# Patient Record
Sex: MALE | Race: ASIAN | Employment: OTHER | URBAN - METROPOLITAN AREA
[De-identification: names, ages, dates, MRNs, and addresses within clinical notes are randomized per-mention and may not be internally consistent; named-entity substitution may affect disease eponyms.]

---

## 2018-09-11 PROBLEM — E55.9 VITAMIN D DEFICIENCY: Status: ACTIVE | Noted: 2018-09-11

## 2018-09-11 PROCEDURE — 82043 UR ALBUMIN QUANTITATIVE: CPT | Performed by: INTERNAL MEDICINE

## 2018-09-11 PROCEDURE — 82570 ASSAY OF URINE CREATININE: CPT | Performed by: INTERNAL MEDICINE

## 2018-12-28 PROCEDURE — 81001 URINALYSIS AUTO W/SCOPE: CPT | Performed by: INTERNAL MEDICINE

## 2019-02-03 PROBLEM — E11.29 TYPE 2 DIABETES MELLITUS WITH MICROALBUMINURIA (HCC): Status: ACTIVE | Noted: 2019-02-03

## 2019-02-03 PROBLEM — R80.9 TYPE 2 DIABETES MELLITUS WITH MICROALBUMINURIA: Status: ACTIVE | Noted: 2019-02-03

## 2019-02-03 PROBLEM — E11.29 TYPE 2 DIABETES MELLITUS WITH MICROALBUMINURIA  (HCC): Status: ACTIVE | Noted: 2019-02-03

## 2019-02-03 PROBLEM — E11.29 TYPE 2 DIABETES MELLITUS WITH MICROALBUMINURIA: Status: ACTIVE | Noted: 2019-02-03

## 2019-02-03 PROBLEM — R80.9 TYPE 2 DIABETES MELLITUS WITH MICROALBUMINURIA (HCC): Status: ACTIVE | Noted: 2019-02-03

## 2019-02-03 PROBLEM — R80.9 TYPE 2 DIABETES MELLITUS WITH MICROALBUMINURIA  (HCC): Status: ACTIVE | Noted: 2019-02-03

## 2019-02-04 PROCEDURE — 81001 URINALYSIS AUTO W/SCOPE: CPT | Performed by: INTERNAL MEDICINE

## 2019-02-04 PROCEDURE — 84156 ASSAY OF PROTEIN URINE: CPT | Performed by: INTERNAL MEDICINE

## 2019-02-04 PROCEDURE — 82652 VIT D 1 25-DIHYDROXY: CPT | Performed by: INTERNAL MEDICINE

## 2019-02-04 PROCEDURE — 86335 IMMUNFIX E-PHORSIS/URINE/CSF: CPT | Performed by: INTERNAL MEDICINE

## 2019-02-04 PROCEDURE — 84165 PROTEIN E-PHORESIS SERUM: CPT | Performed by: INTERNAL MEDICINE

## 2019-02-04 PROCEDURE — 84100 ASSAY OF PHOSPHORUS: CPT | Performed by: INTERNAL MEDICINE

## 2019-02-04 PROCEDURE — 82043 UR ALBUMIN QUANTITATIVE: CPT | Performed by: INTERNAL MEDICINE

## 2019-02-04 PROCEDURE — 83883 ASSAY NEPHELOMETRY NOT SPEC: CPT | Performed by: INTERNAL MEDICINE

## 2019-02-04 PROCEDURE — 83970 ASSAY OF PARATHORMONE: CPT | Performed by: INTERNAL MEDICINE

## 2019-02-04 PROCEDURE — 86334 IMMUNOFIX E-PHORESIS SERUM: CPT | Performed by: INTERNAL MEDICINE

## 2019-02-04 PROCEDURE — 82570 ASSAY OF URINE CREATININE: CPT | Performed by: INTERNAL MEDICINE

## 2019-02-04 PROCEDURE — 82310 ASSAY OF CALCIUM: CPT | Performed by: INTERNAL MEDICINE

## 2019-02-04 PROCEDURE — 82565 ASSAY OF CREATININE: CPT | Performed by: INTERNAL MEDICINE

## 2019-02-04 PROCEDURE — 82784 ASSAY IGA/IGD/IGG/IGM EACH: CPT | Performed by: INTERNAL MEDICINE

## 2019-02-11 PROCEDURE — 84156 ASSAY OF PROTEIN URINE: CPT | Performed by: INTERNAL MEDICINE

## 2019-02-11 PROCEDURE — 82570 ASSAY OF URINE CREATININE: CPT | Performed by: INTERNAL MEDICINE

## 2019-02-11 PROCEDURE — 36415 COLL VENOUS BLD VENIPUNCTURE: CPT | Performed by: INTERNAL MEDICINE

## 2019-02-21 ENCOUNTER — HOSPITAL ENCOUNTER (OUTPATIENT)
Facility: HOSPITAL | Age: 69
Setting detail: HOSPITAL OUTPATIENT SURGERY
Discharge: HOME | End: 2019-02-21
Attending: OPHTHALMOLOGY | Admitting: OPHTHALMOLOGY
Payer: MEDICARE

## 2019-02-21 VITALS
BODY MASS INDEX: 30.46 KG/M2 | OXYGEN SATURATION: 100 % | DIASTOLIC BLOOD PRESSURE: 77 MMHG | WEIGHT: 224.88 LBS | HEIGHT: 72 IN | RESPIRATION RATE: 16 BRPM | TEMPERATURE: 98 F | HEART RATE: 56 BPM | SYSTOLIC BLOOD PRESSURE: 139 MMHG

## 2019-02-21 DIAGNOSIS — H25.812 COMBINED FORMS OF AGE-RELATED CATARACT OF LEFT EYE: ICD-10-CM

## 2019-02-21 LAB
GLUCOSE BLD-MCNC: 158 MG/DL (ref 70–99)
GLUCOSE BLD-MCNC: 168 MG/DL (ref 70–99)

## 2019-02-21 PROCEDURE — 08RK3JZ REPLACEMENT OF LEFT LENS WITH SYNTHETIC SUBSTITUTE, PERCUTANEOUS APPROACH: ICD-10-PCS | Performed by: OPHTHALMOLOGY

## 2019-02-21 PROCEDURE — 82962 GLUCOSE BLOOD TEST: CPT

## 2019-02-21 RX ORDER — TROPICAMIDE 10 MG/ML
1 SOLUTION/ DROPS OPHTHALMIC SEE ADMIN INSTRUCTIONS
Status: COMPLETED | OUTPATIENT
Start: 2019-02-21 | End: 2019-02-21

## 2019-02-21 RX ORDER — SODIUM CHLORIDE, SODIUM LACTATE, POTASSIUM CHLORIDE, CALCIUM CHLORIDE 600; 310; 30; 20 MG/100ML; MG/100ML; MG/100ML; MG/100ML
INJECTION, SOLUTION INTRAVENOUS CONTINUOUS
Status: DISCONTINUED | OUTPATIENT
Start: 2019-02-21 | End: 2019-02-21

## 2019-02-21 RX ORDER — LIDOCAINE HYDROCHLORIDE 10 MG/ML
INJECTION, SOLUTION EPIDURAL; INFILTRATION; INTRACAUDAL; PERINEURAL AS NEEDED
Status: DISCONTINUED | OUTPATIENT
Start: 2019-02-21 | End: 2019-02-21 | Stop reason: HOSPADM

## 2019-02-21 RX ORDER — ONDANSETRON 2 MG/ML
4 INJECTION INTRAMUSCULAR; INTRAVENOUS AS NEEDED
Status: DISCONTINUED | OUTPATIENT
Start: 2019-02-21 | End: 2019-02-21

## 2019-02-21 RX ORDER — HYDROMORPHONE HYDROCHLORIDE 1 MG/ML
0.4 INJECTION, SOLUTION INTRAMUSCULAR; INTRAVENOUS; SUBCUTANEOUS EVERY 5 MIN PRN
Status: DISCONTINUED | OUTPATIENT
Start: 2019-02-21 | End: 2019-02-21

## 2019-02-21 RX ORDER — HYDROCODONE BITARTRATE AND ACETAMINOPHEN 5; 325 MG/1; MG/1
1 TABLET ORAL AS NEEDED
Status: DISCONTINUED | OUTPATIENT
Start: 2019-02-21 | End: 2019-02-21

## 2019-02-21 RX ORDER — ACETAMINOPHEN 500 MG
1000 TABLET ORAL ONCE
Status: DISCONTINUED | OUTPATIENT
Start: 2019-02-21 | End: 2019-02-21 | Stop reason: HOSPADM

## 2019-02-21 RX ORDER — TETRACAINE HYDROCHLORIDE 5 MG/ML
1 SOLUTION OPHTHALMIC SEE ADMIN INSTRUCTIONS
Status: COMPLETED | OUTPATIENT
Start: 2019-02-21 | End: 2019-02-21

## 2019-02-21 RX ORDER — HYDROCODONE BITARTRATE AND ACETAMINOPHEN 5; 325 MG/1; MG/1
2 TABLET ORAL AS NEEDED
Status: DISCONTINUED | OUTPATIENT
Start: 2019-02-21 | End: 2019-02-21

## 2019-02-21 RX ORDER — DEXTROSE MONOHYDRATE 25 G/50ML
50 INJECTION, SOLUTION INTRAVENOUS
Status: DISCONTINUED | OUTPATIENT
Start: 2019-02-21 | End: 2019-02-21

## 2019-02-21 RX ORDER — TETRACAINE HYDROCHLORIDE 5 MG/ML
SOLUTION OPHTHALMIC AS NEEDED
Status: DISCONTINUED | OUTPATIENT
Start: 2019-02-21 | End: 2019-02-21 | Stop reason: HOSPADM

## 2019-02-21 RX ORDER — DEXTROSE MONOHYDRATE 25 G/50ML
50 INJECTION, SOLUTION INTRAVENOUS
Status: DISCONTINUED | OUTPATIENT
Start: 2019-02-21 | End: 2019-02-21 | Stop reason: HOSPADM

## 2019-02-21 RX ORDER — NALOXONE HYDROCHLORIDE 0.4 MG/ML
80 INJECTION, SOLUTION INTRAMUSCULAR; INTRAVENOUS; SUBCUTANEOUS AS NEEDED
Status: DISCONTINUED | OUTPATIENT
Start: 2019-02-21 | End: 2019-02-21

## 2019-02-21 RX ORDER — ACETAMINOPHEN 500 MG
1000 TABLET ORAL EVERY 6 HOURS PRN
COMMUNITY

## 2019-02-21 RX ORDER — PHENYLEPHRINE HCL 2.5 %
1 DROPS OPHTHALMIC (EYE) SEE ADMIN INSTRUCTIONS
Status: COMPLETED | OUTPATIENT
Start: 2019-02-21 | End: 2019-02-21

## 2019-02-21 RX ORDER — METOCLOPRAMIDE HYDROCHLORIDE 5 MG/ML
10 INJECTION INTRAMUSCULAR; INTRAVENOUS AS NEEDED
Status: DISCONTINUED | OUTPATIENT
Start: 2019-02-21 | End: 2019-02-21

## 2019-02-21 RX ORDER — CYCLOPENTOLATE HYDROCHLORIDE 10 MG/ML
1 SOLUTION/ DROPS OPHTHALMIC SEE ADMIN INSTRUCTIONS
Status: COMPLETED | OUTPATIENT
Start: 2019-02-21 | End: 2019-02-21

## 2019-02-21 RX ORDER — DEXAMETHASONE SODIUM PHOSPHATE 4 MG/ML
4 VIAL (ML) INJECTION AS NEEDED
Status: DISCONTINUED | OUTPATIENT
Start: 2019-02-21 | End: 2019-02-21

## 2019-02-21 RX ORDER — MOXIFLOXACIN 5 MG/ML
SOLUTION/ DROPS OPHTHALMIC AS NEEDED
Status: DISCONTINUED | OUTPATIENT
Start: 2019-02-21 | End: 2019-02-21 | Stop reason: HOSPADM

## 2019-02-21 RX ORDER — BALANCED SALT SOLUTION 6.4; .75; .48; .3; 3.9; 1.7 MG/ML; MG/ML; MG/ML; MG/ML; MG/ML; MG/ML
SOLUTION OPHTHALMIC AS NEEDED
Status: DISCONTINUED | OUTPATIENT
Start: 2019-02-21 | End: 2019-02-21 | Stop reason: HOSPADM

## 2019-02-21 NOTE — INTERVAL H&P NOTE
Pre-op Diagnosis: Combined forms of age-related cataract of left eye [H25.812]    The above referenced H&P was reviewed by Bennie Sánchez MD on 2/21/2019, the patient was examined and no significant changes have occurred in the patient's condition since th

## 2019-02-21 NOTE — OPERATIVE REPORT
[facility] OPERATIVE REPORT:     PATIENT NAME:  Ailyn Brown    MRN:  [id number]    DATE OF OPERATION:   2/21/2019      PREOPERATIVE DIAGNOSIS:   1. Cortical Cataract, OS  2. Nuclear Sclerotic Cataract, OS    POSTOPERATIVE DIAGNOSIS:   1.  Cortical cannula. The nucleus was then divided into 4 quadrants using a prechopper. The phacoemulsification tip was introduced into the eye, and the nuclear fragments were removed without difficulty.  The residual cortex was removed using automated irrigation and as

## 2019-04-03 PROBLEM — E66.09 CLASS 1 OBESITY DUE TO EXCESS CALORIES WITH SERIOUS COMORBIDITY AND BODY MASS INDEX (BMI) OF 31.0 TO 31.9 IN ADULT: Status: ACTIVE | Noted: 2019-04-03

## 2019-04-03 PROBLEM — M17.12 OSTEOARTHRITIS OF LEFT KNEE: Status: ACTIVE | Noted: 2019-04-03

## 2019-06-10 ENCOUNTER — TELEPHONE (OUTPATIENT)
Dept: INTERNAL MEDICINE CLINIC | Facility: CLINIC | Age: 69
End: 2019-06-10

## 2019-06-10 RX ORDER — LOSARTAN POTASSIUM 100 MG/1
100 TABLET ORAL DAILY
Qty: 90 TABLET | Refills: 2 | OUTPATIENT
Start: 2019-06-10

## 2019-06-10 NOTE — TELEPHONE ENCOUNTER
Requested Prescriptions     Pending Prescriptions Disp Refills   • losartan 100 MG Oral Tab 90 tablet 2     Sig: Take 1 tablet (100 mg total) by mouth daily.        [address] [address], [place] [address] Waqar Heredia

## 2019-06-10 NOTE — TELEPHONE ENCOUNTER
Fax from [facility] asking for refill for:      Losartan 100mg - daily - #90      Fax to:  [phone]

## 2019-06-21 PROCEDURE — 81001 URINALYSIS AUTO W/SCOPE: CPT | Performed by: INTERNAL MEDICINE

## 2019-06-21 PROCEDURE — 84156 ASSAY OF PROTEIN URINE: CPT | Performed by: INTERNAL MEDICINE

## 2019-08-27 PROCEDURE — 84156 ASSAY OF PROTEIN URINE: CPT | Performed by: INTERNAL MEDICINE

## 2019-08-27 PROCEDURE — 83970 ASSAY OF PARATHORMONE: CPT | Performed by: INTERNAL MEDICINE

## 2019-08-27 PROCEDURE — 84100 ASSAY OF PHOSPHORUS: CPT | Performed by: INTERNAL MEDICINE

## 2019-08-27 PROCEDURE — 82310 ASSAY OF CALCIUM: CPT | Performed by: INTERNAL MEDICINE

## 2019-08-27 PROCEDURE — 82565 ASSAY OF CREATININE: CPT | Performed by: INTERNAL MEDICINE

## 2019-09-10 RX ORDER — FENOFIBRATE 160 MG/1
160 TABLET ORAL DAILY
Qty: 90 TABLET | Refills: 0 | Status: SHIPPED | OUTPATIENT
Start: 2019-09-10 | End: 2019-12-10

## 2019-09-10 NOTE — TELEPHONE ENCOUNTER
Requested Prescriptions     Pending Prescriptions Disp Refills   • Fenofibrate 160 MG Oral Tab 90 tablet 2     Sig: Take 1 tablet (160 mg total) by mouth daily.        [address] [place] [address], [place] [address] [facility] 99.9

## 2019-11-21 RX ORDER — LOSARTAN POTASSIUM 100 MG/1
100 TABLET ORAL DAILY
Qty: 90 TABLET | Refills: 3 | Status: SHIPPED | OUTPATIENT
Start: 2019-11-21 | End: 2020-11-30

## 2019-11-26 PROBLEM — E11.69 COMBINED HYPERLIPIDEMIA ASSOCIATED WITH TYPE 2 DIABETES MELLITUS: Status: ACTIVE | Noted: 2019-11-26

## 2019-11-26 PROBLEM — E78.2 COMBINED HYPERLIPIDEMIA ASSOCIATED WITH TYPE 2 DIABETES MELLITUS  (HCC): Status: ACTIVE | Noted: 2019-11-26

## 2019-11-26 PROBLEM — E78.2 COMBINED HYPERLIPIDEMIA ASSOCIATED WITH TYPE 2 DIABETES MELLITUS (HCC): Status: ACTIVE | Noted: 2019-11-26

## 2019-11-26 PROBLEM — I15.2 HYPERTENSION ASSOCIATED WITH TYPE 2 DIABETES MELLITUS: Status: ACTIVE | Noted: 2019-11-26

## 2019-11-26 PROBLEM — I15.2 HYPERTENSION ASSOCIATED WITH TYPE 2 DIABETES MELLITUS  (HCC): Status: ACTIVE | Noted: 2019-11-26

## 2019-11-26 PROBLEM — E11.59 HYPERTENSION ASSOCIATED WITH TYPE 2 DIABETES MELLITUS: Status: ACTIVE | Noted: 2019-11-26

## 2019-11-26 PROBLEM — I15.2 HYPERTENSION ASSOCIATED WITH TYPE 2 DIABETES MELLITUS (HCC): Status: ACTIVE | Noted: 2019-11-26

## 2019-11-26 PROBLEM — E11.21 TYPE 2 DIABETES MELLITUS WITH NEPHROPATHY (HCC): Status: ACTIVE | Noted: 2019-11-26

## 2019-11-26 PROBLEM — E11.69 COMBINED HYPERLIPIDEMIA ASSOCIATED WITH TYPE 2 DIABETES MELLITUS (HCC): Status: ACTIVE | Noted: 2019-11-26

## 2019-11-26 PROBLEM — E11.59 HYPERTENSION ASSOCIATED WITH TYPE 2 DIABETES MELLITUS  (HCC): Status: ACTIVE | Noted: 2019-11-26

## 2019-11-26 PROBLEM — E78.2 COMBINED HYPERLIPIDEMIA ASSOCIATED WITH TYPE 2 DIABETES MELLITUS: Status: ACTIVE | Noted: 2019-11-26

## 2019-11-26 PROBLEM — E11.59 HYPERTENSION ASSOCIATED WITH TYPE 2 DIABETES MELLITUS (HCC): Status: ACTIVE | Noted: 2019-11-26

## 2019-11-26 PROBLEM — E11.69 COMBINED HYPERLIPIDEMIA ASSOCIATED WITH TYPE 2 DIABETES MELLITUS  (HCC): Status: ACTIVE | Noted: 2019-11-26

## 2019-12-11 RX ORDER — FENOFIBRATE 160 MG/1
TABLET ORAL
Qty: 90 TABLET | Refills: 3 | Status: SHIPPED | OUTPATIENT
Start: 2019-12-11 | End: 2020-12-02

## 2019-12-11 NOTE — TELEPHONE ENCOUNTER
Last office visit: 12/4/2019    Future Appointments   Date Time Provider Maximiliano Rain   2/5/2020  9:20 AM MD Mina Guzman [id number]       Last labs: 12/4/2019    Pharmacy: Daily    Requested Prescriptions     Signed Prescriptions Disp Ref

## 2020-03-10 RX ORDER — METOPROLOL SUCCINATE 100 MG/1
100 TABLET, EXTENDED RELEASE ORAL DAILY
Qty: 90 TABLET | Refills: 3 | OUTPATIENT
Start: 2020-03-10

## 2020-03-10 NOTE — TELEPHONE ENCOUNTER
Refill request has failed the Ambulatory Medication Refill Standing Order and is routed to the primary physician to review the following:    Requested Prescriptions     Refused Prescriptions Disp Refills   • Metoprolol Succinate  MG Oral Tablet 24 Hr

## 2020-04-11 ENCOUNTER — APPOINTMENT (OUTPATIENT)
Dept: GENERAL RADIOLOGY | Facility: HOSPITAL | Age: 70
End: 2020-04-11
Attending: EMERGENCY MEDICINE
Payer: MEDICARE

## 2020-04-11 ENCOUNTER — HOSPITAL ENCOUNTER (EMERGENCY)
Facility: HOSPITAL | Age: 70
Discharge: HOME | End: 2020-04-11
Attending: EMERGENCY MEDICINE
Payer: MEDICARE

## 2020-04-11 ENCOUNTER — APPOINTMENT (OUTPATIENT)
Dept: CT IMAGING | Facility: HOSPITAL | Age: 70
End: 2020-04-11
Attending: EMERGENCY MEDICINE
Payer: MEDICARE

## 2020-04-11 VITALS
HEIGHT: 72 IN | HEART RATE: 68 BPM | DIASTOLIC BLOOD PRESSURE: 76 MMHG | BODY MASS INDEX: 29.8 KG/M2 | RESPIRATION RATE: 18 BRPM | OXYGEN SATURATION: 96 % | TEMPERATURE: 97 F | SYSTOLIC BLOOD PRESSURE: 140 MMHG | WEIGHT: 220 LBS
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
## 2020-04-11 PROCEDURE — 72125 CT NECK SPINE W/O DYE: CPT | Performed by: EMERGENCY MEDICINE

## 2020-04-11 PROCEDURE — 99284 EMERGENCY DEPT VISIT MOD MDM: CPT

## 2020-04-11 PROCEDURE — 72100 X-RAY EXAM L-S SPINE 2/3 VWS: CPT | Performed by: EMERGENCY MEDICINE

## 2020-04-11 PROCEDURE — 73560 X-RAY EXAM OF KNEE 1 OR 2: CPT | Performed by: EMERGENCY MEDICINE

## 2020-04-11 PROCEDURE — 70450 CT HEAD/BRAIN W/O DYE: CPT | Performed by: EMERGENCY MEDICINE

## 2020-04-11 RX ORDER — HYDROCODONE BITARTRATE AND ACETAMINOPHEN 5; 325 MG/1; MG/1
1 TABLET ORAL ONCE
Status: COMPLETED | OUTPATIENT
Start: 2020-04-11 | End: 2020-04-11
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
## 2020-06-22 PROBLEM — D50.9 IRON DEFICIENCY ANEMIA: Status: ACTIVE | Noted: 2020-06-22

## 2020-06-22 PROBLEM — R60.0 BILATERAL LOWER EXTREMITY EDEMA: Status: ACTIVE | Noted: 2020-06-22

## 2020-07-01 PROBLEM — I44.7 LBBB (LEFT BUNDLE BRANCH BLOCK): Status: ACTIVE | Noted: 2020-07-01

## 2021-02-09 PROBLEM — R06.09 DOE (DYSPNEA ON EXERTION): Status: ACTIVE | Noted: 2021-02-09

## 2021-02-09 PROBLEM — R06.00 DOE (DYSPNEA ON EXERTION): Status: ACTIVE | Noted: 2021-02-09

## 2021-02-17 PROBLEM — R06.00 DOE (DYSPNEA ON EXERTION): Status: RESOLVED | Noted: 2021-02-09 | Resolved: 2021-02-17

## 2021-02-17 PROBLEM — R60.0 LOWER EXTREMITY EDEMA: Status: ACTIVE | Noted: 2020-06-22

## 2021-02-17 PROBLEM — I44.7 LBBB (LEFT BUNDLE BRANCH BLOCK): Status: RESOLVED | Noted: 2020-07-01 | Resolved: 2021-02-17

## 2021-02-17 PROBLEM — N18.4 CKD (CHRONIC KIDNEY DISEASE) STAGE 4, GFR 15-29 ML/MIN (HCC): Status: ACTIVE | Noted: 2021-02-17

## 2021-02-17 PROBLEM — E66.09 CLASS 1 OBESITY DUE TO EXCESS CALORIES WITH SERIOUS COMORBIDITY AND BODY MASS INDEX (BMI) OF 31.0 TO 31.9 IN ADULT: Status: RESOLVED | Noted: 2019-04-03 | Resolved: 2021-02-17

## 2021-02-17 PROBLEM — R60.0 BILATERAL LOWER EXTREMITY EDEMA: Status: RESOLVED | Noted: 2020-06-22 | Resolved: 2021-02-17

## 2021-02-17 PROBLEM — R06.09 DOE (DYSPNEA ON EXERTION): Status: RESOLVED | Noted: 2021-02-09 | Resolved: 2021-02-17

## 2021-02-17 PROBLEM — M17.12 OSTEOARTHRITIS OF LEFT KNEE: Status: RESOLVED | Noted: 2019-04-03 | Resolved: 2021-02-17

## 2021-03-24 ENCOUNTER — APPOINTMENT (OUTPATIENT)
Dept: GENERAL RADIOLOGY | Facility: HOSPITAL | Age: 71
DRG: 292 | End: 2021-03-24
Attending: EMERGENCY MEDICINE
Payer: MEDICARE

## 2021-03-24 ENCOUNTER — HOSPITAL ENCOUNTER (INPATIENT)
Facility: HOSPITAL | Age: 71
LOS: 2 days | Discharge: HOME | DRG: 292 | End: 2021-03-26
Attending: EMERGENCY MEDICINE | Admitting: STUDENT IN AN ORGANIZED HEALTH CARE EDUCATION/TRAINING PROGRAM
Payer: MEDICARE

## 2021-03-24 ENCOUNTER — APPOINTMENT (OUTPATIENT)
Dept: CV DIAGNOSTICS | Facility: HOSPITAL | Age: 71
DRG: 292 | End: 2021-03-24
Attending: INTERNAL MEDICINE
Payer: MEDICARE

## 2021-03-24 DIAGNOSIS — I50.9 ACUTE ON CHRONIC CONGESTIVE HEART FAILURE, UNSPECIFIED HEART FAILURE TYPE (HCC): ICD-10-CM

## 2021-03-24 DIAGNOSIS — J96.01 ACUTE RESPIRATORY FAILURE WITH HYPOXIA (HCC): ICD-10-CM

## 2021-03-24 DIAGNOSIS — J81.0 ACUTE PULMONARY EDEMA (HCC): Primary | ICD-10-CM

## 2021-03-24 PROCEDURE — 93005 ELECTROCARDIOGRAM TRACING: CPT

## 2021-03-24 PROCEDURE — 96365 THER/PROPH/DIAG IV INF INIT: CPT

## 2021-03-24 PROCEDURE — 85025 COMPLETE CBC W/AUTO DIFF WBC: CPT | Performed by: EMERGENCY MEDICINE

## 2021-03-24 PROCEDURE — 82962 GLUCOSE BLOOD TEST: CPT

## 2021-03-24 PROCEDURE — 84484 ASSAY OF TROPONIN QUANT: CPT | Performed by: EMERGENCY MEDICINE

## 2021-03-24 PROCEDURE — 93010 ELECTROCARDIOGRAM REPORT: CPT | Performed by: EMERGENCY MEDICINE

## 2021-03-24 PROCEDURE — 83880 ASSAY OF NATRIURETIC PEPTIDE: CPT | Performed by: EMERGENCY MEDICINE

## 2021-03-24 PROCEDURE — 80048 BASIC METABOLIC PNL TOTAL CA: CPT | Performed by: INTERNAL MEDICINE

## 2021-03-24 PROCEDURE — 96375 TX/PRO/DX INJ NEW DRUG ADDON: CPT

## 2021-03-24 PROCEDURE — 80048 BASIC METABOLIC PNL TOTAL CA: CPT | Performed by: EMERGENCY MEDICINE

## 2021-03-24 PROCEDURE — 85025 COMPLETE CBC W/AUTO DIFF WBC: CPT | Performed by: INTERNAL MEDICINE

## 2021-03-24 PROCEDURE — 99285 EMERGENCY DEPT VISIT HI MDM: CPT

## 2021-03-24 PROCEDURE — 96366 THER/PROPH/DIAG IV INF ADDON: CPT

## 2021-03-24 PROCEDURE — 71045 X-RAY EXAM CHEST 1 VIEW: CPT | Performed by: EMERGENCY MEDICINE

## 2021-03-24 RX ORDER — HYDRALAZINE HYDROCHLORIDE 25 MG/1
25 TABLET, FILM COATED ORAL 3 TIMES DAILY
Status: DISCONTINUED | OUTPATIENT
Start: 2021-03-24 | End: 2021-03-26

## 2021-03-24 RX ORDER — LOSARTAN POTASSIUM 100 MG/1
100 TABLET ORAL DAILY
Status: DISCONTINUED | OUTPATIENT
Start: 2021-03-24 | End: 2021-03-26

## 2021-03-24 RX ORDER — PANTOPRAZOLE SODIUM 40 MG/1
40 TABLET, DELAYED RELEASE ORAL
Status: DISCONTINUED | OUTPATIENT
Start: 2021-03-24 | End: 2021-03-26

## 2021-03-24 RX ORDER — GABAPENTIN 300 MG/1
300 CAPSULE ORAL 2 TIMES DAILY
Status: DISCONTINUED | OUTPATIENT
Start: 2021-03-24 | End: 2021-03-26

## 2021-03-24 RX ORDER — ACETAMINOPHEN 325 MG/1
650 TABLET ORAL EVERY 6 HOURS PRN
Status: DISCONTINUED | OUTPATIENT
Start: 2021-03-24 | End: 2021-03-26

## 2021-03-24 RX ORDER — DEXTROSE MONOHYDRATE 25 G/50ML
50 INJECTION, SOLUTION INTRAVENOUS
Status: DISCONTINUED | OUTPATIENT
Start: 2021-03-24 | End: 2021-03-26

## 2021-03-24 RX ORDER — NITROGLYCERIN 20 MG/100ML
INJECTION INTRAVENOUS CONTINUOUS
Status: DISCONTINUED | OUTPATIENT
Start: 2021-03-24 | End: 2021-03-26

## 2021-03-24 RX ORDER — ALBUTEROL SULFATE 90 UG/1
2 AEROSOL, METERED RESPIRATORY (INHALATION) EVERY 6 HOURS PRN
Status: DISCONTINUED | OUTPATIENT
Start: 2021-03-24 | End: 2021-03-26

## 2021-03-24 RX ORDER — ASPIRIN 81 MG/1
81 TABLET ORAL DAILY
Status: DISCONTINUED | OUTPATIENT
Start: 2021-03-24 | End: 2021-03-26

## 2021-03-24 RX ORDER — TAMSULOSIN HYDROCHLORIDE 0.4 MG/1
0.8 CAPSULE ORAL DAILY
Status: DISCONTINUED | OUTPATIENT
Start: 2021-03-24 | End: 2021-03-26

## 2021-03-24 RX ORDER — AMLODIPINE BESYLATE 10 MG/1
10 TABLET ORAL DAILY
Status: DISCONTINUED | OUTPATIENT
Start: 2021-03-24 | End: 2021-03-26

## 2021-03-24 RX ORDER — FUROSEMIDE 10 MG/ML
40 INJECTION INTRAMUSCULAR; INTRAVENOUS
Status: DISCONTINUED | OUTPATIENT
Start: 2021-03-24 | End: 2021-03-25

## 2021-03-24 RX ORDER — HEPARIN SODIUM 5000 [USP'U]/ML
5000 INJECTION, SOLUTION INTRAVENOUS; SUBCUTANEOUS EVERY 8 HOURS SCHEDULED
Status: DISCONTINUED | OUTPATIENT
Start: 2021-03-24 | End: 2021-03-26

## 2021-03-24 RX ORDER — FENOFIBRATE 134 MG/1
134 CAPSULE ORAL
Status: DISCONTINUED | OUTPATIENT
Start: 2021-03-24 | End: 2021-03-26

## 2021-03-24 RX ORDER — METOPROLOL SUCCINATE 100 MG/1
100 TABLET, EXTENDED RELEASE ORAL DAILY
Status: DISCONTINUED | OUTPATIENT
Start: 2021-03-24 | End: 2021-03-26

## 2021-03-24 RX ORDER — ONDANSETRON 2 MG/ML
4 INJECTION INTRAMUSCULAR; INTRAVENOUS EVERY 4 HOURS PRN
Status: ACTIVE | OUTPATIENT
Start: 2021-03-24 | End: 2021-03-24

## 2021-03-24 RX ORDER — PRAVASTATIN SODIUM 20 MG
20 TABLET ORAL NIGHTLY
Status: DISCONTINUED | OUTPATIENT
Start: 2021-03-24 | End: 2021-03-26

## 2021-03-24 RX ORDER — FUROSEMIDE 10 MG/ML
40 INJECTION INTRAMUSCULAR; INTRAVENOUS ONCE
Status: COMPLETED | OUTPATIENT
Start: 2021-03-24 | End: 2021-03-24

## 2021-03-24 RX ORDER — FINASTERIDE 5 MG/1
5 TABLET, FILM COATED ORAL DAILY
Status: DISCONTINUED | OUTPATIENT
Start: 2021-03-24 | End: 2021-03-26

## 2021-03-24 NOTE — PLAN OF CARE
Patient alert and oriented x4. Patient has been comfortable in bed with call light in reach. Patient is self care can ambulate to the bathroom by himself. Patient was suppose to get Echo today, push back to possibly tomorrow. BNP was slightly elevated.  Michele Vogt cardiology and pulmonology  - Respiratory Txs as needed  - Medications as ordered  - Labs and procedures as ordered  - See additional Care Plan goals for specific interventions  Outcome: Progressing     Problem: CARDIOVASCULAR - ADULT  Goal: Maintains opti

## 2021-03-24 NOTE — ED NOTES
Pt states that WOB is easier. Pts work of breathing appears slightly easier. O2 turned down to 2l o2 nc. Will continue to monitor.

## 2021-03-24 NOTE — ED NOTES
Orders for admission, patient is aware of plan and ready to go upstairs. Any questions, please call ED RN Wilfred Keenan at extension [phone].    Type of COVID test sent: rapid  COVID Suspicion level: Low    Titratable drug(s) infusing: n/a  Rate:    LOC at time of tr

## 2021-03-24 NOTE — PLAN OF CARE
Pt admitted from ER with increasing SOB. Oxygenating WNL on 3L NC. No complaints or events at this time. Self care. Plan is to have ECHO today and continue with diuresing.      Problem: Patient Centered Care  Goal: Patient preferences are identified and int CARDIOVASCULAR - ADULT  Goal: Maintains optimal cardiac output and hemodynamic stability  Description: INTERVENTIONS:  - Monitor vital signs, rhythm, and trends  - Monitor for bleeding, hypotension and signs of decreased cardiac output  - Evaluate effectiv vital signs for trends  - Administer supportive blood products/factors, fluids and medications as ordered and appropriate  - Administer supportive blood products/factors as ordered and appropriate  Outcome: Progressing  Goal: Free from bleeding injury  [place]

## 2021-03-24 NOTE — PROGRESS NOTES
ASSESSMENT/PLAN:     Impression: 1. Acute shortness of breath suspicious for congestive heart failure in a 57-year-old male with risk factors heart disease. (HFpEF)  2. Chronic kidney injury. 3.  Diabetes mellitus. 4.  Hypertension 5.   Obesity    Rec CAD: No   Social History:  Social History    Tobacco Use      Smoking status: Never Smoker      Smokeless tobacco: Never Used    Vaping Use      Vaping Use: Never used    Alcohol use: No    Drug use: No       REVIEW OF SYSTEMS:   CONS:denies fever, chills,

## 2021-03-24 NOTE — ED NOTES
Pt c/o dyspnea x2days, worse on exertion. pts work of breathing is labored. Skin color is appropriate. Mild conversational dyspnea. Pt is aox4. Pt states that he has been compliant with his meds. Will continue to monitor.

## 2021-03-24 NOTE — H&P
[facility] Hospitalist H&P     CC: Patient presents with:  Difficulty Breathing     PCP: Ky Hall MD    Date of Admission: 3/24/2021 12:13 AM    ASSESSMENT / PLAN:     Mr. Taurus Marquez is a 64 yo M with PMH of DM2, HTN, HL, BPH who presented with shortness o lasix to take on day prior to admission. In the ED, BP elevated, initially started on nitroglycerin gtt and received IV lasix. Breathing feels better today but not back to baseline.        PMH  Past Medical History:   Diagnosis Date   • BPH (benign prostati Monitoring Suppl [facility] ULTRA MINI) w/Device Does not apply Kit, Test Blood Sugar Once Daily. Non-Insulin Dependent Diabetes Type II. E11.9., Disp: 1 kit, Rfl: 0  OneTouch UltraSoft Lancets Does not apply Misc, Test Blood Sugar Once Daily.  Non-Insulin D HCT 36.5* 34.7*   MCV 96.3 96.7   MCH 29.3 29.2   MCHC 30.4* 30.3*   RDW 14.1 14.1   NEPRELIM 9.29* 8.75*   WBC 12.1* 11.0   .0 206.0         Recent Labs   Lab 03/24/21  0019 03/24/21  0544   * 149*   BUN 44* 43*   CREATSERUM 2.76* 2.73*

## 2021-03-24 NOTE — ED PROVIDER NOTES
Patient Seen in: [facility] AND [facility] Emergency Department    History   Patient presents with:  Difficulty Breathing      HPI    The patient presents to the ED complaining of shortness of breath of the past 2 days it been progressive. Severe today.   He sta Year:       Ran Out of Food in the Last Year:   Transportation Needs:       Lack of Transportation (Medical):       Lack of Transportation (Non-Medical):   Physical Activity:       Days of Exercise per Week:       Minutes of Exercise per Session:   Stress: Conjunctiva/sclera: Conjunctivae normal.   Neck:      Trachea: No tracheal deviation. Cardiovascular:      Rate and Rhythm: Normal rate. Pulmonary:      Effort: Tachypnea and respiratory distress present. Breath sounds: No stridor.  Examination of -----------         ------                                     CBC W/ DIFFERENTIAL[[id number]]          Abnormal            Final result                                                 Please view results for these tests on the individu 03/24/21  0215 03/24/21  0230 03/24/21  0245   BP: 147/76 154/72 155/83 156/90   Pulse: 82 82 82 78   Resp: 18 21 19 16   Temp:       SpO2: 100% 96% 96% 96%     *I personally reviewed and interpreted all ED vitals.     Pulse Ox: 82%, Room air, hypoxic    [facility])    Disposition:  Admit    Follow-up:  No follow-up provider specified.     Medications Prescribed:  Current Discharge Medication List        Hospital Problems     Present on Admission  Date Reviewed: 3/12/2021        ICD-10-CM Noted POA    * (Amelia Contreras

## 2021-03-25 ENCOUNTER — APPOINTMENT (OUTPATIENT)
Dept: CV DIAGNOSTICS | Facility: HOSPITAL | Age: 71
DRG: 292 | End: 2021-03-25
Attending: INTERNAL MEDICINE
Payer: MEDICARE

## 2021-03-25 ENCOUNTER — APPOINTMENT (OUTPATIENT)
Dept: ULTRASOUND IMAGING | Facility: HOSPITAL | Age: 71
DRG: 292 | End: 2021-03-25
Attending: INTERNAL MEDICINE
Payer: MEDICARE

## 2021-03-25 PROCEDURE — 84156 ASSAY OF PROTEIN URINE: CPT | Performed by: INTERNAL MEDICINE

## 2021-03-25 PROCEDURE — 82962 GLUCOSE BLOOD TEST: CPT

## 2021-03-25 PROCEDURE — 84466 ASSAY OF TRANSFERRIN: CPT | Performed by: INTERNAL MEDICINE

## 2021-03-25 PROCEDURE — 83540 ASSAY OF IRON: CPT | Performed by: INTERNAL MEDICINE

## 2021-03-25 PROCEDURE — 80048 BASIC METABOLIC PNL TOTAL CA: CPT | Performed by: INTERNAL MEDICINE

## 2021-03-25 PROCEDURE — 93306 TTE W/DOPPLER COMPLETE: CPT | Performed by: INTERNAL MEDICINE

## 2021-03-25 PROCEDURE — 81001 URINALYSIS AUTO W/SCOPE: CPT | Performed by: INTERNAL MEDICINE

## 2021-03-25 PROCEDURE — 82728 ASSAY OF FERRITIN: CPT | Performed by: INTERNAL MEDICINE

## 2021-03-25 PROCEDURE — 82570 ASSAY OF URINE CREATININE: CPT | Performed by: INTERNAL MEDICINE

## 2021-03-25 PROCEDURE — 84540 ASSAY OF URINE/UREA-N: CPT | Performed by: INTERNAL MEDICINE

## 2021-03-25 PROCEDURE — 84300 ASSAY OF URINE SODIUM: CPT | Performed by: INTERNAL MEDICINE

## 2021-03-25 PROCEDURE — 76770 US EXAM ABDO BACK WALL COMP: CPT | Performed by: INTERNAL MEDICINE

## 2021-03-25 RX ORDER — FUROSEMIDE 10 MG/ML
40 INJECTION INTRAMUSCULAR; INTRAVENOUS DAILY
Status: DISCONTINUED | OUTPATIENT
Start: 2021-03-25 | End: 2021-03-26

## 2021-03-25 NOTE — PROGRESS NOTES
[facility] Hospitalist Progress Note     CC: Hospital Follow up    PCP: Ayaka Camacho MD       Assessment/Plan:     Principal Problem:    Acute pulmonary edema ([facility] [address]  Active Problems:    Acute on chronic congestive heart failure, unspecified heart failure t (1.829 m), weight 238 lb 9.6 oz (108.2 kg), SpO2 95 %.     Temp:  [97.9 °F (36.6 °C)-98.5 °F (36.9 °C)] 98.3 °F (36.8 °C)  Pulse:  [70-84] 70  Resp:  [15-16] 16  BP: (134-148)/(68-81) 134/70      Intake/Output:    Intake/Output Summary (Last 24 hours) at 3/ 8:13 AM              Meds:     • furosemide  40 mg Intravenous Daily   • Heparin Sodium (Porcine)  5,000 Units Subcutaneous Q8H [address]   • amLODIPine Besylate  10 mg Oral Daily   • aspirin  81 mg Oral Daily   • fenofibrate micronized  134 mg Oral Daily with b

## 2021-03-25 NOTE — PAYOR COMM NOTE
--------------  ADMISSION REVIEW     Payor: [address] #:  [id number]  Authorization Number: [id number]    Admit date: 3/24/21  Admit time:  3:39 AM       Admitting Physician: Jaycee Coppola MD  Attending Physician:  Sharda Solano history.     Smoking Status: Social History    Socioeconomic History      Marital status:       Spouse name: Not on file      Number of children: Not on file      Years of education: Not on file      Highest education level: Not on file    Tobacco Us droplet mask on my arrival to the room.  Personal protective equipment including droplet mask, eye protection, and gloves were worn throughout the duration of the exam.  Handwashing was performed prior to and after the exam.  Stethoscope and any equipment u limits   CBC W/ DIFFERENTIAL - Abnormal; Notable for the following components:    WBC 12.1 (*)     RBC 3.79 (*)     HGB 11.1 (*)     HCT 36.5 (*)     MCHC 30.4 (*)     RDW-SD 50.1 (*)     Neutrophil Absolute Prelim 9.29 (*)     Neutrophil Absolute 9.29 (*) angle cuggestive of pleural effusion. Report was faxed/finalized only to the ER and radiology departments at 1:58 AM EST. If you would like to discuss this case directly please call [phone] (extension [phone]).   If you can't reach me at this number, continued diuresis. Discussed the [facility] hospitalist and cardiology. Critical Care:   I spent a total of 34 minutes of critical care time in obtaining history, performing a physical exam, bedside monitoring of interventions, collecting and interpreting colette SSI    HTN  - BP elevated on admit, initially on nitroglycerin gtt, stopped at 2AM  - continue home amlodipine 10, metoprolol 100 mg BID, hydralazine 25 mg TID, losartan 100 mg daily    LBBB  - seen on prior EKG  - had recent stress test that was negative PSH  Past Surgical History:   Procedure Laterality Date   • CATARACT Right    • EYE CATARACT WITH INTRAOCULAR LENS Left 2/21/2019    Performed by Shantelle Jeter MD at [facility] MAIN OR   • RIGHT PHACOEMULSIFICATION OF CATARACT WITH INTRAOCULAR LENS IMPLAN Rfl: 3  tamsulosin (FLOMAX) cap, Take 2 capsules (0.8 mg total) by mouth daily. , Disp: 180 capsule, Rfl: 1  gabapentin 300 MG Oral Cap, Take 1 capsule (300 mg total) by mouth 2 (two) times daily. , Disp: 180 capsule, Rfl: 3  omeprazole 20 MG Oral Capsule Marisol Lilly 03/24/2021    K 4.6 03/24/2021     03/24/2021    CO2 30.0 03/24/2021     03/24/2021    CA 8.8 03/24/2021    TROP <0.045 03/24/2021       Recent Labs   Lab 03/24/21  0019   TROP <0.045       Additional Diagnostics:  FAUSTO    CXR: image personall (hyperlipidemia)    • HTN (hypertension)    • Proteinuria    • Type II diabetes mellitus [facility])      Past Surgical History         Past Surgical History:   Procedure Laterality Date   • CATARACT Right    • EYE CATARACT WITH INTRAOCULAR LENS Left 2/21/2019 PRN   Or   dextrose 50 % injection 50 mL, 50 mL, Intravenous, Q15 Min PRN   Or   glucose (DEX4) oral liquid 30 g, 30 g, Oral, Q15 Min PRN   Or   Glucose-Vitamin C (DEX-4) chewable tab 8 tablet, 8 tablet, Oral, Q15 Min PRN   Insulin Aspart Pen (NOVOLOG) 100 MG Oral Tab, Take 81 mg by mouth daily. hydrALAzine HCl 25 MG Oral Tab, Take 1 tablet (25 mg total) by mouth 3 (three) times daily.  Take two in AM and one in PM   Albuterol Sulfate  (90 Base) MCG/ACT Inhalation Aero Soln, Inhale 2 puffs into the l there are no major discrepancies. Dictated by (CST): Sheryl Chowdhury MD on 3/24/2021 at 8:11 AM Finalized by (CST): Sheryl Chowdhury MD on 3/24/2021 at 8:13 AM     Diagnosis:   Arterionephrosclerosis. Diabetic Glomerulosclerosis.    Comment:   [facility]   Nephrology     3/25  Assessment and Plan:         1. HFpEF  2. Acute on CKD  3.  HTN  4. Obesity           PLAN:  - will discuss with renal  - Echo           Subjective:      Feels better     Objective:   Temp:  [97.9 °F (36.6 °C)-98.5 °F (3 submitted and there are no major discrepancies. Dictated by (CST): Chase Conde MD on 3/24/2021 at 8:11 AM     Finalized by (CST):  Chase Conde MD on 3/24/2021 at 8:13 AM           EKG 12-LEAD     Result Date: 3/24/2021  ECG Report  Interpreta Given 25 mg Oral Ej Caldwell RN    3/24/2021 2104 Given 25 mg Oral Myesha Ahmadi RN    3/24/2021 1631 Given 25 mg Oral Mayda Rhodes      losartan (COZAAR) tab 100 mg     Date Action Dose Route User    3/25/2021 9106 Given 100 mg Oral Vigilia,

## 2021-03-25 NOTE — PROGRESS NOTES
Assessment and Plan:       1. HFpEF  2. Acute on CKD  3.  HTN  4. Obesity        PLAN:  - will discuss with renal  - Echo        Subjective:     Feels better    Objective:   Temp:  [97.9 °F (36.6 °C)-98.5 °F (36.9 °C)] 98.3 °F (36.8 °C)  Pulse:  [79-84] 8 MD on 3/24/2021 at 8:11 AM     Finalized by (CST):  Maxwell Flor MD on 3/24/2021 at 8:13 AM          EKG 12-LEAD    Result Date: 3/24/2021  ECG Report  Interpretation  -------------------------- Sinus Rhythm -Intraventricular Conduction Delay and left

## 2021-03-25 NOTE — PLAN OF CARE
VS stable. Denies any pain. Independent with ADLs. On 1.5 liter of oxygen - will attempt to wean. Renal consult today. Problem: Patient Centered Care  Goal: Patient preferences are identified and integrated in the patient's plan of care  Description:  In and hemodynamic stability  Description: INTERVENTIONS:  - Monitor vital signs, rhythm, and trends  - Monitor for bleeding, hypotension and signs of decreased cardiac output  - Evaluate effectiveness of vasoactive medications to optimize hemodynamic stabili products/factors, fluids and medications as ordered and appropriate  - Administer supportive blood products/factors as ordered and appropriate  Outcome: Progressing  Goal: Free from bleeding injury  Description: (Example usage: patient with low platelets)

## 2021-03-25 NOTE — CONSULTS
[facility]    Report of Consultation    Chelo Beltrán Patient Status:  Inpatient    1950 MRN [id number]   Location [facility] Attending Lauren Davis MD   Hosp Day # 1 PCP Aly Monreal MD     Date of Admission: Intravenous, Continuous  acetaminophen (TYLENOL) tab 650 mg, 650 mg, Oral, Q6H PRN  furosemide (LASIX) injection 40 mg, 40 mg, Intravenous, BID (Diuretic)  Heparin Sodium (Porcine) 5000 UNIT/ML injection 5,000 Units, 5,000 Units, Subcutaneous, Q8H YOLANDA  Alb mouth every evening. losartan 100 MG Oral Tab, Take 1 tablet (100 mg total) by mouth daily. metFORMIN HCl 1000 MG Oral Tab, Take 1 tablet (1,000 mg total) by mouth 2 (two) times daily with meals.   Fenofibrate 160 MG Oral Tab, Take 1 tablet (160 mg total) PERRL  Neck: No JVD. Respiratory: Diminished breath sounds. Cardiovascular: S1, S2.  Regular rate and rhythm. No murmurs. Abdomen: Soft, nontender, nondistended. Positive bowel sounds.   Ext: 1+ LE edema      Results:     Laboratory Data:  Lab Result velocities in the renal artery.    Parenchymal signals and kidney size are normal.    Impression:   65year old male with PMH of HTN, DM2, BPH, CKD stage 4 Biopsy proven arterionephrosclerosis and diabetic nephropathy, presents with increased SOB:    SOB/

## 2021-03-25 NOTE — PLAN OF CARE
O2 2 l n/c. Iv lasix continues. Diuresing well. 2 d echo today.     Problem: Patient Centered Care  Goal: Patient preferences are identified and integrated in the patient's plan of care  Description: Interventions:  - What would you like us to know as we ca INTERVENTIONS:  - Monitor vital signs, rhythm, and trends  - Monitor for bleeding, hypotension and signs of decreased cardiac output  - Evaluate effectiveness of vasoactive medications to optimize hemodynamic stability  - Monitor arterial and/or venous pun and appropriate  - Administer supportive blood products/factors as ordered and appropriate  Outcome: Progressing  Goal: Free from bleeding injury  Description: (Example usage: patient with low platelets)  INTERVENTIONS:  - Avoid intramuscular injections, e

## 2021-03-26 VITALS
WEIGHT: 235.19 LBS | OXYGEN SATURATION: 95 % | BODY MASS INDEX: 31.86 KG/M2 | TEMPERATURE: 98 F | HEIGHT: 72 IN | HEART RATE: 71 BPM | RESPIRATION RATE: 18 BRPM | SYSTOLIC BLOOD PRESSURE: 131 MMHG | DIASTOLIC BLOOD PRESSURE: 73 MMHG

## 2021-03-26 PROCEDURE — 82962 GLUCOSE BLOOD TEST: CPT

## 2021-03-26 PROCEDURE — 83735 ASSAY OF MAGNESIUM: CPT | Performed by: INTERNAL MEDICINE

## 2021-03-26 PROCEDURE — 80048 BASIC METABOLIC PNL TOTAL CA: CPT | Performed by: INTERNAL MEDICINE

## 2021-03-26 PROCEDURE — 80069 RENAL FUNCTION PANEL: CPT | Performed by: INTERNAL MEDICINE

## 2021-03-26 RX ORDER — METOPROLOL SUCCINATE 50 MG/1
50 TABLET, EXTENDED RELEASE ORAL DAILY
Qty: 30 TABLET | Refills: 0 | Status: SHIPPED | OUTPATIENT
Start: 2021-03-27 | End: 2021-04-26

## 2021-03-26 RX ORDER — METOPROLOL SUCCINATE 50 MG/1
50 TABLET, EXTENDED RELEASE ORAL DAILY
Status: DISCONTINUED | OUTPATIENT
Start: 2021-03-27 | End: 2021-03-26

## 2021-03-26 NOTE — PROGRESS NOTES
Assessment and Plan:       1. HFpEF  - EF now 30-40%  - RVSP 50  2. Acute on CKD  3. HTN  4. Obesity        PLAN:  - DC  - outpatient referral for possible cath  - discussed with Dr. Tony Sanchez and call out to son.   - decrease metoprolol       Son: Faviola Brothers

## 2021-03-26 NOTE — PROGRESS NOTES
[facility]    Progress Note    Gio Gold Patient Status:  Inpatient    1950 MRN [id number]   Location [address] [facility] Attending Gonzalez Oquendo, [address] [facility] Day # 2 PCP Shukri Landry MD       Subjective:     Improved O2 intake/output  - daily standing weights.     MED on CKD stage 4:  - Baseline Creatinine 2.2 2/2 Bx proven arterionephrosclerosis and diabetic glomerulopathy  - MED from CRS and progressive kidney disease.  - Renal Ultrasound Pending  - Avoid Nephrotoxins

## 2021-03-26 NOTE — PAYOR COMM NOTE
--------------  CONTINUED STAY REVIEW    Payor: [facility]  Subscriber #:  [id number]  Authorization Number: [id number]    Admit date: 3/24/21  Admit time:  3:39 AM    Admitting Physician: Emeli James DO  Attending Physician:  Fabrice Ryan arterionephrosclerosis and diabetic nephropathy, presents with increased SOB:     SOB/ HF exacerbation:  - continue lasix 40mg IV every day for now.   - Strict intake/output  - daily standing weights.     MED on CKD stage 4:  - Baseline Creatinine 2.2 2/2 B 7461 Given 300 mg Oral Marlys Rivas, RN    3/25/2021 2113 Given 300 mg Oral Patti Castillor, RN      Heparin Sodium (Porcine) 5000 UNIT/ML injection 5,000 Units     Date Action Dose Route User    3/26/2021 0612 Given 5,000 Units Subcutaneous (Left Upp

## 2021-03-26 NOTE — CDS QUERY
Heart Failure  CLINICAL DOCUMENTATION CLARIFICATION FORM    How To Answer This Query:  1)  Click \"Edit Button\" on the toolbar. 2)  Type an \"X\" in the bracket for the diagnosis that applies.   (You may also add additional clinical details as you feel ne parameters are consistent with abnormal left ventricular      relaxation (grade 1 diastolic dysfunction). 03/24 CXR - CONCLUSION:   1. Unfavorable change from June 14 , 2012. 2. Cardiomegaly with moderate CHF.      PMH -   HTN, DM2, CKD 4     If you

## 2021-03-26 NOTE — PLAN OF CARE
Problem: Patient Centered Care  Goal: Patient preferences are identified and integrated in the patient's plan of care  Description: Interventions:  - What would you like us to know as we care for you? I have three sons and two are becoming doctors.   - Pr bleeding, hypotension and signs of decreased cardiac output  - Evaluate effectiveness of vasoactive medications to optimize hemodynamic stability  - Monitor arterial and/or venous puncture sites for bleeding and/or hematoma  - Assess quality of pulses, ski ordered and appropriate  Outcome: Progressing  Goal: Free from bleeding injury  Description: (Example usage: patient with low platelets)  INTERVENTIONS:  - Avoid intramuscular injections, enemas and rectal medication administration  - Ensure safe mobilizat

## 2021-03-26 NOTE — PLAN OF CARE
Plan for discharge home today; no new complaints; up ambulating in room; call light in reach.    Problem: Patient Centered Care  Goal: Patient preferences are identified and integrated in the patient's plan of care  Description: Interventions:  - What would stability  Description: INTERVENTIONS:  - Monitor vital signs, rhythm, and trends  - Monitor for bleeding, hypotension and signs of decreased cardiac output  - Evaluate effectiveness of vasoactive medications to optimize hemodynamic stability  - Monitor ar medications as ordered and appropriate  - Administer supportive blood products/factors as ordered and appropriate  Outcome: Completed  Goal: Free from bleeding injury  Description: (Example usage: patient with low platelets)  INTERVENTIONS:  - Avoid intram

## 2021-03-27 NOTE — DISCHARGE SUMMARY
General Medicine Discharge Summary     Patient ID:  George Mauricio  65year old  9/1/1950    Admit date: 3/24/2021    Discharge date and time: 3/26/2021  6:19 PM     Attending Physician: Dr. Sulma Espinoza: Stefani Hoffmann lasix 40 mg BID IV and switched to po torsemide at d/c  - TTE ordered     MED on CKD4  - Cr 2.7 on admit, recent baseline 2.2-2.6  - monitor  - renal US last year with medical renal disease  - renal consulted,   - resumed losartan, holding aldactone, home MG Tabs  Commonly known as: NORVASC  TAKE 1 TABLET(10 MG) BY MOUTH DAILY     aspirin 81 MG Tabs     ergocalciferol 1.25 MG (06026 UT) Caps  Commonly known as: DRISDOL/VITAMIN D2  Take 1 capsule (50,000 Units total) by mouth every 7 days.      Fenofibrate 16 daily.           Where to Get Your Medications      These medications were sent to [place] [address] [address] [facility]  [address] [facility], [phone], [place] [place], [place] [id number]

## 2021-03-29 NOTE — PAYOR COMM NOTE
--------------  DISCHARGE REVIEW    Payor: [address] #:  [id number]  Authorization Number: [id number]    Admit date: 3/24/21  Admit time:   3:39 AM  Discharge Date: 3/26/2021  6:19 PM     Admitting Physician: Karen Harp DO  At lasix to take on day prior to admission. In the ED, BP elevated, initially started on nitroglycerin gtt and received IV lasix. Breathing feels better today but not back to baseline. Hospital Course: Pt managed with IV diuresis, resopnded well.  SOB impr Disposition: home    Activity: activity as tolerated  Diet: cardiac diet  Code Status: Full Code    Home Medication Changes:    Med list     Medication List      CHANGE how you take these medications    Metoprolol Succinate ER 50 MG Tb24  Commonly OneTouch Ultra Strp  Generic drug: Glucose Blood  Test Blood Sugar Once Daily. Non-Insulin Dependent Diabetes Type II. E11.9. OneTouch UltraSoft Lancets Misc  Test Blood Sugar Once Daily. Non-Insulin Dependent Diabetes Type II. E11.9.      Ozempic (0. Low Sodium Diet  Fluid restriction 1500ml/day    Total Time Coordinating Care: Greater than 30 minutes     I personally reviewed and reconciled current and discharge meds. Rx sent to pharmacy for new meds, changes noted and explained to patient.  Patient's

## 2021-03-30 NOTE — H&P (VIEW-ONLY)
Interventional Cardiology Consultation  [facility]    [facility] Patient Status:  No patient class for patient encounter    1950 MRN [id number]   Location CARDIOLOGY - [address], [facility] Attending No att. providers found   José Luis Reid Allergies    Medications:  Metoprolol Succinate ER 50 MG Oral Tablet 24 Hr, Take 1 tablet (50 mg total) by mouth daily. , Disp: 30 tablet, Rfl: 0  torsemide 20 MG Oral Tab, Take 1 tablet (20 mg total) by mouth daily. , Disp: 90 tablet, Rfl: 3  amLODIPine Bes 1  finasteride 5 MG Oral Tab, TAKE 1 TABLET(5 MG) BY MOUTH DAILY, Disp: 90 tablet, Rfl: 3  tamsulosin (FLOMAX) cap, Take 2 capsules (0.8 mg total) by mouth daily. , Disp: 180 capsule, Rfl: 1  gabapentin 300 MG Oral Cap, Take 1 capsule (300 mg total) by mout and oriented, normal affect. Psych: normal mood and affect  Skin: Warm and dry. Laboratories and Data:  Diagnostics:    TTE 3/2021:  1. Procedure narrative: The study was technically limited due to      body habitus. 2. Left ventricle:  The cavity 09/11/2018     Lab Results   Component Value Date     06/16/2020     12/14/2018     (H) 09/11/2018     Lab Results   Component Value Date    AST 19 02/06/2020    AST 26 02/04/2019    AST 30 12/14/2018     Lab Results   Component Value

## 2021-04-04 ENCOUNTER — LAB ENCOUNTER (OUTPATIENT)
Dept: LAB | Facility: HOSPITAL | Age: 71
End: 2021-04-04
Attending: INTERNAL MEDICINE
Payer: MEDICARE

## 2021-04-04 DIAGNOSIS — Z01.818 PREOP TESTING: ICD-10-CM

## 2021-04-04 DIAGNOSIS — N18.30 STAGE 3 CHRONIC KIDNEY DISEASE, UNSPECIFIED WHETHER STAGE 3A OR 3B CKD (HCC): ICD-10-CM

## 2021-04-04 PROCEDURE — 36415 COLL VENOUS BLD VENIPUNCTURE: CPT

## 2021-04-04 PROCEDURE — 85027 COMPLETE CBC AUTOMATED: CPT

## 2021-04-04 PROCEDURE — 80069 RENAL FUNCTION PANEL: CPT

## 2021-04-05 NOTE — PROGRESS NOTES
Joaquin Vines ,    I have reviewed your labs below are my recommendations and/or comments please let us know if you have any questions. :    - renal function is stable around baseline    MD Mingo Andrea[address]  Nephrology

## 2021-04-07 ENCOUNTER — HOSPITAL ENCOUNTER (OUTPATIENT)
Dept: INTERVENTIONAL RADIOLOGY/VASCULAR | Facility: HOSPITAL | Age: 71
Discharge: HOME | End: 2021-04-07
Attending: INTERNAL MEDICINE | Admitting: INTERNAL MEDICINE
Payer: MEDICARE

## 2021-04-07 VITALS
DIASTOLIC BLOOD PRESSURE: 92 MMHG | SYSTOLIC BLOOD PRESSURE: 152 MMHG | TEMPERATURE: 98 F | RESPIRATION RATE: 15 BRPM | BODY MASS INDEX: 31 KG/M2 | WEIGHT: 230 LBS | HEART RATE: 82 BPM | OXYGEN SATURATION: 96 %

## 2021-04-07 DIAGNOSIS — E11.69 COMBINED HYPERLIPIDEMIA ASSOCIATED WITH TYPE 2 DIABETES MELLITUS (HCC): ICD-10-CM

## 2021-04-07 DIAGNOSIS — Z01.818 PREOP TESTING: Primary | ICD-10-CM

## 2021-04-07 DIAGNOSIS — I50.20 HEART FAILURE WITH REDUCED EJECTION FRACTION (HCC): ICD-10-CM

## 2021-04-07 DIAGNOSIS — E78.2 COMBINED HYPERLIPIDEMIA ASSOCIATED WITH TYPE 2 DIABETES MELLITUS (HCC): ICD-10-CM

## 2021-04-07 PROCEDURE — 36415 COLL VENOUS BLD VENIPUNCTURE: CPT

## 2021-04-07 PROCEDURE — 4A023N8 MEASUREMENT OF CARDIAC SAMPLING AND PRESSURE, BILATERAL, PERCUTANEOUS APPROACH: ICD-10-PCS | Performed by: INTERNAL MEDICINE

## 2021-04-07 PROCEDURE — 82962 GLUCOSE BLOOD TEST: CPT

## 2021-04-07 PROCEDURE — 93460 R&L HRT ART/VENTRICLE ANGIO: CPT

## 2021-04-07 PROCEDURE — B2111ZZ FLUOROSCOPY OF MULTIPLE CORONARY ARTERIES USING LOW OSMOLAR CONTRAST: ICD-10-PCS | Performed by: INTERNAL MEDICINE

## 2021-04-07 PROCEDURE — 99153 MOD SED SAME PHYS/QHP EA: CPT

## 2021-04-07 PROCEDURE — 99152 MOD SED SAME PHYS/QHP 5/>YRS: CPT

## 2021-04-07 PROCEDURE — B2151ZZ FLUOROSCOPY OF LEFT HEART USING LOW OSMOLAR CONTRAST: ICD-10-PCS | Performed by: INTERNAL MEDICINE

## 2021-04-07 RX ORDER — ASPIRIN 81 MG/1
TABLET, CHEWABLE ORAL
Status: DISCONTINUED
Start: 2021-04-07 | End: 2021-04-07

## 2021-04-07 RX ORDER — LIDOCAINE HYDROCHLORIDE 20 MG/ML
INJECTION, SOLUTION EPIDURAL; INFILTRATION; INTRACAUDAL; PERINEURAL
Status: COMPLETED
Start: 2021-04-07 | End: 2021-04-07

## 2021-04-07 RX ORDER — MIDAZOLAM HYDROCHLORIDE 1 MG/ML
INJECTION INTRAMUSCULAR; INTRAVENOUS
Status: COMPLETED
Start: 2021-04-07 | End: 2021-04-07

## 2021-04-07 RX ORDER — ROSUVASTATIN CALCIUM 40 MG/1
40 TABLET, COATED ORAL NIGHTLY
Qty: 90 TABLET | Refills: 3 | Status: SHIPPED | OUTPATIENT
Start: 2021-04-07 | End: 2022-04-07

## 2021-04-07 RX ORDER — ASPIRIN 81 MG/1
162 TABLET, CHEWABLE ORAL DAILY
Status: COMPLETED | OUTPATIENT
Start: 2021-04-07 | End: 2021-04-07

## 2021-04-07 RX ORDER — SODIUM CHLORIDE 9 MG/ML
INJECTION, SOLUTION INTRAVENOUS
Status: DISCONTINUED | OUTPATIENT
Start: 2021-04-07 | End: 2021-04-07

## 2021-04-07 RX ORDER — SODIUM CHLORIDE 9 MG/ML
INJECTION, SOLUTION INTRAVENOUS CONTINUOUS
Status: DISCONTINUED | OUTPATIENT
Start: 2021-04-07 | End: 2021-04-07

## 2021-04-07 RX ADMIN — ASPIRIN 162 MG: 81 TABLET, CHEWABLE ORAL at 13:45:00

## 2021-04-07 RX ADMIN — SODIUM CHLORIDE: 9 INJECTION, SOLUTION INTRAVENOUS at 16:00:00

## 2021-04-07 RX ADMIN — SODIUM CHLORIDE: 9 INJECTION, SOLUTION INTRAVENOUS at 16:51:00

## 2021-04-07 NOTE — PROCEDURES
Procedure Report    Indication for procedure: HFrEF    Procedures performed:  1) Right heart catheterization  2) Left heart catheterization  3) Coronary angiography   4) Supervision of moderate sedation from 1435 to 1535, with a total of 1mg versed and 75m mean 31  PCW 17    /13, EDP 22  Ao 146/77, mean 107    Ao 90%  PA 64%    Thiago:  CO 7.4  CI 3.3  PVR 1.9 Teresa units    Recommendations:  1) Bedrest x 2 hours  2) Gentle post cath hydration  3) CV surgery eval for possible CABG (multivessel CAD in pat

## 2021-04-07 NOTE — INTERVAL H&P NOTE
Pre-op Diagnosis: * No pre-op diagnosis entered *    The above referenced H&P was reviewed by Inis Meigs, MD on 4/7/2021, the patient was examined and no significant changes have occurred in the patient's condition since the H&P was performed.   I discusse

## 2021-04-07 NOTE — IVS NOTE
Report given to mio NUNES, Dr Blaine Gilbert was here to see patient and to leave information, about bypass surgery

## 2021-04-08 NOTE — CONSULTS
[facility]    Report of Consultation    Coy Said Patient Status:  Outpatient in a Bed    1950 MRN [id number]   Location [facility] Attending No att. providers found   Hosp Day # 0 PCP Acosta Eric INTRAOCULAR LENS Left 2/21/2019    Performed by Meryle Guarneri, MD at [facility] MAIN OR   • RIGHT PHACOEMULSIFICATION OF CATARACT WITH INTRAOCULAR LENS IMPLANT [id number] Right 8/24/2016    Performed by Meryle Guarneri, MD at [address] [place] [address] appreciable adenopathy mass or JVD. Carotids are 2+ without bruits. Lungs are clear to auscultation and percussion. Cardiac exam there is a normal rate and rhythm with a normal S1,S2 and no pathological murmurs.   No rub or extra heart sounds appreciated including an approximately 2.5% risk of death and 25% risk of renal failure requiring dialysis. In addition, other risks include but are not limited to stroke, bleeding, MI, and organ failure. I have answered all his questions about the procedure.  He is v

## 2021-04-12 NOTE — PROGRESS NOTES
Unable to reach patient via phone. No call identifier on VM.  Per HIPPA guidelines VM has been left to return call at earliest convenience.  Will attempt to reach patient at a later time.

## 2021-04-14 NOTE — CDS QUERY
Heart Failure  CLINICAL DOCUMENTATION CLARIFICATION FORM  How To Answer This Query:  1)  Click \"Edit Button\" on the toolbar. 2)  Type an \"X\" in the bracket for the diagnosis that applies.   (You may also add additional clinical details as you feel nece Unfavorable change from June 14 , 2012. 2. Cardiomegaly with moderate CHF. PMH -   HTN, DM2, CKD 4         If you have any questions, please contact Clinical :  Hina Tejada at [phone]     Thank You!      THIS FORM IS A PE

## 2021-06-02 ENCOUNTER — APPOINTMENT (OUTPATIENT)
Dept: CT IMAGING | Facility: HOSPITAL | Age: 71
End: 2021-06-02
Attending: EMERGENCY MEDICINE
Payer: MEDICARE

## 2021-06-02 ENCOUNTER — HOSPITAL ENCOUNTER (EMERGENCY)
Facility: HOSPITAL | Age: 71
Discharge: HOME | End: 2021-06-02
Attending: EMERGENCY MEDICINE
Payer: MEDICARE

## 2021-06-02 ENCOUNTER — APPOINTMENT (OUTPATIENT)
Dept: GENERAL RADIOLOGY | Facility: HOSPITAL | Age: 71
End: 2021-06-02
Attending: EMERGENCY MEDICINE
Payer: MEDICARE

## 2021-06-02 VITALS
DIASTOLIC BLOOD PRESSURE: 89 MMHG | SYSTOLIC BLOOD PRESSURE: 107 MMHG | OXYGEN SATURATION: 96 % | HEIGHT: 72 IN | HEART RATE: 75 BPM | BODY MASS INDEX: 29.8 KG/M2 | WEIGHT: 220 LBS | RESPIRATION RATE: 19 BRPM | TEMPERATURE: 97 F

## 2021-06-02 DIAGNOSIS — R10.9 FLANK PAIN: Primary | ICD-10-CM

## 2021-06-02 DIAGNOSIS — I50.9 ACUTE ON CHRONIC CONGESTIVE HEART FAILURE, UNSPECIFIED HEART FAILURE TYPE (HCC): ICD-10-CM

## 2021-06-02 PROCEDURE — 81001 URINALYSIS AUTO W/SCOPE: CPT | Performed by: EMERGENCY MEDICINE

## 2021-06-02 PROCEDURE — 93010 ELECTROCARDIOGRAM REPORT: CPT | Performed by: EMERGENCY MEDICINE

## 2021-06-02 PROCEDURE — 80048 BASIC METABOLIC PNL TOTAL CA: CPT | Performed by: EMERGENCY MEDICINE

## 2021-06-02 PROCEDURE — 71045 X-RAY EXAM CHEST 1 VIEW: CPT | Performed by: EMERGENCY MEDICINE

## 2021-06-02 PROCEDURE — 84484 ASSAY OF TROPONIN QUANT: CPT | Performed by: EMERGENCY MEDICINE

## 2021-06-02 PROCEDURE — 74176 CT ABD & PELVIS W/O CONTRAST: CPT | Performed by: EMERGENCY MEDICINE

## 2021-06-02 PROCEDURE — 96374 THER/PROPH/DIAG INJ IV PUSH: CPT

## 2021-06-02 PROCEDURE — 93005 ELECTROCARDIOGRAM TRACING: CPT

## 2021-06-02 PROCEDURE — 85025 COMPLETE CBC W/AUTO DIFF WBC: CPT | Performed by: EMERGENCY MEDICINE

## 2021-06-02 PROCEDURE — 85379 FIBRIN DEGRADATION QUANT: CPT | Performed by: EMERGENCY MEDICINE

## 2021-06-02 PROCEDURE — 99284 EMERGENCY DEPT VISIT MOD MDM: CPT

## 2021-06-02 RX ORDER — MORPHINE SULFATE 4 MG/ML
2 INJECTION, SOLUTION INTRAMUSCULAR; INTRAVENOUS ONCE
Status: COMPLETED | OUTPATIENT
Start: 2021-06-02 | End: 2021-06-02

## 2021-06-02 RX ORDER — ACETAMINOPHEN AND CODEINE PHOSPHATE 300; 30 MG/1; MG/1
1 TABLET ORAL EVERY 12 HOURS PRN
Qty: 10 TABLET | Refills: 0 | Status: SHIPPED | OUTPATIENT
Start: 2021-06-02 | End: 2021-06-09

## 2021-06-03 NOTE — ED PROVIDER NOTES
Patient Seen in: [facility] AND [facility] Emergency Department    History   Patient presents with:  Abdomen/Flank Pain    Stated Complaint: Back pain    Patient complains of l flank pain that began few days ago. Pain is 6/10. Associated with nausea.   No fever WEEK   metFORMIN HCl 1000 MG Oral Tab,  Take 1 tablet (1,000 mg total) by mouth 2 (two) times daily with meals. Fenofibrate 160 MG Oral Tab,  Take 1 tablet (160 mg total) by mouth daily.    Blood Glucose Monitoring Suppl (ONETOUCH ULTRA MINI) w/Device Vázquez injection  ENT, Mouth: mucous membranes moist  Respiratory: there are no retractions, lungs are clear to auscultation  Cardiovascular: regular rate and rhythm    Gastrointestinal:  abdomen is soft and non tender, no masses, bowel sounds normal, l flank ten RAINBOW DRAW GOLD     EKG    Rate, intervals and axes as noted on EKG Report.   Rate: 78  Rhythm: Sinus Rhythm  Reading: first degree av with lbbb           MDM       Radiology Interpretation:  CT ABDOMEN+PELVIS KIDNEYSTONE 2D RNDR(NO IV,NO ORAL)(CPT=7417 Normal, Disp-10 tablet, R-0

## 2021-06-03 NOTE — ED INITIAL ASSESSMENT (HPI)
Patient with left flank pain, came on suddenly last night. Denies f/n/v/d. No chest pain or shortness of breath. Took Tylenol with some relief.

## 2021-06-17 ENCOUNTER — HOSPITAL ENCOUNTER (OUTPATIENT)
Dept: ULTRASOUND IMAGING | Facility: HOSPITAL | Age: 71
Discharge: HOME | End: 2021-06-17
Attending: THORACIC SURGERY (CARDIOTHORACIC VASCULAR SURGERY)
Payer: MEDICARE

## 2021-06-17 DIAGNOSIS — R42 DIZZINESS: ICD-10-CM

## 2021-06-17 DIAGNOSIS — Z01.818 PRE-OP TESTING: ICD-10-CM

## 2021-06-17 PROCEDURE — 93880 EXTRACRANIAL BILAT STUDY: CPT | Performed by: THORACIC SURGERY (CARDIOTHORACIC VASCULAR SURGERY)

## 2021-06-18 ENCOUNTER — NURSE ONLY (OUTPATIENT)
Dept: LAB | Facility: HOSPITAL | Age: 71
DRG: 235 | End: 2021-06-18
Attending: THORACIC SURGERY (CARDIOTHORACIC VASCULAR SURGERY)
Payer: MEDICARE

## 2021-06-18 ENCOUNTER — ANESTHESIA EVENT (OUTPATIENT)
Dept: SURGERY | Facility: HOSPITAL | Age: 71
DRG: 235 | End: 2021-06-18
Payer: MEDICARE

## 2021-06-18 ENCOUNTER — HOSPITAL ENCOUNTER (OUTPATIENT)
Dept: GENERAL RADIOLOGY | Facility: HOSPITAL | Age: 71
Discharge: HOME | DRG: 235 | End: 2021-06-18
Attending: THORACIC SURGERY (CARDIOTHORACIC VASCULAR SURGERY)
Payer: MEDICARE

## 2021-06-18 DIAGNOSIS — Z01.818 PREOPERATIVE TESTING: ICD-10-CM

## 2021-06-18 PROCEDURE — 85730 THROMBOPLASTIN TIME PARTIAL: CPT

## 2021-06-18 PROCEDURE — 81001 URINALYSIS AUTO W/SCOPE: CPT

## 2021-06-18 PROCEDURE — 83735 ASSAY OF MAGNESIUM: CPT

## 2021-06-18 PROCEDURE — 80053 COMPREHEN METABOLIC PANEL: CPT

## 2021-06-18 PROCEDURE — 85025 COMPLETE CBC W/AUTO DIFF WBC: CPT

## 2021-06-18 PROCEDURE — 85610 PROTHROMBIN TIME: CPT

## 2021-06-18 PROCEDURE — 71046 X-RAY EXAM CHEST 2 VIEWS: CPT | Performed by: THORACIC SURGERY (CARDIOTHORACIC VASCULAR SURGERY)

## 2021-06-18 PROCEDURE — 86900 BLOOD TYPING SEROLOGIC ABO: CPT

## 2021-06-18 PROCEDURE — 86901 BLOOD TYPING SEROLOGIC RH(D): CPT

## 2021-06-18 PROCEDURE — 86850 RBC ANTIBODY SCREEN: CPT

## 2021-06-18 PROCEDURE — 36415 COLL VENOUS BLD VENIPUNCTURE: CPT

## 2021-06-18 RX ORDER — ASCORBIC ACID 500 MG
1000 TABLET ORAL ONCE
Status: CANCELLED | OUTPATIENT
Start: 2021-06-21

## 2021-06-18 RX ORDER — CEFAZOLIN SODIUM/WATER 2 G/20 ML
2 SYRINGE (ML) INTRAVENOUS
Status: CANCELLED | OUTPATIENT
Start: 2021-06-21 | End: 2021-06-21

## 2021-06-18 NOTE — ANESTHESIA PREPROCEDURE EVALUATION
Anesthesia PreOp Note    HPI:     Erika Manzo is a 65year old male who presents for preoperative consultation requested by: Candie Sanchez MD    Date of Surgery: 6/21/2021    Procedure(s):  CORONARY ARTERY BYPASS GRAFT; POSSIBLE ENDOSCOPIC VEIN HARVEST retinopathy ([facility]    • High blood pressure    • High cholesterol    • HLD (hyperlipidemia)    • HTN (hypertension)    • Neuropathy    • Proteinuria    • Renal disorder    • Type II diabetes mellitus ([facility].)    • Visual impairment        Past Surgical History Oral Tab, Take 81 mg by mouth daily. , Disp: , Rfl:   ergocalciferol 1.25 MG (27341 UT) Oral Cap, Take 1 capsule (50,000 Units total) by mouth every 7 days. , Disp: 4 capsule, Rfl: 0  Albuterol Sulfate  (90 Base) MCG/ACT Inhalation Aero Soln, Inhale 2     Difficulty of Paying Living Expenses:   Food Insecurity:       Worried About [address] [email or web address] in the Last Year:       Ran Out of Food in the Last Year:   Transportation Needs:       Lack of Transportation (Medical):       Lack of Transportation (Amina Cage for this visit. There were no vitals filed for this visit.      Anesthesia Evaluation     Patient summary reviewed and Nursing notes reviewed    No history of anesthetic complications   Airway   Mallampati: II  TM distance: >3 FB  Neck ROM: full  Dental management as planned.   Va Sanchez  6/18/2021 1:28 PM

## 2021-06-18 NOTE — PROGRESS NOTES
Pt is having CABG with Dr Carol Ann Schneider on  6/21/21. Preadmission testing performed today with pt. Written and verbal information provided re: manju operative expectations with all questions answered. Pt and daughter  who came with pt verbalized understanding.  Benjy

## 2021-06-18 NOTE — CM/SW NOTE
Preadmission Screen:     Met with patient and his daughter Bhakti for assessment. Patient, wife Smith Medina, [address] [facility] 2 adult sons live in a ranch style home w/ no stairs. Patient is completely independent with ADLs, family manages most IADLs.  He does not own any D

## 2021-06-21 ENCOUNTER — HOSPITAL ENCOUNTER (INPATIENT)
Facility: HOSPITAL | Age: 71
LOS: 8 days | Discharge: HOME HEALTH | DRG: 235 | End: 2021-06-29
Attending: THORACIC SURGERY (CARDIOTHORACIC VASCULAR SURGERY) | Admitting: THORACIC SURGERY (CARDIOTHORACIC VASCULAR SURGERY)
Payer: MEDICARE

## 2021-06-21 ENCOUNTER — APPOINTMENT (OUTPATIENT)
Dept: GENERAL RADIOLOGY | Facility: HOSPITAL | Age: 71
DRG: 235 | End: 2021-06-21
Attending: CLINICAL NURSE SPECIALIST
Payer: MEDICARE

## 2021-06-21 ENCOUNTER — ANESTHESIA (OUTPATIENT)
Dept: SURGERY | Facility: HOSPITAL | Age: 71
DRG: 235 | End: 2021-06-21
Payer: MEDICARE

## 2021-06-21 DIAGNOSIS — Z01.818 PREOPERATIVE TESTING: Primary | ICD-10-CM

## 2021-06-21 DIAGNOSIS — I25.10 CORONARY ARTERY DISEASE INVOLVING NATIVE CORONARY ARTERY OF NATIVE HEART WITHOUT ANGINA PECTORIS: ICD-10-CM

## 2021-06-21 PROCEDURE — 76942 ECHO GUIDE FOR BIOPSY: CPT | Performed by: ANESTHESIOLOGY

## 2021-06-21 PROCEDURE — 71045 X-RAY EXAM CHEST 1 VIEW: CPT | Performed by: CLINICAL NURSE SPECIALIST

## 2021-06-21 PROCEDURE — 06BQ4ZZ EXCISION OF LEFT SAPHENOUS VEIN, PERCUTANEOUS ENDOSCOPIC APPROACH: ICD-10-PCS | Performed by: THORACIC SURGERY (CARDIOTHORACIC VASCULAR SURGERY)

## 2021-06-21 PROCEDURE — 05HN33Z INSERTION OF INFUSION DEVICE INTO LEFT INTERNAL JUGULAR VEIN, PERCUTANEOUS APPROACH: ICD-10-PCS | Performed by: ANESTHESIOLOGY

## 2021-06-21 PROCEDURE — B24BZZ4 ULTRASONOGRAPHY OF HEART WITH AORTA, TRANSESOPHAGEAL: ICD-10-PCS | Performed by: THORACIC SURGERY (CARDIOTHORACIC VASCULAR SURGERY)

## 2021-06-21 PROCEDURE — 02100Z9 BYPASS CORONARY ARTERY, ONE ARTERY FROM LEFT INTERNAL MAMMARY, OPEN APPROACH: ICD-10-PCS | Performed by: THORACIC SURGERY (CARDIOTHORACIC VASCULAR SURGERY)

## 2021-06-21 PROCEDURE — 021109W BYPASS CORONARY ARTERY, TWO ARTERIES FROM AORTA WITH AUTOLOGOUS VENOUS TISSUE, OPEN APPROACH: ICD-10-PCS | Performed by: THORACIC SURGERY (CARDIOTHORACIC VASCULAR SURGERY)

## 2021-06-21 DEVICE — IMPLANTABLE DEVICE
Type: IMPLANTABLE DEVICE | Site: CHEST | Status: FUNCTIONAL
Brand: STERNALOCK® BLU SYSTEM

## 2021-06-21 RX ORDER — DEXTROSE MONOHYDRATE 25 G/50ML
50 INJECTION, SOLUTION INTRAVENOUS
Status: DISCONTINUED | OUTPATIENT
Start: 2021-06-21 | End: 2021-06-29

## 2021-06-21 RX ORDER — FINASTERIDE 5 MG/1
5 TABLET, FILM COATED ORAL DAILY
Status: DISCONTINUED | OUTPATIENT
Start: 2021-06-22 | End: 2021-06-29

## 2021-06-21 RX ORDER — MAGNESIUM SULFATE 1 G/100ML
1 INJECTION INTRAVENOUS AS NEEDED
Status: DISCONTINUED | OUTPATIENT
Start: 2021-06-21 | End: 2021-06-21

## 2021-06-21 RX ORDER — METOCLOPRAMIDE 10 MG/1
10 TABLET ORAL ONCE
Status: COMPLETED | OUTPATIENT
Start: 2021-06-21 | End: 2021-06-21

## 2021-06-21 RX ORDER — ROCURONIUM BROMIDE 10 MG/ML
INJECTION, SOLUTION INTRAVENOUS AS NEEDED
Status: DISCONTINUED | OUTPATIENT
Start: 2021-06-21 | End: 2021-06-21 | Stop reason: SURG

## 2021-06-21 RX ORDER — DEXMEDETOMIDINE HYDROCHLORIDE 4 UG/ML
INJECTION, SOLUTION INTRAVENOUS CONTINUOUS
Status: DISCONTINUED | OUTPATIENT
Start: 2021-06-21 | End: 2021-06-23

## 2021-06-21 RX ORDER — ASCORBIC ACID 500 MG
1000 TABLET ORAL ONCE
Status: COMPLETED | OUTPATIENT
Start: 2021-06-21 | End: 2021-06-21

## 2021-06-21 RX ORDER — NITROGLYCERIN 20 MG/100ML
INJECTION INTRAVENOUS CONTINUOUS PRN
Status: DISCONTINUED | OUTPATIENT
Start: 2021-06-21 | End: 2021-06-23

## 2021-06-21 RX ORDER — DEXTROSE MONOHYDRATE 50 MG/ML
INJECTION, SOLUTION INTRAVENOUS CONTINUOUS
Status: DISCONTINUED | OUTPATIENT
Start: 2021-06-21 | End: 2021-06-23

## 2021-06-21 RX ORDER — MORPHINE SULFATE 4 MG/ML
4 INJECTION, SOLUTION INTRAMUSCULAR; INTRAVENOUS EVERY 2 HOUR PRN
Status: DISCONTINUED | OUTPATIENT
Start: 2021-06-21 | End: 2021-06-22

## 2021-06-21 RX ORDER — MILRINONE LACTATE 0.2 MG/ML
INJECTION, SOLUTION INTRAVENOUS AS NEEDED
Status: DISCONTINUED | OUTPATIENT
Start: 2021-06-21 | End: 2021-06-23

## 2021-06-21 RX ORDER — AMIODARONE HYDROCHLORIDE 200 MG/1
200 TABLET ORAL
Status: DISCONTINUED | OUTPATIENT
Start: 2021-06-21 | End: 2021-06-29

## 2021-06-21 RX ORDER — DEXTROSE AND SODIUM CHLORIDE 5; .9 G/100ML; G/100ML
INJECTION, SOLUTION INTRAVENOUS CONTINUOUS
Status: DISCONTINUED | OUTPATIENT
Start: 2021-06-21 | End: 2021-06-23

## 2021-06-21 RX ORDER — FAMOTIDINE 20 MG/1
20 TABLET ORAL ONCE
Status: COMPLETED | OUTPATIENT
Start: 2021-06-21 | End: 2021-06-21

## 2021-06-21 RX ORDER — DOXEPIN HYDROCHLORIDE 50 MG/1
1 CAPSULE ORAL DAILY
Status: DISCONTINUED | OUTPATIENT
Start: 2021-06-22 | End: 2021-06-29

## 2021-06-21 RX ORDER — ASCORBIC ACID 500 MG
500 TABLET ORAL DAILY
Status: DISCONTINUED | OUTPATIENT
Start: 2021-06-22 | End: 2021-06-29

## 2021-06-21 RX ORDER — POTASSIUM CHLORIDE 14.9 MG/ML
20 INJECTION INTRAVENOUS AS NEEDED
Status: DISCONTINUED | OUTPATIENT
Start: 2021-06-21 | End: 2021-06-21

## 2021-06-21 RX ORDER — ACETAMINOPHEN 650 MG/1
650 SUPPOSITORY RECTAL EVERY 6 HOURS PRN
Status: DISCONTINUED | OUTPATIENT
Start: 2021-06-21 | End: 2021-06-29

## 2021-06-21 RX ORDER — NEOSTIGMINE METHYLSULFATE 1 MG/ML
3 INJECTION INTRAVENOUS ONCE
Status: COMPLETED | OUTPATIENT
Start: 2021-06-21 | End: 2021-06-21

## 2021-06-21 RX ORDER — HEPARIN SODIUM 5000 [USP'U]/ML
5000 INJECTION, SOLUTION INTRAVENOUS; SUBCUTANEOUS EVERY 12 HOURS SCHEDULED
Status: DISCONTINUED | OUTPATIENT
Start: 2021-06-22 | End: 2021-06-27

## 2021-06-21 RX ORDER — CEFAZOLIN SODIUM/WATER 2 G/20 ML
2 SYRINGE (ML) INTRAVENOUS EVERY 12 HOURS
Status: COMPLETED | OUTPATIENT
Start: 2021-06-21 | End: 2021-06-22

## 2021-06-21 RX ORDER — FENOFIBRATE 134 MG/1
134 CAPSULE ORAL
Refills: 2 | Status: DISCONTINUED | OUTPATIENT
Start: 2021-06-22 | End: 2021-06-23

## 2021-06-21 RX ORDER — TAMSULOSIN HYDROCHLORIDE 0.4 MG/1
0.8 CAPSULE ORAL DAILY
Status: DISCONTINUED | OUTPATIENT
Start: 2021-06-22 | End: 2021-06-29

## 2021-06-21 RX ORDER — CEFAZOLIN SODIUM/WATER 2 G/20 ML
2 SYRINGE (ML) INTRAVENOUS
Status: COMPLETED | OUTPATIENT
Start: 2021-06-21 | End: 2021-06-21

## 2021-06-21 RX ORDER — SENNOSIDES 8.6 MG
8.6 TABLET ORAL 2 TIMES DAILY
Status: DISCONTINUED | OUTPATIENT
Start: 2021-06-22 | End: 2021-06-29

## 2021-06-21 RX ORDER — CEFAZOLIN SODIUM 1 G/3ML
INJECTION, POWDER, FOR SOLUTION INTRAMUSCULAR; INTRAVENOUS AS NEEDED
Status: DISCONTINUED | OUTPATIENT
Start: 2021-06-21 | End: 2021-06-21 | Stop reason: HOSPADM

## 2021-06-21 RX ORDER — HYDROCODONE BITARTRATE AND ACETAMINOPHEN 5; 325 MG/1; MG/1
1 TABLET ORAL EVERY 4 HOURS PRN
Status: DISCONTINUED | OUTPATIENT
Start: 2021-06-21 | End: 2021-06-24

## 2021-06-21 RX ORDER — ACETAMINOPHEN 10 MG/ML
1000 INJECTION, SOLUTION INTRAVENOUS EVERY 8 HOURS
Status: COMPLETED | OUTPATIENT
Start: 2021-06-21 | End: 2021-06-22

## 2021-06-21 RX ORDER — BISACODYL 10 MG
10 SUPPOSITORY, RECTAL RECTAL
Status: DISCONTINUED | OUTPATIENT
Start: 2021-06-21 | End: 2021-06-29

## 2021-06-21 RX ORDER — MAGNESIUM SULFATE HEPTAHYDRATE 40 MG/ML
2 INJECTION, SOLUTION INTRAVENOUS AS NEEDED
Status: DISCONTINUED | OUTPATIENT
Start: 2021-06-21 | End: 2021-06-21

## 2021-06-21 RX ORDER — ALBUMIN, HUMAN INJ 5% 5 %
250 SOLUTION INTRAVENOUS ONCE AS NEEDED
Status: COMPLETED | OUTPATIENT
Start: 2021-06-21 | End: 2021-06-21

## 2021-06-21 RX ORDER — DOBUTAMINE HYDROCHLORIDE 200 MG/100ML
INJECTION INTRAVENOUS CONTINUOUS PRN
Status: DISCONTINUED | OUTPATIENT
Start: 2021-06-21 | End: 2021-06-23

## 2021-06-21 RX ORDER — MORPHINE SULFATE 4 MG/ML
2 INJECTION, SOLUTION INTRAMUSCULAR; INTRAVENOUS EVERY 2 HOUR PRN
Status: DISCONTINUED | OUTPATIENT
Start: 2021-06-21 | End: 2021-06-23

## 2021-06-21 RX ORDER — HEPARIN SODIUM 1000 [USP'U]/ML
INJECTION, SOLUTION INTRAVENOUS; SUBCUTANEOUS AS NEEDED
Status: DISCONTINUED | OUTPATIENT
Start: 2021-06-21 | End: 2021-06-21 | Stop reason: SURG

## 2021-06-21 RX ORDER — ACETAMINOPHEN 325 MG/1
650 TABLET ORAL EVERY 4 HOURS PRN
Status: DISCONTINUED | OUTPATIENT
Start: 2021-06-21 | End: 2021-06-29

## 2021-06-21 RX ORDER — CHLORHEXIDINE GLUCONATE 0.12 MG/ML
15 RINSE ORAL 2 TIMES DAILY
Status: DISCONTINUED | OUTPATIENT
Start: 2021-06-21 | End: 2021-06-29

## 2021-06-21 RX ORDER — POLYETHYLENE GLYCOL 3350 17 G/17G
17 POWDER, FOR SOLUTION ORAL DAILY PRN
Status: DISCONTINUED | OUTPATIENT
Start: 2021-06-21 | End: 2021-06-29

## 2021-06-21 RX ORDER — METOCLOPRAMIDE HYDROCHLORIDE 5 MG/ML
5 INJECTION INTRAMUSCULAR; INTRAVENOUS EVERY 6 HOURS
Status: DISCONTINUED | OUTPATIENT
Start: 2021-06-21 | End: 2021-06-24

## 2021-06-21 RX ORDER — DEXTROSE MONOHYDRATE 25 G/50ML
50 INJECTION, SOLUTION INTRAVENOUS
Status: DISCONTINUED | OUTPATIENT
Start: 2021-06-21 | End: 2021-06-21 | Stop reason: HOSPADM

## 2021-06-21 RX ORDER — DOCUSATE SODIUM 100 MG/1
100 CAPSULE, LIQUID FILLED ORAL 2 TIMES DAILY
Status: DISCONTINUED | OUTPATIENT
Start: 2021-06-22 | End: 2021-06-29

## 2021-06-21 RX ORDER — MELATONIN
3 NIGHTLY PRN
Status: DISCONTINUED | OUTPATIENT
Start: 2021-06-21 | End: 2021-06-29

## 2021-06-21 RX ORDER — LORAZEPAM 1 MG/1
1 TABLET ORAL ONCE
Status: COMPLETED | OUTPATIENT
Start: 2021-06-21 | End: 2021-06-21

## 2021-06-21 RX ORDER — ONDANSETRON 2 MG/ML
4 INJECTION INTRAMUSCULAR; INTRAVENOUS EVERY 6 HOURS PRN
Status: DISCONTINUED | OUTPATIENT
Start: 2021-06-21 | End: 2021-06-29

## 2021-06-21 RX ORDER — EPHEDRINE SULFATE 50 MG/ML
INJECTION INTRAVENOUS AS NEEDED
Status: DISCONTINUED | OUTPATIENT
Start: 2021-06-21 | End: 2021-06-21 | Stop reason: SURG

## 2021-06-21 RX ORDER — DOBUTAMINE HYDROCHLORIDE 100 MG/100ML
INJECTION INTRAVENOUS CONTINUOUS PRN
Status: DISCONTINUED | OUTPATIENT
Start: 2021-06-21 | End: 2021-06-21 | Stop reason: SURG

## 2021-06-21 RX ORDER — MORPHINE SULFATE 2 MG/ML
2 INJECTION, SOLUTION INTRAMUSCULAR; INTRAVENOUS ONCE
Status: COMPLETED | OUTPATIENT
Start: 2021-06-21 | End: 2021-06-21

## 2021-06-21 RX ORDER — HYDROCODONE BITARTRATE AND ACETAMINOPHEN 5; 325 MG/1; MG/1
2 TABLET ORAL EVERY 4 HOURS PRN
Status: DISCONTINUED | OUTPATIENT
Start: 2021-06-21 | End: 2021-06-24

## 2021-06-21 RX ORDER — SODIUM CHLORIDE, SODIUM LACTATE, POTASSIUM CHLORIDE, CALCIUM CHLORIDE 600; 310; 30; 20 MG/100ML; MG/100ML; MG/100ML; MG/100ML
INJECTION, SOLUTION INTRAVENOUS CONTINUOUS
Status: DISCONTINUED | OUTPATIENT
Start: 2021-06-21 | End: 2021-06-23

## 2021-06-21 RX ORDER — PROTAMINE SULFATE 10 MG/ML
INJECTION, SOLUTION INTRAVENOUS AS NEEDED
Status: DISCONTINUED | OUTPATIENT
Start: 2021-06-21 | End: 2021-06-21 | Stop reason: SURG

## 2021-06-21 RX ORDER — VANCOMYCIN HYDROCHLORIDE 1 G/20ML
INJECTION, POWDER, LYOPHILIZED, FOR SOLUTION INTRAVENOUS AS NEEDED
Status: DISCONTINUED | OUTPATIENT
Start: 2021-06-21 | End: 2021-06-21 | Stop reason: HOSPADM

## 2021-06-21 RX ORDER — MORPHINE SULFATE 4 MG/ML
8 INJECTION, SOLUTION INTRAMUSCULAR; INTRAVENOUS EVERY 2 HOUR PRN
Status: DISCONTINUED | OUTPATIENT
Start: 2021-06-21 | End: 2021-06-22

## 2021-06-21 RX ORDER — MIDAZOLAM HYDROCHLORIDE 1 MG/ML
INJECTION INTRAMUSCULAR; INTRAVENOUS AS NEEDED
Status: DISCONTINUED | OUTPATIENT
Start: 2021-06-21 | End: 2021-06-21 | Stop reason: SURG

## 2021-06-21 RX ORDER — FAMOTIDINE 10 MG/ML
20 INJECTION, SOLUTION INTRAVENOUS DAILY
Status: DISCONTINUED | OUTPATIENT
Start: 2021-06-21 | End: 2021-06-29

## 2021-06-21 RX ORDER — SODIUM CHLORIDE 9 MG/ML
83 INJECTION, SOLUTION INTRAVENOUS CONTINUOUS
Status: DISCONTINUED | OUTPATIENT
Start: 2021-06-21 | End: 2021-06-23

## 2021-06-21 RX ORDER — ROSUVASTATIN CALCIUM 20 MG/1
40 TABLET, COATED ORAL NIGHTLY
Status: DISCONTINUED | OUTPATIENT
Start: 2021-06-21 | End: 2021-06-29

## 2021-06-21 RX ORDER — CLOPIDOGREL BISULFATE 75 MG/1
75 TABLET ORAL DAILY
Status: DISCONTINUED | OUTPATIENT
Start: 2021-06-22 | End: 2021-06-27

## 2021-06-21 RX ORDER — ACETAMINOPHEN 325 MG/1
650 TABLET ORAL EVERY 6 HOURS PRN
Status: DISCONTINUED | OUTPATIENT
Start: 2021-06-21 | End: 2021-06-29

## 2021-06-21 RX ORDER — CHLORHEXIDINE GLUCONATE 0.12 MG/ML
15 RINSE ORAL
Status: DISCONTINUED | OUTPATIENT
Start: 2021-06-21 | End: 2021-06-22

## 2021-06-21 RX ORDER — ACETAMINOPHEN 160 MG/5ML
650 SOLUTION ORAL EVERY 6 HOURS PRN
Status: DISCONTINUED | OUTPATIENT
Start: 2021-06-21 | End: 2021-06-29

## 2021-06-21 RX ORDER — FAMOTIDINE 20 MG/1
20 TABLET ORAL DAILY
Status: DISCONTINUED | OUTPATIENT
Start: 2021-06-21 | End: 2021-06-29

## 2021-06-21 RX ORDER — INSULIN ASPART 100 [IU]/ML
INJECTION, SOLUTION INTRAVENOUS; SUBCUTANEOUS ONCE
Status: COMPLETED | OUTPATIENT
Start: 2021-06-21 | End: 2021-06-21

## 2021-06-21 RX ORDER — ASPIRIN 81 MG/1
81 TABLET ORAL DAILY
Status: DISCONTINUED | OUTPATIENT
Start: 2021-06-22 | End: 2021-06-29

## 2021-06-21 RX ORDER — GLYCOPYRROLATE 0.2 MG/ML
0.6 INJECTION, SOLUTION INTRAMUSCULAR; INTRAVENOUS ONCE
Status: COMPLETED | OUTPATIENT
Start: 2021-06-21 | End: 2021-06-21

## 2021-06-21 RX ORDER — POTASSIUM CHLORIDE 29.8 MG/ML
40 INJECTION INTRAVENOUS AS NEEDED
Status: DISCONTINUED | OUTPATIENT
Start: 2021-06-21 | End: 2021-06-21

## 2021-06-21 RX ADMIN — ROCURONIUM BROMIDE 30 MG: 10 INJECTION, SOLUTION INTRAVENOUS at 08:26:00

## 2021-06-21 RX ADMIN — CEFAZOLIN SODIUM/WATER 2 G: 2 G/20 ML SYRINGE (ML) INTRAVENOUS at 07:23:00

## 2021-06-21 RX ADMIN — MIDAZOLAM HYDROCHLORIDE 1 MG: 1 INJECTION INTRAMUSCULAR; INTRAVENOUS at 08:27:00

## 2021-06-21 RX ADMIN — ROCURONIUM BROMIDE 50 MG: 10 INJECTION, SOLUTION INTRAVENOUS at 07:14:00

## 2021-06-21 RX ADMIN — PROTAMINE SULFATE 500 MG: 10 INJECTION, SOLUTION INTRAVENOUS at 10:25:00

## 2021-06-21 RX ADMIN — HEPARIN SODIUM 41000 UNITS: 1000 INJECTION, SOLUTION INTRAVENOUS; SUBCUTANEOUS at 08:42:00

## 2021-06-21 RX ADMIN — EPHEDRINE SULFATE 5 MG: 50 INJECTION INTRAVENOUS at 07:17:00

## 2021-06-21 RX ADMIN — DOBUTAMINE HYDROCHLORIDE 5 MCG/KG/MIN: 100 INJECTION INTRAVENOUS at 10:16:00

## 2021-06-21 RX ADMIN — HEPARIN SODIUM 10000 UNITS: 1000 INJECTION, SOLUTION INTRAVENOUS; SUBCUTANEOUS at 08:58:00

## 2021-06-21 RX ADMIN — MIDAZOLAM HYDROCHLORIDE 1 MG: 1 INJECTION INTRAMUSCULAR; INTRAVENOUS at 10:25:00

## 2021-06-21 RX ADMIN — MIDAZOLAM HYDROCHLORIDE 1 MG: 1 INJECTION INTRAMUSCULAR; INTRAVENOUS at 07:14:00

## 2021-06-21 RX ADMIN — MIDAZOLAM HYDROCHLORIDE 1 MG: 1 INJECTION INTRAMUSCULAR; INTRAVENOUS at 07:05:00

## 2021-06-21 RX ADMIN — EPHEDRINE SULFATE 5 MG: 50 INJECTION INTRAVENOUS at 07:20:00

## 2021-06-21 RX ADMIN — ROCURONIUM BROMIDE 30 MG: 10 INJECTION, SOLUTION INTRAVENOUS at 10:25:00

## 2021-06-21 RX ADMIN — EPHEDRINE SULFATE 5 MG: 50 INJECTION INTRAVENOUS at 08:30:00

## 2021-06-21 RX ADMIN — DOBUTAMINE HYDROCHLORIDE 2 MCG/KG/MIN: 100 INJECTION INTRAVENOUS at 10:36:00

## 2021-06-21 NOTE — ANESTHESIA PROCEDURE NOTES
Central Line  Performed by: Cecilia Ryan MD  Authorized by: Cecilia Ryan MD     General Information and Staff    Procedure Start:  6/21/2021 7:25 AM  Procedure End:  6/21/2021 7:31 AM  Anesthesiologist:  Cecilia Ryan MD  Performed by:   Anesthesiologi

## 2021-06-21 NOTE — CONSULTS
NEPHROLOGY CONSULT NOTE     DATE: 6/21/2021    Requesting Physician: Yo Vera NP      Reason for Consult: CKD     HISTORY OF PRESENT ILLNESS: Yeni Ozuna is a 65year old male with history of DM2, HTN and CKD Stage 4 secondary to biopsy proven hype Alcohol use: No      Drug use: No      Sexual activity: Not on file    Other Topics      Concerns:        Caffeine Concern: Not Asked        Exercise: Not Asked        Seat Belt: Not Asked        Special Diet: Not Asked        Stress Concern: Not Asked (DOBUTREX) 500 mg/250 ml infusion, 2.5-20 mcg/kg/min, Intravenous, Continuous PRN  nitroGLYCERIN infusion 50mg in D5W 250ml, 5-300 mcg/min, Intravenous, Continuous PRN  norepinephrine (LEVOPHED) 4 mg/250 ml premix infusion, 0.5-30 mcg/min, Intravenous, Con Or  HYDROcodone-acetaminophen (NORCO) 5-325 MG per tab 2 tablet, 2 tablet, Oral, Q4H PRN  morphINE sulfate (PF) 4 MG/ML injection 2 mg, 2 mg, Intravenous, Q2H PRN   Or  morphINE sulfate (PF) 4 MG/ML injection 4 mg, 4 mg, Intravenous, Q2H PRN   Or  morphINE mcg/kg/hr, Intravenous, Continuous  propofol (DIPRIVAN) infusion, 5-100 mcg/kg/min, Intravenous, Continuous  amiodarone HCl (PACERONE) tab 200 mg, 200 mg, Oral, TID CC  Amiodarone HCl (CORDARONE) 450 mg in dextrose 5 % 250 mL infusion, 1 mg/min, Intravenou 680 ml   Net 70 ml       IMAGING:  XR CHEST AP PORTABLE  (CPT=71045)    Result Date: 6/21/2021  CONCLUSION:  1. Postoperative changes from an interval CABG.   A new left-sided internal jugular approach central venous catheter tip localizes to the azygos vei

## 2021-06-21 NOTE — OPERATIVE REPORT
[facility] [address]  Operative Note     Keiko Torres Location: OR   [facility] [id number] MRN [id number]   Admission Date 6/21/2021 Operation Date 6/21/2021   Attending Physician Mer Rodarte MD Operating Physician Cassandra Alvarez MD      Preoperative D 2.4MM 14MM [facility] [id number]  SCREW BN 2.4MM 14MM [facility]   N/A 8 Implanted   SCREW BN 2.4MM 16MM [facility] - [id number]  SCREW BN 2.4MM 16MM [facility]   N/A 8 Implanted        Specimen: None     Drains: CT # 32 x 2 of cold blood cardioplegia was administered with a good arrest.  Cardioplegia was redosed at approximately 15-minute intervals throughout the cross-clamp. The lateral wall was exposed and a distal OM identified and dissected out with a [place] blade.   An a resuscitated, and weaned from bypass without issue. Protamine was administered and the cannulae removed with the previously placed pursestring sutures tied down.   Hemostasis was obtained in the mediastinum and ventricular pacing wires and 2 chest tubes we

## 2021-06-21 NOTE — ANESTHESIA PROCEDURE NOTES
Arterial Line  Performed by: Lashay Washington MD  Authorized by: Lashay Washington MD     General Information and Staff    Procedure Start:  6/21/2021 7:09 AM  Procedure End:  6/21/2021 7:10 AM  Anesthesiologist:  Lashay Washington MD  Performed By:  Clovis Closs

## 2021-06-21 NOTE — CONSULTS
[facility] Hospitalist Team  Consult   Admit Date:  6/21/21     ASSESSMENT / PLAN:   64 yo male DM type II with neuropathy, retinopathy and ckd, LBBB,HTN, lung nodule, CKD stage 4, HL, BPH, chronic systolic chf, ? right subclavian stenosis, severe CAD who is S/P [phone]  Answering Service number: [phone]  6/21/2021      HISTORY:   CC:Consult from Dr Karol Hyde for post op medical management     PCP: Brooklyn Shaw MD    History of Present Illness:    History obtained via chart review as patient is intubat Rfl: 3  HYDRALAZINE HCL 25 MG Oral Tab, TAKE 1 TABLET(25 MG) BY MOUTH THREE TIMES DAILY, Disp: 90 tablet, Rfl: 3  tamsulosin (FLOMAX) cap, TAKE 2 CAPSULES(0.8 MG) BY MOUTH DAILY, Disp: 180 capsule, Rfl: 3  LOSARTAN 100 MG Oral Tab, TAKE 1 TABLET BY MOUTH D CBC/Chem  Recent Labs   Lab 06/18/21  0851 06/21/21  1144   WBC 7.2 15.0*   HGB 11.4* 10.1*   MCV 91.4 90.8   .0 158.0   INR 1.04  --        Recent Labs   Lab 06/18/21  0851 06/21/21  1144    147*   K 4.3 4.0    116*   CO2 24.0 23.0 intubated  Neck: Lines in place  Pulm: Lungs with mechanical BS  CV: Heart with regular rate and rhythm,    Abd: Abdomen mild distention, BS diminished  MSK: dressing in place  Skin: no rashes or lesions   Psych: unable to assess  Neuro: unable to assess

## 2021-06-21 NOTE — ANESTHESIA PROCEDURE NOTES
Airway  Date/Time: 6/21/2021 7:16 AM  Urgency: Elective    Airway not difficult    General Information and Staff    Patient location during procedure: OR  Anesthesiologist: Jahaira Neely MD  Performed: anesthesiologist     Indications and Patient Conditio

## 2021-06-21 NOTE — CONSULTS
Critical Care Consult     Assessment / Plan:  1. CAD s/p CABG  - extubate per protocol  - wean gtts  - chest tubes in place  - CT surgery and cards following  2. CKD  - monitor  3. Anemia - postop  - monitor  4.  FEN  - npo  5. PPx  - subcu heparin  - famot 1000  torsemide 20 MG Oral Tab, Take 1 tablet (20 mg total) by mouth daily. , Disp: 90 tablet, Rfl: 3, 6/18/2021  amLODIPine Besylate 10 MG Oral Tab, TAKE 1 TABLET(10 MG) BY MOUTH DAILY, Disp: 90 tablet, Rfl: 3, 6/20/2021 at 1000  GLIMEPIRIDE 4 MG Oral Tab, daily., Disp: 180 capsule, Rfl: 3, More than a month at Unknown time      metFORMIN HCl 500 MG Oral Tab, Take 500 mg by mouth 2 (two) times daily with meals. , Disp: , Rfl:   Metoprolol Succinate ER 25 MG Oral Tablet 24 Hr, Take 1 tablet (25 mg total) by mo Mother    • No Known Problems Daughter    • No Known Problems Son    • No Known Problems Son    • No Known Problems Son    • No Known Problems Sister    • No Known Problems Brother          Exam:   06/21/21  1200 06/21/21  1215 06/21/21  1230 06/21/21  124

## 2021-06-21 NOTE — ANESTHESIA POSTPROCEDURE EVALUATION
Patient: Aaron Jones    Procedure Summary     Date: 06/21/21 Room / Location: [address] [place] [address] OR 18 / [address] [place] [address] OR    Anesthesia Start: 0703 Anesthesia Stop: 7746    Procedure: CORONARY ARTERY BYPASS GRAFT X 3; UTILIZING THE LEFT INTERNAL MAMMARY ARTERY TO THE

## 2021-06-21 NOTE — PROGRESS NOTES
[facility] Pharmacy Note:  Renal Adjustment for cefazolin (ANCEF)    Terrence Fulton is a 65year old patient who has been prescribed cefazolin (ANCEF) 2000 mg every 8 hrs x5. The estimated creatinine clearance is 24.8 mL/min (A) (based on SCr of 2.95 mg/dL (H)).

## 2021-06-21 NOTE — CONSULTS
[facility] CARDIOLOGY CONSULT      Terrence MEJIA Kal Restreop is a 65year old male who I assessed as follows:         REASON FOR CONSULTATION:    CABG            ASSESSMENT/PLAN:     IMPRESSIONS:  1. CAD s/p 3v CABG 6/21/21 LIMA-->LAD, SVG-->Diag, SVG-->OM  2.  Anastasia Villalobos Social History:  Social History    Tobacco Use      Smoking status: Never Smoker      Smokeless tobacco: Never Used    Vaping Use      Vaping Use: Never used    Alcohol use: No    Drug use: No         Medications:  0.9% NaCl infusion, 83 mL/hr, Intraveno 6/22/2021] multivitamin (ADULT) tab 1 tablet, 1 tablet, Oral, Daily  [START ON 6/22/2021] ascorbic acid (VITAMIN C) tab 500 mg, 500 mg, Oral, Daily  mupirocin (BACTROBAN) 2% nasal ointment OINT 1 Application, 1 Application, Nasal, BID  Chlorhexidine Glucon (SUBLIMAZE) 0.05 MG/ML injection 50 mcg, 50 mcg, Intravenous, Q30 Min PRN  acetaminophen (TYLENOL) tab 650 mg, 650 mg, Oral, Q6H PRN   Or  acetaminophen (TYLENOL) 160 MG/5ML oral liquid 650 mg, 650 mg, Oral, Q6H PRN   Or  acetaminophen (TYLENOL) 650 MG rec sedated  SKIN: no rashes  HEENT: atraumatic, normocephalic  NECK: supple, no bruits  LUNGS: clear to auscultation  CARDIO: RRR without murmur or S3   GI: soft, nontender  EXTREMITIES: no cyanosis, clubbing or edema  NEUROLOGY: sedated  PSYCH: sedated projects over the expected location of the stomach, however the distal side hole projects over the expected location of the gastroesophageal junction. Consider advancing approximately 5.0 cm. 3. Additional lines/tubes in customary positioning.  4. Developm

## 2021-06-21 NOTE — ANESTHESIA PROCEDURE NOTES
Peripheral IV  Date/Time: 6/21/2021 7:17 AM  Inserted by: Renie Peabody, MD    Placement  Needle size: 20 G  Laterality: left  Location: hand  Local anesthetic: none  Site prep: alcohol  Technique: anatomical landmarks  Attempts: 1

## 2021-06-21 NOTE — PLAN OF CARE
11:45 Received from OR intubated and sedated. Lines in usual placement with additional LIJ for potential dialysis. Surgical incisions to chest and left leg clean dry intact. CT with scant drainage noted on admission, quezada with clear output.  OG placed to L suctioning and perform as needed  - Assess and instruct to report SOB or any respiratory difficulty  - Respiratory Therapy support as indicated  - Manage/alleviate anxiety  - Monitor for signs/symptoms of CO2 retention  Outcome: Progressing     Problem: GA nutritional intake and initiate nutrition consult as needed  - Instruct patient on self management of diabetes  Outcome: Progressing  Goal: Electrolytes maintained within normal limits  Description: INTERVENTIONS:  - Monitor labs and rhythm and assess manish prevention bundle as indicated  Outcome: Progressing     Problem: HEMATOLOGIC - ADULT  Goal: Maintains hematologic stability  Description: INTERVENTIONS  - Assess for signs and symptoms of bleeding or hemorrhage  - Monitor labs and vital signs for trends pain management  - Manage/alleviate anxiety  - Utilize distraction and/or relaxation techniques  - Monitor for opioid side effects  - Notify MD/LIP if interventions unsuccessful or patient reports new pain  - Anticipate increased pain with activity and pre physician/LIP order or complex needs related to functional status, cognitive ability or social support system  Outcome: Progressing

## 2021-06-21 NOTE — CM/SW NOTE
Received MDO for [facility].  Pt is s/p CABG X3 today.   SW met pt in preadmission testing appointment and confirmed he lives with his wife and sons and is independent with ADLs and family manages IADLs and that they are agreeable to [address] refer

## 2021-06-21 NOTE — ANESTHESIA PROCEDURE NOTES
Central Line  Performed by: Sameer Sneed MD  Authorized by: Sameer Sneed MD     General Information and Staff    Procedure Start:  6/21/2021 7:35 AM  Procedure End:  6/21/2021 7:45 AM  Anesthesiologist:  Sameer Sneed MD  Performed by:   Anesthesiologi

## 2021-06-21 NOTE — HOME CARE LIAISON
Received referral from [address] unable to accept due to not being contracted with patient's insurance. CAMILO Alcazar notified.  Thank you for this referral.

## 2021-06-22 ENCOUNTER — APPOINTMENT (OUTPATIENT)
Dept: GENERAL RADIOLOGY | Facility: HOSPITAL | Age: 71
DRG: 235 | End: 2021-06-22
Attending: CLINICAL NURSE SPECIALIST
Payer: MEDICARE

## 2021-06-22 PROCEDURE — 71045 X-RAY EXAM CHEST 1 VIEW: CPT | Performed by: CLINICAL NURSE SPECIALIST

## 2021-06-22 RX ORDER — MELATONIN
325 2 TIMES DAILY WITH MEALS
Status: DISCONTINUED | OUTPATIENT
Start: 2021-06-22 | End: 2021-06-29

## 2021-06-22 RX ORDER — GABAPENTIN 300 MG/1
300 CAPSULE ORAL 2 TIMES DAILY
Status: DISCONTINUED | OUTPATIENT
Start: 2021-06-22 | End: 2021-06-23

## 2021-06-22 NOTE — PROGRESS NOTES
NEPHROLOGY DAILY PROGRESS NOTE     Follow up Reason: CKD stage 4      SUBJECTIVE:  Extubated yesterday   Reports nausea and some chest discomfort from chest tube     OBJECTIVE:    Total Intake/Output:    Intake/Output Summary (Last 24 hours) at 6/22/2021 1 (PRIMACOR) 20mg in D5W 100 mL premix infusion, 0.125-0.25 mcg/kg/min, Intravenous, PRN  docusate sodium (COLACE) cap 100 mg, 100 mg, Oral, BID  PEG 3350 (MIRALAX) powder packet 17 g, 17 g, Oral, Daily PRN  bisacodyl (DULCOLAX) rectal suppository 10 mg, 10 Or  Glucose-Vitamin C (DEX-4) chewable tab 4 tablet, 4 tablet, Oral, Q15 Min PRN   Or  dextrose 50 % injection 50 mL, 50 mL, Intravenous, Q15 Min PRN   Or  glucose (DEX4) oral liquid 30 g, 30 g, Oral, Q15 Min PRN   Or  Glucose-Vitamin C (DEX-4) chewable ta IMAGING:  XR CHEST AP PORTABLE  (CPT=71045)    Result Date: 6/22/2021  CONCLUSION:  1. Endotracheal tube and nasogastric tube removed. 2. Jugular catheter remains. 3. Niland-Stan catheter right pulmonary artery. 4. Stable cardiomegaly.  5. Markings stil free water   - follow Na levels.       CAD s/p CABG x3   - per CV surgery      We will continue to follow [address].  Michela Oreilly, [address] [facility] - Nephrology

## 2021-06-22 NOTE — PROGRESS NOTES
[facility]    Progress Note    Becky Burrows Patient Status:  Inpatient    1950 MRN [id number]   Location [facility] 2W/SW Attending Vita Gonzales MD   Hosp Day # 1 PCP Jane Boyd MD     Subjective:  Pt.  Sitting in Extremities: Warm, dry, no LE edema bilat  Pulses: 2+ Right DP/1+ Left DP  Skin: sternotomy incision drsg: CDI w/TPW intact/Left leg ACE wrap intact  Neurological:  AAOx3, MAEW    Assessment/Plan:  S/P CABG x 3 POD # 1  Encourage pulm toilet: IS- able to

## 2021-06-22 NOTE — PROGRESS NOTES
[facility]    Cardiology Progress Note    Lolis Blanco Patient Status:  Inpatient    1950 MRN [id number]   Location [facility] 2W/SW Attending Jennifer Garg MD   Hosp Day # 1 PCP Jae Camejo MD       Impression/Plan: Last 3 Weights  06/22/21 0610 : 230 lb 12.8 oz (104.7 kg)  06/21/21 0557 : 227 lb (103 kg)  06/18/21 1400 : 231 lb 11.3 oz (105.1 kg)  06/14/21 1007 : 231 lb 12.8 oz (105.1 kg)  06/08/21 1125 : 232 lb (105.2 kg)      Tele: NSR    Physical Exam:    Ge (PRECEDEX) 400 MCG/100ML premix infusion, 0.3-0.7 mcg/kg/hr, Intravenous, Continuous  Insulin Regular Human (NOVOLIN R) 100 Units in sodium chloride 0.9% 100 mL infusion, 1-40 Units/hr, Intravenous, Continuous  lactated ringers infusion, , Intravenous, Con Or  HYDROcodone-acetaminophen (NORCO) 5-325 MG per tab 2 tablet, 2 tablet, Oral, Q4H PRN  morphINE sulfate (PF) 4 MG/ML injection 2 mg, 2 mg, Intravenous, Q2H PRN   Or  morphINE sulfate (PF) 4 MG/ML injection 4 mg, 4 mg, Intravenous, Q2H PRN   Or  morphINE tab 200 mg, 200 mg, Oral, TID CC  Amiodarone HCl (CORDARONE) 450 mg in dextrose 5 % 250 mL infusion, 0.5 mg/min, Intravenous, Continuous  fenofibrate micronized (LOFIBRA) cap 134 mg, 134 mg, Oral, Daily with breakfast  finasteride (PROSCAR) tab 5 mg, 5 mg,

## 2021-06-22 NOTE — PLAN OF CARE
Extubated @ 2045. Tolerated well and currently is on 3L NC. Vitals have been WNL throughout the night. Remains on Amio & Insulin gtts as per protocol. Currently Algorithm 6. Pain has been controlled with Morphine and this has been working well for him.  Ant Therapy support as indicated  - Manage/alleviate anxiety  - Monitor for signs/symptoms of CO2 retention  Outcome: Progressing     Problem: GASTROINTESTINAL - ADULT  Goal: Minimal or absence of nausea and vomiting  Description: INTERVENTIONS:  - Maintain ad restrictions as appropriate  Outcome: Progressing  Goal: Hemodynamic stability and optimal renal function maintained  Description: INTERVENTIONS:  - Monitor labs and assess for signs and symptoms of volume excess or deficit  - Monitor intake, output and pa status  Description: INTERVENTIONS  - Assess for and report changes in neurological status  - Initiate measures to prevent increased intracranial pressure  - Maintain blood pressure and fluid volume within ordered parameters to optimize cerebral perfusion assist with strengthening/mobility  - Encourage toileting schedule  Outcome: Progressing

## 2021-06-22 NOTE — DIETARY NOTE
NUTRITION:  Diet Education    Consult received for diet education per protocol. POD#1 CABG. Education deferred until s/p intervention and when appropriate for teaching. Patient sleeping. Family visited.  Nutrition care discussed/handout provided on what

## 2021-06-22 NOTE — PROGRESS NOTES
[facility] Hospitalist Team  Progress Note    Becky Burrows Patient Status:  Inpatient    1950 MRN [id number]   Location [facility] 2W/SW Attending Vita Gonzales MD   Hosp Day # 1 PCP Jane Boyd MD     CC: Follow Up  PCP: Jane Boyd Right Subclavian Stenosis  -noted on us carotid  -follow up CTA neck ?     FEN  -lytes in am  -GI PPX  -diet-advance to ADA diet when able      Prophy  -SCD  -heparin      Dispo  -pending clinical course  PCP: Suze Rivas MD        Concerns regardi (NEURONTIN) cap 300 mg, 300 mg, Oral, BID  ferrous sulfate EC tab 325 mg, 325 mg, Oral, BID with meals  0.9% NaCl infusion, 83 mL/hr, Intravenous, Continuous  norepinephrine (LEVOPHED) 4 mg/250 ml premix infusion, 0.5-30 mcg/min, Intravenous, Continuous  D Mouth/Throat, BID  Heparin Sodium (Porcine) 5000 UNIT/ML injection 5,000 Units, 5,000 Units, Subcutaneous, 2 times per day  acetaminophen (TYLENOL) tab 650 mg, 650 mg, Oral, Q4H PRN   Or  HYDROcodone-acetaminophen (NORCO) 5-325 MG per tab 1 tablet, 1 table (DULCOLAX) rectal suppository 10 mg, 10 mg, Rectal, Daily PRN  Chlorhexidine Gluconate (PERIDEX) 0.12 % solution 15 mL, 15 mL, Mouth/Throat, [email or web address]  Dexmedetomidine HCl in NaCl (PRECEDEX) 400 MCG/100ML premix infusion, 0.2-1.5 mcg/kg/hr, Intravenous (Pulmonary Artery)   Resp 12   Ht 6' (1.829 m)   Wt 230 lb 12.8 oz (104.7 kg)   SpO2 99%   BMI 31.30 kg/m²     Gen: No acute distress, alert and oriented x3  Neck bilateral lines in place  Pulm: Lungs diminished at bases  CV: Heart with regular rate and rh

## 2021-06-22 NOTE — PLAN OF CARE
Awoke for a few seconds. , opens eyes to voice. Following simple commands. Falls asleep easily. Not awake enough to start weaning parameters.

## 2021-06-22 NOTE — PROGRESS NOTES
Critical Care Progress Note     Assessment / Plan:  1. CAD s/p CABG - extubated 6/21  - IS and OOB as able  - chest tubes in place  - CT surgery and cards following  2. CKD  - nephrology following  3. Anemia - postop  - monitor  4.  PPx  - subcu heparin  -

## 2021-06-23 ENCOUNTER — APPOINTMENT (OUTPATIENT)
Dept: GENERAL RADIOLOGY | Facility: HOSPITAL | Age: 71
DRG: 235 | End: 2021-06-23
Attending: STUDENT IN AN ORGANIZED HEALTH CARE EDUCATION/TRAINING PROGRAM
Payer: MEDICARE

## 2021-06-23 PROCEDURE — 71045 X-RAY EXAM CHEST 1 VIEW: CPT | Performed by: STUDENT IN AN ORGANIZED HEALTH CARE EDUCATION/TRAINING PROGRAM

## 2021-06-23 PROCEDURE — 5A09357 ASSISTANCE WITH RESPIRATORY VENTILATION, LESS THAN 24 CONSECUTIVE HOURS, CONTINUOUS POSITIVE AIRWAY PRESSURE: ICD-10-PCS | Performed by: HOSPITALIST

## 2021-06-23 RX ORDER — FUROSEMIDE 10 MG/ML
20 INJECTION INTRAMUSCULAR; INTRAVENOUS ONCE
Status: COMPLETED | OUTPATIENT
Start: 2021-06-23 | End: 2021-06-23

## 2021-06-23 NOTE — PLAN OF CARE
Spoke w/ US tech to get time estimation for US of the kidneys & bladder. Stated that it will either be later tonight or in the morning.

## 2021-06-23 NOTE — PLAN OF CARE
Terrence is A&O x4 but very drowsy. This morning he was able to walk 5 feet. His drowsiness continued this morning and an ABG was drawn and he was subsequently placed on BiPap then AVAPS. He has been in bed in the chair position for most of the day.  Later t respiratory effort  - Oxygen supplementation based on oxygen saturation or ABGs  - Provide Smoking Cessation handout, if applicable  - Encourage broncho-pulmonary hygiene including cough, deep breathe, Incentive Spirometry  - Assess the need for suctioning range  Description: INTERVENTIONS:  - Monitor Blood Glucose as ordered  - Assess for signs and symptoms of hyperglycemia and hypoglycemia  - Administer ordered medications to maintain glucose within target range  - Assess barriers to adequate nutritional i appropriate  - Administer supportive blood products/factors as ordered and appropriate  Outcome: Progressing     Problem: MUSCULOSKELETAL - ADULT  Goal: Return mobility to safest level of function  Description: INTERVENTIONS:  - Assess patient stability an ADULT  Goal: Absence of fever/infection during anticipated neutropenic period  Description: INTERVENTIONS  - Monitor WBC  - Administer growth factors as ordered  - Implement neutropenic guidelines  Outcome: Progressing     Problem: [address] [facility] - Provide timely, complete, and accurate information to patient/family  - Incorporate patient and family knowledge, values, beliefs, and cultural backgrounds into the planning and delivery of care  - Encourage patient/family to participate in care and de

## 2021-06-23 NOTE — PROGRESS NOTES
[facility]    Cardiology Progress Note    Cesarcarissa Costello Patient Status:  Inpatient    1950 MRN [id number]   Location [facility] 2W/SW Attending Edi Roy MD   Hosp Day # 2 PCP Mikaela Fritz MD       Impression/Plan: lb 11.2 oz (105.1 kg)  06/22/21 0610 : 230 lb 12.8 oz (104.7 kg)  06/21/21 0557 : 227 lb (103 kg)  06/18/21 1400 : 231 lb 11.3 oz (105.1 kg)  06/14/21 1007 : 231 lb 12.8 oz (105.1 kg)  06/08/21 1125 : 232 lb (105.2 kg)      Tele: NSR    Physical Exam:    G Aspart Pen (NOVOLOG) 100 UNIT/ML flexpen 1-5 Units, 1-5 Units, Subcutaneous, TID CC  ferrous sulfate EC tab 325 mg, 325 mg, Oral, BID with meals  melatonin tab 3 mg, 3 mg, Oral, Nightly PRN  metoprolol Tartrate (LOPRESSOR) tab 25 mg, 25 mg, Oral, 2x Daily( 160 MG/5ML oral liquid 650 mg, 650 mg, Oral, Q6H PRN   Or  acetaminophen (TYLENOL) 650 MG rectal suppository 650 mg, 650 mg, Rectal, Q6H PRN  PEG 3350 (MIRALAX) powder packet 17 g, 17 g, Oral, Daily PRN  bisacodyl (DULCOLAX) rectal suppository 10 mg, 10 mg

## 2021-06-23 NOTE — PROGRESS NOTES
[address] Patient Status:  Inpatient    1950 MRN [id number]   Location [facility] 2W/SW Attending Jennifer Garg MD   Hosp Day # 2 PCP Jae Camejo MD     Critical Care Progress Note      Assessment/Plan:  1.  CAD s/p CABG - extuba tamsulosin HCl  0.8 mg Oral Daily       FiO2 (%):  [30 %] 30 %    Intake/Output Summary (Last 24 hours) at 6/23/2021 1150  Last data filed at 6/23/2021 0900  Gross per 24 hour   Intake 1465 ml   Output 995 ml   Net 470 ml       /59 (BP Location: [facility] 168 hours. No results for input(s): TROP, CK in the last 168 hours. Imaging: I independently visualized all relevant chest imaging in PACS, agree with radiology interpretation except where noted.

## 2021-06-23 NOTE — PROGRESS NOTES
[facility]    Progress Note    George Mauricio Patient Status:  Inpatient    1950 MRN [id number]   Location [facility] 2W/SW Attending Caterina Devine MD   Hosp Day # 2 PCP Cydney Mckeon MD     Subjective:  Pt.  Sitting up Extremities: Warm, dry, no LE edema bilat  Pulses: 2+ bilat DP  Skin: sternotomy incision drsg: CDI w/TPW intact/Left leg incision drsg: CDI   Neurological: drowsy/oriented x 2/weak- moves all ext.      Assessment/Plan:  S/P CABG x 3 POD # 2  Encourage pu

## 2021-06-23 NOTE — PROGRESS NOTES
[facility] Hospitalist Team  Progress Note    [facility] Patient Status:  Inpatient    1950 MRN [id number]   Location [facility] 2W/SW Attending Andrea Koo MD   Hosp Day # 2 PCP Tramaine Sellers MD     CC: Follow Up  PCP: Tramaine Sellers renal   -daily wts, Cr, I/O     HTN  -holding home norvasc, hydralazine and topril  -lopressor BID  -follow BP     HL  -continue crestor     CKD Stage 4 2/2 DM  -baseline Cr 2.2-3, admission C 2.95-->3  -US renals with out hydro and medical renal disease DATA:   Labs:     Recent Labs   Lab 06/18/21  0851 06/21/21  1144 06/21/21  1228 06/22/21  5047 06/23/21  0417   WBC 7.2 15.0*  --  14.6* 13.0*   HGB 11.4* 10.1*  --  9.3* 9.3*   MCV 91.4 90.8  --  92.8 95.1   .0 158.0  --  152.0 147.0*   INR 1.04 (PRIMACOR) 20mg in D5W 100 mL premix infusion, 0.125-0.25 mcg/kg/min, Intravenous, PRN  docusate sodium (COLACE) cap 100 mg, 100 mg, Oral, BID  PEG 3350 (MIRALAX) powder packet 17 g, 17 g, Oral, Daily PRN  bisacodyl (DULCOLAX) rectal suppository 10 mg, 10 MG/ML injection 25 mcg, 25 mcg, Intravenous, Q30 Min PRN   Or  fentaNYL citrate (SUBLIMAZE) 0.05 MG/ML injection 50 mcg, 50 mcg, Intravenous, Q30 Min PRN  acetaminophen (TYLENOL) tab 650 mg, 650 mg, Oral, Q6H PRN   Or  acetaminophen (TYLENOL) 160 MG/5ML or projects over the expected location of the gastroesophageal junction. Consider advancing approximately 5.0 cm. 3. Additional lines/tubes in customary positioning.  4. Development of central pulmonary venous congestion and a trace left pleural effusion cons leukocytosis   -meds- asa, plavix, lopressor, amio for paf prevention   -plans for cordis and CT removal   -prn lasix  -O2 as below  -IS, ambulation   -as per [facility] and Cards     Lethargy  Acute Hypercapneic Failure   -morphine and ric stopped in case contr

## 2021-06-23 NOTE — CARDIAC REHAB
Cardiac Rehab Phase I    Activity:   Chair: Yes   Ambulation: No   Assistive Device: Walker   Distance: Standing position for 1 minute   Assistance needed: Maximum   Patient tolerated activity: Very lethargic. Shower Date:  Tolerated Shower Activity .

## 2021-06-23 NOTE — PROGRESS NOTES
NEPHROLOGY DAILY PROGRESS NOTE     Follow up Reason: CKD stage 4      SUBJECTIVE:  Sleepy and lethargic today.  Denies SOB or pain    On BiPAP, given IV lasix earlier today with good urinary response   Wife at bedside     OBJECTIVE:    Total Intake/Output: C) tab 500 mg, 500 mg, Oral, Daily  mupirocin (BACTROBAN) 2% nasal ointment OINT 1 Application, 1 Application, Nasal, BID  Chlorhexidine Gluconate (PERIDEX) 0.12 % solution 15 mL, 15 mL, Mouth/Throat, BID  Heparin Sodium (Porcine) 5000 UNIT/ML injection 5, HB Auto Resulted 03/24/2021    HCT 31.3 (L) 06/23/2021    .0 (L) 06/23/2021       IMAGING:  XR CHEST AP PORTABLE  (CPT=71045)    Result Date: 6/23/2021  CONCLUSION:  1.  Left-sided central venous catheter tip again projects over the expected location - primary nephrologist: Dr. Lidia Gaines     Hyperkalemia   - due to MED   - repeat K normal at 5.1   - low K / renal diet when taking PO      Hx of HTN, hypotension has resolved.     - weaned off levophed   - metoprolol resumed      Anemia   - Trend Hb, blood tr

## 2021-06-23 NOTE — PLAN OF CARE
Pt very drowsy/lethargic throughout the shift, follows commands, but cannot keep eyes open for long periods of time. Attempted to stand up with front wheel walker with two max assist to get to bed, pt could not successfully  order to ambulate.  Pt s saturation or ABGs  - Provide Smoking Cessation handout, if applicable  - Encourage broncho-pulmonary hygiene including cough, deep breathe, Incentive Spirometry  - Assess the need for suctioning and perform as needed  - Assess and instruct to report SOB o ordered  - Assess for signs and symptoms of hyperglycemia and hypoglycemia  - Administer ordered medications to maintain glucose within target range  - Assess barriers to adequate nutritional intake and initiate nutrition consult as needed  - Instruct manish sodium.  Initiate appropriate interventions as ordered  Outcome: Progressing     Problem: PAIN - ADULT  Goal: Verbalizes/displays adequate comfort level or patient's stated pain goal  Description: INTERVENTIONS:  - Encourage pt to monitor pain and request a discharge planning  - Arrange for needed discharge resources and transportation as appropriate  - Identify discharge learning needs (meds, wound care, etc)  - Arrange for interpreters to assist at discharge as needed  - Consider post-discharge preferences goals for specific interventions  Outcome: Progressing  Goal: Patient/Family Short Term Goal  Description: Patient's Short Term Goal: to improve mentation    Interventions:   - frequent neuro checks  -nephrology recommendations    - See additional Care Leslye patient/family  Outcome: Not Progressing

## 2021-06-24 ENCOUNTER — APPOINTMENT (OUTPATIENT)
Dept: GENERAL RADIOLOGY | Facility: HOSPITAL | Age: 71
DRG: 235 | End: 2021-06-24
Attending: STUDENT IN AN ORGANIZED HEALTH CARE EDUCATION/TRAINING PROGRAM
Payer: MEDICARE

## 2021-06-24 ENCOUNTER — APPOINTMENT (OUTPATIENT)
Dept: ULTRASOUND IMAGING | Facility: HOSPITAL | Age: 71
DRG: 235 | End: 2021-06-24
Attending: INTERNAL MEDICINE
Payer: MEDICARE

## 2021-06-24 PROCEDURE — 71045 X-RAY EXAM CHEST 1 VIEW: CPT | Performed by: STUDENT IN AN ORGANIZED HEALTH CARE EDUCATION/TRAINING PROGRAM

## 2021-06-24 PROCEDURE — 76770 US EXAM ABDO BACK WALL COMP: CPT | Performed by: INTERNAL MEDICINE

## 2021-06-24 RX ORDER — ACETAMINOPHEN 500 MG
1000 TABLET ORAL EVERY 8 HOURS PRN
Status: DISCONTINUED | OUTPATIENT
Start: 2021-06-24 | End: 2021-06-29

## 2021-06-24 RX ORDER — FUROSEMIDE 10 MG/ML
20 INJECTION INTRAMUSCULAR; INTRAVENOUS ONCE
Status: COMPLETED | OUTPATIENT
Start: 2021-06-24 | End: 2021-06-24

## 2021-06-24 NOTE — CM/SW NOTE
Attempted to meet with family regarding discharge planning/HH preference, but wife left for the day. L/m for patient's wife at home. SW to f/u Friday.     Adonis Mora, [id number]

## 2021-06-24 NOTE — PROGRESS NOTES
[facility] Hospitalist Progress Note     PCP: Brooklyn Shaw MD    CC: Follow up       Assessment/Plan:   64 yo male DM type II with neuropathy, retinopathy and ckd, LBBB,HTN, lung nodule, CKD stage 4, HL, BPH, chronic systolic chf, ? right subclavian sten I/O     HTN  -holding home norvasc, hydralazine and topril  -lopressor BID  -follow BP     HL  -continue crestor     CKD Stage 4 2/2 DM  -baseline Cr 2.2-3, admission C 2.95-->3->4.09->4.23->3.95  -US renals with out hydro and medical renal disease  -left effort, No wheezing or crackles  CV: Heart with regular rate and rhythm, No murmurs, rubs, gallops  Abd: Abdomen soft, nontender, nondistended, no organomegaly, bowel sounds present  MSK:  no clubbing, no cyanosis.   No Lower extremity edema  Skin: no rashe 06/23/21  0417 06/23/21  1154 06/24/21  0750      < > 147* 147* 143 141 144   K 4.3   < > 4.0 4.7 5.2* 5.1  5.1 4.9      < > 116* 118* 114* 112 113*   CO2 24.0   < > 23.0 21.0 23.0 18.0* 22.0   BUN 41*   < > 41* 39* 56* 57* 56*   CREATSERUM 2.9 2. Postoperative changes from a recent CABG. Central pulmonary venous congestion has partially improved consistent with resolving mild CHF/fluid overload. 3. Partial improvement in subsegmental atelectasis at the left lung base.     Dictated by (CST): Bran Development of central pulmonary venous congestion and a trace left pleural effusion consistent with mild CHF/fluid overload. 5. Development of mild subsegmental atelectasis at the left lung base.      Dictated by (CST): Sapna Harden MD on 6/21/2021 a

## 2021-06-24 NOTE — PROGRESS NOTES
[facility]    Progress Note    Bobby Gordon Patient Status:  Inpatient    1950 MRN [id number]   Location [facility] 2W/SW Attending Aguila Lindsay MD   Hosp Day # 3 PCP Weston Fernandes MD       Subjective:   Bobby Gordon calm  Sternum Incision dressing C/D/I. Left leg incision dressing C/D/I.   Pulmonary:  clear to Diminished  Cardiovascular: S1, S2 normal, no murmur, click, rub or gallop, regular rate and rhythm  Abdominal: softly Disctended, non-tender;faint  bowel sound ready    Rounded with CV surgeon    Addendum; Higher blood glucoses Increase Levemir to 20 units daily.        Results:     Lab Results   Component Value Date    WBC 13.0 (H) 06/23/2021    HGB 9.3 (L) 06/23/2021    HCT 31.3 (L) 06/23/2021    .0 (L) 0 11:22 AM                  Boom Andrade NP  6/24/2021

## 2021-06-24 NOTE — PLAN OF CARE
Remains lethargic and drowsy; not much improvement throughout the shift; ABGs being monitored, AVAPS while asleep but tries to pull of.  Plan to check ABG this evening and continue nasal cannula if ABG is unchanged from prior and use AVAPS at night    Diure Encourage broncho-pulmonary hygiene including cough, deep breathe, Incentive Spirometry  - Assess the need for suctioning and perform as needed  - Assess and instruct to report SOB or any respiratory difficulty  - Respiratory Therapy support as indicated and hypoglycemia  - Administer ordered medications to maintain glucose within target range  - Assess barriers to adequate nutritional intake and initiate nutrition consult as needed  - Instruct patient on self management of diabetes  Outcome: Not Progressi sites and surrounding tissue  - Implement wound care per orders  - Initiate isolation precautions as appropriate  - Initiate Pressure Ulcer prevention bundle as indicated  Outcome: Not Progressing     Problem: HEMATOLOGIC - ADULT  Goal: Maintains hematolog and evaluate response  - Implement non-pharmacological measures as appropriate and evaluate response  - Consider cultural and social influences on pain and pain management  - Manage/alleviate anxiety  - Utilize distraction and/or relaxation techniques  - M responsible for managing their own health  - Refer to Case Management Department for coordinating discharge planning if the patient needs post-hospital services based on physician/LIP order or complex needs related to functional status, cognitive ability o

## 2021-06-24 NOTE — RESPIRATORY THERAPY NOTE
Pt on BiPAP settings AVAPS/450/+5/24/30%/[id number]/0.7  Pt tolerating and saturating appropriately. No acute changes overnight, RT to continue to monitor.

## 2021-06-24 NOTE — OCCUPATIONAL THERAPY NOTE
OCCUPATIONAL THERAPY EVALUATION - INPATIENT     Room Number: 298/545-I  Evaluation Date: 6/24/2021  Type of Evaluation: Initial  Presenting Problem: CABG x 3    Physician Order: IP Consult to Occupational Therapy  Reason for Therapy: ADL/IADL Dysfunction a position. O2 saturations remained WFL throughout activity. The patient's Approx Degree of Impairment: 85.69% has been calculated based on documentation in the [facility] '6 clicks' Inpatient Daily Activity Short Form.   Research supports that patients with t Lives With: Spouse                      Drives: Yes       Prior Level of [place]: independent     SUBJECTIVE  \"no pain, no\" --at rest    OCCUPATIONAL THERAPY EXAMINATION      OBJECTIVE  Precautions: Cardiac;Sternal  Fall Risk: High fall risk    PA Comment:    Patient will complete item retrieval with SBA  Comment:    Pt will complete LE dressing with SBA      Goals  on: 2021  Frequency: 3-5x/wk

## 2021-06-24 NOTE — PROGRESS NOTES
[facility]    Cardiology Progress Note    Weyman Breaker Patient Status:  Inpatient    1950 MRN [id number]   Location [facility] 2W/SW Attending Juliann Meneses MD   Hosp Day # 3 PCP Suze Rivas MD       Impression/Plan: Output 2200 ml   Net -1424 ml       Last 3 Weights  06/24/21 0600 : 230 lb 2.6 oz (104.4 kg)  06/23/21 0500 : 231 lb 11.2 oz (105.1 kg)  06/22/21 0610 : 230 lb 12.8 oz (104.7 kg)  06/21/21 0557 : 227 lb (103 kg)  06/18/21 1400 : 231 lb 11.3 oz (105.1 kg) changes from a recent CABG.  Central pulmonary venous congestion has partially improved consistent with resolving mild CHF/fluid overload.    3. Partial improvement in subsegmental atelectasis at the left lung base.             ECHO 3/21:  Study Conclusions tablet, 1 tablet, Oral, Daily  ascorbic acid (VITAMIN C) tab 500 mg, 500 mg, Oral, Daily  mupirocin (BACTROBAN) 2% nasal ointment OINT 1 Application, 1 Application, Nasal, BID  Chlorhexidine Gluconate (PERIDEX) 0.12 % solution 15 mL, 15 mL, Mouth/Throat, B

## 2021-06-24 NOTE — PROGRESS NOTES
Critical Care Progress Note     Assessment / Plan:  1. Acute hypercapnic respiratory failure - possibly due to pulmonary edema  - ABG reviewed. AVAPS settings adjusted - Vt increased  - lasix  - minimize sedating medications  2.  CAD s/p CABG - extubated 6/

## 2021-06-24 NOTE — PHYSICAL THERAPY NOTE
PHYSICAL THERAPY EVALUATION - INPATIENT     Room Number: 210/951-H  Evaluation Date: 6/24/2021  Type of Evaluation: Initial   Physician Order: PT Eval and Treat    Presenting Problem: CAD, s/p CABG x3 on 6/21  Reason for Therapy: Mobility Dysfunction and functional mobility. Recommended to RN to use lift assist.   Sternal precautions maintained throughout session. Unable to educate pt on sternal precautions due to increased lethargy; will educate in future sessions if appropriate.     Patient's current func his shortness of breath and lower extremity edema.  At the time an echo was done which showed depressed LV function with elevated right heart pressures and he was referred for an angiogram.  He underwent left and right heart cath today which demonstrated 7 Relaxation;Repositioning    COGNITION  · Following Commands:  follows one-step commands inconsistently  · Motor Planning: impaired  · Safety Judgement:  decreased awareness of need for assistance and decreased awareness of need for safety  · Awareness of D self-stated goal is: not stated.     Goal #1 Patient is able to demonstrate supine - sit EOB @ level: modified independent     Goal #1   Current Status    Goal #2 Patient is able to demonstrate transfers EOB to/from Chair/Wheelchair at assistance level: sup

## 2021-06-24 NOTE — DIETARY NOTE
NUTRITION EDUCATION NOTE    Consult received for heart healthy diet education due to s/p CABGx3 6/21. Pt family/spouse in room during visit. Pt very drowsy, education provided to wife.  Pt with CKD4 and noted high Phos, Mg, and K. RD reviewed and provided h

## 2021-06-24 NOTE — PLAN OF CARE
Problem: CARDIOVASCULAR - ADULT  Goal: Maintains optimal cardiac output and hemodynamic stability  Description: INTERVENTIONS:  - Monitor vital signs, rhythm, and trends  - Monitor for bleeding, hypotension and signs of decreased cardiac output  - Evalua profile  Outcome: Progressing     Problem: GENITOURINARY - ADULT  Goal: Absence of urinary retention  Description: INTERVENTIONS:  - Assess patient’s ability to void and empty bladder  - Monitor intake/output and perform bladder scan as needed  - Follow ur appropriate  Outcome: Progressing     Problem: SKIN/TISSUE INTEGRITY - ADULT  Goal: Skin integrity remains intact  Description: INTERVENTIONS  - Assess and document risk factors for pressure ulcer development  - Assess and document skin integrity  - Monito INTERVENTIONS:  - Encourage pt to monitor pain and request assistance  - Assess pain using appropriate pain scale  - Administer analgesics based on type and severity of pain and evaluate response  - Implement non-pharmacological measures as appropriate and discharge as needed  - Consider post-discharge preferences of patient/family/discharge partner  - Complete POLST form as appropriate  - Assess patient's ability to be responsible for managing their own health  - Refer to Case Management Department for coor Progressing     Problem: RESPIRATORY - ADULT  Goal: Achieves optimal ventilation and oxygenation  Description: INTERVENTIONS:  - Assess for changes in respiratory status  - Assess for changes in mentation and behavior  - Position to facilitate oxygenation

## 2021-06-24 NOTE — PROGRESS NOTES
NEPHROLOGY DAILY PROGRESS NOTE     Follow up Reason: CKD stage 4      SUBJECTIVE:  Remains sleepy and lethargic this AM   On AVAPS, denies SOB      OBJECTIVE:    Total Intake/Output:    Intake/Output Summary (Last 24 hours) at 6/24/2021 0815  Last data ceferino Daily  ascorbic acid (VITAMIN C) tab 500 mg, 500 mg, Oral, Daily  mupirocin (BACTROBAN) 2% nasal ointment OINT 1 Application, 1 Application, Nasal, BID  Chlorhexidine Gluconate (PERIDEX) 0.12 % solution 15 mL, 15 mL, Mouth/Throat, BID  Heparin Sodium (Porc (H) 05/24/2021    WBC 13.0 (H) 06/23/2021    HB Auto Resulted 03/24/2021    HCT 31.3 (L) 06/23/2021    .0 (L) 06/23/2021       IMAGING:  XR CHEST AP PORTABLE  (CPT=71045)    Result Date: 6/24/2021  CONCLUSION:  1.  Left-sided central venous catheter acute indication for RRT at this time. Will continue to assess for dialysis needs.   Already has [facility] cath in place if needed   - primary nephrologist: Dr. Rasheed Kennedy     Hyperkalemia   - due to MED   - resolved   - low K / renal diet when taking PO      H

## 2021-06-25 ENCOUNTER — APPOINTMENT (OUTPATIENT)
Dept: MRI IMAGING | Facility: HOSPITAL | Age: 71
DRG: 235 | End: 2021-06-25
Attending: STUDENT IN AN ORGANIZED HEALTH CARE EDUCATION/TRAINING PROGRAM
Payer: MEDICARE

## 2021-06-25 ENCOUNTER — APPOINTMENT (OUTPATIENT)
Dept: GENERAL RADIOLOGY | Facility: HOSPITAL | Age: 71
DRG: 235 | End: 2021-06-25
Attending: CLINICAL NURSE SPECIALIST
Payer: MEDICARE

## 2021-06-25 PROCEDURE — 71046 X-RAY EXAM CHEST 2 VIEWS: CPT | Performed by: CLINICAL NURSE SPECIALIST

## 2021-06-25 NOTE — PROGRESS NOTES
[facility]    Progress Note    Merlinda Beard Patient Status:  Inpatient    1950 MRN [id number]   Location [facility] 2W/SW Attending Armando Walsh MD   Hosp Day # 4 PCP Erasmo Lema MD     Subjective:  Pt.  Sitting in incision drsg: CDI w/TPW intact/left leg incision drsg: CDI   Neurological: Drowsy/arousable-oriented x 2- weak but moves all ext. Assessment/Plan:  S/P CABG x 3 POD # 4  Encourage pulm toilet: IS- needs assistance/encouragement to perform.  Will add EZ [facility]  6/25/2021  8:58 AM

## 2021-06-25 NOTE — PLAN OF CARE
A&ox 1-3, confused and drowsy, repeat ABG done after patient refused AVAPS and on NC, improved  Chua with improved urine output, labs done this am, US kidney/bladder completed  No BM, poor appetite still, (+)BS & flatus    Addendum: alert x4 this am, able report SOB or any respiratory difficulty  - Respiratory Therapy support as indicated  - Manage/alleviate anxiety  - Monitor for signs/symptoms of CO2 retention  Outcome: Progressing     Problem: GASTROINTESTINAL - ADULT  Goal: Minimal or absence of nausea Instruct patient on self management of diabetes  Outcome: Progressing  Goal: Electrolytes maintained within normal limits  Description: INTERVENTIONS:  - Monitor labs and rhythm and assess patient for signs and symptoms of electrolyte imbalances  - Adminis Problem: HEMATOLOGIC - ADULT  Goal: Maintains hematologic stability  Description: INTERVENTIONS  - Assess for signs and symptoms of bleeding or hemorrhage  - Monitor labs and vital signs for trends  - Administer supportive blood products/factors, fluids distraction and/or relaxation techniques  - Monitor for opioid side effects  - Notify MD/LIP if interventions unsuccessful or patient reports new pain  - Anticipate increased pain with activity and pre-medicate as appropriate  Outcome: Progressing     Prob functional status, cognitive ability or social support system  Outcome: Progressing     Problem: Patient Centered Care  Goal: Patient preferences are identified and integrated in the patient's plan of care  Description: Interventions:  - What would you lik

## 2021-06-25 NOTE — PROGRESS NOTES
NEPHROLOGY DAILY PROGRESS NOTE     Follow up Reason: CKD stage 4      SUBJECTIVE:  Remains sleepy,lethargic  Confused this AM  Off AVAPS    OBJECTIVE:    Total Intake/Output:    Intake/Output Summary (Last 24 hours) at 6/25/2021 3014  Last data filed at [facility] BID  Chlorhexidine Gluconate (PERIDEX) 0.12 % solution 15 mL, 15 mL, Mouth/Throat, BID  Heparin Sodium (Porcine) 5000 UNIT/ML injection 5,000 Units, 5,000 Units, Subcutaneous, 2 times per day  acetaminophen (TYLENOL) tab 650 mg, 650 mg, Oral, Q4H PRN  Clop recent CABG. Central pulmonary venous congestion has partially improved consistent with resolving mild CHF/fluid overload. 3. Partial improvement in subsegmental atelectasis at the left lung base.     Dictated by (CST): Brayden Ogden MD on 6/24/2021 a Trend Hb, blood transfusions per ICU / Surgery      Hypernatremia   - Na mildly elevated, encourage po intake of free water  -follow Na levels      CAD s/p CABG x3   - per CV surgery      Dw CV surgery and RN    We will continue to follow [address]

## 2021-06-25 NOTE — PLAN OF CARE
Pt mental status fluctuating, mostly oriented x1-2. Very drowsy/lethargic all day, arouses to voice. MRI brain ordered and will be done tonight. Screening form completed. Pt up in chair most of the day. Up with sling.  Stood at bedside x2 with 2 assist and Spirometry  - Assess the need for suctioning and perform as needed  - Assess and instruct to report SOB or any respiratory difficulty  - Respiratory Therapy support as indicated  - Manage/alleviate anxiety  - Monitor for signs/symptoms of CO2 retention  Norah Aviles Assess barriers to adequate nutritional intake and initiate nutrition consult as needed  - Instruct patient on self management of diabetes  Outcome: Not Progressing  Goal: Electrolytes maintained within normal limits  Description: INTERVENTIONS:  - Monitor appropriate  - Initiate Pressure Ulcer prevention bundle as indicated  Outcome: Progressing     Problem: HEMATOLOGIC - ADULT  Goal: Maintains hematologic stability  Description: INTERVENTIONS  - Assess for signs and symptoms of bleeding or hemorrhage  - Mo Consider cultural and social influences on pain and pain management  - Manage/alleviate anxiety  - Utilize distraction and/or relaxation techniques  - Monitor for opioid side effects  - Notify MD/LIP if interventions unsuccessful or patient reports new gonzalo if the patient needs post-hospital services based on physician/LIP order or complex needs related to functional status, cognitive ability or social support system  Outcome: Progressing     Problem: Patient Centered Care  Goal: Patient preferences are ident Promote wakefulness i.e. lights on, blinds open  - Promote sleep, encourage patient's normal rest cycle i.e. lights off, TV off, minimize noise and interruptions  - Encourage family to assist in orientation and promotion of home routines  Outcome: Progress

## 2021-06-25 NOTE — PROGRESS NOTES
ABG unable to be obtained after 3 attempts by RT. Will place patient back on AVAPS as tolerated and utilize NC for breaks until repeat ABG can be obtained.     Addendum: refused AVAPS after about 2 hours, pulling at mask, becoming irritated with RN and son

## 2021-06-25 NOTE — PAYOR COMM NOTE
--------------  CONTINUED STAY REVIEW    Payor: [address] [place] [address] #:  [id number]  Authorization Number: [id number]    Admit date: 6/21/21  Admit time:  5:49 AM    Admitting Physician: Ken Bergeron MD  Attending Physician:  Ken Bergeron MD 6/25/21 0344   Magnesium   1.6 - 2.6 mg/dL 2.7High         He continues drowsy but improving from previous days. He is more easily arousable. He is calm, cooperative and confused. He is oriented x 2 (person/time). He knows he had heart surgery.  Does not kn indeterminate left lung nodule noted on 6/2 CT abd/pelvis per PCP during F/U visit.   Discharge planning: pt. Lives with his wife and children. Acute rehab recommended per PT. Will ask SW/ to assist with planning.     Addendum @ 11:45am  Pt.  Shower Jadyn Post, RN    6/24/2021 1823 Given 4 Units Subcutaneous (Right Upper Arm) Kamran Don RN    6/24/2021 1359 Given 4 Units Subcutaneous (Left Upper Arm) Deana Mirza RN      Insulin Aspart Pen (NOVOLOG) 100 UNIT/ML flexpen 5 Units     Date Action Dose

## 2021-06-25 NOTE — PHYSICAL THERAPY NOTE
PHYSICAL THERAPY TREATMENT NOTE - INPATIENT     Room Number: 443/298-X       Presenting Problem: CAD, s/p CABG x3 on 6/21    Problem List  Principal Problem:    Coronary artery disease involving native coronary artery of native heart  Active Problems:    C patients with this level of impairment may benefit from LTAC. Recommending acute rehab due to level of assist needed for all mobility.      DISCHARGE RECOMMENDATIONS  PT Discharge Recommendations: Acute rehabilitation     PLAN  PT Treatment Plan: Bed mobili Patient End of Session: Needs met;RN aware of session/findings; All patient questions and concerns addressed; In bathroom - nursing staff aware; Other (Comment) (With RN Jackelin Collins in shower)    CURRENT GOALS   Goals to be met by: 7/8/21  Patient Goal Patibirgit

## 2021-06-25 NOTE — CM/SW NOTE
Notified in Nursing Rounds that PT/OT is currently recommending Acute Rehab. PMR entered and pending. Met with patient and his wife at the bedside and they are agreeable to referral, but hoping pt will progress and return home.       Referral entered fo

## 2021-06-25 NOTE — PROGRESS NOTES
Critical Care Progress Note     Assessment / Plan:  1. Acute hypercapnic respiratory failure - possibly due to pulmonary edema. Improved  - continue AVAPS with sleep as tolerated  - on RA  - lasix prn  - minimize sedating medications  2.  CAD s/p CABG - ext

## 2021-06-25 NOTE — PROGRESS NOTES
[facility] HOSP - [facility]    Cardiology Progress Note    Jenae Hughes Patient Status:  Inpatient    1950 MRN [id number]   Location [facility] 2W/SW Attending Tang De Dios MD   Hosp Day # 4 PCP Dotty Goff MD       Impression/Plan: 24 hour   Intake 540 ml   Output 3900 ml   Net -3360 ml       Last 3 Weights  06/25/21 0600 : 224 lb 10.4 oz (101.9 kg)  06/24/21 0600 : 230 lb 2.6 oz (104.4 kg)  06/23/21 0500 : 231 lb 11.2 oz (105.1 kg)  06/22/21 0610 : 230 lb 12.8 oz (104.7 kg)  06/21/2 Recent Labs   Lab 06/18/21  0851 06/23/21  1154 06/24/21  0750 06/25/21  0344   ALT 29 11*  --   --    AST 19 24  --   --    ALB 3.3* 2.8* 2.5* 2.4*       No results for input(s): TROP in the last 168 hours. CXR 6/24/21:  CONCLUSION:   1.  Left BID  PEG 3350 (MIRALAX) powder packet 17 g, 17 g, Oral, Daily PRN  bisacodyl (DULCOLAX) rectal suppository 10 mg, 10 mg, Rectal, Daily PRN  Senna (SENOKOT) tab 8.6 mg, 8.6 mg, Oral, BID  ondansetron HCl (ZOFRAN) injection 4 mg, 4 mg, Intravenous, Q6H PRN

## 2021-06-25 NOTE — PROGRESS NOTES
[facility] Hospitalist Progress Note     PCP: Brooklyn Shaw MD    CC: Follow up       Assessment/Plan:   66 yo male DM type II with neuropathy, retinopathy and ckd, LBBB,HTN, lung nodule, CKD stage 4, HL, BPH, chronic systolic chf, ? right subclavian sten 6/15     Chronic Systolic CHF  -pre op echo 30-40% with grade 1 DD, moderate MR, Post Op LANE with EF 40%  -home meds- toprol, cozaar and torsemide  -currently on lopressor  -volume management as per renal   -daily wts, Cr, I/O     HTN  -holding home norvas 1400 : 231 lb 11.3 oz (105.1 kg)  06/14/21 1007 : 231 lb 12.8 oz (105.1 kg)  06/08/21 1125 : 232 lb (105.2 kg)      Exam  Gen: No acute distress, avaps, awakens easily, follows commands and answers questions appropriately when he hears you (hard of hearing Recent Labs   Lab 06/21/21  1144 06/21/21  1228 06/22/21  0457 06/23/21  0417 06/25/21  0343   WBC 15.0*  --  14.6* 13.0* 8.7   HGB 10.1*  --  9.3* 9.3* 8.9*   MCV 90.8  --  92.8 95.1 94.8   .0  --  152.0 147.0* 163.0   INR  --  1.28*  --   -- Nikolai Thompson [facility] (PIR=[id number])    Result Date: 6/17/2021  CONCLUSION:  1. No hemodynamically significant stenosis of the right or left internal carotid artery. Mild left carotid bifurcation atherosclerosis.  2. Nonspecific retrograde flow within the nasogastric tube removed. 2. Jugular catheter remains. 3. [place]-Stan catheter right pulmonary artery. 4. Stable cardiomegaly. 5. Markings still are prominent with some basilar atelectasis at the left base. Dictated by (CST):  Payton Kwon MD on 6/22/

## 2021-06-25 NOTE — OCCUPATIONAL THERAPY NOTE
OCCUPATIONAL THERAPY TREATMENT NOTE - INPATIENT        Room Number: 063/717-G           Presenting Problem: CABG x 3    Problem List  Principal Problem:    Coronary artery disease involving native coronary artery of native heart  Active Problems:    CAD, m Hopeful for pt's alertness to improve to maximize participation. DISCHARGE RECOMMENDATIONS  OT Discharge Recommendations: Acute rehabilitation  OT Device Recommendations: TBD     PLAN  OT Treatment Plan: Balance activities; Energy conservation/work si home       Patient will complete functional transfer with SBA  Comment: ongoing    Patient will complete toileting with SBA  Comment: total A    Patient will tolerate standing for 4 minutes in prep for adls with SBA   Comment: ongoing     Patient will comp

## 2021-06-26 RX ORDER — METOPROLOL TARTRATE 50 MG/1
50 TABLET, FILM COATED ORAL
Status: DISCONTINUED | OUTPATIENT
Start: 2021-06-26 | End: 2021-06-29

## 2021-06-26 NOTE — PROGRESS NOTES
[facility]    Assessment and Plan:     Impression/Plan:  65year old male presenting with:     1) Multivessel CAD s/p CABG x 3 6/21/2021 (LIMA-LAD, SVG-diag, SVG-OM)  2) ICM (EF 30-40% 3/2021 with moderate MR).  Chronic systolic CHF  3) HT (CPT=71046)    Result Date: 6/25/2021  CONCLUSION:   Moderate cardiomegaly. Post sternotomy changes. Mild central pulmonary vascular congestion and small to moderate left pleural effusion.    Left retrocardiac opacities, most likely postoperative atelecta

## 2021-06-26 NOTE — PROGRESS NOTES
Pulmonary Progress Note        NAME: Keshia Round - ROOM: 615/538-X - MRN: [id number] - Age: 65year old - : 1950        Last 24hrs: No events overnight, resting comfortably in bed    OBJECTIVE:   21  0300 21  0400 21  0516  click, rub or gallop, regular rate and rhythm  Abdomen: soft, non-tender; bowel sounds normal; no masses,  no organomegaly  Extremities: extremities normal, atraumatic, no cyanosis or edema    Labs reviewed as noted below        ASSESSMENT/PLAN:  1.  Acute

## 2021-06-26 NOTE — PLAN OF CARE
Overall improved mental status, although still sleeps easily.   No clear signs of dementia today  Able to stand and take a few steps today  Able to feed himself   Mental status improved      Problem: CARDIOVASCULAR - ADULT  Goal: Absence of cardiac arrhythm INTERVENTIONS:  - Assess bowel function  - Maintain adequate hydration with IV or PO as ordered and tolerated  - Evaluate effectiveness of GI medications  - Encourage mobilization and activity  - Obtain nutritional consult as needed  - Establish a toiletin limitations with patient/family  Outcome: Progressing     Problem: NEUROLOGICAL - ADULT  Goal: Achieves stable or improved neurological status  Description: INTERVENTIONS  - Assess for and report changes in neurological status  - Initiate measures to preve for signs of decreased coronary artery perfusion - ex.  Angina  - Evaluate fluid balance, assess for edema, trend weights  Outcome: Not Progressing     Problem: GENITOURINARY - ADULT  Goal: Absence of urinary retention  Description: INTERVENTIONS:  - Assess ADULT  Goal: Maintains hematologic stability  Description: INTERVENTIONS  - Assess for signs and symptoms of bleeding or hemorrhage  - Monitor labs and vital signs for trends  - Administer supportive blood products/factors, fluids and medications as ordere strengthening/mobility  - Encourage toileting schedule  Outcome: Not Progressing     Problem: DISCHARGE PLANNING  Goal: Discharge to home or other facility with appropriate resources  Description: INTERVENTIONS:  - Identify barriers to discharge w/pt and c Goal  Description: Patient's Short Term Goal: leave the hospital, improve mental status    Interventions:   - MRI brain 6/27  - work with PT/OT  - delirium protocol  - reorientation  - anticipate discharge Sunday or Monday  - See additional Care Plan goals

## 2021-06-26 NOTE — PROGRESS NOTES
Pt scheduled to go to MRI this evening, per patient's son request to do CT of the head first. Both Dr. Tho Garrison and Dr. Ronel Upton notified. Per Dr. Ronel Upton, MRI to be done. Patient's family concerned was addressed. Patient will be going to MRI later tonight.

## 2021-06-26 NOTE — PLAN OF CARE
Patient's mental status continuous to fluctuate, mainly AOX1-2. Follows commands with delayed responses. On room air. Chua in place with adequate urine output. Patient c/o of pain, managed with tylenol. Up to the chair with sling.  Plan is to go to MRI tod Manage/alleviate anxiety  - Monitor for signs/symptoms of CO2 retention  Outcome: Progressing     Problem: GASTROINTESTINAL - ADULT  Goal: Minimal or absence of nausea and vomiting  Description: INTERVENTIONS:  - Maintain adequate hydration with IV or PO a maintained within normal limits  Description: INTERVENTIONS:  - Monitor labs and rhythm and assess patient for signs and symptoms of electrolyte imbalances  - Administer electrolyte replacement as ordered  - Monitor response to electrolyte replacements, in INTERVENTIONS  - Assess for signs and symptoms of bleeding or hemorrhage  - Monitor labs and vital signs for trends  - Administer supportive blood products/factors, fluids and medications as ordered and appropriate  - Administer supportive blood products/f Problem: Diabetes/Glucose Control  Goal: Glucose maintained within prescribed range  Description: INTERVENTIONS:  - Monitor Blood Glucose as ordered  - Assess for signs and symptoms of hyperglycemia and hypoglycemia  - Administer ordered medications to m

## 2021-06-26 NOTE — PROGRESS NOTES
NEPHROLOGY DAILY PROGRESS NOTE     Follow up Reason: CKD stage 4      SUBJECTIVE:  Remains sleepy  Plan for MRI  Drank some water this am   No other complaints     OBJECTIVE:    Total Intake/Output:    Intake/Output Summary (Last 24 hours) at 6/26/2021 110 solution 15 mL, 15 mL, Mouth/Throat, BID  Heparin Sodium (Porcine) 5000 UNIT/ML injection 5,000 Units, 5,000 Units, Subcutaneous, 2 times per day  acetaminophen (TYLENOL) tab 650 mg, 650 mg, Oral, Q4H PRN  Clopidogrel Bisulfate (PLAVIX) tab 75 mg, 75 mg, O likely postoperative atelectasis. Azygos placement of the patient's left central line. Consider repositioning.     Dictated by (CST): Lc Bautista MD on 6/25/2021 at 10:23 AM     Finalized by (CST): Lc Bautista MD on 6/25/2021 at 10:28 AM

## 2021-06-26 NOTE — PROGRESS NOTES
[facility]    Progress Note    Rick Colemaner Patient Status:  Inpatient    1950 MRN [id number]   Location [facility] 2W/SW Attending Juliann Meneses MD   Hosp Day # 5 PCP Suze Rivas MD     Subjective:  Pt.  Sitting in w/activity    Assessment/Plan:  S/P CABG x 3 POD # 5  Encourage pulm toilet: IS/EZ pap. On RA.    Pain meds as needed- Morphine and Neurontin (pt. takes at home for neuropathy) stopped post op due to persistent drowsiness.  Prefer Tyleonol for pain.   Incre

## 2021-06-26 NOTE — PROGRESS NOTES
[facility] Hospitalist Progress Note     PCP: Adrián Feliz MD    CC: Follow up       Assessment/Plan:   66 yo male DM type II with neuropathy, retinopathy and ckd, LBBB,HTN, lung nodule, CKD stage 4, HL, BPH, chronic systolic chf, ? right subclavian sten CHF  -pre op echo 30-40% with grade 1 DD, moderate MR, Post Op LANE with EF 40%  -home meds- toprol, cozaar and torsemide  -currently on lopressor  -volume management as per renal   -daily wts, Cr, I/O     HTN  -holding home norvasc, hydralazine and topril kg)  06/21/21 0557 : 227 lb (103 kg)  06/18/21 1400 : 231 lb 11.3 oz (105.1 kg)  06/14/21 1007 : 231 lb 12.8 oz (105.1 kg)  06/08/21 1125 : 232 lb (105.2 kg)      Exam  Gen: No acute distress, avaps, awakens easily, hard of hearing.   Able to answer questio acetaminophen **OR** acetaminophen, PEG 3350, bisacodyl    Data Review:       Labs:     Recent Labs   Lab 06/21/21  1144 06/21/21  1228 06/22/21  0457 06/23/21  0417 06/25/21  0343 06/26/21  0441   WBC 15.0*  --  14.6* 13.0* 8.7 7.9   HGB 10.1*  --  9.3* 9 abnormality. Dictated by (CST): Kate Quevedo MD on 6/18/2021 at 9:10 AM     Finalized by (CST): Kate Quevedo MD on 6/18/2021 at [address] [place] [address] ([id number])    Result Date: 6/17/2021  CONCLUSION:  1.  No hemodynamically Tanya Castro MD on 6/23/2021 at 11:22 AM          XR CHEST AP PORTABLE  (CPT=71045)    Result Date: 6/22/2021  CONCLUSION:  1. Endotracheal tube and nasogastric tube removed. 2. Jugular catheter remains. 3. [place]-Stan catheter right pulmonary artery.

## 2021-06-27 ENCOUNTER — APPOINTMENT (OUTPATIENT)
Dept: MRI IMAGING | Facility: HOSPITAL | Age: 71
DRG: 235 | End: 2021-06-27
Attending: STUDENT IN AN ORGANIZED HEALTH CARE EDUCATION/TRAINING PROGRAM
Payer: MEDICARE

## 2021-06-27 PROCEDURE — 70551 MRI BRAIN STEM W/O DYE: CPT | Performed by: STUDENT IN AN ORGANIZED HEALTH CARE EDUCATION/TRAINING PROGRAM

## 2021-06-27 PROCEDURE — 99223 1ST HOSP IP/OBS HIGH 75: CPT | Performed by: OTHER

## 2021-06-27 RX ORDER — AMLODIPINE BESYLATE 5 MG/1
5 TABLET ORAL DAILY
Status: DISCONTINUED | OUTPATIENT
Start: 2021-06-27 | End: 2021-06-29

## 2021-06-27 RX ORDER — TRAMADOL HYDROCHLORIDE 50 MG/1
50 TABLET ORAL EVERY 12 HOURS PRN
Status: DISCONTINUED | OUTPATIENT
Start: 2021-06-27 | End: 2021-06-29

## 2021-06-27 RX ORDER — BENZONATATE 100 MG/1
100 CAPSULE ORAL 3 TIMES DAILY PRN
Status: DISCONTINUED | OUTPATIENT
Start: 2021-06-27 | End: 2021-06-29

## 2021-06-27 RX ORDER — HEPARIN SODIUM AND DEXTROSE 10000; 5 [USP'U]/100ML; G/100ML
1000 INJECTION INTRAVENOUS ONCE
Status: COMPLETED | OUTPATIENT
Start: 2021-06-27 | End: 2021-06-27

## 2021-06-27 RX ORDER — HEPARIN SODIUM AND DEXTROSE 10000; 5 [USP'U]/100ML; G/100ML
INJECTION INTRAVENOUS CONTINUOUS
Status: DISCONTINUED | OUTPATIENT
Start: 2021-06-28 | End: 2021-06-28

## 2021-06-27 NOTE — PROGRESS NOTES
[facility]    Progress Note    Jessie Dykes Patient Status:  Inpatient    1950 MRN [id number]   Location [facility] 2W/SW Attending Katey Huang MD   Hosp Day # 6 PCP Ang Peter MD     Subjective:  Pt.  Sitting in JULIAN- continues weak     Assessment/Plan:  S/P CABG x 3 POD # 6  Encourage pulm toilet: IS/EZ pap. On RA.    Pain meds as needed- Morphine and Neurontin (pt. takes at home for neuropathy) stopped post op due to persistent drowsiness.  Prefer Tyleonol for [employer] pending  Still deconditioned. Needs PT/ rehab  Anti platelets, anti lipids in progress  May need neuro consult if MRI positive.

## 2021-06-27 NOTE — PLAN OF CARE
Problem: CARDIOVASCULAR - ADULT  Goal: Maintains optimal cardiac output and hemodynamic stability  Description: INTERVENTIONS:  - Monitor vital signs, rhythm, and trends  - Monitor for bleeding, hypotension and signs of decreased cardiac output  - Evalua ordered and tolerated  - Nasogastric tube to low intermittent suction as ordered  - Evaluate effectiveness of ordered antiemetic medications  - Provide nonpharmacologic comfort measures as appropriate  - Advance diet as tolerated, if ordered  - Obtain nutr including rhythm and repeat lab results as appropriate  - Fluid restriction as ordered  - Instruct patient on fluid and nutrition restrictions as appropriate  Outcome: Progressing  Goal: Hemodynamic stability and optimal renal function maintained  Descript ordered and appropriate  Outcome: Progressing     Problem: MUSCULOSKELETAL - ADULT  Goal: Return mobility to safest level of function  Description: INTERVENTIONS:  - Assess patient stability and activity tolerance for standing, transferring and ambulating period  Description: INTERVENTIONS  - Monitor WBC  - Administer growth factors as ordered  - Implement neutropenic guidelines  Outcome: Progressing     Problem: SAFETY ADULT - FALL  Goal: Free from fall injury  Description: INTERVENTIONS:  - Assess pt freq information to patient/family  - Incorporate patient and family knowledge, values, beliefs, and cultural backgrounds into the planning and delivery of care  - Encourage patient/family to participate in care and decision-making at the level they choose  - H halls and to the bathroom with front wheel walker. Tramadol added for pain relief. MRI completed & per department the scan will be read by time radiologist leaves for the day ( which may be before midnight).   Chua removed today and patient able to void

## 2021-06-27 NOTE — PROGRESS NOTES
Pulmonary Progress Note        NAME: Oli Tyson - ROOM: [address] [place] [address] - MRN: [id number] - Age: 65year old - : 1950        Last 24hrs: No events overnight, notes some pain in his left chest when he coughs    OBJECTIVE:   21  0543 21  060 normal, no murmur, click, rub or gallop, regular rate and rhythm  Abdomen: soft, non-tender; bowel sounds normal; no masses,  no organomegaly  Extremities: extremities normal, atraumatic, no cyanosis or edema    Labs reviewed as noted below        ASSESSME

## 2021-06-27 NOTE — PHYSICAL THERAPY NOTE
PHYSICAL THERAPY TREATMENT NOTE - INPATIENT     Room Number: 710/868-K       Presenting Problem: CAD, s/p CABG x3 on 6/21    Problem List  Principal Problem:    Coronary artery disease involving native coronary artery of native heart  Active Problems:    C optimal discharge planning. DISCHARGE RECOMMENDATIONS  PT Discharge Recommendations: Acute rehabilitation     PLAN  PT Treatment Plan: Bed mobility; Body mechanics; Endurance; Energy conservation;Patient education; Family education;Gait training;Neuromuscu Device: Rolling walker  Pattern: Shuffle;Comment (forward flexed, difficulty with RW sequencing)  Stoop/Curb Assistance: Not tested       Patient End of Session: Up in chair;Needs met;Call light within reach;RN aware of session/findings; All patient questio

## 2021-06-27 NOTE — OCCUPATIONAL THERAPY NOTE
OCCUPATIONAL THERAPY TREATMENT NOTE - INPATIENT        Room Number: 605/399-N    Presenting Problem: CABG x 3    Problem List  Principal Problem:    Coronary artery disease involving native coronary artery of native heart  Active Problems:    CAD, multiple during exam, additional prompts to accurately approach shape identification, numeric sequencing was incorrect during clock drawing task as well.       Pt will continue to benefit from skilled IP OT services while at [address] [place] [address] in preparation for d/c.     DISCHARGE Dressing: mod a       Patient End of Session: Up in chair;Needs met;Call light within reach;RN aware of session/findings; All patient questions and concerns addressed    OT Goals:  Patients self stated goal is: home      Patient will complete functional tra

## 2021-06-27 NOTE — CM/SW NOTE
Reviewed chart and [facility] referral and no accepting providers for Acute Rehab level of care. PMR Dr Lucian Freeman saw patient on 6/25/2021 and pt was drowsy and unable to participate in exam.  Reevaluation is pending.     Reopened [facility] referral and sent availabl

## 2021-06-27 NOTE — PROGRESS NOTES
Updated Dr Maggie Boyle regarding MRI results and per department when results will be read. Paging Dr Nataliia Bartlett regarding follow up regarding MRI results and when department states may be read.

## 2021-06-27 NOTE — PROGRESS NOTES
[facility]    Assessment and Plan:     Impression/Plan:  65year old male presenting with:     1) Multivessel CAD s/p CABG x 3 6/21/2021 (LIMA-LAD, SVG-diag, SVG-OM)  2) ICM (EF 30-40% 3/2021 with moderate MR).  Chronic systolic CHF  3) HT MCH 27.7   MCHC 30.5*   RDW 13.8   NEPRELIM 5.09   WBC 8.1   .0     Recent Labs   Lab 06/21/21  1228   INR 1.28*       XR CHEST PA + LAT CHEST (CPT=71046)    Result Date: 6/25/2021  CONCLUSION:   Moderate cardiomegaly. Post sternotomy changes.   Dread Mcneil

## 2021-06-27 NOTE — PROGRESS NOTES
[facility] Hospitalist Progress Note     PCP: Debbie Armstrong MD    CC: Follow up       Assessment/Plan:   64 yo male DM type II with neuropathy, retinopathy and ckd, LBBB,HTN, lung nodule, CKD stage 4, HL, BPH, chronic systolic chf, ? right subclavian sten II with Retinopathy, Neuropathy and CKD  -HgbA1C 6/2021 8.6  -home regimen: amaryl, metformin ?, ozempic  -Basal bolus regimen for insulin  -accuchecks  -keep off ric for now, per notes pt stopped 6/15     Chronic Systolic CHF  -pre op echo 30-40% with gr Last 3 Weights  06/27/21 0543 : 218 lb 6.4 oz (99.1 kg)  06/26/21 0516 : 218 lb 11.1 oz (99.2 kg)  06/25/21 0600 : 224 lb 10.4 oz (101.9 kg)  06/24/21 0600 : 230 lb 2.6 oz (104.4 kg)  06/23/21 0500 : 231 lb 11.2 oz (105.1 kg)  06/22/21 0610 : 230 lb benzonatate, traMADol HCl, acetaminophen, melatonin, PEG 3350, bisacodyl, ondansetron HCl, acetaminophen **OR** [DISCONTINUED] HYDROcodone-acetaminophen **OR** [DISCONTINUED] HYDROcodone-acetaminophen, glucose **OR** Glucose-Vitamin C **OR** dextrose Date: 6/25/2021  CONCLUSION:   Moderate cardiomegaly. Post sternotomy changes. Mild central pulmonary vascular congestion and small to moderate left pleural effusion. Left retrocardiac opacities, most likely postoperative atelectasis.    Azygos placemen XR CHEST AP PORTABLE  (CPT=71045)    Result Date: 6/23/2021  CONCLUSION:  1. Left-sided central venous catheter tip again projects over the expected location of the azygos vein. Consider repositioning.  2. [place]-Stan catheter, mediastinal drain and left

## 2021-06-27 NOTE — PLAN OF CARE
Problem: CARDIOVASCULAR - ADULT  Goal: Maintains optimal cardiac output and hemodynamic stability  Description: INTERVENTIONS:  - Monitor vital signs, rhythm, and trends  - Monitor for bleeding, hypotension and signs of decreased cardiac output  - Evalua Minimal or absence of nausea and vomiting  Description: INTERVENTIONS:  - Maintain adequate hydration with IV or PO as ordered and tolerated  - Nasogastric tube to low intermittent suction as ordered  - Evaluate effectiveness of ordered antiemetic medicati INTERVENTIONS:  - Monitor labs and rhythm and assess patient for signs and symptoms of electrolyte imbalances  - Administer electrolyte replacement as ordered  - Monitor response to electrolyte replacements, including rhythm and repeat lab results as appro hematologic stability  Description: INTERVENTIONS  - Assess for signs and symptoms of bleeding or hemorrhage  - Monitor labs and vital signs for trends  - Administer supportive blood products/factors, fluids and medications as ordered and appropriate  - Ad influences on pain and pain management  - Manage/alleviate anxiety  - Utilize distraction and/or relaxation techniques  - Monitor for opioid side effects  - Notify MD/LIP if interventions unsuccessful or patient reports new pain  - Anticipate increased gonzalo post-hospital services based on physician/LIP order or complex needs related to functional status, cognitive ability or social support system  Outcome: Progressing     Problem: Patient Centered Care  Goal: Patient preferences are identified and integrated Progressing     Problem: Delirium  Goal: Minimize duration of delirium  Description: Interventions:  - Encourage use of hearing aids, eye glasses  - Promote highest level of mobility daily  - Provide frequent reorientation  - Promote wakefulness i.e. light

## 2021-06-27 NOTE — PROGRESS NOTES
NEPHROLOGY DAILY PROGRESS NOTE     Follow up Reason: CKD stage 4      SUBJECTIVE:  Sp MRI.  No other complaints     OBJECTIVE:    Total Intake/Output:    Intake/Output Summary (Last 24 hours) at 6/27/2021 1054  Last data filed at 6/27/2021 0800  Gross per 2 Mouth/Throat, BID  Heparin Sodium (Porcine) 5000 UNIT/ML injection 5,000 Units, 5,000 Units, Subcutaneous, 2 times per day  acetaminophen (TYLENOL) tab 650 mg, 650 mg, Oral, Q4H PRN  Clopidogrel Bisulfate (PLAVIX) tab 75 mg, 75 mg, Oral, Daily  aspirin EC hypertensive and diabetic nephrosclerosis.   - baseline sCr 2.8-3.0   - renal US: neg for hydronephrosis   - urine lytes pre-renal, Urine Na <5   - UA + protein, negative for blood   - developed MED, possible cardiorenal   - creatinine improved with diuresi

## 2021-06-28 ENCOUNTER — APPOINTMENT (OUTPATIENT)
Dept: CV DIAGNOSTICS | Facility: HOSPITAL | Age: 71
DRG: 235 | End: 2021-06-28
Attending: Other
Payer: MEDICARE

## 2021-06-28 ENCOUNTER — APPOINTMENT (OUTPATIENT)
Dept: ULTRASOUND IMAGING | Facility: HOSPITAL | Age: 71
DRG: 235 | End: 2021-06-28
Attending: Other
Payer: MEDICARE

## 2021-06-28 PROCEDURE — 93306 TTE W/DOPPLER COMPLETE: CPT | Performed by: OTHER

## 2021-06-28 PROCEDURE — 93880 EXTRACRANIAL BILAT STUDY: CPT | Performed by: OTHER

## 2021-06-28 PROCEDURE — 99232 SBSQ HOSP IP/OBS MODERATE 35: CPT | Performed by: OTHER

## 2021-06-28 RX ORDER — CLOPIDOGREL BISULFATE 75 MG/1
75 TABLET ORAL DAILY
Status: DISCONTINUED | OUTPATIENT
Start: 2021-06-28 | End: 2021-06-29

## 2021-06-28 NOTE — OCCUPATIONAL THERAPY NOTE
OCCUPATIONAL THERAPY TREATMENT NOTE - INPATIENT        Room Number: 849/600-L           Presenting Problem: CABG x 3    Problem List  Principal Problem:    Coronary artery disease involving native coronary artery of native heart  Active Problems:    CAD, m benefit from [address] [place] [address] Dr. Garces continues to progress towards IP OT goals. Continue to recommend ACUTE REHAB. Pt does not demonstrate the physical skills and cognitive skills required to safely return home.  Pt continues to require one person assist for ADLs a for standing balance at sink --VC and additional time for processing   Lower Extremity Dressing: total A to don socks , SBA to doff via figure four     Education Provided: role of OT, activity promotion , compensatory strategies , sternal precautions   Caitlyn Saucedo

## 2021-06-28 NOTE — PHYSICAL THERAPY NOTE
PHYSICAL THERAPY TREATMENT NOTE - INPATIENT     Room Number: 507/376-L       Presenting Problem: CAD, s/p CABG x3 on 6/21    Problem List  Principal Problem:    Coronary artery disease involving native coronary artery of native heart  Active Problems:    C mechanics; Endurance; Patient education; Energy conservation; Family education;Gait training;Neuromuscular re-educate;Range of motion;Strengthening;Stoop training;Stair training;Transfer training;Balance training    SUBJECTIVE  \"I live at home with two of my Session: Up in chair;Needs met;RN aware of session/findings;Call light within reach; All patient questions and concerns addressed    CURRENT GOALS   Goals to be met by: 7/8/21  Patient Goal Patient's self-stated goal is: not stated.    Goal #1 Patient is ab

## 2021-06-28 NOTE — PLAN OF CARE
Dr Domingo Jones called to inquire if neurology has rounded on patient yet. Advised that they have not yet come, but that family is at bedside. Dr Domingo Jones has informed the family at bedside of the results of MRI and the current plan of care.      Addendum at 2140:

## 2021-06-28 NOTE — PROGRESS NOTES
NEPHROLOGY DAILY PROGRESS NOTE     Follow up Reason: CKD stage 4      SUBJECTIVE:    Sitting comfortably in bed no acute distress at this time.     OBJECTIVE:    Total Intake/Output:    Intake/Output Summary (Last 24 hours) at 6/28/2021 1220  Last data file 20 mg, 20 mg, Oral, Daily   Or  famoTIDine (PEPCID) injection 20 mg, 20 mg, Intravenous, Daily  multivitamin (ADULT) tab 1 tablet, 1 tablet, Oral, Daily  ascorbic acid (VITAMIN C) tab 500 mg, 500 mg, Oral, Daily  Chlorhexidine Gluconate (PERIDEX) 0.12 % so corresponding decreased signal on ADC images compatible with multiple small bilateral foci of acute ischemia/infarction.   These involve the right and left occipital lobes posteriorly, the posterior left occipital parietal lobe, and the left frontal lobe hi assess for dialysis needs.    - Ok to remove iHD catheter. - Will plan on starting losartan in AM if renal function stable given history of significant proteinuria. Hypernatremia   -- encourage free water intake. If worsening will need a little D5W.  Noris Ramos

## 2021-06-28 NOTE — PROGRESS NOTES
[facility]    Progress Note    Keiko Torres Patient Status:  Inpatient    1950 MRN [id number]   Location [facility] 2W/SW Attending Mer Rodarte MD   Hosp Day # 7 PCP Jf Khan MD     Subjective:  Pt.  Sitting in bilat  Pulses: 2+ bilat DP  Skin: sternotomy incision JACINTA=CDI/Left leg incision JACINTA=CDI   Neurological:  AAOx3, MAEW- continues weak    Assessment/Plan:  S/P CABG x 3 POD # 7  Encourage pulm toilet: IS/EZ pap.    Pain meds as needed- Morphine and Neurontin Discussed carotid & echo results with Neurologist & Cardiologist who suspect no embolic event from cardiac/carotid standpoint. Stopped IV Heparin at approx 13:30.  Plan for Plavix/Asa for 3 months post cardiac surgery then stop Asa and continue Plavix as re

## 2021-06-28 NOTE — PROGRESS NOTES
Rehab Progress Note       SUBJECTIVE:  Chief Complaint:  To assess rehabilitation needs following Mobility and ADL dysfunction s/p Coronary artery disease involving native coronary artery of native heart as per request of Dr. Clifford Thomas.     History of Prese improved. Neurology consulted 6/27 due to abnormal MRI which may represent perioperative embolic events (stroke?). Stroke code has been implemented. carotid duplex negative for hemodynamically significant stenosis.   TTE + LVEF 45-50 % with grade 1 diasto Location:       Pulse: 75 75 78 73   Resp: 20 18 20 20   Temp:    98 °F (36.7 °C)   TempSrc:       SpO2:       Weight:       Height:            Physical Exam:  General: awake, no distress, sitting up right in recliner  HEENT: normocephalic, normal conjunct  These involve the right and left occipital lobes   posteriorly, the posterior left occipital parietal lobe, and the left frontal lobe superiorly and anteriorly as discussed above.  No surrounding mass effect or edema.  No large area of infarction. Yesenia Smith

## 2021-06-28 NOTE — PROGRESS NOTES
[facility] Hospitalist Progress Note     PCP: Jaja Burden MD    CC: Follow up       Assessment/Plan:   64 yo male DM type II with neuropathy, retinopathy and ckd, LBBB,HTN, lung nodule, CKD stage 4, HL, BPH, chronic systolic chf, ? right subclavian sten ozempic  -Basal bolus regimen for insulin;  uptrending BG with escalating insulin requirements  - Endo on consult, appreciate recs  -keep off ric for now, per notes pt stopped 6/15     Chronic Systolic CHF  -pre op echo 30-40% with grade 1 DD, moderate MR lb 11.1 oz (99.2 kg)  06/25/21 0600 : 224 lb 10.4 oz (101.9 kg)  06/24/21 0600 : 230 lb 2.6 oz (104.4 kg)  06/23/21 0500 : 231 lb 11.2 oz (105.1 kg)  06/22/21 0610 : 230 lb 12.8 oz (104.7 kg)  06/21/21 0557 : 227 lb (103 kg)  06/18/21 1400 : 231 lb 11.3 oz HYDROcodone-acetaminophen **OR** [DISCONTINUED] HYDROcodone-acetaminophen, glucose **OR** Glucose-Vitamin C **OR** dextrose **OR** glucose **OR** Glucose-Vitamin C, acetaminophen **OR** acetaminophen **OR** acetaminophen, PEG 3350, bisacodyl    Data Review opacities, most likely postoperative atelectasis. Azygos placement of the patient's left central line. Consider repositioning.     Dictated by (CST): Avinash Orozco MD on 6/25/2021 at 10:23 AM     Finalized by (CST): Avinash Orozco MD on 6/25/2021 at 10:28 6/17/2021  CONCLUSION:  1. No hemodynamically significant stenosis of the right or left internal carotid artery. Mild left carotid bifurcation atherosclerosis. 2. Nonspecific retrograde flow within the right vertebral artery.   This finding can be seen in [place]-Stan catheter right pulmonary artery. 4. Stable cardiomegaly. 5. Markings still are prominent with some basilar atelectasis at the left base. Dictated by (CST): Barb Faye MD on 6/22/2021 at 8:42 AM     Finalized by (CST):  Barb Faye,

## 2021-06-28 NOTE — PROGRESS NOTES
[facility]    Assessment and Plan:     Impression/Plan:  65year old male presenting with:     1) Multivessel CAD s/p CABG x 3 6/21/2021 (LIMA-LAD, SVG-diag, SVG-OM)  2) ICM (EF 30-40% 3/2021 with moderate MR).  Chronic systolic CHF  3) HT 1. There are multiple small punctate foci of acute restricted diffusion with corresponding decreased signal on ADC images compatible with multiple small bilateral foci of acute ischemia/infarction.   These involve the right and left occipital lobes posterio regurgitation. Compared to the previous study these findings represent   improvement. Previous study 3/25/2021 had an ejection fraction   30-40%.

## 2021-06-28 NOTE — PAYOR COMM NOTE
--------------  CONTINUED STAY REVIEW    Payor: [id number] [place] [address] #:  [id number]  Authorization Number: [id number]    Admit date: 6/21/21  Admit time:  5:49 AM    Admitting Physician: Andrea Koo MD  Attending Physician:  Andrea Koo MD    Pain meds as needed- Morphine and Neurontin (pt. takes at home for neuropathy) stopped post op due to persistent drowsiness.  Prefer Tyleonol for pain.   Increase activity- oob to chair and ambulate in hallway/working PT/OT-now that MS improved- pt.may b

## 2021-06-28 NOTE — CONSULTS
[facility] - [facility]    Report of Endocrinology Consultation  [facility]    Radha Omalley Patient Status:  Inpatient    1950 MRN [id number]   Location [facility] 2W/SW Attending Prince Burke MD   Hosp Day # 7 ZBIGNIEW Mendez impairment      Past Surgical History:   Procedure Laterality Date   • CABG  06/21/2021    x3-lima-->LAD, SVG-->diag and SVG-->OM   • CATARACT Right      Family History   Problem Relation Age of Onset   • No Known Problems Father    • No Known Problems Mot multivitamin (ADULT) tab 1 tablet, 1 tablet, Oral, Daily  •  ascorbic acid (VITAMIN C) tab 500 mg, 500 mg, Oral, Daily  •  Chlorhexidine Gluconate (PERIDEX) 0.12 % solution 15 mL, 15 mL, Mouth/Throat, BID  •  acetaminophen (TYLENOL) tab 650 mg, 650 mg, Ora present  Allergic/Immunologic: negative    Physical Exam:  Blood pressure 138/85, pulse 80, temperature 98 °F (36.7 °C), resp. rate (!) 28, height 6' (1.829 m), weight 210 lb 1.6 oz (95.3 kg), SpO2 99 %. General: Alert, orientated x3. Cooperative.   No cerebellum. Findings may suggest amyloid angiopathy. Correlate clinically. 3. Mild to moderate chronic appearing deep white matter ischemic change in the periventricular white matter bilaterally.     Dictated by (CST): Vickey Moss MD on 6/27/2021 at 4

## 2021-06-28 NOTE — PROGRESS NOTES
Pulmonary Progress Note     Assessment / Plan:  1. Acute hypercapnic respiratory failure - possibly due to pulmonary edema. Improved  - stop AVAPS qHS  - remains on RA  - lasix prn  2.  CAD s/p CABG - extubated 6/21  - IS and OOB as able  - per CT surgery a

## 2021-06-28 NOTE — PLAN OF CARE
No acute neuro changes throughout shift; pt a&o x4 and follows commands. MRI results came back; family notified of results per MD, and neurology came to assess him. Low dose heparin gtt initiated. Vitals stable. On RA. Tesselon pearls given x1 for cough.  A broncho-pulmonary hygiene including cough, deep breathe, Incentive Spirometry  - Assess the need for suctioning and perform as needed  - Assess and instruct to report SOB or any respiratory difficulty  - Respiratory Therapy support as indicated  - Manage/a electrolyte replacement as ordered  - Monitor response to electrolyte replacements, including rhythm and repeat lab results as appropriate  - Fluid restriction as ordered  - Instruct patient on fluid and nutrition restrictions as appropriate  Outcome: Prog medications as ordered and appropriate  - Administer supportive blood products/factors as ordered and appropriate  Outcome: Progressing     Problem: MUSCULOSKELETAL - ADULT  Goal: Return mobility to safest level of function  Description: INTERVENTIONS:  - RISK FOR INFECTION - ADULT  Goal: Absence of fever/infection during anticipated neutropenic period  Description: INTERVENTIONS  - Monitor WBC  - Administer growth factors as ordered  - Implement neutropenic guidelines  Outcome: Progressing     Problem: SAF as we care for you?  Primary language Slovak  - Provide timely, complete, and accurate information to patient/family  - Incorporate patient and family knowledge, values, beliefs, and cultural backgrounds into the planning and delivery of care  - Encourage pat Instruct patient on self management of diabetes  Outcome: Not Progressing     Problem: Diabetes/Glucose Control  Goal: Glucose maintained within prescribed range  Description: INTERVENTIONS:  - Monitor Blood Glucose as ordered  - Assess for signs and sympt

## 2021-06-28 NOTE — PAYOR COMM NOTE
--------------  CONTINUED STAY REVIEW    Payor: [id number] #:  [id number]  Authorization Number: [id number]    Admit date: 6/21/21  Admit time:  5:49 AM    Admitting Physician: Rosa Ruvalcaba MD  Attending Physician:  Rosa Ruvalcaba MD EF 30-40% with moderate MR  -4/2021 cath with multivessel cad  -follow for expected acute blood loss anemia, pre op hgb 11.4--> 10.1-->9. 3- stable  -reactive leukocytosis resolved  -meds- asa, plavix, lopressor, amio for paf prevention   -plans for cordis BP     HL  -continue crestor     CKD Stage 4 2/2 DM  -baseline Cr 2.2-3, admission C 2.95--> now 2.64.   Downtrending  -US renals with out hydro and medical renal disease  -left HD line placed in OR–can be removed on Monday if okay with nephrology  -see abo difficulty: pt. silva procedure well. Site=[facility].  PT/OT at bedside at this time to ambulate again in hallway      [id number] LAST 1 DAY:  amiodarone HCl (PACERONE) tab 200 mg     Date Action Dose Route User    6/28/2021 1156 Given 200 mg Oral Armani Girard RN      metoprolol tartrate (LOPRESSOR) tab 50 mg     Date Action Dose Route User    6/28/2021 0540 Given 50 mg Oral Gian Jimenez    6/27/2021 1837 Given 50 mg Oral Brandi Peter RN

## 2021-06-28 NOTE — CONSULTS
Consult dictated. Requested to evaluate the patient because of MRI findings. At present time patient does not have neurological deficits. This may represent perioperative embolic event.   Would recommend IV heparin until ruling out any other embolic etio

## 2021-06-28 NOTE — PLAN OF CARE
Problem: CARDIOVASCULAR - ADULT  Goal: Maintains optimal cardiac output and hemodynamic stability  Description: INTERVENTIONS:  - Monitor vital signs, rhythm, and trends  - Monitor for bleeding, hypotension and signs of decreased cardiac output  - Evalua ordered and tolerated  - Nasogastric tube to low intermittent suction as ordered  - Evaluate effectiveness of ordered antiemetic medications  - Provide nonpharmacologic comfort measures as appropriate  - Advance diet as tolerated, if ordered  - Obtain nutr including rhythm and repeat lab results as appropriate  - Fluid restriction as ordered  - Instruct patient on fluid and nutrition restrictions as appropriate  Outcome: Progressing  Goal: Hemodynamic stability and optimal renal function maintained  Descript ordered and appropriate  Outcome: Progressing     Problem: MUSCULOSKELETAL - ADULT  Goal: Return mobility to safest level of function  Description: INTERVENTIONS:  - Assess patient stability and activity tolerance for standing, transferring and ambulating period  Description: INTERVENTIONS  - Monitor WBC  - Administer growth factors as ordered  - Implement neutropenic guidelines  Outcome: Progressing     Problem: SAFETY ADULT - FALL  Goal: Free from fall injury  Description: INTERVENTIONS:  - Assess pt freq information to patient/family  - Incorporate patient and family knowledge, values, beliefs, and cultural backgrounds into the planning and delivery of care  - Encourage patient/family to participate in care and decision-making at the level they choose  - H

## 2021-06-28 NOTE — CONSULTS
[facility]    PATIENT'S NAME: Marly Pollard   ATTENDING PHYSICIAN: Edi Orellana MD   CONSULTING PHYSICIAN: David Chavez MD   PATIENT ACCOUNT#:   [id number]    LOCATION:  [address] [place] [address] #:   [id number]       DATE OF BIRTH:  [birth year] Marco A, who will order the IV heparin. Discussed with the patient and family members small risk of intracranial hemorrhage. We will order carotid ultrasounds or recent 2-D echo. Cardiology is on board. Rule out intermittent atrial fibrillation.     If c

## 2021-06-28 NOTE — SLP NOTE
ADULT SWALLOWING EVALUATION    ASSESSMENT    ASSESSMENT/OVERALL IMPRESSION:  PPE REQUIRED. THIS THERAPIST WORE GLOVES, DROPLET MASK, AND GOGGLES FOR DURATION OF EVALUATION. HANDS WASHED UPON ENTRANCE/EXIT. SLP BSSE orders received and acknowledged.  JAILYN hernandez Strategies Recommended: Slow rate; Alternate consistencies;Small bites and sips  Aspiration Precautions: Upright position; Slow rate;Small bites and sips  Medication Administration Recommendations: One pill at a time  Treatment Plan/Recommendations: Aspirati consider and folic phenomenon.    2. Multiple rounded small foci of decreased signal on gradient echo imaging in the right cerebral hemisphere predominantly and to a lesser degree in the left cerebral hemisphere and right cerebellum.  Findings may suggest a consistency and thin liquids without overt signs or symptoms of aspiration with 100 % accuracy over 1-2 session(s).   In Progress   Goal #2 The patient/family/caregiver will demonstrate understanding and implementation of aspiration precautions and swallow

## 2021-06-28 NOTE — CARDIAC REHAB
Cardiac Rehab Phase I    Activity:   Chair: Yes   Ambulation: Yes   Assistive Device: Walker   Distance: 300 feet   Assistance needed: Minimal due to equipment   Patient tolerated activity: Well. Shower Date:  Tolerated Shower Activity .     Education:  H

## 2021-06-29 VITALS
OXYGEN SATURATION: 98 % | RESPIRATION RATE: 19 BRPM | BODY MASS INDEX: 29.28 KG/M2 | HEART RATE: 68 BPM | SYSTOLIC BLOOD PRESSURE: 153 MMHG | HEIGHT: 72 IN | DIASTOLIC BLOOD PRESSURE: 86 MMHG | TEMPERATURE: 97 F | WEIGHT: 216.19 LBS

## 2021-06-29 RX ORDER — METOPROLOL TARTRATE 50 MG/1
50 TABLET, FILM COATED ORAL
Qty: 60 TABLET | Refills: 1 | Status: SHIPPED | OUTPATIENT
Start: 2021-06-29 | End: 2021-09-15

## 2021-06-29 RX ORDER — PEN NEEDLE, DIABETIC 32GX 5/32"
NEEDLE, DISPOSABLE MISCELLANEOUS
Qty: 150 EACH | Refills: 2 | Status: SHIPPED | OUTPATIENT
Start: 2021-06-29

## 2021-06-29 RX ORDER — INSULIN LISPRO 100 [IU]/ML
14 INJECTION, SOLUTION INTRAVENOUS; SUBCUTANEOUS 3 TIMES DAILY
Qty: 15 ML | Refills: 2 | Status: SHIPPED | OUTPATIENT
Start: 2021-06-29

## 2021-06-29 RX ORDER — ASCORBIC ACID 500 MG
500 TABLET ORAL DAILY
Qty: 30 TABLET | Refills: 0 | Status: SHIPPED | OUTPATIENT
Start: 2021-06-30

## 2021-06-29 RX ORDER — HEPARIN SODIUM 5000 [USP'U]/ML
5000 INJECTION, SOLUTION INTRAVENOUS; SUBCUTANEOUS EVERY 12 HOURS
Status: DISCONTINUED | OUTPATIENT
Start: 2021-06-29 | End: 2021-06-29

## 2021-06-29 RX ORDER — TRAMADOL HYDROCHLORIDE 50 MG/1
50 TABLET ORAL EVERY 12 HOURS PRN
Qty: 30 TABLET | Refills: 0 | Status: SHIPPED | OUTPATIENT
Start: 2021-06-29 | End: 2021-09-10

## 2021-06-29 RX ORDER — DOXEPIN HYDROCHLORIDE 50 MG/1
1 CAPSULE ORAL DAILY
Qty: 30 TABLET | Refills: 0 | Status: SHIPPED | OUTPATIENT
Start: 2021-06-30

## 2021-06-29 RX ORDER — INSULIN GLARGINE 100 [IU]/ML
50 INJECTION, SOLUTION SUBCUTANEOUS EVERY MORNING
Qty: 15 ML | Refills: 2 | Status: SHIPPED | OUTPATIENT
Start: 2021-06-29 | End: 2021-09-14

## 2021-06-29 RX ORDER — CLOPIDOGREL BISULFATE 75 MG/1
75 TABLET ORAL DAILY
Qty: 90 TABLET | Refills: 4 | Status: SHIPPED | OUTPATIENT
Start: 2021-06-30 | End: 2021-09-10

## 2021-06-29 NOTE — PROGRESS NOTES
[facility] Hospitalist Progress Note     PCP: Vitaliy Galarza MD    CC: Follow up       Assessment/Plan:   66 yo male DM type II with neuropathy, retinopathy and ckd, LBBB,HTN, lung nodule, CKD stage 4, HL, BPH, chronic systolic chf, ? right subclavian sten here  -no plan at this point to resume insulin as outpatient, but maybe will need to be considered in near future  -Endo on consult, appreciate recs     Chronic Systolic CHF  -pre op echo 30-40% with grade 1 DD, moderate MR, Post Op LANE with EF 40%  -home : 232 lb (105.2 kg)      Exam  Gen: No acute distress  Pulm: Lungs clear, normal respiratory effort  CV: Heart with regular rate and rhythm  Abd: Abdomen soft, nontender, nondistended, no organomegaly, bowel sounds present  MSK: No Lower extremity edema  S 06/27/21  0402 06/27/21 2002 06/28/21  0325 06/29/21  0422   *   < > 149*   < > 144 147* 144 145 147*   K 4.1   < > 3.7   < > 3.6 3.6 3.9 3.7 3.6   *   < > 117*   < > 112 113* 111 114* 113*   CO2 24.0   < > 24.0   < > 23.0 25.0 26.0 26.0 26.0 small punctate foci of acute restricted diffusion with corresponding decreased signal on ADC images compatible with multiple small bilateral foci of acute ischemia/infarction.   These involve the right and left occipital lobes posteriorly, the posterior lef at 10:09 AM          XR CHEST AP PORTABLE  (CPT=71045)    Result Date: 6/24/2021  CONCLUSION:  1. Left-sided central venous catheter tip again projects over the azygos vein. Consider repositioning. 2. Postoperative changes from a recent CABG.   Central pul however the distal side hole projects over the expected location of the gastroesophageal junction. Consider advancing approximately 5.0 cm. 3. Additional lines/tubes in customary positioning.  4. Development of central pulmonary venous congestion and a tra

## 2021-06-29 NOTE — SLP NOTE
SPEECH DAILY NOTE - INPATIENT    ASSESSMENT & PLAN   ASSESSMENT  PPE REQUIRED. THIS SLP WORE GOGGLES, GLOVES, AND DROPLET MASK. HANDS SANITIZED/WASHED UPON ENTRANCE/EXIT. SLP f/u for ongoing  meal assessment per recommendations of 6/28/2021 FLOYD.  RN rep )    Interdisciplinary Communication: Discussed with RN            GOALS  Goal #1 The patient will tolerate regular consistency and thin liquids without overt signs or symptoms of aspiration with 100 % accuracy over 1-2 session(s).   Pt tolerates regular so

## 2021-06-29 NOTE — OCCUPATIONAL THERAPY NOTE
OCCUPATIONAL THERAPY TREATMENT NOTE - INPATIENT        Room Number: 977/543-Z           Presenting Problem: CABG x 3    Problem List  Principal Problem:    Coronary artery disease involving native coronary artery of native heart  Active Problems:    CAD, m DISCHARGE RECOMMENDATIONS  OT Discharge Recommendations: Day rehab;24 hour care/supervision;Home  OT Device Recommendations: Raised toilet seat     PLAN  OT Treatment Plan: Balance activities; Energy conservation/work simplification techniques;ADL train complete toileting with SBA  Comment:MET    Patient will tolerate standing for 4 minutes in prep for adls with SBA   Comment: MET    Patient will complete item retrieval with SBA  Comment:ongoing     Pt will complete LE dressing with SBA  ongoing      Goal

## 2021-06-29 NOTE — CM/SW NOTE
Notified during nursing rounds that patient will be discharged home today and will not need inpatient rehab. Called Buster at [facility] to notify her of discharge and sent updates via A[employer]. REYNAS updated with contact info.     [id number] [facility]

## 2021-06-29 NOTE — PLAN OF CARE
Patient alert and oriented X4, follows commands. Vitals signs stable. Ambulated in the hallway with two nurse assist and a front walker, tolerated well. Patient c/o pain on left side upper chest, managed with tramadol.      Problem: CARDIOVASCULAR - ADULT signs/symptoms of CO2 retention  Outcome: Progressing     Problem: GASTROINTESTINAL - ADULT  Goal: Minimal or absence of nausea and vomiting  Description: INTERVENTIONS:  - Maintain adequate hydration with IV or PO as ordered and tolerated  - Nasogastric t limits  Description: INTERVENTIONS:  - Monitor labs and rhythm and assess patient for signs and symptoms of electrolyte imbalances  - Administer electrolyte replacement as ordered  - Monitor response to electrolyte replacements, including rhythm and repeat symptoms of bleeding or hemorrhage  - Monitor labs and vital signs for trends  - Administer supportive blood products/factors, fluids and medications as ordered and appropriate  - Administer supportive blood products/factors as ordered and appropriate  Out or patient reports new pain  - Anticipate increased pain with activity and pre-medicate as appropriate  Outcome: Progressing     Problem: RISK FOR INFECTION - ADULT  Goal: Absence of fever/infection during anticipated neutropenic period  Description: INTER

## 2021-06-29 NOTE — PROGRESS NOTES
Pt received at approx 1100. Alert and oriented x4. Forgetful. Saturating well on RA. NSR on tele. PIV patent. Denies pain or discomfort. Ambulating in halls. Ok to discharge home from all disciplines. Will continue to monitor.     Pt PIV removed with cath

## 2021-06-29 NOTE — PROGRESS NOTES
NEPHROLOGY DAILY PROGRESS NOTE     Follow up Reason: CKD stage 4      SUBJECTIVE:    Sitting comfortably in bed no acute distress at this time.     OBJECTIVE:    Total Intake/Output:    Intake/Output Summary (Last 24 hours) at 6/29/2021 1912  Last data file (PEPCID) tab 20 mg, 20 mg, Oral, Daily   Or  famoTIDine (PEPCID) injection 20 mg, 20 mg, Intravenous, Daily  multivitamin (ADULT) tab 1 tablet, 1 tablet, Oral, Daily  ascorbic acid (VITAMIN C) tab 500 mg, 500 mg, Oral, Daily  Chlorhexidine Gluconate (PERID diffusion with corresponding decreased signal on ADC images compatible with multiple small bilateral foci of acute ischemia/infarction.   These involve the right and left occipital lobes posteriorly, the posterior left occipital parietal lobe, and the left catheter removed    Hypernatremia   -- encourage free water intake. Hyperkalemia   - due to MED   - resolved   - low K / renal diet when taking PO      Hx of HTN, hypotension has resolved.     - weaned off levophed   - metoprolol resumed      Anemia   -

## 2021-06-29 NOTE — PHYSICAL THERAPY NOTE
PHYSICAL THERAPY TREATMENT NOTE - INPATIENT     Room Number: 513/442-Q       Presenting Problem: CAD, s/p CABG x3 on 6/21    Problem List  Principal Problem:    Coronary artery disease involving native coronary artery of native heart  Active Problems:    C rehab. Recommending home with 24/7 supervision, day rehab due to strong family support and progress made. DISCHARGE RECOMMENDATIONS  PT Discharge Recommendations: 24 hour care/supervision;Home;Day Rehab     PLAN  PT Treatment Plan: Bed mobility; Body me CK    FUNCTIONAL ABILITY STATUS  Gait Assessment   Gait Assistance: Supervision;Contact guard assist  Distance (ft): 20' with RW, 200' with no AD  Assistive Device: None;Rolling walker  Pattern: Shuffle;Comment (L foot decreased step height)  Stoop/Curb As

## 2021-06-29 NOTE — PROGRESS NOTES
[place] [facility] - [facility]    Progress Note    Ameliaan Breaker Patient Status:  Inpatient    1950 MRN [id number]   Location [facility] 2W/SW Attending Juliann Meneses MD   Hosp Day # 8 PCP Suze Rivas MD     Subjective:  Pt. Just return Neurological:  AAOx3, MAEW- increasing strength    Assessment/Plan:  S/P CABG x 3 POD # 8  Encourage pulm toilet: IS/EZ pap.    Pain meds as needed- Morphine and Neurontin (pt. takes at home for neuropathy) stopped post op due to persistent drowsiness.  Jama Cheng and daughter will be with him. Written and verbal info provided to pt. Regarding recovery care including incision care. F/U appt. Made for pt. Will review [place]  prior to narcotic prescription written.  Prescriptions E-prescribed per patient's request.

## 2021-06-29 NOTE — PROGRESS NOTES
[facility]    Cardiology Progress Note    Yeni Ozuna Patient Status:  Inpatient    1950 MRN [id number]   Location [facility] 2W/SW Attending No att. providers found   Hosp Day # 8 PCP MD Dorota Busch testing      Objective:   Temp: 97.1 °F (36.2 °C)  Pulse: 68  Resp: 19  BP: 153/86    Intake/Output:     Intake/Output Summary (Last 24 hours) at 6/29/2021 1739  Last data filed at 6/29/2021 0800  Gross per 24 hour   Intake 320 ml   Output 600 ml   Net -28 06/29/21  0422   *   < > 149*   < > 144 147* 144 145 147*   K 4.1   < > 3.7   < > 3.6 3.6 3.9 3.7 3.6   *   < > 117*   < > 112 113* 111 114* 113*   CO2 24.0   < > 24.0   < > 23.0 25.0 26.0 26.0 26.0   BUN 64*   < > 59*   < > 58* 55* 55* 53* 46* 17 g, Oral, Daily PRN  bisacodyl (DULCOLAX) rectal suppository 10 mg, 10 mg, Rectal, Daily PRN  Senna (SENOKOT) tab 8.6 mg, 8.6 mg, Oral, BID  ondansetron HCl (ZOFRAN) injection 4 mg, 4 mg, Intravenous, Q6H PRN  famoTIDine (PEPCID) tab 20 mg, 20 mg, Oral,

## 2021-06-29 NOTE — PROGRESS NOTES
[facility]    Progress Note    Markus Brown Patient Status:  Inpatient    1950 MRN [id number]   Location [facility] 2W/SW Attending Corrinne Shiner, MD   [facility] Day # 7 PCP Russell Lopez MD       Subjective:   Markus Brown with meals  melatonin tab 3 mg, 3 mg, Oral, Nightly PRN  docusate sodium (COLACE) cap 100 mg, 100 mg, Oral, BID  PEG 3350 (MIRALAX) powder packet 17 g, 17 g, Oral, Daily PRN  bisacodyl (DULCOLAX) rectal suppository 10 mg, 10 mg, Rectal, Daily PRN  Senna (S throat; hearing loss negative  RESPIRATORY: denies shortness of breath, wheezing or cough   CARDIOVASCULAR: denies chest pain or WALKER; no palpitations   GI: denies nausea, vomiting, constipation, diarrhea; no heartburn  GENITAL/: no dysuria, urgency or fr moderate chronic appearing deep white matter ischemic change in the periventricular white matter bilaterally.     Dictated by (CST): Graciela Gastelum MD on 6/27/2021 at 4:34 PM     Finalized by (CST): Graciela Gastelum MD on 6/27/2021 at 4:43 PM          [address]

## 2021-06-29 NOTE — DIABETES ED
[facility] [place] [facility]    Diabetes Education  Note    Zach Stuart Patient Status:  Inpatient   1950 MRN [id number]  Location [facility] 2W/SW Attending Claudio Pineda MD  Hosp Day # 8 PCP Zoya Grant MD      Labs:    HEMOGLOBIN A counting - initial meal plan provided  · Importance of good BG control to prevent short and long term complications  · Importance of close follow up with PCP and medical team    Patient verbalized understanding.       Recommendations:  Patient to self-admin

## 2021-06-29 NOTE — PROGRESS NOTES
[facility]    Endocrine Progress Note  [facility]    Marva Gallardo Patient Status:  Inpatient    1950 MRN [id number]   Location [facility] 2W/SW Attending Carlos Jiménez MD   [facility] Day # 8 PCP NIDHI Wallace 8.6 mg, 8.6 mg, Oral, BID  •  ondansetron HCl (ZOFRAN) injection 4 mg, 4 mg, Intravenous, Q6H PRN  •  famoTIDine (PEPCID) tab 20 mg, 20 mg, Oral, Daily **OR** famoTIDine (PEPCID) injection 20 mg, 20 mg, Intravenous, Daily  •  multivitamin (ADULT) tab 1 tab cooperative  Pulmonary:  Non-labored respirations  Cardiovascular: S1, S2 normal, no murmur, click, rub or gallop, regular rate and rhythm, no S3 or S4  Abdominal: soft, non-tender; bowel sounds normal; no masses,  no organomegaly        Results:     Lab R

## 2021-06-30 NOTE — DISCHARGE SUMMARY
General Medicine Discharge Summary     Patient ID:  Oli Tyson  65year old  9/1/1950    Admit date: 6/21/2021    Discharge date and time: 6/29/2021  5:05 PM     Attending Physician: Nicole Ortiz MD plavix 75mg daily, lopressor 50mg BID in place of toprol 25 for now, consider transitioning to toprol long acting as outpatient   -stop amiodarone, he did not have A fib here  -on statin, resume PTA dose     Acute Hypercapneic Failure -resolved  -morphine Procedures: Procedure(s) (LRB):  CORONARY ARTERY BYPASS GRAFT X 3; UTILIZING THE LEFT INTERNAL MAMMARY ARTERY TO THE LAD; SVG TO THE DIAG; SVG TO THE OM; LEFT LEG ENDOSCOPIC GREATER SAPHENOUS VEING GRAFT HARVEST ; INTRAOPERATIVE TRANSESOPHAGEAL ECHOCARDIOG R-3    amLODIPine Besylate 10 MG Oral Tab  TAKE 1 TABLET(10 MG) BY MOUTH DAILY, Normal, Disp-90 tablet, R-3    GLIMEPIRIDE 4 MG Oral Tab  TAKE 1 TABLET(4 MG) BY MOUTH EVERY MORNING BEFORE BREAKFAST, Normal, Disp-90 tablet, R-3    OZEMPIC, 0.25 OR 0.5 MG/DO nondistended, intact BS  Extremities: no pedal edema   Neuro: no focal deficits    Total time coordinating care for discharge: Greater than 30 minutes    Jaxon Nick DO

## 2021-07-06 ENCOUNTER — TELEPHONE (OUTPATIENT)
Dept: CASE MANAGEMENT | Facility: HOSPITAL | Age: 71
End: 2021-07-06

## 2021-07-06 NOTE — H&P
Reason for Consultation:   Coronary artery disease     History of Present Illness:   Patient is a 64 year old man with type 2 diabetes mellitus, hypertension, hyperlipidemia, stage 4 CKD, and congestive heart failure with EF of 30-40% who presents for an e pertinent family history.     Social History  Social History    Tobacco Use      Smoking status: Never Smoker      Smokeless tobacco: Never Used    Vaping Use      Vaping Use: Never used    Alcohol use: No    Drug use:  No           Current Medications:  0. extra heart sounds appreciated. Abdomen is soft nontender with no organomegaly mass or appreciable aneurysm. Extremities are without cyanosis clubbing or edema. Pulses are equal and intact in all extremities.   Skin is warm and dry without obvious pathol failure. I have answered all his questions about the procedure.         Thank you for allowing me to participate in the care of your patient.

## 2021-07-08 ENCOUNTER — PATIENT OUTREACH (OUTPATIENT)
Dept: CASE MANAGEMENT | Age: 71
End: 2021-07-08

## 2021-07-08 NOTE — PROGRESS NOTES
1st attempt to contact Pt    Unable [facility] [phone] to review DM Questions, mailbox is full

## 2021-07-09 NOTE — PROGRESS NOTES
2nd attempt to contact Pt     Unable [facility] [phone] to review DM Questions, mailbox is full

## 2021-07-12 NOTE — PROGRESS NOTES
3rd  attempt to contact Pt     Diabetic Outreach D/C Follow Up Questions:     1. Did you receive the information provided during your hospitalization and at discharge about diabetes? yes    If No:  Would you like us to mail you the information?  Yes

## 2021-07-19 ENCOUNTER — ORDER TRANSCRIPTION (OUTPATIENT)
Dept: CARDIAC REHAB | Facility: HOSPITAL | Age: 71
End: 2021-07-19

## 2021-07-19 DIAGNOSIS — E78.00 PURE HYPERCHOLESTEROLEMIA: ICD-10-CM

## 2021-07-19 DIAGNOSIS — I25.10 CORONARY ARTERY DISEASE INVOLVING NATIVE CORONARY ARTERY OF NATIVE HEART WITHOUT ANGINA PECTORIS: Primary | ICD-10-CM

## 2021-07-19 DIAGNOSIS — I10 ESSENTIAL HYPERTENSION: ICD-10-CM

## 2021-07-19 DIAGNOSIS — I25.5 ISCHEMIC CARDIOMYOPATHY: ICD-10-CM

## 2021-07-19 DIAGNOSIS — Z95.1 HX OF CABG: ICD-10-CM

## 2021-07-21 ENCOUNTER — TELEPHONE (OUTPATIENT)
Dept: NEUROLOGY | Facility: CLINIC | Age: 71
End: 2021-07-21

## 2021-07-21 NOTE — TELEPHONE ENCOUNTER
Discovered that patient was booked as established versus New and that we do not accept the Insurance that we have on file. Dorian Mandujano.   Left message asking that they call to reschedule the appt and to let us know if they have different insurance

## 2021-07-26 ENCOUNTER — CARDPULM VISIT (OUTPATIENT)
Dept: CARDIAC REHAB | Facility: HOSPITAL | Age: 71
End: 2021-07-26
Attending: INTERNAL MEDICINE
Payer: MEDICARE

## 2021-07-26 DIAGNOSIS — I25.5 ISCHEMIC CARDIOMYOPATHY: ICD-10-CM

## 2021-07-26 DIAGNOSIS — E78.00 PURE HYPERCHOLESTEROLEMIA: ICD-10-CM

## 2021-07-26 DIAGNOSIS — I25.10 CORONARY ARTERY DISEASE INVOLVING NATIVE CORONARY ARTERY OF NATIVE HEART WITHOUT ANGINA PECTORIS: Chronic | ICD-10-CM

## 2021-07-26 DIAGNOSIS — I10 ESSENTIAL HYPERTENSION: ICD-10-CM

## 2021-07-26 DIAGNOSIS — Z95.1 HX OF CABG: ICD-10-CM

## 2021-07-28 PROBLEM — Z95.1 S/P CABG (CORONARY ARTERY BYPASS GRAFT): Status: ACTIVE | Noted: 2021-07-28

## 2021-08-05 ENCOUNTER — APPOINTMENT (OUTPATIENT)
Dept: CARDIAC REHAB | Facility: HOSPITAL | Age: 71
End: 2021-08-05
Attending: INTERNAL MEDICINE
Payer: MEDICARE

## 2021-08-06 ENCOUNTER — CARDPULM VISIT (OUTPATIENT)
Dept: CARDIAC REHAB | Facility: HOSPITAL | Age: 71
End: 2021-08-06
Attending: INTERNAL MEDICINE
Payer: MEDICARE

## 2021-08-06 LAB — GLUCOSE BLDC GLUCOMTR-MCNC: 347 MG/DL (ref 70–99)

## 2021-08-06 PROCEDURE — 82962 GLUCOSE BLOOD TEST: CPT

## 2021-08-06 PROCEDURE — 93798 PHYS/QHP OP CAR RHAB W/ECG: CPT

## 2021-08-09 ENCOUNTER — CARDPULM VISIT (OUTPATIENT)
Dept: CARDIAC REHAB | Facility: HOSPITAL | Age: 71
End: 2021-08-09
Attending: INTERNAL MEDICINE
Payer: MEDICARE

## 2021-08-09 LAB
GLUCOSE BLDC GLUCOMTR-MCNC: 372 MG/DL (ref 70–99)
GLUCOSE BLDC GLUCOMTR-MCNC: 447 MG/DL (ref 70–99)

## 2021-08-09 PROCEDURE — 82962 GLUCOSE BLOOD TEST: CPT

## 2021-08-10 ENCOUNTER — APPOINTMENT (OUTPATIENT)
Dept: CARDIAC REHAB | Facility: HOSPITAL | Age: 71
End: 2021-08-10
Attending: INTERNAL MEDICINE
Payer: MEDICARE

## 2021-08-11 ENCOUNTER — CARDPULM VISIT (OUTPATIENT)
Dept: CARDIAC REHAB | Facility: HOSPITAL | Age: 71
End: 2021-08-11
Attending: INTERNAL MEDICINE
Payer: MEDICARE

## 2021-08-11 PROCEDURE — 93798 PHYS/QHP OP CAR RHAB W/ECG: CPT

## 2021-08-12 ENCOUNTER — APPOINTMENT (OUTPATIENT)
Dept: CARDIAC REHAB | Facility: HOSPITAL | Age: 71
End: 2021-08-12
Attending: INTERNAL MEDICINE
Payer: MEDICARE

## 2021-08-13 ENCOUNTER — CARDPULM VISIT (OUTPATIENT)
Dept: CARDIAC REHAB | Facility: HOSPITAL | Age: 71
End: 2021-08-13
Attending: INTERNAL MEDICINE
Payer: MEDICARE

## 2021-08-13 PROCEDURE — 93798 PHYS/QHP OP CAR RHAB W/ECG: CPT

## 2021-08-16 ENCOUNTER — CARDPULM VISIT (OUTPATIENT)
Dept: CARDIAC REHAB | Facility: HOSPITAL | Age: 71
End: 2021-08-16
Attending: INTERNAL MEDICINE
Payer: MEDICARE

## 2021-08-16 PROCEDURE — 93798 PHYS/QHP OP CAR RHAB W/ECG: CPT

## 2021-08-17 ENCOUNTER — APPOINTMENT (OUTPATIENT)
Dept: CARDIAC REHAB | Facility: HOSPITAL | Age: 71
End: 2021-08-17
Attending: INTERNAL MEDICINE
Payer: MEDICARE

## 2021-08-18 ENCOUNTER — CARDPULM VISIT (OUTPATIENT)
Dept: CARDIAC REHAB | Facility: HOSPITAL | Age: 71
End: 2021-08-18
Attending: INTERNAL MEDICINE
Payer: MEDICARE

## 2021-08-18 PROCEDURE — 93798 PHYS/QHP OP CAR RHAB W/ECG: CPT

## 2021-08-19 ENCOUNTER — APPOINTMENT (OUTPATIENT)
Dept: CARDIAC REHAB | Facility: HOSPITAL | Age: 71
End: 2021-08-19
Attending: INTERNAL MEDICINE
Payer: MEDICARE

## 2021-08-20 ENCOUNTER — CARDPULM VISIT (OUTPATIENT)
Dept: CARDIAC REHAB | Facility: HOSPITAL | Age: 71
End: 2021-08-20
Attending: INTERNAL MEDICINE
Payer: MEDICARE

## 2021-08-20 PROCEDURE — 93798 PHYS/QHP OP CAR RHAB W/ECG: CPT

## 2021-08-23 ENCOUNTER — CARDPULM VISIT (OUTPATIENT)
Dept: CARDIAC REHAB | Facility: HOSPITAL | Age: 71
End: 2021-08-23
Attending: INTERNAL MEDICINE
Payer: MEDICARE

## 2021-08-23 PROCEDURE — 93798 PHYS/QHP OP CAR RHAB W/ECG: CPT

## 2021-08-24 ENCOUNTER — APPOINTMENT (OUTPATIENT)
Dept: CARDIAC REHAB | Facility: HOSPITAL | Age: 71
End: 2021-08-24
Attending: INTERNAL MEDICINE
Payer: MEDICARE

## 2021-08-25 ENCOUNTER — CARDPULM VISIT (OUTPATIENT)
Dept: CARDIAC REHAB | Facility: HOSPITAL | Age: 71
End: 2021-08-25
Attending: INTERNAL MEDICINE
Payer: MEDICARE

## 2021-08-25 PROCEDURE — 93798 PHYS/QHP OP CAR RHAB W/ECG: CPT

## 2021-08-26 ENCOUNTER — APPOINTMENT (OUTPATIENT)
Dept: CARDIAC REHAB | Facility: HOSPITAL | Age: 71
End: 2021-08-26
Attending: INTERNAL MEDICINE
Payer: MEDICARE

## 2021-08-27 ENCOUNTER — CARDPULM VISIT (OUTPATIENT)
Dept: CARDIAC REHAB | Facility: HOSPITAL | Age: 71
End: 2021-08-27
Attending: INTERNAL MEDICINE
Payer: MEDICARE

## 2021-08-27 PROCEDURE — 93798 PHYS/QHP OP CAR RHAB W/ECG: CPT

## 2021-08-30 ENCOUNTER — CARDPULM VISIT (OUTPATIENT)
Dept: CARDIAC REHAB | Facility: HOSPITAL | Age: 71
End: 2021-08-30
Attending: INTERNAL MEDICINE
Payer: MEDICARE

## 2021-08-30 PROCEDURE — 93798 PHYS/QHP OP CAR RHAB W/ECG: CPT

## 2021-08-31 ENCOUNTER — APPOINTMENT (OUTPATIENT)
Dept: CARDIAC REHAB | Facility: HOSPITAL | Age: 71
End: 2021-08-31
Attending: INTERNAL MEDICINE
Payer: MEDICARE

## 2021-09-01 ENCOUNTER — CARDPULM VISIT (OUTPATIENT)
Dept: CARDIAC REHAB | Facility: HOSPITAL | Age: 71
End: 2021-09-01
Attending: INTERNAL MEDICINE
Payer: MEDICARE

## 2021-09-01 PROCEDURE — 93798 PHYS/QHP OP CAR RHAB W/ECG: CPT

## 2021-09-02 ENCOUNTER — APPOINTMENT (OUTPATIENT)
Dept: CARDIAC REHAB | Facility: HOSPITAL | Age: 71
End: 2021-09-02
Attending: INTERNAL MEDICINE
Payer: MEDICARE

## 2021-09-07 ENCOUNTER — APPOINTMENT (OUTPATIENT)
Dept: CARDIAC REHAB | Facility: HOSPITAL | Age: 71
End: 2021-09-07
Attending: INTERNAL MEDICINE
Payer: MEDICARE

## 2021-09-08 ENCOUNTER — CARDPULM VISIT (OUTPATIENT)
Dept: CARDIAC REHAB | Facility: HOSPITAL | Age: 71
End: 2021-09-08
Attending: INTERNAL MEDICINE
Payer: MEDICARE

## 2021-09-08 PROCEDURE — 93798 PHYS/QHP OP CAR RHAB W/ECG: CPT

## 2021-09-09 ENCOUNTER — APPOINTMENT (OUTPATIENT)
Dept: CARDIAC REHAB | Facility: HOSPITAL | Age: 71
End: 2021-09-09
Attending: INTERNAL MEDICINE
Payer: MEDICARE

## 2021-09-10 ENCOUNTER — CARDPULM VISIT (OUTPATIENT)
Dept: CARDIAC REHAB | Facility: HOSPITAL | Age: 71
End: 2021-09-10
Attending: INTERNAL MEDICINE
Payer: MEDICARE

## 2021-09-10 PROCEDURE — 93798 PHYS/QHP OP CAR RHAB W/ECG: CPT

## 2021-09-14 ENCOUNTER — APPOINTMENT (OUTPATIENT)
Dept: CARDIAC REHAB | Facility: HOSPITAL | Age: 71
End: 2021-09-14
Attending: INTERNAL MEDICINE
Payer: MEDICARE

## 2021-09-14 PROCEDURE — 93798 PHYS/QHP OP CAR RHAB W/ECG: CPT

## 2021-09-15 ENCOUNTER — CARDPULM VISIT (OUTPATIENT)
Dept: CARDIAC REHAB | Facility: HOSPITAL | Age: 71
End: 2021-09-15
Attending: INTERNAL MEDICINE
Payer: MEDICARE

## 2021-09-15 PROCEDURE — 93798 PHYS/QHP OP CAR RHAB W/ECG: CPT

## 2021-09-16 ENCOUNTER — APPOINTMENT (OUTPATIENT)
Dept: CARDIAC REHAB | Facility: HOSPITAL | Age: 71
End: 2021-09-16
Attending: INTERNAL MEDICINE
Payer: MEDICARE

## 2021-09-17 ENCOUNTER — CARDPULM VISIT (OUTPATIENT)
Dept: CARDIAC REHAB | Facility: HOSPITAL | Age: 71
End: 2021-09-17
Attending: INTERNAL MEDICINE
Payer: MEDICARE

## 2021-09-17 PROCEDURE — 93798 PHYS/QHP OP CAR RHAB W/ECG: CPT

## 2021-09-20 ENCOUNTER — CARDPULM VISIT (OUTPATIENT)
Dept: CARDIAC REHAB | Facility: HOSPITAL | Age: 71
End: 2021-09-20
Attending: INTERNAL MEDICINE
Payer: MEDICARE

## 2021-09-20 PROCEDURE — 93798 PHYS/QHP OP CAR RHAB W/ECG: CPT

## 2021-09-21 ENCOUNTER — APPOINTMENT (OUTPATIENT)
Dept: CARDIAC REHAB | Facility: HOSPITAL | Age: 71
End: 2021-09-21
Attending: INTERNAL MEDICINE
Payer: MEDICARE

## 2021-09-22 ENCOUNTER — CARDPULM VISIT (OUTPATIENT)
Dept: CARDIAC REHAB | Facility: HOSPITAL | Age: 71
End: 2021-09-22
Attending: INTERNAL MEDICINE
Payer: MEDICARE

## 2021-09-22 PROCEDURE — 93798 PHYS/QHP OP CAR RHAB W/ECG: CPT

## 2021-09-23 ENCOUNTER — APPOINTMENT (OUTPATIENT)
Dept: CARDIAC REHAB | Facility: HOSPITAL | Age: 71
End: 2021-09-23
Attending: INTERNAL MEDICINE
Payer: MEDICARE

## 2021-09-24 ENCOUNTER — CARDPULM VISIT (OUTPATIENT)
Dept: CARDIAC REHAB | Facility: HOSPITAL | Age: 71
End: 2021-09-24
Attending: INTERNAL MEDICINE
Payer: MEDICARE

## 2021-09-24 PROCEDURE — 93798 PHYS/QHP OP CAR RHAB W/ECG: CPT

## 2021-09-27 ENCOUNTER — CARDPULM VISIT (OUTPATIENT)
Dept: CARDIAC REHAB | Facility: HOSPITAL | Age: 71
End: 2021-09-27
Attending: INTERNAL MEDICINE
Payer: MEDICARE

## 2021-09-27 PROCEDURE — 93798 PHYS/QHP OP CAR RHAB W/ECG: CPT

## 2021-09-28 ENCOUNTER — APPOINTMENT (OUTPATIENT)
Dept: CARDIAC REHAB | Facility: HOSPITAL | Age: 71
End: 2021-09-28
Attending: INTERNAL MEDICINE
Payer: MEDICARE

## 2021-09-29 ENCOUNTER — CARDPULM VISIT (OUTPATIENT)
Dept: CARDIAC REHAB | Facility: HOSPITAL | Age: 71
End: 2021-09-29
Attending: INTERNAL MEDICINE
Payer: MEDICARE

## 2021-09-29 PROCEDURE — 93798 PHYS/QHP OP CAR RHAB W/ECG: CPT

## 2021-09-30 ENCOUNTER — APPOINTMENT (OUTPATIENT)
Dept: CARDIAC REHAB | Facility: HOSPITAL | Age: 71
End: 2021-09-30
Attending: INTERNAL MEDICINE
Payer: MEDICARE

## 2021-10-01 ENCOUNTER — CARDPULM VISIT (OUTPATIENT)
Dept: CARDIAC REHAB | Facility: HOSPITAL | Age: 71
End: 2021-10-01
Attending: INTERNAL MEDICINE
Payer: MEDICARE

## 2021-10-01 PROCEDURE — 93798 PHYS/QHP OP CAR RHAB W/ECG: CPT

## 2021-10-04 ENCOUNTER — CARDPULM VISIT (OUTPATIENT)
Dept: CARDIAC REHAB | Facility: HOSPITAL | Age: 71
End: 2021-10-04
Attending: INTERNAL MEDICINE
Payer: MEDICARE

## 2021-10-04 PROCEDURE — 93798 PHYS/QHP OP CAR RHAB W/ECG: CPT

## 2021-10-05 ENCOUNTER — APPOINTMENT (OUTPATIENT)
Dept: CARDIAC REHAB | Facility: HOSPITAL | Age: 71
End: 2021-10-05
Attending: INTERNAL MEDICINE
Payer: MEDICARE

## 2021-10-06 ENCOUNTER — CARDPULM VISIT (OUTPATIENT)
Dept: CARDIAC REHAB | Facility: HOSPITAL | Age: 71
End: 2021-10-06
Attending: INTERNAL MEDICINE
Payer: MEDICARE

## 2021-10-06 PROCEDURE — 93798 PHYS/QHP OP CAR RHAB W/ECG: CPT

## 2021-10-07 ENCOUNTER — APPOINTMENT (OUTPATIENT)
Dept: CARDIAC REHAB | Facility: HOSPITAL | Age: 71
End: 2021-10-07
Attending: INTERNAL MEDICINE
Payer: MEDICARE

## 2021-10-11 ENCOUNTER — CARDPULM VISIT (OUTPATIENT)
Dept: CARDIAC REHAB | Facility: HOSPITAL | Age: 71
End: 2021-10-11
Attending: INTERNAL MEDICINE
Payer: MEDICARE

## 2021-10-11 PROCEDURE — 93798 PHYS/QHP OP CAR RHAB W/ECG: CPT

## 2021-10-12 ENCOUNTER — APPOINTMENT (OUTPATIENT)
Dept: CARDIAC REHAB | Facility: HOSPITAL | Age: 71
End: 2021-10-12
Attending: INTERNAL MEDICINE
Payer: MEDICARE

## 2021-10-13 ENCOUNTER — CARDPULM VISIT (OUTPATIENT)
Dept: CARDIAC REHAB | Facility: HOSPITAL | Age: 71
End: 2021-10-13
Attending: INTERNAL MEDICINE
Payer: MEDICARE

## 2021-10-13 PROCEDURE — 93798 PHYS/QHP OP CAR RHAB W/ECG: CPT

## 2021-10-14 ENCOUNTER — APPOINTMENT (OUTPATIENT)
Dept: CARDIAC REHAB | Facility: HOSPITAL | Age: 71
End: 2021-10-14
Attending: INTERNAL MEDICINE
Payer: MEDICARE

## 2021-10-15 ENCOUNTER — CARDPULM VISIT (OUTPATIENT)
Dept: CARDIAC REHAB | Facility: HOSPITAL | Age: 71
End: 2021-10-15
Attending: INTERNAL MEDICINE
Payer: MEDICARE

## 2021-10-15 PROCEDURE — 93798 PHYS/QHP OP CAR RHAB W/ECG: CPT

## 2021-10-18 ENCOUNTER — APPOINTMENT (OUTPATIENT)
Dept: CARDIAC REHAB | Facility: HOSPITAL | Age: 71
End: 2021-10-18
Attending: INTERNAL MEDICINE
Payer: MEDICARE

## 2021-10-19 ENCOUNTER — APPOINTMENT (OUTPATIENT)
Dept: CARDIAC REHAB | Facility: HOSPITAL | Age: 71
End: 2021-10-19
Attending: INTERNAL MEDICINE
Payer: MEDICARE

## 2021-10-20 ENCOUNTER — APPOINTMENT (OUTPATIENT)
Dept: CARDIAC REHAB | Facility: HOSPITAL | Age: 71
End: 2021-10-20
Attending: INTERNAL MEDICINE
Payer: MEDICARE

## 2021-10-21 ENCOUNTER — APPOINTMENT (OUTPATIENT)
Dept: CARDIAC REHAB | Facility: HOSPITAL | Age: 71
End: 2021-10-21
Attending: INTERNAL MEDICINE
Payer: MEDICARE

## 2021-10-22 ENCOUNTER — APPOINTMENT (OUTPATIENT)
Dept: CARDIAC REHAB | Facility: HOSPITAL | Age: 71
End: 2021-10-22
Attending: INTERNAL MEDICINE
Payer: MEDICARE

## 2021-10-25 ENCOUNTER — APPOINTMENT (OUTPATIENT)
Dept: CARDIAC REHAB | Facility: HOSPITAL | Age: 71
End: 2021-10-25
Attending: INTERNAL MEDICINE
Payer: MEDICARE

## 2021-10-26 ENCOUNTER — APPOINTMENT (OUTPATIENT)
Dept: CARDIAC REHAB | Facility: HOSPITAL | Age: 71
End: 2021-10-26
Attending: INTERNAL MEDICINE
Payer: MEDICARE

## 2021-10-27 ENCOUNTER — APPOINTMENT (OUTPATIENT)
Dept: CARDIAC REHAB | Facility: HOSPITAL | Age: 71
End: 2021-10-27
Attending: INTERNAL MEDICINE
Payer: MEDICARE

## 2021-10-28 ENCOUNTER — APPOINTMENT (OUTPATIENT)
Dept: CARDIAC REHAB | Facility: HOSPITAL | Age: 71
End: 2021-10-28
Attending: INTERNAL MEDICINE
Payer: MEDICARE

## 2021-10-29 ENCOUNTER — APPOINTMENT (OUTPATIENT)
Dept: CARDIAC REHAB | Facility: HOSPITAL | Age: 71
End: 2021-10-29
Attending: INTERNAL MEDICINE
Payer: MEDICARE

## 2021-11-01 ENCOUNTER — APPOINTMENT (OUTPATIENT)
Dept: CARDIAC REHAB | Facility: HOSPITAL | Age: 71
End: 2021-11-01
Attending: INTERNAL MEDICINE
Payer: MEDICARE

## 2021-11-02 ENCOUNTER — APPOINTMENT (OUTPATIENT)
Dept: CARDIAC REHAB | Facility: HOSPITAL | Age: 71
End: 2021-11-02
Attending: INTERNAL MEDICINE
Payer: MEDICARE

## 2021-11-03 ENCOUNTER — APPOINTMENT (OUTPATIENT)
Dept: CARDIAC REHAB | Facility: HOSPITAL | Age: 71
End: 2021-11-03
Attending: INTERNAL MEDICINE
Payer: MEDICARE

## 2021-11-04 ENCOUNTER — APPOINTMENT (OUTPATIENT)
Dept: CARDIAC REHAB | Facility: HOSPITAL | Age: 71
End: 2021-11-04
Attending: INTERNAL MEDICINE
Payer: MEDICARE

## 2021-11-05 ENCOUNTER — APPOINTMENT (OUTPATIENT)
Dept: CARDIAC REHAB | Facility: HOSPITAL | Age: 71
End: 2021-11-05
Attending: INTERNAL MEDICINE
Payer: MEDICARE

## 2021-11-09 ENCOUNTER — APPOINTMENT (OUTPATIENT)
Dept: CARDIAC REHAB | Facility: HOSPITAL | Age: 71
End: 2021-11-09
Attending: INTERNAL MEDICINE
Payer: MEDICARE

## 2021-11-11 ENCOUNTER — APPOINTMENT (OUTPATIENT)
Dept: CARDIAC REHAB | Facility: HOSPITAL | Age: 71
End: 2021-11-11
Attending: INTERNAL MEDICINE
Payer: MEDICARE

## 2021-11-16 ENCOUNTER — APPOINTMENT (OUTPATIENT)
Dept: CARDIAC REHAB | Facility: HOSPITAL | Age: 71
End: 2021-11-16
Attending: INTERNAL MEDICINE
Payer: MEDICARE

## 2021-11-18 ENCOUNTER — APPOINTMENT (OUTPATIENT)
Dept: CARDIAC REHAB | Facility: HOSPITAL | Age: 71
End: 2021-11-18
Attending: INTERNAL MEDICINE
Payer: MEDICARE

## 2021-11-23 ENCOUNTER — APPOINTMENT (OUTPATIENT)
Dept: CARDIAC REHAB | Facility: HOSPITAL | Age: 71
End: 2021-11-23
Attending: INTERNAL MEDICINE
Payer: MEDICARE

## 2021-11-30 ENCOUNTER — APPOINTMENT (OUTPATIENT)
Dept: CARDIAC REHAB | Facility: HOSPITAL | Age: 71
End: 2021-11-30
Attending: INTERNAL MEDICINE
Payer: MEDICARE

## 2021-12-02 ENCOUNTER — APPOINTMENT (OUTPATIENT)
Dept: CARDIAC REHAB | Facility: HOSPITAL | Age: 71
End: 2021-12-02
Attending: INTERNAL MEDICINE
Payer: MEDICARE

## 2021-12-07 ENCOUNTER — APPOINTMENT (OUTPATIENT)
Dept: CARDIAC REHAB | Facility: HOSPITAL | Age: 71
End: 2021-12-07
Attending: INTERNAL MEDICINE
Payer: MEDICARE

## 2021-12-09 ENCOUNTER — APPOINTMENT (OUTPATIENT)
Dept: CARDIAC REHAB | Facility: HOSPITAL | Age: 71
End: 2021-12-09
Attending: INTERNAL MEDICINE
Payer: MEDICARE

## 2021-12-14 ENCOUNTER — APPOINTMENT (OUTPATIENT)
Dept: CARDIAC REHAB | Facility: HOSPITAL | Age: 71
End: 2021-12-14
Attending: INTERNAL MEDICINE
Payer: MEDICARE

## 2021-12-16 ENCOUNTER — APPOINTMENT (OUTPATIENT)
Dept: CARDIAC REHAB | Facility: HOSPITAL | Age: 71
End: 2021-12-16
Attending: INTERNAL MEDICINE
Payer: MEDICARE

## 2022-01-22 ENCOUNTER — HOSPITAL ENCOUNTER (INPATIENT)
Facility: HOSPITAL | Age: 72
LOS: 4 days | Discharge: HOME | DRG: 682 | End: 2022-01-26
Attending: EMERGENCY MEDICINE | Admitting: INTERNAL MEDICINE
Payer: MEDICARE

## 2022-01-22 ENCOUNTER — APPOINTMENT (OUTPATIENT)
Dept: GENERAL RADIOLOGY | Facility: HOSPITAL | Age: 72
DRG: 682 | End: 2022-01-22
Attending: EMERGENCY MEDICINE
Payer: MEDICARE

## 2022-01-22 DIAGNOSIS — N17.9 ACUTE RENAL FAILURE SUPERIMPOSED ON CHRONIC KIDNEY DISEASE, UNSPECIFIED CKD STAGE, UNSPECIFIED ACUTE RENAL FAILURE TYPE (HCC): Primary | ICD-10-CM

## 2022-01-22 DIAGNOSIS — N18.9 ACUTE RENAL FAILURE SUPERIMPOSED ON CHRONIC KIDNEY DISEASE, UNSPECIFIED CKD STAGE, UNSPECIFIED ACUTE RENAL FAILURE TYPE (HCC): Primary | ICD-10-CM

## 2022-01-22 LAB
ANION GAP SERPL CALC-SCNC: 8 MMOL/L (ref 0–18)
BASOPHILS # BLD AUTO: 0.07 X10(3) UL (ref 0–0.2)
BASOPHILS NFR BLD AUTO: 0.9 %
BILIRUB UR QL: NEGATIVE
BUN BLD-MCNC: 66 MG/DL (ref 7–18)
BUN/CREAT SERPL: 16.3 (ref 10–20)
CALCIUM BLD-MCNC: 8.9 MG/DL (ref 8.5–10.1)
CHLORIDE SERPL-SCNC: 113 MMOL/L (ref 98–112)
CO2 SERPL-SCNC: 24 MMOL/L (ref 21–32)
COLOR UR: YELLOW
CREAT BLD-MCNC: 4.06 MG/DL
DEPRECATED RDW RBC AUTO: 53.7 FL (ref 35.1–46.3)
EOSINOPHIL # BLD AUTO: 0.31 X10(3) UL (ref 0–0.7)
EOSINOPHIL NFR BLD AUTO: 4.1 %
ERYTHROCYTE [DISTWIDTH] IN BLOOD BY AUTOMATED COUNT: 16.4 % (ref 11–15)
EST. AVERAGE GLUCOSE BLD GHB EST-MCNC: 223 MG/DL (ref 68–126)
GLUCOSE BLD-MCNC: 115 MG/DL (ref 70–99)
GLUCOSE BLDC GLUCOMTR-MCNC: 146 MG/DL (ref 70–99)
GLUCOSE BLDC GLUCOMTR-MCNC: 169 MG/DL (ref 70–99)
GLUCOSE BLDC GLUCOMTR-MCNC: 191 MG/DL (ref 70–99)
GLUCOSE UR-MCNC: >=500 MG/DL
HBA1C MFR BLD: 9.4 % (ref ?–5.7)
HCT VFR BLD AUTO: 41.8 %
HGB BLD-MCNC: 12.6 G/DL
HGB UR QL STRIP.AUTO: NEGATIVE
IMM GRANULOCYTES # BLD AUTO: 0.02 X10(3) UL (ref 0–1)
IMM GRANULOCYTES NFR BLD: 0.3 %
KETONES UR-MCNC: NEGATIVE MG/DL
LEUKOCYTE ESTERASE UR QL STRIP.AUTO: NEGATIVE
LYMPHOCYTES # BLD AUTO: 1.93 X10(3) UL (ref 1–4)
LYMPHOCYTES NFR BLD AUTO: 25.4 %
MCH RBC QN AUTO: 27.2 PG (ref 26–34)
MCHC RBC AUTO-ENTMCNC: 30.1 G/DL (ref 31–37)
MCV RBC AUTO: 90.3 FL
MONOCYTES # BLD AUTO: 0.64 X10(3) UL (ref 0.1–1)
MONOCYTES NFR BLD AUTO: 8.4 %
NEUTROPHILS # BLD AUTO: 4.62 X10 (3) UL (ref 1.5–7.7)
NEUTROPHILS # BLD AUTO: 4.62 X10(3) UL (ref 1.5–7.7)
NEUTROPHILS NFR BLD AUTO: 60.9 %
NITRITE UR QL STRIP.AUTO: NEGATIVE
NT-PROBNP SERPL-MCNC: 1640 PG/ML (ref ?–125)
OSMOLALITY SERPL CALC.SUM OF ELEC: 320 MOSM/KG (ref 275–295)
PH UR: 5 [PH] (ref 5–8)
PLATELET # BLD AUTO: 205 10(3)UL (ref 150–450)
POTASSIUM SERPL-SCNC: 4.4 MMOL/L (ref 3.5–5.1)
PROT UR-MCNC: >=500 MG/DL
RBC # BLD AUTO: 4.63 X10(6)UL
SARS-COV-2 RNA RESP QL NAA+PROBE: NOT DETECTED
SODIUM SERPL-SCNC: 145 MMOL/L (ref 136–145)
SP GR UR STRIP: 1.02 (ref 1–1.03)
TROPONIN I HIGH SENSITIVITY: 52 NG/L
UROBILINOGEN UR STRIP-ACNC: <2
WBC # BLD AUTO: 7.6 X10(3) UL (ref 4–11)

## 2022-01-22 PROCEDURE — 99285 EMERGENCY DEPT VISIT HI MDM: CPT

## 2022-01-22 PROCEDURE — S0171 BUMETANIDE 0.5 MG: HCPCS | Performed by: EMERGENCY MEDICINE

## 2022-01-22 PROCEDURE — 93010 ELECTROCARDIOGRAM REPORT: CPT | Performed by: EMERGENCY MEDICINE

## 2022-01-22 PROCEDURE — 81001 URINALYSIS AUTO W/SCOPE: CPT | Performed by: EMERGENCY MEDICINE

## 2022-01-22 PROCEDURE — 71045 X-RAY EXAM CHEST 1 VIEW: CPT | Performed by: EMERGENCY MEDICINE

## 2022-01-22 PROCEDURE — 85025 COMPLETE CBC W/AUTO DIFF WBC: CPT | Performed by: EMERGENCY MEDICINE

## 2022-01-22 PROCEDURE — 84484 ASSAY OF TROPONIN QUANT: CPT | Performed by: EMERGENCY MEDICINE

## 2022-01-22 PROCEDURE — 96374 THER/PROPH/DIAG INJ IV PUSH: CPT

## 2022-01-22 PROCEDURE — 82962 GLUCOSE BLOOD TEST: CPT

## 2022-01-22 PROCEDURE — 93005 ELECTROCARDIOGRAM TRACING: CPT

## 2022-01-22 PROCEDURE — 83880 ASSAY OF NATRIURETIC PEPTIDE: CPT | Performed by: EMERGENCY MEDICINE

## 2022-01-22 PROCEDURE — 80048 BASIC METABOLIC PNL TOTAL CA: CPT | Performed by: EMERGENCY MEDICINE

## 2022-01-22 PROCEDURE — 83036 HEMOGLOBIN GLYCOSYLATED A1C: CPT | Performed by: INTERNAL MEDICINE

## 2022-01-22 RX ORDER — ACETAMINOPHEN 325 MG/1
650 TABLET ORAL EVERY 6 HOURS PRN
Status: DISCONTINUED | OUTPATIENT
Start: 2022-01-22 | End: 2022-01-26

## 2022-01-22 RX ORDER — ASCORBIC ACID 500 MG
500 TABLET ORAL DAILY
Status: DISCONTINUED | OUTPATIENT
Start: 2022-01-22 | End: 2022-01-26

## 2022-01-22 RX ORDER — ROSUVASTATIN CALCIUM 20 MG/1
40 TABLET, COATED ORAL NIGHTLY
Status: DISCONTINUED | OUTPATIENT
Start: 2022-01-22 | End: 2022-01-22

## 2022-01-22 RX ORDER — ONDANSETRON 2 MG/ML
4 INJECTION INTRAMUSCULAR; INTRAVENOUS EVERY 6 HOURS PRN
Status: DISCONTINUED | OUTPATIENT
Start: 2022-01-22 | End: 2022-01-26

## 2022-01-22 RX ORDER — BISACODYL 10 MG
10 SUPPOSITORY, RECTAL RECTAL
Status: DISCONTINUED | OUTPATIENT
Start: 2022-01-22 | End: 2022-01-26

## 2022-01-22 RX ORDER — HYDRALAZINE HYDROCHLORIDE 25 MG/1
25 TABLET, FILM COATED ORAL 3 TIMES DAILY
Status: DISCONTINUED | OUTPATIENT
Start: 2022-01-22 | End: 2022-01-26

## 2022-01-22 RX ORDER — SENNOSIDES 8.6 MG
17.2 TABLET ORAL NIGHTLY PRN
Status: DISCONTINUED | OUTPATIENT
Start: 2022-01-22 | End: 2022-01-26

## 2022-01-22 RX ORDER — DOXEPIN HYDROCHLORIDE 50 MG/1
1 CAPSULE ORAL DAILY
Status: DISCONTINUED | OUTPATIENT
Start: 2022-01-22 | End: 2022-01-26

## 2022-01-22 RX ORDER — METOCLOPRAMIDE HYDROCHLORIDE 5 MG/ML
5 INJECTION INTRAMUSCULAR; INTRAVENOUS EVERY 8 HOURS PRN
Status: DISCONTINUED | OUTPATIENT
Start: 2022-01-22 | End: 2022-01-26

## 2022-01-22 RX ORDER — DEXTROSE MONOHYDRATE 25 G/50ML
50 INJECTION, SOLUTION INTRAVENOUS
Status: DISCONTINUED | OUTPATIENT
Start: 2022-01-22 | End: 2022-01-26

## 2022-01-22 RX ORDER — ASPIRIN 81 MG/1
81 TABLET ORAL DAILY
Status: DISCONTINUED | OUTPATIENT
Start: 2022-01-22 | End: 2022-01-26

## 2022-01-22 RX ORDER — BUMETANIDE 0.25 MG/ML
2 INJECTION, SOLUTION INTRAMUSCULAR; INTRAVENOUS ONCE
Status: COMPLETED | OUTPATIENT
Start: 2022-01-22 | End: 2022-01-22

## 2022-01-22 RX ORDER — PANTOPRAZOLE SODIUM 20 MG/1
20 TABLET, DELAYED RELEASE ORAL
Refills: 1 | Status: DISCONTINUED | OUTPATIENT
Start: 2022-01-22 | End: 2022-01-26

## 2022-01-22 RX ORDER — ERGOCALCIFEROL 1.25 MG/1
50000 CAPSULE ORAL WEEKLY
Status: DISCONTINUED | OUTPATIENT
Start: 2022-01-28 | End: 2022-01-26

## 2022-01-22 RX ORDER — POLYETHYLENE GLYCOL 3350 17 G/17G
17 POWDER, FOR SOLUTION ORAL DAILY PRN
Status: DISCONTINUED | OUTPATIENT
Start: 2022-01-22 | End: 2022-01-26

## 2022-01-22 RX ORDER — ROSUVASTATIN CALCIUM 20 MG/1
20 TABLET, COATED ORAL NIGHTLY
Status: DISCONTINUED | OUTPATIENT
Start: 2022-01-22 | End: 2022-01-26

## 2022-01-22 RX ORDER — HEPARIN SODIUM 5000 [USP'U]/ML
5000 INJECTION, SOLUTION INTRAVENOUS; SUBCUTANEOUS EVERY 8 HOURS SCHEDULED
Status: DISCONTINUED | OUTPATIENT
Start: 2022-01-22 | End: 2022-01-26

## 2022-01-22 RX ORDER — METOPROLOL SUCCINATE 50 MG/1
50 TABLET, EXTENDED RELEASE ORAL 2 TIMES DAILY
Status: DISCONTINUED | OUTPATIENT
Start: 2022-01-22 | End: 2022-01-26

## 2022-01-22 RX ORDER — TAMSULOSIN HYDROCHLORIDE 0.4 MG/1
0.8 CAPSULE ORAL DAILY
Status: DISCONTINUED | OUTPATIENT
Start: 2022-01-22 | End: 2022-01-26

## 2022-01-22 NOTE — ED QUICK NOTES
Orders for admission, patient is aware of plan and ready to go upstairs. Any questions, please call ED RN Amrita Gonzalez at extension [phone].      Patient Covid vaccination status: Partially vaccinated     COVID Test Ordered in ED: Rapid SARS-CoV-2 by PCR-negative    COVID Suspicion at Admission: Low clinical suspicion for COVID    Running Infusions:  None    Mental Status/LOC at time of transport: alert and oriented     Other pertinent information:   CIWA score: N/A   NIH score:  N/A

## 2022-01-22 NOTE — ED INITIAL ASSESSMENT (HPI)
Mr Fortunato Downing arrived through triage for c/o urinary frequency progressively worsening over the past 2 months or so. Denies dysuria or hematuria Reported hx of  \"prostate problems. \"

## 2022-01-23 LAB
ALBUMIN SERPL-MCNC: 2.1 G/DL (ref 3.4–5)
ANION GAP SERPL CALC-SCNC: 6 MMOL/L (ref 0–18)
BASOPHILS # BLD AUTO: 0.08 X10(3) UL (ref 0–0.2)
BASOPHILS NFR BLD AUTO: 1.2 %
BUN BLD-MCNC: 63 MG/DL (ref 7–18)
BUN/CREAT SERPL: 15.6 (ref 10–20)
CALCIUM BLD-MCNC: 8.3 MG/DL (ref 8.5–10.1)
CHLORIDE SERPL-SCNC: 111 MMOL/L (ref 98–112)
CO2 SERPL-SCNC: 25 MMOL/L (ref 21–32)
CREAT BLD-MCNC: 4.05 MG/DL
CREAT UR-SCNC: 67.6 MG/DL
DEPRECATED RDW RBC AUTO: 54.3 FL (ref 35.1–46.3)
EOSINOPHIL # BLD AUTO: 0.28 X10(3) UL (ref 0–0.7)
EOSINOPHIL NFR BLD AUTO: 4.2 %
ERYTHROCYTE [DISTWIDTH] IN BLOOD BY AUTOMATED COUNT: 16.1 % (ref 11–15)
GLUCOSE BLD-MCNC: 211 MG/DL (ref 70–99)
GLUCOSE BLDC GLUCOMTR-MCNC: 129 MG/DL (ref 70–99)
GLUCOSE BLDC GLUCOMTR-MCNC: 129 MG/DL (ref 70–99)
GLUCOSE BLDC GLUCOMTR-MCNC: 169 MG/DL (ref 70–99)
GLUCOSE BLDC GLUCOMTR-MCNC: 186 MG/DL (ref 70–99)
HGB BLD-MCNC: 11.2 G/DL
IMM GRANULOCYTES # BLD AUTO: 0.02 X10(3) UL (ref 0–1)
IMM GRANULOCYTES NFR BLD: 0.3 %
LYMPHOCYTES # BLD AUTO: 1.94 X10(3) UL (ref 1–4)
LYMPHOCYTES NFR BLD AUTO: 29.2 %
MCH RBC QN AUTO: 27.8 PG (ref 26–34)
MCHC RBC AUTO-ENTMCNC: 30.5 G/DL (ref 31–37)
MCV RBC AUTO: 91.1 FL
MONOCYTES # BLD AUTO: 0.52 X10(3) UL (ref 0.1–1)
MONOCYTES NFR BLD AUTO: 7.8 %
NEUTROPHILS # BLD AUTO: 3.81 X10 (3) UL (ref 1.5–7.7)
NEUTROPHILS # BLD AUTO: 3.81 X10(3) UL (ref 1.5–7.7)
NEUTROPHILS NFR BLD AUTO: 57.3 %
OSMOLALITY SERPL CALC.SUM OF ELEC: 318 MOSM/KG (ref 275–295)
PHOSPHATE SERPL-MCNC: 5.4 MG/DL (ref 2.5–4.9)
POTASSIUM SERPL-SCNC: 4.1 MMOL/L (ref 3.5–5.1)
RBC # BLD AUTO: 4.03 X10(6)UL
SODIUM SERPL-SCNC: 142 MMOL/L (ref 136–145)
SODIUM SERPL-SCNC: 65 MMOL/L
UUN UR-MCNC: 679 MG/DL
WBC # BLD AUTO: 6.7 X10(3) UL (ref 4–11)

## 2022-01-23 PROCEDURE — 97161 PT EVAL LOW COMPLEX 20 MIN: CPT

## 2022-01-23 PROCEDURE — 84300 ASSAY OF URINE SODIUM: CPT | Performed by: INTERNAL MEDICINE

## 2022-01-23 PROCEDURE — 84540 ASSAY OF URINE/UREA-N: CPT | Performed by: INTERNAL MEDICINE

## 2022-01-23 PROCEDURE — 80069 RENAL FUNCTION PANEL: CPT | Performed by: INTERNAL MEDICINE

## 2022-01-23 PROCEDURE — 83735 ASSAY OF MAGNESIUM: CPT | Performed by: INTERNAL MEDICINE

## 2022-01-23 PROCEDURE — 82962 GLUCOSE BLOOD TEST: CPT

## 2022-01-23 PROCEDURE — 82570 ASSAY OF URINE CREATININE: CPT | Performed by: INTERNAL MEDICINE

## 2022-01-23 PROCEDURE — S0171 BUMETANIDE 0.5 MG: HCPCS | Performed by: INTERNAL MEDICINE

## 2022-01-23 PROCEDURE — 85025 COMPLETE CBC W/AUTO DIFF WBC: CPT | Performed by: INTERNAL MEDICINE

## 2022-01-23 PROCEDURE — 97116 GAIT TRAINING THERAPY: CPT

## 2022-01-23 RX ORDER — BUMETANIDE 0.25 MG/ML
1 INJECTION, SOLUTION INTRAMUSCULAR; INTRAVENOUS DAILY
Status: DISCONTINUED | OUTPATIENT
Start: 2022-01-23 | End: 2022-01-24

## 2022-01-23 NOTE — PLAN OF CARE
No complaints of pain. Tele in place. dyspnea on exertion. Room air. Frequent rounding done. Bed in low and locked position. Pt resting in bed. Problem: Diabetes/Glucose Control  Goal: Glucose maintained within prescribed range  Description: INTERVENTIONS:  - Monitor Blood Glucose as ordered  - Assess for signs and symptoms of hyperglycemia and hypoglycemia  - Administer ordered medications to maintain glucose within target range  - Assess barriers to adequate nutritional intake and initiate nutrition consult as needed  - Instruct patient on self management of diabetes  Outcome: Progressing     Problem: Patient Centered Care  Goal: Patient preferences are identified and integrated in the patient's plan of care  Description: Interventions:  - What would you like us to know as we care for you?  I am from home with family.   - Provide timely, complete, and accurate information to patient/family  - Incorporate patient and family knowledge, values, beliefs, and cultural backgrounds into the planning and delivery of care  - Encourage patient/family to participate in care and decision-making at the level they choose  - Honor patient and family perspectives and choices  Outcome: Progressing

## 2022-01-23 NOTE — PLAN OF CARE
Denies pain. No acute changes overnight. Vitals stable. Continue to monitor. Problem: Patient Centered Care  Goal: Patient preferences are identified and integrated in the patient's plan of care  Description: Interventions:  - What would you like us to know as we care for you?  I am from home with family.   - Provide timely, complete, and accurate information to patient/family  - Incorporate patient and family knowledge, values, beliefs, and cultural backgrounds into the planning and delivery of care  - Encourage patient/family to participate in care and decision-making at the level they choose  - Honor patient and family perspectives and choices  Outcome: Progressing     Problem: Diabetes/Glucose Control  Goal: Glucose maintained within prescribed range  Description: INTERVENTIONS:  - Monitor Blood Glucose as ordered  - Assess for signs and symptoms of hyperglycemia and hypoglycemia  - Administer ordered medications to maintain glucose within target range  - Assess barriers to adequate nutritional intake and initiate nutrition consult as needed  - Instruct patient on self management of diabetes  Outcome: Progressing    Problem: PAIN - ADULT  Goal: Verbalizes/displays adequate comfort level or patient's stated pain goal  Description: INTERVENTIONS:  - Encourage pt to monitor pain and request assistance  - Assess pain using appropriate pain scale  - Administer analgesics based on type and severity of pain and evaluate response  - Implement non-pharmacological measures as appropriate and evaluate response  - Consider cultural and social influences on pain and pain management  - Manage/alleviate anxiety  - Utilize distraction and/or relaxation techniques  - Monitor for opioid side effects  - Notify MD/LIP if interventions unsuccessful or patient reports new pain  - Anticipate increased pain with activity and pre-medicate as appropriate  Outcome: Progressing     Problem: SAFETY ADULT - FALL  Goal: Free from fall injury  Description: INTERVENTIONS:  - Assess pt frequently for physical needs  - Identify cognitive and physical deficits and behaviors that affect risk of falls.   - [place] fall precautions as indicated by assessment.  - Educate pt/family on patient safety including physical limitations  - Instruct pt to call for assistance with activity based on assessment  - Modify environment to reduce risk of injury  - Provide assistive devices as appropriate  - Consider OT/PT consult to assist with strengthening/mobility  - Encourage toileting schedule  Outcome: Progressing     Problem: METABOLIC/FLUID AND ELECTROLYTES - ADULT  Goal: Glucose maintained within prescribed range  Description: INTERVENTIONS:  - Monitor Blood Glucose as ordered  - Assess for signs and symptoms of hyperglycemia and hypoglycemia  - Administer ordered medications to maintain glucose within target range  - Assess barriers to adequate nutritional intake and initiate nutrition consult as needed  - Instruct patient on self management of diabetes  Outcome: Progressing  Goal: Electrolytes maintained within normal limits  Description: INTERVENTIONS:  - Monitor labs and rhythm and assess patient for signs and symptoms of electrolyte imbalances  - Administer electrolyte replacement as ordered  - Monitor response to electrolyte replacements, including rhythm and repeat lab results as appropriate  - Fluid restriction as ordered  - Instruct patient on fluid and nutrition restrictions as appropriate  Outcome: Progressing  Goal: Hemodynamic stability and optimal renal function maintained  Description: INTERVENTIONS:  - Monitor labs and assess for signs and symptoms of volume excess or deficit  - Monitor intake, output and patient weight  - Monitor urine specific gravity, serum osmolarity and serum sodium as indicated or ordered  - Monitor response to interventions for patient's volume status, including labs, urine output, blood pressure (other measures as available)  - Encourage oral intake as appropriate  - Instruct patient on fluid and nutrition restrictions as appropriate  Outcome: Progressing     Problem: SKIN/TISSUE INTEGRITY - ADULT  Goal: Skin integrity remains intact  Description: INTERVENTIONS  - Assess and document risk factors for pressure ulcer development  - Assess and document skin integrity  - Monitor for areas of redness and/or skin breakdown  - Initiate interventions, skin care algorithm/standards of care as needed  Outcome: Progressing

## 2022-01-24 ENCOUNTER — APPOINTMENT (OUTPATIENT)
Dept: ULTRASOUND IMAGING | Facility: HOSPITAL | Age: 72
DRG: 682 | End: 2022-01-24
Attending: INTERNAL MEDICINE
Payer: MEDICARE

## 2022-01-24 ENCOUNTER — APPOINTMENT (OUTPATIENT)
Dept: CV DIAGNOSTICS | Facility: HOSPITAL | Age: 72
DRG: 682 | End: 2022-01-24
Attending: INTERNAL MEDICINE
Payer: MEDICARE

## 2022-01-24 LAB
ALBUMIN SERPL-MCNC: 2.2 G/DL (ref 3.4–5)
ANION GAP SERPL CALC-SCNC: 9 MMOL/L (ref 0–18)
BASOPHILS # BLD AUTO: 0.05 X10(3) UL (ref 0–0.2)
BASOPHILS NFR BLD AUTO: 0.6 %
BUN BLD-MCNC: 66 MG/DL (ref 7–18)
BUN/CREAT SERPL: 15.9 (ref 10–20)
CALCIUM BLD-MCNC: 8.2 MG/DL (ref 8.5–10.1)
CHLORIDE SERPL-SCNC: 110 MMOL/L (ref 98–112)
CO2 SERPL-SCNC: 24 MMOL/L (ref 21–32)
CREAT BLD-MCNC: 4.15 MG/DL
DEPRECATED RDW RBC AUTO: 54.6 FL (ref 35.1–46.3)
EOSINOPHIL # BLD AUTO: 0.25 X10(3) UL (ref 0–0.7)
EOSINOPHIL NFR BLD AUTO: 3.2 %
ERYTHROCYTE [DISTWIDTH] IN BLOOD BY AUTOMATED COUNT: 16.3 % (ref 11–15)
GLUCOSE BLD-MCNC: 232 MG/DL (ref 70–99)
GLUCOSE BLDC GLUCOMTR-MCNC: 106 MG/DL (ref 70–99)
GLUCOSE BLDC GLUCOMTR-MCNC: 168 MG/DL (ref 70–99)
GLUCOSE BLDC GLUCOMTR-MCNC: 181 MG/DL (ref 70–99)
GLUCOSE BLDC GLUCOMTR-MCNC: 225 MG/DL (ref 70–99)
HCT VFR BLD AUTO: 38.6 %
HGB BLD-MCNC: 11.7 G/DL
IMM GRANULOCYTES # BLD AUTO: 0.02 X10(3) UL (ref 0–1)
IMM GRANULOCYTES NFR BLD: 0.3 %
LYMPHOCYTES # BLD AUTO: 2.11 X10(3) UL (ref 1–4)
LYMPHOCYTES NFR BLD AUTO: 26.6 %
MAGNESIUM SERPL-MCNC: 2.4 MG/DL (ref 1.6–2.6)
MCH RBC QN AUTO: 27.4 PG (ref 26–34)
MCHC RBC AUTO-ENTMCNC: 30.3 G/DL (ref 31–37)
MCV RBC AUTO: 90.4 FL
MONOCYTES # BLD AUTO: 0.52 X10(3) UL (ref 0.1–1)
MONOCYTES NFR BLD AUTO: 6.6 %
NEUTROPHILS # BLD AUTO: 4.98 X10 (3) UL (ref 1.5–7.7)
NEUTROPHILS # BLD AUTO: 4.98 X10(3) UL (ref 1.5–7.7)
NEUTROPHILS NFR BLD AUTO: 62.7 %
OSMOLALITY SERPL CALC.SUM OF ELEC: 322 MOSM/KG (ref 275–295)
PHOSPHATE SERPL-MCNC: 5.4 MG/DL (ref 2.5–4.9)
PLATELET # BLD AUTO: 178 10(3)UL (ref 150–450)
POTASSIUM SERPL-SCNC: 4.7 MMOL/L (ref 3.5–5.1)
RBC # BLD AUTO: 4.27 X10(6)UL
SODIUM SERPL-SCNC: 143 MMOL/L (ref 136–145)
WBC # BLD AUTO: 7.9 X10(3) UL (ref 4–11)

## 2022-01-24 PROCEDURE — 93306 TTE W/DOPPLER COMPLETE: CPT | Performed by: INTERNAL MEDICINE

## 2022-01-24 PROCEDURE — 83735 ASSAY OF MAGNESIUM: CPT | Performed by: INTERNAL MEDICINE

## 2022-01-24 PROCEDURE — 97110 THERAPEUTIC EXERCISES: CPT

## 2022-01-24 PROCEDURE — 76770 US EXAM ABDO BACK WALL COMP: CPT | Performed by: INTERNAL MEDICINE

## 2022-01-24 PROCEDURE — 80069 RENAL FUNCTION PANEL: CPT | Performed by: INTERNAL MEDICINE

## 2022-01-24 PROCEDURE — S0171 BUMETANIDE 0.5 MG: HCPCS | Performed by: INTERNAL MEDICINE

## 2022-01-24 PROCEDURE — 85025 COMPLETE CBC W/AUTO DIFF WBC: CPT | Performed by: INTERNAL MEDICINE

## 2022-01-24 PROCEDURE — 82962 GLUCOSE BLOOD TEST: CPT

## 2022-01-24 PROCEDURE — 97116 GAIT TRAINING THERAPY: CPT

## 2022-01-24 NOTE — PLAN OF CARE
No changes overnight. Vitals stable. Continue to monitor. Problem: Patient Centered Care  Goal: Patient preferences are identified and integrated in the patient's plan of care  Description: Interventions:  - What would you like us to know as we care for you?  I am from home with family.   - Provide timely, complete, and accurate information to patient/family  - Incorporate patient and family knowledge, values, beliefs, and cultural backgrounds into the planning and delivery of care  - Encourage patient/family to participate in care and decision-making at the level they choose  - Honor patient and family perspectives and choices  Outcome: Progressing     Problem: Diabetes/Glucose Control  Goal: Glucose maintained within prescribed range  Description: INTERVENTIONS:  - Monitor Blood Glucose as ordered  - Assess for signs and symptoms of hyperglycemia and hypoglycemia  - Administer ordered medications to maintain glucose within target range  - Assess barriers to adequate nutritional intake and initiate nutrition consult as needed  - Instruct patient on self management of diabetes  Outcome: Progressing     Problem: PAIN - ADULT  Goal: Verbalizes/displays adequate comfort level or patient's stated pain goal  Description: INTERVENTIONS:  - Encourage pt to monitor pain and request assistance  - Assess pain using appropriate pain scale  - Administer analgesics based on type and severity of pain and evaluate response  - Implement non-pharmacological measures as appropriate and evaluate response  - Consider cultural and social influences on pain and pain management  - Manage/alleviate anxiety  - Utilize distraction and/or relaxation techniques  - Monitor for opioid side effects  - Notify MD/LIP if interventions unsuccessful or patient reports new pain  - Anticipate increased pain with activity and pre-medicate as appropriate  Outcome: Progressing     Problem: SAFETY ADULT - FALL  Goal: Free from fall injury  Description: INTERVENTIONS:  - Assess pt frequently for physical needs  - Identify cognitive and physical deficits and behaviors that affect risk of falls.   - [place] fall precautions as indicated by assessment.  - Educate pt/family on patient safety including physical limitations  - Instruct pt to call for assistance with activity based on assessment  - Modify environment to reduce risk of injury  - Provide assistive devices as appropriate  - Consider OT/PT consult to assist with strengthening/mobility  - Encourage toileting schedule  Outcome: Progressing     Problem: METABOLIC/FLUID AND ELECTROLYTES - ADULT  Goal: Glucose maintained within prescribed range  Description: INTERVENTIONS:  - Monitor Blood Glucose as ordered  - Assess for signs and symptoms of hyperglycemia and hypoglycemia  - Administer ordered medications to maintain glucose within target range  - Assess barriers to adequate nutritional intake and initiate nutrition consult as needed  - Instruct patient on self management of diabetes  Outcome: Progressing  Goal: Electrolytes maintained within normal limits  Description: INTERVENTIONS:  - Monitor labs and rhythm and assess patient for signs and symptoms of electrolyte imbalances  - Administer electrolyte replacement as ordered  - Monitor response to electrolyte replacements, including rhythm and repeat lab results as appropriate  - Fluid restriction as ordered  - Instruct patient on fluid and nutrition restrictions as appropriate  Outcome: Progressing  Goal: Hemodynamic stability and optimal renal function maintained  Description: INTERVENTIONS:  - Monitor labs and assess for signs and symptoms of volume excess or deficit  - Monitor intake, output and patient weight  - Monitor urine specific gravity, serum osmolarity and serum sodium as indicated or ordered  - Monitor response to interventions for patient's volume status, including labs, urine output, blood pressure (other measures as available)  - Encourage oral intake as appropriate  - Instruct patient on fluid and nutrition restrictions as appropriate  Outcome: Progressing     Problem: SKIN/TISSUE INTEGRITY - ADULT  Goal: Skin integrity remains intact  Description: INTERVENTIONS  - Assess and document risk factors for pressure ulcer development  - Assess and document skin integrity  - Monitor for areas of redness and/or skin breakdown  - Initiate interventions, skin care algorithm/standards of care as needed  Outcome: Progressing

## 2022-01-24 NOTE — PLAN OF CARE
No complaints of pain. Tele in place. Strict I/o.frequent rounding done. Bed in low and locked position. .   Problem: Patient Centered Care  Goal: Patient preferences are identified and integrated in the patient's plan of care  Description: Interventions:  - What would you like us to know as we care for you?  I am from home with family.   - Provide timely, complete, and accurate information to patient/family  - Incorporate patient and family knowledge, values, beliefs, and cultural backgrounds into the planning and delivery of care  - Encourage patient/family to participate in care and decision-making at the level they choose  - Honor patient and family perspectives and choices  Outcome: Progressing     Problem: Diabetes/Glucose Control  Goal: Glucose maintained within prescribed range  Description: INTERVENTIONS:  - Monitor Blood Glucose as ordered  - Assess for signs and symptoms of hyperglycemia and hypoglycemia  - Administer ordered medications to maintain glucose within target range  - Assess barriers to adequate nutritional intake and initiate nutrition consult as needed  - Instruct patient on self management of diabetes  Outcome: Progressing     Problem: PAIN - ADULT  Goal: Verbalizes/displays adequate comfort level or patient's stated pain goal  Description: INTERVENTIONS:  - Encourage pt to monitor pain and request assistance  - Assess pain using appropriate pain scale  - Administer analgesics based on type and severity of pain and evaluate response  - Implement non-pharmacological measures as appropriate and evaluate response  - Consider cultural and social influences on pain and pain management  - Manage/alleviate anxiety  - Utilize distraction and/or relaxation techniques  - Monitor for opioid side effects  - Notify MD/LIP if interventions unsuccessful or patient reports new pain  - Anticipate increased pain with activity and pre-medicate as appropriate  Outcome: Progressing     Problem: SAFETY ADULT - FALL  Goal: Free from fall injury  Description: INTERVENTIONS:  - Assess pt frequently for physical needs  - Identify cognitive and physical deficits and behaviors that affect risk of falls.   - [place] fall precautions as indicated by assessment.  - Educate pt/family on patient safety including physical limitations  - Instruct pt to call for assistance with activity based on assessment  - Modify environment to reduce risk of injury  - Provide assistive devices as appropriate  - Consider OT/PT consult to assist with strengthening/mobility  - Encourage toileting schedule  Outcome: Progressing     Problem: METABOLIC/FLUID AND ELECTROLYTES - ADULT  Goal: Glucose maintained within prescribed range  Description: INTERVENTIONS:  - Monitor Blood Glucose as ordered  - Assess for signs and symptoms of hyperglycemia and hypoglycemia  - Administer ordered medications to maintain glucose within target range  - Assess barriers to adequate nutritional intake and initiate nutrition consult as needed  - Instruct patient on self management of diabetes  Outcome: Progressing  Goal: Electrolytes maintained within normal limits  Description: INTERVENTIONS:  - Monitor labs and rhythm and assess patient for signs and symptoms of electrolyte imbalances  - Administer electrolyte replacement as ordered  - Monitor response to electrolyte replacements, including rhythm and repeat lab results as appropriate  - Fluid restriction as ordered  - Instruct patient on fluid and nutrition restrictions as appropriate  Outcome: Progressing  Goal: Hemodynamic stability and optimal renal function maintained  Description: INTERVENTIONS:  - Monitor labs and assess for signs and symptoms of volume excess or deficit  - Monitor intake, output and patient weight  - Monitor urine specific gravity, serum osmolarity and serum sodium as indicated or ordered  - Monitor response to interventions for patient's volume status, including labs, urine output, blood pressure (other measures as available)  - Encourage oral intake as appropriate  - Instruct patient on fluid and nutrition restrictions as appropriate  Outcome: Progressing     Problem: SKIN/TISSUE INTEGRITY - ADULT  Goal: Skin integrity remains intact  Description: INTERVENTIONS  - Assess and document risk factors for pressure ulcer development  - Assess and document skin integrity  - Monitor for areas of redness and/or skin breakdown  - Initiate interventions, skin care algorithm/standards of care as needed  Outcome: Progressing

## 2022-01-25 LAB
ALBUMIN SERPL-MCNC: 2.3 G/DL (ref 3.4–5)
ANION GAP SERPL CALC-SCNC: 7 MMOL/L (ref 0–18)
BASOPHILS # BLD AUTO: 0.05 X10(3) UL (ref 0–0.2)
BASOPHILS NFR BLD AUTO: 0.7 %
BUN BLD-MCNC: 64 MG/DL (ref 7–18)
BUN/CREAT SERPL: 15.9 (ref 10–20)
CALCIUM BLD-MCNC: 8.4 MG/DL (ref 8.5–10.1)
CHLORIDE SERPL-SCNC: 110 MMOL/L (ref 98–112)
CO2 SERPL-SCNC: 23 MMOL/L (ref 21–32)
CREAT BLD-MCNC: 4.03 MG/DL
DEPRECATED RDW RBC AUTO: 53.2 FL (ref 35.1–46.3)
EOSINOPHIL # BLD AUTO: 0.23 X10(3) UL (ref 0–0.7)
EOSINOPHIL NFR BLD AUTO: 3.4 %
ERYTHROCYTE [DISTWIDTH] IN BLOOD BY AUTOMATED COUNT: 16 % (ref 11–15)
GLUCOSE BLDC GLUCOMTR-MCNC: 124 MG/DL (ref 70–99)
GLUCOSE BLDC GLUCOMTR-MCNC: 163 MG/DL (ref 70–99)
GLUCOSE BLDC GLUCOMTR-MCNC: 164 MG/DL (ref 70–99)
GLUCOSE BLDC GLUCOMTR-MCNC: 205 MG/DL (ref 70–99)
HCT VFR BLD AUTO: 39.4 %
HGB BLD-MCNC: 11.9 G/DL
IMM GRANULOCYTES # BLD AUTO: 0.03 X10(3) UL (ref 0–1)
IMM GRANULOCYTES NFR BLD: 0.4 %
LYMPHOCYTES # BLD AUTO: 1.82 X10(3) UL (ref 1–4)
LYMPHOCYTES NFR BLD AUTO: 26.9 %
MAGNESIUM SERPL-MCNC: 2.5 MG/DL (ref 1.6–2.6)
MCH RBC QN AUTO: 27.2 PG (ref 26–34)
MCHC RBC AUTO-ENTMCNC: 30.2 G/DL (ref 31–37)
MCV RBC AUTO: 90 FL
MONOCYTES # BLD AUTO: 0.51 X10(3) UL (ref 0.1–1)
MONOCYTES NFR BLD AUTO: 7.5 %
NEUTROPHILS # BLD AUTO: 4.12 X10 (3) UL (ref 1.5–7.7)
NEUTROPHILS # BLD AUTO: 4.12 X10(3) UL (ref 1.5–7.7)
NEUTROPHILS NFR BLD AUTO: 61.1 %
OSMOLALITY SERPL CALC.SUM OF ELEC: 314 MOSM/KG (ref 275–295)
PHOSPHATE SERPL-MCNC: 4.8 MG/DL (ref 2.5–4.9)
PLATELET # BLD AUTO: 175 10(3)UL (ref 150–450)
POTASSIUM SERPL-SCNC: 4.6 MMOL/L (ref 3.5–5.1)
SODIUM SERPL-SCNC: 140 MMOL/L (ref 136–145)
WBC # BLD AUTO: 6.8 X10(3) UL (ref 4–11)

## 2022-01-25 PROCEDURE — 97116 GAIT TRAINING THERAPY: CPT

## 2022-01-25 PROCEDURE — 97110 THERAPEUTIC EXERCISES: CPT

## 2022-01-25 PROCEDURE — 83735 ASSAY OF MAGNESIUM: CPT | Performed by: INTERNAL MEDICINE

## 2022-01-25 PROCEDURE — 85025 COMPLETE CBC W/AUTO DIFF WBC: CPT | Performed by: INTERNAL MEDICINE

## 2022-01-25 PROCEDURE — 82962 GLUCOSE BLOOD TEST: CPT

## 2022-01-25 PROCEDURE — 80069 RENAL FUNCTION PANEL: CPT | Performed by: INTERNAL MEDICINE

## 2022-01-25 RX ORDER — TORSEMIDE 20 MG/1
5 TABLET ORAL DAILY
Status: DISCONTINUED | OUTPATIENT
Start: 2022-01-25 | End: 2022-01-26

## 2022-01-25 NOTE — PLAN OF CARE
Monitoring vital signs per order. Monitoring blood glucose per order. No acute changes at this time. Safety precautions in place, call light within reach. Frequent rounding by nursing staff. Problem: Patient Centered Care  Goal: Patient preferences are identified and integrated in the patient's plan of care  Description: Interventions:  - What would you like us to know as we care for you?  I am from home with family.   - Provide timely, complete, and accurate information to patient/family  - Incorporate patient and family knowledge, values, beliefs, and cultural backgrounds into the planning and delivery of care  - Encourage patient/family to participate in care and decision-making at the level they choose  - Honor patient and family perspectives and choices  Outcome: Progressing     Problem: Diabetes/Glucose Control  Goal: Glucose maintained within prescribed range  Description: INTERVENTIONS:  - Monitor Blood Glucose as ordered  - Assess for signs and symptoms of hyperglycemia and hypoglycemia  - Administer ordered medications to maintain glucose within target range  - Assess barriers to adequate nutritional intake and initiate nutrition consult as needed  - Instruct patient on self management of diabetes  Outcome: Progressing     Problem: Patient/Family Goals  Goal: Patient/Family Long Term Goal  Description: Patient's Long Term Goal:     Interventions:  -   - See additional Care Plan goals for specific interventions  Outcome: Progressing  Goal: Patient/Family Short Term Goal  Description: Patient's Short Term Goal:     Interventions:   -   - See additional Care Plan goals for specific interventions  Outcome: Progressing     Problem: PAIN - ADULT  Goal: Verbalizes/displays adequate comfort level or patient's stated pain goal  Description: INTERVENTIONS:  - Encourage pt to monitor pain and request assistance  - Assess pain using appropriate pain scale  - Administer analgesics based on type and severity of pain and evaluate response  - Implement non-pharmacological measures as appropriate and evaluate response  - Consider cultural and social influences on pain and pain management  - Manage/alleviate anxiety  - Utilize distraction and/or relaxation techniques  - Monitor for opioid side effects  - Notify MD/LIP if interventions unsuccessful or patient reports new pain  - Anticipate increased pain with activity and pre-medicate as appropriate  Outcome: Progressing     Problem: SAFETY ADULT - FALL  Goal: Free from fall injury  Description: INTERVENTIONS:  - Assess pt frequently for physical needs  - Identify cognitive and physical deficits and behaviors that affect risk of falls.   - [place] fall precautions as indicated by assessment.  - Educate pt/family on patient safety including physical limitations  - Instruct pt to call for assistance with activity based on assessment  - Modify environment to reduce risk of injury  - Provide assistive devices as appropriate  - Consider OT/PT consult to assist with strengthening/mobility  - Encourage toileting schedule  Outcome: Progressing     Problem: METABOLIC/FLUID AND ELECTROLYTES - ADULT  Goal: Glucose maintained within prescribed range  Description: INTERVENTIONS:  - Monitor Blood Glucose as ordered  - Assess for signs and symptoms of hyperglycemia and hypoglycemia  - Administer ordered medications to maintain glucose within target range  - Assess barriers to adequate nutritional intake and initiate nutrition consult as needed  - Instruct patient on self management of diabetes  Outcome: Progressing  Goal: Electrolytes maintained within normal limits  Description: INTERVENTIONS:  - Monitor labs and rhythm and assess patient for signs and symptoms of electrolyte imbalances  - Administer electrolyte replacement as ordered  - Monitor response to electrolyte replacements, including rhythm and repeat lab results as appropriate  - Fluid restriction as ordered  - Instruct patient on fluid and nutrition restrictions as appropriate  Outcome: Progressing  Goal: Hemodynamic stability and optimal renal function maintained  Description: INTERVENTIONS:  - Monitor labs and assess for signs and symptoms of volume excess or deficit  - Monitor intake, output and patient weight  - Monitor urine specific gravity, serum osmolarity and serum sodium as indicated or ordered  - Monitor response to interventions for patient's volume status, including labs, urine output, blood pressure (other measures as available)  - Encourage oral intake as appropriate  - Instruct patient on fluid and nutrition restrictions as appropriate  Outcome: Progressing     Problem: SKIN/TISSUE INTEGRITY - ADULT  Goal: Skin integrity remains intact  Description: INTERVENTIONS  - Assess and document risk factors for pressure ulcer development  - Assess and document skin integrity  - Monitor for areas of redness and/or skin breakdown  - Initiate interventions, skin care algorithm/standards of care as needed  Outcome: Progressing

## 2022-01-25 NOTE — CM/SW NOTE
01/25/22 1600   CM/SW Referral Data   Referral Source    Reason for Referral Discharge planning   Informant EMR;Clinical Staff Member   Pertinent Medical Hx   Does patient have an established PCP? Yes  [facility])   Significant Past Medical/Mental Health Hx DM, diabetic neuropathy, HTN   Patient Info   Patient's Current Mental Status at Time of Assessment Alert;Oriented   Patient's [address]   Patient lives with Spouse/Significant other; Son   Patient Status Prior to Admission   Independent with ADLs and Mobility Yes   Discharge Needs   Anticipated D/C needs Home health care     Pt discussed during nursing rounds. Dx MED on CKD. Creat elevated to 4.7. From home w/family. PT/OT recommending HH w/PT. [facility] referrals sent via [employer] completed. Possible dc in 1-2 days pending lab results. Pt/family will need [facility] list when available. Plan: Home w/HH and PT pending choice of agency and medical clearance. / to remain available for support and/or discharge planning.      ERIC Jack    [phone]

## 2022-01-25 NOTE — PLAN OF CARE
Problem: Patient Centered Care  Goal: Patient preferences are identified and integrated in the patient's plan of care  Description: Interventions:  - What would you like us to know as we care for you?  I am from home with family.   - Provide timely, complete, and accurate information to patient/family  - Incorporate patient and family knowledge, values, beliefs, and cultural backgrounds into the planning and delivery of care  - Encourage patient/family to participate in care and decision-making at the level they choose  - Honor patient and family perspectives and choices  Outcome: Progressing     Problem: Diabetes/Glucose Control  Goal: Glucose maintained within prescribed range  Description: INTERVENTIONS:  - Monitor Blood Glucose as ordered  - Assess for signs and symptoms of hyperglycemia and hypoglycemia  - Administer ordered medications to maintain glucose within target range  - Assess barriers to adequate nutritional intake and initiate nutrition consult as needed  - Instruct patient on self management of diabetes  Outcome: Progressing        Problem: PAIN - ADULT  Goal: Verbalizes/displays adequate comfort level or patient's stated pain goal  Description: INTERVENTIONS:  - Encourage pt to monitor pain and request assistance  - Assess pain using appropriate pain scale  - Administer analgesics based on type and severity of pain and evaluate response  - Implement non-pharmacological measures as appropriate and evaluate response  - Consider cultural and social influences on pain and pain management  - Manage/alleviate anxiety  - Utilize distraction and/or relaxation techniques  - Monitor for opioid side effects  - Notify MD/LIP if interventions unsuccessful or patient reports new pain  - Anticipate increased pain with activity and pre-medicate as appropriate  Outcome: Progressing     Problem: SAFETY ADULT - FALL  Goal: Free from fall injury  Description: INTERVENTIONS:  - Assess pt frequently for physical needs  - Identify cognitive and physical deficits and behaviors that affect risk of falls.   - [place] fall precautions as indicated by assessment.  - Educate pt/family on patient safety including physical limitations  - Instruct pt to call for assistance with activity based on assessment  - Modify environment to reduce risk of injury  - Provide assistive devices as appropriate  - Consider OT/PT consult to assist with strengthening/mobility  - Encourage toileting schedule  Outcome: Progressing     Problem: METABOLIC/FLUID AND ELECTROLYTES - ADULT  Goal: Glucose maintained within prescribed range  Description: INTERVENTIONS:  - Monitor Blood Glucose as ordered  - Assess for signs and symptoms of hyperglycemia and hypoglycemia  - Administer ordered medications to maintain glucose within target range  - Assess barriers to adequate nutritional intake and initiate nutrition consult as needed  - Instruct patient on self management of diabetes  Outcome: Progressing  Goal: Electrolytes maintained within normal limits  Description: INTERVENTIONS:  - Monitor labs and rhythm and assess patient for signs and symptoms of electrolyte imbalances  - Administer electrolyte replacement as ordered  - Monitor response to electrolyte replacements, including rhythm and repeat lab results as appropriate  - Fluid restriction as ordered  - Instruct patient on fluid and nutrition restrictions as appropriate  Outcome: Progressing  Goal: Hemodynamic stability and optimal renal function maintained  Description: INTERVENTIONS:  - Monitor labs and assess for signs and symptoms of volume excess or deficit  - Monitor intake, output and patient weight  - Monitor urine specific gravity, serum osmolarity and serum sodium as indicated or ordered  - Monitor response to interventions for patient's volume status, including labs, urine output, blood pressure (other measures as available)  - Encourage oral intake as appropriate  - Instruct patient on fluid and nutrition restrictions as appropriate  Outcome: Progressing     Problem: SKIN/TISSUE INTEGRITY - ADULT  Goal: Skin integrity remains intact  Description: INTERVENTIONS  - Assess and document risk factors for pressure ulcer development  - Assess and document skin integrity  - Monitor for areas of redness and/or skin breakdown  - Initiate interventions, skin care algorithm/standards of care as needed  Outcome: Progressing   Patient a/ox4; denies pain; VSS; up at [facility]; had 2Decho today; call light within reach.

## 2022-01-26 VITALS
BODY MASS INDEX: 31.15 KG/M2 | OXYGEN SATURATION: 96 % | HEIGHT: 72 IN | RESPIRATION RATE: 16 BRPM | SYSTOLIC BLOOD PRESSURE: 128 MMHG | HEART RATE: 64 BPM | TEMPERATURE: 98 F | WEIGHT: 230 LBS | DIASTOLIC BLOOD PRESSURE: 52 MMHG

## 2022-01-26 LAB
ANION GAP SERPL CALC-SCNC: 7 MMOL/L (ref 0–18)
BUN BLD-MCNC: 64 MG/DL (ref 7–18)
BUN/CREAT SERPL: 15.7 (ref 10–20)
CALCIUM BLD-MCNC: 8.4 MG/DL (ref 8.5–10.1)
CHLORIDE SERPL-SCNC: 110 MMOL/L (ref 98–112)
CO2 SERPL-SCNC: 24 MMOL/L (ref 21–32)
CREAT BLD-MCNC: 4.07 MG/DL
GLUCOSE BLD-MCNC: 209 MG/DL (ref 70–99)
GLUCOSE BLDC GLUCOMTR-MCNC: 124 MG/DL (ref 70–99)
GLUCOSE BLDC GLUCOMTR-MCNC: 175 MG/DL (ref 70–99)
GLUCOSE BLDC GLUCOMTR-MCNC: 224 MG/DL (ref 70–99)
MAGNESIUM SERPL-MCNC: 2.3 MG/DL (ref 1.6–2.6)
OSMOLALITY SERPL CALC.SUM OF ELEC: 316 MOSM/KG (ref 275–295)
PHOSPHATE SERPL-MCNC: 5.3 MG/DL (ref 2.5–4.9)
POTASSIUM SERPL-SCNC: 4.5 MMOL/L (ref 3.5–5.1)
SODIUM SERPL-SCNC: 141 MMOL/L (ref 136–145)

## 2022-01-26 PROCEDURE — 82962 GLUCOSE BLOOD TEST: CPT

## 2022-01-26 PROCEDURE — 80069 RENAL FUNCTION PANEL: CPT | Performed by: INTERNAL MEDICINE

## 2022-01-26 PROCEDURE — 83735 ASSAY OF MAGNESIUM: CPT | Performed by: INTERNAL MEDICINE

## 2022-01-26 RX ORDER — ISOSORBIDE DINITRATE 10 MG/1
10 TABLET ORAL
Qty: 30 TABLET | Refills: 0 | Status: SHIPPED | OUTPATIENT
Start: 2022-01-26

## 2022-01-26 RX ORDER — ISOSORBIDE DINITRATE 5 MG/1
20 TABLET ORAL
Status: DISCONTINUED | OUTPATIENT
Start: 2022-01-26 | End: 2022-01-26

## 2022-01-26 RX ORDER — ISOSORBIDE DINITRATE 5 MG/1
10 TABLET ORAL
Status: DISCONTINUED | OUTPATIENT
Start: 2022-01-26 | End: 2022-01-26

## 2022-01-26 NOTE — CM/SW NOTE
SW following for discharge planning. SW contacted patient and utilized language line ID [id number]. Patient declined language line and spoke to SW in [place]. SW discussed HH recommendation. Patient reports agreement to home health services. SW shared [facility] list of available HH. Patient reports that choice is [facility]. Reserved via [employer] and notified of DC. Patient requested SW to contact his son to confirm transportation home. SW contacted patient's son, Silvino Turk, who reports that he will provide transportation for patient. Plan: Home w/ [facility]    DAWIT/CAMILO to remain available for support and/or discharge planning.      WES Alonso, [facility]   [id number]

## 2022-01-26 NOTE — PLAN OF CARE
Patient okay to be discharged per MD order, peripheral IV and remote tele removed, report given to patient. Discussed follow up appointment/ rehab. Discussed where to  medication. Patient declined walker stated he will purchase one. Patient stable at time of discharge. Problem: Patient Centered Care  Goal: Patient preferences are identified and integrated in the patient's plan of care  Description: Interventions:  - What would you like us to know as we care for you?  I am from home with family.   - Provide timely, complete, and accurate information to patient/family  - Incorporate patient and family knowledge, values, beliefs, and cultural backgrounds into the planning and delivery of care  - Encourage patient/family to participate in care and decision-making at the level they choose  - Honor patient and family perspectives and choices  Outcome: Completed     Problem: Diabetes/Glucose Control  Goal: Glucose maintained within prescribed range  Description: INTERVENTIONS:  - Monitor Blood Glucose as ordered  - Assess for signs and symptoms of hyperglycemia and hypoglycemia  - Administer ordered medications to maintain glucose within target range  - Assess barriers to adequate nutritional intake and initiate nutrition consult as needed  - Instruct patient on self management of diabetes  Outcome: Completed     Problem: PAIN - ADULT  Goal: Verbalizes/displays adequate comfort level or patient's stated pain goal  Description: INTERVENTIONS:  - Encourage pt to monitor pain and request assistance  - Assess pain using appropriate pain scale  - Administer analgesics based on type and severity of pain and evaluate response  - Implement non-pharmacological measures as appropriate and evaluate response  - Consider cultural and social influences on pain and pain management  - Manage/alleviate anxiety  - Utilize distraction and/or relaxation techniques  - Monitor for opioid side effects  - Notify MD/LIP if interventions unsuccessful or patient reports new pain  - Anticipate increased pain with activity and pre-medicate as appropriate  Outcome: Completed     Problem: SAFETY ADULT - FALL  Goal: Free from fall injury  Description: INTERVENTIONS:  - Assess pt frequently for physical needs  - Identify cognitive and physical deficits and behaviors that affect risk of falls.   - [place] fall precautions as indicated by assessment.  - Educate pt/family on patient safety including physical limitations  - Instruct pt to call for assistance with activity based on assessment  - Modify environment to reduce risk of injury  - Provide assistive devices as appropriate  - Consider OT/PT consult to assist with strengthening/mobility  - Encourage toileting schedule  Outcome: Completed     Problem: METABOLIC/FLUID AND ELECTROLYTES - ADULT  Goal: Glucose maintained within prescribed range  Description: INTERVENTIONS:  - Monitor Blood Glucose as ordered  - Assess for signs and symptoms of hyperglycemia and hypoglycemia  - Administer ordered medications to maintain glucose within target range  - Assess barriers to adequate nutritional intake and initiate nutrition consult as needed  - Instruct patient on self management of diabetes  Outcome: Completed  Goal: Electrolytes maintained within normal limits  Description: INTERVENTIONS:  - Monitor labs and rhythm and assess patient for signs and symptoms of electrolyte imbalances  - Administer electrolyte replacement as ordered  - Monitor response to electrolyte replacements, including rhythm and repeat lab results as appropriate  - Fluid restriction as ordered  - Instruct patient on fluid and nutrition restrictions as appropriate  Outcome: Completed  Goal: Hemodynamic stability and optimal renal function maintained  Description: INTERVENTIONS:  - Monitor labs and assess for signs and symptoms of volume excess or deficit  - Monitor intake, output and patient weight  - Monitor urine specific gravity, serum osmolarity and serum sodium as indicated or ordered  - Monitor response to interventions for patient's volume status, including labs, urine output, blood pressure (other measures as available)  - Encourage oral intake as appropriate  - Instruct patient on fluid and nutrition restrictions as appropriate  Outcome: Completed     Problem: SKIN/TISSUE INTEGRITY - ADULT  Goal: Skin integrity remains intact  Description: INTERVENTIONS  - Assess and document risk factors for pressure ulcer development  - Assess and document skin integrity  - Monitor for areas of redness and/or skin breakdown  - Initiate interventions, skin care algorithm/standards of care as needed  Outcome: Completed

## 2022-01-26 NOTE — PLAN OF CARE
No acute changes in vital signs, no reports of pain. Pt up ad octavio in room, call light & belongings in reach. Will monitor BUN & Cr in AM. Frequent rounding by nursing staff. Problem: Patient Centered Care  Goal: Patient preferences are identified and integrated in the patient's plan of care  Description: Interventions:  - What would you like us to know as we care for you?  I am from home with family.   - Provide timely, complete, and accurate information to patient/family  - Incorporate patient and family knowledge, values, beliefs, and cultural backgrounds into the planning and delivery of care  - Encourage patient/family to participate in care and decision-making at the level they choose  - Honor patient and family perspectives and choices  Outcome: Progressing     Problem: Diabetes/Glucose Control  Goal: Glucose maintained within prescribed range  Description: INTERVENTIONS:  - Monitor Blood Glucose as ordered  - Assess for signs and symptoms of hyperglycemia and hypoglycemia  - Administer ordered medications to maintain glucose within target range  - Assess barriers to adequate nutritional intake and initiate nutrition consult as needed  - Instruct patient on self management of diabetes  Outcome: Progressing     Problem: Patient/Family Goals  Goal: Patient/Family Long Term Goal  Description: Patient's Long Term Goal:     Interventions:  -   - See additional Care Plan goals for specific interventions  Outcome: Progressing  Goal: Patient/Family Short Term Goal  Description: Patient's Short Term Goal:     Interventions:   -   - See additional Care Plan goals for specific interventions  Outcome: Progressing     Problem: PAIN - ADULT  Goal: Verbalizes/displays adequate comfort level or patient's stated pain goal  Description: INTERVENTIONS:  - Encourage pt to monitor pain and request assistance  - Assess pain using appropriate pain scale  - Administer analgesics based on type and severity of pain and evaluate response  - Implement non-pharmacological measures as appropriate and evaluate response  - Consider cultural and social influences on pain and pain management  - Manage/alleviate anxiety  - Utilize distraction and/or relaxation techniques  - Monitor for opioid side effects  - Notify MD/LIP if interventions unsuccessful or patient reports new pain  - Anticipate increased pain with activity and pre-medicate as appropriate  Outcome: Progressing     Problem: SAFETY ADULT - FALL  Goal: Free from fall injury  Description: INTERVENTIONS:  - Assess pt frequently for physical needs  - Identify cognitive and physical deficits and behaviors that affect risk of falls.   - [place] fall precautions as indicated by assessment.  - Educate pt/family on patient safety including physical limitations  - Instruct pt to call for assistance with activity based on assessment  - Modify environment to reduce risk of injury  - Provide assistive devices as appropriate  - Consider OT/PT consult to assist with strengthening/mobility  - Encourage toileting schedule  Outcome: Progressing     Problem: METABOLIC/FLUID AND ELECTROLYTES - ADULT  Goal: Glucose maintained within prescribed range  Description: INTERVENTIONS:  - Monitor Blood Glucose as ordered  - Assess for signs and symptoms of hyperglycemia and hypoglycemia  - Administer ordered medications to maintain glucose within target range  - Assess barriers to adequate nutritional intake and initiate nutrition consult as needed  - Instruct patient on self management of diabetes  Outcome: Progressing  Goal: Electrolytes maintained within normal limits  Description: INTERVENTIONS:  - Monitor labs and rhythm and assess patient for signs and symptoms of electrolyte imbalances  - Administer electrolyte replacement as ordered  - Monitor response to electrolyte replacements, including rhythm and repeat lab results as appropriate  - Fluid restriction as ordered  - Instruct patient on fluid and nutrition restrictions as appropriate  Outcome: Progressing  Goal: Hemodynamic stability and optimal renal function maintained  Description: INTERVENTIONS:  - Monitor labs and assess for signs and symptoms of volume excess or deficit  - Monitor intake, output and patient weight  - Monitor urine specific gravity, serum osmolarity and serum sodium as indicated or ordered  - Monitor response to interventions for patient's volume status, including labs, urine output, blood pressure (other measures as available)  - Encourage oral intake as appropriate  - Instruct patient on fluid and nutrition restrictions as appropriate  Outcome: Progressing     Problem: SKIN/TISSUE INTEGRITY - ADULT  Goal: Skin integrity remains intact  Description: INTERVENTIONS  - Assess and document risk factors for pressure ulcer development  - Assess and document skin integrity  - Monitor for areas of redness and/or skin breakdown  - Initiate interventions, skin care algorithm/standards of care as needed  Outcome: Progressing

## 2022-01-26 NOTE — DIABETES ED
Discussed diabetes with pt regarding diabetes diet basics. Declined in depth discussion, talking to an educator at [facility]. I discussed importance of matching carbohydrates iwith insulin and it's impact on his organs. Handout given and initial meal plan provided. Encouraged to attend outpatient diabetes education. Questions answered. Moderately receptive to information. Please reach out for more in depth discussion, if pt would like.

## 2022-01-27 ENCOUNTER — PATIENT OUTREACH (OUTPATIENT)
Dept: CASE MANAGEMENT | Age: 72
End: 2022-01-27

## 2022-02-03 ENCOUNTER — PATIENT OUTREACH (OUTPATIENT)
Dept: CASE MANAGEMENT | Age: 72
End: 2022-02-03

## 2022-02-03 NOTE — PROGRESS NOTES
1St attempt at to review Dm Follow up Question       Diabetic Outreach D/C Follow Up Questions:      1. Did you receive the information provided during your hospitalization and at discharge about diabetes? yes    If No:  Would you like us to mail you the information? no   If Yes:  Was the information helpful? Yes     2. Were there any changes made to your medications? no            3. Do you have all of your diabetes pills and or insulin now that you are home? Yes  If yes:  do you understand the changes made? yes      4. Are you confident in managing your diabetes? yes     5. Did you make the necessary transportation arrangements to get to your diabetes follow-up appointment? Yes         If pt answers No to questions #3 and #4 Advise pt you will have a clinical person contact them to address.

## 2022-02-09 PROBLEM — N18.5 STAGE 5 CHRONIC KIDNEY DISEASE NOT ON CHRONIC DIALYSIS (HCC): Status: ACTIVE | Noted: 2022-02-09

## 2022-03-07 ENCOUNTER — HOSPITAL ENCOUNTER (OUTPATIENT)
Dept: INTERVENTIONAL RADIOLOGY/VASCULAR | Facility: HOSPITAL | Age: 72
Discharge: HOME | End: 2022-03-07
Attending: INTERNAL MEDICINE | Admitting: RADIOLOGY
Payer: MEDICARE

## 2022-03-07 VITALS
TEMPERATURE: 98 F | HEART RATE: 84 BPM | BODY MASS INDEX: 33 KG/M2 | RESPIRATION RATE: 13 BRPM | SYSTOLIC BLOOD PRESSURE: 156 MMHG | DIASTOLIC BLOOD PRESSURE: 94 MMHG | OXYGEN SATURATION: 97 % | WEIGHT: 241 LBS

## 2022-03-07 DIAGNOSIS — R80.9 NON-NEPHROTIC RANGE PROTEINURIA: ICD-10-CM

## 2022-03-07 DIAGNOSIS — M89.9 CHRONIC KIDNEY DISEASE-MINERAL AND BONE DISORDER: ICD-10-CM

## 2022-03-07 DIAGNOSIS — E83.9 CHRONIC KIDNEY DISEASE-MINERAL AND BONE DISORDER: ICD-10-CM

## 2022-03-07 DIAGNOSIS — E11.22 CHRONIC KIDNEY DISEASE DUE TO TYPE 2 DIABETES MELLITUS (HCC): ICD-10-CM

## 2022-03-07 DIAGNOSIS — N18.9 CHRONIC KIDNEY DISEASE-MINERAL AND BONE DISORDER: ICD-10-CM

## 2022-03-07 DIAGNOSIS — I10 ESSENTIAL HYPERTENSION: ICD-10-CM

## 2022-03-07 LAB — GLUCOSE BLDC GLUCOMTR-MCNC: 122 MG/DL (ref 70–99)

## 2022-03-07 PROCEDURE — 77001 FLUOROGUIDE FOR VEIN DEVICE: CPT

## 2022-03-07 PROCEDURE — 36415 COLL VENOUS BLD VENIPUNCTURE: CPT

## 2022-03-07 PROCEDURE — 82962 GLUCOSE BLOOD TEST: CPT

## 2022-03-07 PROCEDURE — 02H633Z INSERTION OF INFUSION DEVICE INTO RIGHT ATRIUM, PERCUTANEOUS APPROACH: ICD-10-PCS | Performed by: RADIOLOGY

## 2022-03-07 PROCEDURE — 36558 INSERT TUNNELED CV CATH: CPT

## 2022-03-07 PROCEDURE — 99152 MOD SED SAME PHYS/QHP 5/>YRS: CPT

## 2022-03-07 RX ORDER — SODIUM CHLORIDE 9 MG/ML
INJECTION, SOLUTION INTRAVENOUS CONTINUOUS
Status: DISCONTINUED | OUTPATIENT
Start: 2022-03-07 | End: 2022-03-07

## 2022-03-07 RX ORDER — MIDAZOLAM HYDROCHLORIDE 1 MG/ML
INJECTION INTRAMUSCULAR; INTRAVENOUS
Status: COMPLETED
Start: 2022-03-07 | End: 2022-03-07

## 2022-03-07 RX ORDER — CEFAZOLIN SODIUM/WATER 2 G/20 ML
SYRINGE (ML) INTRAVENOUS
Status: COMPLETED
Start: 2022-03-07 | End: 2022-03-07

## 2022-03-07 RX ORDER — LIDOCAINE HYDROCHLORIDE 20 MG/ML
INJECTION, SOLUTION EPIDURAL; INFILTRATION; INTRACAUDAL; PERINEURAL
Status: COMPLETED
Start: 2022-03-07 | End: 2022-03-07

## 2022-03-07 RX ORDER — HEPARIN SODIUM 1000 [USP'U]/ML
INJECTION, SOLUTION INTRAVENOUS; SUBCUTANEOUS
Status: COMPLETED
Start: 2022-03-07 | End: 2022-03-07

## 2022-03-07 RX ORDER — HEPARIN SODIUM (PORCINE) LOCK FLUSH IV SOLN 100 UNIT/ML 100 UNIT/ML
1.5 SOLUTION INTRAVENOUS ONCE
Status: DISCONTINUED | OUTPATIENT
Start: 2022-03-07 | End: 2022-03-07

## 2022-03-07 NOTE — PROCEDURES
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 23cm tunneled hemodialysis catheter placed. Specimens: None    Blood Loss:  5mL      Complications:  None    Drains:  None    Plan:  OK to use catheter for dialysis.     Shaun Nair MD  3/7/2022

## 2022-03-07 NOTE — INTERVAL H&P NOTE
The above referenced H&P was reviewed by Vishal Kent MD on 3/7/2022, the patient was examined and no significant changes have occurred in the patient's condition since the H&P was performed. Risks, benefits, alternative treatments and consequences of no treatment were discussed. We will proceed with procedure as planned.       Vishal Kent MD  3/7/2022  9:31 AM

## 2022-03-07 NOTE — IVS NOTE
DISCHARGE NOTE     Pt is able to sit up and ambulate without difficulty. Pt voided and tolerated fluids and food. Procedural site remains dry and intact with good circulation, motion, and sensation. No signs and symptoms of bleeding/hematoma noted. IV access removed  Instruction provided, patient/family verbalizes understanding.      Pt discharge via wheelchair to [address]       Follow up Appointment: none    New Prescription: none

## 2022-04-26 ENCOUNTER — APPOINTMENT (OUTPATIENT)
Dept: CT IMAGING | Facility: HOSPITAL | Age: 72
End: 2022-04-26
Attending: EMERGENCY MEDICINE
Payer: MEDICARE

## 2022-04-26 ENCOUNTER — HOSPITAL ENCOUNTER (EMERGENCY)
Facility: HOSPITAL | Age: 72
Discharge: HOME | End: 2022-04-26
Attending: EMERGENCY MEDICINE
Payer: MEDICARE

## 2022-04-26 VITALS
OXYGEN SATURATION: 98 % | HEART RATE: 84 BPM | DIASTOLIC BLOOD PRESSURE: 76 MMHG | RESPIRATION RATE: 18 BRPM | SYSTOLIC BLOOD PRESSURE: 148 MMHG | WEIGHT: 245 LBS | TEMPERATURE: 98 F | HEIGHT: 72 IN | BODY MASS INDEX: 33.18 KG/M2

## 2022-04-26 DIAGNOSIS — R63.5 WEIGHT GAIN: Primary | ICD-10-CM

## 2022-04-26 DIAGNOSIS — Z99.2 ESRD ON HEMODIALYSIS (HCC): ICD-10-CM

## 2022-04-26 DIAGNOSIS — N18.6 ESRD ON HEMODIALYSIS (HCC): ICD-10-CM

## 2022-04-26 LAB
ALBUMIN SERPL-MCNC: 3 G/DL (ref 3.4–5)
ALP LIVER SERPL-CCNC: 85 U/L
ALT SERPL-CCNC: 29 U/L
ANION GAP SERPL CALC-SCNC: 5 MMOL/L (ref 0–18)
AST SERPL-CCNC: 17 U/L (ref 15–37)
BASOPHILS # BLD AUTO: 0.05 X10(3) UL (ref 0–0.2)
BASOPHILS NFR BLD AUTO: 0.6 %
BILIRUB DIRECT SERPL-MCNC: <0.1 MG/DL (ref 0–0.2)
BILIRUB SERPL-MCNC: 0.3 MG/DL (ref 0.1–2)
BILIRUB UR QL: NEGATIVE
BUN BLD-MCNC: 46 MG/DL (ref 7–18)
BUN/CREAT SERPL: 12 (ref 10–20)
CALCIUM BLD-MCNC: 8.6 MG/DL (ref 8.5–10.1)
CHLORIDE SERPL-SCNC: 108 MMOL/L (ref 98–112)
CLARITY UR: CLEAR
CO2 SERPL-SCNC: 31 MMOL/L (ref 21–32)
COLOR UR: YELLOW
CREAT BLD-MCNC: 3.83 MG/DL
DEPRECATED RDW RBC AUTO: 47 FL (ref 35.1–46.3)
EOSINOPHIL # BLD AUTO: 0.29 X10(3) UL (ref 0–0.7)
EOSINOPHIL NFR BLD AUTO: 3.7 %
ERYTHROCYTE [DISTWIDTH] IN BLOOD BY AUTOMATED COUNT: 13.6 % (ref 11–15)
GLUCOSE BLD-MCNC: 189 MG/DL (ref 70–99)
GLUCOSE UR-MCNC: >=500 MG/DL
HCT VFR BLD AUTO: 35.4 %
HGB BLD-MCNC: 11.1 G/DL
HGB UR QL STRIP.AUTO: NEGATIVE
IMM GRANULOCYTES # BLD AUTO: 0.04 X10(3) UL (ref 0–1)
IMM GRANULOCYTES NFR BLD: 0.5 %
KETONES UR-MCNC: NEGATIVE MG/DL
LEUKOCYTE ESTERASE UR QL STRIP.AUTO: NEGATIVE
LYMPHOCYTES # BLD AUTO: 1.63 X10(3) UL (ref 1–4)
LYMPHOCYTES NFR BLD AUTO: 20.9 %
MCH RBC QN AUTO: 29.7 PG (ref 26–34)
MCHC RBC AUTO-ENTMCNC: 31.4 G/DL (ref 31–37)
MCV RBC AUTO: 94.7 FL
MONOCYTES # BLD AUTO: 0.7 X10(3) UL (ref 0.1–1)
MONOCYTES NFR BLD AUTO: 9 %
NEUTROPHILS # BLD AUTO: 5.09 X10 (3) UL (ref 1.5–7.7)
NEUTROPHILS # BLD AUTO: 5.09 X10(3) UL (ref 1.5–7.7)
NEUTROPHILS NFR BLD AUTO: 65.3 %
NITRITE UR QL STRIP.AUTO: NEGATIVE
OSMOLALITY SERPL CALC.SUM OF ELEC: 315 MOSM/KG (ref 275–295)
PH UR: 6 [PH] (ref 5–8)
PLATELET # BLD AUTO: 212 10(3)UL (ref 150–450)
POTASSIUM SERPL-SCNC: 4.4 MMOL/L (ref 3.5–5.1)
PROT SERPL-MCNC: 6.7 G/DL (ref 6.4–8.2)
PROT UR-MCNC: >=500 MG/DL
RBC # BLD AUTO: 3.74 X10(6)UL
SODIUM SERPL-SCNC: 144 MMOL/L (ref 136–145)
SP GR UR STRIP: 1.01 (ref 1–1.03)
UROBILINOGEN UR STRIP-ACNC: <2
VIT C UR-MCNC: NEGATIVE MG/DL
WBC # BLD AUTO: 7.8 X10(3) UL (ref 4–11)

## 2022-04-26 PROCEDURE — 80076 HEPATIC FUNCTION PANEL: CPT | Performed by: EMERGENCY MEDICINE

## 2022-04-26 PROCEDURE — 81001 URINALYSIS AUTO W/SCOPE: CPT | Performed by: EMERGENCY MEDICINE

## 2022-04-26 PROCEDURE — 85025 COMPLETE CBC W/AUTO DIFF WBC: CPT

## 2022-04-26 PROCEDURE — 36415 COLL VENOUS BLD VENIPUNCTURE: CPT

## 2022-04-26 PROCEDURE — 80048 BASIC METABOLIC PNL TOTAL CA: CPT

## 2022-04-26 PROCEDURE — 99284 EMERGENCY DEPT VISIT MOD MDM: CPT

## 2022-04-26 PROCEDURE — 74176 CT ABD & PELVIS W/O CONTRAST: CPT | Performed by: EMERGENCY MEDICINE

## 2022-04-26 PROCEDURE — 85025 COMPLETE CBC W/AUTO DIFF WBC: CPT | Performed by: EMERGENCY MEDICINE

## 2022-04-26 PROCEDURE — 80048 BASIC METABOLIC PNL TOTAL CA: CPT | Performed by: EMERGENCY MEDICINE

## 2022-04-26 NOTE — ED INITIAL ASSESSMENT (HPI)
Patient from home with c/o abdominal bloating for the past month. HD MWF with 20 lb weight gain in the past month. Denies N/V/D, difficulty breathing.

## 2022-05-09 ENCOUNTER — HOSPITAL ENCOUNTER (EMERGENCY)
Facility: HOSPITAL | Age: 72
Discharge: HOME | End: 2022-05-09
Attending: EMERGENCY MEDICINE
Payer: MEDICARE

## 2022-05-09 ENCOUNTER — APPOINTMENT (OUTPATIENT)
Dept: GENERAL RADIOLOGY | Facility: HOSPITAL | Age: 72
End: 2022-05-09
Attending: EMERGENCY MEDICINE
Payer: MEDICARE

## 2022-05-09 VITALS
DIASTOLIC BLOOD PRESSURE: 71 MMHG | WEIGHT: 240 LBS | RESPIRATION RATE: 20 BRPM | OXYGEN SATURATION: 94 % | SYSTOLIC BLOOD PRESSURE: 142 MMHG | TEMPERATURE: 98 F | BODY MASS INDEX: 33 KG/M2 | HEART RATE: 75 BPM

## 2022-05-09 DIAGNOSIS — R07.9 CHEST PAIN, UNSPECIFIED TYPE: Primary | ICD-10-CM

## 2022-05-09 LAB
ANION GAP SERPL CALC-SCNC: 6 MMOL/L (ref 0–18)
BASOPHILS # BLD AUTO: 0.04 X10(3) UL (ref 0–0.2)
BASOPHILS NFR BLD AUTO: 0.5 %
BUN BLD-MCNC: 68 MG/DL (ref 7–18)
BUN/CREAT SERPL: 12.9 (ref 10–20)
CALCIUM BLD-MCNC: 8.7 MG/DL (ref 8.5–10.1)
CHLORIDE SERPL-SCNC: 112 MMOL/L (ref 98–112)
CO2 SERPL-SCNC: 25 MMOL/L (ref 21–32)
CREAT BLD-MCNC: 5.26 MG/DL
D DIMER PPP FEU-MCNC: 0.33 UG/ML FEU (ref ?–0.73)
DEPRECATED RDW RBC AUTO: 49.3 FL (ref 35.1–46.3)
EOSINOPHIL # BLD AUTO: 0.26 X10(3) UL (ref 0–0.7)
EOSINOPHIL NFR BLD AUTO: 3.5 %
ERYTHROCYTE [DISTWIDTH] IN BLOOD BY AUTOMATED COUNT: 13.8 % (ref 11–15)
GLUCOSE BLD-MCNC: 136 MG/DL (ref 70–99)
GLUCOSE BLDC GLUCOMTR-MCNC: 54 MG/DL (ref 70–99)
GLUCOSE BLDC GLUCOMTR-MCNC: 80 MG/DL (ref 70–99)
HCT VFR BLD AUTO: 34.8 %
HGB BLD-MCNC: 10.6 G/DL
IMM GRANULOCYTES # BLD AUTO: 0.03 X10(3) UL (ref 0–1)
IMM GRANULOCYTES NFR BLD: 0.4 %
LYMPHOCYTES # BLD AUTO: 1.33 X10(3) UL (ref 1–4)
LYMPHOCYTES NFR BLD AUTO: 18.1 %
MCH RBC QN AUTO: 29.4 PG (ref 26–34)
MCHC RBC AUTO-ENTMCNC: 30.5 G/DL (ref 31–37)
MCV RBC AUTO: 96.4 FL
MONOCYTES # BLD AUTO: 0.54 X10(3) UL (ref 0.1–1)
MONOCYTES NFR BLD AUTO: 7.4 %
NEUTROPHILS # BLD AUTO: 5.13 X10 (3) UL (ref 1.5–7.7)
NEUTROPHILS # BLD AUTO: 5.13 X10(3) UL (ref 1.5–7.7)
NEUTROPHILS NFR BLD AUTO: 70.1 %
OSMOLALITY SERPL CALC.SUM OF ELEC: 318 MOSM/KG (ref 275–295)
PLATELET # BLD AUTO: 218 10(3)UL (ref 150–450)
POTASSIUM SERPL-SCNC: 4.2 MMOL/L (ref 3.5–5.1)
RBC # BLD AUTO: 3.61 X10(6)UL
SODIUM SERPL-SCNC: 143 MMOL/L (ref 136–145)
TROPONIN I HIGH SENSITIVITY: 26 NG/L
WBC # BLD AUTO: 7.3 X10(3) UL (ref 4–11)

## 2022-05-09 PROCEDURE — 71045 X-RAY EXAM CHEST 1 VIEW: CPT | Performed by: EMERGENCY MEDICINE

## 2022-05-09 PROCEDURE — 93005 ELECTROCARDIOGRAM TRACING: CPT

## 2022-05-09 PROCEDURE — 85025 COMPLETE CBC W/AUTO DIFF WBC: CPT | Performed by: EMERGENCY MEDICINE

## 2022-05-09 PROCEDURE — 85379 FIBRIN DEGRADATION QUANT: CPT | Performed by: EMERGENCY MEDICINE

## 2022-05-09 PROCEDURE — 99284 EMERGENCY DEPT VISIT MOD MDM: CPT

## 2022-05-09 PROCEDURE — 84484 ASSAY OF TROPONIN QUANT: CPT | Performed by: EMERGENCY MEDICINE

## 2022-05-09 PROCEDURE — 80048 BASIC METABOLIC PNL TOTAL CA: CPT | Performed by: EMERGENCY MEDICINE

## 2022-05-09 PROCEDURE — 36415 COLL VENOUS BLD VENIPUNCTURE: CPT

## 2022-05-09 PROCEDURE — 82962 GLUCOSE BLOOD TEST: CPT

## 2022-05-09 PROCEDURE — 93010 ELECTROCARDIOGRAM REPORT: CPT | Performed by: EMERGENCY MEDICINE

## 2022-05-09 RX ORDER — HYDROCODONE BITARTRATE AND ACETAMINOPHEN 5; 325 MG/1; MG/1
1-2 TABLET ORAL EVERY 6 HOURS PRN
Qty: 10 TABLET | Refills: 0 | Status: SHIPPED | OUTPATIENT
Start: 2022-05-09 | End: 2022-05-14

## 2022-05-09 NOTE — ED QUICK NOTES
Patient is in stable condition. Provided discharge instructions and follow-up information with understanding verbalized. Agrees to seek medical attention for any new or worsening condition. Patient wheeled out of ED by staff.

## 2022-05-09 NOTE — ED QUICK NOTES
Pt presents to the ED with chest pain worse on inspiration and back pain in the middle/right side of his back. Pt states he receives dialysis MWF and that this all started yesterday. Pt went today to receive his dialysis and states the problem was worse, so PCP told him to come to the ED. Pt denies SOB at this time.

## 2022-05-12 NOTE — LETTER
[facility] Ms. Marisol Gastelum regarding follow-up for low oxygen after hospital discharge. She was discharged from the hospital on 5/10/2022. Review of the After Visit Summary from the recent hospitalization indicates that the patient needs to follow up with PCP. She feels that she is doing well at home. Her diet concern is none. Overall, the patient is eating well. Ambulation: improved  Fever: is not present  Pain: none  Activities of Daily Living (global): Partial assistance   Patient states that she does have sufficient family support. She feels that she is able to ask for assistance when needed. Additional patient/family concerns: None . Discharge medications were verified with the patient. She is fully compliant with the medication regimen prescribed at the time of discharge. She reports that she is not experiencing any medication side effects. Upon discharge, the patient was to receive nursing home services. These services have been initiated. These services have been scheduled and no further arrangements are needed at this time. Advance Directive: The patient has the following documents:  No Advance Directives on file. Patient offered documents. Patient has an appointment on 5/24/2022 with Farheen Causey DO. Ms. Marisol Gastelum was reminded about the importance of keeping this appointment. Ck [facility] [address], [address], [place]  [id number]  INFORMED CONSENT FOR TRANSFUSION OF BLOOD OR BLOOD PRODUCTS  My physician has informed me of the nature, purpose, benefits and risks of transfusion for blood and blood components that he/she may deem necessary during my treatment or hospitalization. He/she has also discussed alternatives to receiving blood from the voluntary blood supply with me, such as self-donation (autologous) and directed donation (blood donated by family or friends to be used specifically for me). I further understand that while the [address] [place] [address] will attempt to supply any autologous or directed donor blood prior to transfusion of blood from the routine blood supply, medical circumstances may require that other or additional blood components may be required for my care. In giving consent, I acknowledge that my physician has also informed me that despite careful screening and testing in accordance with national and regional regulations, there is still a small risk of transmission of infectious agents including hepatitis, HIV-1/2, cytomegalovirus and other viruses or diseases as yet unknown for which licensed definitive screening tests do not currently exist. Additionally, my physician has informed me of the potential for transfusion reactions not related to an infectious agent. [  ]  Check here for Recurring Outpatient Transfusion Therapy (valid for 1 year) In addition to the above, my physician has informed me that I shall receive numerous transfusions over a period of time and that these can lead to other increased risks. I hereby authorize the transfusion of blood and/or blood products to me as deemed necessary and ordered by physicians participating in my care.  My physician has given me the opportunity to ask questions and any questions asked have been answered to my satisfaction  __________________________________________ ______________________________________________  (Signature of Patient)                                                            (Responsible party in case of Minor,                                                                                                 Incompetent, or unconscious Patient)  ___________________________________________       ________ ______________________________________  (Relationship to Patient)                                                       (Signature of Witness)  ______________________________________________________________________________________________   (Date)                                                                           (Time)  REFUSAL OF CONSENT FOR BLOOD TRANSFUSIONS   Sign only if Refusing   [  ] I understand refusal of blood or blood products as deemed necessary by my physician may have serious consequences to my condition to include possible death.  I hereby assume responsibility for my refusal and release the hospital, its personnel, and my physicians from any responsibility for the consequences of my refusal.    ________________________________________________________________________________  (Signature of Patient)                                                         (Responsible Party/Relationship to Patient)    ________________________________________________________________________________  (Signature of Witness)                                                       (Date/Time)     Patient Name: Tiffanie Inman     : 1950                 Printed: 2023      Medical Record #: [id number]                                 Page 1 of 1

## 2022-08-08 ENCOUNTER — HOSPITAL ENCOUNTER (OUTPATIENT)
Facility: HOSPITAL | Age: 72
Setting detail: OBSERVATION
Discharge: HOME | End: 2022-08-09
Attending: EMERGENCY MEDICINE | Admitting: STUDENT IN AN ORGANIZED HEALTH CARE EDUCATION/TRAINING PROGRAM
Payer: MEDICARE

## 2022-08-08 ENCOUNTER — APPOINTMENT (OUTPATIENT)
Dept: GENERAL RADIOLOGY | Facility: HOSPITAL | Age: 72
End: 2022-08-08
Attending: EMERGENCY MEDICINE
Payer: MEDICARE

## 2022-08-08 ENCOUNTER — HOSPITAL ENCOUNTER (INPATIENT)
Facility: HOSPITAL | Age: 72
LOS: 1 days | Discharge: HOME | End: 2022-08-09
Attending: EMERGENCY MEDICINE | Admitting: STUDENT IN AN ORGANIZED HEALTH CARE EDUCATION/TRAINING PROGRAM
Payer: MEDICARE

## 2022-08-08 DIAGNOSIS — Z99.2 ESRD ON HEMODIALYSIS (HCC): ICD-10-CM

## 2022-08-08 DIAGNOSIS — J81.0 ACUTE PULMONARY EDEMA (HCC): Primary | ICD-10-CM

## 2022-08-08 DIAGNOSIS — N18.6 ESRD ON HEMODIALYSIS (HCC): ICD-10-CM

## 2022-08-08 PROBLEM — N17.9 ACUTE KIDNEY INJURY (HCC): Status: ACTIVE | Noted: 2022-08-08

## 2022-08-08 PROBLEM — E87.0 HYPERNATREMIA: Status: ACTIVE | Noted: 2022-08-08

## 2022-08-08 PROBLEM — D64.9 ANEMIA: Status: ACTIVE | Noted: 2022-08-08

## 2022-08-08 LAB
ANION GAP SERPL CALC-SCNC: 7 MMOL/L (ref 0–18)
BASOPHILS # BLD AUTO: 0.07 X10(3) UL (ref 0–0.2)
BASOPHILS NFR BLD AUTO: 0.9 %
BUN BLD-MCNC: 59 MG/DL (ref 7–18)
BUN/CREAT SERPL: 12.3 (ref 10–20)
CALCIUM BLD-MCNC: 8.6 MG/DL (ref 8.5–10.1)
CHLORIDE SERPL-SCNC: 111 MMOL/L (ref 98–112)
CO2 SERPL-SCNC: 27 MMOL/L (ref 21–32)
CREAT BLD-MCNC: 4.81 MG/DL
DEPRECATED RDW RBC AUTO: 46.5 FL (ref 35.1–46.3)
EOSINOPHIL # BLD AUTO: 0.35 X10(3) UL (ref 0–0.7)
EOSINOPHIL NFR BLD AUTO: 4.6 %
ERYTHROCYTE [DISTWIDTH] IN BLOOD BY AUTOMATED COUNT: 13.1 % (ref 11–15)
GFR SERPLBLD BASED ON 1.73 SQ M-ARVRAT: 12 ML/MIN/1.73M2 (ref 60–?)
GLUCOSE BLD-MCNC: 228 MG/DL (ref 70–99)
GLUCOSE BLDC GLUCOMTR-MCNC: 220 MG/DL (ref 70–99)
GLUCOSE BLDC GLUCOMTR-MCNC: 88 MG/DL (ref 70–99)
HCT VFR BLD AUTO: 35.1 %
HGB BLD-MCNC: 11.4 G/DL
IMM GRANULOCYTES # BLD AUTO: 0.02 X10(3) UL (ref 0–1)
IMM GRANULOCYTES NFR BLD: 0.3 %
LYMPHOCYTES # BLD AUTO: 1.66 X10(3) UL (ref 1–4)
LYMPHOCYTES NFR BLD AUTO: 21.9 %
MCH RBC QN AUTO: 31.4 PG (ref 26–34)
MCHC RBC AUTO-ENTMCNC: 32.5 G/DL (ref 31–37)
MCV RBC AUTO: 96.7 FL
MONOCYTES # BLD AUTO: 0.69 X10(3) UL (ref 0.1–1)
MONOCYTES NFR BLD AUTO: 9.1 %
NEUTROPHILS # BLD AUTO: 4.8 X10 (3) UL (ref 1.5–7.7)
NEUTROPHILS # BLD AUTO: 4.8 X10(3) UL (ref 1.5–7.7)
NEUTROPHILS NFR BLD AUTO: 63.2 %
NT-PROBNP SERPL-MCNC: 3044 PG/ML (ref ?–125)
OSMOLALITY SERPL CALC.SUM OF ELEC: 324 MOSM/KG (ref 275–295)
PLATELET # BLD AUTO: 171 10(3)UL (ref 150–450)
POTASSIUM SERPL-SCNC: 4.2 MMOL/L (ref 3.5–5.1)
PROCALCITONIN SERPL-MCNC: 0.22 NG/ML (ref ?–0.16)
RBC # BLD AUTO: 3.63 X10(6)UL
SARS-COV-2 RNA RESP QL NAA+PROBE: NOT DETECTED
SODIUM SERPL-SCNC: 145 MMOL/L (ref 136–145)
TROPONIN I HIGH SENSITIVITY: 26 NG/L
WBC # BLD AUTO: 7.6 X10(3) UL (ref 4–11)

## 2022-08-08 PROCEDURE — 71045 X-RAY EXAM CHEST 1 VIEW: CPT | Performed by: EMERGENCY MEDICINE

## 2022-08-08 PROCEDURE — 83880 ASSAY OF NATRIURETIC PEPTIDE: CPT | Performed by: STUDENT IN AN ORGANIZED HEALTH CARE EDUCATION/TRAINING PROGRAM

## 2022-08-08 PROCEDURE — 99285 EMERGENCY DEPT VISIT HI MDM: CPT

## 2022-08-08 PROCEDURE — 85025 COMPLETE CBC W/AUTO DIFF WBC: CPT | Performed by: EMERGENCY MEDICINE

## 2022-08-08 PROCEDURE — 84145 PROCALCITONIN (PCT): CPT | Performed by: EMERGENCY MEDICINE

## 2022-08-08 PROCEDURE — 84484 ASSAY OF TROPONIN QUANT: CPT | Performed by: STUDENT IN AN ORGANIZED HEALTH CARE EDUCATION/TRAINING PROGRAM

## 2022-08-08 PROCEDURE — 96374 THER/PROPH/DIAG INJ IV PUSH: CPT

## 2022-08-08 PROCEDURE — 82962 GLUCOSE BLOOD TEST: CPT

## 2022-08-08 PROCEDURE — 80048 BASIC METABOLIC PNL TOTAL CA: CPT | Performed by: EMERGENCY MEDICINE

## 2022-08-08 PROCEDURE — 93010 ELECTROCARDIOGRAM REPORT: CPT | Performed by: EMERGENCY MEDICINE

## 2022-08-08 PROCEDURE — 93005 ELECTROCARDIOGRAM TRACING: CPT

## 2022-08-08 PROCEDURE — 87340 HEPATITIS B SURFACE AG IA: CPT | Performed by: INTERNAL MEDICINE

## 2022-08-08 PROCEDURE — 90935 HEMODIALYSIS ONE EVALUATION: CPT

## 2022-08-08 RX ORDER — NICOTINE POLACRILEX 4 MG
15 LOZENGE BUCCAL
Status: DISCONTINUED | OUTPATIENT
Start: 2022-08-08 | End: 2022-08-09

## 2022-08-08 RX ORDER — TAMSULOSIN HYDROCHLORIDE 0.4 MG/1
0.8 CAPSULE ORAL DAILY
Status: DISCONTINUED | OUTPATIENT
Start: 2022-08-09 | End: 2022-08-09

## 2022-08-08 RX ORDER — DOXYCYCLINE HYCLATE 100 MG/1
100 CAPSULE ORAL EVERY 12 HOURS SCHEDULED
Status: DISCONTINUED | OUTPATIENT
Start: 2022-08-08 | End: 2022-08-09

## 2022-08-08 RX ORDER — HEPARIN SODIUM 5000 [USP'U]/ML
5000 INJECTION, SOLUTION INTRAVENOUS; SUBCUTANEOUS EVERY 8 HOURS SCHEDULED
Status: DISCONTINUED | OUTPATIENT
Start: 2022-08-08 | End: 2022-08-09

## 2022-08-08 RX ORDER — ACETAMINOPHEN 500 MG
500 TABLET ORAL EVERY 4 HOURS PRN
Status: DISCONTINUED | OUTPATIENT
Start: 2022-08-08 | End: 2022-08-09

## 2022-08-08 RX ORDER — FINASTERIDE 5 MG/1
5 TABLET, FILM COATED ORAL DAILY
Status: DISCONTINUED | OUTPATIENT
Start: 2022-08-09 | End: 2022-08-09

## 2022-08-08 RX ORDER — METOCLOPRAMIDE HYDROCHLORIDE 5 MG/ML
5 INJECTION INTRAMUSCULAR; INTRAVENOUS EVERY 8 HOURS PRN
Status: DISCONTINUED | OUTPATIENT
Start: 2022-08-08 | End: 2022-08-09

## 2022-08-08 RX ORDER — HEPARIN SODIUM 1000 [USP'U]/ML
INJECTION, SOLUTION INTRAVENOUS; SUBCUTANEOUS
Status: DISPENSED
Start: 2022-08-08 | End: 2022-08-09

## 2022-08-08 RX ORDER — ISOSORBIDE DINITRATE 10 MG/1
10 TABLET ORAL
Status: DISCONTINUED | OUTPATIENT
Start: 2022-08-08 | End: 2022-08-09

## 2022-08-08 RX ORDER — ASPIRIN 81 MG/1
81 TABLET ORAL DAILY
Status: DISCONTINUED | OUTPATIENT
Start: 2022-08-09 | End: 2022-08-09

## 2022-08-08 RX ORDER — BISACODYL 10 MG
10 SUPPOSITORY, RECTAL RECTAL
Status: DISCONTINUED | OUTPATIENT
Start: 2022-08-08 | End: 2022-08-09

## 2022-08-08 RX ORDER — TORSEMIDE 5 MG/1
10 TABLET ORAL DAILY
Status: DISCONTINUED | OUTPATIENT
Start: 2022-08-09 | End: 2022-08-08

## 2022-08-08 RX ORDER — CALCIUM CARBONATE 200(500)MG
500 TABLET,CHEWABLE ORAL 3 TIMES DAILY
Status: DISCONTINUED | OUTPATIENT
Start: 2022-08-08 | End: 2022-08-09

## 2022-08-08 RX ORDER — POLYETHYLENE GLYCOL 3350 17 G/17G
17 POWDER, FOR SOLUTION ORAL DAILY PRN
Status: DISCONTINUED | OUTPATIENT
Start: 2022-08-08 | End: 2022-08-09

## 2022-08-08 RX ORDER — HYDRALAZINE HYDROCHLORIDE 50 MG/1
50 TABLET, FILM COATED ORAL 3 TIMES DAILY
Status: DISCONTINUED | OUTPATIENT
Start: 2022-08-08 | End: 2022-08-09

## 2022-08-08 RX ORDER — DEXTROSE MONOHYDRATE 25 G/50ML
50 INJECTION, SOLUTION INTRAVENOUS
Status: DISCONTINUED | OUTPATIENT
Start: 2022-08-08 | End: 2022-08-09

## 2022-08-08 RX ORDER — ONDANSETRON 2 MG/ML
4 INJECTION INTRAMUSCULAR; INTRAVENOUS EVERY 6 HOURS PRN
Status: DISCONTINUED | OUTPATIENT
Start: 2022-08-08 | End: 2022-08-09

## 2022-08-08 RX ORDER — NICOTINE POLACRILEX 4 MG
30 LOZENGE BUCCAL
Status: DISCONTINUED | OUTPATIENT
Start: 2022-08-08 | End: 2022-08-09

## 2022-08-08 RX ORDER — SENNOSIDES 8.6 MG
17.2 TABLET ORAL NIGHTLY PRN
Status: DISCONTINUED | OUTPATIENT
Start: 2022-08-08 | End: 2022-08-09

## 2022-08-08 RX ORDER — PANTOPRAZOLE SODIUM 20 MG/1
20 TABLET, DELAYED RELEASE ORAL
Status: DISCONTINUED | OUTPATIENT
Start: 2022-08-09 | End: 2022-08-09

## 2022-08-08 RX ORDER — DOXYCYCLINE HYCLATE 100 MG/1
100 CAPSULE ORAL 2 TIMES DAILY
COMMUNITY
Start: 2022-08-05 | End: 2022-08-12

## 2022-08-08 RX ORDER — HYDRALAZINE HYDROCHLORIDE 20 MG/ML
10 INJECTION INTRAMUSCULAR; INTRAVENOUS ONCE
Status: COMPLETED | OUTPATIENT
Start: 2022-08-08 | End: 2022-08-08

## 2022-08-08 NOTE — ED QUICK NOTES
Orders for admission, patient is aware of plan and ready to go upstairs. Any questions, please call ED RN Barb at [phone].      Patient Covid vaccination status: Partially vaccinated     COVID Test Ordered in ED: Rapid SARS-CoV-2 by PCR    COVID Suspicion at Admission: Low clinical suspicion for COVID    Running Infusions:  None    Mental Status/LOC at time of transport: A&Ox4/4    Other pertinent information:   CIWA score: N/A   NIH score:  N/A

## 2022-08-08 NOTE — ED INITIAL ASSESSMENT (HPI)
dialysis m/w/f next today at 330pm.  Sent from [address] [place] [address] patient notes bloated, shortness of breath, crackles noted. Labored breathing in triage.

## 2022-08-09 VITALS
OXYGEN SATURATION: 97 % | SYSTOLIC BLOOD PRESSURE: 132 MMHG | WEIGHT: 236.5 LBS | TEMPERATURE: 98 F | DIASTOLIC BLOOD PRESSURE: 76 MMHG | RESPIRATION RATE: 18 BRPM | BODY MASS INDEX: 32 KG/M2 | HEART RATE: 75 BPM

## 2022-08-09 LAB
ANION GAP SERPL CALC-SCNC: 9 MMOL/L (ref 0–18)
BUN BLD-MCNC: 21 MG/DL (ref 7–18)
BUN/CREAT SERPL: 9.7 (ref 10–20)
CALCIUM BLD-MCNC: 8.9 MG/DL (ref 8.5–10.1)
CHLORIDE SERPL-SCNC: 104 MMOL/L (ref 98–112)
CO2 SERPL-SCNC: 28 MMOL/L (ref 21–32)
CREAT BLD-MCNC: 2.16 MG/DL
DEPRECATED RDW RBC AUTO: 46.4 FL (ref 35.1–46.3)
ERYTHROCYTE [DISTWIDTH] IN BLOOD BY AUTOMATED COUNT: 13.2 % (ref 11–15)
GFR SERPLBLD BASED ON 1.73 SQ M-ARVRAT: 32 ML/MIN/1.73M2 (ref 60–?)
GLUCOSE BLD-MCNC: 115 MG/DL (ref 70–99)
GLUCOSE BLDC GLUCOMTR-MCNC: 119 MG/DL (ref 70–99)
GLUCOSE BLDC GLUCOMTR-MCNC: 184 MG/DL (ref 70–99)
HBV SURFACE AG SER-ACNC: 0.24 [IU]/L
HBV SURFACE AG SERPL QL IA: NONREACTIVE
HCT VFR BLD AUTO: 37.1 %
HGB BLD-MCNC: 11.6 G/DL
MCH RBC QN AUTO: 30.5 PG (ref 26–34)
MCHC RBC AUTO-ENTMCNC: 31.3 G/DL (ref 31–37)
MCV RBC AUTO: 97.6 FL
OSMOLALITY SERPL CALC.SUM OF ELEC: 296 MOSM/KG (ref 275–295)
PLATELET # BLD AUTO: 177 10(3)UL (ref 150–450)
POTASSIUM SERPL-SCNC: 3.1 MMOL/L (ref 3.5–5.1)
RBC # BLD AUTO: 3.8 X10(6)UL
SODIUM SERPL-SCNC: 141 MMOL/L (ref 136–145)
WBC # BLD AUTO: 7.5 X10(3) UL (ref 4–11)

## 2022-08-09 PROCEDURE — 80048 BASIC METABOLIC PNL TOTAL CA: CPT | Performed by: STUDENT IN AN ORGANIZED HEALTH CARE EDUCATION/TRAINING PROGRAM

## 2022-08-09 PROCEDURE — 90935 HEMODIALYSIS ONE EVALUATION: CPT

## 2022-08-09 PROCEDURE — 82962 GLUCOSE BLOOD TEST: CPT

## 2022-08-09 PROCEDURE — 85027 COMPLETE CBC AUTOMATED: CPT | Performed by: STUDENT IN AN ORGANIZED HEALTH CARE EDUCATION/TRAINING PROGRAM

## 2022-08-09 RX ORDER — TORSEMIDE 5 MG/1
10 TABLET ORAL DAILY
Qty: 30 TABLET | Refills: 1 | Status: SHIPPED | OUTPATIENT
Start: 2022-08-09 | End: 2023-02-05

## 2022-08-09 RX ORDER — FUROSEMIDE 10 MG/ML
40 INJECTION INTRAMUSCULAR; INTRAVENOUS ONCE
Status: COMPLETED | OUTPATIENT
Start: 2022-08-09 | End: 2022-08-09

## 2022-08-09 NOTE — CM/SW NOTE
08/09/22 1200   CM/SW Referral Data   Referral Source Social Work (self-referral)   Reason for Referral Discharge planning   Informant EMR; Other   Pertinent Medical Hx   Does patient have an established PCP? Yes  Zoila Woody )   Significant Past Medical/Mental Health Hx ESRD   Patient Info   Patient's Current Mental Status at Time of Assessment Alert;Oriented   Patient's Home Environment House   Patient lives with Spouse/Significant other; Son   Patient Status Prior to Admission   Independent with ADLs and Mobility Yes   Services in place prior to admission Dialysis   Scheduled Dialysis days M-W-F   Discharge Needs   Anticipated D/C needs Home health care   Choice of Post-Acute Provider   Informed patient of right to choose their preferred provider Yes   List of appropriate post-acute services provided to patient/family with quality data No - Declined list   Patient/family choice [facility]    Information given to Spouse/Significant other     The pt is home with his family and is established HD MWF. The pt. Has a hx. Of [facility] and would like to continue to use them. Referral sent in [facility] to [facility]. Orders and face to face have been sent. Plan is for the pt.  To return home and resume HD with [facility] (pending acceptance)     Meena Castro [facility] ext [phone]

## 2022-08-09 NOTE — PLAN OF CARE
Pt is alert and oriented x4, can be forgetful at times. Tolerating renal diet. HD cath is clean, dry, and intact. Plan for HD in the morning. Up with a walker. Call light within reach. Fall precautions maintained. Problem: Patient Centered Care  Goal: Patient preferences are identified and integrated in the patient's plan of care  Description: Interventions:  - What would you like us to know as we care for you?   - Provide timely, complete, and accurate information to patient/family  - Incorporate patient and family knowledge, values, beliefs, and cultural backgrounds into the planning and delivery of care  - Encourage patient/family to participate in care and decision-making at the level they choose  - Honor patient and family perspectives and choices  Outcome: Progressing     Problem: SAFETY ADULT - FALL  Goal: Free from fall injury  Description: INTERVENTIONS:  - Assess pt frequently for physical needs  - Identify cognitive and physical deficits and behaviors that affect risk of falls. - [place] fall precautions as indicated by assessment.  - Educate pt/family on patient safety including physical limitations  - Instruct pt to call for assistance with activity based on assessment  - Modify environment to reduce risk of injury  - Provide assistive devices as appropriate  - Consider OT/PT consult to assist with strengthening/mobility  - Encourage toileting schedule  Outcome: Progressing     Problem: CARDIOVASCULAR - ADULT  Goal: Maintains optimal cardiac output and hemodynamic stability  Description: INTERVENTIONS:  - Monitor vital signs, rhythm, and trends  - Monitor for bleeding, hypotension and signs of decreased cardiac output  - Evaluate effectiveness of vasoactive medications to optimize hemodynamic stability  - Monitor arterial and/or venous puncture sites for bleeding and/or hematoma  - Assess quality of pulses, skin color and temperature  - Assess for signs of decreased coronary artery perfusion - ex. Angina  - Evaluate fluid balance, assess for edema, trend weights  Outcome: Progressing  Goal: Absence of cardiac arrhythmias or at baseline  Description: INTERVENTIONS:  - Continuous cardiac monitoring, monitor vital signs, obtain 12 lead EKG if indicated  - Evaluate effectiveness of antiarrhythmic and heart rate control medications as ordered  - Initiate emergency measures for life threatening arrhythmias  - Monitor electrolytes and administer replacement therapy as ordered  Outcome: Progressing

## 2022-08-09 NOTE — PLAN OF CARE
Patient alert and oriented x4. Vss. On room air. Tolerating diet, no nausea. Accucheks achs, insulin coverage as ordered. +BM. Voiding. 1x lasix given. HD done, 3L off. Updated weight in system. Up with steady gait. Fall precautions in place. Cleared for discharge. Instructions given. Education given. Problem: Patient Centered Care  Goal: Patient preferences are identified and integrated in the patient's plan of care  Description: Interventions:  - What would you like us to know as we care for you?   - Provide timely, complete, and accurate information to patient/family  - Incorporate patient and family knowledge, values, beliefs, and cultural backgrounds into the planning and delivery of care  - Encourage patient/family to participate in care and decision-making at the level they choose  - Honor patient and family perspectives and choices  Outcome: Adequate for Discharge     Problem: SAFETY ADULT - FALL  Goal: Free from fall injury  Description: INTERVENTIONS:  - Assess pt frequently for physical needs  - Identify cognitive and physical deficits and behaviors that affect risk of falls.   - [place] fall precautions as indicated by assessment.  - Educate pt/family on patient safety including physical limitations  - Instruct pt to call for assistance with activity based on assessment  - Modify environment to reduce risk of injury  - Provide assistive devices as appropriate  - Consider OT/PT consult to assist with strengthening/mobility  - Encourage toileting schedule  Outcome: Adequate for Discharge     Problem: CARDIOVASCULAR - ADULT  Goal: Maintains optimal cardiac output and hemodynamic stability  Description: INTERVENTIONS:  - Monitor vital signs, rhythm, and trends  - Monitor for bleeding, hypotension and signs of decreased cardiac output  - Evaluate effectiveness of vasoactive medications to optimize hemodynamic stability  - Monitor arterial and/or venous puncture sites for bleeding and/or hematoma  - Assess quality of pulses, skin color and temperature  - Assess for signs of decreased coronary artery perfusion - ex.  Angina  - Evaluate fluid balance, assess for edema, trend weights  Outcome: Adequate for Discharge  Goal: Absence of cardiac arrhythmias or at baseline  Description: INTERVENTIONS:  - Continuous cardiac monitoring, monitor vital signs, obtain 12 lead EKG if indicated  - Evaluate effectiveness of antiarrhythmic and heart rate control medications as ordered  - Initiate emergency measures for life threatening arrhythmias  - Monitor electrolytes and administer replacement therapy as ordered  Outcome: Adequate for Discharge

## 2022-08-10 NOTE — CM/SW NOTE
[facility] accepted in [facility] and has been reserved. The pt. Discharged home yesterday 8/9. SW notified [facility] to follow up with pt. And wife regarding start of care via [facility].      Isabela Obrien, [facility] ext [phone]

## 2022-08-19 ENCOUNTER — HOSPITAL ENCOUNTER (EMERGENCY)
Facility: HOSPITAL | Age: 72
Discharge: HOME | End: 2022-08-19
Attending: EMERGENCY MEDICINE
Payer: MEDICARE

## 2022-08-19 VITALS
BODY MASS INDEX: 32.1 KG/M2 | HEART RATE: 64 BPM | DIASTOLIC BLOOD PRESSURE: 89 MMHG | HEIGHT: 72 IN | TEMPERATURE: 98 F | WEIGHT: 237 LBS | SYSTOLIC BLOOD PRESSURE: 177 MMHG | OXYGEN SATURATION: 98 % | RESPIRATION RATE: 19 BRPM

## 2022-08-19 DIAGNOSIS — R55 SYNCOPE, UNSPECIFIED SYNCOPE TYPE: Primary | ICD-10-CM

## 2022-08-19 LAB
ANION GAP SERPL CALC-SCNC: 8 MMOL/L (ref 0–18)
BASOPHILS # BLD AUTO: 0.06 X10(3) UL (ref 0–0.2)
BASOPHILS NFR BLD AUTO: 0.7 %
BUN BLD-MCNC: 36 MG/DL (ref 7–18)
BUN/CREAT SERPL: 11.9 (ref 10–20)
CALCIUM BLD-MCNC: 8.4 MG/DL (ref 8.5–10.1)
CHLORIDE SERPL-SCNC: 104 MMOL/L (ref 98–112)
CO2 SERPL-SCNC: 28 MMOL/L (ref 21–32)
CREAT BLD-MCNC: 3.03 MG/DL
DEPRECATED RDW RBC AUTO: 46.6 FL (ref 35.1–46.3)
EOSINOPHIL # BLD AUTO: 0.34 X10(3) UL (ref 0–0.7)
EOSINOPHIL NFR BLD AUTO: 4 %
ERYTHROCYTE [DISTWIDTH] IN BLOOD BY AUTOMATED COUNT: 13.1 % (ref 11–15)
GFR SERPLBLD BASED ON 1.73 SQ M-ARVRAT: 21 ML/MIN/1.73M2 (ref 60–?)
GLUCOSE BLD-MCNC: 107 MG/DL (ref 70–99)
GLUCOSE BLDC GLUCOMTR-MCNC: 111 MG/DL (ref 70–99)
HCT VFR BLD AUTO: 36.5 %
HGB BLD-MCNC: 11.3 G/DL
IMM GRANULOCYTES # BLD AUTO: 0.05 X10(3) UL (ref 0–1)
IMM GRANULOCYTES NFR BLD: 0.6 %
LYMPHOCYTES # BLD AUTO: 2.2 X10(3) UL (ref 1–4)
LYMPHOCYTES NFR BLD AUTO: 25.7 %
MCH RBC QN AUTO: 30.3 PG (ref 26–34)
MCHC RBC AUTO-ENTMCNC: 31 G/DL (ref 31–37)
MCV RBC AUTO: 97.9 FL
MONOCYTES # BLD AUTO: 0.71 X10(3) UL (ref 0.1–1)
MONOCYTES NFR BLD AUTO: 8.3 %
NEUTROPHILS # BLD AUTO: 5.19 X10 (3) UL (ref 1.5–7.7)
NEUTROPHILS # BLD AUTO: 5.19 X10(3) UL (ref 1.5–7.7)
NEUTROPHILS NFR BLD AUTO: 60.7 %
OSMOLALITY SERPL CALC.SUM OF ELEC: 299 MOSM/KG (ref 275–295)
PLATELET # BLD AUTO: 182 10(3)UL (ref 150–450)
POTASSIUM SERPL-SCNC: 4 MMOL/L (ref 3.5–5.1)
RBC # BLD AUTO: 3.73 X10(6)UL
SODIUM SERPL-SCNC: 140 MMOL/L (ref 136–145)
TROPONIN I HIGH SENSITIVITY: 21 NG/L
WBC # BLD AUTO: 8.6 X10(3) UL (ref 4–11)

## 2022-08-19 PROCEDURE — 93010 ELECTROCARDIOGRAM REPORT: CPT | Performed by: EMERGENCY MEDICINE

## 2022-08-19 PROCEDURE — 82962 GLUCOSE BLOOD TEST: CPT

## 2022-08-19 PROCEDURE — 84484 ASSAY OF TROPONIN QUANT: CPT | Performed by: EMERGENCY MEDICINE

## 2022-08-19 PROCEDURE — 93005 ELECTROCARDIOGRAM TRACING: CPT

## 2022-08-19 PROCEDURE — 99284 EMERGENCY DEPT VISIT MOD MDM: CPT

## 2022-08-19 PROCEDURE — 85025 COMPLETE CBC W/AUTO DIFF WBC: CPT | Performed by: EMERGENCY MEDICINE

## 2022-08-19 PROCEDURE — 36415 COLL VENOUS BLD VENIPUNCTURE: CPT

## 2022-08-19 PROCEDURE — 80048 BASIC METABOLIC PNL TOTAL CA: CPT | Performed by: EMERGENCY MEDICINE

## 2022-08-24 ENCOUNTER — PATIENT OUTREACH (OUTPATIENT)
Dept: CASE MANAGEMENT | Age: 72
End: 2022-08-24

## 2022-08-24 NOTE — PROGRESS NOTES
1st attempt to review DM f/u questions    LVM to call [phone] to review DM f/u quesitons; will try again

## 2022-08-25 NOTE — PROGRESS NOTES
2nd attempt to review DM f/u questions  Pt answered all questions  Closing encounter  Diabetic Outreach D/C Follow Up Questions:     1. Did you receive the information provided during your hospitalization and at discharge about diabetes? yes    If No:  Would you like us to mail you the information? no   If Yes:  Was the information helpful? Yes     2. Were there any changes made to your medications? no            3. Do you have all of your diabetes pills and or insulin now that you are home? Yes  If yes:  do you understand the changes made? no      4. Are you confident in managing your diabetes? yes     5. Did you make the necessary transportation arrangements to get to your diabetes follow-up appointment? Yes         If pt answers No to questions #3 and #4 Advise pt you will have a clinical person contact them to address.

## 2022-09-04 ENCOUNTER — LAB ENCOUNTER (OUTPATIENT)
Dept: LAB | Facility: HOSPITAL | Age: 72
End: 2022-09-04
Attending: SURGERY
Payer: MEDICARE

## 2022-09-04 DIAGNOSIS — Z01.818 PRE-OP TESTING: ICD-10-CM

## 2022-09-04 LAB — SARS-COV-2 RNA RESP QL NAA+PROBE: NOT DETECTED

## 2022-09-06 ENCOUNTER — HOSPITAL ENCOUNTER (OUTPATIENT)
Facility: HOSPITAL | Age: 72
Setting detail: HOSPITAL OUTPATIENT SURGERY
Discharge: HOME | End: 2022-09-06
Attending: SURGERY | Admitting: SURGERY
Payer: MEDICARE

## 2022-09-06 ENCOUNTER — ANESTHESIA (OUTPATIENT)
Dept: SURGERY | Facility: HOSPITAL | Age: 72
End: 2022-09-06
Payer: MEDICARE

## 2022-09-06 ENCOUNTER — ANESTHESIA EVENT (OUTPATIENT)
Dept: SURGERY | Facility: HOSPITAL | Age: 72
End: 2022-09-06
Payer: MEDICARE

## 2022-09-06 VITALS
TEMPERATURE: 98 F | DIASTOLIC BLOOD PRESSURE: 75 MMHG | BODY MASS INDEX: 32.34 KG/M2 | RESPIRATION RATE: 18 BRPM | HEART RATE: 69 BPM | SYSTOLIC BLOOD PRESSURE: 179 MMHG | OXYGEN SATURATION: 97 % | WEIGHT: 244 LBS | HEIGHT: 73 IN

## 2022-09-06 DIAGNOSIS — Z01.818 PRE-OP TESTING: Primary | ICD-10-CM

## 2022-09-06 LAB
ANION GAP SERPL CALC-SCNC: 7 MMOL/L (ref 0–18)
APTT PPP: 28.4 SECONDS (ref 23.3–35.6)
BASOPHILS # BLD AUTO: 0.04 X10(3) UL (ref 0–0.2)
BASOPHILS NFR BLD AUTO: 0.6 %
BUN BLD-MCNC: 78 MG/DL (ref 7–18)
BUN/CREAT SERPL: 12.7 (ref 10–20)
CALCIUM BLD-MCNC: 8.3 MG/DL (ref 8.5–10.1)
CHLORIDE SERPL-SCNC: 113 MMOL/L (ref 98–112)
CO2 SERPL-SCNC: 24 MMOL/L (ref 21–32)
CREAT BLD-MCNC: 6.13 MG/DL
DEPRECATED RDW RBC AUTO: 46.4 FL (ref 35.1–46.3)
EOSINOPHIL # BLD AUTO: 0.44 X10(3) UL (ref 0–0.7)
EOSINOPHIL NFR BLD AUTO: 6.1 %
ERYTHROCYTE [DISTWIDTH] IN BLOOD BY AUTOMATED COUNT: 13 % (ref 11–15)
GFR SERPLBLD BASED ON 1.73 SQ M-ARVRAT: 9 ML/MIN/1.73M2 (ref 60–?)
GLUCOSE BLD-MCNC: 227 MG/DL (ref 70–99)
GLUCOSE BLDC GLUCOMTR-MCNC: 188 MG/DL (ref 70–99)
GLUCOSE BLDC GLUCOMTR-MCNC: 209 MG/DL (ref 70–99)
HCT VFR BLD AUTO: 33.7 %
HGB BLD-MCNC: 10.4 G/DL
IMM GRANULOCYTES # BLD AUTO: 0.02 X10(3) UL (ref 0–1)
IMM GRANULOCYTES NFR BLD: 0.3 %
INR BLD: 1.02 (ref 0.85–1.16)
LYMPHOCYTES # BLD AUTO: 1.33 X10(3) UL (ref 1–4)
LYMPHOCYTES NFR BLD AUTO: 18.5 %
MCH RBC QN AUTO: 30.6 PG (ref 26–34)
MCHC RBC AUTO-ENTMCNC: 30.9 G/DL (ref 31–37)
MCV RBC AUTO: 99.1 FL
MONOCYTES # BLD AUTO: 0.52 X10(3) UL (ref 0.1–1)
MONOCYTES NFR BLD AUTO: 7.2 %
NEUTROPHILS # BLD AUTO: 4.84 X10 (3) UL (ref 1.5–7.7)
NEUTROPHILS # BLD AUTO: 4.84 X10(3) UL (ref 1.5–7.7)
NEUTROPHILS NFR BLD AUTO: 67.3 %
OSMOLALITY SERPL CALC.SUM OF ELEC: 328 MOSM/KG (ref 275–295)
PLATELET # BLD AUTO: 175 10(3)UL (ref 150–450)
POTASSIUM SERPL-SCNC: 4.8 MMOL/L (ref 3.5–5.1)
POTASSIUM SERPL-SCNC: 4.8 MMOL/L (ref 3.5–5.1)
PROTHROMBIN TIME: 13.3 SECONDS (ref 11.6–14.8)
RBC # BLD AUTO: 3.4 X10(6)UL
SODIUM SERPL-SCNC: 144 MMOL/L (ref 136–145)
WBC # BLD AUTO: 7.2 X10(3) UL (ref 4–11)

## 2022-09-06 PROCEDURE — 031C0ZF BYPASS LEFT RADIAL ARTERY TO LOWER ARM VEIN, OPEN APPROACH: ICD-10-PCS | Performed by: SURGERY

## 2022-09-06 PROCEDURE — 82962 GLUCOSE BLOOD TEST: CPT

## 2022-09-06 PROCEDURE — 85610 PROTHROMBIN TIME: CPT | Performed by: SURGERY

## 2022-09-06 PROCEDURE — 84132 ASSAY OF SERUM POTASSIUM: CPT | Performed by: SURGERY

## 2022-09-06 PROCEDURE — 85025 COMPLETE CBC W/AUTO DIFF WBC: CPT | Performed by: SURGERY

## 2022-09-06 PROCEDURE — 85730 THROMBOPLASTIN TIME PARTIAL: CPT | Performed by: SURGERY

## 2022-09-06 PROCEDURE — 80048 BASIC METABOLIC PNL TOTAL CA: CPT | Performed by: SURGERY

## 2022-09-06 RX ORDER — SODIUM CHLORIDE, SODIUM LACTATE, POTASSIUM CHLORIDE, CALCIUM CHLORIDE 600; 310; 30; 20 MG/100ML; MG/100ML; MG/100ML; MG/100ML
INJECTION, SOLUTION INTRAVENOUS CONTINUOUS
Status: DISCONTINUED | OUTPATIENT
Start: 2022-09-06 | End: 2022-09-06

## 2022-09-06 RX ORDER — MORPHINE SULFATE 10 MG/ML
6 INJECTION, SOLUTION INTRAMUSCULAR; INTRAVENOUS EVERY 10 MIN PRN
Status: DISCONTINUED | OUTPATIENT
Start: 2022-09-06 | End: 2022-09-06

## 2022-09-06 RX ORDER — CEFAZOLIN SODIUM/WATER 2 G/20 ML
2 SYRINGE (ML) INTRAVENOUS ONCE
Status: COMPLETED | OUTPATIENT
Start: 2022-09-06 | End: 2022-09-06

## 2022-09-06 RX ORDER — BUPIVACAINE HYDROCHLORIDE 2.5 MG/ML
INJECTION, SOLUTION EPIDURAL; INFILTRATION; INTRACAUDAL AS NEEDED
Status: DISCONTINUED | OUTPATIENT
Start: 2022-09-06 | End: 2022-09-06 | Stop reason: HOSPADM

## 2022-09-06 RX ORDER — ACETAMINOPHEN 500 MG
1000 TABLET ORAL ONCE
Status: COMPLETED | OUTPATIENT
Start: 2022-09-06 | End: 2022-09-06

## 2022-09-06 RX ORDER — SODIUM CHLORIDE, SODIUM LACTATE, POTASSIUM CHLORIDE, CALCIUM CHLORIDE 600; 310; 30; 20 MG/100ML; MG/100ML; MG/100ML; MG/100ML
INJECTION, SOLUTION INTRAVENOUS CONTINUOUS PRN
Status: DISCONTINUED | OUTPATIENT
Start: 2022-09-06 | End: 2022-09-06

## 2022-09-06 RX ORDER — NALOXONE HYDROCHLORIDE 0.4 MG/ML
80 INJECTION, SOLUTION INTRAMUSCULAR; INTRAVENOUS; SUBCUTANEOUS AS NEEDED
Status: DISCONTINUED | OUTPATIENT
Start: 2022-09-06 | End: 2022-09-06

## 2022-09-06 RX ORDER — NICOTINE POLACRILEX 4 MG
15 LOZENGE BUCCAL
Status: DISCONTINUED | OUTPATIENT
Start: 2022-09-06 | End: 2022-09-06

## 2022-09-06 RX ORDER — HYDROMORPHONE HYDROCHLORIDE 1 MG/ML
0.4 INJECTION, SOLUTION INTRAMUSCULAR; INTRAVENOUS; SUBCUTANEOUS EVERY 5 MIN PRN
Status: DISCONTINUED | OUTPATIENT
Start: 2022-09-06 | End: 2022-09-06

## 2022-09-06 RX ORDER — LIDOCAINE HYDROCHLORIDE 10 MG/ML
INJECTION, SOLUTION EPIDURAL; INFILTRATION; INTRACAUDAL; PERINEURAL AS NEEDED
Status: DISCONTINUED | OUTPATIENT
Start: 2022-09-06 | End: 2022-09-06 | Stop reason: SURG

## 2022-09-06 RX ORDER — NICOTINE POLACRILEX 4 MG
30 LOZENGE BUCCAL
Status: DISCONTINUED | OUTPATIENT
Start: 2022-09-06 | End: 2022-09-06

## 2022-09-06 RX ORDER — MIDAZOLAM HYDROCHLORIDE 1 MG/ML
INJECTION INTRAMUSCULAR; INTRAVENOUS AS NEEDED
Status: DISCONTINUED | OUTPATIENT
Start: 2022-09-06 | End: 2022-09-06 | Stop reason: SURG

## 2022-09-06 RX ORDER — HYDROMORPHONE HYDROCHLORIDE 1 MG/ML
0.2 INJECTION, SOLUTION INTRAMUSCULAR; INTRAVENOUS; SUBCUTANEOUS EVERY 5 MIN PRN
Status: DISCONTINUED | OUTPATIENT
Start: 2022-09-06 | End: 2022-09-06

## 2022-09-06 RX ORDER — FAMOTIDINE 20 MG/1
20 TABLET, FILM COATED ORAL ONCE
Status: COMPLETED | OUTPATIENT
Start: 2022-09-06 | End: 2022-09-06

## 2022-09-06 RX ORDER — DEXTROSE MONOHYDRATE 25 G/50ML
50 INJECTION, SOLUTION INTRAVENOUS
Status: DISCONTINUED | OUTPATIENT
Start: 2022-09-06 | End: 2022-09-06

## 2022-09-06 RX ORDER — MORPHINE SULFATE 4 MG/ML
4 INJECTION, SOLUTION INTRAMUSCULAR; INTRAVENOUS EVERY 10 MIN PRN
Status: DISCONTINUED | OUTPATIENT
Start: 2022-09-06 | End: 2022-09-06

## 2022-09-06 RX ORDER — SODIUM CHLORIDE 9 MG/ML
INJECTION, SOLUTION INTRAVENOUS CONTINUOUS
Status: DISCONTINUED | OUTPATIENT
Start: 2022-09-06 | End: 2022-09-06

## 2022-09-06 RX ORDER — LIDOCAINE HYDROCHLORIDE 10 MG/ML
INJECTION, SOLUTION EPIDURAL; INFILTRATION; INTRACAUDAL; PERINEURAL AS NEEDED
Status: DISCONTINUED | OUTPATIENT
Start: 2022-09-06 | End: 2022-09-06 | Stop reason: HOSPADM

## 2022-09-06 RX ORDER — HYDROMORPHONE HYDROCHLORIDE 1 MG/ML
0.6 INJECTION, SOLUTION INTRAMUSCULAR; INTRAVENOUS; SUBCUTANEOUS EVERY 5 MIN PRN
Status: DISCONTINUED | OUTPATIENT
Start: 2022-09-06 | End: 2022-09-06

## 2022-09-06 RX ORDER — MORPHINE SULFATE 4 MG/ML
2 INJECTION, SOLUTION INTRAMUSCULAR; INTRAVENOUS EVERY 10 MIN PRN
Status: DISCONTINUED | OUTPATIENT
Start: 2022-09-06 | End: 2022-09-06

## 2022-09-06 RX ORDER — METOCLOPRAMIDE 10 MG/1
10 TABLET ORAL ONCE
Status: COMPLETED | OUTPATIENT
Start: 2022-09-06 | End: 2022-09-06

## 2022-09-06 RX ORDER — HYDROCODONE BITARTRATE AND ACETAMINOPHEN 5; 325 MG/1; MG/1
1-2 TABLET ORAL EVERY 6 HOURS PRN
Qty: 10 TABLET | Refills: 0 | Status: SHIPPED | OUTPATIENT
Start: 2022-09-06

## 2022-09-06 RX ADMIN — SODIUM CHLORIDE: 9 INJECTION, SOLUTION INTRAVENOUS at 12:35:00

## 2022-09-06 RX ADMIN — LIDOCAINE HYDROCHLORIDE 50 MG: 10 INJECTION, SOLUTION EPIDURAL; INFILTRATION; INTRACAUDAL; PERINEURAL at 11:24:00

## 2022-09-06 RX ADMIN — SODIUM CHLORIDE: 9 INJECTION, SOLUTION INTRAVENOUS at 11:19:00

## 2022-09-06 RX ADMIN — MIDAZOLAM HYDROCHLORIDE 1 MG: 1 INJECTION INTRAMUSCULAR; INTRAVENOUS at 11:24:00

## 2022-09-06 RX ADMIN — CEFAZOLIN SODIUM/WATER 2 G: 2 G/20 ML SYRINGE (ML) INTRAVENOUS at 11:27:00

## 2022-09-06 NOTE — ANESTHESIA POSTPROCEDURE EVALUATION
Patient: Ky Burnett    Procedure Summary     Date: 09/06/22 Room / Location: [address] [place] [address] MAIN OR 16 / [address] [place] [address] MAIN OR    Anesthesia Start: 1119 Anesthesia Stop: 1244    Procedure: Creation of left radio cephalic arteriovenous fistula (Left Wrist) Diagnosis: (Chronic kidney disease stage 5 due to type 2 diabetes, mellitus)    Surgeons: Rebel Ye MD Anesthesiologist: Brionna Soliman MD    Anesthesia Type: MAC ASA Status: 3          Anesthesia Type: MAC    Vitals Value Taken Time   /65 09/06/22 1240   Temp 97 09/06/22 1244   Pulse 75 09/06/22 1244   Resp 13 09/06/22 1243   SpO2 99 % 09/06/22 1244   Vitals shown include unvalidated device data.     [address] [place] [address] AN Post Evaluation:   Patient Evaluated in PACU  Patient Participation: complete - patient participated  Level of Consciousness: awake  Pain Management: adequate  Airway Patency:patent  Dental exam unchanged from preop  Yes    Cardiovascular Status: acceptable  Respiratory Status: acceptable  Postoperative Hydration acceptable      Gordon Rodriguez MD  9/6/2022 12:44 PM

## 2022-09-06 NOTE — OPERATIVE REPORT
[facility]     PATIENTS NAME: Boston Hays  ATTENDING PHYSICIAN: Yolanda Grijalva MD  OPERATING PHYSICIAN: Melisa Rock MD  CSN: [id number]     LOCATION:  OR  MRN: [id number]    YOB: 1950  ADMISSION DATE: 9/6/2022  OPERATION DATE: 9/6/2022                OPERATIVE REPORT     PRE-OPERATIVE DIAGNOSIS:  End Stage Renal Disease in need of a more permanent access. POST-OPERATIVE DIAGNOSIS: Same as above. PROCEDURE PERFORMED: left radio-cephalic arteriovenous fistula creation    ANESTHESIA: Sedation with local    SURGEON: Melisa Rock MD    ASSISTANT: Brandon Salinas SA    EBL: 5 ml    SPECIMENS: * No specimens in log *    COMPLICATIONS: None    SUMMARY OF CASE: Vein and artery were of adequate caliber    INDICATIONS: The patient is a 74year old male with ESRD in need of a more permanent access. Preoperative mapping revealed a usable left forearm cephalic vein. We have discussed the risks and benefits of the fistula in detail including the risk of nerve injury, ischemic nerve injury, thrombosis, with need for angioplasty or revision, infection, and steal syndrome among other complications. DESCRIPTION OF PROCEDURE:  The patient was brought to the operating room and placed in the supine position. The left upper extremity was prepped and draped in the usual sterile fashion. Following infiltration of local anesthesia with 1% lidocaine, a longitudinal incision was made adjacent to the radial artery and cephalic vein in the wrist. The cephalic vein was identified. It was dissected at a large branch point. Several small tributaries were ligated with 4-0 silk . The vein was mobilized and secured with silastic loops. Next, the radial artery was identified and dissected. The artery was relatively soft and was without significant atherosclerotic disease. The artery was mobilized proximally and distally for a 2-cm segment. The distal vein was ligated and the vein divided with proximal control.  The end of the vein was then opened and spatulated and the vein was brought adjacent to the artery and appeared to lie comfortably without tension or kinking. The artery was controlled proximally and distally with silastic loops and an anterolateral arteriotomy was made using a #11 blade scalpel. The artery was then locally heparinized with concentrated heparinized saline solution. The arteriovenous anastomosis was then performed using a running 7-0 prolene suture in a running fashion. Following completion of the anastomosis, vascular control appeared excellent. Excellent flow was established through the fistula, and a distal pulse was palpable within the radial artery. Hemostasis was achieved with pressure and thrombin-gel foam. The wound was irrigated and the subcutaneous tissues were reapproximated using a running 3-0 Vicryl sutures. The skin was closed a running subcuticular closure 5-0 Vicryl sutures. The incision area was injected with a 0.5% Marcaine solution, then Steri-strips were applied followed by a dry sterile dressing. The patient tolerated the procedure well and was taken to the postanesthesia care unit in a stable condition.

## 2022-10-28 ENCOUNTER — HOSPITAL ENCOUNTER (INPATIENT)
Facility: HOSPITAL | Age: 72
LOS: 2 days | Discharge: HOME | End: 2022-10-30
Attending: EMERGENCY MEDICINE | Admitting: INTERNAL MEDICINE
Payer: MEDICARE

## 2022-10-28 ENCOUNTER — HOSPITAL ENCOUNTER (OUTPATIENT)
Facility: HOSPITAL | Age: 72
Setting detail: OBSERVATION
Discharge: HOME | End: 2022-10-30
Attending: EMERGENCY MEDICINE | Admitting: INTERNAL MEDICINE
Payer: MEDICARE

## 2022-10-28 ENCOUNTER — APPOINTMENT (OUTPATIENT)
Dept: GENERAL RADIOLOGY | Facility: HOSPITAL | Age: 72
End: 2022-10-28
Attending: EMERGENCY MEDICINE
Payer: MEDICARE

## 2022-10-28 ENCOUNTER — APPOINTMENT (OUTPATIENT)
Dept: CT IMAGING | Facility: HOSPITAL | Age: 72
End: 2022-10-28
Attending: EMERGENCY MEDICINE
Payer: MEDICARE

## 2022-10-28 DIAGNOSIS — R22.0 FACIAL SWELLING: ICD-10-CM

## 2022-10-28 DIAGNOSIS — R50.9 ACUTE FEBRILE ILLNESS: Primary | ICD-10-CM

## 2022-10-28 DIAGNOSIS — N18.6 ESRD ON HEMODIALYSIS (HCC): ICD-10-CM

## 2022-10-28 DIAGNOSIS — Z99.2 ESRD ON HEMODIALYSIS (HCC): ICD-10-CM

## 2022-10-28 LAB
ANION GAP SERPL CALC-SCNC: 7 MMOL/L (ref 0–18)
BASOPHILS # BLD AUTO: 0.05 X10(3) UL (ref 0–0.2)
BASOPHILS NFR BLD AUTO: 0.6 %
BUN BLD-MCNC: 51 MG/DL (ref 7–18)
BUN/CREAT SERPL: 8.7 (ref 10–20)
CALCIUM BLD-MCNC: 9 MG/DL (ref 8.5–10.1)
CHLORIDE SERPL-SCNC: 107 MMOL/L (ref 98–112)
CO2 SERPL-SCNC: 26 MMOL/L (ref 21–32)
CREAT BLD-MCNC: 5.85 MG/DL
DEPRECATED RDW RBC AUTO: 46.2 FL (ref 35.1–46.3)
EOSINOPHIL # BLD AUTO: 0.29 X10(3) UL (ref 0–0.7)
EOSINOPHIL NFR BLD AUTO: 3.2 %
ERYTHROCYTE [DISTWIDTH] IN BLOOD BY AUTOMATED COUNT: 12.8 % (ref 11–15)
GFR SERPLBLD BASED ON 1.73 SQ M-ARVRAT: 9 ML/MIN/1.73M2 (ref 60–?)
GLUCOSE BLD-MCNC: 204 MG/DL (ref 70–99)
GLUCOSE BLDC GLUCOMTR-MCNC: 125 MG/DL (ref 70–99)
HCT VFR BLD AUTO: 36.1 %
HGB BLD-MCNC: 11 G/DL
IMM GRANULOCYTES # BLD AUTO: 0.03 X10(3) UL (ref 0–1)
IMM GRANULOCYTES NFR BLD: 0.3 %
LACTATE SERPL-SCNC: 0.7 MMOL/L (ref 0.4–2)
LYMPHOCYTES # BLD AUTO: 1.57 X10(3) UL (ref 1–4)
LYMPHOCYTES NFR BLD AUTO: 17.5 %
MCH RBC QN AUTO: 30.2 PG (ref 26–34)
MCHC RBC AUTO-ENTMCNC: 30.5 G/DL (ref 31–37)
MCV RBC AUTO: 99.2 FL
MONOCYTES # BLD AUTO: 0.66 X10(3) UL (ref 0.1–1)
MONOCYTES NFR BLD AUTO: 7.3 %
NEUTROPHILS # BLD AUTO: 6.39 X10 (3) UL (ref 1.5–7.7)
NEUTROPHILS # BLD AUTO: 6.39 X10(3) UL (ref 1.5–7.7)
NEUTROPHILS NFR BLD AUTO: 71.1 %
OSMOLALITY SERPL CALC.SUM OF ELEC: 310 MOSM/KG (ref 275–295)
PLATELET # BLD AUTO: 195 10(3)UL (ref 150–450)
POTASSIUM SERPL-SCNC: 4.9 MMOL/L (ref 3.5–5.1)
RBC # BLD AUTO: 3.64 X10(6)UL
SARS-COV-2 RNA RESP QL NAA+PROBE: NOT DETECTED
SODIUM SERPL-SCNC: 140 MMOL/L (ref 136–145)
TROPONIN I HIGH SENSITIVITY: 22 NG/L
WBC # BLD AUTO: 9 X10(3) UL (ref 4–11)

## 2022-10-28 PROCEDURE — 96365 THER/PROPH/DIAG IV INF INIT: CPT

## 2022-10-28 PROCEDURE — 85025 COMPLETE CBC W/AUTO DIFF WBC: CPT | Performed by: EMERGENCY MEDICINE

## 2022-10-28 PROCEDURE — 93010 ELECTROCARDIOGRAM REPORT: CPT | Performed by: EMERGENCY MEDICINE

## 2022-10-28 PROCEDURE — 87040 BLOOD CULTURE FOR BACTERIA: CPT | Performed by: EMERGENCY MEDICINE

## 2022-10-28 PROCEDURE — 71045 X-RAY EXAM CHEST 1 VIEW: CPT | Performed by: EMERGENCY MEDICINE

## 2022-10-28 PROCEDURE — 71260 CT THORAX DX C+: CPT | Performed by: EMERGENCY MEDICINE

## 2022-10-28 PROCEDURE — 99285 EMERGENCY DEPT VISIT HI MDM: CPT

## 2022-10-28 PROCEDURE — 70491 CT SOFT TISSUE NECK W/DYE: CPT | Performed by: EMERGENCY MEDICINE

## 2022-10-28 PROCEDURE — 82962 GLUCOSE BLOOD TEST: CPT

## 2022-10-28 PROCEDURE — 36415 COLL VENOUS BLD VENIPUNCTURE: CPT

## 2022-10-28 PROCEDURE — 93005 ELECTROCARDIOGRAM TRACING: CPT

## 2022-10-28 PROCEDURE — 83605 ASSAY OF LACTIC ACID: CPT | Performed by: EMERGENCY MEDICINE

## 2022-10-28 PROCEDURE — 84484 ASSAY OF TROPONIN QUANT: CPT | Performed by: EMERGENCY MEDICINE

## 2022-10-28 PROCEDURE — 80048 BASIC METABOLIC PNL TOTAL CA: CPT | Performed by: EMERGENCY MEDICINE

## 2022-10-28 RX ORDER — HYDRALAZINE HYDROCHLORIDE 50 MG/1
50 TABLET, FILM COATED ORAL 3 TIMES DAILY
Status: DISCONTINUED | OUTPATIENT
Start: 2022-10-28 | End: 2022-10-30

## 2022-10-28 RX ORDER — ACETAMINOPHEN 500 MG
1000 TABLET ORAL EVERY 6 HOURS PRN
Status: DISCONTINUED | OUTPATIENT
Start: 2022-10-28 | End: 2022-10-30

## 2022-10-28 RX ORDER — TORSEMIDE 5 MG/1
10 TABLET ORAL DAILY
Status: DISCONTINUED | OUTPATIENT
Start: 2022-10-29 | End: 2022-10-30

## 2022-10-28 RX ORDER — NICOTINE POLACRILEX 4 MG
30 LOZENGE BUCCAL
Status: DISCONTINUED | OUTPATIENT
Start: 2022-10-28 | End: 2022-10-30

## 2022-10-28 RX ORDER — FINASTERIDE 5 MG/1
5 TABLET, FILM COATED ORAL EVERY MORNING
Status: DISCONTINUED | OUTPATIENT
Start: 2022-10-29 | End: 2022-10-30

## 2022-10-28 RX ORDER — HYDROCODONE BITARTRATE AND ACETAMINOPHEN 5; 325 MG/1; MG/1
1 TABLET ORAL EVERY 4 HOURS PRN
Status: DISCONTINUED | OUTPATIENT
Start: 2022-10-28 | End: 2022-10-29

## 2022-10-28 RX ORDER — HYDROCODONE BITARTRATE AND ACETAMINOPHEN 5; 325 MG/1; MG/1
2 TABLET ORAL EVERY 4 HOURS PRN
Status: DISCONTINUED | OUTPATIENT
Start: 2022-10-28 | End: 2022-10-29

## 2022-10-28 RX ORDER — NICOTINE POLACRILEX 4 MG
15 LOZENGE BUCCAL
Status: DISCONTINUED | OUTPATIENT
Start: 2022-10-28 | End: 2022-10-30

## 2022-10-28 RX ORDER — BENZONATATE 100 MG/1
100 CAPSULE ORAL 3 TIMES DAILY PRN
Status: DISCONTINUED | OUTPATIENT
Start: 2022-10-28 | End: 2022-10-30

## 2022-10-28 RX ORDER — HEPARIN SODIUM 5000 [USP'U]/ML
5000 INJECTION, SOLUTION INTRAVENOUS; SUBCUTANEOUS EVERY 8 HOURS SCHEDULED
Status: DISCONTINUED | OUTPATIENT
Start: 2022-10-28 | End: 2022-10-30

## 2022-10-28 RX ORDER — MELATONIN
3 NIGHTLY PRN
Status: DISCONTINUED | OUTPATIENT
Start: 2022-10-28 | End: 2022-10-30

## 2022-10-28 RX ORDER — TAMSULOSIN HYDROCHLORIDE 0.4 MG/1
0.4 CAPSULE ORAL
Status: DISCONTINUED | OUTPATIENT
Start: 2022-10-29 | End: 2022-10-30

## 2022-10-28 RX ORDER — ASPIRIN 81 MG/1
81 TABLET ORAL DAILY
Status: DISCONTINUED | OUTPATIENT
Start: 2022-10-29 | End: 2022-10-30

## 2022-10-28 RX ORDER — ONDANSETRON 2 MG/ML
4 INJECTION INTRAMUSCULAR; INTRAVENOUS EVERY 6 HOURS PRN
Status: DISCONTINUED | OUTPATIENT
Start: 2022-10-28 | End: 2022-10-30

## 2022-10-28 RX ORDER — ACETAMINOPHEN 500 MG
1000 TABLET ORAL ONCE
Status: COMPLETED | OUTPATIENT
Start: 2022-10-28 | End: 2022-10-28

## 2022-10-28 RX ORDER — DEXTROSE MONOHYDRATE 25 G/50ML
50 INJECTION, SOLUTION INTRAVENOUS
Status: DISCONTINUED | OUTPATIENT
Start: 2022-10-28 | End: 2022-10-30

## 2022-10-28 RX ORDER — ACETAMINOPHEN 325 MG/1
650 TABLET ORAL EVERY 4 HOURS PRN
Status: DISCONTINUED | OUTPATIENT
Start: 2022-10-28 | End: 2022-10-30

## 2022-10-28 NOTE — ED QUICK NOTES
Orders for admission, patient is aware of plan and ready to go upstairs. Any questions, please call ED RN Britney Brar at extension [phone]. Patient Covid vaccination status: Partially vaccinated     COVID Test Ordered in ED: Rapid SARS-CoV-2 by PCR    COVID Suspicion at Admission: Low clinical suspicion for COVID    Running Infusions:  Zosyn until 17:31    Mental Status/LOC at time of transport: AOx4    Other pertinent information: fever improved. Dialysis tomorrow. CT complete.   CIWA score: N/A   NIH score:  N/A

## 2022-10-28 NOTE — ED QUICK NOTES
Rounding Completed    Plan of Care reviewed with Dr Krystle Murdock and patient. Waiting for blood culture collection, CT chest (ok to give IV contrast per Dr Krystle Murdock, plan to schedule dialysis for tomorrow). Elimination needs assessed. Provided update, patient verbalized understanding. No acute distress. States no change in swelling. Was coughing during assessment, states that that began last night. Pt febrile, reported to MD.    Bed is locked and in lowest position. Call light within reach. Phlebotomist at bedside.

## 2022-10-29 LAB
ADENOVIRUS PCR:: NOT DETECTED
ALBUMIN SERPL-MCNC: 2.8 G/DL (ref 3.4–5)
ANION GAP SERPL CALC-SCNC: 7 MMOL/L (ref 0–18)
B PARAPERT DNA SPEC QL NAA+PROBE: NOT DETECTED
B PERT DNA SPEC QL NAA+PROBE: NOT DETECTED
BUN BLD-MCNC: 49 MG/DL (ref 7–18)
BUN/CREAT SERPL: 8.3 (ref 10–20)
C PNEUM DNA SPEC QL NAA+PROBE: NOT DETECTED
CALCIUM BLD-MCNC: 8.2 MG/DL (ref 8.5–10.1)
CHLORIDE SERPL-SCNC: 108 MMOL/L (ref 98–112)
CHOLEST SERPL-MCNC: 246 MG/DL (ref ?–200)
CO2 SERPL-SCNC: 26 MMOL/L (ref 21–32)
CORONAVIRUS 229E PCR:: NOT DETECTED
CORONAVIRUS HKU1 PCR:: NOT DETECTED
CORONAVIRUS NL63 PCR:: NOT DETECTED
CORONAVIRUS OC43 PCR:: NOT DETECTED
CREAT BLD-MCNC: 5.91 MG/DL
DEPRECATED RDW RBC AUTO: 46.3 FL (ref 35.1–46.3)
ERYTHROCYTE [DISTWIDTH] IN BLOOD BY AUTOMATED COUNT: 13 % (ref 11–15)
EST. AVERAGE GLUCOSE BLD GHB EST-MCNC: 197 MG/DL (ref 68–126)
FLUAV RNA SPEC QL NAA+PROBE: NOT DETECTED
FLUBV RNA SPEC QL NAA+PROBE: NOT DETECTED
GFR SERPLBLD BASED ON 1.73 SQ M-ARVRAT: 9 ML/MIN/1.73M2 (ref 60–?)
GLUCOSE BLD-MCNC: 237 MG/DL (ref 70–99)
GLUCOSE BLDC GLUCOMTR-MCNC: 202 MG/DL (ref 70–99)
GLUCOSE BLDC GLUCOMTR-MCNC: 258 MG/DL (ref 70–99)
GLUCOSE BLDC GLUCOMTR-MCNC: 277 MG/DL (ref 70–99)
GLUCOSE BLDC GLUCOMTR-MCNC: 97 MG/DL (ref 70–99)
HBA1C MFR BLD: 8.5 % (ref ?–5.7)
HCT VFR BLD AUTO: 32.6 %
HDLC SERPL-MCNC: 31 MG/DL (ref 40–59)
HGB BLD-MCNC: 10.2 G/DL
LDLC SERPL CALC-MCNC: 127 MG/DL (ref ?–100)
MAGNESIUM SERPL-MCNC: 2.4 MG/DL (ref 1.6–2.6)
MCH RBC QN AUTO: 30.8 PG (ref 26–34)
MCHC RBC AUTO-ENTMCNC: 31.3 G/DL (ref 31–37)
MCV RBC AUTO: 98.5 FL
METAPNEUMOVIRUS PCR:: NOT DETECTED
MRSA DNA SPEC QL NAA+PROBE: NEGATIVE
MYCOPLASMA PNEUMONIA PCR:: NOT DETECTED
NONHDLC SERPL-MCNC: 215 MG/DL (ref ?–130)
OSMOLALITY SERPL CALC.SUM OF ELEC: 313 MOSM/KG (ref 275–295)
PARAINFLUENZA 1 PCR:: NOT DETECTED
PARAINFLUENZA 2 PCR:: NOT DETECTED
PARAINFLUENZA 3 PCR:: NOT DETECTED
PARAINFLUENZA 4 PCR:: NOT DETECTED
PHOSPHATE SERPL-MCNC: 5.7 MG/DL (ref 2.5–4.9)
PLATELET # BLD AUTO: 175 10(3)UL (ref 150–450)
POTASSIUM SERPL-SCNC: 4.5 MMOL/L (ref 3.5–5.1)
PROCALCITONIN SERPL-MCNC: 0.16 NG/ML (ref ?–0.16)
RBC # BLD AUTO: 3.31 X10(6)UL
RHINOVIRUS/ENTERO PCR:: DETECTED
RSV RNA SPEC QL NAA+PROBE: NOT DETECTED
SARS-COV-2 RNA NPH QL NAA+NON-PROBE: NOT DETECTED
SODIUM SERPL-SCNC: 141 MMOL/L (ref 136–145)
TRIGL SERPL-MCNC: 488 MG/DL (ref 30–149)
TROPONIN I HIGH SENSITIVITY: 23 NG/L
VLDLC SERPL CALC-MCNC: 91 MG/DL (ref 0–30)
WBC # BLD AUTO: 7.8 X10(3) UL (ref 4–11)

## 2022-10-29 PROCEDURE — 0202U NFCT DS 22 TRGT SARS-COV-2: CPT | Performed by: INTERNAL MEDICINE

## 2022-10-29 PROCEDURE — 5A1D70Z PERFORMANCE OF URINARY FILTRATION, INTERMITTENT, LESS THAN 6 HOURS PER DAY: ICD-10-PCS | Performed by: INTERNAL MEDICINE

## 2022-10-29 PROCEDURE — 80061 LIPID PANEL: CPT | Performed by: INTERNAL MEDICINE

## 2022-10-29 PROCEDURE — 82962 GLUCOSE BLOOD TEST: CPT

## 2022-10-29 PROCEDURE — 80069 RENAL FUNCTION PANEL: CPT | Performed by: INTERNAL MEDICINE

## 2022-10-29 PROCEDURE — 87641 MR-STAPH DNA AMP PROBE: CPT | Performed by: INTERNAL MEDICINE

## 2022-10-29 PROCEDURE — 84145 PROCALCITONIN (PCT): CPT | Performed by: INTERNAL MEDICINE

## 2022-10-29 PROCEDURE — 83036 HEMOGLOBIN GLYCOSYLATED A1C: CPT | Performed by: INTERNAL MEDICINE

## 2022-10-29 PROCEDURE — 90935 HEMODIALYSIS ONE EVALUATION: CPT

## 2022-10-29 PROCEDURE — 85027 COMPLETE CBC AUTOMATED: CPT | Performed by: INTERNAL MEDICINE

## 2022-10-29 PROCEDURE — 83735 ASSAY OF MAGNESIUM: CPT | Performed by: INTERNAL MEDICINE

## 2022-10-29 PROCEDURE — 84484 ASSAY OF TROPONIN QUANT: CPT | Performed by: INTERNAL MEDICINE

## 2022-10-29 RX ORDER — GUAIFENESIN/DEXTROMETHORPHAN 100-10MG/5
5 SYRUP ORAL EVERY 4 HOURS PRN
Status: DISCONTINUED | OUTPATIENT
Start: 2022-10-29 | End: 2022-10-30

## 2022-10-29 RX ORDER — LOSARTAN POTASSIUM 50 MG/1
TABLET ORAL DAILY
COMMUNITY
End: 2022-10-30

## 2022-10-29 RX ORDER — HYDROCODONE BITARTRATE AND ACETAMINOPHEN 5; 325 MG/1; MG/1
1-2 TABLET ORAL EVERY 6 HOURS PRN
Status: DISCONTINUED | OUTPATIENT
Start: 2022-10-29 | End: 2022-10-30

## 2022-10-29 RX ORDER — LOSARTAN POTASSIUM 50 MG/1
50 TABLET ORAL DAILY
Status: DISCONTINUED | OUTPATIENT
Start: 2022-10-29 | End: 2022-10-29

## 2022-10-29 RX ORDER — CLOPIDOGREL BISULFATE 75 MG/1
75 TABLET ORAL DAILY
COMMUNITY

## 2022-10-29 RX ORDER — CALCITRIOL 0.25 UG/1
0.25 CAPSULE, LIQUID FILLED ORAL DAILY
COMMUNITY

## 2022-10-29 RX ORDER — METOPROLOL SUCCINATE 50 MG/1
50 TABLET, EXTENDED RELEASE ORAL 2 TIMES DAILY
Status: DISCONTINUED | OUTPATIENT
Start: 2022-10-29 | End: 2022-10-30

## 2022-10-29 RX ORDER — METOPROLOL SUCCINATE 50 MG/1
50 TABLET, EXTENDED RELEASE ORAL 2 TIMES DAILY
COMMUNITY

## 2022-10-29 RX ORDER — CALCITRIOL 0.25 UG/1
0.25 CAPSULE, LIQUID FILLED ORAL DAILY
Status: DISCONTINUED | OUTPATIENT
Start: 2022-10-29 | End: 2022-10-30

## 2022-10-29 RX ORDER — CLOPIDOGREL BISULFATE 75 MG/1
75 TABLET ORAL DAILY
Status: DISCONTINUED | OUTPATIENT
Start: 2022-10-29 | End: 2022-10-30

## 2022-10-29 NOTE — PLAN OF CARE
Problem: Patient/Family Goals  Goal: Patient/Family Long Term Goal  Description: Patient's Long Term Goal: Discharge from the hospital    Interventions:  - Monitor vital signs  - Monitor appropriate labs  - Monitor blood glucose levels  - Administer medications per order  - Pain management as needed  - Follow MD orders  - Diagnostics per orders  - Dialysis per orders  - Update / inform patient and family on plan of care  - Discharge planning  - See additional Care Plan goals for specific interventions  Outcome: Progressing  Goal: Patient/Family Short Term Goal  Description: Patient's Short Term Goal: Feel better    Interventions:   - Monitor vital signs  - Monitor appropriate labs  - Monitor blood glucose levels  - Administer medications per order  - Pain management as needed  - Follow MD orders  - Diagnostics per orders  - Dialysis per orders  - Update / inform patient and family on plan of care  - See additional Care Plan goals for specific interventions  Outcome: Progressing     Problem: Patient Centered Care  Goal: Patient preferences are identified and integrated in the patient's plan of care  Description: Interventions:  - What would you like us to know as we care for you?  From home with wife  - Provide timely, complete, and accurate information to patient/family  - Incorporate patient and family knowledge, values, beliefs, and cultural backgrounds into the planning and delivery of care  - Encourage patient/family to participate in care and decision-making at the level they choose  - Honor patient and family perspectives and choices  Outcome: Progressing     Problem: PAIN - ADULT  Goal: Verbalizes/displays adequate comfort level or patient's stated pain goal  Description: INTERVENTIONS:  - Encourage pt to monitor pain and request assistance  - Assess pain using appropriate pain scale  - Administer analgesics based on type and severity of pain and evaluate response  - Implement non-pharmacological measures as appropriate and evaluate response  - Consider cultural and social influences on pain and pain management  - Manage/alleviate anxiety  - Utilize distraction and/or relaxation techniques  - Monitor for opioid side effects  - Notify MD/LIP if interventions unsuccessful or patient reports new pain  - Anticipate increased pain with activity and pre-medicate as appropriate  Outcome: Progressing     Problem: SAFETY ADULT - FALL  Goal: Free from fall injury  Description: INTERVENTIONS:  - Assess pt frequently for physical needs  - Identify cognitive and physical deficits and behaviors that affect risk of falls.   - [place] fall precautions as indicated by assessment.  - Educate pt/family on patient safety including physical limitations  - Instruct pt to call for assistance with activity based on assessment  - Modify environment to reduce risk of injury  - Provide assistive devices as appropriate  - Consider OT/PT consult to assist with strengthening/mobility  - Encourage toileting schedule  Outcome: Progressing     Problem: DISCHARGE PLANNING  Goal: Discharge to home or other facility with appropriate resources  Description: INTERVENTIONS:  - Identify barriers to discharge w/pt and caregiver  - Include patient/family/discharge partner in discharge planning  - Arrange for needed discharge resources and transportation as appropriate  - Identify discharge learning needs (meds, wound care, etc)  - Arrange for interpreters to assist at discharge as needed  - Consider post-discharge preferences of patient/family/discharge partner  - Complete POLST form as appropriate  - Assess patient's ability to be responsible for managing their own health  - Refer to Case Management Department for coordinating discharge planning if the patient needs post-hospital services based on physician/LIP order or complex needs related to functional status, cognitive ability or social support system  Outcome: Progressing     Problem: RESPIRATORY - ADULT  Goal: Achieves optimal ventilation and oxygenation  Description: INTERVENTIONS:  - Assess for changes in respiratory status  - Assess for changes in mentation and behavior  - Position to facilitate oxygenation and minimize respiratory effort  - Oxygen supplementation based on oxygen saturation or ABGs  - Provide Smoking Cessation handout, if applicable  - Encourage broncho-pulmonary hygiene including cough, deep breathe, Incentive Spirometry  - Assess the need for suctioning and perform as needed  - Assess and instruct to report SOB or any respiratory difficulty  - Respiratory Therapy support as indicated  - Manage/alleviate anxiety  - Monitor for signs/symptoms of CO2 retention  Outcome: Progressing     Problem: METABOLIC/FLUID AND ELECTROLYTES - ADULT  Goal: Glucose maintained within prescribed range  Description: INTERVENTIONS:  - Monitor Blood Glucose as ordered  - Assess for signs and symptoms of hyperglycemia and hypoglycemia  - Administer ordered medications to maintain glucose within target range  - Assess barriers to adequate nutritional intake and initiate nutrition consult as needed  - Instruct patient on self management of diabetes  Outcome: Progressing  Goal: Electrolytes maintained within normal limits  Description: INTERVENTIONS:  - Monitor labs and rhythm and assess patient for signs and symptoms of electrolyte imbalances  - Administer electrolyte replacement as ordered  - Monitor response to electrolyte replacements, including rhythm and repeat lab results as appropriate  - Fluid restriction as ordered  - Instruct patient on fluid and nutrition restrictions as appropriate  Outcome: Progressing  Goal: Hemodynamic stability and optimal renal function maintained  Description: INTERVENTIONS:  - Monitor labs and assess for signs and symptoms of volume excess or deficit  - Monitor intake, output and patient weight  - Monitor urine specific gravity, serum osmolarity and serum sodium as indicated or ordered  - Monitor response to interventions for patient's volume status, including labs, urine output, blood pressure (other measures as available)  - Encourage oral intake as appropriate  - Instruct patient on fluid and nutrition restrictions as appropriate  Outcome: Progressing     Problem: SKIN/TISSUE INTEGRITY - ADULT  Goal: Skin integrity remains intact  Description: INTERVENTIONS  - Assess and document risk factors for pressure ulcer development  - Assess and document skin integrity  - Monitor for areas of redness and/or skin breakdown  - Initiate interventions, skin care algorithm/standards of care as needed  Outcome: Progressing     Problem: Diabetes/Glucose Control  Goal: Glucose maintained within prescribed range  Description: INTERVENTIONS:  - Monitor Blood Glucose as ordered  - Assess for signs and symptoms of hyperglycemia and hypoglycemia  - Administer ordered medications to maintain glucose within target range  - Assess barriers to adequate nutritional intake and initiate nutrition consult as needed  - Instruct patient on self management of diabetes  Outcome: Progressing       Monitoring vital signs- stable at this time. Monitored blood glucose levels. No acute changes noted at this time. Safety and fall precautions maintained- bed alarm on, bed locked in lowest position, call light within reach. Frequent rounding by nursing staff. Plan for dialysis today.

## 2022-10-29 NOTE — PLAN OF CARE
Problem: Patient Centered Care  Goal: Patient preferences are identified and integrated in the patient's plan of care  Description: Interventions:  - What would you like us to know as we care for you? From home with wife  - Provide timely, complete, and accurate information to patient/family  - Incorporate patient and family knowledge, values, beliefs, and cultural backgrounds into the planning and delivery of care  - Encourage patient/family to participate in care and decision-making at the level they choose  - Honor patient and family perspectives and choices  Outcome: Progressing     Problem: PAIN - ADULT  Goal: Verbalizes/displays adequate comfort level or patient's stated pain goal  Description: INTERVENTIONS:  - Encourage pt to monitor pain and request assistance  - Assess pain using appropriate pain scale  - Administer analgesics based on type and severity of pain and evaluate response  - Implement non-pharmacological measures as appropriate and evaluate response  - Consider cultural and social influences on pain and pain management  - Manage/alleviate anxiety  - Utilize distraction and/or relaxation techniques  - Monitor for opioid side effects  - Notify MD/LIP if interventions unsuccessful or patient reports new pain  - Anticipate increased pain with activity and pre-medicate as appropriate  Outcome: Progressing     Problem: SAFETY ADULT - FALL  Goal: Free from fall injury  Description: INTERVENTIONS:  - Assess pt frequently for physical needs  - Identify cognitive and physical deficits and behaviors that affect risk of falls.   - [place] fall precautions as indicated by assessment.  - Educate pt/family on patient safety including physical limitations  - Instruct pt to call for assistance with activity based on assessment  - Modify environment to reduce risk of injury  - Provide assistive devices as appropriate  - Consider OT/PT consult to assist with strengthening/mobility  - Encourage toileting schedule  Outcome: Progressing     Problem: DISCHARGE PLANNING  Goal: Discharge to home or other facility with appropriate resources  Description: INTERVENTIONS:  - Identify barriers to discharge w/pt and caregiver  - Include patient/family/discharge partner in discharge planning  - Arrange for needed discharge resources and transportation as appropriate  - Identify discharge learning needs (meds, wound care, etc)  - Arrange for interpreters to assist at discharge as needed  - Consider post-discharge preferences of patient/family/discharge partner  - Complete POLST form as appropriate  - Assess patient's ability to be responsible for managing their own health  - Refer to Case Management Department for coordinating discharge planning if the patient needs post-hospital services based on physician/LIP order or complex needs related to functional status, cognitive ability or social support system  Outcome: Progressing     Problem: RESPIRATORY - ADULT  Goal: Achieves optimal ventilation and oxygenation  Description: INTERVENTIONS:  - Assess for changes in respiratory status  - Assess for changes in mentation and behavior  - Position to facilitate oxygenation and minimize respiratory effort  - Oxygen supplementation based on oxygen saturation or ABGs  - Provide Smoking Cessation handout, if applicable  - Encourage broncho-pulmonary hygiene including cough, deep breathe, Incentive Spirometry  - Assess the need for suctioning and perform as needed  - Assess and instruct to report SOB or any respiratory difficulty  - Respiratory Therapy support as indicated  - Manage/alleviate anxiety  - Monitor for signs/symptoms of CO2 retention  Outcome: Progressing     Problem: METABOLIC/FLUID AND ELECTROLYTES - ADULT  Goal: Glucose maintained within prescribed range  Description: INTERVENTIONS:  - Monitor Blood Glucose as ordered  - Assess for signs and symptoms of hyperglycemia and hypoglycemia  - Administer ordered medications to maintain glucose within target range  - Assess barriers to adequate nutritional intake and initiate nutrition consult as needed  - Instruct patient on self management of diabetes  Outcome: Progressing  Goal: Electrolytes maintained within normal limits  Description: INTERVENTIONS:  - Monitor labs and rhythm and assess patient for signs and symptoms of electrolyte imbalances  - Administer electrolyte replacement as ordered  - Monitor response to electrolyte replacements, including rhythm and repeat lab results as appropriate  - Fluid restriction as ordered  - Instruct patient on fluid and nutrition restrictions as appropriate  Outcome: Progressing  Goal: Hemodynamic stability and optimal renal function maintained  Description: INTERVENTIONS:  - Monitor labs and assess for signs and symptoms of volume excess or deficit  - Monitor intake, output and patient weight  - Monitor urine specific gravity, serum osmolarity and serum sodium as indicated or ordered  - Monitor response to interventions for patient's volume status, including labs, urine output, blood pressure (other measures as available)  - Encourage oral intake as appropriate  - Instruct patient on fluid and nutrition restrictions as appropriate  Outcome: Progressing     Problem: SKIN/TISSUE INTEGRITY - ADULT  Goal: Skin integrity remains intact  Description: INTERVENTIONS  - Assess and document risk factors for pressure ulcer development  - Assess and document skin integrity  - Monitor for areas of redness and/or skin breakdown  - Initiate interventions, skin care algorithm/standards of care as needed  Outcome: Progressing     Problem: Diabetes/Glucose Control  Goal: Glucose maintained within prescribed range  Description: INTERVENTIONS:  - Monitor Blood Glucose as ordered  - Assess for signs and symptoms of hyperglycemia and hypoglycemia  - Administer ordered medications to maintain glucose within target range  - Assess barriers to adequate nutritional intake and initiate nutrition consult as needed  - Instruct patient on self management of diabetes  Outcome: Progressing     Patient A/Ox4, up in chair, independent ambulates in room, reinforce safety. Continue IV antibiotic. Insulin therapy. Pain manage as needed. Dialysis today, removed 2L, tolerated, held BP meds, will continue to monitor. MRSA nares recollected today results pending. ID consult. Held heparin subcutaneous patient is [place] reinforced patient to ambulate. Call light within reach.

## 2022-10-29 NOTE — PROGRESS NOTES
[facility] Pharmacy Note:  Renal Adjustment for piperacillin/tazobactam (Chet Leyden)    Trina Hernández is a 74year old patient who has been prescribed piperacillin/tazobactam (ZOSYN) 3.375 g every 8 hrs. The estimated creatinine clearance is 12.2 mL/min (A) (based on SCr of 5.85 mg/dL (H)). The dose has been adjusted to piperacillin/tazobactam (ZOSYN) 4.5 g every 12 hrs per hospital renal dose adjustment protocol for treatment of sepsis. Pharmacy will follow and adjust dose as warranted for additional renal function changes.     Thank you,    Rufus Schaffer PharmD  10/28/2022  8:07 PM

## 2022-10-30 VITALS
SYSTOLIC BLOOD PRESSURE: 116 MMHG | RESPIRATION RATE: 18 BRPM | DIASTOLIC BLOOD PRESSURE: 68 MMHG | OXYGEN SATURATION: 95 % | WEIGHT: 239.81 LBS | TEMPERATURE: 98 F | HEART RATE: 81 BPM | HEIGHT: 72 IN | BODY MASS INDEX: 32.48 KG/M2

## 2022-10-30 PROBLEM — R91.8 PULMONARY NODULES: Status: ACTIVE | Noted: 2022-10-30

## 2022-10-30 PROBLEM — R22.0 CHEEK SWELLING: Status: RESOLVED | Noted: 2022-10-28 | Resolved: 2022-10-30

## 2022-10-30 PROBLEM — E04.1 THYROID NODULE: Status: ACTIVE | Noted: 2022-10-30

## 2022-10-30 PROBLEM — B34.8 RHINOVIRUS INFECTION: Status: ACTIVE | Noted: 2022-10-30

## 2022-10-30 PROBLEM — R50.9 ACUTE FEBRILE ILLNESS: Status: RESOLVED | Noted: 2022-10-28 | Resolved: 2022-10-30

## 2022-10-30 LAB
ALBUMIN SERPL-MCNC: 3.2 G/DL (ref 3.4–5)
ANION GAP SERPL CALC-SCNC: 7 MMOL/L (ref 0–18)
BUN BLD-MCNC: 40 MG/DL (ref 7–18)
BUN/CREAT SERPL: 8.1 (ref 10–20)
CALCIUM BLD-MCNC: 8.5 MG/DL (ref 8.5–10.1)
CHLORIDE SERPL-SCNC: 103 MMOL/L (ref 98–112)
CO2 SERPL-SCNC: 28 MMOL/L (ref 21–32)
CREAT BLD-MCNC: 4.91 MG/DL
GFR SERPLBLD BASED ON 1.73 SQ M-ARVRAT: 12 ML/MIN/1.73M2 (ref 60–?)
GLUCOSE BLD-MCNC: 127 MG/DL (ref 70–99)
GLUCOSE BLDC GLUCOMTR-MCNC: 163 MG/DL (ref 70–99)
MAGNESIUM SERPL-MCNC: 2.2 MG/DL (ref 1.6–2.6)
OSMOLALITY SERPL CALC.SUM OF ELEC: 297 MOSM/KG (ref 275–295)
PHOSPHATE SERPL-MCNC: 5.7 MG/DL (ref 2.5–4.9)
POTASSIUM SERPL-SCNC: 4.5 MMOL/L (ref 3.5–5.1)
SODIUM SERPL-SCNC: 138 MMOL/L (ref 136–145)

## 2022-10-30 PROCEDURE — 80069 RENAL FUNCTION PANEL: CPT | Performed by: INTERNAL MEDICINE

## 2022-10-30 PROCEDURE — 82962 GLUCOSE BLOOD TEST: CPT

## 2022-10-30 PROCEDURE — 83735 ASSAY OF MAGNESIUM: CPT | Performed by: INTERNAL MEDICINE

## 2022-10-30 RX ORDER — CEFADROXIL 500 MG/1
500 CAPSULE ORAL 2 TIMES DAILY
Qty: 10 CAPSULE | Refills: 0 | Status: SHIPPED | OUTPATIENT
Start: 2022-10-30 | End: 2022-11-04

## 2022-10-30 RX ORDER — TORSEMIDE 5 MG/1
5 TABLET ORAL EVERY MORNING
Status: SHIPPED | COMMUNITY
Start: 2022-10-30

## 2022-10-30 RX ORDER — INSULIN GLARGINE 100 [IU]/ML
55 INJECTION, SOLUTION SUBCUTANEOUS EVERY MORNING
Status: SHIPPED | COMMUNITY
Start: 2022-10-30

## 2022-10-30 NOTE — PLAN OF CARE
No acute changes overnight. PRN medication given for cough. IV antibiotics continued. All safety measures are in place. Call light is within reach. Problem: Patient/Family Goals  Goal: Patient/Family Long Term Goal  Description: Patient's Long Term Goal: Discharge from the hospital    Interventions:  - Monitor vital signs  - Monitor appropriate labs  - Monitor blood glucose levels  - Administer medications per order  - Pain management as needed  - Follow MD orders  - Diagnostics per orders  - Dialysis per orders  - Update / inform patient and family on plan of care  - Discharge planning  - See additional Care Plan goals for specific interventions  Outcome: Progressing  Goal: Patient/Family Short Term Goal  Description: Patient's Short Term Goal: Feel better    Interventions:   - Monitor vital signs  - Monitor appropriate labs  - Monitor blood glucose levels  - Administer medications per order  - Pain management as needed  - Follow MD orders  - Diagnostics per orders  - Dialysis per orders  - Update / inform patient and family on plan of care  - See additional Care Plan goals for specific interventions  Outcome: Progressing     Problem: Patient Centered Care  Goal: Patient preferences are identified and integrated in the patient's plan of care  Description: Interventions:  - What would you like us to know as we care for you?  From home with wife  - Provide timely, complete, and accurate information to patient/family  - Incorporate patient and family knowledge, values, beliefs, and cultural backgrounds into the planning and delivery of care  - Encourage patient/family to participate in care and decision-making at the level they choose  - Honor patient and family perspectives and choices  Outcome: Progressing     Problem: PAIN - ADULT  Goal: Verbalizes/displays adequate comfort level or patient's stated pain goal  Description: INTERVENTIONS:  - Encourage pt to monitor pain and request assistance  - Assess pain using appropriate pain scale  - Administer analgesics based on type and severity of pain and evaluate response  - Implement non-pharmacological measures as appropriate and evaluate response  - Consider cultural and social influences on pain and pain management  - Manage/alleviate anxiety  - Utilize distraction and/or relaxation techniques  - Monitor for opioid side effects  - Notify MD/LIP if interventions unsuccessful or patient reports new pain  - Anticipate increased pain with activity and pre-medicate as appropriate  Outcome: Progressing     Problem: SAFETY ADULT - FALL  Goal: Free from fall injury  Description: INTERVENTIONS:  - Assess pt frequently for physical needs  - Identify cognitive and physical deficits and behaviors that affect risk of falls.   - [place] fall precautions as indicated by assessment.  - Educate pt/family on patient safety including physical limitations  - Instruct pt to call for assistance with activity based on assessment  - Modify environment to reduce risk of injury  - Provide assistive devices as appropriate  - Consider OT/PT consult to assist with strengthening/mobility  - Encourage toileting schedule  Outcome: Progressing     Problem: DISCHARGE PLANNING  Goal: Discharge to home or other facility with appropriate resources  Description: INTERVENTIONS:  - Identify barriers to discharge w/pt and caregiver  - Include patient/family/discharge partner in discharge planning  - Arrange for needed discharge resources and transportation as appropriate  - Identify discharge learning needs (meds, wound care, etc)  - Arrange for interpreters to assist at discharge as needed  - Consider post-discharge preferences of patient/family/discharge partner  - Complete POLST form as appropriate  - Assess patient's ability to be responsible for managing their own health  - Refer to Case Management Department for coordinating discharge planning if the patient needs post-hospital services based on physician/LIP order or complex needs related to functional status, cognitive ability or social support system  Outcome: Progressing     Problem: RESPIRATORY - ADULT  Goal: Achieves optimal ventilation and oxygenation  Description: INTERVENTIONS:  - Assess for changes in respiratory status  - Assess for changes in mentation and behavior  - Position to facilitate oxygenation and minimize respiratory effort  - Oxygen supplementation based on oxygen saturation or ABGs  - Provide Smoking Cessation handout, if applicable  - Encourage broncho-pulmonary hygiene including cough, deep breathe, Incentive Spirometry  - Assess the need for suctioning and perform as needed  - Assess and instruct to report SOB or any respiratory difficulty  - Respiratory Therapy support as indicated  - Manage/alleviate anxiety  - Monitor for signs/symptoms of CO2 retention  Outcome: Progressing     Problem: METABOLIC/FLUID AND ELECTROLYTES - ADULT  Goal: Glucose maintained within prescribed range  Description: INTERVENTIONS:  - Monitor Blood Glucose as ordered  - Assess for signs and symptoms of hyperglycemia and hypoglycemia  - Administer ordered medications to maintain glucose within target range  - Assess barriers to adequate nutritional intake and initiate nutrition consult as needed  - Instruct patient on self management of diabetes  Outcome: Progressing  Goal: Electrolytes maintained within normal limits  Description: INTERVENTIONS:  - Monitor labs and rhythm and assess patient for signs and symptoms of electrolyte imbalances  - Administer electrolyte replacement as ordered  - Monitor response to electrolyte replacements, including rhythm and repeat lab results as appropriate  - Fluid restriction as ordered  - Instruct patient on fluid and nutrition restrictions as appropriate  Outcome: Progressing  Goal: Hemodynamic stability and optimal renal function maintained  Description: INTERVENTIONS:  - Monitor labs and assess for signs and symptoms of volume excess or deficit  - Monitor intake, output and patient weight  - Monitor urine specific gravity, serum osmolarity and serum sodium as indicated or ordered  - Monitor response to interventions for patient's volume status, including labs, urine output, blood pressure (other measures as available)  - Encourage oral intake as appropriate  - Instruct patient on fluid and nutrition restrictions as appropriate  Outcome: Progressing     Problem: SKIN/TISSUE INTEGRITY - ADULT  Goal: Skin integrity remains intact  Description: INTERVENTIONS  - Assess and document risk factors for pressure ulcer development  - Assess and document skin integrity  - Monitor for areas of redness and/or skin breakdown  - Initiate interventions, skin care algorithm/standards of care as needed  Outcome: Progressing     Problem: Diabetes/Glucose Control  Goal: Glucose maintained within prescribed range  Description: INTERVENTIONS:  - Monitor Blood Glucose as ordered  - Assess for signs and symptoms of hyperglycemia and hypoglycemia  - Administer ordered medications to maintain glucose within target range  - Assess barriers to adequate nutritional intake and initiate nutrition consult as needed  - Instruct patient on self management of diabetes  Outcome: Progressing

## 2022-10-30 NOTE — PLAN OF CARE
Problem: Patient Centered Care  Goal: Patient preferences are identified and integrated in the patient's plan of care  Description: Interventions:  - What would you like us to know as we care for you? From home with wife  - Provide timely, complete, and accurate information to patient/family  - Incorporate patient and family knowledge, values, beliefs, and cultural backgrounds into the planning and delivery of care  - Encourage patient/family to participate in care and decision-making at the level they choose  - Honor patient and family perspectives and choices  Outcome: Progressing     Problem: PAIN - ADULT  Goal: Verbalizes/displays adequate comfort level or patient's stated pain goal  Description: INTERVENTIONS:  - Encourage pt to monitor pain and request assistance  - Assess pain using appropriate pain scale  - Administer analgesics based on type and severity of pain and evaluate response  - Implement non-pharmacological measures as appropriate and evaluate response  - Consider cultural and social influences on pain and pain management  - Manage/alleviate anxiety  - Utilize distraction and/or relaxation techniques  - Monitor for opioid side effects  - Notify MD/LIP if interventions unsuccessful or patient reports new pain  - Anticipate increased pain with activity and pre-medicate as appropriate  Outcome: Progressing     Problem: SAFETY ADULT - FALL  Goal: Free from fall injury  Description: INTERVENTIONS:  - Assess pt frequently for physical needs  - Identify cognitive and physical deficits and behaviors that affect risk of falls.   - [place] fall precautions as indicated by assessment.  - Educate pt/family on patient safety including physical limitations  - Instruct pt to call for assistance with activity based on assessment  - Modify environment to reduce risk of injury  - Provide assistive devices as appropriate  - Consider OT/PT consult to assist with strengthening/mobility  - Encourage toileting schedule  Outcome: Progressing     Problem: DISCHARGE PLANNING  Goal: Discharge to home or other facility with appropriate resources  Description: INTERVENTIONS:  - Identify barriers to discharge w/pt and caregiver  - Include patient/family/discharge partner in discharge planning  - Arrange for needed discharge resources and transportation as appropriate  - Identify discharge learning needs (meds, wound care, etc)  - Arrange for interpreters to assist at discharge as needed  - Consider post-discharge preferences of patient/family/discharge partner  - Complete POLST form as appropriate  - Assess patient's ability to be responsible for managing their own health  - Refer to Case Management Department for coordinating discharge planning if the patient needs post-hospital services based on physician/LIP order or complex needs related to functional status, cognitive ability or social support system  Outcome: Progressing     Problem: RESPIRATORY - ADULT  Goal: Achieves optimal ventilation and oxygenation  Description: INTERVENTIONS:  - Assess for changes in respiratory status  - Assess for changes in mentation and behavior  - Position to facilitate oxygenation and minimize respiratory effort  - Oxygen supplementation based on oxygen saturation or ABGs  - Provide Smoking Cessation handout, if applicable  - Encourage broncho-pulmonary hygiene including cough, deep breathe, Incentive Spirometry  - Assess the need for suctioning and perform as needed  - Assess and instruct to report SOB or any respiratory difficulty  - Respiratory Therapy support as indicated  - Manage/alleviate anxiety  - Monitor for signs/symptoms of CO2 retention  Outcome: Progressing     Problem: METABOLIC/FLUID AND ELECTROLYTES - ADULT  Goal: Glucose maintained within prescribed range  Description: INTERVENTIONS:  - Monitor Blood Glucose as ordered  - Assess for signs and symptoms of hyperglycemia and hypoglycemia  - Administer ordered medications to maintain glucose within target range  - Assess barriers to adequate nutritional intake and initiate nutrition consult as needed  - Instruct patient on self management of diabetes  Outcome: Progressing  Goal: Electrolytes maintained within normal limits  Description: INTERVENTIONS:  - Monitor labs and rhythm and assess patient for signs and symptoms of electrolyte imbalances  - Administer electrolyte replacement as ordered  - Monitor response to electrolyte replacements, including rhythm and repeat lab results as appropriate  - Fluid restriction as ordered  - Instruct patient on fluid and nutrition restrictions as appropriate  Outcome: Progressing  Goal: Hemodynamic stability and optimal renal function maintained  Description: INTERVENTIONS:  - Monitor labs and assess for signs and symptoms of volume excess or deficit  - Monitor intake, output and patient weight  - Monitor urine specific gravity, serum osmolarity and serum sodium as indicated or ordered  - Monitor response to interventions for patient's volume status, including labs, urine output, blood pressure (other measures as available)  - Encourage oral intake as appropriate  - Instruct patient on fluid and nutrition restrictions as appropriate  Outcome: Progressing     Problem: SKIN/TISSUE INTEGRITY - ADULT  Goal: Skin integrity remains intact  Description: INTERVENTIONS  - Assess and document risk factors for pressure ulcer development  - Assess and document skin integrity  - Monitor for areas of redness and/or skin breakdown  - Initiate interventions, skin care algorithm/standards of care as needed  Outcome: Progressing     Problem: Diabetes/Glucose Control  Goal: Glucose maintained within prescribed range  Description: INTERVENTIONS:  - Monitor Blood Glucose as ordered  - Assess for signs and symptoms of hyperglycemia and hypoglycemia  - Administer ordered medications to maintain glucose within target range  - Assess barriers to adequate nutritional intake and initiate nutrition consult as needed  - Instruct patient on self management of diabetes  Outcome: Progressing     Patient A/Ox4, ambulates in room. Denies pain. Isolation. Patient is medically clear for discharge. Call light within reach.

## 2022-11-02 ENCOUNTER — PATIENT OUTREACH (OUTPATIENT)
Dept: CASE MANAGEMENT | Age: 72
End: 2022-11-02

## 2022-11-06 ENCOUNTER — APPOINTMENT (OUTPATIENT)
Dept: GENERAL RADIOLOGY | Facility: HOSPITAL | Age: 72
End: 2022-11-06
Attending: EMERGENCY MEDICINE
Payer: MEDICARE

## 2022-11-06 ENCOUNTER — HOSPITAL ENCOUNTER (EMERGENCY)
Facility: HOSPITAL | Age: 72
Discharge: HOME | End: 2022-11-06
Attending: EMERGENCY MEDICINE
Payer: MEDICARE

## 2022-11-06 VITALS
TEMPERATURE: 98 F | BODY MASS INDEX: 31.83 KG/M2 | WEIGHT: 235 LBS | HEART RATE: 104 BPM | DIASTOLIC BLOOD PRESSURE: 69 MMHG | OXYGEN SATURATION: 100 % | HEIGHT: 72 IN | RESPIRATION RATE: 20 BRPM | SYSTOLIC BLOOD PRESSURE: 137 MMHG

## 2022-11-06 DIAGNOSIS — U07.1 COVID-19: Primary | ICD-10-CM

## 2022-11-06 LAB
FLUAV + FLUBV RNA SPEC NAA+PROBE: NEGATIVE
FLUAV + FLUBV RNA SPEC NAA+PROBE: NEGATIVE
RSV RNA SPEC NAA+PROBE: NEGATIVE
SARS-COV-2 RNA RESP QL NAA+PROBE: DETECTED

## 2022-11-06 PROCEDURE — 0241U SARS-COV-2/FLU A AND B/RSV BY PCR (GENEXPERT): CPT | Performed by: EMERGENCY MEDICINE

## 2022-11-06 PROCEDURE — 99284 EMERGENCY DEPT VISIT MOD MDM: CPT

## 2022-11-06 PROCEDURE — 71045 X-RAY EXAM CHEST 1 VIEW: CPT | Performed by: EMERGENCY MEDICINE

## 2022-11-06 PROCEDURE — 94644 CONT INHLJ TX 1ST HOUR: CPT

## 2022-11-06 RX ORDER — BENZOCAINE AND DEXTROMETHORPHAN HYDROBROMIDE 7.5; 5 MG/1; MG/1
1 LOZENGE ORAL
Qty: 20 LOZENGE | Refills: 0 | Status: SHIPPED | OUTPATIENT
Start: 2022-11-06 | End: 2022-11-13

## 2022-11-06 RX ORDER — PREDNISONE 20 MG/1
40 TABLET ORAL DAILY
Qty: 10 TABLET | Refills: 0 | Status: SHIPPED | OUTPATIENT
Start: 2022-11-06 | End: 2022-11-11

## 2022-11-06 RX ORDER — ALBUTEROL SULFATE 2.5 MG/3ML
5 SOLUTION RESPIRATORY (INHALATION) ONCE
Status: COMPLETED | OUTPATIENT
Start: 2022-11-06 | End: 2022-11-06

## 2022-11-06 RX ORDER — ALBUTEROL SULFATE 90 UG/1
2 AEROSOL, METERED RESPIRATORY (INHALATION) EVERY 4 HOURS PRN
Qty: 1 EACH | Refills: 0 | Status: SHIPPED | OUTPATIENT
Start: 2022-11-06 | End: 2022-12-06

## 2022-11-07 NOTE — ED QUICK NOTES
Pt and patient's son provided discharge instructions and verbalized understanding. All questions and concerns addressed prior to patient leaving.  Pt wheeled out of ED in wheelchair by son

## 2022-11-07 NOTE — ED INITIAL ASSESSMENT (HPI)
Pt ambulatory to ed c/o cough x 2 wks. Pt states he was seen a week ago and cough has not gotten better. Pt denies fever.

## 2022-11-14 ENCOUNTER — HOSPITAL ENCOUNTER (INPATIENT)
Facility: HOSPITAL | Age: 72
LOS: 4 days | Discharge: HOME HEALTH | End: 2022-11-19
Attending: EMERGENCY MEDICINE | Admitting: INTERNAL MEDICINE
Payer: MEDICARE

## 2022-11-14 ENCOUNTER — APPOINTMENT (OUTPATIENT)
Dept: GENERAL RADIOLOGY | Facility: HOSPITAL | Age: 72
End: 2022-11-14
Attending: EMERGENCY MEDICINE
Payer: MEDICARE

## 2022-11-14 DIAGNOSIS — R79.9 ELEVATED BUN: Primary | ICD-10-CM

## 2022-11-14 DIAGNOSIS — R05.2 SUBACUTE COUGH: ICD-10-CM

## 2022-11-14 LAB
BASOPHILS # BLD AUTO: 0.02 X10(3) UL (ref 0–0.2)
BASOPHILS NFR BLD AUTO: 0.2 %
DEPRECATED RDW RBC AUTO: 46.8 FL (ref 35.1–46.3)
EOSINOPHIL # BLD AUTO: 0 X10(3) UL (ref 0–0.7)
EOSINOPHIL NFR BLD AUTO: 0 %
ERYTHROCYTE [DISTWIDTH] IN BLOOD BY AUTOMATED COUNT: 13 % (ref 11–15)
GLUCOSE BLDC GLUCOMTR-MCNC: 389 MG/DL (ref 70–99)
HCT VFR BLD AUTO: 35.5 %
HGB BLD-MCNC: 11 G/DL
IMM GRANULOCYTES # BLD AUTO: 0.04 X10(3) UL (ref 0–1)
IMM GRANULOCYTES NFR BLD: 0.4 %
LYMPHOCYTES # BLD AUTO: 1.11 X10(3) UL (ref 1–4)
LYMPHOCYTES NFR BLD AUTO: 10.8 %
MCH RBC QN AUTO: 30.5 PG (ref 26–34)
MCHC RBC AUTO-ENTMCNC: 31 G/DL (ref 31–37)
MCV RBC AUTO: 98.3 FL
MONOCYTES # BLD AUTO: 0.45 X10(3) UL (ref 0.1–1)
MONOCYTES NFR BLD AUTO: 4.4 %
NEUTROPHILS # BLD AUTO: 8.67 X10 (3) UL (ref 1.5–7.7)
NEUTROPHILS # BLD AUTO: 8.67 X10(3) UL (ref 1.5–7.7)
NEUTROPHILS NFR BLD AUTO: 84.2 %
PLATELET # BLD AUTO: 161 10(3)UL (ref 150–450)
RBC # BLD AUTO: 3.61 X10(6)UL
WBC # BLD AUTO: 10.3 X10(3) UL (ref 4–11)

## 2022-11-14 PROCEDURE — 93010 ELECTROCARDIOGRAM REPORT: CPT | Performed by: EMERGENCY MEDICINE

## 2022-11-14 PROCEDURE — 36415 COLL VENOUS BLD VENIPUNCTURE: CPT

## 2022-11-14 PROCEDURE — 93010 ELECTROCARDIOGRAM REPORT: CPT

## 2022-11-14 PROCEDURE — 99285 EMERGENCY DEPT VISIT HI MDM: CPT

## 2022-11-14 PROCEDURE — 71045 X-RAY EXAM CHEST 1 VIEW: CPT | Performed by: EMERGENCY MEDICINE

## 2022-11-14 PROCEDURE — 85025 COMPLETE CBC W/AUTO DIFF WBC: CPT | Performed by: EMERGENCY MEDICINE

## 2022-11-14 PROCEDURE — 93005 ELECTROCARDIOGRAM TRACING: CPT

## 2022-11-14 PROCEDURE — 82962 GLUCOSE BLOOD TEST: CPT

## 2022-11-14 PROCEDURE — 80048 BASIC METABOLIC PNL TOTAL CA: CPT | Performed by: EMERGENCY MEDICINE

## 2022-11-14 PROCEDURE — 83735 ASSAY OF MAGNESIUM: CPT | Performed by: EMERGENCY MEDICINE

## 2022-11-15 PROBLEM — R79.9 ELEVATED BUN: Status: ACTIVE | Noted: 2022-11-15

## 2022-11-15 PROBLEM — R05.2 SUBACUTE COUGH: Status: ACTIVE | Noted: 2022-11-15

## 2022-11-15 LAB
ALBUMIN SERPL-MCNC: 2.2 G/DL (ref 3.4–5)
ALBUMIN/GLOB SERPL: 0.5 {RATIO} (ref 1–2)
ALP LIVER SERPL-CCNC: 79 U/L
ALT SERPL-CCNC: 32 U/L
ANION GAP SERPL CALC-SCNC: 11 MMOL/L (ref 0–18)
ANION GAP SERPL CALC-SCNC: 11 MMOL/L (ref 0–18)
AST SERPL-CCNC: 39 U/L (ref 15–37)
ATRIAL RATE: 94 BPM
BASOPHILS # BLD AUTO: 0.02 X10(3) UL (ref 0–0.2)
BASOPHILS NFR BLD AUTO: 0.2 %
BILIRUB SERPL-MCNC: 0.4 MG/DL (ref 0.1–2)
BUN BLD-MCNC: 82 MG/DL (ref 7–18)
BUN BLD-MCNC: 87 MG/DL (ref 7–18)
BUN/CREAT SERPL: 11.6 (ref 10–20)
BUN/CREAT SERPL: 12.1 (ref 10–20)
CALCIUM BLD-MCNC: 7.7 MG/DL (ref 8.5–10.1)
CALCIUM BLD-MCNC: 8.4 MG/DL (ref 8.5–10.1)
CHLORIDE SERPL-SCNC: 110 MMOL/L (ref 98–112)
CHLORIDE SERPL-SCNC: 112 MMOL/L (ref 98–112)
CO2 SERPL-SCNC: 19 MMOL/L (ref 21–32)
CO2 SERPL-SCNC: 22 MMOL/L (ref 21–32)
CREAT BLD-MCNC: 7.05 MG/DL
CREAT BLD-MCNC: 7.19 MG/DL
DEPRECATED RDW RBC AUTO: 51.9 FL (ref 35.1–46.3)
EOSINOPHIL # BLD AUTO: 0.03 X10(3) UL (ref 0–0.7)
EOSINOPHIL NFR BLD AUTO: 0.3 %
ERYTHROCYTE [DISTWIDTH] IN BLOOD BY AUTOMATED COUNT: 13.4 % (ref 11–15)
GFR SERPLBLD BASED ON 1.73 SQ M-ARVRAT: 7 ML/MIN/1.73M2 (ref 60–?)
GFR SERPLBLD BASED ON 1.73 SQ M-ARVRAT: 8 ML/MIN/1.73M2 (ref 60–?)
GLOBULIN PLAS-MCNC: 4.2 G/DL (ref 2.8–4.4)
GLUCOSE BLD-MCNC: 121 MG/DL (ref 70–99)
GLUCOSE BLD-MCNC: 388 MG/DL (ref 70–99)
GLUCOSE BLDC GLUCOMTR-MCNC: 122 MG/DL (ref 70–99)
GLUCOSE BLDC GLUCOMTR-MCNC: 155 MG/DL (ref 70–99)
GLUCOSE BLDC GLUCOMTR-MCNC: 209 MG/DL (ref 70–99)
GLUCOSE BLDC GLUCOMTR-MCNC: 330 MG/DL (ref 70–99)
GLUCOSE BLDC GLUCOMTR-MCNC: 85 MG/DL (ref 70–99)
GLUCOSE BLDC GLUCOMTR-MCNC: 99 MG/DL (ref 70–99)
HCT VFR BLD AUTO: 33.5 %
HGB BLD-MCNC: 10.2 G/DL
IMM GRANULOCYTES # BLD AUTO: 0.04 X10(3) UL (ref 0–1)
IMM GRANULOCYTES NFR BLD: 0.4 %
LYMPHOCYTES # BLD AUTO: 0.97 X10(3) UL (ref 1–4)
LYMPHOCYTES NFR BLD AUTO: 9.7 %
MAGNESIUM SERPL-MCNC: 1.5 MG/DL (ref 1.6–2.6)
MCH RBC QN AUTO: 31.9 PG (ref 26–34)
MCHC RBC AUTO-ENTMCNC: 30.4 G/DL (ref 31–37)
MCV RBC AUTO: 104.7 FL
MONOCYTES # BLD AUTO: 0.35 X10(3) UL (ref 0.1–1)
MONOCYTES NFR BLD AUTO: 3.5 %
NEUTROPHILS # BLD AUTO: 8.63 X10 (3) UL (ref 1.5–7.7)
NEUTROPHILS # BLD AUTO: 8.63 X10(3) UL (ref 1.5–7.7)
NEUTROPHILS NFR BLD AUTO: 85.9 %
OSMOLALITY SERPL CALC.SUM OF ELEC: 326 MOSM/KG (ref 275–295)
OSMOLALITY SERPL CALC.SUM OF ELEC: 333 MOSM/KG (ref 275–295)
P AXIS: 67 DEGREES
P-R INTERVAL: 194 MS
PHOSPHATE SERPL-MCNC: 5.1 MG/DL (ref 2.5–4.9)
PLATELET # BLD AUTO: 83 10(3)UL (ref 150–450)
POTASSIUM SERPL-SCNC: 4.1 MMOL/L (ref 3.5–5.1)
POTASSIUM SERPL-SCNC: 4.6 MMOL/L (ref 3.5–5.1)
PROT SERPL-MCNC: 6.4 G/DL (ref 6.4–8.2)
Q-T INTERVAL: 402 MS
QRS DURATION: 128 MS
QTC CALCULATION (BEZET): 502 MS
R AXIS: -65 DEGREES
RBC # BLD AUTO: 3.2 X10(6)UL
SARS-COV-2 RNA RESP QL NAA+PROBE: DETECTED
SODIUM SERPL-SCNC: 140 MMOL/L (ref 136–145)
SODIUM SERPL-SCNC: 145 MMOL/L (ref 136–145)
T AXIS: 99 DEGREES
VENTRICULAR RATE: 94 BPM
WBC # BLD AUTO: 10 X10(3) UL (ref 4–11)

## 2022-11-15 PROCEDURE — 90935 HEMODIALYSIS ONE EVALUATION: CPT

## 2022-11-15 PROCEDURE — 85025 COMPLETE CBC W/AUTO DIFF WBC: CPT | Performed by: INTERNAL MEDICINE

## 2022-11-15 PROCEDURE — 97165 OT EVAL LOW COMPLEX 30 MIN: CPT

## 2022-11-15 PROCEDURE — 97162 PT EVAL MOD COMPLEX 30 MIN: CPT

## 2022-11-15 PROCEDURE — 82962 GLUCOSE BLOOD TEST: CPT

## 2022-11-15 PROCEDURE — 80053 COMPREHEN METABOLIC PANEL: CPT | Performed by: INTERNAL MEDICINE

## 2022-11-15 PROCEDURE — 84100 ASSAY OF PHOSPHORUS: CPT | Performed by: INTERNAL MEDICINE

## 2022-11-15 PROCEDURE — 97530 THERAPEUTIC ACTIVITIES: CPT

## 2022-11-15 RX ORDER — HEPARIN SODIUM 5000 [USP'U]/ML
5000 INJECTION, SOLUTION INTRAVENOUS; SUBCUTANEOUS EVERY 8 HOURS SCHEDULED
Status: DISCONTINUED | OUTPATIENT
Start: 2022-11-15 | End: 2022-11-19

## 2022-11-15 RX ORDER — FINASTERIDE 5 MG/1
5 TABLET, FILM COATED ORAL DAILY
Status: DISCONTINUED | OUTPATIENT
Start: 2022-11-15 | End: 2022-11-19

## 2022-11-15 RX ORDER — HYDRALAZINE HYDROCHLORIDE 50 MG/1
50 TABLET, FILM COATED ORAL 3 TIMES DAILY
Status: DISCONTINUED | OUTPATIENT
Start: 2022-11-15 | End: 2022-11-19

## 2022-11-15 RX ORDER — NICOTINE POLACRILEX 4 MG
15 LOZENGE BUCCAL
Status: DISCONTINUED | OUTPATIENT
Start: 2022-11-15 | End: 2022-11-19

## 2022-11-15 RX ORDER — CLOPIDOGREL BISULFATE 75 MG/1
75 TABLET ORAL DAILY
Status: DISCONTINUED | OUTPATIENT
Start: 2022-11-15 | End: 2022-11-16

## 2022-11-15 RX ORDER — METOPROLOL SUCCINATE 50 MG/1
50 TABLET, EXTENDED RELEASE ORAL 2 TIMES DAILY
Status: DISCONTINUED | OUTPATIENT
Start: 2022-11-15 | End: 2022-11-19

## 2022-11-15 RX ORDER — TAMSULOSIN HYDROCHLORIDE 0.4 MG/1
0.8 CAPSULE ORAL DAILY
Status: DISCONTINUED | OUTPATIENT
Start: 2022-11-15 | End: 2022-11-19

## 2022-11-15 RX ORDER — NICOTINE POLACRILEX 4 MG
30 LOZENGE BUCCAL
Status: DISCONTINUED | OUTPATIENT
Start: 2022-11-15 | End: 2022-11-19

## 2022-11-15 RX ORDER — CALCITRIOL 0.25 UG/1
0.25 CAPSULE, LIQUID FILLED ORAL DAILY
Status: DISCONTINUED | OUTPATIENT
Start: 2022-11-15 | End: 2022-11-19

## 2022-11-15 RX ORDER — HYDRALAZINE HYDROCHLORIDE 50 MG/1
50 TABLET, FILM COATED ORAL 3 TIMES DAILY
Status: DISCONTINUED | OUTPATIENT
Start: 2022-11-15 | End: 2022-11-15

## 2022-11-15 RX ORDER — DOXEPIN HYDROCHLORIDE 50 MG/1
1 CAPSULE ORAL DAILY
Status: DISCONTINUED | OUTPATIENT
Start: 2022-11-15 | End: 2022-11-19

## 2022-11-15 RX ORDER — HYDROCODONE BITARTRATE AND ACETAMINOPHEN 5; 325 MG/1; MG/1
1-2 TABLET ORAL EVERY 6 HOURS PRN
Status: DISCONTINUED | OUTPATIENT
Start: 2022-11-15 | End: 2022-11-19

## 2022-11-15 RX ORDER — ASCORBIC ACID 500 MG
500 TABLET ORAL DAILY
Status: DISCONTINUED | OUTPATIENT
Start: 2022-11-15 | End: 2022-11-19

## 2022-11-15 RX ORDER — INSULIN LISPRO 100 [IU]/ML
14 INJECTION, SOLUTION INTRAVENOUS; SUBCUTANEOUS 3 TIMES DAILY
Status: DISCONTINUED | OUTPATIENT
Start: 2022-11-15 | End: 2022-11-15 | Stop reason: RX

## 2022-11-15 RX ORDER — ACETAMINOPHEN 500 MG
1000 TABLET ORAL EVERY 6 HOURS PRN
Status: DISCONTINUED | OUTPATIENT
Start: 2022-11-15 | End: 2022-11-19

## 2022-11-15 RX ORDER — ASPIRIN 81 MG/1
81 TABLET ORAL DAILY
Status: DISCONTINUED | OUTPATIENT
Start: 2022-11-15 | End: 2022-11-16

## 2022-11-15 RX ORDER — ALBUTEROL SULFATE 90 UG/1
2 AEROSOL, METERED RESPIRATORY (INHALATION) EVERY 4 HOURS PRN
Status: DISCONTINUED | OUTPATIENT
Start: 2022-11-15 | End: 2022-11-19

## 2022-11-15 RX ORDER — DEXTROSE MONOHYDRATE 25 G/50ML
50 INJECTION, SOLUTION INTRAVENOUS
Status: DISCONTINUED | OUTPATIENT
Start: 2022-11-15 | End: 2022-11-19

## 2022-11-15 NOTE — ED INITIAL ASSESSMENT (HPI)
Patient brought in by EMS with reports of falling out of bed and not able to get himself up. Patient reports generalized weakness. Pt. Takes baby ASA. Patient reports not having dialysis done for a week. Usual days MWF.

## 2022-11-15 NOTE — PROGRESS NOTES
Therapeutic interchange from Insulin Lispro (1 Unit Dial) SOPN  to insulin aspart (NovoLOG) 100 UNIT/ML FlexPen  per P&T approved protocol.     Citlali Peace, PharmD

## 2022-11-15 NOTE — PROGRESS NOTES
Pharmacy Note: Dietary Supplement Discontinuation Per Policy    Dextromethorphan-Benzocaine 5-7.5 MG LOZG  [facility]. This supplement may be restarted upon discharge using the medication reconciliation process.     Thank you,   Jarrett García, PharmD  11/15/2022,  2:59 AM

## 2022-11-15 NOTE — ED QUICK NOTES
Orders for admission, patient is aware of plan and ready to go upstairs. Any questions, please call ED  [facility] at [address]. Patient Covid vaccination status: Partially vaccinated     COVID Test Ordered in ED: Rapid SARS-CoV-2 by PCR    COVID Suspicion at Admission: N/A    Running Infusions:  None    Mental Status/LOC at time of transport:  AxO x4    Other pertinent information:  + COVID

## 2022-11-15 NOTE — PROGRESS NOTES
Therapeutic interchange from Insulin Lispro (1 Unit Dial) SOPN  to insulin aspart (NovoLOG) 100 UNIT/ML FlexPen  per P&T approved protocol.     Yudy Valera, PharmD

## 2022-11-15 NOTE — CM/SW NOTE
11/15/22 1600   CM/SW Referral Data   Referral Source    Reason for Referral Discharge planning   Informant Son   Pertinent Medical Hx   Does patient have an established PCP? Yes  [facility])   Significant Past Medical/Mental Health Hx ESRD on HD   Patient Info   Patient's Current Mental Status at Time of Assessment Alert;Oriented   Patient's [address]   Patient lives with Spouse/Significant other   Patient Status Prior to Admission   Independent with ADLs and Mobility Yes   Services in place prior to admission Dialysis   Dialysis Clinic [facility] [address]  [facility])   Scheduled Dialysis days M-W-F   Dialysis scheduled time 1400   Discharge Needs   Anticipated D/C needs Home health care; Outpatient dialysis   Choice of Post-Acute Provider   Informed patient of right to choose their preferred provider Yes     Pt discussed during nursing rounds. Dx MED, ESRD on HD. From home w/spouse, independent at baseline. Pt is current w/[facility] for outpatient HD, MWF at 2pm though since getting COVID-19, pt has needed to go to [facility] at 5am. PT [place], [place] agreeable to [place] recommendation. [place] referrals sent in [place], [place] completed. List of accepting agencies will be needed for choice. Plan: Home w/spouse with [place] and outpatient HD at [facility] [id number] pending agency choice and medical clearance. / to remain available for support and/or discharge planning.      ERIC Ruiz    [phone]

## 2022-11-15 NOTE — PLAN OF CARE
Problem: Patient Centered Care  Goal: Patient preferences are identified and integrated in the patient's plan of care  Description: Interventions:  - What would you like us to know as we care for you? Pt is from home w/ wife and son. - Provide timely, complete, and accurate information to patient/family  - Incorporate patient and family knowledge, values, beliefs, and cultural backgrounds into the planning and delivery of care  - Encourage patient/family to participate in care and decision-making at the level they choose  - Honor patient and family perspectives and choices  Outcome: Progressing     Problem: CARDIOVASCULAR - ADULT  Goal: Maintains optimal cardiac output and hemodynamic stability  Description: INTERVENTIONS:  - Monitor vital signs, rhythm, and trends  - Monitor for bleeding, hypotension and signs of decreased cardiac output  - Evaluate effectiveness of vasoactive medications to optimize hemodynamic stability  - Monitor arterial and/or venous puncture sites for bleeding and/or hematoma  - Assess quality of pulses, skin color and temperature  - Assess for signs of decreased coronary artery perfusion - ex.  Angina  - Evaluate fluid balance, assess for edema, trend weights  Outcome: Progressing  Goal: Absence of cardiac arrhythmias or at baseline  Description: INTERVENTIONS:  - Continuous cardiac monitoring, monitor vital signs, obtain 12 lead EKG if indicated  - Evaluate effectiveness of antiarrhythmic and heart rate control medications as ordered  - Initiate emergency measures for life threatening arrhythmias  - Monitor electrolytes and administer replacement therapy as ordered  Outcome: Progressing     Problem: RESPIRATORY - ADULT  Goal: Achieves optimal ventilation and oxygenation  Description: INTERVENTIONS:  - Assess for changes in respiratory status  - Assess for changes in mentation and behavior  - Position to facilitate oxygenation and minimize respiratory effort  - Oxygen supplementation based on oxygen saturation or ABGs  - Provide Smoking Cessation handout, if applicable  - Encourage broncho-pulmonary hygiene including cough, deep breathe, Incentive Spirometry  - Assess the need for suctioning and perform as needed  - Assess and instruct to report SOB or any respiratory difficulty  - Respiratory Therapy support as indicated  - Manage/alleviate anxiety  - Monitor for signs/symptoms of CO2 retention  Outcome: Progressing     Problem: METABOLIC/FLUID AND ELECTROLYTES - ADULT  Goal: Glucose maintained within prescribed range  Description: INTERVENTIONS:  - Monitor Blood Glucose as ordered  - Assess for signs and symptoms of hyperglycemia and hypoglycemia  - Administer ordered medications to maintain glucose within target range  - Assess barriers to adequate nutritional intake and initiate nutrition consult as needed  - Instruct patient on self management of diabetes  Outcome: Progressing  Goal: Hemodynamic stability and optimal renal function maintained  Description: INTERVENTIONS:  - Monitor labs and assess for signs and symptoms of volume excess or deficit  - Monitor intake, output and patient weight  - Monitor urine specific gravity, serum osmolarity and serum sodium as indicated or ordered  - Monitor response to interventions for patient's volume status, including labs, urine output, blood pressure (other measures as available)  - Encourage oral intake as appropriate  - Instruct patient on fluid and nutrition restrictions as appropriate  Outcome: Progressing     Problem: SKIN/TISSUE INTEGRITY - ADULT  Goal: Skin integrity remains intact  Description: INTERVENTIONS  - Assess and document risk factors for pressure ulcer development  - Assess and document skin integrity  - Monitor for areas of redness and/or skin breakdown  - Initiate interventions, skin care algorithm/standards of care as needed  Outcome: Progressing     Problem: MUSCULOSKELETAL - ADULT  Goal: Return mobility to safest level of function  Description: INTERVENTIONS:  - Assess patient stability and activity tolerance for standing, transferring and ambulating w/ or w/o assistive devices  - Assist with transfers and ambulation using safe patient handling equipment as needed  - Ensure adequate protection for wounds/incisions during mobilization  - Obtain PT/OT consults as needed  - Advance activity as appropriate  - Communicate ordered activity level and limitations with patient/family  Outcome: Progressing     Problem: Impaired Functional Mobility  Goal: Achieve highest/safest level of mobility/gait  Description: Interventions:  - Assess patient's functional ability and stability  - Promote increasing activity/tolerance for mobility and gait  - Educate and engage patient/family in tolerated activity level and precautions  - Recommend use of  RW for transfers and ambulation  Outcome: Progressing     Problem: SAFETY ADULT - FALL  Goal: Free from fall injury  Description: INTERVENTIONS:  - Assess pt frequently for physical needs  - Identify cognitive and physical deficits and behaviors that affect risk of falls. - [place] fall precautions as indicated by assessment.  - Educate pt/family on patient safety including physical limitations  - Instruct pt to call for assistance with activity based on assessment  - Modify environment to reduce risk of injury  - Provide assistive devices as appropriate  - Consider OT/PT consult to assist with strengthening/mobility  - Encourage toileting schedule  Outcome: Progressing     Problem: Patient Centered Care  Goal: Patient preferences are identified and integrated in the patient's plan of care  Description: Interventions:  - What would you like us to know as we care for you? Pt is from home w/ wife and son.    - Provide timely, complete, and accurate information to patient/family  - Incorporate patient and family knowledge, values, beliefs, and cultural backgrounds into the planning and delivery of care  - Encourage patient/family to participate in care and decision-making at the level they choose  - Honor patient and family perspectives and choices  11/15/2022 1615 by Feroz Peck RN  Outcome: Progressing  11/15/2022 1609 by Feroz Peck RN  Outcome: Progressing     Problem: CARDIOVASCULAR - ADULT  Goal: Maintains optimal cardiac output and hemodynamic stability  Description: INTERVENTIONS:  - Monitor vital signs, rhythm, and trends  - Monitor for bleeding, hypotension and signs of decreased cardiac output  - Evaluate effectiveness of vasoactive medications to optimize hemodynamic stability  - Monitor arterial and/or venous puncture sites for bleeding and/or hematoma  - Assess quality of pulses, skin color and temperature  - Assess for signs of decreased coronary artery perfusion - ex.  Angina  - Evaluate fluid balance, assess for edema, trend weights  11/15/2022 1615 by Feroz Peck RN  Outcome: Progressing  11/15/2022 1609 by Feroz Peck RN  Outcome: Progressing  Goal: Absence of cardiac arrhythmias or at baseline  Description: INTERVENTIONS:  - Continuous cardiac monitoring, monitor vital signs, obtain 12 lead EKG if indicated  - Evaluate effectiveness of antiarrhythmic and heart rate control medications as ordered  - Initiate emergency measures for life threatening arrhythmias  - Monitor electrolytes and administer replacement therapy as ordered  11/15/2022 1615 by Feroz Peck RN  Outcome: Progressing  11/15/2022 1609 by Feorz Peck RN  Outcome: Progressing     Problem: RESPIRATORY - ADULT  Goal: Achieves optimal ventilation and oxygenation  Description: INTERVENTIONS:  - Assess for changes in respiratory status  - Assess for changes in mentation and behavior  - Position to facilitate oxygenation and minimize respiratory effort  - Oxygen supplementation based on oxygen saturation or ABGs  - Provide Smoking Cessation handout, if applicable  - Encourage broncho-pulmonary hygiene including cough, deep breathe, Incentive Spirometry  - Assess the need for suctioning and perform as needed  - Assess and instruct to report SOB or any respiratory difficulty  - Respiratory Therapy support as indicated  - Manage/alleviate anxiety  - Monitor for signs/symptoms of CO2 retention  11/15/2022 1615 by Leonard Miller RN  Outcome: Progressing  11/15/2022 1609 by Leonard Miller RN  Outcome: Progressing     Problem: METABOLIC/FLUID AND ELECTROLYTES - ADULT  Goal: Glucose maintained within prescribed range  Description: INTERVENTIONS:  - Monitor Blood Glucose as ordered  - Assess for signs and symptoms of hyperglycemia and hypoglycemia  - Administer ordered medications to maintain glucose within target range  - Assess barriers to adequate nutritional intake and initiate nutrition consult as needed  - Instruct patient on self management of diabetes  11/15/2022 1615 by Leonard Miller RN  Outcome: Progressing  11/15/2022 1609 by Leonard Miller RN  Outcome: Progressing  Goal: Hemodynamic stability and optimal renal function maintained  Description: INTERVENTIONS:  - Monitor labs and assess for signs and symptoms of volume excess or deficit  - Monitor intake, output and patient weight  - Monitor urine specific gravity, serum osmolarity and serum sodium as indicated or ordered  - Monitor response to interventions for patient's volume status, including labs, urine output, blood pressure (other measures as available)  - Encourage oral intake as appropriate  - Instruct patient on fluid and nutrition restrictions as appropriate  11/15/2022 1615 by Leonard Miller RN  Outcome: Progressing  11/15/2022 1609 by Leonard Miller RN  Outcome: Progressing     Problem: SKIN/TISSUE INTEGRITY - ADULT  Goal: Skin integrity remains intact  Description: INTERVENTIONS  - Assess and document risk factors for pressure ulcer development  - Assess and document skin integrity  - Monitor for areas of redness and/or skin breakdown  - Initiate interventions, skin care algorithm/standards of care as needed  11/15/2022 1615 by Cherri Puentes RN  Outcome: Progressing  11/15/2022 1609 by Cherri Puentes RN  Outcome: Progressing     Problem: MUSCULOSKELETAL - ADULT  Goal: Return mobility to safest level of function  Description: INTERVENTIONS:  - Assess patient stability and activity tolerance for standing, transferring and ambulating w/ or w/o assistive devices  - Assist with transfers and ambulation using safe patient handling equipment as needed  - Ensure adequate protection for wounds/incisions during mobilization  - Obtain PT/OT consults as needed  - Advance activity as appropriate  - Communicate ordered activity level and limitations with patient/family  11/15/2022 1615 by Cherri Puentes RN  Outcome: Progressing  11/15/2022 1609 by Cherri Puentes RN  Outcome: Progressing     Problem: Impaired Functional Mobility  Goal: Achieve highest/safest level of mobility/gait  Description: Interventions:  - Assess patient's functional ability and stability  - Promote increasing activity/tolerance for mobility and gait  - Educate and engage patient/family in tolerated activity level and precautions  - Recommend use of  RW for transfers and ambulation  11/15/2022 1615 by Cherri Puentes RN  Outcome: Progressing  11/15/2022 1609 by Cherri Puentes RN  Outcome: Progressing     Problem: SAFETY ADULT - FALL  Goal: Free from fall injury  Description: INTERVENTIONS:  - Assess pt frequently for physical needs  - Identify cognitive and physical deficits and behaviors that affect risk of falls.   - [place] fall precautions as indicated by assessment.  - Educate pt/family on patient safety including physical limitations  - Instruct pt to call for assistance with activity based on assessment  - Modify environment to reduce risk of injury  - Provide assistive devices as appropriate  - Consider OT/PT consult to assist with strengthening/mobility  - Encourage toileting schedule  11/15/2022 1615 by Feroz Peck RN  Outcome: Progressing  11/15/2022 1609 by Feroz Peck RN  Outcome: Progressing     Problem: Patient/Family Goals  Goal: Patient/Family Long Term Goal  Description: Patient's Long Term Goal: Achieve glucose control and tolerate hemodialysis    Interventions:  - Insulin therapy  - Diet management  - Hemodialysis education  - See additional Care Plan goals for specific interventions  Outcome: Progressing  Goal: Patient/Family Short Term Goal  Description: Patient's Short Term Goal: Go home    Interventions:   - Glucose control measures  - Hemodialysis  - Home medication management  - See additional Care Plan goals for specific interventions  Outcome: Progressing

## 2022-11-15 NOTE — PLAN OF CARE
Problem: Patient Centered Care  Goal: Patient preferences are identified and integrated in the patient's plan of care  Description: Interventions:  - What would you like us to know as we care for you? Pt is from home w/ wife and son.    - Provide timely, complete, and accurate information to patient/family  - Incorporate patient and family knowledge, values, beliefs, and cultural backgrounds into the planning and delivery of care  - Encourage patient/family to participate in care and decision-making at the level they choose  - Honor patient and family perspectives and choices  Outcome: Progressing     Problem: RESPIRATORY - ADULT  Goal: Achieves optimal ventilation and oxygenation  Description: INTERVENTIONS:  - Assess for changes in respiratory status  - Assess for changes in mentation and behavior  - Position to facilitate oxygenation and minimize respiratory effort  - Oxygen supplementation based on oxygen saturation or ABGs  - Provide Smoking Cessation handout, if applicable  - Encourage broncho-pulmonary hygiene including cough, deep breathe, Incentive Spirometry  - Assess the need for suctioning and perform as needed  - Assess and instruct to report SOB or any respiratory difficulty  - Respiratory Therapy support as indicated  - Manage/alleviate anxiety  - Monitor for signs/symptoms of CO2 retention  Outcome: Progressing     Problem: SKIN/TISSUE INTEGRITY - ADULT  Goal: Skin integrity remains intact  Description: INTERVENTIONS  - Assess and document risk factors for pressure ulcer development  - Assess and document skin integrity  - Monitor for areas of redness and/or skin breakdown  - Initiate interventions, skin care algorithm/standards of care as needed  Outcome: Progressing     Problem: MUSCULOSKELETAL - ADULT  Goal: Return mobility to safest level of function  Description: INTERVENTIONS:  - Assess patient stability and activity tolerance for standing, transferring and ambulating w/ or w/o assistive devices  - Assist with transfers and ambulation using safe patient handling equipment as needed  - Ensure adequate protection for wounds/incisions during mobilization  - Obtain PT/OT consults as needed  - Advance activity as appropriate  - Communicate ordered activity level and limitations with patient/family  Outcome: Progressing     Problem: Impaired Functional Mobility  Goal: Achieve highest/safest level of mobility/gait  Description: Interventions:  - Assess patient's functional ability and stability  - Promote increasing activity/tolerance for mobility and gait  - Educate and engage patient/family in tolerated activity level and precautions  Outcome: Progressing     Problem: SAFETY ADULT - FALL  Goal: Free from fall injury  Description: INTERVENTIONS:  - Assess pt frequently for physical needs  - Identify cognitive and physical deficits and behaviors that affect risk of falls.   - [place] fall precautions as indicated by assessment.  - Educate pt/family on patient safety including physical limitations  - Instruct pt to call for assistance with activity based on assessment  - Modify environment to reduce risk of injury  - Provide assistive devices as appropriate  - Consider OT/PT consult to assist with strengthening/mobility  - Encourage toileting schedule  Outcome: Progressing     Problem: Patient/Family Goals  Goal: Patient/Family Long Term Goal  Description: Patient's Long Term Goal: Go back home    Interventions:  - Follow MD orders  -Monitor vitals, labs, test results and notify MD if abnormal  -Assist w/ ADL's  -Administer meds  -Dialysis and nephro consult  -Discharge planning  - See additional Care Plan goals for specific interventions  Outcome: Progressing  Goal: Patient/Family Short Term Goal  Description: Patient's Short Term Goal: Feel better    Interventions:   -Monitor vitals, labs, test results and notify MD if abnormal  -Assist w/ ADL's  -Administer meds  -Dialysis and nephro consult  - See additional Care Plan goals for specific interventions  Outcome: Progressing     Problem: CARDIOVASCULAR - ADULT  Goal: Maintains optimal cardiac output and hemodynamic stability  Description: INTERVENTIONS:  - Monitor vital signs, rhythm, and trends  - Monitor for bleeding, hypotension and signs of decreased cardiac output  - Evaluate effectiveness of vasoactive medications to optimize hemodynamic stability  - Monitor arterial and/or venous puncture sites for bleeding and/or hematoma  - Assess quality of pulses, skin color and temperature  - Assess for signs of decreased coronary artery perfusion - ex.  Angina  - Evaluate fluid balance, assess for edema, trend weights  Outcome: Not Progressing  Goal: Absence of cardiac arrhythmias or at baseline  Description: INTERVENTIONS:  - Continuous cardiac monitoring, monitor vital signs, obtain 12 lead EKG if indicated  - Evaluate effectiveness of antiarrhythmic and heart rate control medications as ordered  - Initiate emergency measures for life threatening arrhythmias  - Monitor electrolytes and administer replacement therapy as ordered  Outcome: Not Progressing     Problem: METABOLIC/FLUID AND ELECTROLYTES - ADULT  Goal: Glucose maintained within prescribed range  Description: INTERVENTIONS:  - Monitor Blood Glucose as ordered  - Assess for signs and symptoms of hyperglycemia and hypoglycemia  - Administer ordered medications to maintain glucose within target range  - Assess barriers to adequate nutritional intake and initiate nutrition consult as needed  - Instruct patient on self management of diabetes  Outcome: Not Progressing  Goal: Hemodynamic stability and optimal renal function maintained  Description: INTERVENTIONS:  - Monitor labs and assess for signs and symptoms of volume excess or deficit  - Monitor intake, output and patient weight  - Monitor urine specific gravity, serum osmolarity and serum sodium as indicated or ordered  - Monitor response to interventions for patient's volume status, including labs, urine output, blood pressure (other measures as available)  - Encourage oral intake as appropriate  - Instruct patient on fluid and nutrition restrictions as appropriate  Outcome: Not Progressing   Blood pressure and accucheck elevated, meds given per MD order, plan for dialysis today, nephro/pt/ot to see patient, all needs attended, all precautions are in place, frequent rounding completed, continue to monitor.

## 2022-11-16 ENCOUNTER — APPOINTMENT (OUTPATIENT)
Dept: CV DIAGNOSTICS | Facility: HOSPITAL | Age: 72
End: 2022-11-16
Attending: HOSPITALIST
Payer: MEDICARE

## 2022-11-16 LAB
ALBUMIN SERPL-MCNC: 2.2 G/DL (ref 3.4–5)
ANION GAP SERPL CALC-SCNC: 10 MMOL/L (ref 0–18)
BUN BLD-MCNC: 50 MG/DL (ref 7–18)
BUN/CREAT SERPL: 9.7 (ref 10–20)
CALCIUM BLD-MCNC: 7.9 MG/DL (ref 8.5–10.1)
CHLORIDE SERPL-SCNC: 107 MMOL/L (ref 98–112)
CO2 SERPL-SCNC: 26 MMOL/L (ref 21–32)
CREAT BLD-MCNC: 5.16 MG/DL
GFR SERPLBLD BASED ON 1.73 SQ M-ARVRAT: 11 ML/MIN/1.73M2 (ref 60–?)
GLUCOSE BLD-MCNC: 161 MG/DL (ref 70–99)
GLUCOSE BLDC GLUCOMTR-MCNC: 157 MG/DL (ref 70–99)
GLUCOSE BLDC GLUCOMTR-MCNC: 252 MG/DL (ref 70–99)
GLUCOSE BLDC GLUCOMTR-MCNC: 271 MG/DL (ref 70–99)
GLUCOSE BLDC GLUCOMTR-MCNC: 310 MG/DL (ref 70–99)
HBV SURFACE AG SER-ACNC: <0.1 [IU]/L
HBV SURFACE AG SERPL QL IA: NONREACTIVE
MAGNESIUM SERPL-MCNC: 1.7 MG/DL (ref 1.6–2.6)
OSMOLALITY SERPL CALC.SUM OF ELEC: 313 MOSM/KG (ref 275–295)
PHOSPHATE SERPL-MCNC: 4 MG/DL (ref 2.5–4.9)
POTASSIUM SERPL-SCNC: 3.3 MMOL/L (ref 3.5–5.1)
SODIUM SERPL-SCNC: 143 MMOL/L (ref 136–145)

## 2022-11-16 PROCEDURE — 82962 GLUCOSE BLOOD TEST: CPT

## 2022-11-16 PROCEDURE — 93306 TTE W/DOPPLER COMPLETE: CPT | Performed by: HOSPITALIST

## 2022-11-16 PROCEDURE — 83735 ASSAY OF MAGNESIUM: CPT | Performed by: INTERNAL MEDICINE

## 2022-11-16 PROCEDURE — 87340 HEPATITIS B SURFACE AG IA: CPT | Performed by: INTERNAL MEDICINE

## 2022-11-16 PROCEDURE — 80069 RENAL FUNCTION PANEL: CPT | Performed by: INTERNAL MEDICINE

## 2022-11-16 RX ORDER — ATORVASTATIN CALCIUM 40 MG/1
40 TABLET, FILM COATED ORAL NIGHTLY
Status: DISCONTINUED | OUTPATIENT
Start: 2022-11-16 | End: 2022-11-19

## 2022-11-16 RX ORDER — HEPARIN SODIUM 1000 [USP'U]/ML
1.5 INJECTION, SOLUTION INTRAVENOUS; SUBCUTANEOUS
Status: DISCONTINUED | OUTPATIENT
Start: 2022-11-16 | End: 2022-11-19

## 2022-11-16 RX ORDER — ASPIRIN 81 MG/1
81 TABLET ORAL DAILY
Status: DISCONTINUED | OUTPATIENT
Start: 2022-11-16 | End: 2022-11-19

## 2022-11-16 RX ORDER — BENZONATATE 100 MG/1
100 CAPSULE ORAL 3 TIMES DAILY PRN
Status: DISCONTINUED | OUTPATIENT
Start: 2022-11-16 | End: 2022-11-19

## 2022-11-16 RX ORDER — POTASSIUM CHLORIDE 20 MEQ/1
20 TABLET, EXTENDED RELEASE ORAL ONCE
Status: COMPLETED | OUTPATIENT
Start: 2022-11-16 | End: 2022-11-16

## 2022-11-16 NOTE — PLAN OF CARE
Patient alert & oriented x3 but very forgetful. Patient coughing frequently. Patient sitting up in room chair. Patient updated on POC. Problem: Patient Centered Care  Goal: Patient preferences are identified and integrated in the patient's plan of care  Description: Interventions:  - What would you like us to know as we care for you? Pt is from home w/ wife and son. - Provide timely, complete, and accurate information to patient/family  - Incorporate patient and family knowledge, values, beliefs, and cultural backgrounds into the planning and delivery of care  - Encourage patient/family to participate in care and decision-making at the level they choose  - Honor patient and family perspectives and choices  Outcome: Progressing     Problem: CARDIOVASCULAR - ADULT  Goal: Maintains optimal cardiac output and hemodynamic stability  Description: INTERVENTIONS:  - Monitor vital signs, rhythm, and trends  - Monitor for bleeding, hypotension and signs of decreased cardiac output  - Evaluate effectiveness of vasoactive medications to optimize hemodynamic stability  - Monitor arterial and/or venous puncture sites for bleeding and/or hematoma  - Assess quality of pulses, skin color and temperature  - Assess for signs of decreased coronary artery perfusion - ex.  Angina  - Evaluate fluid balance, assess for edema, trend weights  Outcome: Progressing  Goal: Absence of cardiac arrhythmias or at baseline  Description: INTERVENTIONS:  - Continuous cardiac monitoring, monitor vital signs, obtain 12 lead EKG if indicated  - Evaluate effectiveness of antiarrhythmic and heart rate control medications as ordered  - Initiate emergency measures for life threatening arrhythmias  - Monitor electrolytes and administer replacement therapy as ordered  Outcome: Progressing     Problem: RESPIRATORY - ADULT  Goal: Achieves optimal ventilation and oxygenation  Description: INTERVENTIONS:  - Assess for changes in respiratory status  - Assess for changes in mentation and behavior  - Position to facilitate oxygenation and minimize respiratory effort  - Oxygen supplementation based on oxygen saturation or ABGs  - Provide Smoking Cessation handout, if applicable  - Encourage broncho-pulmonary hygiene including cough, deep breathe, Incentive Spirometry  - Assess the need for suctioning and perform as needed  - Assess and instruct to report SOB or any respiratory difficulty  - Respiratory Therapy support as indicated  - Manage/alleviate anxiety  - Monitor for signs/symptoms of CO2 retention  Outcome: Progressing     Problem: METABOLIC/FLUID AND ELECTROLYTES - ADULT  Goal: Glucose maintained within prescribed range  Description: INTERVENTIONS:  - Monitor Blood Glucose as ordered  - Assess for signs and symptoms of hyperglycemia and hypoglycemia  - Administer ordered medications to maintain glucose within target range  - Assess barriers to adequate nutritional intake and initiate nutrition consult as needed  - Instruct patient on self management of diabetes  Outcome: Progressing  Goal: Hemodynamic stability and optimal renal function maintained  Description: INTERVENTIONS:  - Monitor labs and assess for signs and symptoms of volume excess or deficit  - Monitor intake, output and patient weight  - Monitor urine specific gravity, serum osmolarity and serum sodium as indicated or ordered  - Monitor response to interventions for patient's volume status, including labs, urine output, blood pressure (other measures as available)  - Encourage oral intake as appropriate  - Instruct patient on fluid and nutrition restrictions as appropriate  Outcome: Progressing     Problem: SKIN/TISSUE INTEGRITY - ADULT  Goal: Skin integrity remains intact  Description: INTERVENTIONS  - Assess and document risk factors for pressure ulcer development  - Assess and document skin integrity  - Monitor for areas of redness and/or skin breakdown  - Initiate interventions, skin care algorithm/standards of care as needed  Outcome: Progressing     Problem: MUSCULOSKELETAL - ADULT  Goal: Return mobility to safest level of function  Description: INTERVENTIONS:  - Assess patient stability and activity tolerance for standing, transferring and ambulating w/ or w/o assistive devices  - Assist with transfers and ambulation using safe patient handling equipment as needed  - Ensure adequate protection for wounds/incisions during mobilization  - Obtain PT/OT consults as needed  - Advance activity as appropriate  - Communicate ordered activity level and limitations with patient/family  Outcome: Progressing     Problem: Impaired Functional Mobility  Goal: Achieve highest/safest level of mobility/gait  Description: Interventions:  - Assess patient's functional ability and stability  - Promote increasing activity/tolerance for mobility and gait  - Educate and engage patient/family in tolerated activity level and precautions  Outcome: Progressing     Problem: SAFETY ADULT - FALL  Goal: Free from fall injury  Description: INTERVENTIONS:  - Assess pt frequently for physical needs  - Identify cognitive and physical deficits and behaviors that affect risk of falls.   - [place] fall precautions as indicated by assessment.  - Educate pt/family on patient safety including physical limitations  - Instruct pt to call for assistance with activity based on assessment  - Modify environment to reduce risk of injury  - Provide assistive devices as appropriate  - Consider OT/PT consult to assist with strengthening/mobility  - Encourage toileting schedule  Outcome: Progressing     Problem: Patient/Family Goals  Goal: Patient/Family Long Term Goal  Description: Patient's Long Term Goal: Achieve glucose control and tolerate hemodialysis    Interventions:  - Insulin therapy  - Diet management  - Hemodialysis education  - See additional Care Plan goals for specific interventions  Outcome: Progressing  Goal: Patient/Family Short Term Goal  Description: Patient's Short Term Goal: Go home    Interventions:   - Glucose control measures  - Hemodialysis  - Home medication management  - See additional Care Plan goals for specific interventions  Outcome: Progressing

## 2022-11-16 NOTE — CM/SW NOTE
CM emailed list of accepting [facility] agencies to patient's son Eduin Guillermo per his request. Eduin Guillermo agreed to review list and provide choice later today. 1630: Pt's son notified CM that [facility] [address] is chosen [facility] agency at [facility]. Agency reserved in [facility] and notified pt will be ready for dc in 1-2 days. Plan: Home w/spouse with [facility] [address] pending medical clearance.     ERIC Tovar    [phone]

## 2022-11-16 NOTE — PLAN OF CARE
Problem: Patient Centered Care  Goal: Patient preferences are identified and integrated in the patient's plan of care  Description: Interventions:  - What would you like us to know as we care for you? Pt is from home w/ wife and son. - Provide timely, complete, and accurate information to patient/family  - Incorporate patient and family knowledge, values, beliefs, and cultural backgrounds into the planning and delivery of care  - Encourage patient/family to participate in care and decision-making at the level they choose  - Honor patient and family perspectives and choices  Outcome: Progressing     Problem: CARDIOVASCULAR - ADULT  Goal: Maintains optimal cardiac output and hemodynamic stability  Description: INTERVENTIONS:  - Monitor vital signs, rhythm, and trends  - Monitor for bleeding, hypotension and signs of decreased cardiac output  - Evaluate effectiveness of vasoactive medications to optimize hemodynamic stability  - Monitor arterial and/or venous puncture sites for bleeding and/or hematoma  - Assess quality of pulses, skin color and temperature  - Assess for signs of decreased coronary artery perfusion - ex.  Angina  - Evaluate fluid balance, assess for edema, trend weights  Outcome: Progressing  Goal: Absence of cardiac arrhythmias or at baseline  Description: INTERVENTIONS:  - Continuous cardiac monitoring, monitor vital signs, obtain 12 lead EKG if indicated  - Evaluate effectiveness of antiarrhythmic and heart rate control medications as ordered  - Initiate emergency measures for life threatening arrhythmias  - Monitor electrolytes and administer replacement therapy as ordered  Outcome: Progressing     Problem: RESPIRATORY - ADULT  Goal: Achieves optimal ventilation and oxygenation  Description: INTERVENTIONS:  - Assess for changes in respiratory status  - Assess for changes in mentation and behavior  - Position to facilitate oxygenation and minimize respiratory effort  - Oxygen supplementation based on oxygen saturation or ABGs  - Provide Smoking Cessation handout, if applicable  - Encourage broncho-pulmonary hygiene including cough, deep breathe, Incentive Spirometry  - Assess the need for suctioning and perform as needed  - Assess and instruct to report SOB or any respiratory difficulty  - Respiratory Therapy support as indicated  - Manage/alleviate anxiety  - Monitor for signs/symptoms of CO2 retention  Outcome: Progressing     Problem: METABOLIC/FLUID AND ELECTROLYTES - ADULT  Goal: Glucose maintained within prescribed range  Description: INTERVENTIONS:  - Monitor Blood Glucose as ordered  - Assess for signs and symptoms of hyperglycemia and hypoglycemia  - Administer ordered medications to maintain glucose within target range  - Assess barriers to adequate nutritional intake and initiate nutrition consult as needed  - Instruct patient on self management of diabetes  Outcome: Progressing  Goal: Hemodynamic stability and optimal renal function maintained  Description: INTERVENTIONS:  - Monitor labs and assess for signs and symptoms of volume excess or deficit  - Monitor intake, output and patient weight  - Monitor urine specific gravity, serum osmolarity and serum sodium as indicated or ordered  - Monitor response to interventions for patient's volume status, including labs, urine output, blood pressure (other measures as available)  - Encourage oral intake as appropriate  - Instruct patient on fluid and nutrition restrictions as appropriate  Outcome: Progressing     Problem: SKIN/TISSUE INTEGRITY - ADULT  Goal: Skin integrity remains intact  Description: INTERVENTIONS  - Assess and document risk factors for pressure ulcer development  - Assess and document skin integrity  - Monitor for areas of redness and/or skin breakdown  - Initiate interventions, skin care algorithm/standards of care as needed  Outcome: Progressing     Problem: MUSCULOSKELETAL - ADULT  Goal: Return mobility to safest level of function  Description: INTERVENTIONS:  - Assess patient stability and activity tolerance for standing, transferring and ambulating w/ or w/o assistive devices  - Assist with transfers and ambulation using safe patient handling equipment as needed  - Ensure adequate protection for wounds/incisions during mobilization  - Obtain PT/OT consults as needed  - Advance activity as appropriate  - Communicate ordered activity level and limitations with patient/family  Outcome: Progressing     Problem: Impaired Functional Mobility  Goal: Achieve highest/safest level of mobility/gait  Description: Interventions:  - Assess patient's functional ability and stability  - Promote increasing activity/tolerance for mobility and gait  - Educate and engage patient/family in tolerated activity level and precautions    Outcome: Progressing     Problem: SAFETY ADULT - FALL  Goal: Free from fall injury  Description: INTERVENTIONS:  - Assess pt frequently for physical needs  - Identify cognitive and physical deficits and behaviors that affect risk of falls.   - [place] fall precautions as indicated by assessment.  - Educate pt/family on patient safety including physical limitations  - Instruct pt to call for assistance with activity based on assessment  - Modify environment to reduce risk of injury  - Provide assistive devices as appropriate  - Consider OT/PT consult to assist with strengthening/mobility  - Encourage toileting schedule  Outcome: Progressing     Problem: Patient/Family Goals  Goal: Patient/Family Long Term Goal  Description: Patient's Long Term Goal: Achieve glucose control and tolerate hemodialysis    Interventions:  - Insulin therapy  - Diet management  - Hemodialysis education  - See additional Care Plan goals for specific interventions  Outcome: Progressing  Goal: Patient/Family Short Term Goal  Description: Patient's Short Term Goal: Go home    Interventions:   - Glucose control measures  - Hemodialysis  - Home medication management  - See additional Care Plan goals for specific interventions  Outcome: Progressing     Patient alert, vitals stable. No c/o pain. Patient assisted with needs to stay clean, dry, and comfortable. Bed alarm on. Isolation precautions maintained. HD done tonight, per HD RN patient was educated patient on nutrition and I&O's which was reinforced by this RN.

## 2022-11-16 NOTE — DISCHARGE INSTRUCTIONS
Please follow up with PCP and cardiology in 1-2 weeks    Please continue to follow with nephrology and keep HD appointments. Please use your inhaler when you feel short of breath or are coughing      Sometimes managing your health at home requires assistance. [facility] team has recognized your preference to use [facility] [address], Phone: [phone] or Fax: [phone]. A representative from the home health agency will contact you or your family to schedule your first visit.        [facility]   [address] [place] [id number]  P: [phone]  NEXT APPOINTMENT: Monday 11/21/2022 @ 3:30PM

## 2022-11-16 NOTE — CDS QUERY
Dr. Rodríguez Johnston: . To answer this query: 1st click on \"Edit\" button on toolbar. 2nd type an \"X\" in the bracket for the diagnosis(s) that apply. (You may add/type in any additional clinical details you feel necessary to substantiate your response). 3rd Click \"SIGN\" TAB to complete your response. Thank you. COVID [address] [place] [address] FORM  Dear Doctor: Rodríguez Johnston:   Clinical information (provided below) includes documentation of COVID-19. For accurate ICD-10-CM code assignment to reflect severity of illness and risk of mortality,  PLEASE (X) ALL DIAGNOSES THAT APPLY. THIS SECTION FOR PROVIDER DOCUMENTATION ONLY    (  ) COVID-19 is a current and active infection    (  x)   COVID-19 is not a current infection, patient has a history of COVID-19    (  )  Other (please specify):     (  )  Unable to Determine      Information from the Medical Record     11/14/22 ED MD HPI: He tested positive for COVID-19 on November 6 however at the end of October was admitted to the hospital for fevers and cough. He continues to have ongoing fits of coughing. Today and evening he was sitting on the edge of the bed,  began coughing and slipped off the bed. There was no LOC. He was not able to get up and paramedics were called. In addition he reports that along with the cough he has intermittent vomiting. Furthermore he has not attended dialysis  since approximately November 5 due to his COVID diagnosis. He also reportsnoncompliance with his home medication due to his coughing. No shortness of breath, no chest pain, no anticoagulation or antiplatelet use. ED MD Imp: Elevated BUN, Subacute cough    11/15/22  RAPID SARS-COV-2 BY PCR: Detected    11/16/22 Included in Your Progress Note: 74year old male with PMH sig for ESRD on HD, hypertension, hypercholesterolemia, CAD S/p CABG, DM2 with neuropathy, retinopathy and nephropathy with recent diagnosis of COVID who presented with fatigue and missed HD x 10 days.  Weakness, Recent COVID diagnosed 11/6 not currently hypoxic, PT/OT eval    Treatment: Contact and Droplet Isolation    ________________________________________________________________________________________________________  If you have any questions, please contact Clinical :  Diane Au RN at [phone].    Thank You    THIS FORM IS A PERMANENT PART OF THE MEDICAL RECORD

## 2022-11-17 LAB
ALBUMIN SERPL-MCNC: 2 G/DL (ref 3.4–5)
ANION GAP SERPL CALC-SCNC: 10 MMOL/L (ref 0–18)
BASOPHILS # BLD AUTO: 0.02 X10(3) UL (ref 0–0.2)
BASOPHILS NFR BLD AUTO: 0.2 %
BUN BLD-MCNC: 65 MG/DL (ref 7–18)
BUN/CREAT SERPL: 10.2 (ref 10–20)
C DIFF TOX B STL QL: NEGATIVE
CALCIUM BLD-MCNC: 7.7 MG/DL (ref 8.5–10.1)
CHLORIDE SERPL-SCNC: 106 MMOL/L (ref 98–112)
CO2 SERPL-SCNC: 24 MMOL/L (ref 21–32)
CREAT BLD-MCNC: 6.36 MG/DL
DEPRECATED RDW RBC AUTO: 44.5 FL (ref 35.1–46.3)
EOSINOPHIL # BLD AUTO: 0.21 X10(3) UL (ref 0–0.7)
EOSINOPHIL NFR BLD AUTO: 2.6 %
ERYTHROCYTE [DISTWIDTH] IN BLOOD BY AUTOMATED COUNT: 12.7 % (ref 11–15)
GFR SERPLBLD BASED ON 1.73 SQ M-ARVRAT: 9 ML/MIN/1.73M2 (ref 60–?)
GLUCOSE BLD-MCNC: 242 MG/DL (ref 70–99)
GLUCOSE BLDC GLUCOMTR-MCNC: 192 MG/DL (ref 70–99)
GLUCOSE BLDC GLUCOMTR-MCNC: 193 MG/DL (ref 70–99)
GLUCOSE BLDC GLUCOMTR-MCNC: 250 MG/DL (ref 70–99)
GLUCOSE BLDC GLUCOMTR-MCNC: 288 MG/DL (ref 70–99)
HCT VFR BLD AUTO: 31.1 %
HGB BLD-MCNC: 10 G/DL
IMM GRANULOCYTES # BLD AUTO: 0.03 X10(3) UL (ref 0–1)
IMM GRANULOCYTES NFR BLD: 0.4 %
LYMPHOCYTES # BLD AUTO: 1.62 X10(3) UL (ref 1–4)
LYMPHOCYTES NFR BLD AUTO: 20 %
MAGNESIUM SERPL-MCNC: 1.9 MG/DL (ref 1.6–2.6)
MCH RBC QN AUTO: 30.6 PG (ref 26–34)
MCHC RBC AUTO-ENTMCNC: 32.2 G/DL (ref 31–37)
MCV RBC AUTO: 95.1 FL
MONOCYTES # BLD AUTO: 0.5 X10(3) UL (ref 0.1–1)
MONOCYTES NFR BLD AUTO: 6.2 %
NEUTROPHILS # BLD AUTO: 5.71 X10 (3) UL (ref 1.5–7.7)
NEUTROPHILS # BLD AUTO: 5.71 X10(3) UL (ref 1.5–7.7)
NEUTROPHILS NFR BLD AUTO: 70.6 %
OSMOLALITY SERPL CALC.SUM OF ELEC: 317 MOSM/KG (ref 275–295)
PHOSPHATE SERPL-MCNC: 5.1 MG/DL (ref 2.5–4.9)
PLATELET # BLD AUTO: 164 10(3)UL (ref 150–450)
POTASSIUM SERPL-SCNC: 3.7 MMOL/L (ref 3.5–5.1)
RBC # BLD AUTO: 3.27 X10(6)UL
SODIUM SERPL-SCNC: 140 MMOL/L (ref 136–145)
WBC # BLD AUTO: 8.1 X10(3) UL (ref 4–11)

## 2022-11-17 PROCEDURE — 80069 RENAL FUNCTION PANEL: CPT | Performed by: INTERNAL MEDICINE

## 2022-11-17 PROCEDURE — 82962 GLUCOSE BLOOD TEST: CPT

## 2022-11-17 PROCEDURE — 83735 ASSAY OF MAGNESIUM: CPT | Performed by: INTERNAL MEDICINE

## 2022-11-17 PROCEDURE — 5A1D70Z PERFORMANCE OF URINARY FILTRATION, INTERMITTENT, LESS THAN 6 HOURS PER DAY: ICD-10-PCS | Performed by: INTERNAL MEDICINE

## 2022-11-17 PROCEDURE — 87493 C DIFF AMPLIFIED PROBE: CPT | Performed by: HOSPITALIST

## 2022-11-17 PROCEDURE — 90935 HEMODIALYSIS ONE EVALUATION: CPT

## 2022-11-17 PROCEDURE — 85025 COMPLETE CBC W/AUTO DIFF WBC: CPT | Performed by: HOSPITALIST

## 2022-11-17 NOTE — OCCUPATIONAL THERAPY NOTE
Attempted to se pt for OT session however pt had just begun bedside HD. Nurse aware plan is to reschedule as able.

## 2022-11-17 NOTE — PLAN OF CARE
Patient having hemodialysis now in his room. Patient complains of frequent persistent coughing. Prn benzonatate and guaifenesin administered, see MAR. Patient reports improvement in cough with medications. Patient updated on POC. He verbalizes understanding but requires reinforcement. Problem: Patient Centered Care  Goal: Patient preferences are identified and integrated in the patient's plan of care  Description: Interventions:  - What would you like us to know as we care for you? Pt is from home w/ wife and son. - Provide timely, complete, and accurate information to patient/family  - Incorporate patient and family knowledge, values, beliefs, and cultural backgrounds into the planning and delivery of care  - Encourage patient/family to participate in care and decision-making at the level they choose  - Honor patient and family perspectives and choices  Outcome: Progressing     Problem: CARDIOVASCULAR - ADULT  Goal: Maintains optimal cardiac output and hemodynamic stability  Description: INTERVENTIONS:  - Monitor vital signs, rhythm, and trends  - Monitor for bleeding, hypotension and signs of decreased cardiac output  - Evaluate effectiveness of vasoactive medications to optimize hemodynamic stability  - Monitor arterial and/or venous puncture sites for bleeding and/or hematoma  - Assess quality of pulses, skin color and temperature  - Assess for signs of decreased coronary artery perfusion - ex.  Angina  - Evaluate fluid balance, assess for edema, trend weights  Outcome: Progressing  Goal: Absence of cardiac arrhythmias or at baseline  Description: INTERVENTIONS:  - Continuous cardiac monitoring, monitor vital signs, obtain 12 lead EKG if indicated  - Evaluate effectiveness of antiarrhythmic and heart rate control medications as ordered  - Initiate emergency measures for life threatening arrhythmias  - Monitor electrolytes and administer replacement therapy as ordered  Outcome: Progressing     Problem: RESPIRATORY - ADULT  Goal: Achieves optimal ventilation and oxygenation  Description: INTERVENTIONS:  - Assess for changes in respiratory status  - Assess for changes in mentation and behavior  - Position to facilitate oxygenation and minimize respiratory effort  - Oxygen supplementation based on oxygen saturation or ABGs  - Provide Smoking Cessation handout, if applicable  - Encourage broncho-pulmonary hygiene including cough, deep breathe, Incentive Spirometry  - Assess the need for suctioning and perform as needed  - Assess and instruct to report SOB or any respiratory difficulty  - Respiratory Therapy support as indicated  - Manage/alleviate anxiety  - Monitor for signs/symptoms of CO2 retention  Outcome: Progressing     Problem: METABOLIC/FLUID AND ELECTROLYTES - ADULT  Goal: Glucose maintained within prescribed range  Description: INTERVENTIONS:  - Monitor Blood Glucose as ordered  - Assess for signs and symptoms of hyperglycemia and hypoglycemia  - Administer ordered medications to maintain glucose within target range  - Assess barriers to adequate nutritional intake and initiate nutrition consult as needed  - Instruct patient on self management of diabetes  Outcome: Progressing  Goal: Hemodynamic stability and optimal renal function maintained  Description: INTERVENTIONS:  - Monitor labs and assess for signs and symptoms of volume excess or deficit  - Monitor intake, output and patient weight  - Monitor urine specific gravity, serum osmolarity and serum sodium as indicated or ordered  - Monitor response to interventions for patient's volume status, including labs, urine output, blood pressure (other measures as available)  - Encourage oral intake as appropriate  - Instruct patient on fluid and nutrition restrictions as appropriate  Outcome: Progressing     Problem: SKIN/TISSUE INTEGRITY - ADULT  Goal: Skin integrity remains intact  Description: INTERVENTIONS  - Assess and document risk factors for pressure ulcer development  - Assess and document skin integrity  - Monitor for areas of redness and/or skin breakdown  - Initiate interventions, skin care algorithm/standards of care as needed  Outcome: Progressing     Problem: MUSCULOSKELETAL - ADULT  Goal: Return mobility to safest level of function  Description: INTERVENTIONS:  - Assess patient stability and activity tolerance for standing, transferring and ambulating w/ or w/o assistive devices  - Assist with transfers and ambulation using safe patient handling equipment as needed  - Ensure adequate protection for wounds/incisions during mobilization  - Obtain PT/OT consults as needed  - Advance activity as appropriate  - Communicate ordered activity level and limitations with patient/family  Outcome: Progressing     Problem: Impaired Functional Mobility  Goal: Achieve highest/safest level of mobility/gait  Description: Interventions:  - Assess patient's functional ability and stability  - Promote increasing activity/tolerance for mobility and gait  - Educate and engage patient/family in tolerated activity level and precautions  Outcome: Progressing     Problem: SAFETY ADULT - FALL  Goal: Free from fall injury  Description: INTERVENTIONS:  - Assess pt frequently for physical needs  - Identify cognitive and physical deficits and behaviors that affect risk of falls.   - [place] fall precautions as indicated by assessment.  - Educate pt/family on patient safety including physical limitations  - Instruct pt to call for assistance with activity based on assessment  - Modify environment to reduce risk of injury  - Provide assistive devices as appropriate  - Consider OT/PT consult to assist with strengthening/mobility  - Encourage toileting schedule  Outcome: Progressing     Problem: Patient/Family Goals  Goal: Patient/Family Long Term Goal  Description: Patient's Long Term Goal: Achieve glucose control and tolerate hemodialysis    Interventions:  - Insulin therapy  - Diet management  - Hemodialysis education  - See additional Care Plan goals for specific interventions  Outcome: Progressing  Goal: Patient/Family Short Term Goal  Description: Patient's Short Term Goal: Go home    Interventions:   - Glucose control measures  - Hemodialysis  - Home medication management  - See additional Care Plan goals for specific interventions  Outcome: Progressing

## 2022-11-17 NOTE — PLAN OF CARE
Problem: Patient Centered Care  Goal: Patient preferences are identified and integrated in the patient's plan of care  Description: Interventions:  - What would you like us to know as we care for you? Pt is from home w/ wife and son. - Provide timely, complete, and accurate information to patient/family  - Incorporate patient and family knowledge, values, beliefs, and cultural backgrounds into the planning and delivery of care  - Encourage patient/family to participate in care and decision-making at the level they choose  - Honor patient and family perspectives and choices  Outcome: Progressing     Problem: CARDIOVASCULAR - ADULT  Goal: Maintains optimal cardiac output and hemodynamic stability  Description: INTERVENTIONS:  - Monitor vital signs, rhythm, and trends  - Monitor for bleeding, hypotension and signs of decreased cardiac output  - Evaluate effectiveness of vasoactive medications to optimize hemodynamic stability  - Monitor arterial and/or venous puncture sites for bleeding and/or hematoma  - Assess quality of pulses, skin color and temperature  - Assess for signs of decreased coronary artery perfusion - ex.  Angina  - Evaluate fluid balance, assess for edema, trend weights  Outcome: Progressing  Goal: Absence of cardiac arrhythmias or at baseline  Description: INTERVENTIONS:  - Continuous cardiac monitoring, monitor vital signs, obtain 12 lead EKG if indicated  - Evaluate effectiveness of antiarrhythmic and heart rate control medications as ordered  - Initiate emergency measures for life threatening arrhythmias  - Monitor electrolytes and administer replacement therapy as ordered  Outcome: Progressing     Problem: RESPIRATORY - ADULT  Goal: Achieves optimal ventilation and oxygenation  Description: INTERVENTIONS:  - Assess for changes in respiratory status  - Assess for changes in mentation and behavior  - Position to facilitate oxygenation and minimize respiratory effort  - Oxygen supplementation based on oxygen saturation or ABGs  - Provide Smoking Cessation handout, if applicable  - Encourage broncho-pulmonary hygiene including cough, deep breathe, Incentive Spirometry  - Assess the need for suctioning and perform as needed  - Assess and instruct to report SOB or any respiratory difficulty  - Respiratory Therapy support as indicated  - Manage/alleviate anxiety  - Monitor for signs/symptoms of CO2 retention  Outcome: Progressing     Problem: METABOLIC/FLUID AND ELECTROLYTES - ADULT  Goal: Glucose maintained within prescribed range  Description: INTERVENTIONS:  - Monitor Blood Glucose as ordered  - Assess for signs and symptoms of hyperglycemia and hypoglycemia  - Administer ordered medications to maintain glucose within target range  - Assess barriers to adequate nutritional intake and initiate nutrition consult as needed  - Instruct patient on self management of diabetes  Outcome: Progressing  Goal: Hemodynamic stability and optimal renal function maintained  Description: INTERVENTIONS:  - Monitor labs and assess for signs and symptoms of volume excess or deficit  - Monitor intake, output and patient weight  - Monitor urine specific gravity, serum osmolarity and serum sodium as indicated or ordered  - Monitor response to interventions for patient's volume status, including labs, urine output, blood pressure (other measures as available)  - Encourage oral intake as appropriate  - Instruct patient on fluid and nutrition restrictions as appropriate  Outcome: Progressing     Problem: SKIN/TISSUE INTEGRITY - ADULT  Goal: Skin integrity remains intact  Description: INTERVENTIONS  - Assess and document risk factors for pressure ulcer development  - Assess and document skin integrity  - Monitor for areas of redness and/or skin breakdown  - Initiate interventions, skin care algorithm/standards of care as needed  Outcome: Progressing     Problem: MUSCULOSKELETAL - ADULT  Goal: Return mobility to safest level of function  Description: INTERVENTIONS:  - Assess patient stability and activity tolerance for standing, transferring and ambulating w/ or w/o assistive devices  - Assist with transfers and ambulation using safe patient handling equipment as needed  - Ensure adequate protection for wounds/incisions during mobilization  - Obtain PT/OT consults as needed  - Advance activity as appropriate  - Communicate ordered activity level and limitations with patient/family  Outcome: Progressing     Problem: Impaired Functional Mobility  Goal: Achieve highest/safest level of mobility/gait  Description: Interventions:  - Assess patient's functional ability and stability  - Promote increasing activity/tolerance for mobility and gait  - Educate and engage patient/family in tolerated activity level and precautions  - Recommend use of  RW for transfers and ambulation  Outcome: Progressing     Problem: SAFETY ADULT - FALL  Goal: Free from fall injury  Description: INTERVENTIONS:  - Assess pt frequently for physical needs  - Identify cognitive and physical deficits and behaviors that affect risk of falls.   - [place] fall precautions as indicated by assessment.  - Educate pt/family on patient safety including physical limitations  - Instruct pt to call for assistance with activity based on assessment  - Modify environment to reduce risk of injury  - Provide assistive devices as appropriate  - Consider OT/PT consult to assist with strengthening/mobility  - Encourage toileting schedule  Outcome: Progressing     Problem: Patient/Family Goals  Goal: Patient/Family Long Term Goal  Description: Patient's Long Term Goal: Achieve glucose control and tolerate hemodialysis    Interventions:  - Insulin therapy  - Diet management  - Hemodialysis education  - See additional Care Plan goals for specific interventions  Outcome: Progressing  Goal: Patient/Family Short Term Goal  Description: Patient's Short Term Goal: Go home    Interventions:   - Glucose control measures  - Hemodialysis  - Home medication management  - See additional Care Plan goals for specific interventions  Outcome: Progressing  No acute changes overnight. VSS. Denies pain at this time. Robitussin given for cough. Patient scheduled for HD today. Call light within reach. Bed alarm on. Safety measures in place.

## 2022-11-18 LAB
ALBUMIN SERPL-MCNC: 2.2 G/DL (ref 3.4–5)
ANION GAP SERPL CALC-SCNC: 8 MMOL/L (ref 0–18)
BASOPHILS # BLD AUTO: 0.03 X10(3) UL (ref 0–0.2)
BASOPHILS NFR BLD AUTO: 0.4 %
BUN BLD-MCNC: 43 MG/DL (ref 7–18)
BUN/CREAT SERPL: 8.8 (ref 10–20)
CALCIUM BLD-MCNC: 7.9 MG/DL (ref 8.5–10.1)
CHLORIDE SERPL-SCNC: 104 MMOL/L (ref 98–112)
CO2 SERPL-SCNC: 24 MMOL/L (ref 21–32)
CREAT BLD-MCNC: 4.91 MG/DL
DEPRECATED RDW RBC AUTO: 43.2 FL (ref 35.1–46.3)
EOSINOPHIL # BLD AUTO: 0.28 X10(3) UL (ref 0–0.7)
EOSINOPHIL NFR BLD AUTO: 3.4 %
ERYTHROCYTE [DISTWIDTH] IN BLOOD BY AUTOMATED COUNT: 12.4 % (ref 11–15)
GFR SERPLBLD BASED ON 1.73 SQ M-ARVRAT: 12 ML/MIN/1.73M2 (ref 60–?)
GLUCOSE BLD-MCNC: 219 MG/DL (ref 70–99)
GLUCOSE BLDC GLUCOMTR-MCNC: 186 MG/DL (ref 70–99)
GLUCOSE BLDC GLUCOMTR-MCNC: 206 MG/DL (ref 70–99)
GLUCOSE BLDC GLUCOMTR-MCNC: 257 MG/DL (ref 70–99)
GLUCOSE BLDC GLUCOMTR-MCNC: 300 MG/DL (ref 70–99)
HCT VFR BLD AUTO: 33.4 %
HGB BLD-MCNC: 10.8 G/DL
IMM GRANULOCYTES # BLD AUTO: 0.06 X10(3) UL (ref 0–1)
IMM GRANULOCYTES NFR BLD: 0.7 %
LYMPHOCYTES # BLD AUTO: 1.78 X10(3) UL (ref 1–4)
LYMPHOCYTES NFR BLD AUTO: 21.6 %
MAGNESIUM SERPL-MCNC: 1.9 MG/DL (ref 1.6–2.6)
MCH RBC QN AUTO: 30.8 PG (ref 26–34)
MCHC RBC AUTO-ENTMCNC: 32.3 G/DL (ref 31–37)
MCV RBC AUTO: 95.2 FL
MONOCYTES # BLD AUTO: 0.57 X10(3) UL (ref 0.1–1)
MONOCYTES NFR BLD AUTO: 6.9 %
NEUTROPHILS # BLD AUTO: 5.51 X10 (3) UL (ref 1.5–7.7)
NEUTROPHILS # BLD AUTO: 5.51 X10(3) UL (ref 1.5–7.7)
NEUTROPHILS NFR BLD AUTO: 67 %
OSMOLALITY SERPL CALC.SUM OF ELEC: 300 MOSM/KG (ref 275–295)
PHOSPHATE SERPL-MCNC: 4.9 MG/DL (ref 2.5–4.9)
PLATELET # BLD AUTO: 163 10(3)UL (ref 150–450)
POTASSIUM SERPL-SCNC: 3.6 MMOL/L (ref 3.5–5.1)
RBC # BLD AUTO: 3.51 X10(6)UL
SODIUM SERPL-SCNC: 136 MMOL/L (ref 136–145)
WBC # BLD AUTO: 8.2 X10(3) UL (ref 4–11)

## 2022-11-18 PROCEDURE — 83735 ASSAY OF MAGNESIUM: CPT | Performed by: INTERNAL MEDICINE

## 2022-11-18 PROCEDURE — 80069 RENAL FUNCTION PANEL: CPT | Performed by: INTERNAL MEDICINE

## 2022-11-18 PROCEDURE — 82962 GLUCOSE BLOOD TEST: CPT

## 2022-11-18 PROCEDURE — 94799 UNLISTED PULMONARY SVC/PX: CPT

## 2022-11-18 PROCEDURE — 90935 HEMODIALYSIS ONE EVALUATION: CPT

## 2022-11-18 PROCEDURE — 94640 AIRWAY INHALATION TREATMENT: CPT

## 2022-11-18 PROCEDURE — 85025 COMPLETE CBC W/AUTO DIFF WBC: CPT | Performed by: HOSPITALIST

## 2022-11-18 RX ORDER — IPRATROPIUM BROMIDE AND ALBUTEROL SULFATE 2.5; .5 MG/3ML; MG/3ML
3 SOLUTION RESPIRATORY (INHALATION)
Status: DISCONTINUED | OUTPATIENT
Start: 2022-11-18 | End: 2022-11-19

## 2022-11-18 NOTE — PLAN OF CARE
Pt is alert and oriented x4 with confusion and forgetfulness at times. Denies pain. Expected to have a short session of dialysis today to return to pt's original dialysis schedule of MWF. BP was low this morning; held meds per parameters. MD aware of BP; continue to monitor BP. Pt currently receiving dialysis. Per HD RN, pt education on fluid overload and electrolyte levels, reinforced by this RN at bedside. Plan is to discharge home tomorrow. Problem: Patient Centered Care  Goal: Patient preferences are identified and integrated in the patient's plan of care  Description: Interventions:  - What would you like us to know as we care for you? Pt is from home w/ wife and son. - Provide timely, complete, and accurate information to patient/family  - Incorporate patient and family knowledge, values, beliefs, and cultural backgrounds into the planning and delivery of care  - Encourage patient/family to participate in care and decision-making at the level they choose  - Honor patient and family perspectives and choices  Outcome: Progressing     Problem: CARDIOVASCULAR - ADULT  Goal: Maintains optimal cardiac output and hemodynamic stability  Description: INTERVENTIONS:  - Monitor vital signs, rhythm, and trends  - Monitor for bleeding, hypotension and signs of decreased cardiac output  - Evaluate effectiveness of vasoactive medications to optimize hemodynamic stability  - Monitor arterial and/or venous puncture sites for bleeding and/or hematoma  - Assess quality of pulses, skin color and temperature  - Assess for signs of decreased coronary artery perfusion - ex.  Angina  - Evaluate fluid balance, assess for edema, trend weights  Outcome: Progressing  Goal: Absence of cardiac arrhythmias or at baseline  Description: INTERVENTIONS:  - Continuous cardiac monitoring, monitor vital signs, obtain 12 lead EKG if indicated  - Evaluate effectiveness of antiarrhythmic and heart rate control medications as ordered  - Initiate emergency measures for life threatening arrhythmias  - Monitor electrolytes and administer replacement therapy as ordered  Outcome: Progressing     Problem: RESPIRATORY - ADULT  Goal: Achieves optimal ventilation and oxygenation  Description: INTERVENTIONS:  - Assess for changes in respiratory status  - Assess for changes in mentation and behavior  - Position to facilitate oxygenation and minimize respiratory effort  - Oxygen supplementation based on oxygen saturation or ABGs  - Provide Smoking Cessation handout, if applicable  - Encourage broncho-pulmonary hygiene including cough, deep breathe, Incentive Spirometry  - Assess the need for suctioning and perform as needed  - Assess and instruct to report SOB or any respiratory difficulty  - Respiratory Therapy support as indicated  - Manage/alleviate anxiety  - Monitor for signs/symptoms of CO2 retention  Outcome: Progressing     Problem: METABOLIC/FLUID AND ELECTROLYTES - ADULT  Goal: Glucose maintained within prescribed range  Description: INTERVENTIONS:  - Monitor Blood Glucose as ordered  - Assess for signs and symptoms of hyperglycemia and hypoglycemia  - Administer ordered medications to maintain glucose within target range  - Assess barriers to adequate nutritional intake and initiate nutrition consult as needed  - Instruct patient on self management of diabetes  Outcome: Progressing  Goal: Hemodynamic stability and optimal renal function maintained  Description: INTERVENTIONS:  - Monitor labs and assess for signs and symptoms of volume excess or deficit  - Monitor intake, output and patient weight  - Monitor urine specific gravity, serum osmolarity and serum sodium as indicated or ordered  - Monitor response to interventions for patient's volume status, including labs, urine output, blood pressure (other measures as available)  - Encourage oral intake as appropriate  - Instruct patient on fluid and nutrition restrictions as appropriate  Outcome: Progressing Problem: SKIN/TISSUE INTEGRITY - ADULT  Goal: Skin integrity remains intact  Description: INTERVENTIONS  - Assess and document risk factors for pressure ulcer development  - Assess and document skin integrity  - Monitor for areas of redness and/or skin breakdown  - Initiate interventions, skin care algorithm/standards of care as needed  Outcome: Progressing     Problem: MUSCULOSKELETAL - ADULT  Goal: Return mobility to safest level of function  Description: INTERVENTIONS:  - Assess patient stability and activity tolerance for standing, transferring and ambulating w/ or w/o assistive devices  - Assist with transfers and ambulation using safe patient handling equipment as needed  - Ensure adequate protection for wounds/incisions during mobilization  - Obtain PT/OT consults as needed  - Advance activity as appropriate  - Communicate ordered activity level and limitations with patient/family  Outcome: Progressing     Problem: Impaired Functional Mobility  Goal: Achieve highest/safest level of mobility/gait  Description: Interventions:  - Assess patient's functional ability and stability  - Promote increasing activity/tolerance for mobility and gait  - Educate and engage patient/family in tolerated activity level and precautions  Outcome: Progressing     Problem: SAFETY ADULT - FALL  Goal: Free from fall injury  Description: INTERVENTIONS:  - Assess pt frequently for physical needs  - Identify cognitive and physical deficits and behaviors that affect risk of falls.   - [place] fall precautions as indicated by assessment.  - Educate pt/family on patient safety including physical limitations  - Instruct pt to call for assistance with activity based on assessment  - Modify environment to reduce risk of injury  - Provide assistive devices as appropriate  - Consider OT/PT consult to assist with strengthening/mobility  - Encourage toileting schedule  Outcome: Progressing     Problem: Patient/Family Goals  Goal: Patient/Family Long Term Goal  Description: Patient's Long Term Goal: Achieve glucose control and tolerate hemodialysis    Interventions:  - Insulin therapy  - Diet management  - Hemodialysis education  - See additional Care Plan goals for specific interventions  Outcome: Progressing  Goal: Patient/Family Short Term Goal  Description: Patient's Short Term Goal: Go home    Interventions:   - Glucose control measures  - Hemodialysis  - Home medication management  - See additional Care Plan goals for specific interventions  Outcome: Progressing

## 2022-11-18 NOTE — CM/SW NOTE
DAWIT spoke with Marybel p: [phone] with [facility] who confirmed that pt can return to his home HD clinic starting on Monday 11/21 @ original time. DAWIT informed RN. RN to update Nephrologist. DAWIT placed info on next appt in AVS.     Plan: Home w/spouse with [address], resume HD at [address] [facility] @ 3:30PM      [facility]   [address] [facility] [address]  P: [address] [facility] [address]: Monday 11/21/2022 @ 3:30PM    DAWIT remains available for support and/or discharge planning. Please do not hesitate to call/chat DAWIT if further DC needs arise.      Armando HARVEY, [place], [place]   Ext [phone]

## 2022-11-19 VITALS
WEIGHT: 223.81 LBS | SYSTOLIC BLOOD PRESSURE: 115 MMHG | HEART RATE: 80 BPM | RESPIRATION RATE: 20 BRPM | BODY MASS INDEX: 30.31 KG/M2 | OXYGEN SATURATION: 95 % | TEMPERATURE: 98 F | HEIGHT: 72 IN | DIASTOLIC BLOOD PRESSURE: 62 MMHG

## 2022-11-19 LAB
ALBUMIN SERPL-MCNC: 2 G/DL (ref 3.4–5)
ANION GAP SERPL CALC-SCNC: 11 MMOL/L (ref 0–18)
BUN BLD-MCNC: 36 MG/DL (ref 7–18)
BUN/CREAT SERPL: 8.5 (ref 10–20)
CALCIUM BLD-MCNC: 7.8 MG/DL (ref 8.5–10.1)
CHLORIDE SERPL-SCNC: 107 MMOL/L (ref 98–112)
CO2 SERPL-SCNC: 20 MMOL/L (ref 21–32)
CREAT BLD-MCNC: 4.23 MG/DL
GFR SERPLBLD BASED ON 1.73 SQ M-ARVRAT: 14 ML/MIN/1.73M2 (ref 60–?)
GLUCOSE BLD-MCNC: 228 MG/DL (ref 70–99)
GLUCOSE BLDC GLUCOMTR-MCNC: 222 MG/DL (ref 70–99)
GLUCOSE BLDC GLUCOMTR-MCNC: 341 MG/DL (ref 70–99)
MAGNESIUM SERPL-MCNC: 1.8 MG/DL (ref 1.6–2.6)
OSMOLALITY SERPL CALC.SUM OF ELEC: 302 MOSM/KG (ref 275–295)
PHOSPHATE SERPL-MCNC: 4.9 MG/DL (ref 2.5–4.9)
POTASSIUM SERPL-SCNC: 3.7 MMOL/L (ref 3.5–5.1)
SODIUM SERPL-SCNC: 138 MMOL/L (ref 136–145)

## 2022-11-19 PROCEDURE — 82962 GLUCOSE BLOOD TEST: CPT

## 2022-11-19 PROCEDURE — 97110 THERAPEUTIC EXERCISES: CPT

## 2022-11-19 PROCEDURE — 94640 AIRWAY INHALATION TREATMENT: CPT

## 2022-11-19 PROCEDURE — 83735 ASSAY OF MAGNESIUM: CPT | Performed by: INTERNAL MEDICINE

## 2022-11-19 PROCEDURE — 97535 SELF CARE MNGMENT TRAINING: CPT

## 2022-11-19 PROCEDURE — 94799 UNLISTED PULMONARY SVC/PX: CPT

## 2022-11-19 PROCEDURE — 80069 RENAL FUNCTION PANEL: CPT | Performed by: INTERNAL MEDICINE

## 2022-11-19 RX ORDER — BENZONATATE 100 MG/1
100 CAPSULE ORAL 3 TIMES DAILY PRN
Qty: 30 CAPSULE | Refills: 0 | Status: SHIPPED | OUTPATIENT
Start: 2022-11-19

## 2022-11-19 RX ORDER — ATORVASTATIN CALCIUM 40 MG/1
40 TABLET, FILM COATED ORAL NIGHTLY
Qty: 30 TABLET | Refills: 0 | Status: SHIPPED | OUTPATIENT
Start: 2022-11-19

## 2022-11-19 NOTE — PHYSICAL THERAPY NOTE
PHYSICAL THERAPY TREATMENT NOTE - INPATIENT     Room Number: 528/528-A       Presenting Problem: fall out of bed, generalized weakness, elevated BUN       Problem List  Principal Problem:    Elevated BUN  Active Problems:    Subacute cough      PHYSICAL THERAPY ASSESSMENT     Pt cleared for co-treatment session with OT by RN. PPE donned. Pt greeted in bed. Performs bed mobility MOD I with bed flat. SO2 95% on RA. Sit<>stand to/from RW performed MOD I. Ambulates within room MOD I with RW ~60 ft in total during session. Performs high knees at RW with SUPERVISION to simulate stairs. Good stability with this activity. No WALKER or SOB in session. SO2 stable at 94% after activity. Left sitting in chair with all needs within reach and ALARM ON. RN updated. The patient's Approx Degree of Impairment: 11.2% has been calculated based on documentation in the [facility] '6 clicks' Inpatient Basic Mobility Short Form. Research supports that patients with this level of impairment may benefit from HOME. PT recommendation: HOME WITH  with INTERMITTENT SUPERVISION. WILL REQUIRE RW AT LA. Pt will be discharged from PT at this level of care as he has no further skilled needs at this time. DISCHARGE RECOMMENDATIONS  PT Discharge Recommendations: Home with home health PT; Intermittent Supervision     PLAN  PT Treatment Plan: Bed mobility; Body mechanics; Coordination; Endurance; Patient education;Gait training;Family education;Stoop training;Strengthening;Transfer training;Balance training  Frequency (Obs): 5x/week    SUBJECTIVE  \"I am good\"     OBJECTIVE  Precautions: Bed/chair alarm;Limb alert - left    PAIN ASSESSMENT   Ratin    BALANCE  Static Sitting: Good  Dynamic Sitting: Good  Static Standing: Fair -  Dynamic Standing: Fair -    ACTIVITY TOLERANCE  SO2 stable throughout on RA  No WALKER or SOB with activity in session.      AM-PAC '6-Clicks' INPATIENT SHORT FORM - BASIC MOBILITY  How much difficulty does the patient currently have. .. Patient Difficulty: Turning over in bed (including adjusting bedclothes, sheets and blankets)?: None   Patient Difficulty: Sitting down on and standing up from a chair with arms (e.g., wheelchair, bedside commode, etc.): None   Patient Difficulty: Moving from lying on back to sitting on the side of the bed?: None   How much help from another person does the patient currently need. .. Help from Another: Moving to and from a bed to a chair (including a wheelchair)?: None   Help from Another: Need to walk in hospital room?: None   Help from Another: Climbing 3-5 steps with a railing?: A Little     AM-PAC Score:  Raw Score: 23   Approx Degree of Impairment: 11.2%   Standardized Score (AM-PAC Scale): 56.93   CMS Modifier (G-Code): CI    FUNCTIONAL ABILITY STATUS  Functional Mobility/Gait Assessment  Gait Assistance: Modified independent  Distance (ft): 60  Assistive Device: Rolling walker  Pattern:  (slow, forward flexed over walker)  Stairs: Other (comment) (simulated with high knees and UE support)    Patient End of Session: Up in chair;Call light within reach; Needs met;RN aware of session/findings; All patient questions and concerns addressed; Alarm set    CURRENT GOALS   Goals to be met by: 11/22/22  Patient Goal Patient's self-stated goal is: go home   Goal #1 Patient is able to demonstrate supine - sit EOB @ level: modified independent     Goal #1   Current Status MET    Goal #2 Patient is able to demonstrate transfers Sit to/from Stand at assistance level: modified independent with walker - rolling     Goal #2  Current Status MET    Goal #3 Patient is able to ambulate 100 feet with assist device: walker - rolling at assistance level: modified independent   Goal #3   Current Status PARTIALLY MET, MOD I 60 FT    Goal #4 Patient will negotiate 1 stair/one curb w/ assistive device and supervision   Goal #4   Current Status MET VIA SIMULATION OF STEP    Goal #5 Patient to demonstrate independence with home activity/exercise instructions provided to patient in preparation for discharge.    Goal #5   Current Status MET

## 2022-11-19 NOTE — PLAN OF CARE
Problem: Patient Centered Care  Goal: Patient preferences are identified and integrated in the patient's plan of care  Description: Interventions:  - What would you like us to know as we care for you? Pt is from home w/ wife and son.    - Provide timely, complete, and accurate information to patient/family  - Incorporate patient and family knowledge, values, beliefs, and cultural backgrounds into the planning and delivery of care  - Encourage patient/family to participate in care and decision-making at the level they choose  - Honor patient and family perspectives and choices  Outcome: Progressing     Problem: CARDIOVASCULAR - ADULT  Goal: Absence of cardiac arrhythmias or at baseline  Description: INTERVENTIONS:  - Continuous cardiac monitoring, monitor vital signs, obtain 12 lead EKG if indicated  - Evaluate effectiveness of antiarrhythmic and heart rate control medications as ordered  - Initiate emergency measures for life threatening arrhythmias  - Monitor electrolytes and administer replacement therapy as ordered  Outcome: Progressing     Problem: METABOLIC/FLUID AND ELECTROLYTES - ADULT  Goal: Glucose maintained within prescribed range  Description: INTERVENTIONS:  - Monitor Blood Glucose as ordered  - Assess for signs and symptoms of hyperglycemia and hypoglycemia  - Administer ordered medications to maintain glucose within target range  - Assess barriers to adequate nutritional intake and initiate nutrition consult as needed  - Instruct patient on self management of diabetes  Outcome: Progressing  Goal: Hemodynamic stability and optimal renal function maintained  Description: INTERVENTIONS:  - Monitor labs and assess for signs and symptoms of volume excess or deficit  - Monitor intake, output and patient weight  - Monitor urine specific gravity, serum osmolarity and serum sodium as indicated or ordered  - Monitor response to interventions for patient's volume status, including labs, urine output, blood pressure (other measures as available)  - Encourage oral intake as appropriate  - Instruct patient on fluid and nutrition restrictions as appropriate  Outcome: Progressing     Problem: SKIN/TISSUE INTEGRITY - ADULT  Goal: Skin integrity remains intact  Description: INTERVENTIONS  - Assess and document risk factors for pressure ulcer development  - Assess and document skin integrity  - Monitor for areas of redness and/or skin breakdown  - Initiate interventions, skin care algorithm/standards of care as needed  Outcome: Progressing     Problem: MUSCULOSKELETAL - ADULT  Goal: Return mobility to safest level of function  Description: INTERVENTIONS:  - Assess patient stability and activity tolerance for standing, transferring and ambulating w/ or w/o assistive devices  - Assist with transfers and ambulation using safe patient handling equipment as needed  - Ensure adequate protection for wounds/incisions during mobilization  - Obtain PT/OT consults as needed  - Advance activity as appropriate  - Communicate ordered activity level and limitations with patient/family  Outcome: Progressing     Problem: Impaired Functional Mobility  Goal: Achieve highest/safest level of mobility/gait  Description: Interventions:  - Assess patient's functional ability and stability  - Promote increasing activity/tolerance for mobility and gait  - Educate and engage patient/family in tolerated activity level and precautions    Outcome: Progressing     Problem: SAFETY ADULT - FALL  Goal: Free from fall injury  Description: INTERVENTIONS:  - Assess pt frequently for physical needs  - Identify cognitive and physical deficits and behaviors that affect risk of falls.   - [place] fall precautions as indicated by assessment.  - Educate pt/family on patient safety including physical limitations  - Instruct pt to call for assistance with activity based on assessment  - Modify environment to reduce risk of injury  - Provide assistive devices as appropriate  - Consider OT/PT consult to assist with strengthening/mobility  - Encourage toileting schedule  Outcome: Progressing     Problem: Patient/Family Goals  Goal: Patient/Family Long Term Goal  Description: Patient's Long Term Goal: Achieve glucose control and tolerate hemodialysis    Interventions:  - Insulin therapy  - Diet management  - Hemodialysis education  - See additional Care Plan goals for specific interventions  Outcome: Progressing  Goal: Patient/Family Short Term Goal  Description: Patient's Short Term Goal: Go home    Interventions:   - Glucose control measures  - Hemodialysis  - Home medication management  - See additional Care Plan goals for specific interventions  Outcome: Progressing     HD performed today. Per HD RN, pt educated on fluid overload and electrolyte balance. Education reinforced by this RN at the bedside. All questions answered with in my scope of practice. Call light left at bedside, within reach.

## 2022-11-19 NOTE — PLAN OF CARE
Patient is alert and oriented x4. On room air, denies SOB, vitals stable. Educated patient on plan of care, and general education. Call light within reach. Bed in lowest position. Fall precautions in place. All needs addressed. Hourly rounding maintained. Pt educated on discharge instructions. PIV removed. Pt sent home with all personal belongings. Pt educated on follow up appointments. Pt brought to main lobby via wheelchair with PCT to private vehicle. Pt stable at change of shift. Problem: Patient Centered Care  Goal: Patient preferences are identified and integrated in the patient's plan of care  Description: Interventions:  - What would you like us to know as we care for you? Pt is from home w/ wife and son. - Provide timely, complete, and accurate information to patient/family  - Incorporate patient and family knowledge, values, beliefs, and cultural backgrounds into the planning and delivery of care  - Encourage patient/family to participate in care and decision-making at the level they choose  - Honor patient and family perspectives and choices  Outcome: Completed     Problem: CARDIOVASCULAR - ADULT  Goal: Maintains optimal cardiac output and hemodynamic stability  Description: INTERVENTIONS:  - Monitor vital signs, rhythm, and trends  - Monitor for bleeding, hypotension and signs of decreased cardiac output  - Evaluate effectiveness of vasoactive medications to optimize hemodynamic stability  - Monitor arterial and/or venous puncture sites for bleeding and/or hematoma  - Assess quality of pulses, skin color and temperature  - Assess for signs of decreased coronary artery perfusion - ex.  Angina  - Evaluate fluid balance, assess for edema, trend weights  Outcome: Completed  Goal: Absence of cardiac arrhythmias or at baseline  Description: INTERVENTIONS:  - Continuous cardiac monitoring, monitor vital signs, obtain 12 lead EKG if indicated  - Evaluate effectiveness of antiarrhythmic and heart rate control medications as ordered  - Initiate emergency measures for life threatening arrhythmias  - Monitor electrolytes and administer replacement therapy as ordered  Outcome: Completed     Problem: RESPIRATORY - ADULT  Goal: Achieves optimal ventilation and oxygenation  Description: INTERVENTIONS:  - Assess for changes in respiratory status  - Assess for changes in mentation and behavior  - Position to facilitate oxygenation and minimize respiratory effort  - Oxygen supplementation based on oxygen saturation or ABGs  - Provide Smoking Cessation handout, if applicable  - Encourage broncho-pulmonary hygiene including cough, deep breathe, Incentive Spirometry  - Assess the need for suctioning and perform as needed  - Assess and instruct to report SOB or any respiratory difficulty  - Respiratory Therapy support as indicated  - Manage/alleviate anxiety  - Monitor for signs/symptoms of CO2 retention  Outcome: Completed     Problem: METABOLIC/FLUID AND ELECTROLYTES - ADULT  Goal: Glucose maintained within prescribed range  Description: INTERVENTIONS:  - Monitor Blood Glucose as ordered  - Assess for signs and symptoms of hyperglycemia and hypoglycemia  - Administer ordered medications to maintain glucose within target range  - Assess barriers to adequate nutritional intake and initiate nutrition consult as needed  - Instruct patient on self management of diabetes  Outcome: Completed  Goal: Hemodynamic stability and optimal renal function maintained  Description: INTERVENTIONS:  - Monitor labs and assess for signs and symptoms of volume excess or deficit  - Monitor intake, output and patient weight  - Monitor urine specific gravity, serum osmolarity and serum sodium as indicated or ordered  - Monitor response to interventions for patient's volume status, including labs, urine output, blood pressure (other measures as available)  - Encourage oral intake as appropriate  - Instruct patient on fluid and nutrition restrictions as appropriate  Outcome: Completed     Problem: SKIN/TISSUE INTEGRITY - ADULT  Goal: Skin integrity remains intact  Description: INTERVENTIONS  - Assess and document risk factors for pressure ulcer development  - Assess and document skin integrity  - Monitor for areas of redness and/or skin breakdown  - Initiate interventions, skin care algorithm/standards of care as needed  Outcome: Completed     Problem: MUSCULOSKELETAL - ADULT  Goal: Return mobility to safest level of function  Description: INTERVENTIONS:  - Assess patient stability and activity tolerance for standing, transferring and ambulating w/ or w/o assistive devices  - Assist with transfers and ambulation using safe patient handling equipment as needed  - Ensure adequate protection for wounds/incisions during mobilization  - Obtain PT/OT consults as needed  - Advance activity as appropriate  - Communicate ordered activity level and limitations with patient/family  Outcome: Completed     Problem: Impaired Functional Mobility  Goal: Achieve highest/safest level of mobility/gait  Description: Interventions:  - Assess patient's functional ability and stability  - Promote increasing activity/tolerance for mobility and gait  - Educate and engage patient/family in tolerated activity level and precautions  Outcome: Completed     Problem: SAFETY ADULT - FALL  Goal: Free from fall injury  Description: INTERVENTIONS:  - Assess pt frequently for physical needs  - Identify cognitive and physical deficits and behaviors that affect risk of falls.   - [place] fall precautions as indicated by assessment.  - Educate pt/family on patient safety including physical limitations  - Instruct pt to call for assistance with activity based on assessment  - Modify environment to reduce risk of injury  - Provide assistive devices as appropriate  - Consider OT/PT consult to assist with strengthening/mobility  - Encourage toileting schedule  Outcome: Completed     Problem: Patient/Family Goals  Goal: Patient/Family Long Term Goal  Description: Patient's Long Term Goal: Achieve glucose control and tolerate hemodialysis    Interventions:  - Insulin therapy  - Diet management  - Hemodialysis education  - See additional Care Plan goals for specific interventions  Outcome: Completed  Goal: Patient/Family Short Term Goal  Description: Patient's Short Term Goal: Go home    Interventions:   - Glucose control measures  - Hemodialysis  - Home medication management  - See additional Care Plan goals for specific interventions  Outcome: Completed

## 2022-11-23 ENCOUNTER — PATIENT OUTREACH (OUTPATIENT)
Dept: CASE MANAGEMENT | Age: 72
End: 2022-11-23

## 2022-11-25 NOTE — PROGRESS NOTES
2nd attempt dm hfu apt request & questions     DM: existing apt (11/22)    Temo Dennisno PA  CARDS  [address]  [address]  [address] [place] [address]  [phone]  2 weeks  Apt: Dec 8 @10am     Bennett Bell NP   Nephrology  [address] [place] [address]  [phone]  2 weeks  Apt: Dec 1 @10:30am     [facility]  Vascular Surgery  [address] [facility]  [phone]  Apt: Dec 1 @ 8:20am     No answer, LVM w/ apt info  Pt answered all questions  Closing encounter      Diabetic Outreach D/C Follow Up Questions:     1. Did you receive the information provided during your hospitalization and at discharge about diabetes? yes    If No:  Would you like us to mail you the information? no   If Yes:  Was the information helpful? Yes              2. Do you have all of your diabetes medications now that you are home? Yes  If yes:  do you understand how to take your medications. yes    If No: Are there barriers to getting your medications? no       3. Did you make the necessary transportation arrangements to get to your diabetes follow-up appointment? Yes         If pt answers No to questions #2 and #3 Advise pt you will have a clinical person contact them to address.

## 2022-11-29 ENCOUNTER — PATIENT OUTREACH (OUTPATIENT)
Dept: CASE MANAGEMENT | Age: 72
End: 2022-11-29

## 2022-11-29 NOTE — PROGRESS NOTES
VM received; pt requesting assistance w/scheduling apt (dc 11/19)    Dr Kit Loja, [address]  [phone]  Follow up 2 weeks  Office to call pt to schedule w/Dr not APN  LVM that office will be calling to see if they can schedule w/Dr instead of APN  Closing encounter

## 2023-01-20 ENCOUNTER — HOSPITAL ENCOUNTER (OUTPATIENT)
Dept: INTERVENTIONAL RADIOLOGY/VASCULAR | Facility: HOSPITAL | Age: 73
Discharge: HOME | End: 2023-01-20
Attending: SURGERY | Admitting: SURGERY
Payer: MEDICARE

## 2023-01-20 VITALS
BODY MASS INDEX: 30.88 KG/M2 | HEART RATE: 72 BPM | WEIGHT: 228 LBS | DIASTOLIC BLOOD PRESSURE: 75 MMHG | SYSTOLIC BLOOD PRESSURE: 140 MMHG | TEMPERATURE: 97 F | OXYGEN SATURATION: 97 % | HEIGHT: 72 IN | RESPIRATION RATE: 14 BRPM

## 2023-01-20 DIAGNOSIS — N18.6 ESRD (END STAGE RENAL DISEASE) ON DIALYSIS (HCC): ICD-10-CM

## 2023-01-20 DIAGNOSIS — Z01.818 PRE-OP TESTING: Primary | ICD-10-CM

## 2023-01-20 DIAGNOSIS — Z99.2 ESRD (END STAGE RENAL DISEASE) ON DIALYSIS (HCC): ICD-10-CM

## 2023-01-20 LAB — GLUCOSE BLDC GLUCOMTR-MCNC: 155 MG/DL (ref 70–99)

## 2023-01-20 PROCEDURE — 057F3ZZ DILATION OF LEFT CEPHALIC VEIN, PERCUTANEOUS APPROACH: ICD-10-PCS | Performed by: SURGERY

## 2023-01-20 PROCEDURE — 99152 MOD SED SAME PHYS/QHP 5/>YRS: CPT

## 2023-01-20 PROCEDURE — 99153 MOD SED SAME PHYS/QHP EA: CPT

## 2023-01-20 PROCEDURE — 82962 GLUCOSE BLOOD TEST: CPT

## 2023-01-20 PROCEDURE — 36902 INTRO CATH DIALYSIS CIRCUIT: CPT

## 2023-01-20 RX ORDER — HYDROCODONE BITARTRATE AND ACETAMINOPHEN 5; 325 MG/1; MG/1
1 TABLET ORAL EVERY 4 HOURS PRN
Status: DISCONTINUED | OUTPATIENT
Start: 2023-01-20 | End: 2023-01-20

## 2023-01-20 RX ORDER — HEPARIN SODIUM 1000 [USP'U]/ML
INJECTION, SOLUTION INTRAVENOUS; SUBCUTANEOUS
Status: COMPLETED
Start: 2023-01-20 | End: 2023-01-20

## 2023-01-20 RX ORDER — SODIUM CHLORIDE 9 MG/ML
INJECTION, SOLUTION INTRAVENOUS CONTINUOUS
Status: DISCONTINUED | OUTPATIENT
Start: 2023-01-20 | End: 2023-01-20

## 2023-01-20 RX ORDER — ACETAMINOPHEN 325 MG/1
650 TABLET ORAL EVERY 4 HOURS PRN
Status: DISCONTINUED | OUTPATIENT
Start: 2023-01-20 | End: 2023-01-20

## 2023-01-20 RX ORDER — MIDAZOLAM HYDROCHLORIDE 1 MG/ML
INJECTION INTRAMUSCULAR; INTRAVENOUS
Status: COMPLETED
Start: 2023-01-20 | End: 2023-01-20

## 2023-01-20 RX ORDER — SODIUM CHLORIDE 0.9 % (FLUSH) 0.9 %
10 SYRINGE (ML) INJECTION AS NEEDED
Status: DISCONTINUED | OUTPATIENT
Start: 2023-01-20 | End: 2023-01-20

## 2023-01-20 RX ORDER — HYDROCODONE BITARTRATE AND ACETAMINOPHEN 5; 325 MG/1; MG/1
2 TABLET ORAL EVERY 4 HOURS PRN
Status: DISCONTINUED | OUTPATIENT
Start: 2023-01-20 | End: 2023-01-20

## 2023-01-20 RX ORDER — LIDOCAINE HYDROCHLORIDE 20 MG/ML
INJECTION, SOLUTION EPIDURAL; INFILTRATION; INTRACAUDAL; PERINEURAL
Status: COMPLETED
Start: 2023-01-20 | End: 2023-01-20

## 2023-01-20 RX ADMIN — SODIUM CHLORIDE: 9 INJECTION, SOLUTION INTRAVENOUS at 10:40:00

## 2023-01-20 NOTE — OPERATIVE REPORT
Vascular Surgery Cath Lab Operative Note  Patients Name:  Kristie Kershaw  Operating Physician: Cherylene Messier, MD  Location:  Cath Lab  MRN:   [id number]                                            YOB: 1950    Operation Date:  01/20/23         Pre-Operative Diagnosis:   ESRD, non-functional left radiocephalic fistula    Post-Operative Diagnosis:   Same     Procedure Performed:   1. Ultrasound guided access of the left radiocephalic fistula   2. Fistulogram, central venogram  3. Balloon angioplasty proximal fistula using 6x40mm Angiosculpt and 5x20mm angiosculpt balloons  4. Radiographic supervision and interpretation    Anesthesia:   Conscious Sedation: 1mg Versed 50mcg Fentanyl     Start Time: 1243  Finish Time: 3786     EBL: minimal      Complications: None apparent     Findings: Proximal fistula with tight stenosis resistant to balloon angioplasty     Indication for Procedure: Shell Khan is a 26-year-old male with a left radiocephalic fistula. This was placed last year. He has been unable to use it for dialysis. He presents today for a fistulogram after a ultrasound showed proximal stenosis of the fistula. Risks and benefits of the procedure were explained to the patient and he elected to proceed. Description of Procedure: The patient was brought to the operating room, he is placed supine on the operating table. He was sedated and monitored by an independent. Vision. His left upper extremity was prepped and draped in usual sterile fashion. The fistula was marked under ultrasound guidance. Under the fistula was accessed towards the anastomosis using a micropuncture needle, wire and sheath. Central venogram was then performed through the micropuncture sheath which did not show any evidence of central stenosis. The sheath was then exchanged over an 035 braided guidewire for a short 6 [place] sheath.   There were numerous branches that were difficult to get around but using a glide catheter and a J-wire was able to get through the arterial anastomosis. The wire was then exchanged for an 018 glide advantage wire. Fistulogram showed proximal stenosis however the anastomosis was widely patent. Balloon angioplasty was first performed using a 5 x 60 mm Miller balloon. The proximal portion of the fistula appeared fairly tight and was resistant to balloon angioplasty. I then advanced a 6 x 40 angio sculpt balloon. Again this area remained resistant to angioplasty. The 6 balloon was removed and a 5 x 20 mm angio sculpt balloon was placed. This was inflated to rated burst pressure. Despite this there still remained a fairly tight proximal stenosis. I then ballooned using a 6 x 20 mm Miller balloon. There was no resolution of the waist however it appeared slightly less tight. At this portion the balloons were removed and the glide catheter was replaced. Completion angiography showed that the majority of the fistula was widely patent with 1 area of small tight stenosis. There was a easily palpable thrill in the fistula that was improved from preop. At this point the patient was deemed maximally revascularized. All wires and catheters were removed. The sheath was removed and manual pressure was held. The patient tolerated the procedure well there are no complications. He was taken to the postanesthesia care unit in good condition.      Plan:  Bedrest x1 hour   Ok to use fistula for dialysis starting on Monday   Follow-up as needed        Olayinka Robb MD  01/20/23  1:59 PM

## 2023-01-20 NOTE — H&P
The H&P from 23 was reviewed by myself on 23 , and there has been no significant interval change. The procedure was discussed with the patient and/or legal representative including the potential risks, benefits, and side effects. Reasonable alternatives to the procedure were discussed. An opportunity to ask questions was given and they were answered to their satisfaction. Stephon Alvarez MD  23   10:45 AM         Stephon Alvarez MD.  [facility]   Vascular and Endovascular Surgery    VASCULAR SURGERY   CLINIC CONSULT NOTE    Name: Deven Calderon   : 1950  [id number]     REFERRING PHYSICIAN: No ref. provider found  PRIMARY CARE PHYSICIAN: Ina Coulter MD    HISTORY OF PRESENT ILLNESS: Alexus Tapia is a 74year old male with a history of CHF, DM, HTN and hyperlipidemia who has been referred regarding concern for a malfunctioning fistula. Patient is currently dialyzed MWF via a RIJ permacatheter. Patient has his fistula created in September but has not been able to use it. He presents today to discuss his ultrasound and a plan going forward. PAST MEDICAL HISTORY:   Past Medical History:   Diagnosis Date   BPH (benign prostatic hyperplasia)   BPH (benign prostatic hypertrophy)   Cataract   Congestive heart disease (HCC)   Diabetes (HCC)   Diabetic neuropathy (HCC)   Diabetic retinopathy ([address]   High blood pressure   High cholesterol   HLD (hyperlipidemia)   HTN (hypertension)   Neuropathy   Proteinuria   Renal disorder   Type II diabetes mellitus ([address]   Visual impairment     PAST SURGICAL HISTORY:   Past Surgical History:   Procedure Laterality Date   CABG 06/21/2021   x3-lima-->LAD, SVG-->diag and SVG-->OM   CATARACT Right     MEDICATIONS:     Current Outpatient Medications:   atorvastatin 40 MG Oral Tab, , Disp: , Rfl:   Glucose Blood (ONETOUCH ULTRA) In Vitro Strip, Test Blood Sugar 4 Times Daily.  Insulin Dependent Diabetes Type II. Z79.4, E11.9., Disp: 100 strip, Rfl: 3  OneTouch UltraSoft Lancets Does not apply Misc, Test Blood Sugar Once Daily. Non-Insulin Dependent Diabetes Type II. E11.9., Disp: 100 each, Rfl: 1  Blood Glucose Monitoring Suppl (ONETOUCH ULTRA MINI) w/Device Does not apply Kit, Test Blood Sugar Once Daily. Non-Insulin Dependent Diabetes Type II. E11.9., Disp: 1 kit, Rfl: 0  Continuous Blood Gluc Transmit (DEXCOM G6 TRANSMITTER) Does not apply Misc, 1 each every 3 (three) months., Disp: 1 each, Rfl: 3  Continuous Blood Gluc Sensor (DEXCOM G6 SENSOR) Does not apply Misc, 1 each Every 10 days. , Disp: 9 each, Rfl: 3  Continuous Blood Gluc Sensor (DEXCOM G6 SENSOR) Does not apply Misc, 1 each Every 10 days. , Disp: 9 each, Rfl: 0  hydrALAZINE 50 MG Oral Tab, Take 1 tablet (50 mg total) by mouth 3 (three) times daily. , Disp: 270 tablet, Rfl: 0  Insulin Pen Needle (BD PEN NEEDLE MERLENE 2ND GEN) 32G X 4 MM Does not apply Misc, Use 1 Needle With Insulin Four Times Daily. Insulin Dependent Diabetes Type II. Z79.4, E11.9., Disp: 400 each, Rfl: 3  rosuvastatin 20 MG Oral Tab, Take 1 tablet (20 mg total) by mouth nightly., Disp: 90 tablet, Rfl: 3  benzonatate 200 MG Oral Cap, Take 1 capsule (200 mg total) by mouth 3 (three) times daily as needed for cough. , Disp: 30 capsule, Rfl: 0  insulin glargine (BASAGLAR KWIKPEN) 100 UNIT/ML Subcutaneous Solution Pen-injector, ADMINISTER 55 UNITS UNDER THE SKIN EVERY MORNING, Disp: 45 mL, Rfl: 1  METOPROLOL SUCCINATE ER 50 MG Oral Tablet 24 Hr, TAKE 1 TABLET(50 MG) BY MOUTH TWICE DAILY, Disp: 180 tablet, Rfl: 0  Insulin Lispro, 1 Unit Dial, (HUMALOG KWIKPEN) 100 UNIT/ML Subcutaneous Solution Pen-injector, Inject 14 Units into the skin 3 (three) times daily. , Disp: 15 mL, Rfl: 2  calcium carbonate antacid 500 MG Oral Chew Tab, Chew 1 tablet (500 mg total) by mouth 3 (three) times daily. , Disp: 90 tablet, Rfl: 1  isosorbide dinitrate 10 MG Oral Tab, Take 1 tablet (10 mg total) by mouth TID (Nitrates). , Disp: 30 tablet, Rfl: 0  FINASTERIDE 5 MG Oral Tab, TAKE 1 TABLET(5 MG) BY MOUTH DAILY, Disp: 90 tablet, Rfl: 3  GVOKE HYPOPEN 2-PACK 1 MG/0.2ML Subcutaneous Solution Auto-injector, Inject 0.2 mL into the skin As Directed., Disp: 0.4 mL, Rfl: 1  Continuous Blood Gluc Sensor (DEXCOM G6 SENSOR) Does not apply Misc, 1 each by Does not apply route Every 10 days. , Disp: 9 each, Rfl: 3  OMEPRAZOLE 20 MG Oral Capsule Delayed Release, TAKE 1 CAPSULE(20 MG) BY MOUTH EVERY MORNING BEFORE BREAKFAST, Disp: 90 capsule, Rfl: 1  ascorbic acid 500 MG Oral Tab, Take 1 tablet (500 mg total) by mouth daily. , Disp: 30 tablet, Rfl: 0  multivitamin Oral Tab, Take 1 tablet by mouth daily. , Disp: 30 tablet, Rfl: 0  tamsulosin (FLOMAX) cap, TAKE 2 CAPSULES(0.8 MG) BY MOUTH DAILY, Disp: 180 capsule, Rfl: 3  acetaminophen 500 MG Oral Tab, Take 1,000 mg by mouth every 6 (six) hours as needed. , Disp: , Rfl:   aspirin 81 MG Oral Tab, Take 81 mg by mouth daily. , Disp: , Rfl:     ALLERGIES:   He has No Known Allergies. SOCIAL HISTORY:   Patient reports that he has never smoked. He has never used smokeless tobacco. He reports that he does not drink alcohol and does not use drugs. FAMILY HISTORY:   Patient's family history includes No Known Problems in his brother, daughter, father, mother, sister, son, son, and son. ROS:   A 12 point review of systems with pertinent positives and negatives listed in the HPI. EXAM:  GENERAL: Alert and oriented x3, in no apparent distress  PSYCH: Normal mood and affect  HEENT: Ears and throat are clear  NECK: Supple  RESPIRATORY: Normal work of breathing   CARDIO: RRR  ABDOMEN: Soft, non-tender  BACK: Normal, no tenderness  SKIN: No rashes, warm and dry  EXTREMITIES: No edema. Well healed LUE incision.  Palpable thrill in the fistula, weakens in the mid forearm   NEURO: No sensory or motor deficits    IMAGING: Dialysis Access Duplex 12/22/22  Impressions   Left: Patent radial artery to cephalic vein fistula, There is a   hemodynamically significant (>50%) stenosis in the cephalic vein at the   distal forearm. Flow rates drop distal to the stenosis to 357 cc/min. There   is also a branch seen in the cephalic at the distal forearm , measured appx   0.34 cm. ASSESSMENT: Mr. Curly Espino is a 75 y/o male who presents for evaluation of a fistula that has failed to completely mature. Will plan for a left upper extremity fistula angioplasty. We discussed the benefits of the fistulagram and the risks of acute thrombosis, distal embolization, nerve injury, restenosis, dissection or poorly controlled bleeding, pseudoaneurysm, and need for open surgery among other complications. Patient understood and all questions were answered. Diagnoses and all orders for this visit:    ESRD (end stage renal disease) on dialysis ([facility]  - OFFICE/OUTPT VISIT,EST,LEVL III      PLAN:  LUE fistulogram with intervention     The patient indicated an understanding of these issues and agreed to the plan and all questions were answered during the clinic visit. Thank you for allowing me to participate in your patient's care. Please do not hesitate to contact me with any questions.     Sincerely,  Shahida Leonard MD  [facility]  Vascular and Endovascular Surgery

## 2023-01-20 NOTE — IVS NOTE
DISCHARGE NOTE     Pt is able to sit up and ambulate without difficulty. Pt voided and tolerated fluids and food. Procedural site remains dry and intact with good circulation, motion, and sensation. No signs and symptoms of bleeding/hematoma noted. IV access removed  Instruction provided, patient/family verbalizes understanding. Dr. Shyam Estrada spoke with family post procedure via telephone per procedural staff.      Pt discharge via wheelchair to [facility]

## 2023-02-02 NOTE — H&P
Call was made to verify patient's Pcp. Patient unavailable. Left a message.   Reason for Consultation:   Coronary artery disease     History of Present Illness:   Patient is a 65year old man with type 2 diabetes mellitus, hypertension, hyperlipidemia, stage 4 CKD, and congestive heart failure with EF of 30-40% who presents for an e family history.     Social History  Social History    Tobacco Use      Smoking status: Never Smoker      Smokeless tobacco: Never Used    Vaping Use      Vaping Use: Never used    Alcohol use: No    Drug use:  No           Current Medications:  0.9% NaCl in sounds appreciated. Abdomen is soft nontender with no organomegaly mass or appreciable aneurysm. Extremities are without cyanosis clubbing or edema. Pulses are equal and intact in all extremities.   Skin is warm and dry without obvious pathological lesio answered all his questions about the procedure. He is very concerned about the risk of renal failure and would like to discuss with his son who is an internal medicine physician.   I provided him with the coronary artery bypass grafting booklet and my card

## 2023-02-28 ENCOUNTER — LAB ENCOUNTER (OUTPATIENT)
Dept: LAB | Facility: HOSPITAL | Age: 73
End: 2023-02-28
Attending: SURGERY
Payer: MEDICARE

## 2023-02-28 DIAGNOSIS — Z01.818 PREOPERATIVE TESTING: ICD-10-CM

## 2023-03-01 ENCOUNTER — APPOINTMENT (OUTPATIENT)
Dept: GENERAL RADIOLOGY | Facility: HOSPITAL | Age: 73
End: 2023-03-01
Attending: EMERGENCY MEDICINE
Payer: MEDICARE

## 2023-03-01 ENCOUNTER — HOSPITAL ENCOUNTER (EMERGENCY)
Facility: HOSPITAL | Age: 73
Discharge: HOME | End: 2023-03-01
Attending: EMERGENCY MEDICINE
Payer: MEDICARE

## 2023-03-01 VITALS
WEIGHT: 228.31 LBS | BODY MASS INDEX: 31 KG/M2 | DIASTOLIC BLOOD PRESSURE: 75 MMHG | HEART RATE: 67 BPM | RESPIRATION RATE: 15 BRPM | TEMPERATURE: 98 F | SYSTOLIC BLOOD PRESSURE: 169 MMHG | OXYGEN SATURATION: 97 %

## 2023-03-01 DIAGNOSIS — R55 SYNCOPE AND COLLAPSE: Primary | ICD-10-CM

## 2023-03-01 DIAGNOSIS — N17.9 ACUTE RENAL FAILURE SUPERIMPOSED ON STAGE 4 CHRONIC KIDNEY DISEASE, UNSPECIFIED ACUTE RENAL FAILURE TYPE (HCC): ICD-10-CM

## 2023-03-01 DIAGNOSIS — N18.4 ACUTE RENAL FAILURE SUPERIMPOSED ON STAGE 4 CHRONIC KIDNEY DISEASE, UNSPECIFIED ACUTE RENAL FAILURE TYPE (HCC): ICD-10-CM

## 2023-03-01 LAB
ALBUMIN SERPL-MCNC: 3.5 G/DL (ref 3.4–5)
ALBUMIN/GLOB SERPL: 0.9 {RATIO} (ref 1–2)
ALP LIVER SERPL-CCNC: 71 U/L
ALT SERPL-CCNC: 25 U/L
ANION GAP SERPL CALC-SCNC: 6 MMOL/L (ref 0–18)
AST SERPL-CCNC: 15 U/L (ref 15–37)
BASOPHILS # BLD AUTO: 0.06 X10(3) UL (ref 0–0.2)
BASOPHILS NFR BLD AUTO: 0.7 %
BILIRUB SERPL-MCNC: 0.3 MG/DL (ref 0.1–2)
BUN BLD-MCNC: 34 MG/DL (ref 7–18)
BUN/CREAT SERPL: 8.1 (ref 10–20)
CALCIUM BLD-MCNC: 8.5 MG/DL (ref 8.5–10.1)
CHLORIDE SERPL-SCNC: 105 MMOL/L (ref 98–112)
CO2 SERPL-SCNC: 29 MMOL/L (ref 21–32)
CREAT BLD-MCNC: 4.22 MG/DL
DEPRECATED RDW RBC AUTO: 43.7 FL (ref 35.1–46.3)
EOSINOPHIL # BLD AUTO: 0.31 X10(3) UL (ref 0–0.7)
EOSINOPHIL NFR BLD AUTO: 3.5 %
ERYTHROCYTE [DISTWIDTH] IN BLOOD BY AUTOMATED COUNT: 12.5 % (ref 11–15)
GFR SERPLBLD BASED ON 1.73 SQ M-ARVRAT: 14 ML/MIN/1.73M2 (ref 60–?)
GLOBULIN PLAS-MCNC: 3.8 G/DL (ref 2.8–4.4)
GLUCOSE BLD-MCNC: 168 MG/DL (ref 70–99)
GLUCOSE BLDC GLUCOMTR-MCNC: 157 MG/DL (ref 70–99)
HCT VFR BLD AUTO: 33.7 %
HGB BLD-MCNC: 10.9 G/DL
IMM GRANULOCYTES # BLD AUTO: 0.02 X10(3) UL (ref 0–1)
IMM GRANULOCYTES NFR BLD: 0.2 %
LYMPHOCYTES # BLD AUTO: 1.09 X10(3) UL (ref 1–4)
LYMPHOCYTES NFR BLD AUTO: 12.2 %
MAGNESIUM SERPL-MCNC: 2 MG/DL (ref 1.6–2.6)
MCH RBC QN AUTO: 31.1 PG (ref 26–34)
MCHC RBC AUTO-ENTMCNC: 32.3 G/DL (ref 31–37)
MCV RBC AUTO: 96 FL
MONOCYTES # BLD AUTO: 0.7 X10(3) UL (ref 0.1–1)
MONOCYTES NFR BLD AUTO: 7.9 %
NEUTROPHILS # BLD AUTO: 6.72 X10 (3) UL (ref 1.5–7.7)
NEUTROPHILS # BLD AUTO: 6.72 X10(3) UL (ref 1.5–7.7)
NEUTROPHILS NFR BLD AUTO: 75.5 %
OSMOLALITY SERPL CALC.SUM OF ELEC: 301 MOSM/KG (ref 275–295)
PLATELET # BLD AUTO: 171 10(3)UL (ref 150–450)
POTASSIUM SERPL-SCNC: 3.8 MMOL/L (ref 3.5–5.1)
PROT SERPL-MCNC: 7.3 G/DL (ref 6.4–8.2)
RBC # BLD AUTO: 3.51 X10(6)UL
SARS-COV-2 RNA RESP QL NAA+PROBE: NOT DETECTED
SODIUM SERPL-SCNC: 140 MMOL/L (ref 136–145)
WBC # BLD AUTO: 8.9 X10(3) UL (ref 4–11)

## 2023-03-01 PROCEDURE — 80053 COMPREHEN METABOLIC PANEL: CPT

## 2023-03-01 PROCEDURE — 85025 COMPLETE CBC W/AUTO DIFF WBC: CPT | Performed by: EMERGENCY MEDICINE

## 2023-03-01 PROCEDURE — 93005 ELECTROCARDIOGRAM TRACING: CPT

## 2023-03-01 PROCEDURE — 80053 COMPREHEN METABOLIC PANEL: CPT | Performed by: EMERGENCY MEDICINE

## 2023-03-01 PROCEDURE — 93010 ELECTROCARDIOGRAM REPORT: CPT

## 2023-03-01 PROCEDURE — 82962 GLUCOSE BLOOD TEST: CPT

## 2023-03-01 PROCEDURE — 71045 X-RAY EXAM CHEST 1 VIEW: CPT | Performed by: EMERGENCY MEDICINE

## 2023-03-01 PROCEDURE — 99285 EMERGENCY DEPT VISIT HI MDM: CPT

## 2023-03-01 PROCEDURE — 83735 ASSAY OF MAGNESIUM: CPT | Performed by: EMERGENCY MEDICINE

## 2023-03-01 PROCEDURE — 36415 COLL VENOUS BLD VENIPUNCTURE: CPT

## 2023-03-01 PROCEDURE — 99284 EMERGENCY DEPT VISIT MOD MDM: CPT

## 2023-03-01 PROCEDURE — 85025 COMPLETE CBC W/AUTO DIFF WBC: CPT

## 2023-03-02 LAB
ATRIAL RATE: 77 BPM
P AXIS: 65 DEGREES
P-R INTERVAL: 214 MS
Q-T INTERVAL: 442 MS
QRS DURATION: 146 MS
QTC CALCULATION (BEZET): 500 MS
R AXIS: -60 DEGREES
T AXIS: 91 DEGREES
VENTRICULAR RATE: 77 BPM

## 2023-03-02 RX ORDER — ROSUVASTATIN CALCIUM 20 MG/1
TABLET, COATED ORAL
COMMUNITY
Start: 2023-02-02

## 2023-03-02 RX ORDER — SEVELAMER CARBONATE 800 MG/1
TABLET, FILM COATED ORAL
COMMUNITY
Start: 2023-01-19

## 2023-03-02 NOTE — ED INITIAL ASSESSMENT (HPI)
Arrives from dialysis. Completed dialysis per patient, then cramps started in BLE, pt syncopized while in chair. When he came to, felt nauseous. Since, he feels a little better but still off. Yoshi flores.  Reports feeling normal this AM.

## 2023-03-03 ENCOUNTER — ANESTHESIA (OUTPATIENT)
Dept: SURGERY | Facility: HOSPITAL | Age: 73
End: 2023-03-03
Payer: MEDICARE

## 2023-03-03 ENCOUNTER — HOSPITAL ENCOUNTER (OUTPATIENT)
Facility: HOSPITAL | Age: 73
Setting detail: HOSPITAL OUTPATIENT SURGERY
Discharge: HOME | End: 2023-03-03
Attending: SURGERY | Admitting: SURGERY
Payer: MEDICARE

## 2023-03-03 ENCOUNTER — ANESTHESIA EVENT (OUTPATIENT)
Dept: SURGERY | Facility: HOSPITAL | Age: 73
End: 2023-03-03
Payer: MEDICARE

## 2023-03-03 VITALS
OXYGEN SATURATION: 94 % | SYSTOLIC BLOOD PRESSURE: 111 MMHG | DIASTOLIC BLOOD PRESSURE: 64 MMHG | RESPIRATION RATE: 14 BRPM | HEART RATE: 77 BPM | HEIGHT: 72 IN | BODY MASS INDEX: 32.1 KG/M2 | TEMPERATURE: 97 F | WEIGHT: 237 LBS

## 2023-03-03 DIAGNOSIS — Z01.818 PREOPERATIVE TESTING: Primary | ICD-10-CM

## 2023-03-03 LAB
GLUCOSE BLDC GLUCOMTR-MCNC: 212 MG/DL (ref 70–99)
GLUCOSE BLDC GLUCOMTR-MCNC: 247 MG/DL (ref 70–99)
POTASSIUM SERPL-SCNC: 4.7 MMOL/L (ref 3.5–5.1)

## 2023-03-03 PROCEDURE — 82962 GLUCOSE BLOOD TEST: CPT

## 2023-03-03 PROCEDURE — 64415 NJX AA&/STRD BRCH PLXS IMG: CPT | Performed by: SURGERY

## 2023-03-03 PROCEDURE — 84132 ASSAY OF SERUM POTASSIUM: CPT | Performed by: SURGERY

## 2023-03-03 PROCEDURE — 03180ZD BYPASS LEFT BRACHIAL ARTERY TO UPPER ARM VEIN, OPEN APPROACH: ICD-10-PCS | Performed by: SURGERY

## 2023-03-03 RX ORDER — NICOTINE POLACRILEX 4 MG
15 LOZENGE BUCCAL
Status: DISCONTINUED | OUTPATIENT
Start: 2023-03-03 | End: 2023-03-03

## 2023-03-03 RX ORDER — HYDROMORPHONE HYDROCHLORIDE 1 MG/ML
0.2 INJECTION, SOLUTION INTRAMUSCULAR; INTRAVENOUS; SUBCUTANEOUS EVERY 5 MIN PRN
Status: DISCONTINUED | OUTPATIENT
Start: 2023-03-03 | End: 2023-03-03

## 2023-03-03 RX ORDER — NICOTINE POLACRILEX 4 MG
15 LOZENGE BUCCAL
Status: DISCONTINUED | OUTPATIENT
Start: 2023-03-03 | End: 2023-03-03 | Stop reason: HOSPADM

## 2023-03-03 RX ORDER — DEXTROSE MONOHYDRATE 25 G/50ML
50 INJECTION, SOLUTION INTRAVENOUS
Status: DISCONTINUED | OUTPATIENT
Start: 2023-03-03 | End: 2023-03-03

## 2023-03-03 RX ORDER — LIDOCAINE HYDROCHLORIDE 10 MG/ML
INJECTION, SOLUTION EPIDURAL; INFILTRATION; INTRACAUDAL; PERINEURAL AS NEEDED
Status: DISCONTINUED | OUTPATIENT
Start: 2023-03-03 | End: 2023-03-03 | Stop reason: SURG

## 2023-03-03 RX ORDER — SODIUM CHLORIDE 9 MG/ML
INJECTION, SOLUTION INTRAVENOUS CONTINUOUS
Status: DISCONTINUED | OUTPATIENT
Start: 2023-03-03 | End: 2023-03-03

## 2023-03-03 RX ORDER — DEXTROSE MONOHYDRATE 50 MG/ML
INJECTION, SOLUTION INTRAVENOUS CONTINUOUS
Status: DISCONTINUED | OUTPATIENT
Start: 2023-03-03 | End: 2023-03-03

## 2023-03-03 RX ORDER — METOCLOPRAMIDE HYDROCHLORIDE 5 MG/ML
5 INJECTION INTRAMUSCULAR; INTRAVENOUS EVERY 8 HOURS PRN
Status: DISCONTINUED | OUTPATIENT
Start: 2023-03-03 | End: 2023-03-03

## 2023-03-03 RX ORDER — NICOTINE POLACRILEX 4 MG
30 LOZENGE BUCCAL
Status: DISCONTINUED | OUTPATIENT
Start: 2023-03-03 | End: 2023-03-03 | Stop reason: HOSPADM

## 2023-03-03 RX ORDER — DEXTROSE MONOHYDRATE 25 G/50ML
50 INJECTION, SOLUTION INTRAVENOUS
Status: DISCONTINUED | OUTPATIENT
Start: 2023-03-03 | End: 2023-03-03 | Stop reason: HOSPADM

## 2023-03-03 RX ORDER — INSULIN ASPART 100 [IU]/ML
INJECTION, SOLUTION INTRAVENOUS; SUBCUTANEOUS ONCE
Status: COMPLETED | OUTPATIENT
Start: 2023-03-03 | End: 2023-03-03

## 2023-03-03 RX ORDER — FAMOTIDINE 20 MG/1
20 TABLET, FILM COATED ORAL ONCE
Status: COMPLETED | OUTPATIENT
Start: 2023-03-03 | End: 2023-03-03

## 2023-03-03 RX ORDER — ONDANSETRON 2 MG/ML
4 INJECTION INTRAMUSCULAR; INTRAVENOUS EVERY 6 HOURS PRN
Status: DISCONTINUED | OUTPATIENT
Start: 2023-03-03 | End: 2023-03-03

## 2023-03-03 RX ORDER — NICOTINE POLACRILEX 4 MG
30 LOZENGE BUCCAL
Status: DISCONTINUED | OUTPATIENT
Start: 2023-03-03 | End: 2023-03-03

## 2023-03-03 RX ORDER — NALOXONE HYDROCHLORIDE 0.4 MG/ML
80 INJECTION, SOLUTION INTRAMUSCULAR; INTRAVENOUS; SUBCUTANEOUS AS NEEDED
Status: DISCONTINUED | OUTPATIENT
Start: 2023-03-03 | End: 2023-03-03

## 2023-03-03 RX ORDER — ACETAMINOPHEN 500 MG
1000 TABLET ORAL ONCE
Status: COMPLETED | OUTPATIENT
Start: 2023-03-03 | End: 2023-03-03

## 2023-03-03 RX ORDER — ROPIVACAINE HYDROCHLORIDE 5 MG/ML
INJECTION, SOLUTION EPIDURAL; INFILTRATION; PERINEURAL AS NEEDED
Status: DISCONTINUED | OUTPATIENT
Start: 2023-03-03 | End: 2023-03-03 | Stop reason: SURG

## 2023-03-03 RX ORDER — HEPARIN SODIUM 1000 [USP'U]/ML
INJECTION, SOLUTION INTRAVENOUS; SUBCUTANEOUS AS NEEDED
Status: DISCONTINUED | OUTPATIENT
Start: 2023-03-03 | End: 2023-03-03 | Stop reason: SURG

## 2023-03-03 RX ORDER — MORPHINE SULFATE 4 MG/ML
2 INJECTION, SOLUTION INTRAMUSCULAR; INTRAVENOUS EVERY 10 MIN PRN
Status: DISCONTINUED | OUTPATIENT
Start: 2023-03-03 | End: 2023-03-03

## 2023-03-03 RX ORDER — PHENYLEPHRINE HCL 10 MG/ML
VIAL (ML) INJECTION AS NEEDED
Status: DISCONTINUED | OUTPATIENT
Start: 2023-03-03 | End: 2023-03-03 | Stop reason: SURG

## 2023-03-03 RX ORDER — CEFAZOLIN SODIUM/WATER 2 G/20 ML
2 SYRINGE (ML) INTRAVENOUS ONCE
Status: COMPLETED | OUTPATIENT
Start: 2023-03-03 | End: 2023-03-03

## 2023-03-03 RX ORDER — METOCLOPRAMIDE 10 MG/1
10 TABLET ORAL ONCE
Status: COMPLETED | OUTPATIENT
Start: 2023-03-03 | End: 2023-03-03

## 2023-03-03 RX ADMIN — LIDOCAINE HYDROCHLORIDE 40 MG: 10 INJECTION, SOLUTION EPIDURAL; INFILTRATION; INTRACAUDAL; PERINEURAL at 10:40:00

## 2023-03-03 RX ADMIN — LIDOCAINE HYDROCHLORIDE 5 ML: 10 INJECTION, SOLUTION EPIDURAL; INFILTRATION; INTRACAUDAL; PERINEURAL at 10:27:00

## 2023-03-03 RX ADMIN — SODIUM CHLORIDE: 9 INJECTION, SOLUTION INTRAVENOUS at 11:43:00

## 2023-03-03 RX ADMIN — PHENYLEPHRINE HCL 100 MCG: 10 MG/ML VIAL (ML) INJECTION at 11:03:00

## 2023-03-03 RX ADMIN — PHENYLEPHRINE HCL 100 MCG: 10 MG/ML VIAL (ML) INJECTION at 10:58:00

## 2023-03-03 RX ADMIN — CEFAZOLIN SODIUM/WATER 2 G: 2 G/20 ML SYRINGE (ML) INTRAVENOUS at 10:43:00

## 2023-03-03 RX ADMIN — PHENYLEPHRINE HCL 50 MCG: 10 MG/ML VIAL (ML) INJECTION at 10:52:00

## 2023-03-03 RX ADMIN — SODIUM CHLORIDE: 9 INJECTION, SOLUTION INTRAVENOUS at 11:00:00

## 2023-03-03 RX ADMIN — HEPARIN SODIUM 5000 UNITS: 1000 INJECTION, SOLUTION INTRAVENOUS; SUBCUTANEOUS at 11:11:00

## 2023-03-03 RX ADMIN — PHENYLEPHRINE HCL 100 MCG: 10 MG/ML VIAL (ML) INJECTION at 11:07:00

## 2023-03-03 RX ADMIN — ROPIVACAINE HYDROCHLORIDE 30 ML: 5 INJECTION, SOLUTION EPIDURAL; INFILTRATION; PERINEURAL at 10:29:00

## 2023-03-03 RX ADMIN — SODIUM CHLORIDE: 9 INJECTION, SOLUTION INTRAVENOUS at 10:37:00

## 2023-03-03 NOTE — ANESTHESIA PROCEDURE NOTES
Peripheral Block    Date/Time: 3/3/2023 10:25 AM    Performed by: Nancy Stuart DO  Authorized by: Nancy Stuart DO      General Information and Staff    Start Time:  3/3/2023 10:25 AM  End Time:  3/3/2023 10:29 AM  Anesthesiologist:  Nancy Stuart DO  Performed by:   Anesthesiologist  Patient Location:  PACU    Block Placement: Pre Induction  Site Identification: real time ultrasound guided and image stored and retrievable    Block site/laterality marked before start: site marked  Reason for Block: at surgeon's request and post-op pain management    Preanesthetic Checklist: 2 patient identifers, IV checked, risks and benefits discussed, monitors and equipment checked, pre-op evaluation, timeout performed, anesthesia consent and sterile technique used      Procedure Details    Patient Position:  Sitting  Prep: ChloraPrep and patient draped    Monitoring:  Cardiac monitor  Block Type:  Supraclavicular  Injection Technique:  Single-shot    Needle    Needle Type:  Short-bevel  Needle Gauge:  22 G  Needle Length:  50 mm  Needle Localization:  Ultrasound guidance and nerve stimulator  Reason for Ultrasound Use: appropriate spread of the medication was noted in real time and no ultrasound evidence of intravascular and/or intraneural injection      Muscle Twitch Response: distal twitch      Assessment    Injection Assessment:  Good spread noted, incremental injection, local visualized surrounding nerve on ultrasound, low pressure, negative aspiration for heme and no pain on injection  Heart Rate Change: No        Medications  3/3/2023 10:25 AM      Additional Comments

## 2023-03-03 NOTE — OPERATIVE REPORT
[facility]     PATIENTS NAME: Evonne Martin  ATTENDING PHYSICIAN: Perfecto Ulrich MD  OPERATING PHYSICIAN: Karla Gallo MD  CSN: [id number]     LOCATION:  OR  MRN: [id number]    YOB: 1950  ADMISSION DATE: 3/3/2023  OPERATION DATE: 3/3/2023                OPERATIVE REPORT     PRE-OPERATIVE DIAGNOSIS:  End Stage Renal Disease in need of a more permanent access. POST-OPERATIVE DIAGNOSIS: Same as above. PROCEDURE PERFORMED: left brachio-cephalic arteriovenous fistula creation    ANESTHESIA: Regional    SURGEON: Karla Gallo MD    ASSISTANT: Vinh Silva SA    EBL: 25 ml    SPECIMENS: * No specimens in log *    COMPLICATIONS: None    SUMMARY OF CASE: Palpable thrill in fistula, palpable radial pulse    INDICATIONS: The patient is a 74year old male with ESRD in need of a more permanent access. He has an existing radiocephalic fistula that is not functional. Preoperative mapping revealed a usable left upper arm cephalic vein. We have discussed the risks and benefits of the fistula in detail including the risk of nerve injury, ischemic nerve injury, thrombosis, with need for angioplasty or revision, infection, and steal syndrome among other complications. DESCRIPTION OF PROCEDURE: The patient was brought to the operating room and placed in the supine position. The left upper extremity was prepped and draped in the usual sterile fashion. The skin over the antecubital fossa was infiltrated with 1% lidocaine solution. A 6-cm transverse skin incision was then performed over the antecubital fossa. The cephalic vein was identified and encircled with a vessel loop. The vein was dissected for a segment of 5 cm. The dissection was carried as distally as possible through that incision. Attention was then directed to the brachial artery. The tendonous aponeurosis of the biceps muscle was incised. The location of the brachial artery was identified by palpation.  The soft tissue over the brachial artery was then incised and the brachial artery was identified and encircled with a vessel loop. Care was taken to avoid injury to the nerve. The patient was then systemically heparinized. The cephalic vein was then ligated at its most distal end. Two small profunda clamps were applied to the brachial artery and a 6-mm anterolateral arteriotomy was made using a #11 blade scalpel. The artery was then locally heparinized with concentrated heparinized saline solution. The cephalic vein was then gently curved and allowed to lie over the arteriotomy. The end of the vein was spatulated to match the size of the arteriotomy. The anastomosis was then performed using a 6-0 prolene running suture. At the completion of the suture line, the brachial artery was forward-flushed and then allowed to backbleed. The cephalic vein was also allowed to backbleed. The anastomosis was irrigated with heparinized solution. The sutures were then tied and the suture line evaluated for hemostasis, which was adequate. There was an excellent thrill in the cephalic vein. The patient maintained having a palpable pulse in the brachial and radial arteries. There was no evidence of any kinks. Hemostasis was achieved with pressure and thrombin-gel foam. The wound was then irrigated. The subcutaneous tissues were reapproximated using a running 3-0 Vicryl sutures. The skin was closed using a running 4-0 monocryl subcuticular stitch. A sterile dressing was applied. Attention was then turned to the wrist. An incision was made through the previous incision and deepened down with Bovie. The radiocephalic fistula was identified and sharply circumferentially dissected. It was ligated with a 2-0 silk tie. There was a palpable radial pulse at the wrist. The incision was irrigated and closed using a 3-0 vicryl deep dermal and a 4-0 monocryl subcuticular stitch.  The patient tolerated the procedure well and was taken to the postanesthesia care unit in a stable condition.       Stephon Alvarez MD  03/03/23  11:59 AM

## 2023-05-09 ENCOUNTER — APPOINTMENT (OUTPATIENT)
Dept: ULTRASOUND IMAGING | Facility: HOSPITAL | Age: 73
End: 2023-05-09
Attending: EMERGENCY MEDICINE
Payer: MEDICARE

## 2023-05-09 ENCOUNTER — HOSPITAL ENCOUNTER (OUTPATIENT)
Facility: HOSPITAL | Age: 73
Setting detail: OBSERVATION
Discharge: HOME | End: 2023-05-10
Attending: EMERGENCY MEDICINE | Admitting: STUDENT IN AN ORGANIZED HEALTH CARE EDUCATION/TRAINING PROGRAM
Payer: MEDICARE

## 2023-05-09 DIAGNOSIS — T82.9XXA COMPLICATION OF AV DIALYSIS FISTULA, INITIAL ENCOUNTER: Primary | ICD-10-CM

## 2023-05-09 LAB
ANION GAP SERPL CALC-SCNC: 11 MMOL/L (ref 0–18)
BASOPHILS # BLD AUTO: 0.06 X10(3) UL (ref 0–0.2)
BASOPHILS NFR BLD AUTO: 0.7 %
BUN BLD-MCNC: 60 MG/DL (ref 7–18)
BUN/CREAT SERPL: 9.7 (ref 10–20)
CALCIUM BLD-MCNC: 8.9 MG/DL (ref 8.5–10.1)
CHLORIDE SERPL-SCNC: 105 MMOL/L (ref 98–112)
CO2 SERPL-SCNC: 24 MMOL/L (ref 21–32)
CREAT BLD-MCNC: 6.2 MG/DL
DEPRECATED RDW RBC AUTO: 51.6 FL (ref 35.1–46.3)
EOSINOPHIL # BLD AUTO: 0.44 X10(3) UL (ref 0–0.7)
EOSINOPHIL NFR BLD AUTO: 5 %
ERYTHROCYTE [DISTWIDTH] IN BLOOD BY AUTOMATED COUNT: 14.6 % (ref 11–15)
GFR SERPLBLD BASED ON 1.73 SQ M-ARVRAT: 9 ML/MIN/1.73M2 (ref 60–?)
GLUCOSE BLD-MCNC: 216 MG/DL (ref 70–99)
GLUCOSE BLDC GLUCOMTR-MCNC: 316 MG/DL (ref 70–99)
HCT VFR BLD AUTO: 32.8 %
HGB BLD-MCNC: 10.4 G/DL
IMM GRANULOCYTES # BLD AUTO: 0.03 X10(3) UL (ref 0–1)
IMM GRANULOCYTES NFR BLD: 0.3 %
LYMPHOCYTES # BLD AUTO: 2.1 X10(3) UL (ref 1–4)
LYMPHOCYTES NFR BLD AUTO: 24 %
MCH RBC QN AUTO: 31 PG (ref 26–34)
MCHC RBC AUTO-ENTMCNC: 31.7 G/DL (ref 31–37)
MCV RBC AUTO: 97.6 FL
MONOCYTES # BLD AUTO: 0.64 X10(3) UL (ref 0.1–1)
MONOCYTES NFR BLD AUTO: 7.3 %
NEUTROPHILS # BLD AUTO: 5.49 X10 (3) UL (ref 1.5–7.7)
NEUTROPHILS # BLD AUTO: 5.49 X10(3) UL (ref 1.5–7.7)
NEUTROPHILS NFR BLD AUTO: 62.7 %
OSMOLALITY SERPL CALC.SUM OF ELEC: 313 MOSM/KG (ref 275–295)
PLATELET # BLD AUTO: 163 10(3)UL (ref 150–450)
POTASSIUM SERPL-SCNC: 4.3 MMOL/L (ref 3.5–5.1)
RBC # BLD AUTO: 3.36 X10(6)UL
SODIUM SERPL-SCNC: 140 MMOL/L (ref 136–145)
WBC # BLD AUTO: 8.8 X10(3) UL (ref 4–11)

## 2023-05-09 PROCEDURE — 80048 BASIC METABOLIC PNL TOTAL CA: CPT | Performed by: EMERGENCY MEDICINE

## 2023-05-09 PROCEDURE — 85025 COMPLETE CBC W/AUTO DIFF WBC: CPT | Performed by: EMERGENCY MEDICINE

## 2023-05-09 PROCEDURE — 82962 GLUCOSE BLOOD TEST: CPT

## 2023-05-09 PROCEDURE — 93971 EXTREMITY STUDY: CPT | Performed by: EMERGENCY MEDICINE

## 2023-05-09 RX ORDER — HYDROCODONE BITARTRATE AND ACETAMINOPHEN 5; 325 MG/1; MG/1
2 TABLET ORAL EVERY 4 HOURS PRN
Status: DISCONTINUED | OUTPATIENT
Start: 2023-05-09 | End: 2023-05-10

## 2023-05-09 RX ORDER — ROSUVASTATIN CALCIUM 20 MG/1
20 TABLET, COATED ORAL NIGHTLY
Status: DISCONTINUED | OUTPATIENT
Start: 2023-05-09 | End: 2023-05-10

## 2023-05-09 RX ORDER — SEVELAMER CARBONATE 800 MG/1
800 TABLET, FILM COATED ORAL
Status: DISCONTINUED | OUTPATIENT
Start: 2023-05-10 | End: 2023-05-10

## 2023-05-09 RX ORDER — HYDRALAZINE HYDROCHLORIDE 50 MG/1
50 TABLET, FILM COATED ORAL 3 TIMES DAILY
Status: DISCONTINUED | OUTPATIENT
Start: 2023-05-09 | End: 2023-05-10

## 2023-05-09 RX ORDER — METOPROLOL SUCCINATE 50 MG/1
50 TABLET, EXTENDED RELEASE ORAL 2 TIMES DAILY
Status: DISCONTINUED | OUTPATIENT
Start: 2023-05-09 | End: 2023-05-10

## 2023-05-09 RX ORDER — TAMSULOSIN HYDROCHLORIDE 0.4 MG/1
0.8 CAPSULE ORAL
Status: DISCONTINUED | OUTPATIENT
Start: 2023-05-10 | End: 2023-05-10

## 2023-05-09 RX ORDER — HYDROCODONE BITARTRATE AND ACETAMINOPHEN 5; 325 MG/1; MG/1
1 TABLET ORAL EVERY 4 HOURS PRN
Status: DISCONTINUED | OUTPATIENT
Start: 2023-05-09 | End: 2023-05-10

## 2023-05-09 RX ORDER — FINASTERIDE 5 MG/1
5 TABLET, FILM COATED ORAL EVERY MORNING
Status: DISCONTINUED | OUTPATIENT
Start: 2023-05-10 | End: 2023-05-10

## 2023-05-09 RX ORDER — DEXTROSE MONOHYDRATE 25 G/50ML
50 INJECTION, SOLUTION INTRAVENOUS
Status: DISCONTINUED | OUTPATIENT
Start: 2023-05-09 | End: 2023-05-10

## 2023-05-09 RX ORDER — NICOTINE POLACRILEX 4 MG
15 LOZENGE BUCCAL
Status: DISCONTINUED | OUTPATIENT
Start: 2023-05-09 | End: 2023-05-10

## 2023-05-09 RX ORDER — NICOTINE POLACRILEX 4 MG
30 LOZENGE BUCCAL
Status: DISCONTINUED | OUTPATIENT
Start: 2023-05-09 | End: 2023-05-10

## 2023-05-09 RX ORDER — METOCLOPRAMIDE HYDROCHLORIDE 5 MG/ML
5 INJECTION INTRAMUSCULAR; INTRAVENOUS EVERY 8 HOURS PRN
Status: DISCONTINUED | OUTPATIENT
Start: 2023-05-09 | End: 2023-05-10

## 2023-05-09 RX ORDER — ONDANSETRON 2 MG/ML
4 INJECTION INTRAMUSCULAR; INTRAVENOUS EVERY 6 HOURS PRN
Status: DISCONTINUED | OUTPATIENT
Start: 2023-05-09 | End: 2023-05-10

## 2023-05-09 NOTE — ED INITIAL ASSESSMENT (HPI)
Pt ambulatory to ED A&O x 4 w/ c/o: L arm pain/swelling after HD yesterday. Pt has L AVF that was accessed for HD.  (+) swelling & ecchymosis to site. Palpable radial pulse, denies numbness/tingling. Pt had AVF revision surgery approx 2 months ago. Pt also reporting that the dialysis nurse stuck pt mult times yesterday attempting to get access & when done w/ HD pt had significant bleeding.

## 2023-05-09 NOTE — ED QUICK NOTES
Orders for admission, patient is aware of plan and ready to go upstairs. Any questions, please call ED RN Jia at extension [phone]. Patient Covid vaccination status: Partially vaccinated     COVID Test Ordered in ED: None    COVID Suspicion at Admission: N/A    Running Infusions:  None    Mental Status/LOC at time of transport:  AxOX4    Other pertinent information:   CIWA score: N/A   NIH score:  N/A

## 2023-05-10 VITALS
HEART RATE: 71 BPM | HEIGHT: 72 IN | OXYGEN SATURATION: 98 % | BODY MASS INDEX: 32.3 KG/M2 | RESPIRATION RATE: 16 BRPM | TEMPERATURE: 98 F | WEIGHT: 238.5 LBS | DIASTOLIC BLOOD PRESSURE: 76 MMHG | SYSTOLIC BLOOD PRESSURE: 146 MMHG

## 2023-05-10 LAB
ALBUMIN SERPL-MCNC: 2.9 G/DL (ref 3.4–5)
ANION GAP SERPL CALC-SCNC: 8 MMOL/L (ref 0–18)
BUN BLD-MCNC: 76 MG/DL (ref 7–18)
BUN/CREAT SERPL: 10.9 (ref 10–20)
CALCIUM BLD-MCNC: 8.4 MG/DL (ref 8.5–10.1)
CHLORIDE SERPL-SCNC: 107 MMOL/L (ref 98–112)
CO2 SERPL-SCNC: 28 MMOL/L (ref 21–32)
CREAT BLD-MCNC: 6.96 MG/DL
DEPRECATED RDW RBC AUTO: 50.1 FL (ref 35.1–46.3)
ERYTHROCYTE [DISTWIDTH] IN BLOOD BY AUTOMATED COUNT: 14.4 % (ref 11–15)
GFR SERPLBLD BASED ON 1.73 SQ M-ARVRAT: 8 ML/MIN/1.73M2 (ref 60–?)
GLUCOSE BLD-MCNC: 185 MG/DL (ref 70–99)
GLUCOSE BLDC GLUCOMTR-MCNC: 209 MG/DL (ref 70–99)
GLUCOSE BLDC GLUCOMTR-MCNC: 246 MG/DL (ref 70–99)
HCT VFR BLD AUTO: 27.7 %
HGB BLD-MCNC: 8.8 G/DL
MAGNESIUM SERPL-MCNC: 2.3 MG/DL (ref 1.6–2.6)
MCH RBC QN AUTO: 30.6 PG (ref 26–34)
MCHC RBC AUTO-ENTMCNC: 31.8 G/DL (ref 31–37)
MCV RBC AUTO: 96.2 FL
OSMOLALITY SERPL CALC.SUM OF ELEC: 323 MOSM/KG (ref 275–295)
PHOSPHATE SERPL-MCNC: 7.2 MG/DL (ref 2.5–4.9)
PLATELET # BLD AUTO: 158 10(3)UL (ref 150–450)
POTASSIUM SERPL-SCNC: 4.2 MMOL/L (ref 3.5–5.1)
RBC # BLD AUTO: 2.88 X10(6)UL
SODIUM SERPL-SCNC: 143 MMOL/L (ref 136–145)
WBC # BLD AUTO: 8.4 X10(3) UL (ref 4–11)

## 2023-05-10 PROCEDURE — 5A1D70Z PERFORMANCE OF URINARY FILTRATION, INTERMITTENT, LESS THAN 6 HOURS PER DAY: ICD-10-PCS | Performed by: INTERNAL MEDICINE

## 2023-05-10 PROCEDURE — 80069 RENAL FUNCTION PANEL: CPT | Performed by: INTERNAL MEDICINE

## 2023-05-10 PROCEDURE — 83735 ASSAY OF MAGNESIUM: CPT | Performed by: INTERNAL MEDICINE

## 2023-05-10 PROCEDURE — 85027 COMPLETE CBC AUTOMATED: CPT | Performed by: INTERNAL MEDICINE

## 2023-05-10 PROCEDURE — P9047 ALBUMIN (HUMAN), 25%, 50ML: HCPCS | Performed by: INTERNAL MEDICINE

## 2023-05-10 PROCEDURE — 82962 GLUCOSE BLOOD TEST: CPT

## 2023-05-10 PROCEDURE — 90935 HEMODIALYSIS ONE EVALUATION: CPT

## 2023-05-10 RX ORDER — ALBUMIN (HUMAN) 12.5 G/50ML
25 SOLUTION INTRAVENOUS ONCE
Status: DISCONTINUED | OUTPATIENT
Start: 2023-05-10 | End: 2023-05-10

## 2023-05-10 NOTE — PLAN OF CARE
Pt is a&o x4, room air, on tele, standby assist with no device. Voiding freely. Dialysis M/W/F, currently unknown if he will have dialysis 5/10 due to pain at AV fistula site. Plan is for vascular surgery to see and assess prior to temporary line placement. SCDs for DVT prophylaxis. Safety precautions maintained. Call light in reach. Problem: Patient Centered Care  Goal: Patient preferences are identified and integrated in the patient's plan of care  Description: Interventions:  - What would you like us to know as we care for you?  From home with family   - Provide timely, complete, and accurate information to patient/family  - Incorporate patient and family knowledge, values, beliefs, and cultural backgrounds into the planning and delivery of care  - Encourage patient/family to participate in care and decision-making at the level they choose  - Honor patient and family perspectives and choices  Outcome: Progressing     Problem: Patient/Family Goals  Goal: Patient/Family Long Term Goal  Description: Patient's Long Term Goal: Discharge home    Interventions:  - Vascular surgery to assess  - Temp line placement  - See additional Care Plan goals for specific interventions  Outcome: Progressing  Goal: Patient/Family Short Term Goal  Description: Patient's Short Term Goal: Pain management     Interventions:   - Take medications as needed   - Alert of worsening pain   - See additional Care Plan goals for specific interventions  Outcome: Progressing

## 2023-05-10 NOTE — DISCHARGE INSTRUCTIONS
Pulled 1.5L off during dialysis today. Follow up with your nephrologist and primary care doctors. Continue dialysis on Friday as directed.

## 2023-05-10 NOTE — PROGRESS NOTES
Insulin Lispro (1 Unit Dial) SOPN 15 Units tid with meals  is Non-Formulary Medication &  Auto-Substituted to Novolog insulin 15 units tid with meals Per P&T PROTOCOL

## 2023-05-10 NOTE — PROGRESS NOTES
[facility] Note:  Renal Dose Adjustment for Metoclopramide (REGLAN)    Terrence Vines has been prescribed Metoclopramide (REGLAN) 10 mg every 8 hours as needed for nausea/vomiting,. Estimated Creatinine Clearance: 11.5 mL/min (A) (based on SCr of 6.2 mg/dL (H)). Calculated creatinine clearance is < 40 ml/min, therefore, the dose of Metoclopramide (REGLAN) has been changed to 5 mg every 8 hours as needed for nausea/vomiting per P&T approved protocol. Pharmacy will continue to follow, and if renal function improves, will resume the original order.        Thank you,  Sarkis Shaw, PharmD  5/9/2023 9:45 PM

## 2023-05-10 NOTE — PLAN OF CARE
1645: rec'd patient back from dialysis. Per RN report, pulled 1.5L off, vitals stable and plan for home later this evening when daughter can come pick patient up.

## 2023-05-10 NOTE — CM/SW NOTE
05/10/23 1300   Discharge disposition   Expected discharge disposition Home or Self   Outpatient services Dialysis  ([address] in [place], [place] at 3:30pm)   Discharge transportation Private car     Pt discussed during nursing rounds. Pt is stable for [facility] today. MD barr order entered. [address] will resume outpatient HD services at [facility] at 3:30pm. Pt's family will provide transport at [facility]. Plan: Home w/family with [place] for outpatient HD services today. / to remain available for support and/or discharge planning.      MARCELA KellerN    [phone]

## 2023-05-10 NOTE — ED QUICK NOTES
Pt is A&O x 4 speaking in complete coherent sentences. RR even and unlabored. Pt denies any questions or concerns regarding admission.

## 2023-05-10 NOTE — PLAN OF CARE
Order rec'd for dialysis for patient. Clarified with Dr. Jen Woodard and Dr. Kelsey Ann to use catheter NOT fistula for HD. Spoke to [facility] at [facility].    She reports dialysis will be done later today

## 2023-05-12 ENCOUNTER — PATIENT OUTREACH (OUTPATIENT)
Dept: CASE MANAGEMENT | Age: 73
End: 2023-05-12

## 2023-05-12 NOTE — PROGRESS NOTES
1st attempt DM hfu apt request & questions  Pt stated now is not a good time, he is not feeling well

## 2023-05-19 NOTE — PROGRESS NOTES
3rd  attempt DM hfu apt request & questions  No answer, generic vm  Unable to contact pt after multiple attempts   Closing encounter

## 2023-08-31 ENCOUNTER — TELEPHONE (OUTPATIENT)
Dept: INTERVENTIONAL RADIOLOGY/VASCULAR | Facility: HOSPITAL | Age: 73
End: 2023-08-31

## 2023-09-01 ENCOUNTER — TELEPHONE (OUTPATIENT)
Dept: INTERVENTIONAL RADIOLOGY/VASCULAR | Facility: HOSPITAL | Age: 73
End: 2023-09-01

## 2023-09-05 RX ORDER — ASPIRIN 81 MG/1
81 TABLET ORAL DAILY
COMMUNITY

## 2023-09-07 ENCOUNTER — TELEPHONE (OUTPATIENT)
Dept: INTERVENTIONAL RADIOLOGY/VASCULAR | Facility: HOSPITAL | Age: 73
End: 2023-09-07

## 2023-09-14 ENCOUNTER — HOSPITAL ENCOUNTER (OUTPATIENT)
Dept: INTERVENTIONAL RADIOLOGY/VASCULAR | Facility: HOSPITAL | Age: 73
Discharge: HOME | End: 2023-09-14
Attending: NURSE PRACTITIONER | Admitting: RADIOLOGY
Payer: MEDICARE

## 2023-09-14 VITALS
BODY MASS INDEX: 33.59 KG/M2 | TEMPERATURE: 97 F | SYSTOLIC BLOOD PRESSURE: 164 MMHG | DIASTOLIC BLOOD PRESSURE: 80 MMHG | RESPIRATION RATE: 14 BRPM | HEIGHT: 72 IN | HEART RATE: 63 BPM | WEIGHT: 248 LBS | OXYGEN SATURATION: 96 %

## 2023-09-14 DIAGNOSIS — Z99.2 ESRD ON HEMODIALYSIS (HCC): ICD-10-CM

## 2023-09-14 DIAGNOSIS — N18.6 ESRD ON HEMODIALYSIS (HCC): ICD-10-CM

## 2023-09-14 LAB — GLUCOSE BLDC GLUCOMTR-MCNC: 282 MG/DL (ref 70–99)

## 2023-09-14 PROCEDURE — 99152 MOD SED SAME PHYS/QHP 5/>YRS: CPT | Performed by: RADIOLOGY

## 2023-09-14 PROCEDURE — 36415 COLL VENOUS BLD VENIPUNCTURE: CPT

## 2023-09-14 PROCEDURE — 36589 REMOVAL TUNNELED CV CATH: CPT | Performed by: RADIOLOGY

## 2023-09-14 PROCEDURE — 82962 GLUCOSE BLOOD TEST: CPT

## 2023-09-14 PROCEDURE — 05PY33Z REMOVAL OF INFUSION DEVICE FROM UPPER VEIN, PERCUTANEOUS APPROACH: ICD-10-PCS | Performed by: RADIOLOGY

## 2023-09-14 RX ORDER — LIDOCAINE HYDROCHLORIDE 20 MG/ML
INJECTION, SOLUTION INFILTRATION; PERINEURAL
Status: COMPLETED
Start: 2023-09-14 | End: 2023-09-14

## 2023-09-14 RX ORDER — SODIUM CHLORIDE 9 MG/ML
INJECTION, SOLUTION INTRAVENOUS CONTINUOUS
Status: DISCONTINUED | OUTPATIENT
Start: 2023-09-14 | End: 2023-09-14

## 2023-09-14 RX ORDER — MIDAZOLAM HYDROCHLORIDE 1 MG/ML
INJECTION INTRAMUSCULAR; INTRAVENOUS
Status: DISCONTINUED
Start: 2023-09-14 | End: 2023-09-14

## 2023-09-14 RX ADMIN — SODIUM CHLORIDE: 9 INJECTION, SOLUTION INTRAVENOUS at 08:45:00

## 2023-09-20 ENCOUNTER — HOSPITAL ENCOUNTER (EMERGENCY)
Facility: HOSPITAL | Age: 73
Discharge: HOME | End: 2023-09-20
Attending: EMERGENCY MEDICINE
Payer: MEDICARE

## 2023-09-20 ENCOUNTER — APPOINTMENT (OUTPATIENT)
Dept: CT IMAGING | Facility: HOSPITAL | Age: 73
End: 2023-09-20
Attending: EMERGENCY MEDICINE
Payer: MEDICARE

## 2023-09-20 VITALS
HEART RATE: 82 BPM | WEIGHT: 165 LBS | TEMPERATURE: 98 F | SYSTOLIC BLOOD PRESSURE: 156 MMHG | RESPIRATION RATE: 18 BRPM | HEIGHT: 72 IN | BODY MASS INDEX: 22.35 KG/M2 | DIASTOLIC BLOOD PRESSURE: 69 MMHG | OXYGEN SATURATION: 92 %

## 2023-09-20 DIAGNOSIS — R10.9 ABDOMINAL PAIN OF UNKNOWN ETIOLOGY: Primary | ICD-10-CM

## 2023-09-20 DIAGNOSIS — R55 SYNCOPE AND COLLAPSE: ICD-10-CM

## 2023-09-20 LAB
ALBUMIN SERPL-MCNC: 3.2 G/DL (ref 3.4–5)
ALP LIVER SERPL-CCNC: 106 U/L
ALT SERPL-CCNC: 37 U/L
ANION GAP SERPL CALC-SCNC: 11 MMOL/L (ref 0–18)
AST SERPL-CCNC: 25 U/L (ref 15–37)
BASOPHILS # BLD AUTO: 0.04 X10(3) UL (ref 0–0.2)
BASOPHILS NFR BLD AUTO: 0.5 %
BILIRUB DIRECT SERPL-MCNC: <0.1 MG/DL (ref 0–0.2)
BILIRUB SERPL-MCNC: 0.4 MG/DL (ref 0.1–2)
BUN BLD-MCNC: 75 MG/DL (ref 7–18)
BUN/CREAT SERPL: 8.4 (ref 10–20)
CALCIUM BLD-MCNC: 8.4 MG/DL (ref 8.5–10.1)
CHLORIDE SERPL-SCNC: 102 MMOL/L (ref 98–112)
CO2 SERPL-SCNC: 25 MMOL/L (ref 21–32)
CREAT BLD-MCNC: 8.9 MG/DL
DEPRECATED RDW RBC AUTO: 46.7 FL (ref 35.1–46.3)
EGFRCR SERPLBLD CKD-EPI 2021: 6 ML/MIN/1.73M2 (ref 60–?)
EOSINOPHIL # BLD AUTO: 0.35 X10(3) UL (ref 0–0.7)
EOSINOPHIL NFR BLD AUTO: 4.1 %
ERYTHROCYTE [DISTWIDTH] IN BLOOD BY AUTOMATED COUNT: 14 % (ref 11–15)
GLUCOSE BLD-MCNC: 360 MG/DL (ref 70–99)
HCT VFR BLD AUTO: 32.7 %
HGB BLD-MCNC: 10.9 G/DL
IMM GRANULOCYTES # BLD AUTO: 0.04 X10(3) UL (ref 0–1)
IMM GRANULOCYTES NFR BLD: 0.5 %
LIPASE SERPL-CCNC: 145 U/L (ref 13–75)
LYMPHOCYTES # BLD AUTO: 1.72 X10(3) UL (ref 1–4)
LYMPHOCYTES NFR BLD AUTO: 20 %
MCH RBC QN AUTO: 31.5 PG (ref 26–34)
MCHC RBC AUTO-ENTMCNC: 33.3 G/DL (ref 31–37)
MCV RBC AUTO: 94.5 FL
MONOCYTES # BLD AUTO: 0.77 X10(3) UL (ref 0.1–1)
MONOCYTES NFR BLD AUTO: 8.9 %
NEUTROPHILS # BLD AUTO: 5.69 X10 (3) UL (ref 1.5–7.7)
NEUTROPHILS # BLD AUTO: 5.69 X10(3) UL (ref 1.5–7.7)
NEUTROPHILS NFR BLD AUTO: 66 %
OSMOLALITY SERPL CALC.SUM OF ELEC: 323 MOSM/KG (ref 275–295)
PLATELET # BLD AUTO: 169 10(3)UL (ref 150–450)
POTASSIUM SERPL-SCNC: 4.2 MMOL/L (ref 3.5–5.1)
PROT SERPL-MCNC: 7.2 G/DL (ref 6.4–8.2)
RBC # BLD AUTO: 3.46 X10(6)UL
SODIUM SERPL-SCNC: 138 MMOL/L (ref 136–145)
WBC # BLD AUTO: 8.6 X10(3) UL (ref 4–11)

## 2023-09-20 PROCEDURE — 80048 BASIC METABOLIC PNL TOTAL CA: CPT | Performed by: EMERGENCY MEDICINE

## 2023-09-20 PROCEDURE — 85025 COMPLETE CBC W/AUTO DIFF WBC: CPT | Performed by: EMERGENCY MEDICINE

## 2023-09-20 PROCEDURE — 96374 THER/PROPH/DIAG INJ IV PUSH: CPT

## 2023-09-20 PROCEDURE — 99285 EMERGENCY DEPT VISIT HI MDM: CPT

## 2023-09-20 PROCEDURE — 74176 CT ABD & PELVIS W/O CONTRAST: CPT | Performed by: EMERGENCY MEDICINE

## 2023-09-20 PROCEDURE — 93005 ELECTROCARDIOGRAM TRACING: CPT

## 2023-09-20 PROCEDURE — 83690 ASSAY OF LIPASE: CPT | Performed by: EMERGENCY MEDICINE

## 2023-09-20 PROCEDURE — 80076 HEPATIC FUNCTION PANEL: CPT | Performed by: EMERGENCY MEDICINE

## 2023-09-20 PROCEDURE — 93010 ELECTROCARDIOGRAM REPORT: CPT

## 2023-09-20 PROCEDURE — 99284 EMERGENCY DEPT VISIT MOD MDM: CPT

## 2023-09-20 RX ORDER — MORPHINE SULFATE 2 MG/ML
2 INJECTION, SOLUTION INTRAMUSCULAR; INTRAVENOUS ONCE
Status: COMPLETED | OUTPATIENT
Start: 2023-09-20 | End: 2023-09-20

## 2023-09-20 NOTE — ED INITIAL ASSESSMENT (HPI)
Patient states getting his dialysis yesterday when he fainted. Patient denies falling. Patient presents today with a complain of right upper & lower abdominal pain. States feeling nauseous though denies vomiting. Denies diarrhea. Patient has history of open heart surgery and states feeling shortness of breath since this time. Denies chest pain.

## 2023-09-20 NOTE — ED QUICK NOTES
Pt states having lower abd pain. States having nausea. States receives dialysis on Mo-Wed-Fri. States on Mon he passed out at dialysis, but was sent home. States did not go to dialysis today.

## 2023-09-21 LAB
ATRIAL RATE: 88 BPM
P AXIS: 66 DEGREES
P-R INTERVAL: 230 MS
Q-T INTERVAL: 412 MS
QRS DURATION: 136 MS
QTC CALCULATION (BEZET): 498 MS
R AXIS: -63 DEGREES
T AXIS: 106 DEGREES
VENTRICULAR RATE: 88 BPM

## 2023-09-22 ENCOUNTER — APPOINTMENT (OUTPATIENT)
Dept: GENERAL RADIOLOGY | Facility: HOSPITAL | Age: 73
End: 2023-09-22
Attending: EMERGENCY MEDICINE
Payer: MEDICARE

## 2023-09-22 ENCOUNTER — HOSPITAL ENCOUNTER (OUTPATIENT)
Facility: HOSPITAL | Age: 73
Setting detail: OBSERVATION
Discharge: HOME | End: 2023-09-27
Attending: EMERGENCY MEDICINE | Admitting: INTERNAL MEDICINE
Payer: MEDICARE

## 2023-09-22 DIAGNOSIS — Z79.4 TYPE 2 DIABETES MELLITUS WITH HYPERGLYCEMIA, WITH LONG-TERM CURRENT USE OF INSULIN (HCC): ICD-10-CM

## 2023-09-22 DIAGNOSIS — Z99.2 ESRD ON HEMODIALYSIS (HCC): ICD-10-CM

## 2023-09-22 DIAGNOSIS — E11.65 TYPE 2 DIABETES MELLITUS WITH HYPERGLYCEMIA, WITH LONG-TERM CURRENT USE OF INSULIN (HCC): ICD-10-CM

## 2023-09-22 DIAGNOSIS — N18.6 ESRD ON HEMODIALYSIS (HCC): ICD-10-CM

## 2023-09-22 DIAGNOSIS — I50.9 ACUTE ON CHRONIC CONGESTIVE HEART FAILURE, UNSPECIFIED HEART FAILURE TYPE (HCC): Primary | ICD-10-CM

## 2023-09-22 LAB
ALBUMIN SERPL-MCNC: 3 G/DL (ref 3.4–5)
ALP LIVER SERPL-CCNC: 83 U/L
ALT SERPL-CCNC: 28 U/L
ANION GAP SERPL CALC-SCNC: 10 MMOL/L (ref 0–18)
AST SERPL-CCNC: 13 U/L (ref 15–37)
ATRIAL RATE: 71 BPM
BASOPHILS # BLD AUTO: 0.05 X10(3) UL (ref 0–0.2)
BASOPHILS NFR BLD AUTO: 0.6 %
BILIRUB DIRECT SERPL-MCNC: <0.1 MG/DL (ref 0–0.2)
BILIRUB SERPL-MCNC: 0.3 MG/DL (ref 0.1–2)
BUN BLD-MCNC: 102 MG/DL (ref 7–18)
BUN/CREAT SERPL: 8.8 (ref 10–20)
CALCIUM BLD-MCNC: 8.2 MG/DL (ref 8.5–10.1)
CHLORIDE SERPL-SCNC: 102 MMOL/L (ref 98–112)
CO2 SERPL-SCNC: 27 MMOL/L (ref 21–32)
CREAT BLD-MCNC: 11.6 MG/DL
DEPRECATED RDW RBC AUTO: 48.5 FL (ref 35.1–46.3)
EGFRCR SERPLBLD CKD-EPI 2021: 4 ML/MIN/1.73M2 (ref 60–?)
EOSINOPHIL # BLD AUTO: 0.35 X10(3) UL (ref 0–0.7)
EOSINOPHIL NFR BLD AUTO: 4.1 %
ERYTHROCYTE [DISTWIDTH] IN BLOOD BY AUTOMATED COUNT: 14.2 % (ref 11–15)
GLUCOSE BLD-MCNC: 451 MG/DL (ref 70–99)
GLUCOSE BLDC GLUCOMTR-MCNC: 297 MG/DL (ref 70–99)
GLUCOSE BLDC GLUCOMTR-MCNC: 334 MG/DL (ref 70–99)
GLUCOSE BLDC GLUCOMTR-MCNC: 379 MG/DL (ref 70–99)
GLUCOSE BLDC GLUCOMTR-MCNC: 406 MG/DL (ref 70–99)
GLUCOSE BLDC GLUCOMTR-MCNC: 427 MG/DL (ref 70–99)
HBV SURFACE AG SER-ACNC: <0.1 [IU]/L
HBV SURFACE AG SERPL QL IA: NONREACTIVE
HCT VFR BLD AUTO: 31.2 %
HGB BLD-MCNC: 10.2 G/DL
IMM GRANULOCYTES # BLD AUTO: 0.03 X10(3) UL (ref 0–1)
IMM GRANULOCYTES NFR BLD: 0.4 %
LYMPHOCYTES # BLD AUTO: 1.48 X10(3) UL (ref 1–4)
LYMPHOCYTES NFR BLD AUTO: 17.4 %
MCH RBC QN AUTO: 31.1 PG (ref 26–34)
MCHC RBC AUTO-ENTMCNC: 32.7 G/DL (ref 31–37)
MCV RBC AUTO: 95.1 FL
MONOCYTES # BLD AUTO: 0.58 X10(3) UL (ref 0.1–1)
MONOCYTES NFR BLD AUTO: 6.8 %
NEUTROPHILS # BLD AUTO: 6 X10 (3) UL (ref 1.5–7.7)
NEUTROPHILS # BLD AUTO: 6 X10(3) UL (ref 1.5–7.7)
NEUTROPHILS NFR BLD AUTO: 70.7 %
NT-PROBNP SERPL-MCNC: 1616 PG/ML (ref ?–450)
OSMOLALITY SERPL CALC.SUM OF ELEC: 339 MOSM/KG (ref 275–295)
P AXIS: 63 DEGREES
P-R INTERVAL: 240 MS
PLATELET # BLD AUTO: 140 10(3)UL (ref 150–450)
POTASSIUM SERPL-SCNC: 4.8 MMOL/L (ref 3.5–5.1)
PROT SERPL-MCNC: 6.9 G/DL (ref 6.4–8.2)
Q-T INTERVAL: 454 MS
QRS DURATION: 146 MS
QTC CALCULATION (BEZET): 493 MS
R AXIS: -68 DEGREES
RBC # BLD AUTO: 3.28 X10(6)UL
SODIUM SERPL-SCNC: 139 MMOL/L (ref 136–145)
T AXIS: 96 DEGREES
VENTRICULAR RATE: 71 BPM
WBC # BLD AUTO: 8.5 X10(3) UL (ref 4–11)

## 2023-09-22 PROCEDURE — 93005 ELECTROCARDIOGRAM TRACING: CPT

## 2023-09-22 PROCEDURE — 87340 HEPATITIS B SURFACE AG IA: CPT | Performed by: INTERNAL MEDICINE

## 2023-09-22 PROCEDURE — 82962 GLUCOSE BLOOD TEST: CPT

## 2023-09-22 PROCEDURE — 80076 HEPATIC FUNCTION PANEL: CPT | Performed by: EMERGENCY MEDICINE

## 2023-09-22 PROCEDURE — 85025 COMPLETE CBC W/AUTO DIFF WBC: CPT | Performed by: EMERGENCY MEDICINE

## 2023-09-22 PROCEDURE — 90935 HEMODIALYSIS ONE EVALUATION: CPT

## 2023-09-22 PROCEDURE — 71045 X-RAY EXAM CHEST 1 VIEW: CPT | Performed by: EMERGENCY MEDICINE

## 2023-09-22 PROCEDURE — 80048 BASIC METABOLIC PNL TOTAL CA: CPT | Performed by: EMERGENCY MEDICINE

## 2023-09-22 PROCEDURE — 83880 ASSAY OF NATRIURETIC PEPTIDE: CPT | Performed by: EMERGENCY MEDICINE

## 2023-09-22 PROCEDURE — 5A1D70Z PERFORMANCE OF URINARY FILTRATION, INTERMITTENT, LESS THAN 6 HOURS PER DAY: ICD-10-PCS | Performed by: SURGERY

## 2023-09-22 RX ORDER — NICOTINE POLACRILEX 4 MG
30 LOZENGE BUCCAL
Status: DISCONTINUED | OUTPATIENT
Start: 2023-09-22 | End: 2023-09-27

## 2023-09-22 RX ORDER — NICOTINE POLACRILEX 4 MG
15 LOZENGE BUCCAL
Status: DISCONTINUED | OUTPATIENT
Start: 2023-09-22 | End: 2023-09-27

## 2023-09-22 RX ORDER — ASPIRIN 81 MG/1
81 TABLET ORAL DAILY
Status: DISCONTINUED | OUTPATIENT
Start: 2023-09-22 | End: 2023-09-27

## 2023-09-22 RX ORDER — DEXTROSE MONOHYDRATE 25 G/50ML
50 INJECTION, SOLUTION INTRAVENOUS
Status: DISCONTINUED | OUTPATIENT
Start: 2023-09-22 | End: 2023-09-27

## 2023-09-22 RX ORDER — ACETAMINOPHEN 325 MG/1
650 TABLET ORAL EVERY 4 HOURS PRN
Status: DISCONTINUED | OUTPATIENT
Start: 2023-09-22 | End: 2023-09-27

## 2023-09-22 RX ORDER — FINASTERIDE 5 MG/1
5 TABLET, FILM COATED ORAL EVERY MORNING
Status: DISCONTINUED | OUTPATIENT
Start: 2023-09-23 | End: 2023-09-27

## 2023-09-22 RX ORDER — SENNOSIDES 8.6 MG
17.2 TABLET ORAL NIGHTLY PRN
Status: DISCONTINUED | OUTPATIENT
Start: 2023-09-22 | End: 2023-09-27

## 2023-09-22 RX ORDER — HYDROCODONE BITARTRATE AND ACETAMINOPHEN 5; 325 MG/1; MG/1
1 TABLET ORAL EVERY 4 HOURS PRN
Status: DISCONTINUED | OUTPATIENT
Start: 2023-09-22 | End: 2023-09-27

## 2023-09-22 RX ORDER — BISACODYL 10 MG
10 SUPPOSITORY, RECTAL RECTAL
Status: DISCONTINUED | OUTPATIENT
Start: 2023-09-22 | End: 2023-09-27

## 2023-09-22 RX ORDER — SEVELAMER CARBONATE 800 MG/1
800 TABLET, FILM COATED ORAL
Status: DISCONTINUED | OUTPATIENT
Start: 2023-09-22 | End: 2023-09-27

## 2023-09-22 RX ORDER — TAMSULOSIN HYDROCHLORIDE 0.4 MG/1
0.8 CAPSULE ORAL NIGHTLY
Status: DISCONTINUED | OUTPATIENT
Start: 2023-09-22 | End: 2023-09-27

## 2023-09-22 RX ORDER — POLYETHYLENE GLYCOL 3350 17 G/17G
17 POWDER, FOR SOLUTION ORAL DAILY PRN
Status: DISCONTINUED | OUTPATIENT
Start: 2023-09-22 | End: 2023-09-27

## 2023-09-22 RX ORDER — HEPARIN SODIUM 5000 [USP'U]/ML
5000 INJECTION, SOLUTION INTRAVENOUS; SUBCUTANEOUS EVERY 8 HOURS SCHEDULED
Status: DISPENSED | OUTPATIENT
Start: 2023-09-22 | End: 2023-09-25

## 2023-09-22 RX ORDER — HYDRALAZINE HYDROCHLORIDE 50 MG/1
50 TABLET, FILM COATED ORAL 3 TIMES DAILY
Status: DISCONTINUED | OUTPATIENT
Start: 2023-09-22 | End: 2023-09-27

## 2023-09-22 RX ORDER — ROSUVASTATIN CALCIUM 20 MG/1
20 TABLET, COATED ORAL NIGHTLY
Status: DISCONTINUED | OUTPATIENT
Start: 2023-09-22 | End: 2023-09-27

## 2023-09-22 RX ORDER — HYDROCODONE BITARTRATE AND ACETAMINOPHEN 5; 325 MG/1; MG/1
2 TABLET ORAL EVERY 4 HOURS PRN
Status: DISCONTINUED | OUTPATIENT
Start: 2023-09-22 | End: 2023-09-27

## 2023-09-22 RX ORDER — METOCLOPRAMIDE HYDROCHLORIDE 5 MG/ML
5 INJECTION INTRAMUSCULAR; INTRAVENOUS EVERY 8 HOURS PRN
Status: DISCONTINUED | OUTPATIENT
Start: 2023-09-22 | End: 2023-09-27

## 2023-09-22 RX ORDER — DOXEPIN HYDROCHLORIDE 50 MG/1
1 CAPSULE ORAL EVERY MORNING
Status: DISCONTINUED | OUTPATIENT
Start: 2023-09-22 | End: 2023-09-27

## 2023-09-22 RX ORDER — METOPROLOL SUCCINATE 50 MG/1
50 TABLET, EXTENDED RELEASE ORAL 2 TIMES DAILY
Status: DISCONTINUED | OUTPATIENT
Start: 2023-09-22 | End: 2023-09-27

## 2023-09-22 RX ORDER — ONDANSETRON 2 MG/ML
4 INJECTION INTRAMUSCULAR; INTRAVENOUS EVERY 6 HOURS PRN
Status: DISCONTINUED | OUTPATIENT
Start: 2023-09-22 | End: 2023-09-27

## 2023-09-22 NOTE — ED INITIAL ASSESSMENT (HPI)
Patient from home with c/o shortness of breath since this morning. States he was scheduled to go to HD today but was told to go to ER due to having too much fluid. Last HD was on Monday. HD schedule: Monday, Wednesday, Friday.

## 2023-09-22 NOTE — ED QUICK NOTES
Orders for admission, patient is aware of plan and ready to go upstairs. Any questions, please call ED RN Maddison Dies at extension [phone].      Patient Covid vaccination status: Partially vaccinated     COVID Test Ordered in ED: None    COVID Suspicion at Admission: N/A    Running Infusions:  None    Mental Status/LOC at time of transport: A&Ox4    Other pertinent information: Left arm fistula, HD M/W/F      CIWA score: N/A   NIH score:  N/A

## 2023-09-22 NOTE — ED QUICK NOTES
Spoke with NP, Maikel Uriostegui- patient has had multiple syncopal episodes with dialysis, is not tolerating fluid removal. Admit with tele for HD per nephrologist Dr. Leah Bradley. Seen by cardiology, may still have monitor.

## 2023-09-23 ENCOUNTER — APPOINTMENT (OUTPATIENT)
Dept: GENERAL RADIOLOGY | Facility: HOSPITAL | Age: 73
End: 2023-09-23
Attending: INTERNAL MEDICINE
Payer: MEDICARE

## 2023-09-23 ENCOUNTER — APPOINTMENT (OUTPATIENT)
Dept: INTERVENTIONAL RADIOLOGY/VASCULAR | Facility: HOSPITAL | Age: 73
End: 2023-09-23
Attending: RADIOLOGY
Payer: MEDICARE

## 2023-09-23 PROBLEM — T82.590A DIALYSIS AV FISTULA MALFUNCTION (HCC): Status: ACTIVE | Noted: 2023-05-09

## 2023-09-23 PROBLEM — T82.590A DIALYSIS AV FISTULA MALFUNCTION (HCC): Status: ACTIVE | Noted: 2023-01-01

## 2023-09-23 LAB
ALBUMIN SERPL-MCNC: 2.9 G/DL (ref 3.4–5)
ALBUMIN/GLOB SERPL: 0.9 {RATIO} (ref 1–2)
ALP LIVER SERPL-CCNC: 66 U/L
ALT SERPL-CCNC: 22 U/L
ANION GAP SERPL CALC-SCNC: 12 MMOL/L (ref 0–18)
ANION GAP SERPL CALC-SCNC: 12 MMOL/L (ref 0–18)
AST SERPL-CCNC: 10 U/L (ref 15–37)
BILIRUB SERPL-MCNC: 0.3 MG/DL (ref 0.1–2)
BUN BLD-MCNC: 105 MG/DL (ref 7–18)
BUN BLD-MCNC: 105 MG/DL (ref 7–18)
BUN/CREAT SERPL: 9.1 (ref 10–20)
BUN/CREAT SERPL: 9.1 (ref 10–20)
CALCIUM BLD-MCNC: 8.5 MG/DL (ref 8.5–10.1)
CALCIUM BLD-MCNC: 8.5 MG/DL (ref 8.5–10.1)
CHLORIDE SERPL-SCNC: 107 MMOL/L (ref 98–112)
CHLORIDE SERPL-SCNC: 107 MMOL/L (ref 98–112)
CO2 SERPL-SCNC: 22 MMOL/L (ref 21–32)
CO2 SERPL-SCNC: 22 MMOL/L (ref 21–32)
CREAT BLD-MCNC: 11.5 MG/DL
CREAT BLD-MCNC: 11.5 MG/DL
DEPRECATED RDW RBC AUTO: 47.4 FL (ref 35.1–46.3)
EGFRCR SERPLBLD CKD-EPI 2021: 4 ML/MIN/1.73M2 (ref 60–?)
EGFRCR SERPLBLD CKD-EPI 2021: 4 ML/MIN/1.73M2 (ref 60–?)
ERYTHROCYTE [DISTWIDTH] IN BLOOD BY AUTOMATED COUNT: 14.1 % (ref 11–15)
GLOBULIN PLAS-MCNC: 3.4 G/DL (ref 2.8–4.4)
GLUCOSE BLD-MCNC: 285 MG/DL (ref 70–99)
GLUCOSE BLD-MCNC: 285 MG/DL (ref 70–99)
GLUCOSE BLDC GLUCOMTR-MCNC: 128 MG/DL (ref 70–99)
GLUCOSE BLDC GLUCOMTR-MCNC: 192 MG/DL (ref 70–99)
GLUCOSE BLDC GLUCOMTR-MCNC: 247 MG/DL (ref 70–99)
GLUCOSE BLDC GLUCOMTR-MCNC: 307 MG/DL (ref 70–99)
HCT VFR BLD AUTO: 28.5 %
HGB BLD-MCNC: 9.2 G/DL
MCH RBC QN AUTO: 30.2 PG (ref 26–34)
MCHC RBC AUTO-ENTMCNC: 32.3 G/DL (ref 31–37)
MCV RBC AUTO: 93.4 FL
OSMOLALITY SERPL CALC.SUM OF ELEC: 335 MOSM/KG (ref 275–295)
OSMOLALITY SERPL CALC.SUM OF ELEC: 335 MOSM/KG (ref 275–295)
PLATELET # BLD AUTO: 122 10(3)UL (ref 150–450)
POTASSIUM SERPL-SCNC: 4.3 MMOL/L (ref 3.5–5.1)
POTASSIUM SERPL-SCNC: 4.3 MMOL/L (ref 3.5–5.1)
PROT SERPL-MCNC: 6.3 G/DL (ref 6.4–8.2)
RBC # BLD AUTO: 3.05 X10(6)UL
SODIUM SERPL-SCNC: 141 MMOL/L (ref 136–145)
SODIUM SERPL-SCNC: 141 MMOL/L (ref 136–145)
WBC # BLD AUTO: 8.3 X10(3) UL (ref 4–11)

## 2023-09-23 PROCEDURE — 85027 COMPLETE CBC AUTOMATED: CPT | Performed by: INTERNAL MEDICINE

## 2023-09-23 PROCEDURE — 76937 US GUIDE VASCULAR ACCESS: CPT | Performed by: RADIOLOGY

## 2023-09-23 PROCEDURE — 90935 HEMODIALYSIS ONE EVALUATION: CPT

## 2023-09-23 PROCEDURE — 5A1D70Z PERFORMANCE OF URINARY FILTRATION, INTERMITTENT, LESS THAN 6 HOURS PER DAY: ICD-10-PCS | Performed by: SURGERY

## 2023-09-23 PROCEDURE — 71045 X-RAY EXAM CHEST 1 VIEW: CPT | Performed by: INTERNAL MEDICINE

## 2023-09-23 PROCEDURE — 02HV33Z INSERTION OF INFUSION DEVICE INTO SUPERIOR VENA CAVA, PERCUTANEOUS APPROACH: ICD-10-PCS | Performed by: RADIOLOGY

## 2023-09-23 PROCEDURE — 80053 COMPREHEN METABOLIC PANEL: CPT | Performed by: INTERNAL MEDICINE

## 2023-09-23 PROCEDURE — 36556 INSERT NON-TUNNEL CV CATH: CPT | Performed by: RADIOLOGY

## 2023-09-23 PROCEDURE — 82962 GLUCOSE BLOOD TEST: CPT

## 2023-09-23 RX ORDER — HEPARIN SODIUM (PORCINE) LOCK FLUSH IV SOLN 100 UNIT/ML 100 UNIT/ML
SOLUTION INTRAVENOUS
Status: COMPLETED
Start: 2023-09-23 | End: 2023-09-23

## 2023-09-23 RX ORDER — HEPARIN SODIUM 1000 [USP'U]/ML
INJECTION, SOLUTION INTRAVENOUS; SUBCUTANEOUS
Status: COMPLETED
Start: 2023-09-23 | End: 2023-09-23

## 2023-09-23 RX ORDER — HEPARIN SODIUM 1000 [USP'U]/ML
3000 INJECTION, SOLUTION INTRAVENOUS; SUBCUTANEOUS
Status: DISCONTINUED | OUTPATIENT
Start: 2023-09-23 | End: 2023-09-27

## 2023-09-23 RX ORDER — LIDOCAINE HYDROCHLORIDE 20 MG/ML
INJECTION, SOLUTION EPIDURAL; INFILTRATION; INTRACAUDAL; PERINEURAL
Status: COMPLETED
Start: 2023-09-23 | End: 2023-09-23

## 2023-09-23 NOTE — BRIEF PROCEDURE NOTE
[facility]  Procedure Note    Deven Calderon Patient Status:  Observation    1950 MRN [id number]   Location [facility]5W Attending Levy Barton, DO   Hosp Day # 0 PCP Ina Coulter MD     Procedure: Guero Baltazar dialysis catheter    Pre-Procedure Diagnosis: End-stage renal disease on hemodialysis with failing fistula, requiring catheter access    Post-Procedure Diagnosis: Same    Anesthesia:  Local    Findings: Julian large bore dual-lumen dialysis catheter placed via right IJ    Specimens: 0    Blood Loss:  minimal      Complications:  None      Amadeo Escobar MD  2023

## 2023-09-23 NOTE — PLAN OF CARE
AOx4. VSS on RA. NSR on tele. Reports SOB with exertion  Dialysis scheduled this evening but not done- dialysis RN unable to access AVF- Dr. Rosario Justin aware. Vascular Sx and IR consults placed  Ambulates with SBA, gait steady  Bed locked and in lowest position, call light and belongings within reach, reinforced to call for assistance. Problem: Patient Centered Care  Goal: Patient preferences are identified and integrated in the patient's plan of care  Description: Interventions:  - What would you like us to know as we care for you?  I receive dialysis MWF  - Provide timely, complete, and accurate information to patient/family  - Incorporate patient and family knowledge, values, beliefs, and cultural backgrounds into the planning and delivery of care  - Encourage patient/family to participate in care and decision-making at the level they choose  - Honor patient and family perspectives and choices  Outcome: Progressing     Problem: METABOLIC/FLUID AND ELECTROLYTES - ADULT  Goal: Glucose maintained within prescribed range  Description: INTERVENTIONS:  - Monitor Blood Glucose as ordered  - Assess for signs and symptoms of hyperglycemia and hypoglycemia  - Administer ordered medications to maintain glucose within target range  - Assess barriers to adequate nutritional intake and initiate nutrition consult as needed  - Instruct patient on self management of diabetes  Outcome: Progressing  Goal: Electrolytes maintained within normal limits  Description: INTERVENTIONS:  - Monitor labs and rhythm and assess patient for signs and symptoms of electrolyte imbalances  - Administer electrolyte replacement as ordered  - Monitor response to electrolyte replacements, including rhythm and repeat lab results as appropriate  - Fluid restriction as ordered  - Instruct patient on fluid and nutrition restrictions as appropriate  Outcome: Progressing  Goal: Hemodynamic stability and optimal renal function maintained  Description: INTERVENTIONS:  - Monitor labs and assess for signs and symptoms of volume excess or deficit  - Monitor intake, output and patient weight  - Monitor urine specific gravity, serum osmolarity and serum sodium as indicated or ordered  - Monitor response to interventions for patient's volume status, including labs, urine output, blood pressure (other measures as available)  - Encourage oral intake as appropriate  - Instruct patient on fluid and nutrition restrictions as appropriate  Outcome: Progressing

## 2023-09-23 NOTE — IVS NOTE
Patient tolerated temporary dialysis catheter placement without incident. Site prepped, lidocaine given subcutaneous by Dr. Francesco Maynard. Catheter sutured in place, bio patch and sorbaview dressing applied. No bleeding noted. Stat order for CXR. Report to [facility].

## 2023-09-23 NOTE — PLAN OF CARE
Patient alert and orientated x4. Received from the ED. Dialysis scheduled for today, [facility] called and confirm dialysis at will be done today. Danii assist. Ac and hs per orders. Voiding. Plan of care ongoing.                Problem: Patient Centered Care  Goal: Patient preferences are identified and integrated in the patient's plan of care  Description: Interventions:  - What would you like us to know as we care for you?   - Provide timely, complete, and accurate information to patient/family  - Incorporate patient and family knowledge, values, beliefs, and cultural backgrounds into the planning and delivery of care  - Encourage patient/family to participate in care and decision-making at the level they choose  - Honor patient and family perspectives and choices  Outcome: Progressing

## 2023-09-24 LAB
ANION GAP SERPL CALC-SCNC: 7 MMOL/L (ref 0–18)
BUN BLD-MCNC: 49 MG/DL (ref 7–18)
BUN/CREAT SERPL: 7.5 (ref 10–20)
CALCIUM BLD-MCNC: 7.9 MG/DL (ref 8.5–10.1)
CHLORIDE SERPL-SCNC: 106 MMOL/L (ref 98–112)
CO2 SERPL-SCNC: 28 MMOL/L (ref 21–32)
CREAT BLD-MCNC: 6.51 MG/DL
DEPRECATED RDW RBC AUTO: 48.4 FL (ref 35.1–46.3)
EGFRCR SERPLBLD CKD-EPI 2021: 8 ML/MIN/1.73M2 (ref 60–?)
ERYTHROCYTE [DISTWIDTH] IN BLOOD BY AUTOMATED COUNT: 14 % (ref 11–15)
GLUCOSE BLD-MCNC: 254 MG/DL (ref 70–99)
GLUCOSE BLDC GLUCOMTR-MCNC: 140 MG/DL (ref 70–99)
GLUCOSE BLDC GLUCOMTR-MCNC: 159 MG/DL (ref 70–99)
GLUCOSE BLDC GLUCOMTR-MCNC: 184 MG/DL (ref 70–99)
GLUCOSE BLDC GLUCOMTR-MCNC: 248 MG/DL (ref 70–99)
HCT VFR BLD AUTO: 27.8 %
HGB BLD-MCNC: 8.8 G/DL
MCH RBC QN AUTO: 30.3 PG (ref 26–34)
MCHC RBC AUTO-ENTMCNC: 31.7 G/DL (ref 31–37)
MCV RBC AUTO: 95.9 FL
OSMOLALITY SERPL CALC.SUM OF ELEC: 314 MOSM/KG (ref 275–295)
PLATELET # BLD AUTO: 119 10(3)UL (ref 150–450)
POTASSIUM SERPL-SCNC: 4.1 MMOL/L (ref 3.5–5.1)
RBC # BLD AUTO: 2.9 X10(6)UL
SODIUM SERPL-SCNC: 141 MMOL/L (ref 136–145)
WBC # BLD AUTO: 8.2 X10(3) UL (ref 4–11)

## 2023-09-24 PROCEDURE — 80048 BASIC METABOLIC PNL TOTAL CA: CPT | Performed by: INTERNAL MEDICINE

## 2023-09-24 PROCEDURE — 85027 COMPLETE CBC AUTOMATED: CPT | Performed by: INTERNAL MEDICINE

## 2023-09-24 PROCEDURE — 82962 GLUCOSE BLOOD TEST: CPT

## 2023-09-24 NOTE — PLAN OF CARE
Patient comfortable. Patient had bowel movement and shower today. Temporary dialysis catheter in place. Patient standby assist. Hermelinda Rodriguez. Plan for fistulagram with vascular surgery. Dialysis yesterday.    Problem: Patient Centered Care  Goal: Patient preferences are identified and integrated in the patient's plan of care  Description: Interventions:  - What would you like us to know as we care for you?   - Provide timely, complete, and accurate information to patient/family  - Incorporate patient and family knowledge, values, beliefs, and cultural backgrounds into the planning and delivery of care  - Encourage patient/family to participate in care and decision-making at the level they choose  - Honor patient and family perspectives and choices  Outcome: Progressing     Problem: METABOLIC/FLUID AND ELECTROLYTES - ADULT  Goal: Glucose maintained within prescribed range  Description: INTERVENTIONS:  - Monitor Blood Glucose as ordered  - Assess for signs and symptoms of hyperglycemia and hypoglycemia  - Administer ordered medications to maintain glucose within target range  - Assess barriers to adequate nutritional intake and initiate nutrition consult as needed  - Instruct patient on self management of diabetes  Outcome: Progressing  Goal: Electrolytes maintained within normal limits  Description: INTERVENTIONS:  - Monitor labs and rhythm and assess patient for signs and symptoms of electrolyte imbalances  - Administer electrolyte replacement as ordered  - Monitor response to electrolyte replacements, including rhythm and repeat lab results as appropriate  - Fluid restriction as ordered  - Instruct patient on fluid and nutrition restrictions as appropriate  Outcome: Progressing  Goal: Hemodynamic stability and optimal renal function maintained  Description: INTERVENTIONS:  - Monitor labs and assess for signs and symptoms of volume excess or deficit  - Monitor intake, output and patient weight  - Monitor urine specific gravity, serum osmolarity and serum sodium as indicated or ordered  - Monitor response to interventions for patient's volume status, including labs, urine output, blood pressure (other measures as available)  - Encourage oral intake as appropriate  - Instruct patient on fluid and nutrition restrictions as appropriate  Outcome: Progressing     Problem: PAIN - ADULT  Goal: Verbalizes/displays adequate comfort level or patient's stated pain goal  Description: INTERVENTIONS:  - Encourage pt to monitor pain and request assistance  - Assess pain using appropriate pain scale  - Administer analgesics based on type and severity of pain and evaluate response  - Implement non-pharmacological measures as appropriate and evaluate response  - Consider cultural and social influences on pain and pain management  - Manage/alleviate anxiety  - Utilize distraction and/or relaxation techniques  - Monitor for opioid side effects  - Notify MD/LIP if interventions unsuccessful or patient reports new pain  - Anticipate increased pain with activity and pre-medicate as appropriate  Outcome: Progressing     Problem: RISK FOR INFECTION - ADULT  Goal: Absence of fever/infection during anticipated neutropenic period  Description: INTERVENTIONS  - Monitor WBC  - Administer growth factors as ordered  - Implement neutropenic guidelines  Outcome: Progressing     Problem: SAFETY ADULT - FALL  Goal: Free from fall injury  Description: INTERVENTIONS:  - Assess pt frequently for physical needs  - Identify cognitive and physical deficits and behaviors that affect risk of falls.   - [place] fall precautions as indicated by assessment.  - Educate pt/family on patient safety including physical limitations  - Instruct pt to call for assistance with activity based on assessment  - Modify environment to reduce risk of injury  - Provide assistive devices as appropriate  - Consider OT/PT consult to assist with strengthening/mobility  - Encourage toileting schedule  Outcome: Progressing     Problem: DISCHARGE PLANNING  Goal: Discharge to home or other facility with appropriate resources  Description: INTERVENTIONS:  - Identify barriers to discharge w/pt and caregiver  - Include patient/family/discharge partner in discharge planning  - Arrange for needed discharge resources and transportation as appropriate  - Identify discharge learning needs (meds, wound care, etc)  - Arrange for interpreters to assist at discharge as needed  - Consider post-discharge preferences of patient/family/discharge partner  - Complete POLST form as appropriate  - Assess patient's ability to be responsible for managing their own health  - Refer to Case Management Department for coordinating discharge planning if the patient needs post-hospital services based on physician/LIP order or complex needs related to functional status, cognitive ability or social support system  Outcome: Progressing

## 2023-09-24 NOTE — PLAN OF CARE
Pt restless overnight, though no specific complaints. Bed alarm on, walks unsteady. Call light in reach. Pt received dialysis, 2L off, vss. Dialysis education reinforced. Plan for vascular surgery consult. L arm fistula with thrill felt. Wants miralax with breakfast.  Problem: Patient Centered Care  Goal: Patient preferences are identified and integrated in the patient's plan of care  Description: Interventions:  - What would you like us to know as we care for you?  Home with wife and kids  - Provide timely, complete, and accurate information to patient/family  - Incorporate patient and family knowledge, values, beliefs, and cultural backgrounds into the planning and delivery of care  - Encourage patient/family to participate in care and decision-making at the level they choose  - Honor patient and family perspectives and choices  Outcome: Progressing     Problem: METABOLIC/FLUID AND ELECTROLYTES - ADULT  Goal: Glucose maintained within prescribed range  Description: INTERVENTIONS:  - Monitor Blood Glucose as ordered  - Assess for signs and symptoms of hyperglycemia and hypoglycemia  - Administer ordered medications to maintain glucose within target range  - Assess barriers to adequate nutritional intake and initiate nutrition consult as needed  - Instruct patient on self management of diabetes  Outcome: Progressing  Goal: Electrolytes maintained within normal limits  Description: INTERVENTIONS:  - Monitor labs and rhythm and assess patient for signs and symptoms of electrolyte imbalances  - Administer electrolyte replacement as ordered  - Monitor response to electrolyte replacements, including rhythm and repeat lab results as appropriate  - Fluid restriction as ordered  - Instruct patient on fluid and nutrition restrictions as appropriate  Outcome: Progressing  Goal: Hemodynamic stability and optimal renal function maintained  Description: INTERVENTIONS:  - Monitor labs and assess for signs and symptoms of volume excess or deficit  - Monitor intake, output and patient weight  - Monitor urine specific gravity, serum osmolarity and serum sodium as indicated or ordered  - Monitor response to interventions for patient's volume status, including labs, urine output, blood pressure (other measures as available)  - Encourage oral intake as appropriate  - Instruct patient on fluid and nutrition restrictions as appropriate  Outcome: Progressing     Problem: PAIN - ADULT  Goal: Verbalizes/displays adequate comfort level or patient's stated pain goal  Description: INTERVENTIONS:  - Encourage pt to monitor pain and request assistance  - Assess pain using appropriate pain scale  - Administer analgesics based on type and severity of pain and evaluate response  - Implement non-pharmacological measures as appropriate and evaluate response  - Consider cultural and social influences on pain and pain management  - Manage/alleviate anxiety  - Utilize distraction and/or relaxation techniques  - Monitor for opioid side effects  - Notify MD/LIP if interventions unsuccessful or patient reports new pain  - Anticipate increased pain with activity and pre-medicate as appropriate  Outcome: Progressing     Problem: RISK FOR INFECTION - ADULT  Goal: Absence of fever/infection during anticipated neutropenic period  Description: INTERVENTIONS  - Monitor WBC  - Administer growth factors as ordered  - Implement neutropenic guidelines  Outcome: Progressing     Problem: SAFETY ADULT - FALL  Goal: Free from fall injury  Description: INTERVENTIONS:  - Assess pt frequently for physical needs  - Identify cognitive and physical deficits and behaviors that affect risk of falls.   - [place] fall precautions as indicated by assessment.  - Educate pt/family on patient safety including physical limitations  - Instruct pt to call for assistance with activity based on assessment  - Modify environment to reduce risk of injury  - Provide assistive devices as appropriate  - Consider OT/PT consult to assist with strengthening/mobility  - Encourage toileting schedule  Outcome: Progressing     Problem: DISCHARGE PLANNING  Goal: Discharge to home or other facility with appropriate resources  Description: INTERVENTIONS:  - Identify barriers to discharge w/pt and caregiver  - Include patient/family/discharge partner in discharge planning  - Arrange for needed discharge resources and transportation as appropriate  - Identify discharge learning needs (meds, wound care, etc)  - Arrange for interpreters to assist at discharge as needed  - Consider post-discharge preferences of patient/family/discharge partner  - Complete POLST form as appropriate  - Assess patient's ability to be responsible for managing their own health  - Refer to Case Management Department for coordinating discharge planning if the patient needs post-hospital services based on physician/LIP order or complex needs related to functional status, cognitive ability or social support system  Outcome: Progressing

## 2023-09-24 NOTE — PLAN OF CARE
Patient comfortable for most of the day, having a headache during dialysis that patient wanted to stop dialysis for, this RN educated patient on the need for dialysis and gave pain medication. Patient accuchek AC/HS. Abdomen distended, nightshift endorsed patient request of having miralax with dinner post dialysis. Temporary dialysis cath placed at bedside by Dr. Navarro Corners today. Plan to have vascular surgery to evaluate and do fistulagram on Monday. Dialysis RN educated patient and this RN reinforced education.   Problem: Patient Centered Care  Goal: Patient preferences are identified and integrated in the patient's plan of care  Description: Interventions:  - What would you like us to know as we care for you?   - Provide timely, complete, and accurate information to patient/family  - Incorporate patient and family knowledge, values, beliefs, and cultural backgrounds into the planning and delivery of care  - Encourage patient/family to participate in care and decision-making at the level they choose  - Honor patient and family perspectives and choices  Outcome: Progressing     Problem: METABOLIC/FLUID AND ELECTROLYTES - ADULT  Goal: Glucose maintained within prescribed range  Description: INTERVENTIONS:  - Monitor Blood Glucose as ordered  - Assess for signs and symptoms of hyperglycemia and hypoglycemia  - Administer ordered medications to maintain glucose within target range  - Assess barriers to adequate nutritional intake and initiate nutrition consult as needed  - Instruct patient on self management of diabetes  Outcome: Progressing  Goal: Electrolytes maintained within normal limits  Description: INTERVENTIONS:  - Monitor labs and rhythm and assess patient for signs and symptoms of electrolyte imbalances  - Administer electrolyte replacement as ordered  - Monitor response to electrolyte replacements, including rhythm and repeat lab results as appropriate  - Fluid restriction as ordered  - Instruct patient on fluid and nutrition restrictions as appropriate  Outcome: Progressing  Goal: Hemodynamic stability and optimal renal function maintained  Description: INTERVENTIONS:  - Monitor labs and assess for signs and symptoms of volume excess or deficit  - Monitor intake, output and patient weight  - Monitor urine specific gravity, serum osmolarity and serum sodium as indicated or ordered  - Monitor response to interventions for patient's volume status, including labs, urine output, blood pressure (other measures as available)  - Encourage oral intake as appropriate  - Instruct patient on fluid and nutrition restrictions as appropriate  Outcome: Progressing     Problem: PAIN - ADULT  Goal: Verbalizes/displays adequate comfort level or patient's stated pain goal  Description: INTERVENTIONS:  - Encourage pt to monitor pain and request assistance  - Assess pain using appropriate pain scale  - Administer analgesics based on type and severity of pain and evaluate response  - Implement non-pharmacological measures as appropriate and evaluate response  - Consider cultural and social influences on pain and pain management  - Manage/alleviate anxiety  - Utilize distraction and/or relaxation techniques  - Monitor for opioid side effects  - Notify MD/LIP if interventions unsuccessful or patient reports new pain  - Anticipate increased pain with activity and pre-medicate as appropriate  Outcome: Progressing     Problem: RISK FOR INFECTION - ADULT  Goal: Absence of fever/infection during anticipated neutropenic period  Description: INTERVENTIONS  - Monitor WBC  - Administer growth factors as ordered  - Implement neutropenic guidelines  Outcome: Progressing     Problem: SAFETY ADULT - FALL  Goal: Free from fall injury  Description: INTERVENTIONS:  - Assess pt frequently for physical needs  - Identify cognitive and physical deficits and behaviors that affect risk of falls.   - [place] fall precautions as indicated by assessment.  - Educate pt/family on patient safety including physical limitations  - Instruct pt to call for assistance with activity based on assessment  - Modify environment to reduce risk of injury  - Provide assistive devices as appropriate  - Consider OT/PT consult to assist with strengthening/mobility  - Encourage toileting schedule  Outcome: Progressing     Problem: DISCHARGE PLANNING  Goal: Discharge to home or other facility with appropriate resources  Description: INTERVENTIONS:  - Identify barriers to discharge w/pt and caregiver  - Include patient/family/discharge partner in discharge planning  - Arrange for needed discharge resources and transportation as appropriate  - Identify discharge learning needs (meds, wound care, etc)  - Arrange for interpreters to assist at discharge as needed  - Consider post-discharge preferences of patient/family/discharge partner  - Complete POLST form as appropriate  - Assess patient's ability to be responsible for managing their own health  - Refer to Case Management Department for coordinating discharge planning if the patient needs post-hospital services based on physician/LIP order or complex needs related to functional status, cognitive ability or social support system  Outcome: Progressing

## 2023-09-25 ENCOUNTER — APPOINTMENT (OUTPATIENT)
Dept: INTERVENTIONAL RADIOLOGY/VASCULAR | Facility: HOSPITAL | Age: 73
End: 2023-09-25
Attending: SURGERY
Payer: MEDICARE

## 2023-09-25 LAB
ANION GAP SERPL CALC-SCNC: 8 MMOL/L (ref 0–18)
BUN BLD-MCNC: 60 MG/DL (ref 7–18)
BUN/CREAT SERPL: 7.5 (ref 10–20)
CALCIUM BLD-MCNC: 8 MG/DL (ref 8.5–10.1)
CHLORIDE SERPL-SCNC: 106 MMOL/L (ref 98–112)
CO2 SERPL-SCNC: 26 MMOL/L (ref 21–32)
CREAT BLD-MCNC: 8.02 MG/DL
DEPRECATED RDW RBC AUTO: 47.8 FL (ref 35.1–46.3)
EGFRCR SERPLBLD CKD-EPI 2021: 6 ML/MIN/1.73M2 (ref 60–?)
ERYTHROCYTE [DISTWIDTH] IN BLOOD BY AUTOMATED COUNT: 14.4 % (ref 11–15)
GLUCOSE BLD-MCNC: 185 MG/DL (ref 70–99)
GLUCOSE BLDC GLUCOMTR-MCNC: 127 MG/DL (ref 70–99)
GLUCOSE BLDC GLUCOMTR-MCNC: 168 MG/DL (ref 70–99)
GLUCOSE BLDC GLUCOMTR-MCNC: 213 MG/DL (ref 70–99)
GLUCOSE BLDC GLUCOMTR-MCNC: 218 MG/DL (ref 70–99)
HCT VFR BLD AUTO: 27 %
HGB BLD-MCNC: 8.7 G/DL
MCH RBC QN AUTO: 30 PG (ref 26–34)
MCHC RBC AUTO-ENTMCNC: 32.2 G/DL (ref 31–37)
MCV RBC AUTO: 93.1 FL
OSMOLALITY SERPL CALC.SUM OF ELEC: 312 MOSM/KG (ref 275–295)
PLATELET # BLD AUTO: 124 10(3)UL (ref 150–450)
POTASSIUM SERPL-SCNC: 4.5 MMOL/L (ref 3.5–5.1)
RBC # BLD AUTO: 2.9 X10(6)UL
SODIUM SERPL-SCNC: 140 MMOL/L (ref 136–145)
WBC # BLD AUTO: 8.3 X10(3) UL (ref 4–11)

## 2023-09-25 PROCEDURE — 99152 MOD SED SAME PHYS/QHP 5/>YRS: CPT

## 2023-09-25 PROCEDURE — B51W1ZZ FLUOROSCOPY OF DIALYSIS SHUNT/FISTULA USING LOW OSMOLAR CONTRAST: ICD-10-PCS | Performed by: SURGERY

## 2023-09-25 PROCEDURE — 85027 COMPLETE CBC AUTOMATED: CPT | Performed by: INTERNAL MEDICINE

## 2023-09-25 PROCEDURE — 36902 INTRO CATH DIALYSIS CIRCUIT: CPT

## 2023-09-25 PROCEDURE — 03783ZZ DILATION OF LEFT BRACHIAL ARTERY, PERCUTANEOUS APPROACH: ICD-10-PCS | Performed by: SURGERY

## 2023-09-25 PROCEDURE — 36140 INTRO NDL ICATH UPR/LXTR ART: CPT

## 2023-09-25 PROCEDURE — 82962 GLUCOSE BLOOD TEST: CPT

## 2023-09-25 PROCEDURE — 80048 BASIC METABOLIC PNL TOTAL CA: CPT | Performed by: INTERNAL MEDICINE

## 2023-09-25 PROCEDURE — 99153 MOD SED SAME PHYS/QHP EA: CPT

## 2023-09-25 RX ORDER — PROTAMINE SULFATE 10 MG/ML
INJECTION, SOLUTION INTRAVENOUS
Status: COMPLETED
Start: 2023-09-25 | End: 2023-09-25

## 2023-09-25 RX ORDER — HEPARIN SODIUM 1000 [USP'U]/ML
INJECTION, SOLUTION INTRAVENOUS; SUBCUTANEOUS
Status: COMPLETED
Start: 2023-09-25 | End: 2023-09-25

## 2023-09-25 RX ORDER — VERAPAMIL HYDROCHLORIDE 2.5 MG/ML
INJECTION, SOLUTION INTRAVENOUS
Status: COMPLETED
Start: 2023-09-25 | End: 2023-09-25

## 2023-09-25 RX ORDER — LIDOCAINE HYDROCHLORIDE 20 MG/ML
INJECTION, SOLUTION EPIDURAL; INFILTRATION; INTRACAUDAL; PERINEURAL
Status: COMPLETED
Start: 2023-09-25 | End: 2023-09-25

## 2023-09-25 RX ORDER — SODIUM CHLORIDE 0.9 % (FLUSH) 0.9 %
10 SYRINGE (ML) INJECTION AS NEEDED
Status: DISCONTINUED | OUTPATIENT
Start: 2023-09-25 | End: 2023-09-27

## 2023-09-25 RX ORDER — MIDAZOLAM HYDROCHLORIDE 1 MG/ML
INJECTION INTRAMUSCULAR; INTRAVENOUS
Status: COMPLETED
Start: 2023-09-25 | End: 2023-09-25

## 2023-09-25 RX ORDER — NITROGLYCERIN 20 MG/100ML
INJECTION INTRAVENOUS
Status: COMPLETED
Start: 2023-09-25 | End: 2023-09-25

## 2023-09-25 NOTE — PROGRESS NOTES
Vascular Surgery Progress Note    Patient known to me from brachiocephalic fistula creation several months ago. The fistula has been used successfully as an outpatient. He was admitted due to syncope during dialysis and his fistula was unable to be accessed on Friday. He had a temporary catheter placed. We discussed fistulogram given difficulty with access. Risks were discussed and he elected to proceed.      Primitivo Cross MD  09/25/23  4:57 PM

## 2023-09-25 NOTE — PLAN OF CARE
Problem: Patient Centered Care  Goal: Patient preferences are identified and integrated in the patient's plan of care  Description: Interventions:  - What would you like us to know as we care for you?  Feel better  - Provide timely, complete, and accurate information to patient/family  - Incorporate patient and family knowledge, values, beliefs, and cultural backgrounds into the planning and delivery of care  - Encourage patient/family to participate in care and decision-making at the level they choose  - Honor patient and family perspectives and choices  Outcome: Progressing     Problem: METABOLIC/FLUID AND ELECTROLYTES - ADULT  Goal: Glucose maintained within prescribed range  Description: INTERVENTIONS:  - Monitor Blood Glucose as ordered  - Assess for signs and symptoms of hyperglycemia and hypoglycemia  - Administer ordered medications to maintain glucose within target range  - Assess barriers to adequate nutritional intake and initiate nutrition consult as needed  - Instruct patient on self management of diabetes  Outcome: Progressing  Goal: Electrolytes maintained within normal limits  Description: INTERVENTIONS:  - Monitor labs and rhythm and assess patient for signs and symptoms of electrolyte imbalances  - Administer electrolyte replacement as ordered  - Monitor response to electrolyte replacements, including rhythm and repeat lab results as appropriate  - Fluid restriction as ordered  - Instruct patient on fluid and nutrition restrictions as appropriate  Outcome: Progressing  Goal: Hemodynamic stability and optimal renal function maintained  Description: INTERVENTIONS:  - Monitor labs and assess for signs and symptoms of volume excess or deficit  - Monitor intake, output and patient weight  - Monitor urine specific gravity, serum osmolarity and serum sodium as indicated or ordered  - Monitor response to interventions for patient's volume status, including labs, urine output, blood pressure (other measures as available)  - Encourage oral intake as appropriate  - Instruct patient on fluid and nutrition restrictions as appropriate  Outcome: Progressing     Problem: PAIN - ADULT  Goal: Verbalizes/displays adequate comfort level or patient's stated pain goal  Description: INTERVENTIONS:  - Encourage pt to monitor pain and request assistance  - Assess pain using appropriate pain scale  - Administer analgesics based on type and severity of pain and evaluate response  - Implement non-pharmacological measures as appropriate and evaluate response  - Consider cultural and social influences on pain and pain management  - Manage/alleviate anxiety  - Utilize distraction and/or relaxation techniques  - Monitor for opioid side effects  - Notify MD/LIP if interventions unsuccessful or patient reports new pain  - Anticipate increased pain with activity and pre-medicate as appropriate  Outcome: Progressing     Problem: SAFETY ADULT - FALL  Goal: Free from fall injury  Description: INTERVENTIONS:  - Assess pt frequently for physical needs  - Identify cognitive and physical deficits and behaviors that affect risk of falls. - [place] fall precautions as indicated by assessment.  - Educate pt/family on patient safety including physical limitations  - Instruct pt to call for assistance with activity based on assessment  - Modify environment to reduce risk of injury  - Provide assistive devices as appropriate  - Consider OT/PT consult to assist with strengthening/mobility  - Encourage toileting schedule  Outcome: Progressing    Patient is alert and orientedx4. On room air. Walking with walker. Continent both bowel and bladder with decrease urine output. With AV fistula in left arm and for fistulogram today at 4:45pm. Secured consent for Left upper extremity fistula angioplasty and anesthesia consent. Nothing per orem instructed. AM medications given with sips of water as per cathlab nurse instructions.  HD scheduled on Tuesday and will be back on [facility] schedule thereafter. Patient came back from cathlab at 1710H, vitals signs monitoring done and TR band in left wrist intact, no bleeding/hematoma noted with 14ml of air. 1845H- started to remove air from TR band 2ml every 15mins and diet was resumed.

## 2023-09-25 NOTE — OPERATIVE REPORT
Vascular Surgery Cath Lab Operative Note  Patients Name:  Kp Truong  Operating Physician: Patricia Boyer MD  Location:  Cath Lab  MRN:   [id number]                                            YOB: 1950    Operation Date:  September 25, 2023         Pre-Operative Diagnosis:   ESRD with malfunctioning fistula     Post-Operative Diagnosis:   Same     Procedure Performed:   Ultrasound guided access of the left radial artery  Left upper extremity fistulogram   Left fistula angioplasty with 6x60mm Miller  Radiographic supervision and interpretation    Anesthesia:   Conscious Sedation: 1mg Versed 25mcg Fentanyl     Start Time: 1618  Finish Time: 4431     EBL: Minimal      Complications: None apparent     Findings: Moderate stenosis at the arterial anastomosis. Resolved s/p angioplasty. Large branch proximally, not treated. Indication for Procedure: Madi Belcher is a 75-year-old male with a left upper extremity brachiocephalic fistula. This has been used excessively as an outpatient however when he was admitted it was unable to be accessed on Friday. He presents today for left upper extremity fistulogram with possible intervention. Risks and benefits of the procedure were explained to the patient he elected to proceed. Description of Procedure: The patient was brought to the operating room, he was placed supine on the operating table. He was sedated and monitored by an independent practitioner under my supervision. His left upper extremity was prepped and draped in usual sterile fashion. Under ultrasound guidance 1% lidocaine was infiltrated above the left radial artery. The left radial artery was then accessed using a 6 [place] slender sheath in the Seldinger technique. Radial artery cocktail was given and the patient was fully heparinized. An 018 glide advantage wire was placed followed by a glide catheter. Angiogram through the sheath showed the findings above.   Using the wire and catheter the fistula was accessed. The catheter was advanced into the distal fistula and a central venogram did not show any evidence of central stenosis. The catheter was removed and the arterial anastomosis of the proximal fistula was ballooned using a 6 x 60 mm Miller balloon. Completion angiography confirmed wide patency of the fistula with brisk flow throughout. At this point the patient was felt to be maximally revascularized all wires and catheters were removed. Protamine was given to reverse the effects of heparin. A TR band was placed over the access site and the sheath was removed. The patient tolerated the procedure well, there were no immediate complications. He was taken to the recovery room in good condition.      Plan: Ok to use fistula tonight or tomorrow        Winsome Hu MD  09/25/23  4:50 PM

## 2023-09-25 NOTE — PLAN OF CARE
No acute changes overnight.     Problem: Patient Centered Care  Goal: Patient preferences are identified and integrated in the patient's plan of care  Description: Interventions:  - What would you like us to know as we care for you?   - Provide timely, complete, and accurate information to patient/family  - Incorporate patient and family knowledge, values, beliefs, and cultural backgrounds into the planning and delivery of care  - Encourage patient/family to participate in care and decision-making at the level they choose  - Honor patient and family perspectives and choices  Outcome: Progressing     Problem: METABOLIC/FLUID AND ELECTROLYTES - ADULT  Goal: Glucose maintained within prescribed range  Description: INTERVENTIONS:  - Monitor Blood Glucose as ordered  - Assess for signs and symptoms of hyperglycemia and hypoglycemia  - Administer ordered medications to maintain glucose within target range  - Assess barriers to adequate nutritional intake and initiate nutrition consult as needed  - Instruct patient on self management of diabetes  Outcome: Progressing  Goal: Electrolytes maintained within normal limits  Description: INTERVENTIONS:  - Monitor labs and rhythm and assess patient for signs and symptoms of electrolyte imbalances  - Administer electrolyte replacement as ordered  - Monitor response to electrolyte replacements, including rhythm and repeat lab results as appropriate  - Fluid restriction as ordered  - Instruct patient on fluid and nutrition restrictions as appropriate  Outcome: Progressing  Goal: Hemodynamic stability and optimal renal function maintained  Description: INTERVENTIONS:  - Monitor labs and assess for signs and symptoms of volume excess or deficit  - Monitor intake, output and patient weight  - Monitor urine specific gravity, serum osmolarity and serum sodium as indicated or ordered  - Monitor response to interventions for patient's volume status, including labs, urine output, blood pressure (other measures as available)  - Encourage oral intake as appropriate  - Instruct patient on fluid and nutrition restrictions as appropriate  Outcome: Progressing     Problem: PAIN - ADULT  Goal: Verbalizes/displays adequate comfort level or patient's stated pain goal  Description: INTERVENTIONS:  - Encourage pt to monitor pain and request assistance  - Assess pain using appropriate pain scale  - Administer analgesics based on type and severity of pain and evaluate response  - Implement non-pharmacological measures as appropriate and evaluate response  - Consider cultural and social influences on pain and pain management  - Manage/alleviate anxiety  - Utilize distraction and/or relaxation techniques  - Monitor for opioid side effects  - Notify MD/LIP if interventions unsuccessful or patient reports new pain  - Anticipate increased pain with activity and pre-medicate as appropriate  Outcome: Progressing     Problem: RISK FOR INFECTION - ADULT  Goal: Absence of fever/infection during anticipated neutropenic period  Description: INTERVENTIONS  - Monitor WBC  - Administer growth factors as ordered  - Implement neutropenic guidelines  Outcome: Progressing     Problem: SAFETY ADULT - FALL  Goal: Free from fall injury  Description: INTERVENTIONS:  - Assess pt frequently for physical needs  - Identify cognitive and physical deficits and behaviors that affect risk of falls.   - [place] fall precautions as indicated by assessment.  - Educate pt/family on patient safety including physical limitations  - Instruct pt to call for assistance with activity based on assessment  - Modify environment to reduce risk of injury  - Provide assistive devices as appropriate  - Consider OT/PT consult to assist with strengthening/mobility  - Encourage toileting schedule  Outcome: Progressing     Problem: DISCHARGE PLANNING  Goal: Discharge to home or other facility with appropriate resources  Description: INTERVENTIONS:  - Identify barriers to discharge w/pt and caregiver  - Include patient/family/discharge partner in discharge planning  - Arrange for needed discharge resources and transportation as appropriate  - Identify discharge learning needs (meds, wound care, etc)  - Arrange for interpreters to assist at discharge as needed  - Consider post-discharge preferences of patient/family/discharge partner  - Complete POLST form as appropriate  - Assess patient's ability to be responsible for managing their own health  - Refer to Case Management Department for coordinating discharge planning if the patient needs post-hospital services based on physician/LIP order or complex needs related to functional status, cognitive ability or social support system  Outcome: Progressing

## 2023-09-26 LAB
ALBUMIN SERPL-MCNC: 2.8 G/DL (ref 3.4–5)
ANION GAP SERPL CALC-SCNC: 11 MMOL/L (ref 0–18)
BUN BLD-MCNC: 76 MG/DL (ref 7–18)
BUN/CREAT SERPL: 8.1 (ref 10–20)
CALCIUM BLD-MCNC: 8.2 MG/DL (ref 8.5–10.1)
CHLORIDE SERPL-SCNC: 107 MMOL/L (ref 98–112)
CO2 SERPL-SCNC: 23 MMOL/L (ref 21–32)
CREAT BLD-MCNC: 9.34 MG/DL
EGFRCR SERPLBLD CKD-EPI 2021: 5 ML/MIN/1.73M2 (ref 60–?)
GLUCOSE BLD-MCNC: 141 MG/DL (ref 70–99)
GLUCOSE BLDC GLUCOMTR-MCNC: 102 MG/DL (ref 70–99)
GLUCOSE BLDC GLUCOMTR-MCNC: 144 MG/DL (ref 70–99)
GLUCOSE BLDC GLUCOMTR-MCNC: 153 MG/DL (ref 70–99)
GLUCOSE BLDC GLUCOMTR-MCNC: 82 MG/DL (ref 70–99)
MAGNESIUM SERPL-MCNC: 2.7 MG/DL (ref 1.6–2.6)
OSMOLALITY SERPL CALC.SUM OF ELEC: 317 MOSM/KG (ref 275–295)
PHOSPHATE SERPL-MCNC: 7 MG/DL (ref 2.5–4.9)
POTASSIUM SERPL-SCNC: 4.7 MMOL/L (ref 3.5–5.1)
SODIUM SERPL-SCNC: 141 MMOL/L (ref 136–145)

## 2023-09-26 PROCEDURE — 80069 RENAL FUNCTION PANEL: CPT | Performed by: SURGERY

## 2023-09-26 PROCEDURE — 83735 ASSAY OF MAGNESIUM: CPT | Performed by: SURGERY

## 2023-09-26 PROCEDURE — 5A1D70Z PERFORMANCE OF URINARY FILTRATION, INTERMITTENT, LESS THAN 6 HOURS PER DAY: ICD-10-PCS | Performed by: SURGERY

## 2023-09-26 PROCEDURE — 90935 HEMODIALYSIS ONE EVALUATION: CPT

## 2023-09-26 PROCEDURE — 82962 GLUCOSE BLOOD TEST: CPT

## 2023-09-26 NOTE — PLAN OF CARE
Pt A/Ox4. Dialysis today with L AV fistula- no complications, 2L off. Plan to dialyze tomorrow. Called in dialysis for tomorrow. Insulin not given for low blood sugar. Safety precautions in place and call light within reach.      Problem: Patient Centered Care  Goal: Patient preferences are identified and integrated in the patient's plan of care  Description: Interventions:  - What would you like us to know as we care for you?   - Provide timely, complete, and accurate information to patient/family  - Incorporate patient and family knowledge, values, beliefs, and cultural backgrounds into the planning and delivery of care  - Encourage patient/family to participate in care and decision-making at the level they choose  - Honor patient and family perspectives and choices  Outcome: Progressing     Problem: METABOLIC/FLUID AND ELECTROLYTES - ADULT  Goal: Glucose maintained within prescribed range  Description: INTERVENTIONS:  - Monitor Blood Glucose as ordered  - Assess for signs and symptoms of hyperglycemia and hypoglycemia  - Administer ordered medications to maintain glucose within target range  - Assess barriers to adequate nutritional intake and initiate nutrition consult as needed  - Instruct patient on self management of diabetes  Outcome: Progressing  Goal: Electrolytes maintained within normal limits  Description: INTERVENTIONS:  - Monitor labs and rhythm and assess patient for signs and symptoms of electrolyte imbalances  - Administer electrolyte replacement as ordered  - Monitor response to electrolyte replacements, including rhythm and repeat lab results as appropriate  - Fluid restriction as ordered  - Instruct patient on fluid and nutrition restrictions as appropriate  Outcome: Progressing  Goal: Hemodynamic stability and optimal renal function maintained  Description: INTERVENTIONS:  - Monitor labs and assess for signs and symptoms of volume excess or deficit  - Monitor intake, output and patient weight  - Monitor urine specific gravity, serum osmolarity and serum sodium as indicated or ordered  - Monitor response to interventions for patient's volume status, including labs, urine output, blood pressure (other measures as available)  - Encourage oral intake as appropriate  - Instruct patient on fluid and nutrition restrictions as appropriate  Outcome: Progressing     Problem: PAIN - ADULT  Goal: Verbalizes/displays adequate comfort level or patient's stated pain goal  Description: INTERVENTIONS:  - Encourage pt to monitor pain and request assistance  - Assess pain using appropriate pain scale  - Administer analgesics based on type and severity of pain and evaluate response  - Implement non-pharmacological measures as appropriate and evaluate response  - Consider cultural and social influences on pain and pain management  - Manage/alleviate anxiety  - Utilize distraction and/or relaxation techniques  - Monitor for opioid side effects  - Notify MD/LIP if interventions unsuccessful or patient reports new pain  - Anticipate increased pain with activity and pre-medicate as appropriate  Outcome: Progressing     Problem: RISK FOR INFECTION - ADULT  Goal: Absence of fever/infection during anticipated neutropenic period  Description: INTERVENTIONS  - Monitor WBC  - Administer growth factors as ordered  - Implement neutropenic guidelines  Outcome: Progressing     Problem: SAFETY ADULT - FALL  Goal: Free from fall injury  Description: INTERVENTIONS:  - Assess pt frequently for physical needs  - Identify cognitive and physical deficits and behaviors that affect risk of falls.   - [place] fall precautions as indicated by assessment.  - Educate pt/family on patient safety including physical limitations  - Instruct pt to call for assistance with activity based on assessment  - Modify environment to reduce risk of injury  - Provide assistive devices as appropriate  - Consider OT/PT consult to assist with strengthening/mobility  - Encourage toileting schedule  Outcome: Progressing     Problem: DISCHARGE PLANNING  Goal: Discharge to home or other facility with appropriate resources  Description: INTERVENTIONS:  - Identify barriers to discharge w/pt and caregiver  - Include patient/family/discharge partner in discharge planning  - Arrange for needed discharge resources and transportation as appropriate  - Identify discharge learning needs (meds, wound care, etc)  - Arrange for interpreters to assist at discharge as needed  - Consider post-discharge preferences of patient/family/discharge partner  - Complete POLST form as appropriate  - Assess patient's ability to be responsible for managing their own health  - Refer to Case Management Department for coordinating discharge planning if the patient needs post-hospital services based on physician/LIP order or complex needs related to functional status, cognitive ability or social support system  Outcome: Progressing

## 2023-09-26 NOTE — PLAN OF CARE
No acute changes overnight.     Problem: Patient Centered Care  Goal: Patient preferences are identified and integrated in the patient's plan of care  Description: Interventions:  - What would you like us to know as we care for you?   - Provide timely, complete, and accurate information to patient/family  - Incorporate patient and family knowledge, values, beliefs, and cultural backgrounds into the planning and delivery of care  - Encourage patient/family to participate in care and decision-making at the level they choose  - Honor patient and family perspectives and choices  Outcome: Progressing     Problem: METABOLIC/FLUID AND ELECTROLYTES - ADULT  Goal: Glucose maintained within prescribed range  Description: INTERVENTIONS:  - Monitor Blood Glucose as ordered  - Assess for signs and symptoms of hyperglycemia and hypoglycemia  - Administer ordered medications to maintain glucose within target range  - Assess barriers to adequate nutritional intake and initiate nutrition consult as needed  - Instruct patient on self management of diabetes  Outcome: Progressing  Goal: Electrolytes maintained within normal limits  Description: INTERVENTIONS:  - Monitor labs and rhythm and assess patient for signs and symptoms of electrolyte imbalances  - Administer electrolyte replacement as ordered  - Monitor response to electrolyte replacements, including rhythm and repeat lab results as appropriate  - Fluid restriction as ordered  - Instruct patient on fluid and nutrition restrictions as appropriate  Outcome: Progressing  Goal: Hemodynamic stability and optimal renal function maintained  Description: INTERVENTIONS:  - Monitor labs and assess for signs and symptoms of volume excess or deficit  - Monitor intake, output and patient weight  - Monitor urine specific gravity, serum osmolarity and serum sodium as indicated or ordered  - Monitor response to interventions for patient's volume status, including labs, urine output, blood pressure (other measures as available)  - Encourage oral intake as appropriate  - Instruct patient on fluid and nutrition restrictions as appropriate  Outcome: Progressing     Problem: PAIN - ADULT  Goal: Verbalizes/displays adequate comfort level or patient's stated pain goal  Description: INTERVENTIONS:  - Encourage pt to monitor pain and request assistance  - Assess pain using appropriate pain scale  - Administer analgesics based on type and severity of pain and evaluate response  - Implement non-pharmacological measures as appropriate and evaluate response  - Consider cultural and social influences on pain and pain management  - Manage/alleviate anxiety  - Utilize distraction and/or relaxation techniques  - Monitor for opioid side effects  - Notify MD/LIP if interventions unsuccessful or patient reports new pain  - Anticipate increased pain with activity and pre-medicate as appropriate  Outcome: Progressing     Problem: RISK FOR INFECTION - ADULT  Goal: Absence of fever/infection during anticipated neutropenic period  Description: INTERVENTIONS  - Monitor WBC  - Administer growth factors as ordered  - Implement neutropenic guidelines  Outcome: Progressing     Problem: SAFETY ADULT - FALL  Goal: Free from fall injury  Description: INTERVENTIONS:  - Assess pt frequently for physical needs  - Identify cognitive and physical deficits and behaviors that affect risk of falls.   - [place] fall precautions as indicated by assessment.  - Educate pt/family on patient safety including physical limitations  - Instruct pt to call for assistance with activity based on assessment  - Modify environment to reduce risk of injury  - Provide assistive devices as appropriate  - Consider OT/PT consult to assist with strengthening/mobility  - Encourage toileting schedule  Outcome: Progressing     Problem: DISCHARGE PLANNING  Goal: Discharge to home or other facility with appropriate resources  Description: INTERVENTIONS:  - Identify barriers to discharge w/pt and caregiver  - Include patient/family/discharge partner in discharge planning  - Arrange for needed discharge resources and transportation as appropriate  - Identify discharge learning needs (meds, wound care, etc)  - Arrange for interpreters to assist at discharge as needed  - Consider post-discharge preferences of patient/family/discharge partner  - Complete POLST form as appropriate  - Assess patient's ability to be responsible for managing their own health  - Refer to Case Management Department for coordinating discharge planning if the patient needs post-hospital services based on physician/LIP order or complex needs related to functional status, cognitive ability or social support system  Outcome: Progressing

## 2023-09-27 ENCOUNTER — APPOINTMENT (OUTPATIENT)
Dept: PICC SERVICES | Facility: HOSPITAL | Age: 73
End: 2023-09-27
Attending: INTERNAL MEDICINE
Payer: MEDICARE

## 2023-09-27 VITALS
WEIGHT: 243.63 LBS | OXYGEN SATURATION: 95 % | HEIGHT: 72 IN | RESPIRATION RATE: 18 BRPM | TEMPERATURE: 99 F | DIASTOLIC BLOOD PRESSURE: 69 MMHG | BODY MASS INDEX: 33 KG/M2 | HEART RATE: 66 BPM | SYSTOLIC BLOOD PRESSURE: 137 MMHG

## 2023-09-27 LAB
GLUCOSE BLDC GLUCOMTR-MCNC: 177 MG/DL (ref 70–99)
GLUCOSE BLDC GLUCOMTR-MCNC: 89 MG/DL (ref 70–99)

## 2023-09-27 PROCEDURE — 90935 HEMODIALYSIS ONE EVALUATION: CPT

## 2023-09-27 PROCEDURE — 5A1D70Z PERFORMANCE OF URINARY FILTRATION, INTERMITTENT, LESS THAN 6 HOURS PER DAY: ICD-10-PCS | Performed by: SURGERY

## 2023-09-27 PROCEDURE — 82962 GLUCOSE BLOOD TEST: CPT

## 2023-09-27 NOTE — DISCHARGE PLANNING
Patient was provided with discharge instructions, education, and follow up information. Patient verbalizes understanding of follow up information, specifically following up with physicians, post cath site care and precautions, temporary dialysis catheter (that was removed) site care and precautions, when to take the next dose of each medication, resuming dialysis and importance of not missing it and medicar scheduled time for . Patient has no questions after reviewing all instructions and will be going home via [address] scheduled for 6:30pm.     Patient's wife called with patient's consent and discharge instructions including all of the above were discussed, all questions answered.      Vesna Zambrano RN, Discharge Leader [id number]

## 2023-09-27 NOTE — DISCHARGE SUMMARY
General Medicine Discharge Summary     Patient ID:  Trina Hernández  74year old  9/1/1950    Admit date: 9/22/2023    Discharge date and time: No discharge date for patient encounter. 9/27/23    Attending Physician: Sophia Yanes MD     Consults: IP CONSULT TO NEPHROLOGY  IP CONSULT TO HOSPITALIST  IP CONSULT TO VASCULAR SURGERY  IP CONSULT TO INTERVENTIONAL RADIOLOGY  IP CONSULT TO VASCULAR ACCESS TEAM    Primary Care Physician: Will Trevino MD     Reason for admission: Recurrent syncope with dialysis     Risk For Readmission: low    Discharge Diagnoses: ESRD on hemodialysis ([address] [N18.6, Z99.2]  Type 2 diabetes mellitus with hyperglycemia, with long-term current use of insulin ([address] [E11.65, Z79.4]  Acute on chronic congestive heart failure, unspecified heart failure type ([address] [I50.9]  See Additional Discharge Diagnoses in Hospital Course    Discharged Condition: stable    Follow-up with labs/images appointments: PCP, Nephrology     Exam  Gen: No acute distress, alert and oriented x3  Pulm: Lungs clear, normal respiratory effort  CV: Heart with regular rate and rhythm, no peripheral edema  Abd: Abdomen soft, nontender, nondistended, bowel sounds present  MSK: good thrill in left UE at AV fistula site     HPI: per chart  Patient is a 74year old male with PMH BPH , CHF / ICM EF 30% , moderate MR, HTN, HLD, ESRD on HD M/W/F who was sent in by his nephrologist for dialysis. Patient reports he had a syncopal episode during dialysis on Monday this has been a recurrent problem, he missed dialysis on Wednesday reportedly just not feeling well came to dialysis center today was advised to come to the ED for admission for supervised dialysis as he has been having recurrent syncope. He denies chest pain shortness of breath dizziness lightheadedness fevers or chills.    With regards to the syncope he has been following with cardiology outpatient work-up includes 7-day Zio patch trial of stopping statin previous nuclear stress test they are considering possible right and left heart cath for volume assessment. Patient does endorse chronic dyspnea on exertion but this appears to be unchanged. Of note he states he ran out of his insulin as his insurance would not renew it per his report so he did not take any insulin this morning he was hyperglycemic on presentation to the ED, he was given 8 units of regular insulin. Hospital Course:   Patient is a 74year old male with PMH BPH , CHF / ICM EF 30% , moderate MR, HTN, HLD, ESRD on HD M/W/F who was sent in by his nephrologist for dialysis. Patient reports he had a syncopal episode during dialysis on Monday this has been a recurrent problem, he missed dialysis on Wednesday reportedly just not feeling well came to dialysis center and was advised to come to the ED for admission for supervised dialysis as he has been having recurrent syncope. Patient was dialyzed in the hospital per nephrology and did well. Plan to discharge with close follow-up with nephrology and strict dialysis schedule. Patient also had AV fistula malfunction which was repaired. Plan to remove temporary HD cath before discharge.        Missed dialysis, ERSD on HD M/W/F  -No syncope with HD while inpatient  -AV fistula repair by vascular complete  -Per renal dialysis while in house  -temp line removed prior to discharge as fistula functioning correctly     Hyperglycemia -> improved  Type 2 diabetes with hyperglycemia  Diabetic nephropathy  -Increase long acting insulin to 55 units and mealtime insulin to 15 units TID  Low-dose sliding scale  Controlled renal diet  Continue home aspirin     Hyperlipidemia  Continue home statin     BPH continue home Flomax     Ischemic cardiomyopathy EF 30% without acute exacerbation of congestive heart failure  Chronic Systolic heart failure  -Continue home metoprolol hydralazine  Not on ACE or ARB due to renal disease  -Does not use diuretics is anuric    Operative Procedures:      Imaging: No results found. Disposition: home    Activity: as tolerated   Diet: renal   Wound Care: no needs  Code Status: Full Code  O2: no needs    Home Medication Changes: as below   All discharge medications have been reconciled with current medication list.     Med list     Medication List        CHANGE how you take these medications      finasteride 5 MG Tabs  Commonly known as: Proscar  TAKE 1 TABLET(5 MG) BY MOUTH DAILY  What changed:   when to take this  additional instructions     multivitamin Tabs  Take 1 tablet by mouth daily. What changed: when to take this     tamsulosin 0.4 MG Caps  Commonly known as: Flomax  TAKE 2 CAPSULES(0.8 MG) BY MOUTH DAILY  What changed: when to take this            CONTINUE taking these medications      acetaminophen 500 MG Tabs  Commonly known as: Tylenol Extra Strength     aspirin 81 MG Tbec     Basaglar KwikPen 100 UNIT/ML Sopn  Generic drug: insulin glargine     Dexcom G6 Sensor Misc  1 each by Does not apply route Every 10 days. hydrALAZINE 50 MG Tabs  Commonly known as: Apresoline  Take 1 tablet (50 mg total) by mouth 3 (three) times daily. Insulin Lispro (1 Unit Dial) 100 UNIT/ML Sopn  Commonly known as: HumaLOG KwikPen  Inject 14 Units into the skin 3 (three) times daily. metoprolol succinate ER 50 MG Tb24  Commonly known as: Toprol XL     OneTouch Ultra Mini w/Device Kit  Test Blood Sugar Once Daily. Non-Insulin Dependent Diabetes Type II. E11.9. OneTouch Ultra Strp  Generic drug: Glucose Blood  Test Blood Sugar 4 Times Daily. Insulin Dependent Diabetes Type II. Z79.4, E11.9. OneTouch UltraSoft Lancets Misc  Test Blood Sugar Once Daily.  Non-Insulin Dependent Diabetes Type II. E11.9.     rosuvastatin 20 MG Tabs  Commonly known as: Crestor     sevelamer carbonate 800 MG Tabs  Commonly known as: Renvela     TRUEplus Pen Needles 32G X 4 MM Misc  Generic drug: Insulin Pen Needle  Use 1 needle 4 times a day              FU Follow-up Information       [facility]. Schedule an appointment as soon as possible for a visit in 1 week(s). Specialty: Nurse Practitioner  Contact information:  [address] [place] [address]                Damian Ballesteros MD Follow up in 1 week(s). Specialty: Internal Medicine  Contact information:  [address] [facility], [place] instructions: Other Discharge Instructions:         Keep all follow-up appointments and continue strict dialysis schedule. Do not miss any dialysis sessions. Patient had opportunity to ask questions and state understand and agree with therapeutic plan as outlined    Thank You,    Eleazar Greene. Catalina Watson M.D.   [facility]

## 2023-09-27 NOTE — PLAN OF CARE
Patient denied pain or discomfort. Need frequent reminder to request assistance prior getting out of the bed. Safety precaution maintained. Plan for dialysis, temporary dialysis catheter removal and discharge home once medically clear. Problem: Patient Centered Care  Goal: Patient preferences are identified and integrated in the patient's plan of care  Description: Interventions:  - What would you like us to know as we care for you?  Dialysis MWF  - Provide timely, complete, and accurate information to patient/family  - Incorporate patient and family knowledge, values, beliefs, and cultural backgrounds into the planning and delivery of care  - Encourage patient/family to participate in care and decision-making at the level they choose  - Honor patient and family perspectives and choices  Outcome: Progressing     Problem: METABOLIC/FLUID AND ELECTROLYTES - ADULT  Goal: Glucose maintained within prescribed range  Description: INTERVENTIONS:  - Monitor Blood Glucose as ordered  - Assess for signs and symptoms of hyperglycemia and hypoglycemia  - Administer ordered medications to maintain glucose within target range  - Assess barriers to adequate nutritional intake and initiate nutrition consult as needed  - Instruct patient on self management of diabetes  Outcome: Progressing  Goal: Electrolytes maintained within normal limits  Description: INTERVENTIONS:  - Monitor labs and rhythm and assess patient for signs and symptoms of electrolyte imbalances  - Administer electrolyte replacement as ordered  - Monitor response to electrolyte replacements, including rhythm and repeat lab results as appropriate  - Fluid restriction as ordered  - Instruct patient on fluid and nutrition restrictions as appropriate  Outcome: Progressing  Goal: Hemodynamic stability and optimal renal function maintained  Description: INTERVENTIONS:  - Monitor labs and assess for signs and symptoms of volume excess or deficit  - Monitor intake, output and patient weight  - Monitor urine specific gravity, serum osmolarity and serum sodium as indicated or ordered  - Monitor response to interventions for patient's volume status, including labs, urine output, blood pressure (other measures as available)  - Encourage oral intake as appropriate  - Instruct patient on fluid and nutrition restrictions as appropriate  Outcome: Progressing     Problem: PAIN - ADULT  Goal: Verbalizes/displays adequate comfort level or patient's stated pain goal  Description: INTERVENTIONS:  - Encourage pt to monitor pain and request assistance  - Assess pain using appropriate pain scale  - Administer analgesics based on type and severity of pain and evaluate response  - Implement non-pharmacological measures as appropriate and evaluate response  - Consider cultural and social influences on pain and pain management  - Manage/alleviate anxiety  - Utilize distraction and/or relaxation techniques  - Monitor for opioid side effects  - Notify MD/LIP if interventions unsuccessful or patient reports new pain  - Anticipate increased pain with activity and pre-medicate as appropriate  Outcome: Progressing     Problem: RISK FOR INFECTION - ADULT  Goal: Absence of fever/infection during anticipated neutropenic period  Description: INTERVENTIONS  - Monitor WBC  - Administer growth factors as ordered  - Implement neutropenic guidelines  Outcome: Progressing     Problem: SAFETY ADULT - FALL  Goal: Free from fall injury  Description: INTERVENTIONS:  - Assess pt frequently for physical needs  - Identify cognitive and physical deficits and behaviors that affect risk of falls.   - [place] fall precautions as indicated by assessment.  - Educate pt/family on patient safety including physical limitations  - Instruct pt to call for assistance with activity based on assessment  - Modify environment to reduce risk of injury  - Provide assistive devices as appropriate  - Consider OT/PT consult to assist with strengthening/mobility  - Encourage toileting schedule  Outcome: Progressing     Problem: DISCHARGE PLANNING  Goal: Discharge to home or other facility with appropriate resources  Description: INTERVENTIONS:  - Identify barriers to discharge w/pt and caregiver  - Include patient/family/discharge partner in discharge planning  - Arrange for needed discharge resources and transportation as appropriate  - Identify discharge learning needs (meds, wound care, etc)  - Arrange for interpreters to assist at discharge as needed  - Consider post-discharge preferences of patient/family/discharge partner  - Complete POLST form as appropriate  - Assess patient's ability to be responsible for managing their own health  - Refer to Case Management Department for coordinating discharge planning if the patient needs post-hospital services based on physician/LIP order or complex needs related to functional status, cognitive ability or social support system  Outcome: Progressing

## 2023-09-27 NOTE — CM/SW NOTE
09/27/23 1429   Discharge disposition   Expected discharge disposition Home or [address] [place] [address]   Outpatient services Dialysis   Home services after discharge None   Discharge transportation [address] [facility]     The patient received a MDO for discharge. The patient will be transported home via [facility] at 6:30pm.  PCS complete. Social work notified the patient's spouse Josh Lindsay of transportation time. Social work spoke to [facility] [place] and notified them of discharge and resumption of HD on Friday 9/29. The patient's spouse has no further questions at this time. SW/CM to remain available for support and/or discharge planning.      Therese HARVEY, [facility]  Discharge Planner [id number]

## 2023-09-27 NOTE — DISCHARGE INSTRUCTIONS
Keep all follow-up appointments and continue strict dialysis schedule. Do not miss any dialysis sessions.

## 2023-11-06 ENCOUNTER — HOSPITAL ENCOUNTER (INPATIENT)
Facility: HOSPITAL | Age: 73
LOS: 7 days | Discharge: HOME HEALTH | End: 2023-11-13
Attending: EMERGENCY MEDICINE | Admitting: HOSPITALIST
Payer: MEDICARE

## 2023-11-06 DIAGNOSIS — D64.9 ANEMIA, UNSPECIFIED TYPE: Primary | ICD-10-CM

## 2023-11-06 LAB
ALBUMIN SERPL-MCNC: 3.8 G/DL (ref 3.2–4.8)
ALBUMIN/GLOB SERPL: 1.6 {RATIO} (ref 1–2)
ALP LIVER SERPL-CCNC: 63 U/L
ALT SERPL-CCNC: 13 U/L
ANION GAP SERPL CALC-SCNC: 14 MMOL/L (ref 0–18)
ANTIBODY SCREEN: NEGATIVE
AST SERPL-CCNC: 12 U/L (ref ?–34)
BASOPHILS # BLD AUTO: 0.03 X10(3) UL (ref 0–0.2)
BASOPHILS NFR BLD AUTO: 0.4 %
BILIRUB SERPL-MCNC: <0.2 MG/DL (ref 0.2–1.1)
BUN BLD-MCNC: 103 MG/DL (ref 9–23)
BUN/CREAT SERPL: 11 (ref 10–20)
CALCIUM BLD-MCNC: 8.7 MG/DL (ref 8.7–10.4)
CHLORIDE SERPL-SCNC: 103 MMOL/L (ref 98–112)
CO2 SERPL-SCNC: 22 MMOL/L (ref 21–32)
CREAT BLD-MCNC: 9.35 MG/DL
DEPRECATED RDW RBC AUTO: 58.6 FL (ref 35.1–46.3)
EGFRCR SERPLBLD CKD-EPI 2021: 5 ML/MIN/1.73M2 (ref 60–?)
EOSINOPHIL # BLD AUTO: 0.23 X10(3) UL (ref 0–0.7)
EOSINOPHIL NFR BLD AUTO: 3.1 %
ERYTHROCYTE [DISTWIDTH] IN BLOOD BY AUTOMATED COUNT: 15.9 % (ref 11–15)
EST. AVERAGE GLUCOSE BLD GHB EST-MCNC: 252 MG/DL (ref 68–126)
GLOBULIN PLAS-MCNC: 2.4 G/DL (ref 2.8–4.4)
GLUCOSE BLD-MCNC: 334 MG/DL (ref 70–99)
GLUCOSE BLDC GLUCOMTR-MCNC: 135 MG/DL (ref 70–99)
GLUCOSE BLDC GLUCOMTR-MCNC: 290 MG/DL (ref 70–99)
HBA1C MFR BLD: 10.4 % (ref ?–5.7)
HBV CORE AB SERPL QL IA: REACTIVE
HBV CORE IGM SER QL: NONREACTIVE
HBV SURFACE AB SER QL: REACTIVE
HBV SURFACE AB SERPL IA-ACNC: 23.44 MIU/ML
HBV SURFACE AG SER-ACNC: <0.1 [IU]/L
HBV SURFACE AG SERPL QL IA: NONREACTIVE
HCT VFR BLD AUTO: 18.7 %
HCT VFR BLD AUTO: 20.2 %
HGB BLD-MCNC: 5.9 G/DL
HGB BLD-MCNC: 6.6 G/DL
IMM GRANULOCYTES # BLD AUTO: 0.11 X10(3) UL (ref 0–1)
IMM GRANULOCYTES NFR BLD: 1.5 %
LYMPHOCYTES # BLD AUTO: 1.51 X10(3) UL (ref 1–4)
LYMPHOCYTES NFR BLD AUTO: 20.5 %
MCH RBC QN AUTO: 31.9 PG (ref 26–34)
MCHC RBC AUTO-ENTMCNC: 31.6 G/DL (ref 31–37)
MCV RBC AUTO: 101.1 FL
MONOCYTES # BLD AUTO: 0.56 X10(3) UL (ref 0.1–1)
MONOCYTES NFR BLD AUTO: 7.6 %
NEUTROPHILS # BLD AUTO: 4.92 X10 (3) UL (ref 1.5–7.7)
NEUTROPHILS # BLD AUTO: 4.92 X10(3) UL (ref 1.5–7.7)
NEUTROPHILS NFR BLD AUTO: 66.9 %
OSMOLALITY SERPL CALC.SUM OF ELEC: 333 MOSM/KG (ref 275–295)
PLATELET # BLD AUTO: 177 10(3)UL (ref 150–450)
POTASSIUM SERPL-SCNC: 4.5 MMOL/L (ref 3.5–5.1)
PROT SERPL-MCNC: 6.2 G/DL (ref 5.7–8.2)
RBC # BLD AUTO: 1.85 X10(6)UL
RH BLOOD TYPE: POSITIVE
SODIUM SERPL-SCNC: 139 MMOL/L (ref 136–145)
WBC # BLD AUTO: 7.4 X10(3) UL (ref 4–11)

## 2023-11-06 PROCEDURE — 86920 COMPATIBILITY TEST SPIN: CPT

## 2023-11-06 PROCEDURE — 36430 TRANSFUSION BLD/BLD COMPNT: CPT

## 2023-11-06 PROCEDURE — 87340 HEPATITIS B SURFACE AG IA: CPT | Performed by: HOSPITALIST

## 2023-11-06 PROCEDURE — 80053 COMPREHEN METABOLIC PANEL: CPT | Performed by: EMERGENCY MEDICINE

## 2023-11-06 PROCEDURE — 86704 HEP B CORE ANTIBODY TOTAL: CPT | Performed by: HOSPITALIST

## 2023-11-06 PROCEDURE — 86850 RBC ANTIBODY SCREEN: CPT | Performed by: EMERGENCY MEDICINE

## 2023-11-06 PROCEDURE — 80503 PATH CLIN CONSLTJ SF 5-20: CPT | Performed by: HOSPITALIST

## 2023-11-06 PROCEDURE — 90935 HEMODIALYSIS ONE EVALUATION: CPT

## 2023-11-06 PROCEDURE — 83036 HEMOGLOBIN GLYCOSYLATED A1C: CPT | Performed by: HOSPITALIST

## 2023-11-06 PROCEDURE — 85025 COMPLETE CBC W/AUTO DIFF WBC: CPT | Performed by: EMERGENCY MEDICINE

## 2023-11-06 PROCEDURE — 36415 COLL VENOUS BLD VENIPUNCTURE: CPT

## 2023-11-06 PROCEDURE — 85018 HEMOGLOBIN: CPT | Performed by: HOSPITALIST

## 2023-11-06 PROCEDURE — 86705 HEP B CORE ANTIBODY IGM: CPT | Performed by: HOSPITALIST

## 2023-11-06 PROCEDURE — 5A1D70Z PERFORMANCE OF URINARY FILTRATION, INTERMITTENT, LESS THAN 6 HOURS PER DAY: ICD-10-PCS | Performed by: INTERNAL MEDICINE

## 2023-11-06 PROCEDURE — 99285 EMERGENCY DEPT VISIT HI MDM: CPT

## 2023-11-06 PROCEDURE — 30233N1 TRANSFUSION OF NONAUTOLOGOUS RED BLOOD CELLS INTO PERIPHERAL VEIN, PERCUTANEOUS APPROACH: ICD-10-PCS | Performed by: EMERGENCY MEDICINE

## 2023-11-06 PROCEDURE — 82962 GLUCOSE BLOOD TEST: CPT

## 2023-11-06 PROCEDURE — C9113 INJ PANTOPRAZOLE SODIUM, VIA: HCPCS | Performed by: HOSPITALIST

## 2023-11-06 PROCEDURE — 85014 HEMATOCRIT: CPT | Performed by: HOSPITALIST

## 2023-11-06 PROCEDURE — 86901 BLOOD TYPING SEROLOGIC RH(D): CPT | Performed by: EMERGENCY MEDICINE

## 2023-11-06 PROCEDURE — 86900 BLOOD TYPING SEROLOGIC ABO: CPT | Performed by: EMERGENCY MEDICINE

## 2023-11-06 PROCEDURE — 86706 HEP B SURFACE ANTIBODY: CPT | Performed by: HOSPITALIST

## 2023-11-06 PROCEDURE — 85060 BLOOD SMEAR INTERPRETATION: CPT | Performed by: EMERGENCY MEDICINE

## 2023-11-06 RX ORDER — GABAPENTIN 100 MG/1
100 CAPSULE ORAL 2 TIMES DAILY
Status: DISCONTINUED | OUTPATIENT
Start: 2023-11-06 | End: 2023-11-13

## 2023-11-06 RX ORDER — ONDANSETRON 2 MG/ML
4 INJECTION INTRAMUSCULAR; INTRAVENOUS EVERY 6 HOURS PRN
Status: DISCONTINUED | OUTPATIENT
Start: 2023-11-06 | End: 2023-11-13

## 2023-11-06 RX ORDER — HYDRALAZINE HYDROCHLORIDE 50 MG/1
50 TABLET, FILM COATED ORAL 3 TIMES DAILY
Status: DISCONTINUED | OUTPATIENT
Start: 2023-11-06 | End: 2023-11-13

## 2023-11-06 RX ORDER — METOPROLOL SUCCINATE 50 MG/1
50 TABLET, EXTENDED RELEASE ORAL 2 TIMES DAILY
Status: DISCONTINUED | OUTPATIENT
Start: 2023-11-06 | End: 2023-11-13

## 2023-11-06 RX ORDER — METOCLOPRAMIDE HYDROCHLORIDE 5 MG/ML
5 INJECTION INTRAMUSCULAR; INTRAVENOUS EVERY 8 HOURS PRN
Status: DISCONTINUED | OUTPATIENT
Start: 2023-11-06 | End: 2023-11-13

## 2023-11-06 RX ORDER — TAMSULOSIN HYDROCHLORIDE 0.4 MG/1
0.8 CAPSULE ORAL NIGHTLY
Status: DISCONTINUED | OUTPATIENT
Start: 2023-11-06 | End: 2023-11-13

## 2023-11-06 RX ORDER — DEXTROSE MONOHYDRATE 25 G/50ML
50 INJECTION, SOLUTION INTRAVENOUS
Status: DISCONTINUED | OUTPATIENT
Start: 2023-11-06 | End: 2023-11-13

## 2023-11-06 RX ORDER — FINASTERIDE 5 MG/1
5 TABLET, FILM COATED ORAL EVERY MORNING
Status: DISCONTINUED | OUTPATIENT
Start: 2023-11-07 | End: 2023-11-13

## 2023-11-06 RX ORDER — ACETAMINOPHEN 500 MG
500 TABLET ORAL EVERY 4 HOURS PRN
Status: DISCONTINUED | OUTPATIENT
Start: 2023-11-06 | End: 2023-11-13

## 2023-11-06 RX ORDER — ROSUVASTATIN CALCIUM 20 MG/1
20 TABLET, COATED ORAL NIGHTLY
Status: DISCONTINUED | OUTPATIENT
Start: 2023-11-06 | End: 2023-11-13

## 2023-11-06 RX ORDER — SODIUM CHLORIDE 9 MG/ML
INJECTION, SOLUTION INTRAVENOUS ONCE
Status: COMPLETED | OUTPATIENT
Start: 2023-11-06 | End: 2023-11-06

## 2023-11-06 RX ORDER — GABAPENTIN 100 MG/1
100 CAPSULE ORAL 2 TIMES DAILY
COMMUNITY

## 2023-11-06 RX ORDER — SEVELAMER CARBONATE 800 MG/1
800 TABLET, FILM COATED ORAL
Status: DISCONTINUED | OUTPATIENT
Start: 2023-11-06 | End: 2023-11-13

## 2023-11-06 RX ORDER — NICOTINE POLACRILEX 4 MG
15 LOZENGE BUCCAL
Status: DISCONTINUED | OUTPATIENT
Start: 2023-11-06 | End: 2023-11-13

## 2023-11-06 RX ORDER — NICOTINE POLACRILEX 4 MG
30 LOZENGE BUCCAL
Status: DISCONTINUED | OUTPATIENT
Start: 2023-11-06 | End: 2023-11-13

## 2023-11-06 NOTE — ED QUICK NOTES
Orders for admission, patient is aware of plan and ready to go upstairs. Any questions, please call ED RN Barb at [phone].      Patient Covid vaccination status: Partially vaccinated     COVID Test Ordered in ED: None    COVID Suspicion at Admission: N/A    Running Infusions:  None    Mental Status/LOC at time of transport: A&Ox4/4    Other pertinent information:   CIWA score: N/A   NIH score:  N/A

## 2023-11-06 NOTE — PLAN OF CARE
Problem: Patient Centered Care  Goal: Patient preferences are identified and integrated in the patient's plan of care  Description: Interventions:  - What would you like us to know as we care for you?   - Provide timely, complete, and accurate information to patient/family  - Incorporate patient and family knowledge, values, beliefs, and cultural backgrounds into the planning and delivery of care  - Encourage patient/family to participate in care and decision-making at the level they choose  - Honor patient and family perspectives and choices  Outcome: Progressing     Problem: Patient/Family Goals  Goal: Patient/Family Long Term Goal  Description: Patient's Long Term Goal:     Interventions:  -   - See additional Care Plan goals for specific interventions  Outcome: Progressing  Goal: Patient/Family Short Term Goal  Description: Patient's Short Term Goal:     Interventions:   -   - See additional Care Plan goals for specific interventions  Outcome: Progressing     Problem: RESPIRATORY - ADULT  Goal: Achieves optimal ventilation and oxygenation  Description: INTERVENTIONS:  - Assess for changes in respiratory status  - Assess for changes in mentation and behavior  - Position to facilitate oxygenation and minimize respiratory effort  - Oxygen supplementation based on oxygen saturation or ABGs  - Provide Smoking Cessation handout, if applicable  - Encourage broncho-pulmonary hygiene including cough, deep breathe, Incentive Spirometry  - Assess the need for suctioning and perform as needed  - Assess and instruct to report SOB or any respiratory difficulty  - Respiratory Therapy support as indicated  - Manage/alleviate anxiety  - Monitor for signs/symptoms of CO2 retention  Outcome: Progressing     Problem: GASTROINTESTINAL - ADULT  Goal: Maintains or returns to baseline bowel function  Description: INTERVENTIONS:  - Assess bowel function  - Maintain adequate hydration with IV or PO as ordered and tolerated  - Evaluate effectiveness of GI medications  - Encourage mobilization and activity  - Obtain nutritional consult as needed  - Establish a toileting routine/schedule  - Consider collaborating with pharmacy to review patient's medication profile  Outcome: Progressing     Problem: METABOLIC/FLUID AND ELECTROLYTES - ADULT  Goal: Glucose maintained within prescribed range  Description: INTERVENTIONS:  - Monitor Blood Glucose as ordered  - Assess for signs and symptoms of hyperglycemia and hypoglycemia  - Administer ordered medications to maintain glucose within target range  - Assess barriers to adequate nutritional intake and initiate nutrition consult as needed  - Instruct patient on self management of diabetes  Outcome: Progressing  Goal: Electrolytes maintained within normal limits  Description: INTERVENTIONS:  - Monitor labs and rhythm and assess patient for signs and symptoms of electrolyte imbalances  - Administer electrolyte replacement as ordered  - Monitor response to electrolyte replacements, including rhythm and repeat lab results as appropriate  - Fluid restriction as ordered  - Instruct patient on fluid and nutrition restrictions as appropriate  Outcome: Progressing  Goal: Hemodynamic stability and optimal renal function maintained  Description: INTERVENTIONS:  - Monitor labs and assess for signs and symptoms of volume excess or deficit  - Monitor intake, output and patient weight  - Monitor urine specific gravity, serum osmolarity and serum sodium as indicated or ordered  - Monitor response to interventions for patient's volume status, including labs, urine output, blood pressure (other measures as available)  - Encourage oral intake as appropriate  - Instruct patient on fluid and nutrition restrictions as appropriate  Outcome: Progressing     Problem: HEMATOLOGIC - ADULT  Goal: Maintains hematologic stability  Description: INTERVENTIONS  - Assess for signs and symptoms of bleeding or hemorrhage  - Monitor labs and vital signs for trends  - Administer supportive blood products/factors, fluids and medications as ordered and appropriate  - Administer supportive blood products/factors as ordered and appropriate  Outcome: Progressing     Problem: PAIN - ADULT  Goal: Verbalizes/displays adequate comfort level or patient's stated pain goal  Description: INTERVENTIONS:  - Encourage pt to monitor pain and request assistance  - Assess pain using appropriate pain scale  - Administer analgesics based on type and severity of pain and evaluate response  - Implement non-pharmacological measures as appropriate and evaluate response  - Consider cultural and social influences on pain and pain management  - Manage/alleviate anxiety  - Utilize distraction and/or relaxation techniques  - Monitor for opioid side effects  - Notify MD/LIP if interventions unsuccessful or patient reports new pain  - Anticipate increased pain with activity and pre-medicate as appropriate  Outcome: Progressing     Problem: SAFETY ADULT - FALL  Goal: Free from fall injury  Description: INTERVENTIONS:  - Assess pt frequently for physical needs  - Identify cognitive and physical deficits and behaviors that affect risk of falls.   - [place] fall precautions as indicated by assessment.  - Educate pt/family on patient safety including physical limitations  - Instruct pt to call for assistance with activity based on assessment  - Modify environment to reduce risk of injury  - Provide assistive devices as appropriate  - Consider OT/PT consult to assist with strengthening/mobility  - Encourage toileting schedule  Outcome: Progressing     Problem: DISCHARGE PLANNING  Goal: Discharge to home or other facility with appropriate resources  Description: INTERVENTIONS:  - Identify barriers to discharge w/pt and caregiver  - Include patient/family/discharge partner in discharge planning  - Arrange for needed discharge resources and transportation as appropriate  - Identify discharge learning needs (meds, wound care, etc)  - Arrange for interpreters to assist at discharge as needed  - Consider post-discharge preferences of patient/family/discharge partner  - Complete POLST form as appropriate  - Assess patient's ability to be responsible for managing their own health  - Refer to Case Management Department for coordinating discharge planning if the patient needs post-hospital services based on physician/LIP order or complex needs related to functional status, cognitive ability or social support system  Outcome: Progressing

## 2023-11-06 NOTE — ED INITIAL ASSESSMENT (HPI)
Patient presents with low hemoglobin after dialysis on Friday. Patient was feeling light-headed during dialysis on Friday. Patient's nephrologist is Dr. Jessica Marcelino. Patient complains of weakness.

## 2023-11-07 ENCOUNTER — ANESTHESIA EVENT (OUTPATIENT)
Dept: ENDOSCOPY | Facility: HOSPITAL | Age: 73
End: 2023-11-07
Payer: MEDICARE

## 2023-11-07 ENCOUNTER — ANESTHESIA (OUTPATIENT)
Dept: ENDOSCOPY | Facility: HOSPITAL | Age: 73
End: 2023-11-07
Payer: MEDICARE

## 2023-11-07 LAB
ALBUMIN SERPL-MCNC: 3.6 G/DL (ref 3.2–4.8)
ANION GAP SERPL CALC-SCNC: 10 MMOL/L (ref 0–18)
ANION GAP SERPL CALC-SCNC: 9 MMOL/L (ref 0–18)
BUN BLD-MCNC: 41 MG/DL (ref 9–23)
BUN BLD-MCNC: 46 MG/DL (ref 9–23)
BUN/CREAT SERPL: 7.9 (ref 10–20)
BUN/CREAT SERPL: 8 (ref 10–20)
CALCIUM BLD-MCNC: 8.4 MG/DL (ref 8.7–10.4)
CALCIUM BLD-MCNC: 8.6 MG/DL (ref 8.7–10.4)
CHLORIDE SERPL-SCNC: 104 MMOL/L (ref 98–112)
CHLORIDE SERPL-SCNC: 106 MMOL/L (ref 98–112)
CO2 SERPL-SCNC: 25 MMOL/L (ref 21–32)
CO2 SERPL-SCNC: 27 MMOL/L (ref 21–32)
CREAT BLD-MCNC: 5.2 MG/DL
CREAT BLD-MCNC: 5.78 MG/DL
DEPRECATED RDW RBC AUTO: 58.6 FL (ref 35.1–46.3)
EGFRCR SERPLBLD CKD-EPI 2021: 10 ML/MIN/1.73M2 (ref 60–?)
EGFRCR SERPLBLD CKD-EPI 2021: 11 ML/MIN/1.73M2 (ref 60–?)
ERYTHROCYTE [DISTWIDTH] IN BLOOD BY AUTOMATED COUNT: 17.2 % (ref 11–15)
GLUCOSE BLD-MCNC: 254 MG/DL (ref 70–99)
GLUCOSE BLD-MCNC: 259 MG/DL (ref 70–99)
GLUCOSE BLDC GLUCOMTR-MCNC: 168 MG/DL (ref 70–99)
GLUCOSE BLDC GLUCOMTR-MCNC: 214 MG/DL (ref 70–99)
GLUCOSE BLDC GLUCOMTR-MCNC: 226 MG/DL (ref 70–99)
GLUCOSE BLDC GLUCOMTR-MCNC: 258 MG/DL (ref 70–99)
GLUCOSE BLDC GLUCOMTR-MCNC: 278 MG/DL (ref 70–99)
HCT VFR BLD AUTO: 24.6 %
HCT VFR BLD AUTO: 25.3 %
HGB BLD-MCNC: 8 G/DL
HGB BLD-MCNC: 8.3 G/DL
HGB BLD-MCNC: 8.4 G/DL
HGB BLD-MCNC: 8.6 G/DL
IRON SATN MFR SERPL: 24 %
IRON SERPL-MCNC: 62 UG/DL
MAGNESIUM SERPL-MCNC: 1.9 MG/DL (ref 1.6–2.6)
MCH RBC QN AUTO: 30.5 PG (ref 26–34)
MCHC RBC AUTO-ENTMCNC: 32.5 G/DL (ref 31–37)
MCV RBC AUTO: 93.9 FL
OSMOLALITY SERPL CALC.SUM OF ELEC: 309 MOSM/KG (ref 275–295)
OSMOLALITY SERPL CALC.SUM OF ELEC: 313 MOSM/KG (ref 275–295)
PHOSPHATE SERPL-MCNC: 5.2 MG/DL (ref 2.4–5.1)
PLATELET # BLD AUTO: 165 10(3)UL (ref 150–450)
POTASSIUM SERPL-SCNC: 4.3 MMOL/L (ref 3.5–5.1)
RBC # BLD AUTO: 2.62 X10(6)UL
SODIUM SERPL-SCNC: 140 MMOL/L (ref 136–145)
SODIUM SERPL-SCNC: 141 MMOL/L (ref 136–145)
TIBC SERPL-MCNC: 262 UG/DL (ref 250–425)
TRANSFERRIN SERPL-MCNC: 176 MG/DL (ref 215–365)
WBC # BLD AUTO: 9.7 X10(3) UL (ref 4–11)

## 2023-11-07 PROCEDURE — 84466 ASSAY OF TRANSFERRIN: CPT | Performed by: HOSPITALIST

## 2023-11-07 PROCEDURE — 83540 ASSAY OF IRON: CPT | Performed by: HOSPITALIST

## 2023-11-07 PROCEDURE — C9113 INJ PANTOPRAZOLE SODIUM, VIA: HCPCS | Performed by: INTERNAL MEDICINE

## 2023-11-07 PROCEDURE — 80069 RENAL FUNCTION PANEL: CPT | Performed by: INTERNAL MEDICINE

## 2023-11-07 PROCEDURE — 88305 TISSUE EXAM BY PATHOLOGIST: CPT | Performed by: INTERNAL MEDICINE

## 2023-11-07 PROCEDURE — 88312 SPECIAL STAINS GROUP 1: CPT | Performed by: INTERNAL MEDICINE

## 2023-11-07 PROCEDURE — 82962 GLUCOSE BLOOD TEST: CPT

## 2023-11-07 PROCEDURE — 84132 ASSAY OF SERUM POTASSIUM: CPT | Performed by: INTERNAL MEDICINE

## 2023-11-07 PROCEDURE — 85018 HEMOGLOBIN: CPT | Performed by: HOSPITALIST

## 2023-11-07 PROCEDURE — 83735 ASSAY OF MAGNESIUM: CPT | Performed by: INTERNAL MEDICINE

## 2023-11-07 PROCEDURE — C9113 INJ PANTOPRAZOLE SODIUM, VIA: HCPCS | Performed by: HOSPITALIST

## 2023-11-07 PROCEDURE — 85027 COMPLETE CBC AUTOMATED: CPT | Performed by: HOSPITALIST

## 2023-11-07 PROCEDURE — 85018 HEMOGLOBIN: CPT | Performed by: INTERNAL MEDICINE

## 2023-11-07 PROCEDURE — 0DB68ZX EXCISION OF STOMACH, VIA NATURAL OR ARTIFICIAL OPENING ENDOSCOPIC, DIAGNOSTIC: ICD-10-PCS | Performed by: INTERNAL MEDICINE

## 2023-11-07 RX ORDER — NALOXONE HYDROCHLORIDE 0.4 MG/ML
0.08 INJECTION, SOLUTION INTRAMUSCULAR; INTRAVENOUS; SUBCUTANEOUS ONCE AS NEEDED
Status: CANCELLED | OUTPATIENT
Start: 2023-11-07 | End: 2023-11-07

## 2023-11-07 RX ORDER — LIDOCAINE HYDROCHLORIDE 10 MG/ML
INJECTION, SOLUTION EPIDURAL; INFILTRATION; INTRACAUDAL; PERINEURAL AS NEEDED
Status: DISCONTINUED | OUTPATIENT
Start: 2023-11-07 | End: 2023-11-07 | Stop reason: SURG

## 2023-11-07 RX ORDER — SODIUM CHLORIDE, SODIUM LACTATE, POTASSIUM CHLORIDE, CALCIUM CHLORIDE 600; 310; 30; 20 MG/100ML; MG/100ML; MG/100ML; MG/100ML
INJECTION, SOLUTION INTRAVENOUS CONTINUOUS
Status: CANCELLED | OUTPATIENT
Start: 2023-11-07

## 2023-11-07 RX ORDER — SODIUM CHLORIDE 9 MG/ML
INJECTION, SOLUTION INTRAVENOUS CONTINUOUS PRN
Status: DISCONTINUED | OUTPATIENT
Start: 2023-11-07 | End: 2023-11-07 | Stop reason: SURG

## 2023-11-07 RX ADMIN — LIDOCAINE HYDROCHLORIDE 25 MG: 10 INJECTION, SOLUTION EPIDURAL; INFILTRATION; INTRACAUDAL; PERINEURAL at 15:52:00

## 2023-11-07 RX ADMIN — SODIUM CHLORIDE: 9 INJECTION, SOLUTION INTRAVENOUS at 15:52:00

## 2023-11-07 NOTE — CM/SW NOTE
11/07/23 1700   CM/SW Referral Data   Referral Source Physician   Reason for Referral Discharge planning   Informant Patient   Medical Hx   Does patient have an established PCP? Yes  Gary Iyer)   Significant Past Medical/Mental Health Hx ESRD on HD   Patient Info   Patient's Current Mental Status at Time of Assessment Alert;Oriented   Patient's [address]   Patient lives with Spouse/Significant other;Son;Daughter   Patient Status Prior to Admission   Independent with ADLs and Mobility Yes   Services in place prior to admission Dialysis   D[facility] [address]  [facility] in [facility])   Scheduled Dialysis days M-W-F   Dialysis scheduled time 1500   Discharge Needs   Anticipated D/C needs Home health care; Outpatient dialysis   Choice of Post-Acute Provider   Informed patient of right to choose their preferred provider Yes     Pt discussed during nursing rounds. Dx anemia, ESRD on HD. Home w/family, independent w/walker at baseline. Current w/[facility] in [facility] for outpatient HD, MWF at [address] [facility] received for [facility] w[facility], pt agreeable to [facility] at [place]. [facility] referrals sent in [place], [place] entered. List of accepting [facility] agencies will be needed for choice. PT/OT evals pending. Plan: Home w/family with [facility] and [facility] for outpatient HD pending evals, agency choice, and medical clearance. / to remain available for support and/or discharge planning.      Claudean August, BSN    [phone]

## 2023-11-07 NOTE — PLAN OF CARE
Problem: Patient Centered Care  Goal: Patient preferences are identified and integrated in the patient's plan of care  Description: Interventions:  - What would you like us to know as we care for you?   - Provide timely, complete, and accurate information to patient/family  - Incorporate patient and family knowledge, values, beliefs, and cultural backgrounds into the planning and delivery of care  - Encourage patient/family to participate in care and decision-making at the level they choose  - Honor patient and family perspectives and choices  Outcome: Progressing     Problem: Patient/Family Goals  Goal: Patient/Family Long Term Goal  Description: Patient's Long Term Goal:     Interventions:  -   - See additional Care Plan goals for specific interventions  Outcome: Progressing  Goal: Patient/Family Short Term Goal  Description: Patient's Short Term Goal:     Interventions:   -   - See additional Care Plan goals for specific interventions  Outcome: Progressing     Problem: RESPIRATORY - ADULT  Goal: Achieves optimal ventilation and oxygenation  Description: INTERVENTIONS:  - Assess for changes in respiratory status  - Assess for changes in mentation and behavior  - Position to facilitate oxygenation and minimize respiratory effort  - Oxygen supplementation based on oxygen saturation or ABGs  - Provide Smoking Cessation handout, if applicable  - Encourage broncho-pulmonary hygiene including cough, deep breathe, Incentive Spirometry  - Assess the need for suctioning and perform as needed  - Assess and instruct to report SOB or any respiratory difficulty  - Respiratory Therapy support as indicated  - Manage/alleviate anxiety  - Monitor for signs/symptoms of CO2 retention  Outcome: Progressing     Problem: GASTROINTESTINAL - ADULT  Goal: Maintains or returns to baseline bowel function  Description: INTERVENTIONS:  - Assess bowel function  - Maintain adequate hydration with IV or PO as ordered and tolerated  - Evaluate effectiveness of GI medications  - Encourage mobilization and activity  - Obtain nutritional consult as needed  - Establish a toileting routine/schedule  - Consider collaborating with pharmacy to review patient's medication profile  Outcome: Progressing     Problem: METABOLIC/FLUID AND ELECTROLYTES - ADULT  Goal: Glucose maintained within prescribed range  Description: INTERVENTIONS:  - Monitor Blood Glucose as ordered  - Assess for signs and symptoms of hyperglycemia and hypoglycemia  - Administer ordered medications to maintain glucose within target range  - Assess barriers to adequate nutritional intake and initiate nutrition consult as needed  - Instruct patient on self management of diabetes  Outcome: Progressing  Goal: Electrolytes maintained within normal limits  Description: INTERVENTIONS:  - Monitor labs and rhythm and assess patient for signs and symptoms of electrolyte imbalances  - Administer electrolyte replacement as ordered  - Monitor response to electrolyte replacements, including rhythm and repeat lab results as appropriate  - Fluid restriction as ordered  - Instruct patient on fluid and nutrition restrictions as appropriate  Outcome: Progressing  Goal: Hemodynamic stability and optimal renal function maintained  Description: INTERVENTIONS:  - Monitor labs and assess for signs and symptoms of volume excess or deficit  - Monitor intake, output and patient weight  - Monitor urine specific gravity, serum osmolarity and serum sodium as indicated or ordered  - Monitor response to interventions for patient's volume status, including labs, urine output, blood pressure (other measures as available)  - Encourage oral intake as appropriate  - Instruct patient on fluid and nutrition restrictions as appropriate  Outcome: Progressing     Problem: HEMATOLOGIC - ADULT  Goal: Maintains hematologic stability  Description: INTERVENTIONS  - Assess for signs and symptoms of bleeding or hemorrhage  - Monitor labs and vital signs for trends  - Administer supportive blood products/factors, fluids and medications as ordered and appropriate  - Administer supportive blood products/factors as ordered and appropriate  Outcome: Progressing     Problem: PAIN - ADULT  Goal: Verbalizes/displays adequate comfort level or patient's stated pain goal  Description: INTERVENTIONS:  - Encourage pt to monitor pain and request assistance  - Assess pain using appropriate pain scale  - Administer analgesics based on type and severity of pain and evaluate response  - Implement non-pharmacological measures as appropriate and evaluate response  - Consider cultural and social influences on pain and pain management  - Manage/alleviate anxiety  - Utilize distraction and/or relaxation techniques  - Monitor for opioid side effects  - Notify MD/LIP if interventions unsuccessful or patient reports new pain  - Anticipate increased pain with activity and pre-medicate as appropriate  Outcome: Progressing     Problem: SAFETY ADULT - FALL  Goal: Free from fall injury  Description: INTERVENTIONS:  - Assess pt frequently for physical needs  - Identify cognitive and physical deficits and behaviors that affect risk of falls.   - [place] fall precautions as indicated by assessment.  - Educate pt/family on patient safety including physical limitations  - Instruct pt to call for assistance with activity based on assessment  - Modify environment to reduce risk of injury  - Provide assistive devices as appropriate  - Consider OT/PT consult to assist with strengthening/mobility  - Encourage toileting schedule  Outcome: Progressing     Problem: DISCHARGE PLANNING  Goal: Discharge to home or other facility with appropriate resources  Description: INTERVENTIONS:  - Identify barriers to discharge w/pt and caregiver  - Include patient/family/discharge partner in discharge planning  - Arrange for needed discharge resources and transportation as appropriate  - Identify discharge learning needs (meds, wound care, etc)  - Arrange for interpreters to assist at discharge as needed  - Consider post-discharge preferences of patient/family/discharge partner  - Complete POLST form as appropriate  - Assess patient's ability to be responsible for managing their own health  - Refer to Case Management Department for coordinating discharge planning if the patient needs post-hospital services based on physician/LIP order or complex needs related to functional status, cognitive ability or social support system  Outcome: Progressing

## 2023-11-07 NOTE — PLAN OF CARE
Patient transported to dialysis in stable condition with PRBCs running. Endorsed to dialysis MANJU Bullock.

## 2023-11-07 NOTE — OCCUPATIONAL THERAPY NOTE
OT attempted evaluation; discussed with RN, pt being prepped to go off the floor for EGD; will defer evaluation at this time; will re-attempt 11/8. Will continue to follow.      Maurice Castellanos, Occupational Therapist, OTR/L ext [phone]

## 2023-11-07 NOTE — CONSULTS
History and Physical for Endoscopic Procedure      Tomas Cortez Patient Status:  Inpatient    1950 MRN [id number]   Location [facility]5W Attending Dawson Gomez MD   Hosp Day # 1 PCP Anita Davison MD     Date of Consult:  23    Reason for Consultation:  Anemia    History of Present Illness:  Tomas Cortez is a a(n) 74year old male with pmh sig for DM II, HTN, hyperlipidemia, ESRD on HD, GERD, CAD s/p CABG admitted with anemia. Patient states that he noted some dark stools about 3 days ago. No abdominal pain. No diarrhea. No nausea or vomiting. NO significant NSAID use. No brbpr. In the ER, patient was noted to have hgb of 5.9. This is lower than his baseline of around 8-8.5. No previous EGD that he knows of. Not sure of when his last colonoscopy was. Since admission, patient has been doing okay. No signs of melena or rectal bleeding. History:  Past Medical History:   Diagnosis Date    BPH (benign prostatic hypertrophy)     Cataract     Congestive heart disease (HCC)     Coronary atherosclerosis     Diabetes (HCC)     Diabetic neuropathy (HCC)     Diabetic retinopathy ([address]     Dialysis patient ([address]     M,W,F,    Esophageal reflux     Heart valve disease     High blood pressure     High cholesterol     HLD (hyperlipidemia)     HTN (hypertension)     Neuropathy     Proteinuria     Renal disorder     HD every MWF with temporary HD cath    Type II diabetes mellitus ([address]     Visual impairment     Reading glasses     Past Surgical History:   Procedure Laterality Date    CABG  06/21/2021    x3-lima-->LAD, SVG-->diag and SVG-->OM    CATARACT Right      Family History   Problem Relation Age of Onset    No Known Problems Father     No Known Problems Mother     No Known Problems Daughter     No Known Problems Son     No Known Problems Son     No Known Problems Son     No Known Problems Sister     No Known Problems Brother       reports that he has never smoked.  He has never used smokeless tobacco. He reports that he does not drink alcohol and does not use drugs.     Allergies:  No Known Allergies    Medications:    Current Facility-Administered Medications:     insulin detemir (Levemir) 100 UNIT/ML FlexPen/FlexTouch 25 Units, 25 Units, Subcutaneous, Daily    glucose (Dex4) 15 GM/59ML oral liquid 15 g, 15 g, Oral, Q15 Min PRN **OR** glucose (Glutose) 40% oral gel 15 g, 15 g, Oral, Q15 Min PRN **OR** glucose-vitamin C (Dex-4) chewable tab 4 tablet, 4 tablet, Oral, Q15 Min PRN **OR** dextrose 50% injection 50 mL, 50 mL, Intravenous, Q15 Min PRN **OR** glucose (Dex4) 15 GM/59ML oral liquid 30 g, 30 g, Oral, Q15 Min PRN **OR** glucose (Glutose) 40% oral gel 30 g, 30 g, Oral, Q15 Min PRN **OR** glucose-vitamin C (Dex-4) chewable tab 8 tablet, 8 tablet, Oral, Q15 Min PRN    acetaminophen (Tylenol Extra Strength) tab 500 mg, 500 mg, Oral, Q4H PRN    ondansetron (Zofran) 4 MG/2ML injection 4 mg, 4 mg, Intravenous, Q6H PRN    metoclopramide (Reglan) 5 mg/mL injection 5 mg, 5 mg, Intravenous, Q8H PRN    insulin aspart (NovoLOG) 100 Units/mL FlexPen 1-7 Units, 1-7 Units, Subcutaneous, TID CC    insulin aspart (NovoLOG) 100 Units/mL FlexPen 5 Units, 5 Units, Subcutaneous, TID CC    pantoprazole (Protonix) 40 mg in sodium chloride 0.9% PF 10 mL IV push, 40 mg, Intravenous, Q12H    finasteride (Proscar) tab 5 mg, 5 mg, Oral, QAM    hydrALAZINE (Apresoline) tab 50 mg, 50 mg, Oral, TID    metoprolol succinate ER (Toprol XL) 24 hr tab 50 mg, 50 mg, Oral, BID    rosuvastatin (Crestor) tab 20 mg, 20 mg, Oral, Nightly    sevelamer carbonate (Renvela) tab 800 mg, 800 mg, Oral, TID CC    tamsulosin (Flomax) cap 0.8 mg, 0.8 mg, Oral, Nightly    gabapentin (Neurontin) cap 100 mg, 100 mg, Oral, BID    Review of Systems:  Gastrointestinal: negative other than specified in the HPI  General: negative other than specified in the HPI  Neurological: negative other than specified in the HPI  Cardiovascular: negative other than specified in the HPI  Respiratory: negative other than specified in the HPI  Skin: negative other than specified in the HPI  Allergy: negative other than specified in the HPI  ENT: negative other than specified in the HPI.     Physical Exam:  No acute distress  RRR  CTA B/L  SOFT +BS    Assessment/Plan:  Patient Active Problem List   Diagnosis    Moderate nonproliferative diabetic retinopathy without macular edema associated with type 2 diabetes mellitus ([facility])    Benign prostatic hyperplasia    Type 2 diabetes mellitus with diabetic polyneuropathy, without long-term current use of insulin ([facility])    Vitamin D deficiency    Type 2 diabetes mellitus with microalbuminuria     Type 2 diabetes mellitus with nephropathy ([facility])    Combined hyperlipidemia associated with type 2 diabetes mellitus     Hypertension associated with type 2 diabetes mellitus     Lower extremity edema    Iron deficiency anemia    CKD (chronic kidney disease) stage 4, GFR 15-29 ml/min ([facility])    Acute pulmonary edema ([facility])    Acute on chronic congestive heart failure, unspecified heart failure type ([facility])    Acute respiratory failure with hypoxia ([facility])    Coronary artery disease involving native coronary artery of native heart    Cerebrovascular accident (CVA) due to bilateral embolism of middle cerebral arteries ([address]    Preoperative testing    S/P CABG (coronary artery bypass graft)    Acute renal failure superimposed on chronic kidney disease     Acute renal failure superimposed on chronic kidney disease, unspecified CKD stage, unspecified acute renal failure type    Stage 5 chronic kidney disease not on chronic dialysis ([facility])    Hypernatremia    Acute kidney injury ([address]    Anemia    ESRD on hemodialysis ([facility])    Facial swelling    Rhinovirus infection    Thyroid nodule    Pulmonary nodules    Elevated BUN    Subacute cough    Dialysis AV fistula malfunction ([facility])    Type 2 diabetes mellitus with hyperglycemia, with long-term current use of insulin (HCC)    Anemia, unspecified type       Impression:   Acute on chronic anemia  -likely related to underlying chronic kidney disease, however with the dark stools, elevated BUN, and acute drop in hemoglobin, an acute GI blood loss is certainly possible.   -hemodynamically doing well after transfusion  -no previous EGD  -not sure of last colonoscopy. 2.   Dark stools  3. ESRD on HD  4.    CAD s/p CABG    Plan:  Monitor hgb and transfuse as necessary  Ppi daily  EGD today      Dinorah Martin MD  11/7/2023  1:15 PM

## 2023-11-07 NOTE — PLAN OF CARE
Pt slept comfortably overnight. A&O x4, weak and exhausted after returning from dialysis. Per pt report this is baseline. Pt given urinal at bedside, re-educated on call light, fall precautions in place, bed alarm on. After 2 units of prbc HGB is 8. 3. hourly rounding done and pt has remained NPO since 0000.      Problem: Patient Centered Care  Goal: Patient preferences are identified and integrated in the patient's plan of care  Description: Interventions:  - What would you like us to know as we care for you?   - Provide timely, complete, and accurate information to patient/family  - Incorporate patient and family knowledge, values, beliefs, and cultural backgrounds into the planning and delivery of care  - Encourage patient/family to participate in care and decision-making at the level they choose  - Honor patient and family perspectives and choices  Outcome: Progressing     Problem: Patient/Family Goals  Goal: Patient/Family Long Term Goal  Description: Patient's Long Term Goal:     Interventions:  -   - See additional Care Plan goals for specific interventions  Outcome: Progressing  Goal: Patient/Family Short Term Goal  Description: Patient's Short Term Goal:     Interventions:   -   - See additional Care Plan goals for specific interventions  Outcome: Progressing     Problem: RESPIRATORY - ADULT  Goal: Achieves optimal ventilation and oxygenation  Description: INTERVENTIONS:  - Assess for changes in respiratory status  - Assess for changes in mentation and behavior  - Position to facilitate oxygenation and minimize respiratory effort  - Oxygen supplementation based on oxygen saturation or ABGs  - Provide Smoking Cessation handout, if applicable  - Encourage broncho-pulmonary hygiene including cough, deep breathe, Incentive Spirometry  - Assess the need for suctioning and perform as needed  - Assess and instruct to report SOB or any respiratory difficulty  - Respiratory Therapy support as indicated  - Manage/alleviate anxiety  - Monitor for signs/symptoms of CO2 retention  Outcome: Progressing     Problem: GASTROINTESTINAL - ADULT  Goal: Maintains or returns to baseline bowel function  Description: INTERVENTIONS:  - Assess bowel function  - Maintain adequate hydration with IV or PO as ordered and tolerated  - Evaluate effectiveness of GI medications  - Encourage mobilization and activity  - Obtain nutritional consult as needed  - Establish a toileting routine/schedule  - Consider collaborating with pharmacy to review patient's medication profile  Outcome: Progressing     Problem: METABOLIC/FLUID AND ELECTROLYTES - ADULT  Goal: Glucose maintained within prescribed range  Description: INTERVENTIONS:  - Monitor Blood Glucose as ordered  - Assess for signs and symptoms of hyperglycemia and hypoglycemia  - Administer ordered medications to maintain glucose within target range  - Assess barriers to adequate nutritional intake and initiate nutrition consult as needed  - Instruct patient on self management of diabetes  Outcome: Progressing  Goal: Electrolytes maintained within normal limits  Description: INTERVENTIONS:  - Monitor labs and rhythm and assess patient for signs and symptoms of electrolyte imbalances  - Administer electrolyte replacement as ordered  - Monitor response to electrolyte replacements, including rhythm and repeat lab results as appropriate  - Fluid restriction as ordered  - Instruct patient on fluid and nutrition restrictions as appropriate  Outcome: Progressing  Goal: Hemodynamic stability and optimal renal function maintained  Description: INTERVENTIONS:  - Monitor labs and assess for signs and symptoms of volume excess or deficit  - Monitor intake, output and patient weight  - Monitor urine specific gravity, serum osmolarity and serum sodium as indicated or ordered  - Monitor response to interventions for patient's volume status, including labs, urine output, blood pressure (other measures as available)  - Encourage oral intake as appropriate  - Instruct patient on fluid and nutrition restrictions as appropriate  Outcome: Progressing     Problem: HEMATOLOGIC - ADULT  Goal: Maintains hematologic stability  Description: INTERVENTIONS  - Assess for signs and symptoms of bleeding or hemorrhage  - Monitor labs and vital signs for trends  - Administer supportive blood products/factors, fluids and medications as ordered and appropriate  - Administer supportive blood products/factors as ordered and appropriate  Outcome: Progressing     Problem: PAIN - ADULT  Goal: Verbalizes/displays adequate comfort level or patient's stated pain goal  Description: INTERVENTIONS:  - Encourage pt to monitor pain and request assistance  - Assess pain using appropriate pain scale  - Administer analgesics based on type and severity of pain and evaluate response  - Implement non-pharmacological measures as appropriate and evaluate response  - Consider cultural and social influences on pain and pain management  - Manage/alleviate anxiety  - Utilize distraction and/or relaxation techniques  - Monitor for opioid side effects  - Notify MD/LIP if interventions unsuccessful or patient reports new pain  - Anticipate increased pain with activity and pre-medicate as appropriate  Outcome: Progressing     Problem: SAFETY ADULT - FALL  Goal: Free from fall injury  Description: INTERVENTIONS:  - Assess pt frequently for physical needs  - Identify cognitive and physical deficits and behaviors that affect risk of falls.   - [place] fall precautions as indicated by assessment.  - Educate pt/family on patient safety including physical limitations  - Instruct pt to call for assistance with activity based on assessment  - Modify environment to reduce risk of injury  - Provide assistive devices as appropriate  - Consider OT/PT consult to assist with strengthening/mobility  - Encourage toileting schedule  Outcome: Progressing     Problem: DISCHARGE PLANNING  Goal: Discharge to home or other facility with appropriate resources  Description: INTERVENTIONS:  - Identify barriers to discharge w/pt and caregiver  - Include patient/family/discharge partner in discharge planning  - Arrange for needed discharge resources and transportation as appropriate  - Identify discharge learning needs (meds, wound care, etc)  - Arrange for interpreters to assist at discharge as needed  - Consider post-discharge preferences of patient/family/discharge partner  - Complete POLST form as appropriate  - Assess patient's ability to be responsible for managing their own health  - Refer to Case Management Department for coordinating discharge planning if the patient needs post-hospital services based on physician/LIP order or complex needs related to functional status, cognitive ability or social support system  Outcome: Progressing

## 2023-11-07 NOTE — PROGRESS NOTES
PT evaluation orders received and chart reviewed. Patient going off floor for EGD. Will reschedule.      Thank you,  Luis M Dyer, PT, DPT

## 2023-11-08 ENCOUNTER — APPOINTMENT (OUTPATIENT)
Dept: CV DIAGNOSTICS | Facility: HOSPITAL | Age: 73
End: 2023-11-08
Attending: INTERNAL MEDICINE
Payer: MEDICARE

## 2023-11-08 ENCOUNTER — ANESTHESIA (OUTPATIENT)
Dept: ENDOSCOPY | Facility: HOSPITAL | Age: 73
End: 2023-11-08
Payer: MEDICARE

## 2023-11-08 ENCOUNTER — ANESTHESIA EVENT (OUTPATIENT)
Dept: ENDOSCOPY | Facility: HOSPITAL | Age: 73
End: 2023-11-08
Payer: MEDICARE

## 2023-11-08 LAB
ALBUMIN SERPL-MCNC: 3.5 G/DL (ref 3.2–4.8)
ANION GAP SERPL CALC-SCNC: 10 MMOL/L (ref 0–18)
ANION GAP SERPL CALC-SCNC: 10 MMOL/L (ref 0–18)
BLOOD TYPE BARCODE: 5100
BLOOD TYPE BARCODE: 5100
BUN BLD-MCNC: 46 MG/DL (ref 9–23)
BUN BLD-MCNC: 46 MG/DL (ref 9–23)
BUN/CREAT SERPL: 6.8 (ref 10–20)
BUN/CREAT SERPL: 6.8 (ref 10–20)
CALCIUM BLD-MCNC: 8.4 MG/DL (ref 8.7–10.4)
CALCIUM BLD-MCNC: 8.4 MG/DL (ref 8.7–10.4)
CHLORIDE SERPL-SCNC: 104 MMOL/L (ref 98–112)
CHLORIDE SERPL-SCNC: 104 MMOL/L (ref 98–112)
CO2 SERPL-SCNC: 26 MMOL/L (ref 21–32)
CO2 SERPL-SCNC: 26 MMOL/L (ref 21–32)
CREAT BLD-MCNC: 6.79 MG/DL
CREAT BLD-MCNC: 6.79 MG/DL
DEPRECATED RDW RBC AUTO: 59.6 FL (ref 35.1–46.3)
EGFRCR SERPLBLD CKD-EPI 2021: 8 ML/MIN/1.73M2 (ref 60–?)
EGFRCR SERPLBLD CKD-EPI 2021: 8 ML/MIN/1.73M2 (ref 60–?)
ERYTHROCYTE [DISTWIDTH] IN BLOOD BY AUTOMATED COUNT: 17.1 % (ref 11–15)
GLUCOSE BLD-MCNC: 169 MG/DL (ref 70–99)
GLUCOSE BLD-MCNC: 169 MG/DL (ref 70–99)
GLUCOSE BLDC GLUCOMTR-MCNC: 172 MG/DL (ref 70–99)
GLUCOSE BLDC GLUCOMTR-MCNC: 202 MG/DL (ref 70–99)
GLUCOSE BLDC GLUCOMTR-MCNC: 208 MG/DL (ref 70–99)
GLUCOSE BLDC GLUCOMTR-MCNC: 220 MG/DL (ref 70–99)
GLUCOSE BLDC GLUCOMTR-MCNC: 243 MG/DL (ref 70–99)
HCT VFR BLD AUTO: 24.7 %
HGB BLD-MCNC: 8 G/DL
HGB BLD-MCNC: 8.1 G/DL
HGB BLD-MCNC: 8.2 G/DL
HGB BLD-MCNC: 8.8 G/DL
MAGNESIUM SERPL-MCNC: 2.1 MG/DL (ref 1.6–2.6)
MCH RBC QN AUTO: 30.8 PG (ref 26–34)
MCHC RBC AUTO-ENTMCNC: 32.4 G/DL (ref 31–37)
MCV RBC AUTO: 95 FL
OSMOLALITY SERPL CALC.SUM OF ELEC: 306 MOSM/KG (ref 275–295)
OSMOLALITY SERPL CALC.SUM OF ELEC: 306 MOSM/KG (ref 275–295)
PHOSPHATE SERPL-MCNC: 7.4 MG/DL (ref 2.4–5.1)
PLATELET # BLD AUTO: 142 10(3)UL (ref 150–450)
POTASSIUM SERPL-SCNC: 4 MMOL/L (ref 3.5–5.1)
POTASSIUM SERPL-SCNC: 4 MMOL/L (ref 3.5–5.1)
RBC # BLD AUTO: 2.6 X10(6)UL
SODIUM SERPL-SCNC: 140 MMOL/L (ref 136–145)
SODIUM SERPL-SCNC: 140 MMOL/L (ref 136–145)
UNIT VOLUME: 350 ML
UNIT VOLUME: 350 ML
WBC # BLD AUTO: 7 X10(3) UL (ref 4–11)

## 2023-11-08 PROCEDURE — 0DBH8ZZ EXCISION OF CECUM, VIA NATURAL OR ARTIFICIAL OPENING ENDOSCOPIC: ICD-10-PCS | Performed by: INTERNAL MEDICINE

## 2023-11-08 PROCEDURE — 97166 OT EVAL MOD COMPLEX 45 MIN: CPT

## 2023-11-08 PROCEDURE — 0DBK8ZZ EXCISION OF ASCENDING COLON, VIA NATURAL OR ARTIFICIAL OPENING ENDOSCOPIC: ICD-10-PCS | Performed by: INTERNAL MEDICINE

## 2023-11-08 PROCEDURE — C9113 INJ PANTOPRAZOLE SODIUM, VIA: HCPCS | Performed by: INTERNAL MEDICINE

## 2023-11-08 PROCEDURE — 85027 COMPLETE CBC AUTOMATED: CPT | Performed by: INTERNAL MEDICINE

## 2023-11-08 PROCEDURE — 93306 TTE W/DOPPLER COMPLETE: CPT | Performed by: INTERNAL MEDICINE

## 2023-11-08 PROCEDURE — 85018 HEMOGLOBIN: CPT | Performed by: INTERNAL MEDICINE

## 2023-11-08 PROCEDURE — 97530 THERAPEUTIC ACTIVITIES: CPT

## 2023-11-08 PROCEDURE — 97116 GAIT TRAINING THERAPY: CPT

## 2023-11-08 PROCEDURE — 0DBL8ZZ EXCISION OF TRANSVERSE COLON, VIA NATURAL OR ARTIFICIAL OPENING ENDOSCOPIC: ICD-10-PCS | Performed by: INTERNAL MEDICINE

## 2023-11-08 PROCEDURE — 97535 SELF CARE MNGMENT TRAINING: CPT

## 2023-11-08 PROCEDURE — 82962 GLUCOSE BLOOD TEST: CPT

## 2023-11-08 PROCEDURE — 80069 RENAL FUNCTION PANEL: CPT | Performed by: INTERNAL MEDICINE

## 2023-11-08 PROCEDURE — 83735 ASSAY OF MAGNESIUM: CPT | Performed by: INTERNAL MEDICINE

## 2023-11-08 PROCEDURE — 88305 TISSUE EXAM BY PATHOLOGIST: CPT | Performed by: INTERNAL MEDICINE

## 2023-11-08 PROCEDURE — 80048 BASIC METABOLIC PNL TOTAL CA: CPT | Performed by: INTERNAL MEDICINE

## 2023-11-08 PROCEDURE — 97162 PT EVAL MOD COMPLEX 30 MIN: CPT

## 2023-11-08 PROCEDURE — 0DBP8ZZ EXCISION OF RECTUM, VIA NATURAL OR ARTIFICIAL OPENING ENDOSCOPIC: ICD-10-PCS | Performed by: INTERNAL MEDICINE

## 2023-11-08 RX ORDER — LIDOCAINE HYDROCHLORIDE 10 MG/ML
INJECTION, SOLUTION EPIDURAL; INFILTRATION; INTRACAUDAL; PERINEURAL AS NEEDED
Status: DISCONTINUED | OUTPATIENT
Start: 2023-11-08 | End: 2023-11-08 | Stop reason: SURG

## 2023-11-08 RX ORDER — SODIUM CHLORIDE 9 MG/ML
INJECTION, SOLUTION INTRAVENOUS CONTINUOUS PRN
Status: DISCONTINUED | OUTPATIENT
Start: 2023-11-08 | End: 2023-11-08 | Stop reason: SURG

## 2023-11-08 RX ORDER — NALOXONE HYDROCHLORIDE 0.4 MG/ML
0.08 INJECTION, SOLUTION INTRAMUSCULAR; INTRAVENOUS; SUBCUTANEOUS ONCE AS NEEDED
OUTPATIENT
Start: 2023-11-08 | End: 2023-11-08

## 2023-11-08 RX ORDER — SODIUM CHLORIDE, SODIUM LACTATE, POTASSIUM CHLORIDE, CALCIUM CHLORIDE 600; 310; 30; 20 MG/100ML; MG/100ML; MG/100ML; MG/100ML
INJECTION, SOLUTION INTRAVENOUS CONTINUOUS
OUTPATIENT
Start: 2023-11-08

## 2023-11-08 RX ADMIN — LIDOCAINE HYDROCHLORIDE 50 MG: 10 INJECTION, SOLUTION EPIDURAL; INFILTRATION; INTRACAUDAL; PERINEURAL at 14:56:00

## 2023-11-08 RX ADMIN — SODIUM CHLORIDE: 9 INJECTION, SOLUTION INTRAVENOUS at 14:56:00

## 2023-11-08 NOTE — PHYSICAL THERAPY NOTE
PHYSICAL THERAPY EVALUATION - INPATIENT     Room Number: [facility] ENDO POOL ROOM/[facility] ENDO POOL ROOM  Evaluation Date: 11/8/2023  Type of Evaluation: Initial   Physician Order: PT Eval and Treat    Presenting Problem: anemia, weakness  Co-Morbidities : DM2, ESRD (+dialysis), CVA  Reason for Therapy: Mobility Dysfunction and Discharge Planning    PHYSICAL THERAPY ASSESSMENT     Patient is a 74year old male admitted 11/6/2023 for anemia and weakness. Medical history significant for ESRD (+dialysis), DM2 and CVA. Patient's current functional deficits include impaired bed mobility, transfers, ambulation, balance and strength, which are below the patient's pre-admission status. Patient received supine in bed. RN approved activity. Coordinated session with OT. Therapist educated pt on POC and physiological benefits of mobilization. Patient agreeable to participate. A&O x 3 and noted forgetfulness. Denies pain. Bed mobility: CG supine>sit. Supervision to maintain sitting balance EOB for 5 minutes. Bp sitting EOB; 133/72mmHg. Transfers: CG with RW. Cues for appropriate UE positioning with transitional movements. Instruction on eccentric lowering from stand>sit in chair. SBA to maintain static standing with bilateral UE support on RW. Ambulation: CG for straight path, Min A for turns. Shuffled gait. Decreased emir speed. Cues for safe management of RW and to ambulate within frame of RW. 125ft. Patient in chair at end of session with needs in reach and alarm activated    The patient's Approx Degree of Impairment: 50.57% has been calculated based on documentation in the [facility] '6 clicks' Inpatient Basic Mobility Short Form. Research supports that patients with this level of impairment may benefit from home with PT and 24 hour supervision/care. Patient will benefit from continued IP PT services to address these deficits in preparation for discharge.     DISCHARGE RECOMMENDATIONS  PT Discharge Recommendations: Home with home health PT;24 hour care/supervision    PLAN  PT Treatment Plan: Bed mobility; Body mechanics; Endurance; Energy conservation;Patient education; Family education;Gait training;Range of motion;Strengthening;Transfer training;Balance training  Rehab Potential : Fair  Frequency (Obs): 5x/week       PHYSICAL THERAPY MEDICAL/SOCIAL HISTORY   Problem List  Principal Problem:    Anemia, unspecified type      HOME SITUATION  Home Situation  Type of Home: House  Home Layout: One level  Stairs to Enter : 0  Lives With: Spouse (2 adult daughters)  Drives: Yes  Patient Owned Equipment: Rolling walker;Cane (reports did not use DME prior to admission)  Patient Regularly Uses: None     Prior Level of [place]: Ind in ADL's and IADL's +driving. Reports PRN assist from family for support. SUBJECTIVE  \"I feel ok\"     PHYSICAL THERAPY EXAMINATION     OBJECTIVE  Precautions: Bed/chair alarm;Limb alert - left  Fall Risk: High fall risk    PAIN ASSESSMENT  Rating: Unable to rate  Location: bilateral hips  Management Techniques: Activity promotion; Body mechanics;Repositioning    COGNITION  Overall Cognitive Status:  Impaired  Following Commands:  follows multistep commands with increased time  Safety Judgement:  decreased awareness of need for safety  Awareness of Errors:  decreased awareness of errors   Awareness of Deficits:  decreased awareness of deficits    RANGE OF MOTION AND STRENGTH ASSESSMENT  Upper extremity ROM and strength are within functional limits   Lower extremity ROM is within functional limits   Lower extremity strength is impaired bilateral LE's: 4/5    BALANCE  Static Sitting: Normal  Dynamic Sitting: Good  Static Standing: Fair +  Dynamic Standing: Fair      ACTIVITY TOLERANCE  Pulse: 65  Heart Rate Source: Monitor     BP: 133/72  BP Location: Right arm  BP Method: Automatic  Patient Position: Sitting    O2 WALK  Oxygen Therapy  SPO2% on Room Air at Rest: 95  SPO2% Ambulation on Room Air: 96    AM-PAC '6-Clicks' INPATIENT SHORT FORM - BASIC MOBILITY  How much difficulty does the patient currently have. .. Patient Difficulty: Turning over in bed (including adjusting bedclothes, sheets and blankets)?: A Little   Patient Difficulty: Sitting down on and standing up from a chair with arms (e.g., wheelchair, bedside commode, etc.): A Little   Patient Difficulty: Moving from lying on back to sitting on the side of the bed?: A Little   How much help from another person does the patient currently need. .. Help from Another: Moving to and from a bed to a chair (including a wheelchair)?: A Little   Help from Another: Need to walk in hospital room?: A Little   Help from Another: Climbing 3-5 steps with a railing?: A Lot     AM-PAC Score:  Raw Score: 17   Approx Degree of Impairment: 50.57%   Standardized Score (AM-PAC Scale): 42.13   CMS Modifier (G-Code): CK    FUNCTIONAL ABILITY STATUS  Functional Mobility/Gait Assessment  Gait Assistance: Contact guard assist;Minimum assistance  Distance (ft): 125ft  Assistive Device: Rolling walker  Pattern: Shuffle (poor management of RW, decreased emir speed)    Exercise/Education Provided:  Bed mobility  Body mechanics  Energy conservation  Functional activity tolerated  Gait training  Posture  ROM  Strengthening  Transfer training    Patient End of Session: Up in chair;Needs met;Call light within reach; Alarm set;RN aware of session/findings    CURRENT GOALS    Goals to be met by: 11/22/23  Patient Goal Patient's self-stated goal is: return to PLOF   Goal #1 Patient is able to demonstrate supine - sit EOB @ level: independent     Goal #1   Current Status    Goal #2 Patient is able to demonstrate transfers Sit to/from Stand at assistance level: supervision with walker - rolling     Goal #2  Current Status    Goal #3 Patient is able to ambulate 300 feet with assist device: walker - rolling at assistance level: supervision   Goal #3   Current Status    Goal #5 Patient to demonstrate independence with home activity/exercise instructions provided to patient in preparation for discharge.    Goal #5   Current Status      Gait Training: 15 minutes  Therapeutic Activity: 10 minutes

## 2023-11-08 NOTE — PLAN OF CARE
Patient vital signs stable. Plan for colonoscopy in AM, consent signed, GoLytely prep started. PRN Tylenol given for leg pain. No acute changes. Problem: Patient Centered Care  Goal: Patient preferences are identified and integrated in the patient's plan of care  Description: Interventions:  - What would you like us to know as we care for you?  From home with wife and daughter.  - Provide timely, complete, and accurate information to patient/family  - Incorporate patient and family knowledge, values, beliefs, and cultural backgrounds into the planning and delivery of care  - Encourage patient/family to participate in care and decision-making at the level they choose  - Honor patient and family perspectives and choices  Outcome: Progressing      Problem: RESPIRATORY - ADULT  Goal: Achieves optimal ventilation and oxygenation  Description: INTERVENTIONS:  - Assess for changes in respiratory status  - Assess for changes in mentation and behavior  - Position to facilitate oxygenation and minimize respiratory effort  - Oxygen supplementation based on oxygen saturation or ABGs  - Provide Smoking Cessation handout, if applicable  - Encourage broncho-pulmonary hygiene including cough, deep breathe, Incentive Spirometry  - Assess the need for suctioning and perform as needed  - Assess and instruct to report SOB or any respiratory difficulty  - Respiratory Therapy support as indicated  - Manage/alleviate anxiety  - Monitor for signs/symptoms of CO2 retention  Outcome: Progressing     Problem: GASTROINTESTINAL - ADULT  Goal: Maintains or returns to baseline bowel function  Description: INTERVENTIONS:  - Assess bowel function  - Maintain adequate hydration with IV or PO as ordered and tolerated  - Evaluate effectiveness of GI medications  - Encourage mobilization and activity  - Obtain nutritional consult as needed  - Establish a toileting routine/schedule  - Consider collaborating with pharmacy to review patient's medication profile  Outcome: Progressing     Problem: METABOLIC/FLUID AND ELECTROLYTES - ADULT  Goal: Glucose maintained within prescribed range  Description: INTERVENTIONS:  - Monitor Blood Glucose as ordered  - Assess for signs and symptoms of hyperglycemia and hypoglycemia  - Administer ordered medications to maintain glucose within target range  - Assess barriers to adequate nutritional intake and initiate nutrition consult as needed  - Instruct patient on self management of diabetes  Outcome: Progressing  Goal: Electrolytes maintained within normal limits  Description: INTERVENTIONS:  - Monitor labs and rhythm and assess patient for signs and symptoms of electrolyte imbalances  - Administer electrolyte replacement as ordered  - Monitor response to electrolyte replacements, including rhythm and repeat lab results as appropriate  - Fluid restriction as ordered  - Instruct patient on fluid and nutrition restrictions as appropriate  Outcome: Progressing  Goal: Hemodynamic stability and optimal renal function maintained  Description: INTERVENTIONS:  - Monitor labs and assess for signs and symptoms of volume excess or deficit  - Monitor intake, output and patient weight  - Monitor urine specific gravity, serum osmolarity and serum sodium as indicated or ordered  - Monitor response to interventions for patient's volume status, including labs, urine output, blood pressure (other measures as available)  - Encourage oral intake as appropriate  - Instruct patient on fluid and nutrition restrictions as appropriate  Outcome: Progressing     Problem: HEMATOLOGIC - ADULT  Goal: Maintains hematologic stability  Description: INTERVENTIONS  - Assess for signs and symptoms of bleeding or hemorrhage  - Monitor labs and vital signs for trends  - Administer supportive blood products/factors, fluids and medications as ordered and appropriate  - Administer supportive blood products/factors as ordered and appropriate  Outcome: Progressing Problem: PAIN - ADULT  Goal: Verbalizes/displays adequate comfort level or patient's stated pain goal  Description: INTERVENTIONS:  - Encourage pt to monitor pain and request assistance  - Assess pain using appropriate pain scale  - Administer analgesics based on type and severity of pain and evaluate response  - Implement non-pharmacological measures as appropriate and evaluate response  - Consider cultural and social influences on pain and pain management  - Manage/alleviate anxiety  - Utilize distraction and/or relaxation techniques  - Monitor for opioid side effects  - Notify MD/LIP if interventions unsuccessful or patient reports new pain  - Anticipate increased pain with activity and pre-medicate as appropriate  Outcome: Progressing     Problem: SAFETY ADULT - FALL  Goal: Free from fall injury  Description: INTERVENTIONS:  - Assess pt frequently for physical needs  - Identify cognitive and physical deficits and behaviors that affect risk of falls.   - [place] fall precautions as indicated by assessment.  - Educate pt/family on patient safety including physical limitations  - Instruct pt to call for assistance with activity based on assessment  - Modify environment to reduce risk of injury  - Provide assistive devices as appropriate  - Consider OT/PT consult to assist with strengthening/mobility  - Encourage toileting schedule  Outcome: Progressing     Problem: DISCHARGE PLANNING  Goal: Discharge to home or other facility with appropriate resources  Description: INTERVENTIONS:  - Identify barriers to discharge w/pt and caregiver  - Include patient/family/discharge partner in discharge planning  - Arrange for needed discharge resources and transportation as appropriate  - Identify discharge learning needs (meds, wound care, etc)  - Arrange for interpreters to assist at discharge as needed  - Consider post-discharge preferences of patient/family/discharge partner  - Complete POLST form as appropriate  - Assess patient's ability to be responsible for managing their own health  - Refer to Case Management Department for coordinating discharge planning if the patient needs post-hospital services based on physician/LIP order or complex needs related to functional status, cognitive ability or social support system  Outcome: Progressing

## 2023-11-08 NOTE — PLAN OF CARE
Patient is Aox4 but forgetful at times, vitals stable on room air and meds given as ordered. Golyte given and patient went for a colonoscopy today. Dialysis ordered and will be done today. Frequent rounding done on pt and needs attended to.    Problem: Patient Centered Care  Goal: Patient preferences are identified and integrated in the patient's plan of care  Description: Interventions:  - Provide timely, complete, and accurate information to patient/family  - Incorporate patient and family knowledge, values, beliefs, and cultural backgrounds into the planning and delivery of care  - Encourage patient/family to participate in care and decision-making at the level they choose  - Honor patient and family perspectives and choices  Outcome: Progressing     Problem: RESPIRATORY - ADULT  Goal: Achieves optimal ventilation and oxygenation  Description: INTERVENTIONS:  - Assess for changes in respiratory status  - Assess for changes in mentation and behavior  - Position to facilitate oxygenation and minimize respiratory effort  - Oxygen supplementation based on oxygen saturation or ABGs  - Provide Smoking Cessation handout, if applicable  - Encourage broncho-pulmonary hygiene including cough, deep breathe, Incentive Spirometry  - Assess the need for suctioning and perform as needed  - Assess and instruct to report SOB or any respiratory difficulty  - Respiratory Therapy support as indicated  - Manage/alleviate anxiety  - Monitor for signs/symptoms of CO2 retention  Outcome: Progressing     Problem: GASTROINTESTINAL - ADULT  Goal: Maintains or returns to baseline bowel function  Description: INTERVENTIONS:  - Assess bowel function  - Maintain adequate hydration with IV or PO as ordered and tolerated  - Evaluate effectiveness of GI medications  - Encourage mobilization and activity  - Obtain nutritional consult as needed  - Establish a toileting routine/schedule  - Consider collaborating with pharmacy to review patient's medication profile  Outcome: Progressing     Problem: METABOLIC/FLUID AND ELECTROLYTES - ADULT  Goal: Glucose maintained within prescribed range  Description: INTERVENTIONS:  - Monitor Blood Glucose as ordered  - Assess for signs and symptoms of hyperglycemia and hypoglycemia  - Administer ordered medications to maintain glucose within target range  - Assess barriers to adequate nutritional intake and initiate nutrition consult as needed  - Instruct patient on self management of diabetes  Outcome: Progressing  Goal: Electrolytes maintained within normal limits  Description: INTERVENTIONS:  - Monitor labs and rhythm and assess patient for signs and symptoms of electrolyte imbalances  - Administer electrolyte replacement as ordered  - Monitor response to electrolyte replacements, including rhythm and repeat lab results as appropriate  - Fluid restriction as ordered  - Instruct patient on fluid and nutrition restrictions as appropriate  Outcome: Progressing  Goal: Hemodynamic stability and optimal renal function maintained  Description: INTERVENTIONS:  - Monitor labs and assess for signs and symptoms of volume excess or deficit  - Monitor intake, output and patient weight  - Monitor urine specific gravity, serum osmolarity and serum sodium as indicated or ordered  - Monitor response to interventions for patient's volume status, including labs, urine output, blood pressure (other measures as available)  - Encourage oral intake as appropriate  - Instruct patient on fluid and nutrition restrictions as appropriate  Outcome: Progressing     Problem: HEMATOLOGIC - ADULT  Goal: Maintains hematologic stability  Description: INTERVENTIONS  - Assess for signs and symptoms of bleeding or hemorrhage  - Monitor labs and vital signs for trends  - Administer supportive blood products/factors, fluids and medications as ordered and appropriate  - Administer supportive blood products/factors as ordered and appropriate  Outcome: Progressing     Problem: PAIN - ADULT  Goal: Verbalizes/displays adequate comfort level or patient's stated pain goal  Description: INTERVENTIONS:  - Encourage pt to monitor pain and request assistance  - Assess pain using appropriate pain scale  - Administer analgesics based on type and severity of pain and evaluate response  - Implement non-pharmacological measures as appropriate and evaluate response  - Consider cultural and social influences on pain and pain management  - Manage/alleviate anxiety  - Utilize distraction and/or relaxation techniques  - Monitor for opioid side effects  - Notify MD/LIP if interventions unsuccessful or patient reports new pain  - Anticipate increased pain with activity and pre-medicate as appropriate  Outcome: Progressing     Problem: SAFETY ADULT - FALL  Goal: Free from fall injury  Description: INTERVENTIONS:  - Assess pt frequently for physical needs  - Identify cognitive and physical deficits and behaviors that affect risk of falls.   - [place] fall precautions as indicated by assessment.  - Educate pt/family on patient safety including physical limitations  - Instruct pt to call for assistance with activity based on assessment  - Modify environment to reduce risk of injury  - Provide assistive devices as appropriate  - Consider OT/PT consult to assist with strengthening/mobility  - Encourage toileting schedule  Outcome: Progressing     Problem: DISCHARGE PLANNING  Goal: Discharge to home or other facility with appropriate resources  Description: INTERVENTIONS:  - Identify barriers to discharge w/pt and caregiver  - Include patient/family/discharge partner in discharge planning  - Arrange for needed discharge resources and transportation as appropriate  - Identify discharge learning needs (meds, wound care, etc)  - Arrange for interpreters to assist at discharge as needed  - Consider post-discharge preferences of patient/family/discharge partner  - Complete POLST form as appropriate  - Assess patient's ability to be responsible for managing their own health  - Refer to Case Management Department for coordinating discharge planning if the patient needs post-hospital services based on physician/LIP order or complex needs related to functional status, cognitive ability or social support system  Outcome: Progressing

## 2023-11-08 NOTE — DISCHARGE INSTRUCTIONS
Sometimes managing your health at home requires assistance. The [facility] team has recognized your preference to use [address] ([address]), Phone: [phone]. A representative from the home health agency will contact you or your family to schedule your first visit.

## 2023-11-08 NOTE — ANESTHESIA POSTPROCEDURE EVALUATION
Patient: Tessa Yu    Procedure Summary       Date: 11/08/23 Room / Location: [address] [place] [address] ENDOSCOPY 05 / [address] [place] [address] ENDOSCOPY    Anesthesia Start: 9001 Anesthesia Stop: 1471    Procedure: COLONOSCOPY Diagnosis: (colon polyps)    Surgeons: Cyril Gonzalez MD Anesthesiologist: Siena Nino MD    Anesthesia Type: general ASA Status: 3            Anesthesia Type: general    Vitals Value Taken Time   /50 11/08/23 1540   Temp 98 11/08/23 1542   Pulse 59 11/08/23 1541   Resp 15 11/08/23 1541   SpO2 99 % 11/08/23 1541   Vitals shown include unfiled device data.     [address] [place] [address] AN Post Evaluation:   Patient Evaluated in PACU  Patient Participation: complete - patient participated  Level of Consciousness: awake  Pain Score: 0  Pain Management: adequate  Airway Patency:patent  Yes    Cardiovascular Status: acceptable  Respiratory Status: acceptable  Postoperative Hydration acceptable      Nidia Nunez CRNA  11/8/2023 3:42 PM

## 2023-11-08 NOTE — CM/SW NOTE
List of [facility] agencies provided to patient at bedside. [facility] ([facility]) is chosen agency at [employer], agency reserved in [place]. Plan: Home w/family with [facility] ([address]) and [facility] in [place] for outpatient HD pending medical clearance.     ERIC Min    [phone]

## 2023-11-08 NOTE — OPERATIVE REPORT
Colonoscopy Operative Report    Dona Grace Patient Status:  Inpatient    1950 MRN [id number]   Location [facility] ENDOSCOPY LAB SUITES Attending Cecelia Anaya DO   Hosp Day #   2 PCP Lottie Urbina MD     Pre-Operative Diagnosis: Anemia, possible GI bleed    Post-Operative Diagnosis:  There was a large flat lateral spreading polyp in the cecum adjacent to the IC valve measuring approximately 3 cm in length. This had benign appearance without any ulceration noted. This was attempted to be removed after raising with Eleview. We could not get a good lift. This was partially removed on the lateral aspect. The rest of the polyp was not able to be grasped with a snare despite multiple attempts. This was due to the edematous mucosa and difficult positioning. There was an additional ascending colon sessile polyp 3 mm fully removed with cold forceps  There was a transverse colon sessile polyp 8 mm removed with hot snare. Retrieved. There was a rectal sessile polyp 2 mm fully removed cold forceps. A few left-sided diverticuli. Somewhat edematous colon mucosa throughout  Normal terminal ileum  Small internal hemorrhoids    Procedure Performed: COLONOSCOPY with injection/hot snare and cold forceps    Informed Consent: Informed consent for both the procedure and sedation were obtained from the patient. The potentially life-threatening complications of sedation, bleeding,  perforation, transfusion or repeat endoscopy  were reviewed along with the possible need for hospitalization, surgical management, transfusion or repeat endoscopy should one of these complications arise. The patient understands and is agreeable to proceed. Sedation Type: MAC-Patient received sedation with monitored anesthesia provided by an anesthesiologist  Moderate Sedation Time: None. Deep sedation provided by anesthesia.   Cecum Withdrawal Time:  35 min  Date of previous colonoscopy: not sure    Procedure Description: The patient was placed in the left lateral decubitus position. After careful digital rectal examination, the Adult colonoscope was inserted into the rectum and advanced to the level of the cecum under direct visualization. The cecum was identified by landmarks, including the appendiceal orifice and ileoceccal valve. Careful examination of the entire colon was performed during withdrawal of the endoscope. The scope was withdrawn to the rectum and retroflexion was performed. The patient tolerated the procedure well with no immediate complications. The patient was transferred to the recovery area in stable condition. Quality of Preparation: Adequate  [place] Bowel Prep Scale:  Excellent  Findings:   1. There was a large flat lateral spreading polyp in the cecum adjacent to the IC valve measuring approximately 3 cm in length. This had benign appearance without any ulceration noted. This was attempted to be removed after raising with Eleview. We could not get a good lift. This was partially removed on the lateral aspect. The rest of the polyp was not able to be grasped with a snare despite multiple attempts. This was due to the edematous mucosa and difficult positioning. 2. There was an additional ascending colon sessile polyp 3 mm fully removed with cold forceps  3. There was a transverse colon sessile polyp 8 mm removed with hot snare. Retrieved. 4. There was a rectal sessile polyp 2 mm fully removed cold forceps. 5. A few left-sided diverticuli. Somewhat edematous colon mucosa throughout  6. Normal terminal ileum  7. Small internal hemorrhoids    Recommendations: Follow-up pathology results  Repeat colonoscopy within 6 months to remove the remainder of the polyp. No obvious source of GI bleeding noted  Discharge: The patient was given an after visit summary detailing the procedure, findings, recommendations and follow up plans.      Cullen LEAVITT Mu Perdomo MD  11/8/2023  3:38 PM

## 2023-11-09 LAB
ALBUMIN SERPL-MCNC: 3.4 G/DL (ref 3.2–4.8)
ANION GAP SERPL CALC-SCNC: 11 MMOL/L (ref 0–18)
ANION GAP SERPL CALC-SCNC: 11 MMOL/L (ref 0–18)
BUN BLD-MCNC: 52 MG/DL (ref 9–23)
BUN BLD-MCNC: 52 MG/DL (ref 9–23)
BUN/CREAT SERPL: 7 (ref 10–20)
BUN/CREAT SERPL: 7 (ref 10–20)
CALCIUM BLD-MCNC: 7.9 MG/DL (ref 8.7–10.4)
CALCIUM BLD-MCNC: 7.9 MG/DL (ref 8.7–10.4)
CHLORIDE SERPL-SCNC: 105 MMOL/L (ref 98–112)
CHLORIDE SERPL-SCNC: 105 MMOL/L (ref 98–112)
CO2 SERPL-SCNC: 26 MMOL/L (ref 21–32)
CO2 SERPL-SCNC: 26 MMOL/L (ref 21–32)
CREAT BLD-MCNC: 7.4 MG/DL
CREAT BLD-MCNC: 7.4 MG/DL
DEPRECATED RDW RBC AUTO: 57.7 FL (ref 35.1–46.3)
EGFRCR SERPLBLD CKD-EPI 2021: 7 ML/MIN/1.73M2 (ref 60–?)
EGFRCR SERPLBLD CKD-EPI 2021: 7 ML/MIN/1.73M2 (ref 60–?)
ERYTHROCYTE [DISTWIDTH] IN BLOOD BY AUTOMATED COUNT: 16.9 % (ref 11–15)
GLUCOSE BLD-MCNC: 152 MG/DL (ref 70–99)
GLUCOSE BLD-MCNC: 152 MG/DL (ref 70–99)
GLUCOSE BLDC GLUCOMTR-MCNC: 118 MG/DL (ref 70–99)
GLUCOSE BLDC GLUCOMTR-MCNC: 147 MG/DL (ref 70–99)
GLUCOSE BLDC GLUCOMTR-MCNC: 243 MG/DL (ref 70–99)
GLUCOSE BLDC GLUCOMTR-MCNC: 315 MG/DL (ref 70–99)
GLUCOSE BLDC GLUCOMTR-MCNC: 337 MG/DL (ref 70–99)
GLUCOSE BLDC GLUCOMTR-MCNC: 362 MG/DL (ref 70–99)
HCT VFR BLD AUTO: 23.9 %
HGB BLD-MCNC: 7.6 G/DL
HGB BLD-MCNC: 7.6 G/DL
HGB BLD-MCNC: 7.9 G/DL
HGB BLD-MCNC: 8.2 G/DL
HGB BLD-MCNC: 9 G/DL
MAGNESIUM SERPL-MCNC: 2.1 MG/DL (ref 1.6–2.6)
MCH RBC QN AUTO: 30.4 PG (ref 26–34)
MCHC RBC AUTO-ENTMCNC: 31.8 G/DL (ref 31–37)
MCV RBC AUTO: 95.6 FL
OSMOLALITY SERPL CALC.SUM OF ELEC: 311 MOSM/KG (ref 275–295)
OSMOLALITY SERPL CALC.SUM OF ELEC: 311 MOSM/KG (ref 275–295)
PHOSPHATE SERPL-MCNC: 8.4 MG/DL (ref 2.4–5.1)
PLATELET # BLD AUTO: 133 10(3)UL (ref 150–450)
POTASSIUM SERPL-SCNC: 4 MMOL/L (ref 3.5–5.1)
POTASSIUM SERPL-SCNC: 4 MMOL/L (ref 3.5–5.1)
RBC # BLD AUTO: 2.5 X10(6)UL
SODIUM SERPL-SCNC: 142 MMOL/L (ref 136–145)
SODIUM SERPL-SCNC: 142 MMOL/L (ref 136–145)
WBC # BLD AUTO: 6.5 X10(3) UL (ref 4–11)

## 2023-11-09 PROCEDURE — 85018 HEMOGLOBIN: CPT | Performed by: INTERNAL MEDICINE

## 2023-11-09 PROCEDURE — 90935 HEMODIALYSIS ONE EVALUATION: CPT

## 2023-11-09 PROCEDURE — 85018 HEMOGLOBIN: CPT | Performed by: STUDENT IN AN ORGANIZED HEALTH CARE EDUCATION/TRAINING PROGRAM

## 2023-11-09 PROCEDURE — 82962 GLUCOSE BLOOD TEST: CPT

## 2023-11-09 PROCEDURE — 80069 RENAL FUNCTION PANEL: CPT | Performed by: INTERNAL MEDICINE

## 2023-11-09 PROCEDURE — C9113 INJ PANTOPRAZOLE SODIUM, VIA: HCPCS | Performed by: INTERNAL MEDICINE

## 2023-11-09 PROCEDURE — 93010 ELECTROCARDIOGRAM REPORT: CPT | Performed by: STUDENT IN AN ORGANIZED HEALTH CARE EDUCATION/TRAINING PROGRAM

## 2023-11-09 PROCEDURE — 93005 ELECTROCARDIOGRAM TRACING: CPT

## 2023-11-09 PROCEDURE — 85027 COMPLETE CBC AUTOMATED: CPT | Performed by: INTERNAL MEDICINE

## 2023-11-09 PROCEDURE — 83735 ASSAY OF MAGNESIUM: CPT | Performed by: INTERNAL MEDICINE

## 2023-11-09 NOTE — PLAN OF CARE
Patient is alert and oriented X4. HD done today, per HD RN pt educated on fluid overload and potassium levels and fluid intake-education reinforced by this RN at the bedside, 1.6L removed. Pt returned from HD low BP, pt reporting dizziness, MD aware. STAT HBG ordered. EKG done, CV MD aware. Bed low, locked, and all safety measures in place. Patient communicates understanding of fall precautions. Frequent rounding, call light within reach. Problem: Patient Centered Care  Goal: Patient preferences are identified and integrated in the patient's plan of care  Description: Interventions:  - What would you like us to know as we care for you?  From home with wife  - Provide timely, complete, and accurate information to patient/family  - Incorporate patient and family knowledge, values, beliefs, and cultural backgrounds into the planning and delivery of care  - Encourage patient/family to participate in care and decision-making at the level they choose  - Honor patient and family perspectives and choices  Outcome: Progressing     Problem: Patient/Family Goals  Goal: Patient/Family Long Term Goal  Description: Patient's Long Term Goal:    Interventions:  - See additional Care Plan goals for specific interventions  Outcome: Progressing  Goal: Patient/Family Short Term Goal  Description: Patient's Short Term Goal:     Interventions:   - See additional Care Plan goals for specific interventions  Outcome: Progressing     Problem: RESPIRATORY - ADULT  Goal: Achieves optimal ventilation and oxygenation  Description: INTERVENTIONS:  - Assess for changes in respiratory status  - Assess for changes in mentation and behavior  - Position to facilitate oxygenation and minimize respiratory effort  - Oxygen supplementation based on oxygen saturation or ABGs  - Provide Smoking Cessation handout, if applicable  - Encourage broncho-pulmonary hygiene including cough, deep breathe, Incentive Spirometry  - Assess the need for suctioning and perform as needed  - Assess and instruct to report SOB or any respiratory difficulty  - Respiratory Therapy support as indicated  - Manage/alleviate anxiety  - Monitor for signs/symptoms of CO2 retention  Outcome: Progressing     Problem: GASTROINTESTINAL - ADULT  Goal: Maintains or returns to baseline bowel function  Description: INTERVENTIONS:  - Assess bowel function  - Maintain adequate hydration with IV or PO as ordered and tolerated  - Evaluate effectiveness of GI medications  - Encourage mobilization and activity  - Obtain nutritional consult as needed  - Establish a toileting routine/schedule  - Consider collaborating with pharmacy to review patient's medication profile  Outcome: Progressing     Problem: METABOLIC/FLUID AND ELECTROLYTES - ADULT  Goal: Glucose maintained within prescribed range  Description: INTERVENTIONS:  - Monitor Blood Glucose as ordered  - Assess for signs and symptoms of hyperglycemia and hypoglycemia  - Administer ordered medications to maintain glucose within target range  - Assess barriers to adequate nutritional intake and initiate nutrition consult as needed  - Instruct patient on self management of diabetes  Outcome: Progressing  Goal: Electrolytes maintained within normal limits  Description: INTERVENTIONS:  - Monitor labs and rhythm and assess patient for signs and symptoms of electrolyte imbalances  - Administer electrolyte replacement as ordered  - Monitor response to electrolyte replacements, including rhythm and repeat lab results as appropriate  - Fluid restriction as ordered  - Instruct patient on fluid and nutrition restrictions as appropriate  Outcome: Progressing  Goal: Hemodynamic stability and optimal renal function maintained  Description: INTERVENTIONS:  - Monitor labs and assess for signs and symptoms of volume excess or deficit  - Monitor intake, output and patient weight  - Monitor urine specific gravity, serum osmolarity and serum sodium as indicated or ordered  - Monitor response to interventions for patient's volume status, including labs, urine output, blood pressure (other measures as available)  - Encourage oral intake as appropriate  - Instruct patient on fluid and nutrition restrictions as appropriate  Outcome: Progressing     Problem: HEMATOLOGIC - ADULT  Goal: Maintains hematologic stability  Description: INTERVENTIONS  - Assess for signs and symptoms of bleeding or hemorrhage  - Monitor labs and vital signs for trends  - Administer supportive blood products/factors, fluids and medications as ordered and appropriate  - Administer supportive blood products/factors as ordered and appropriate  Outcome: Progressing     Problem: PAIN - ADULT  Goal: Verbalizes/displays adequate comfort level or patient's stated pain goal  Description: INTERVENTIONS:  - Encourage pt to monitor pain and request assistance  - Assess pain using appropriate pain scale  - Administer analgesics based on type and severity of pain and evaluate response  - Implement non-pharmacological measures as appropriate and evaluate response  - Consider cultural and social influences on pain and pain management  - Manage/alleviate anxiety  - Utilize distraction and/or relaxation techniques  - Monitor for opioid side effects  - Notify MD/LIP if interventions unsuccessful or patient reports new pain  - Anticipate increased pain with activity and pre-medicate as appropriate  Outcome: Progressing     Problem: SAFETY ADULT - FALL  Goal: Free from fall injury  Description: INTERVENTIONS:  - Assess pt frequently for physical needs  - Identify cognitive and physical deficits and behaviors that affect risk of falls.   - [place] fall precautions as indicated by assessment.  - Educate pt/family on patient safety including physical limitations  - Instruct pt to call for assistance with activity based on assessment  - Modify environment to reduce risk of injury  - Provide assistive devices as appropriate  - Consider OT/PT consult to assist with strengthening/mobility  - Encourage toileting schedule  Outcome: Progressing     Problem: DISCHARGE PLANNING  Goal: Discharge to home or other facility with appropriate resources  Description: INTERVENTIONS:  - Identify barriers to discharge w/pt and caregiver  - Include patient/family/discharge partner in discharge planning  - Arrange for needed discharge resources and transportation as appropriate  - Identify discharge learning needs (meds, wound care, etc)  - Arrange for interpreters to assist at discharge as needed  - Consider post-discharge preferences of patient/family/discharge partner  - Complete POLST form as appropriate  - Assess patient's ability to be responsible for managing their own health  - Refer to Case Management Department for coordinating discharge planning if the patient needs post-hospital services based on physician/LIP order or complex needs related to functional status, cognitive ability or social support system  Outcome: Progressing

## 2023-11-09 NOTE — PLAN OF CARE
Patient vital signs stable overnight. PRN Tylenol given for leg pain. Unable to complete dialysis this evening due to difficult access per Dialysis RN, nephrologist informed, dialysis to be re attempted in the morning. No acute changes. Problem: Patient Centered Care  Goal: Patient preferences are identified and integrated in the patient's plan of care  Description: Interventions:  - What would you like us to know as we care for you?  From home with wife and daughter.  - Provide timely, complete, and accurate information to patient/family  - Incorporate patient and family knowledge, values, beliefs, and cultural backgrounds into the planning and delivery of care  - Encourage patient/family to participate in care and decision-making at the level they choose  - Honor patient and family perspectives and choices  Outcome: Progressing      Problem: RESPIRATORY - ADULT  Goal: Achieves optimal ventilation and oxygenation  Description: INTERVENTIONS:  - Assess for changes in respiratory status  - Assess for changes in mentation and behavior  - Position to facilitate oxygenation and minimize respiratory effort  - Oxygen supplementation based on oxygen saturation or ABGs  - Provide Smoking Cessation handout, if applicable  - Encourage broncho-pulmonary hygiene including cough, deep breathe, Incentive Spirometry  - Assess the need for suctioning and perform as needed  - Assess and instruct to report SOB or any respiratory difficulty  - Respiratory Therapy support as indicated  - Manage/alleviate anxiety  - Monitor for signs/symptoms of CO2 retention  Outcome: Progressing     Problem: GASTROINTESTINAL - ADULT  Goal: Maintains or returns to baseline bowel function  Description: INTERVENTIONS:  - Assess bowel function  - Maintain adequate hydration with IV or PO as ordered and tolerated  - Evaluate effectiveness of GI medications  - Encourage mobilization and activity  - Obtain nutritional consult as needed  - Establish a toileting routine/schedule  - Consider collaborating with pharmacy to review patient's medication profile  Outcome: Progressing     Problem: METABOLIC/FLUID AND ELECTROLYTES - ADULT  Goal: Glucose maintained within prescribed range  Description: INTERVENTIONS:  - Monitor Blood Glucose as ordered  - Assess for signs and symptoms of hyperglycemia and hypoglycemia  - Administer ordered medications to maintain glucose within target range  - Assess barriers to adequate nutritional intake and initiate nutrition consult as needed  - Instruct patient on self management of diabetes  Outcome: Progressing  Goal: Electrolytes maintained within normal limits  Description: INTERVENTIONS:  - Monitor labs and rhythm and assess patient for signs and symptoms of electrolyte imbalances  - Administer electrolyte replacement as ordered  - Monitor response to electrolyte replacements, including rhythm and repeat lab results as appropriate  - Fluid restriction as ordered  - Instruct patient on fluid and nutrition restrictions as appropriate  Outcome: Progressing  Goal: Hemodynamic stability and optimal renal function maintained  Description: INTERVENTIONS:  - Monitor labs and assess for signs and symptoms of volume excess or deficit  - Monitor intake, output and patient weight  - Monitor urine specific gravity, serum osmolarity and serum sodium as indicated or ordered  - Monitor response to interventions for patient's volume status, including labs, urine output, blood pressure (other measures as available)  - Encourage oral intake as appropriate  - Instruct patient on fluid and nutrition restrictions as appropriate  Outcome: Progressing     Problem: HEMATOLOGIC - ADULT  Goal: Maintains hematologic stability  Description: INTERVENTIONS  - Assess for signs and symptoms of bleeding or hemorrhage  - Monitor labs and vital signs for trends  - Administer supportive blood products/factors, fluids and medications as ordered and appropriate  - Administer supportive blood products/factors as ordered and appropriate  Outcome: Progressing     Problem: PAIN - ADULT  Goal: Verbalizes/displays adequate comfort level or patient's stated pain goal  Description: INTERVENTIONS:  - Encourage pt to monitor pain and request assistance  - Assess pain using appropriate pain scale  - Administer analgesics based on type and severity of pain and evaluate response  - Implement non-pharmacological measures as appropriate and evaluate response  - Consider cultural and social influences on pain and pain management  - Manage/alleviate anxiety  - Utilize distraction and/or relaxation techniques  - Monitor for opioid side effects  - Notify MD/LIP if interventions unsuccessful or patient reports new pain  - Anticipate increased pain with activity and pre-medicate as appropriate  Outcome: Progressing     Problem: SAFETY ADULT - FALL  Goal: Free from fall injury  Description: INTERVENTIONS:  - Assess pt frequently for physical needs  - Identify cognitive and physical deficits and behaviors that affect risk of falls.   - [place] fall precautions as indicated by assessment.  - Educate pt/family on patient safety including physical limitations  - Instruct pt to call for assistance with activity based on assessment  - Modify environment to reduce risk of injury  - Provide assistive devices as appropriate  - Consider OT/PT consult to assist with strengthening/mobility  - Encourage toileting schedule  Outcome: Progressing     Problem: DISCHARGE PLANNING  Goal: Discharge to home or other facility with appropriate resources  Description: INTERVENTIONS:  - Identify barriers to discharge w/pt and caregiver  - Include patient/family/discharge partner in discharge planning  - Arrange for needed discharge resources and transportation as appropriate  - Identify discharge learning needs (meds, wound care, etc)  - Arrange for interpreters to assist at discharge as needed  - Consider post-discharge preferences of patient/family/discharge partner  - Complete POLST form as appropriate  - Assess patient's ability to be responsible for managing their own health  - Refer to Case Management Department for coordinating discharge planning if the patient needs post-hospital services based on physician/LIP order or complex needs related to functional status, cognitive ability or social support system  Outcome: Progressing

## 2023-11-09 NOTE — PHYSICAL THERAPY NOTE
Chart reviewed and Attempted treatment pt is at dialysis at this time. Will follow up later as appropriate and schedule permitting. RN aware.

## 2023-11-10 LAB
ALBUMIN SERPL-MCNC: 3.6 G/DL (ref 3.2–4.8)
ANION GAP SERPL CALC-SCNC: 10 MMOL/L (ref 0–18)
ANION GAP SERPL CALC-SCNC: 10 MMOL/L (ref 0–18)
ATRIAL RATE: 65 BPM
BUN BLD-MCNC: 34 MG/DL (ref 9–23)
BUN BLD-MCNC: 34 MG/DL (ref 9–23)
BUN/CREAT SERPL: 5.9 (ref 10–20)
BUN/CREAT SERPL: 5.9 (ref 10–20)
CALCIUM BLD-MCNC: 7.8 MG/DL (ref 8.7–10.4)
CALCIUM BLD-MCNC: 7.8 MG/DL (ref 8.7–10.4)
CHLORIDE SERPL-SCNC: 104 MMOL/L (ref 98–112)
CHLORIDE SERPL-SCNC: 104 MMOL/L (ref 98–112)
CO2 SERPL-SCNC: 26 MMOL/L (ref 21–32)
CO2 SERPL-SCNC: 26 MMOL/L (ref 21–32)
CREAT BLD-MCNC: 5.72 MG/DL
CREAT BLD-MCNC: 5.72 MG/DL
DEPRECATED RDW RBC AUTO: 55.8 FL (ref 35.1–46.3)
EGFRCR SERPLBLD CKD-EPI 2021: 10 ML/MIN/1.73M2 (ref 60–?)
EGFRCR SERPLBLD CKD-EPI 2021: 10 ML/MIN/1.73M2 (ref 60–?)
ERYTHROCYTE [DISTWIDTH] IN BLOOD BY AUTOMATED COUNT: 16.6 % (ref 11–15)
GLUCOSE BLD-MCNC: 151 MG/DL (ref 70–99)
GLUCOSE BLD-MCNC: 151 MG/DL (ref 70–99)
GLUCOSE BLDC GLUCOMTR-MCNC: 169 MG/DL (ref 70–99)
GLUCOSE BLDC GLUCOMTR-MCNC: 202 MG/DL (ref 70–99)
GLUCOSE BLDC GLUCOMTR-MCNC: 230 MG/DL (ref 70–99)
GLUCOSE BLDC GLUCOMTR-MCNC: 256 MG/DL (ref 70–99)
HCT VFR BLD AUTO: 24.8 %
HGB BLD-MCNC: 7.6 G/DL
HGB BLD-MCNC: 7.9 G/DL
HGB BLD-MCNC: 8 G/DL
HGB BLD-MCNC: 8 G/DL
MAGNESIUM SERPL-MCNC: 1.9 MG/DL (ref 1.6–2.6)
MCH RBC QN AUTO: 30.9 PG (ref 26–34)
MCHC RBC AUTO-ENTMCNC: 32.3 G/DL (ref 31–37)
MCV RBC AUTO: 95.8 FL
OSMOLALITY SERPL CALC.SUM OF ELEC: 301 MOSM/KG (ref 275–295)
OSMOLALITY SERPL CALC.SUM OF ELEC: 301 MOSM/KG (ref 275–295)
P AXIS: 46 DEGREES
P-R INTERVAL: 220 MS
PHOSPHATE SERPL-MCNC: 6.5 MG/DL (ref 2.4–5.1)
PLATELET # BLD AUTO: 126 10(3)UL (ref 150–450)
POTASSIUM SERPL-SCNC: 4 MMOL/L (ref 3.5–5.1)
POTASSIUM SERPL-SCNC: 4 MMOL/L (ref 3.5–5.1)
Q-T INTERVAL: 482 MS
QRS DURATION: 146 MS
QTC CALCULATION (BEZET): 501 MS
R AXIS: -52 DEGREES
RBC # BLD AUTO: 2.59 X10(6)UL
SODIUM SERPL-SCNC: 140 MMOL/L (ref 136–145)
SODIUM SERPL-SCNC: 140 MMOL/L (ref 136–145)
T AXIS: 116 DEGREES
VENTRICULAR RATE: 65 BPM
WBC # BLD AUTO: 6.4 X10(3) UL (ref 4–11)

## 2023-11-10 PROCEDURE — 85027 COMPLETE CBC AUTOMATED: CPT | Performed by: INTERNAL MEDICINE

## 2023-11-10 PROCEDURE — C9113 INJ PANTOPRAZOLE SODIUM, VIA: HCPCS | Performed by: INTERNAL MEDICINE

## 2023-11-10 PROCEDURE — 85018 HEMOGLOBIN: CPT | Performed by: INTERNAL MEDICINE

## 2023-11-10 PROCEDURE — 80069 RENAL FUNCTION PANEL: CPT | Performed by: INTERNAL MEDICINE

## 2023-11-10 PROCEDURE — 82962 GLUCOSE BLOOD TEST: CPT

## 2023-11-10 PROCEDURE — 83735 ASSAY OF MAGNESIUM: CPT | Performed by: INTERNAL MEDICINE

## 2023-11-10 NOTE — PLAN OF CARE
Problem: RESPIRATORY - ADULT  Goal: Achieves optimal ventilation and oxygenation  Description: INTERVENTIONS:  - Assess for changes in respiratory status  - Assess for changes in mentation and behavior  - Position to facilitate oxygenation and minimize respiratory effort  - Oxygen supplementation based on oxygen saturation or ABGs  - Provide Smoking Cessation handout, if applicable  - Encourage broncho-pulmonary hygiene including cough, deep breathe, Incentive Spirometry  - Assess the need for suctioning and perform as needed  - Assess and instruct to report SOB or any respiratory difficulty  - Respiratory Therapy support as indicated  - Manage/alleviate anxiety  - Monitor for signs/symptoms of CO2 retention  Outcome: Progressing     Problem: GASTROINTESTINAL - ADULT  Goal: Maintains or returns to baseline bowel function  Description: INTERVENTIONS:  - Assess bowel function  - Maintain adequate hydration with IV or PO as ordered and tolerated  - Evaluate effectiveness of GI medications  - Encourage mobilization and activity  - Obtain nutritional consult as needed  - Establish a toileting routine/schedule  - Consider collaborating with pharmacy to review patient's medication profile  Outcome: Progressing     Problem: METABOLIC/FLUID AND ELECTROLYTES - ADULT  Goal: Glucose maintained within prescribed range  Description: INTERVENTIONS:  - Monitor Blood Glucose as ordered  - Assess for signs and symptoms of hyperglycemia and hypoglycemia  - Administer ordered medications to maintain glucose within target range  - Assess barriers to adequate nutritional intake and initiate nutrition consult as needed  - Instruct patient on self management of diabetes  Outcome: Progressing  Goal: Electrolytes maintained within normal limits  Description: INTERVENTIONS:  - Monitor labs and rhythm and assess patient for signs and symptoms of electrolyte imbalances  - Administer electrolyte replacement as ordered  - Monitor response to electrolyte replacements, including rhythm and repeat lab results as appropriate  - Fluid restriction as ordered  - Instruct patient on fluid and nutrition restrictions as appropriate  Outcome: Progressing  Goal: Hemodynamic stability and optimal renal function maintained  Description: INTERVENTIONS:  - Monitor labs and assess for signs and symptoms of volume excess or deficit  - Monitor intake, output and patient weight  - Monitor urine specific gravity, serum osmolarity and serum sodium as indicated or ordered  - Monitor response to interventions for patient's volume status, including labs, urine output, blood pressure (other measures as available)  - Encourage oral intake as appropriate  - Instruct patient on fluid and nutrition restrictions as appropriate  Outcome: Progressing     Problem: HEMATOLOGIC - ADULT  Goal: Maintains hematologic stability  Description: INTERVENTIONS  - Assess for signs and symptoms of bleeding or hemorrhage  - Monitor labs and vital signs for trends  - Administer supportive blood products/factors, fluids and medications as ordered and appropriate  - Administer supportive blood products/factors as ordered and appropriate  Outcome: Progressing     Problem: PAIN - ADULT  Goal: Verbalizes/displays adequate comfort level or patient's stated pain goal  Description: INTERVENTIONS:  - Encourage pt to monitor pain and request assistance  - Assess pain using appropriate pain scale  - Administer analgesics based on type and severity of pain and evaluate response  - Implement non-pharmacological measures as appropriate and evaluate response  - Consider cultural and social influences on pain and pain management  - Manage/alleviate anxiety  - Utilize distraction and/or relaxation techniques  - Monitor for opioid side effects  - Notify MD/LIP if interventions unsuccessful or patient reports new pain  - Anticipate increased pain with activity and pre-medicate as appropriate  Outcome: Progressing Problem: SAFETY ADULT - FALL  Goal: Free from fall injury  Description: INTERVENTIONS:  - Assess pt frequently for physical needs  - Identify cognitive and physical deficits and behaviors that affect risk of falls. - [place] fall precautions as indicated by assessment.  - Educate pt/family on patient safety including physical limitations  - Instruct pt to call for assistance with activity based on assessment  - Modify environment to reduce risk of injury  - Provide assistive devices as appropriate  - Consider OT/PT consult to assist with strengthening/mobility  - Encourage toileting schedule  Outcome: Progressing     Problem: DISCHARGE PLANNING  Goal: Discharge to home or other facility with appropriate resources  Description: INTERVENTIONS:  - Identify barriers to discharge w/pt and caregiver  - Include patient/family/discharge partner in discharge planning  - Arrange for needed discharge resources and transportation as appropriate  - Identify discharge learning needs (meds, wound care, etc)  - Arrange for interpreters to assist at discharge as needed  - Consider post-discharge preferences of patient/family/discharge partner  - Complete POLST form as appropriate  - Assess patient's ability to be responsible for managing their own health  - Refer to Case Management Department for coordinating discharge planning if the patient needs post-hospital services based on physician/LIP order or complex needs related to functional status, cognitive ability or social support system  Outcome: Progressing   No acute changes. Fall precautions and safety maintained. Call light and all belongings in reach.

## 2023-11-11 LAB
ANION GAP SERPL CALC-SCNC: 8 MMOL/L (ref 0–18)
BUN BLD-MCNC: 44 MG/DL (ref 9–23)
BUN/CREAT SERPL: 6.3 (ref 10–20)
CALCIUM BLD-MCNC: 7.7 MG/DL (ref 8.7–10.4)
CHLORIDE SERPL-SCNC: 105 MMOL/L (ref 98–112)
CO2 SERPL-SCNC: 26 MMOL/L (ref 21–32)
CREAT BLD-MCNC: 6.94 MG/DL
DEPRECATED RDW RBC AUTO: 58.3 FL (ref 35.1–46.3)
EGFRCR SERPLBLD CKD-EPI 2021: 8 ML/MIN/1.73M2 (ref 60–?)
ERYTHROCYTE [DISTWIDTH] IN BLOOD BY AUTOMATED COUNT: 16.7 % (ref 11–15)
GLUCOSE BLD-MCNC: 186 MG/DL (ref 70–99)
GLUCOSE BLDC GLUCOMTR-MCNC: 133 MG/DL (ref 70–99)
GLUCOSE BLDC GLUCOMTR-MCNC: 146 MG/DL (ref 70–99)
GLUCOSE BLDC GLUCOMTR-MCNC: 246 MG/DL (ref 70–99)
GLUCOSE BLDC GLUCOMTR-MCNC: 255 MG/DL (ref 70–99)
HCT VFR BLD AUTO: 24.1 %
HGB BLD-MCNC: 7.4 G/DL
HGB BLD-MCNC: 7.7 G/DL
HGB BLD-MCNC: 8.4 G/DL
MCH RBC QN AUTO: 30.9 PG (ref 26–34)
MCHC RBC AUTO-ENTMCNC: 32 G/DL (ref 31–37)
MCV RBC AUTO: 96.8 FL
OSMOLALITY SERPL CALC.SUM OF ELEC: 304 MOSM/KG (ref 275–295)
PLATELET # BLD AUTO: 129 10(3)UL (ref 150–450)
POTASSIUM SERPL-SCNC: 4.8 MMOL/L (ref 3.5–5.1)
RBC # BLD AUTO: 2.49 X10(6)UL
SODIUM SERPL-SCNC: 139 MMOL/L (ref 136–145)
WBC # BLD AUTO: 8 X10(3) UL (ref 4–11)

## 2023-11-11 PROCEDURE — 85027 COMPLETE CBC AUTOMATED: CPT | Performed by: STUDENT IN AN ORGANIZED HEALTH CARE EDUCATION/TRAINING PROGRAM

## 2023-11-11 PROCEDURE — 80048 BASIC METABOLIC PNL TOTAL CA: CPT | Performed by: STUDENT IN AN ORGANIZED HEALTH CARE EDUCATION/TRAINING PROGRAM

## 2023-11-11 PROCEDURE — 97116 GAIT TRAINING THERAPY: CPT

## 2023-11-11 PROCEDURE — 97530 THERAPEUTIC ACTIVITIES: CPT

## 2023-11-11 PROCEDURE — C9113 INJ PANTOPRAZOLE SODIUM, VIA: HCPCS | Performed by: INTERNAL MEDICINE

## 2023-11-11 PROCEDURE — 90935 HEMODIALYSIS ONE EVALUATION: CPT

## 2023-11-11 PROCEDURE — 82962 GLUCOSE BLOOD TEST: CPT

## 2023-11-11 PROCEDURE — 85018 HEMOGLOBIN: CPT | Performed by: INTERNAL MEDICINE

## 2023-11-11 NOTE — PLAN OF CARE
Patient is alert and oriented X4. VSS at this time. Plan for HD tomorrow per MD. Bed low, locked, and all safety measures in place. Patient communicates understanding of fall precautions. Frequent rounding, call light within reach.        Problem: Patient Centered Care  Goal: Patient preferences are identified and integrated in the patient's plan of care  Description: Interventions:  - What would you like us to know as we care for you?   - Provide timely, complete, and accurate information to patient/family  - Incorporate patient and family knowledge, values, beliefs, and cultural backgrounds into the planning and delivery of care  - Encourage patient/family to participate in care and decision-making at the level they choose  - Honor patient and family perspectives and choices  Outcome: Progressing     Problem: Patient/Family Goals  Goal: Patient/Family Long Term Goal  Description: Patient's Long Term Goal:     Interventions:  - See additional Care Plan goals for specific interventions  Outcome: Progressing  Goal: Patient/Family Short Term Goal  Description: Patient's Short Term Goal:     Interventions:   - See additional Care Plan goals for specific interventions  Outcome: Progressing     Problem: RESPIRATORY - ADULT  Goal: Achieves optimal ventilation and oxygenation  Description: INTERVENTIONS:  - Assess for changes in respiratory status  - Assess for changes in mentation and behavior  - Position to facilitate oxygenation and minimize respiratory effort  - Oxygen supplementation based on oxygen saturation or ABGs  - Provide Smoking Cessation handout, if applicable  - Encourage broncho-pulmonary hygiene including cough, deep breathe, Incentive Spirometry  - Assess the need for suctioning and perform as needed  - Assess and instruct to report SOB or any respiratory difficulty  - Respiratory Therapy support as indicated  - Manage/alleviate anxiety  - Monitor for signs/symptoms of CO2 retention  Outcome: Progressing Problem: GASTROINTESTINAL - ADULT  Goal: Maintains or returns to baseline bowel function  Description: INTERVENTIONS:  - Assess bowel function  - Maintain adequate hydration with IV or PO as ordered and tolerated  - Evaluate effectiveness of GI medications  - Encourage mobilization and activity  - Obtain nutritional consult as needed  - Establish a toileting routine/schedule  - Consider collaborating with pharmacy to review patient's medication profile  Outcome: Progressing     Problem: METABOLIC/FLUID AND ELECTROLYTES - ADULT  Goal: Glucose maintained within prescribed range  Description: INTERVENTIONS:  - Monitor Blood Glucose as ordered  - Assess for signs and symptoms of hyperglycemia and hypoglycemia  - Administer ordered medications to maintain glucose within target range  - Assess barriers to adequate nutritional intake and initiate nutrition consult as needed  - Instruct patient on self management of diabetes  Outcome: Progressing  Goal: Electrolytes maintained within normal limits  Description: INTERVENTIONS:  - Monitor labs and rhythm and assess patient for signs and symptoms of electrolyte imbalances  - Administer electrolyte replacement as ordered  - Monitor response to electrolyte replacements, including rhythm and repeat lab results as appropriate  - Fluid restriction as ordered  - Instruct patient on fluid and nutrition restrictions as appropriate  Outcome: Progressing  Goal: Hemodynamic stability and optimal renal function maintained  Description: INTERVENTIONS:  - Monitor labs and assess for signs and symptoms of volume excess or deficit  - Monitor intake, output and patient weight  - Monitor urine specific gravity, serum osmolarity and serum sodium as indicated or ordered  - Monitor response to interventions for patient's volume status, including labs, urine output, blood pressure (other measures as available)  - Encourage oral intake as appropriate  - Instruct patient on fluid and nutrition restrictions as appropriate  Outcome: Progressing     Problem: HEMATOLOGIC - ADULT  Goal: Maintains hematologic stability  Description: INTERVENTIONS  - Assess for signs and symptoms of bleeding or hemorrhage  - Monitor labs and vital signs for trends  - Administer supportive blood products/factors, fluids and medications as ordered and appropriate  - Administer supportive blood products/factors as ordered and appropriate  Outcome: Progressing     Problem: PAIN - ADULT  Goal: Verbalizes/displays adequate comfort level or patient's stated pain goal  Description: INTERVENTIONS:  - Encourage pt to monitor pain and request assistance  - Assess pain using appropriate pain scale  - Administer analgesics based on type and severity of pain and evaluate response  - Implement non-pharmacological measures as appropriate and evaluate response  - Consider cultural and social influences on pain and pain management  - Manage/alleviate anxiety  - Utilize distraction and/or relaxation techniques  - Monitor for opioid side effects  - Notify MD/LIP if interventions unsuccessful or patient reports new pain  - Anticipate increased pain with activity and pre-medicate as appropriate  Outcome: Progressing     Problem: SAFETY ADULT - FALL  Goal: Free from fall injury  Description: INTERVENTIONS:  - Assess pt frequently for physical needs  - Identify cognitive and physical deficits and behaviors that affect risk of falls.   - [place] fall precautions as indicated by assessment.  - Educate pt/family on patient safety including physical limitations  - Instruct pt to call for assistance with activity based on assessment  - Modify environment to reduce risk of injury  - Provide assistive devices as appropriate  - Consider OT/PT consult to assist with strengthening/mobility  - Encourage toileting schedule  Outcome: Progressing     Problem: DISCHARGE PLANNING  Goal: Discharge to home or other facility with appropriate resources  Description: INTERVENTIONS:  - Identify barriers to discharge w/pt and caregiver  - Include patient/family/discharge partner in discharge planning  - Arrange for needed discharge resources and transportation as appropriate  - Identify discharge learning needs (meds, wound care, etc)  - Arrange for interpreters to assist at discharge as needed  - Consider post-discharge preferences of patient/family/discharge partner  - Complete POLST form as appropriate  - Assess patient's ability to be responsible for managing their own health  - Refer to Case Management Department for coordinating discharge planning if the patient needs post-hospital services based on physician/LIP order or complex needs related to functional status, cognitive ability or social support system  Outcome: Progressing

## 2023-11-11 NOTE — PLAN OF CARE
Problem: Patient Centered Care  Goal: Patient preferences are identified and integrated in the patient's plan of care  Description: Interventions:  - What would you like us to know as we care for you?  From home with wife  - Provide timely, complete, and accurate information to patient/family  - Incorporate patient and family knowledge, values, beliefs, and cultural backgrounds into the planning and delivery of care  - Encourage patient/family to participate in care and decision-making at the level they choose  - Honor patient and family perspectives and choices  Outcome: Progressing     Problem: Patient/Family Goals  Goal: Patient/Family Long Term Goal  Description: Patient's Long Term Goal: Discharge from the hospital    Interventions:  - Monitor vital signs  - Monitor appropriate labs  - Monitor blood glucose levels  - Pain management  - Administer medications per order  - Dialysis per order  - Follow MD orders  - Diagnostics per order  - Update / inform patient and family on plan of care  - Discharge planning  - See additional Care Plan goals for specific interventions  Outcome: Progressing  Goal: Patient/Family Short Term Goal  Description: Patient's Short Term Goal: Improve dark bowel movements     Interventions:   - Monitor vital signs  - Monitor appropriate labs  - Monitor blood glucose levels  - Pain management  - Administer medications per order  - Dialysis per order  - Follow MD orders  - Diagnostics per order  - Update / inform patient and family on plan of care  - See additional Care Plan goals for specific interventions  Outcome: Progressing     Problem: RESPIRATORY - ADULT  Goal: Achieves optimal ventilation and oxygenation  Description: INTERVENTIONS:  - Assess for changes in respiratory status  - Assess for changes in mentation and behavior  - Position to facilitate oxygenation and minimize respiratory effort  - Oxygen supplementation based on oxygen saturation or ABGs  - Provide Smoking Cessation handout, if applicable  - Encourage broncho-pulmonary hygiene including cough, deep breathe, Incentive Spirometry  - Assess the need for suctioning and perform as needed  - Assess and instruct to report SOB or any respiratory difficulty  - Respiratory Therapy support as indicated  - Manage/alleviate anxiety  - Monitor for signs/symptoms of CO2 retention  Outcome: Progressing     Problem: GASTROINTESTINAL - ADULT  Goal: Maintains or returns to baseline bowel function  Description: INTERVENTIONS:  - Assess bowel function  - Maintain adequate hydration with IV or PO as ordered and tolerated  - Evaluate effectiveness of GI medications  - Encourage mobilization and activity  - Obtain nutritional consult as needed  - Establish a toileting routine/schedule  - Consider collaborating with pharmacy to review patient's medication profile  Outcome: Progressing     Problem: METABOLIC/FLUID AND ELECTROLYTES - ADULT  Goal: Glucose maintained within prescribed range  Description: INTERVENTIONS:  - Monitor Blood Glucose as ordered  - Assess for signs and symptoms of hyperglycemia and hypoglycemia  - Administer ordered medications to maintain glucose within target range  - Assess barriers to adequate nutritional intake and initiate nutrition consult as needed  - Instruct patient on self management of diabetes  Outcome: Progressing  Goal: Electrolytes maintained within normal limits  Description: INTERVENTIONS:  - Monitor labs and rhythm and assess patient for signs and symptoms of electrolyte imbalances  - Administer electrolyte replacement as ordered  - Monitor response to electrolyte replacements, including rhythm and repeat lab results as appropriate  - Fluid restriction as ordered  - Instruct patient on fluid and nutrition restrictions as appropriate  Outcome: Progressing  Goal: Hemodynamic stability and optimal renal function maintained  Description: INTERVENTIONS:  - Monitor labs and assess for signs and symptoms of volume excess or deficit  - Monitor intake, output and patient weight  - Monitor urine specific gravity, serum osmolarity and serum sodium as indicated or ordered  - Monitor response to interventions for patient's volume status, including labs, urine output, blood pressure (other measures as available)  - Encourage oral intake as appropriate  - Instruct patient on fluid and nutrition restrictions as appropriate  Outcome: Progressing     Problem: HEMATOLOGIC - ADULT  Goal: Maintains hematologic stability  Description: INTERVENTIONS  - Assess for signs and symptoms of bleeding or hemorrhage  - Monitor labs and vital signs for trends  - Administer supportive blood products/factors, fluids and medications as ordered and appropriate  - Administer supportive blood products/factors as ordered and appropriate  Outcome: Progressing  Goal: Free from bleeding injury  Description: (Example usage: patient with low platelets)  INTERVENTIONS:  - Avoid intramuscular injections, enemas and rectal medication administration  - Ensure safe mobilization of patient  - Hold pressure on venipuncture sites to achieve adequate hemostasis  - Assess for signs and symptoms of internal bleeding  - Monitor lab trends  - Patient is to report abnormal signs of bleeding to staff  - Avoid use of toothpicks and dental floss  - Use electric shaver for shaving  - Use soft bristle tooth brush  - Limit straining and forceful nose blowing  Outcome: Progressing     Problem: PAIN - ADULT  Goal: Verbalizes/displays adequate comfort level or patient's stated pain goal  Description: INTERVENTIONS:  - Encourage pt to monitor pain and request assistance  - Assess pain using appropriate pain scale  - Administer analgesics based on type and severity of pain and evaluate response  - Implement non-pharmacological measures as appropriate and evaluate response  - Consider cultural and social influences on pain and pain management  - Manage/alleviate anxiety  - Utilize distraction and/or relaxation techniques  - Monitor for opioid side effects  - Notify MD/LIP if interventions unsuccessful or patient reports new pain  - Anticipate increased pain with activity and pre-medicate as appropriate  Outcome: Progressing     Problem: SAFETY ADULT - FALL  Goal: Free from fall injury  Description: INTERVENTIONS:  - Assess pt frequently for physical needs  - Identify cognitive and physical deficits and behaviors that affect risk of falls.   - [place] fall precautions as indicated by assessment.  - Educate pt/family on patient safety including physical limitations  - Instruct pt to call for assistance with activity based on assessment  - Modify environment to reduce risk of injury  - Provide assistive devices as appropriate  - Consider OT/PT consult to assist with strengthening/mobility  - Encourage toileting schedule  Outcome: Progressing     Problem: DISCHARGE PLANNING  Goal: Discharge to home or other facility with appropriate resources  Description: INTERVENTIONS:  - Identify barriers to discharge w/pt and caregiver  - Include patient/family/discharge partner in discharge planning  - Arrange for needed discharge resources and transportation as appropriate  - Identify discharge learning needs (meds, wound care, etc)  - Arrange for interpreters to assist at discharge as needed  - Consider post-discharge preferences of patient/family/discharge partner  - Complete POLST form as appropriate  - Assess patient's ability to be responsible for managing their own health  - Refer to Case Management Department for coordinating discharge planning if the patient needs post-hospital services based on physician/LIP order or complex needs related to functional status, cognitive ability or social support system  Outcome: Progressing     Problem: CARDIOVASCULAR - ADULT  Goal: Maintains optimal cardiac output and hemodynamic stability  Description: INTERVENTIONS:  - Monitor vital signs, rhythm, and trends  - Monitor for bleeding, hypotension and signs of decreased cardiac output  - Evaluate effectiveness of vasoactive medications to optimize hemodynamic stability  - Monitor arterial and/or venous puncture sites for bleeding and/or hematoma  - Assess quality of pulses, skin color and temperature  - Assess for signs of decreased coronary artery perfusion - ex. Angina  - Evaluate fluid balance, assess for edema, trend weights  Outcome: Progressing  Goal: Absence of cardiac arrhythmias or at baseline  Description: INTERVENTIONS:  - Continuous cardiac monitoring, monitor vital signs, obtain 12 lead EKG if indicated  - Evaluate effectiveness of antiarrhythmic and heart rate control medications as ordered  - Initiate emergency measures for life threatening arrhythmias  - Monitor electrolytes and administer replacement therapy as ordered  Outcome: Progressing      Monitoring vital signs- stable at this time. Left arm precautions maintained- left AV fistula noted with thrill and bruit. No acute changes noted at this moment. Safety and fall precautions maintained- bed alarm on, bed locked in lowest position, call light within reach. Frequent rounding by nursing staff.

## 2023-11-11 NOTE — PLAN OF CARE
Patient is alert and oriented x's 4, vital signs are stable. Patient denies pain/ discomfort. Fall and safety precautions are in place, call light within reach. Hemodialysis treatment completed, 500ml removed. Patient educated on maintaining electrolyte balance by this RN, importance of fluid balance. Frequent rounding performed by RN and POCT. Problem: Patient Centered Care  Goal: Patient preferences are identified and integrated in the patient's plan of care  Description: Interventions:  - What would you like us to know as we care for you?  From home with wife  - Provide timely, complete, and accurate information to patient/family  - Incorporate patient and family knowledge, values, beliefs, and cultural backgrounds into the planning and delivery of care  - Encourage patient/family to participate in care and decision-making at the level they choose  - Honor patient and family perspectives and choices  Outcome: Progressing     Problem: Patient/Family Goals  Goal: Patient/Family Long Term Goal  Description: Patient's Long Term Goal: Discharge from the hospital    Interventions:  - Monitor vital signs  - Monitor appropriate labs  - Monitor blood glucose levels  - Pain management  - Administer medications per order  - Dialysis per order  - Follow MD orders  - Diagnostics per order  - Update / inform patient and family on plan of care  - Discharge planning  - See additional Care Plan goals for specific interventions  Outcome: Progressing  Goal: Patient/Family Short Term Goal  Description: Patient's Short Term Goal: Improve dark bowel movements     Interventions:   - Monitor vital signs  - Monitor appropriate labs  - Monitor blood glucose levels  - Pain management  - Administer medications per order  - Dialysis per order  - Follow MD orders  - Diagnostics per order  - Update / inform patient and family on plan of care  - See additional Care Plan goals for specific interventions  Outcome: Progressing Problem: RESPIRATORY - ADULT  Goal: Achieves optimal ventilation and oxygenation  Description: INTERVENTIONS:  - Assess for changes in respiratory status  - Assess for changes in mentation and behavior  - Position to facilitate oxygenation and minimize respiratory effort  - Oxygen supplementation based on oxygen saturation or ABGs  - Provide Smoking Cessation handout, if applicable  - Encourage broncho-pulmonary hygiene including cough, deep breathe, Incentive Spirometry  - Assess the need for suctioning and perform as needed  - Assess and instruct to report SOB or any respiratory difficulty  - Respiratory Therapy support as indicated  - Manage/alleviate anxiety  - Monitor for signs/symptoms of CO2 retention  Outcome: Progressing     Problem: GASTROINTESTINAL - ADULT  Goal: Maintains or returns to baseline bowel function  Description: INTERVENTIONS:  - Assess bowel function  - Maintain adequate hydration with IV or PO as ordered and tolerated  - Evaluate effectiveness of GI medications  - Encourage mobilization and activity  - Obtain nutritional consult as needed  - Establish a toileting routine/schedule  - Consider collaborating with pharmacy to review patient's medication profile  Outcome: Progressing     Problem: METABOLIC/FLUID AND ELECTROLYTES - ADULT  Goal: Glucose maintained within prescribed range  Description: INTERVENTIONS:  - Monitor Blood Glucose as ordered  - Assess for signs and symptoms of hyperglycemia and hypoglycemia  - Administer ordered medications to maintain glucose within target range  - Assess barriers to adequate nutritional intake and initiate nutrition consult as needed  - Instruct patient on self management of diabetes  Outcome: Progressing  Goal: Electrolytes maintained within normal limits  Description: INTERVENTIONS:  - Monitor labs and rhythm and assess patient for signs and symptoms of electrolyte imbalances  - Administer electrolyte replacement as ordered  - Monitor response to electrolyte replacements, including rhythm and repeat lab results as appropriate  - Fluid restriction as ordered  - Instruct patient on fluid and nutrition restrictions as appropriate  Outcome: Progressing  Goal: Hemodynamic stability and optimal renal function maintained  Description: INTERVENTIONS:  - Monitor labs and assess for signs and symptoms of volume excess or deficit  - Monitor intake, output and patient weight  - Monitor urine specific gravity, serum osmolarity and serum sodium as indicated or ordered  - Monitor response to interventions for patient's volume status, including labs, urine output, blood pressure (other measures as available)  - Encourage oral intake as appropriate  - Instruct patient on fluid and nutrition restrictions as appropriate  Outcome: Progressing     Problem: HEMATOLOGIC - ADULT  Goal: Maintains hematologic stability  Description: INTERVENTIONS  - Assess for signs and symptoms of bleeding or hemorrhage  - Monitor labs and vital signs for trends  - Administer supportive blood products/factors, fluids and medications as ordered and appropriate  - Administer supportive blood products/factors as ordered and appropriate  Outcome: Progressing  Goal: Free from bleeding injury  Description: (Example usage: patient with low platelets)  INTERVENTIONS:  - Avoid intramuscular injections, enemas and rectal medication administration  - Ensure safe mobilization of patient  - Hold pressure on venipuncture sites to achieve adequate hemostasis  - Assess for signs and symptoms of internal bleeding  - Monitor lab trends  - Patient is to report abnormal signs of bleeding to staff  - Avoid use of toothpicks and dental floss  - Use electric shaver for shaving  - Use soft bristle tooth brush  - Limit straining and forceful nose blowing  Outcome: Progressing     Problem: PAIN - ADULT  Goal: Verbalizes/displays adequate comfort level or patient's stated pain goal  Description: INTERVENTIONS:  - Encourage pt to monitor pain and request assistance  - Assess pain using appropriate pain scale  - Administer analgesics based on type and severity of pain and evaluate response  - Implement non-pharmacological measures as appropriate and evaluate response  - Consider cultural and social influences on pain and pain management  - Manage/alleviate anxiety  - Utilize distraction and/or relaxation techniques  - Monitor for opioid side effects  - Notify MD/LIP if interventions unsuccessful or patient reports new pain  - Anticipate increased pain with activity and pre-medicate as appropriate  Outcome: Progressing     Problem: SAFETY ADULT - FALL  Goal: Free from fall injury  Description: INTERVENTIONS:  - Assess pt frequently for physical needs  - Identify cognitive and physical deficits and behaviors that affect risk of falls.   - [place] fall precautions as indicated by assessment.  - Educate pt/family on patient safety including physical limitations  - Instruct pt to call for assistance with activity based on assessment  - Modify environment to reduce risk of injury  - Provide assistive devices as appropriate  - Consider OT/PT consult to assist with strengthening/mobility  - Encourage toileting schedule  Outcome: Progressing     Problem: DISCHARGE PLANNING  Goal: Discharge to home or other facility with appropriate resources  Description: INTERVENTIONS:  - Identify barriers to discharge w/pt and caregiver  - Include patient/family/discharge partner in discharge planning  - Arrange for needed discharge resources and transportation as appropriate  - Identify discharge learning needs (meds, wound care, etc)  - Arrange for interpreters to assist at discharge as needed  - Consider post-discharge preferences of patient/family/discharge partner  - Complete POLST form as appropriate  - Assess patient's ability to be responsible for managing their own health  - Refer to Case Management Department for coordinating discharge planning if the patient needs post-hospital services based on physician/LIP order or complex needs related to functional status, cognitive ability or social support system  Outcome: Progressing     Problem: CARDIOVASCULAR - ADULT  Goal: Maintains optimal cardiac output and hemodynamic stability  Description: INTERVENTIONS:  - Monitor vital signs, rhythm, and trends  - Monitor for bleeding, hypotension and signs of decreased cardiac output  - Evaluate effectiveness of vasoactive medications to optimize hemodynamic stability  - Monitor arterial and/or venous puncture sites for bleeding and/or hematoma  - Assess quality of pulses, skin color and temperature  - Assess for signs of decreased coronary artery perfusion - ex.  Angina  - Evaluate fluid balance, assess for edema, trend weights  Outcome: Progressing  Goal: Absence of cardiac arrhythmias or at baseline  Description: INTERVENTIONS:  - Continuous cardiac monitoring, monitor vital signs, obtain 12 lead EKG if indicated  - Evaluate effectiveness of antiarrhythmic and heart rate control medications as ordered  - Initiate emergency measures for life threatening arrhythmias  - Monitor electrolytes and administer replacement therapy as ordered  Outcome: Progressing     Problem: SKIN/TISSUE INTEGRITY - ADULT  Goal: Skin integrity remains intact  Description: INTERVENTIONS  - Assess and document risk factors for pressure ulcer development  - Assess and document skin integrity  - Monitor for areas of redness and/or skin breakdown  - Initiate interventions, skin care algorithm/standards of care as needed  Outcome: Progressing

## 2023-11-11 NOTE — PHYSICAL THERAPY NOTE
PHYSICAL THERAPY TREATMENT NOTE - INPATIENT     Room Number: 189/301-Q       Presenting Problem: anemia, weakness  Co-Morbidities : DM2, ESRD (+dialysis), CVA    Problem List  Principal Problem:    Anemia, unspecified type      PHYSICAL THERAPY ASSESSMENT     RN approved PT session and pt willing to work with therapist. Pt sitting in chair, instructed on seated thera exs with ble;s to promote functional ability. Sit to stand with CGA and standing silva for 1 min beofre mobilization. Pt amb 150 ft with RW CGA and provided cues for upright posture and amb closer to RW. Pt with good carryover. After amb pt position in bed due pt scheduled for HD. Pt with SBA for sit to supine and reposition in bed. All needs in reach and PCT present in room. The patient's Approx Degree of Impairment: 50.57% has been calculated based on documentation in the [facility] '6 clicks' Inpatient Basic Mobility Short Form. Research supports that patients with this level of impairment may benefit from home with [facility] PT and 24 hrs of care/supervision. Eduardo Brito DISCHARGE RECOMMENDATIONS  PT Discharge Recommendations: Home with home health PT;24 hour care/supervision     PLAN  PT Treatment Plan: Bed mobility; Body mechanics; Patient education;Gait training;Strengthening;Transfer training;Balance training  Frequency (Obs): 5x/week    SUBJECTIVE  I am OK    OBJECTIVE  Precautions: Bed/chair alarm;Limb alert - left    WEIGHT BEARING RESTRICTION                PAIN ASSESSMENT   Ratin  Location: b knees  Management Techniques: Activity promotion; Body mechanics; Relaxation;Repositioning    BALANCE  Static Sitting: Normal  Dynamic Sitting: Good  Static Standing: Fair +  Dynamic Standing: Fair    ACTIVITY TOLERANCE                          O2 WALK       AM-PAC '6-Clicks' INPATIENT SHORT FORM - BASIC MOBILITY  How much difficulty does the patient currently have. ..   Patient Difficulty: Turning over in bed (including adjusting bedclothes, sheets and blankets)?: A Little Patient Difficulty: Sitting down on and standing up from a chair with arms (e.g., wheelchair, bedside commode, etc.): A Little   Patient Difficulty: Moving from lying on back to sitting on the side of the bed?: A Little   How much help from another person does the patient currently need. .. Help from Another: Moving to and from a bed to a chair (including a wheelchair)?: A Little   Help from Another: Need to walk in hospital room?: A Little   Help from Another: Climbing 3-5 steps with a railing?: A Lot     AM-PAC Score:  Raw Score: 17   Approx Degree of Impairment: 50.57%   Standardized Score (AM-PAC Scale): 42.13   CMS Modifier (G-Code): CK    FUNCTIONAL ABILITY STATUS  Functional Mobility/Gait Assessment  Gait Assistance: Contact guard assist  Distance (ft): 150 ft  Assistive Device: Rolling walker  Pattern: Shuffle    Additional information:     THERAPEUTIC EXERCISES  Lower Extremity Alternating marching  Ankle pumps  Knee extension     Position Sitting       Patient End of Session: In bed; With [facility] staff;Needs met;RN aware of session/findings;Call light within reach; All patient questions and concerns addressed    CURRENT GOALS   Goals to be met by: 11/22/23  Patient Goal Patient's self-stated goal is: return to PLOF   Goal #1 Patient is able to demonstrate supine - sit EOB @ level: independent     Goal #1   Current Status SBA   Goal #2 Patient is able to demonstrate transfers Sit to/from Stand at assistance level: supervision with walker - rolling     Goal #2  Current Status SBA   Goal #3 Patient is able to ambulate 300 feet with assist device: walker - rolling at assistance level: supervision   Goal #3   Current Status Pt amb 150 ft with RW CGA   Goal #5 Patient to demonstrate independence with home activity/exercise instructions provided to patient in preparation for discharge.    Goal #5   Current Status In progress     Gait Training: 15 minutes  Therapeutic Activity: 10 minutes

## 2023-11-12 LAB
ALBUMIN SERPL-MCNC: 3.6 G/DL (ref 3.2–4.8)
ANION GAP SERPL CALC-SCNC: 7 MMOL/L (ref 0–18)
BUN BLD-MCNC: 31 MG/DL (ref 9–23)
BUN/CREAT SERPL: 5.8 (ref 10–20)
CALCIUM BLD-MCNC: 7.9 MG/DL (ref 8.7–10.4)
CHLORIDE SERPL-SCNC: 104 MMOL/L (ref 98–112)
CO2 SERPL-SCNC: 28 MMOL/L (ref 21–32)
CREAT BLD-MCNC: 5.37 MG/DL
DEPRECATED RDW RBC AUTO: 59.5 FL (ref 35.1–46.3)
EGFRCR SERPLBLD CKD-EPI 2021: 10 ML/MIN/1.73M2 (ref 60–?)
ERYTHROCYTE [DISTWIDTH] IN BLOOD BY AUTOMATED COUNT: 16.7 % (ref 11–15)
GLUCOSE BLD-MCNC: 238 MG/DL (ref 70–99)
GLUCOSE BLDC GLUCOMTR-MCNC: 165 MG/DL (ref 70–99)
GLUCOSE BLDC GLUCOMTR-MCNC: 246 MG/DL (ref 70–99)
GLUCOSE BLDC GLUCOMTR-MCNC: 255 MG/DL (ref 70–99)
GLUCOSE BLDC GLUCOMTR-MCNC: 256 MG/DL (ref 70–99)
HCT VFR BLD AUTO: 23.2 %
HGB BLD-MCNC: 7.3 G/DL
HGB BLD-MCNC: 7.4 G/DL
HGB BLD-MCNC: 7.9 G/DL
MAGNESIUM SERPL-MCNC: 1.8 MG/DL (ref 1.6–2.6)
MCH RBC QN AUTO: 31.2 PG (ref 26–34)
MCHC RBC AUTO-ENTMCNC: 31.5 G/DL (ref 31–37)
MCV RBC AUTO: 99.1 FL
OSMOLALITY SERPL CALC.SUM OF ELEC: 302 MOSM/KG (ref 275–295)
PHOSPHATE SERPL-MCNC: 5.3 MG/DL (ref 2.4–5.1)
PLATELET # BLD AUTO: 133 10(3)UL (ref 150–450)
POTASSIUM SERPL-SCNC: 4.3 MMOL/L (ref 3.5–5.1)
RBC # BLD AUTO: 2.34 X10(6)UL
SODIUM SERPL-SCNC: 139 MMOL/L (ref 136–145)
WBC # BLD AUTO: 7.6 X10(3) UL (ref 4–11)

## 2023-11-12 PROCEDURE — 85018 HEMOGLOBIN: CPT | Performed by: INTERNAL MEDICINE

## 2023-11-12 PROCEDURE — 83735 ASSAY OF MAGNESIUM: CPT | Performed by: INTERNAL MEDICINE

## 2023-11-12 PROCEDURE — 80069 RENAL FUNCTION PANEL: CPT | Performed by: INTERNAL MEDICINE

## 2023-11-12 PROCEDURE — C9113 INJ PANTOPRAZOLE SODIUM, VIA: HCPCS | Performed by: INTERNAL MEDICINE

## 2023-11-12 PROCEDURE — 85027 COMPLETE CBC AUTOMATED: CPT | Performed by: HOSPITALIST

## 2023-11-12 PROCEDURE — 85018 HEMOGLOBIN: CPT | Performed by: HOSPITALIST

## 2023-11-12 PROCEDURE — 82962 GLUCOSE BLOOD TEST: CPT

## 2023-11-12 NOTE — PLAN OF CARE
Problem: Patient Centered Care  Goal: Patient preferences are identified and integrated in the patient's plan of care  Description: Interventions:  - What would you like us to know as we care for you?  From home with wife  - Provide timely, complete, and accurate information to patient/family  - Incorporate patient and family knowledge, values, beliefs, and cultural backgrounds into the planning and delivery of care  - Encourage patient/family to participate in care and decision-making at the level they choose  - Honor patient and family perspectives and choices  Outcome: Progressing     Problem: Patient/Family Goals  Goal: Patient/Family Long Term Goal  Description: Patient's Long Term Goal: Discharge from the hospital    Interventions:  - Monitor vital signs  - Monitor appropriate labs  - Monitor blood glucose levels  - Pain management  - Administer medications per order  - Dialysis per order  - Follow MD orders  - Diagnostics per order  - Update / inform patient and family on plan of care  - Discharge planning  - See additional Care Plan goals for specific interventions  Outcome: Progressing  Goal: Patient/Family Short Term Goal  Description: Patient's Short Term Goal: Improve dark bowel movements     Interventions:   - Monitor vital signs  - Monitor appropriate labs  - Monitor blood glucose levels  - Pain management  - Administer medications per order  - Dialysis per order  - Follow MD orders  - Diagnostics per order  - Update / inform patient and family on plan of care  - See additional Care Plan goals for specific interventions  Outcome: Progressing     Problem: RESPIRATORY - ADULT  Goal: Achieves optimal ventilation and oxygenation  Description: INTERVENTIONS:  - Assess for changes in respiratory status  - Assess for changes in mentation and behavior  - Position to facilitate oxygenation and minimize respiratory effort  - Oxygen supplementation based on oxygen saturation or ABGs  - Provide Smoking Cessation handout, if applicable  - Encourage broncho-pulmonary hygiene including cough, deep breathe, Incentive Spirometry  - Assess the need for suctioning and perform as needed  - Assess and instruct to report SOB or any respiratory difficulty  - Respiratory Therapy support as indicated  - Manage/alleviate anxiety  - Monitor for signs/symptoms of CO2 retention  Outcome: Progressing     Problem: GASTROINTESTINAL - ADULT  Goal: Maintains or returns to baseline bowel function  Description: INTERVENTIONS:  - Assess bowel function  - Maintain adequate hydration with IV or PO as ordered and tolerated  - Evaluate effectiveness of GI medications  - Encourage mobilization and activity  - Obtain nutritional consult as needed  - Establish a toileting routine/schedule  - Consider collaborating with pharmacy to review patient's medication profile  Outcome: Progressing     Problem: METABOLIC/FLUID AND ELECTROLYTES - ADULT  Goal: Glucose maintained within prescribed range  Description: INTERVENTIONS:  - Monitor Blood Glucose as ordered  - Assess for signs and symptoms of hyperglycemia and hypoglycemia  - Administer ordered medications to maintain glucose within target range  - Assess barriers to adequate nutritional intake and initiate nutrition consult as needed  - Instruct patient on self management of diabetes  Outcome: Progressing  Goal: Electrolytes maintained within normal limits  Description: INTERVENTIONS:  - Monitor labs and rhythm and assess patient for signs and symptoms of electrolyte imbalances  - Administer electrolyte replacement as ordered  - Monitor response to electrolyte replacements, including rhythm and repeat lab results as appropriate  - Fluid restriction as ordered  - Instruct patient on fluid and nutrition restrictions as appropriate  Outcome: Progressing  Goal: Hemodynamic stability and optimal renal function maintained  Description: INTERVENTIONS:  - Monitor labs and assess for signs and symptoms of volume excess or deficit  - Monitor intake, output and patient weight  - Monitor urine specific gravity, serum osmolarity and serum sodium as indicated or ordered  - Monitor response to interventions for patient's volume status, including labs, urine output, blood pressure (other measures as available)  - Encourage oral intake as appropriate  - Instruct patient on fluid and nutrition restrictions as appropriate  Outcome: Progressing     Problem: HEMATOLOGIC - ADULT  Goal: Maintains hematologic stability  Description: INTERVENTIONS  - Assess for signs and symptoms of bleeding or hemorrhage  - Monitor labs and vital signs for trends  - Administer supportive blood products/factors, fluids and medications as ordered and appropriate  - Administer supportive blood products/factors as ordered and appropriate  Outcome: Progressing  Goal: Free from bleeding injury  Description: (Example usage: patient with low platelets)  INTERVENTIONS:  - Avoid intramuscular injections, enemas and rectal medication administration  - Ensure safe mobilization of patient  - Hold pressure on venipuncture sites to achieve adequate hemostasis  - Assess for signs and symptoms of internal bleeding  - Monitor lab trends  - Patient is to report abnormal signs of bleeding to staff  - Avoid use of toothpicks and dental floss  - Use electric shaver for shaving  - Use soft bristle tooth brush  - Limit straining and forceful nose blowing  Outcome: Progressing     Problem: PAIN - ADULT  Goal: Verbalizes/displays adequate comfort level or patient's stated pain goal  Description: INTERVENTIONS:  - Encourage pt to monitor pain and request assistance  - Assess pain using appropriate pain scale  - Administer analgesics based on type and severity of pain and evaluate response  - Implement non-pharmacological measures as appropriate and evaluate response  - Consider cultural and social influences on pain and pain management  - Manage/alleviate anxiety  - Utilize distraction and/or relaxation techniques  - Monitor for opioid side effects  - Notify MD/LIP if interventions unsuccessful or patient reports new pain  - Anticipate increased pain with activity and pre-medicate as appropriate  Outcome: Progressing     Problem: SAFETY ADULT - FALL  Goal: Free from fall injury  Description: INTERVENTIONS:  - Assess pt frequently for physical needs  - Identify cognitive and physical deficits and behaviors that affect risk of falls.   - [place] fall precautions as indicated by assessment.  - Educate pt/family on patient safety including physical limitations  - Instruct pt to call for assistance with activity based on assessment  - Modify environment to reduce risk of injury  - Provide assistive devices as appropriate  - Consider OT/PT consult to assist with strengthening/mobility  - Encourage toileting schedule  Outcome: Progressing     Problem: DISCHARGE PLANNING  Goal: Discharge to home or other facility with appropriate resources  Description: INTERVENTIONS:  - Identify barriers to discharge w/pt and caregiver  - Include patient/family/discharge partner in discharge planning  - Arrange for needed discharge resources and transportation as appropriate  - Identify discharge learning needs (meds, wound care, etc)  - Arrange for interpreters to assist at discharge as needed  - Consider post-discharge preferences of patient/family/discharge partner  - Complete POLST form as appropriate  - Assess patient's ability to be responsible for managing their own health  - Refer to Case Management Department for coordinating discharge planning if the patient needs post-hospital services based on physician/LIP order or complex needs related to functional status, cognitive ability or social support system  Outcome: Progressing     Problem: CARDIOVASCULAR - ADULT  Goal: Maintains optimal cardiac output and hemodynamic stability  Description: INTERVENTIONS:  - Monitor vital signs, rhythm, and trends  - Monitor for bleeding, hypotension and signs of decreased cardiac output  - Evaluate effectiveness of vasoactive medications to optimize hemodynamic stability  - Monitor arterial and/or venous puncture sites for bleeding and/or hematoma  - Assess quality of pulses, skin color and temperature  - Assess for signs of decreased coronary artery perfusion - ex. Angina  - Evaluate fluid balance, assess for edema, trend weights  Outcome: Progressing  Goal: Absence of cardiac arrhythmias or at baseline  Description: INTERVENTIONS:  - Continuous cardiac monitoring, monitor vital signs, obtain 12 lead EKG if indicated  - Evaluate effectiveness of antiarrhythmic and heart rate control medications as ordered  - Initiate emergency measures for life threatening arrhythmias  - Monitor electrolytes and administer replacement therapy as ordered  Outcome: Progressing      Monitoring vital signs- stable at this time. Left arm precautions maintained- left AV fistula noted with thrill and bruit. No acute changes noted at this moment. Safety and fall precautions maintained- bed alarm on, bed locked in lowest position, call light within reach. Frequent rounding by nursing staff.

## 2023-11-13 VITALS
SYSTOLIC BLOOD PRESSURE: 140 MMHG | HEIGHT: 72 IN | RESPIRATION RATE: 18 BRPM | TEMPERATURE: 98 F | DIASTOLIC BLOOD PRESSURE: 65 MMHG | HEART RATE: 75 BPM | WEIGHT: 245.31 LBS | OXYGEN SATURATION: 97 % | BODY MASS INDEX: 33.23 KG/M2

## 2023-11-13 LAB
ANION GAP SERPL CALC-SCNC: 9 MMOL/L (ref 0–18)
ANTIBODY SCREEN: NEGATIVE
BUN BLD-MCNC: 43 MG/DL (ref 9–23)
BUN/CREAT SERPL: 6.3 (ref 10–20)
CALCIUM BLD-MCNC: 7.9 MG/DL (ref 8.7–10.4)
CHLORIDE SERPL-SCNC: 101 MMOL/L (ref 98–112)
CO2 SERPL-SCNC: 25 MMOL/L (ref 21–32)
CREAT BLD-MCNC: 6.82 MG/DL
EGFRCR SERPLBLD CKD-EPI 2021: 8 ML/MIN/1.73M2 (ref 60–?)
GLUCOSE BLD-MCNC: 166 MG/DL (ref 70–99)
GLUCOSE BLDC GLUCOMTR-MCNC: 157 MG/DL (ref 70–99)
GLUCOSE BLDC GLUCOMTR-MCNC: 193 MG/DL (ref 70–99)
GLUCOSE BLDC GLUCOMTR-MCNC: 239 MG/DL (ref 70–99)
HCT VFR BLD AUTO: 25.3 %
HGB BLD-MCNC: 7.4 G/DL
HGB BLD-MCNC: 8.1 G/DL
OSMOLALITY SERPL CALC.SUM OF ELEC: 295 MOSM/KG (ref 275–295)
POTASSIUM SERPL-SCNC: 4.4 MMOL/L (ref 3.5–5.1)
RH BLOOD TYPE: POSITIVE
SODIUM SERPL-SCNC: 135 MMOL/L (ref 136–145)

## 2023-11-13 PROCEDURE — 90935 HEMODIALYSIS ONE EVALUATION: CPT

## 2023-11-13 PROCEDURE — C9113 INJ PANTOPRAZOLE SODIUM, VIA: HCPCS | Performed by: INTERNAL MEDICINE

## 2023-11-13 PROCEDURE — 85018 HEMOGLOBIN: CPT | Performed by: HOSPITALIST

## 2023-11-13 PROCEDURE — 80048 BASIC METABOLIC PNL TOTAL CA: CPT | Performed by: HOSPITALIST

## 2023-11-13 PROCEDURE — 86920 COMPATIBILITY TEST SPIN: CPT

## 2023-11-13 PROCEDURE — 82962 GLUCOSE BLOOD TEST: CPT

## 2023-11-13 PROCEDURE — 86900 BLOOD TYPING SEROLOGIC ABO: CPT | Performed by: HOSPITALIST

## 2023-11-13 PROCEDURE — 86850 RBC ANTIBODY SCREEN: CPT | Performed by: HOSPITALIST

## 2023-11-13 PROCEDURE — 85014 HEMATOCRIT: CPT | Performed by: HOSPITALIST

## 2023-11-13 PROCEDURE — 86901 BLOOD TYPING SEROLOGIC RH(D): CPT | Performed by: HOSPITALIST

## 2023-11-13 PROCEDURE — 36430 TRANSFUSION BLD/BLD COMPNT: CPT

## 2023-11-13 RX ORDER — ASPIRIN 81 MG/1
81 TABLET ORAL DAILY
Qty: 30 TABLET | Refills: 0 | Status: SHIPPED | OUTPATIENT
Start: 2023-11-20

## 2023-11-13 RX ORDER — SODIUM CHLORIDE 9 MG/ML
INJECTION, SOLUTION INTRAVENOUS ONCE
Status: COMPLETED | OUTPATIENT
Start: 2023-11-13 | End: 2023-11-13

## 2023-11-13 NOTE — DIETARY NOTE
Brief Nutrition Note:     Pt admitted for anemia. Pt screened at no nutrition risk at admission by RN. Pt re-screened by RD for length of stay (LOS). Chart reviewed, pt with good intake during admission, eating % when diet active. EGD completed 11/8. Wt appears relatively stable. Pt on HD 3x/week. Labs reviewed, noted hyperphosphatemia (5.3 on 11/12). Elevated phosphorus noted throughout admission. Pt currently on Low fiber/Renal diet. Added low phosphorus restriction as not included in renal diet. Pt continues at no nutrition risk at this time. F/u per protocol. Please consult nutrition services if earlier intervention is indicated. Wt Readings from Last 6 Encounters:   11/13/23 111.3 kg (245 lb 4.8 oz)   09/27/23 110.5 kg (243 lb 9.7 oz)   09/20/23 74.8 kg (165 lb)   09/05/23 112.5 kg (248 lb)   05/09/23 108.2 kg (238 lb 8 oz)   03/03/23 107.5 kg (237 lb)     Percent Meals Eaten (last 6 days)       Date/Time Percent Meals Eaten (%)    11/07/23 0700 0 %     Percent Meals Eaten (%): NPO at 11/07/23 0700    11/07/23 1209 0 %     Percent Meals Eaten (%): NPO at 11/07/23 1209    11/08/23 1230 0 %     Percent Meals Eaten (%): pt skipped lunch due to procedure at 11/08/23 1230    11/08/23 1900 100 %    11/09/23 0907 100 %    11/09/23 1537 90 %    11/09/23 2030 100 %    11/10/23 1126 100 %    11/10/23 1451 100 %    11/10/23 2107 100 %     Percent Meals Eaten (%): dinner at 11/10/23 2107    11/11/23 0544 100 %     Percent Meals Eaten (%): snack- crackers at 11/11/23 0544    11/11/23 0900 100 %    11/11/23 1431 100 %    11/11/23 1510 100 %    11/11/23 2007 --     Percent Meals Eaten (%): currently eating dinner at 11/11/23 2007 11/11/23 2020 100 %     Percent Meals Eaten (%): dinner at 11/11/23 2020 11/12/23 0900 100 %    11/12/23 1700 100 %          Dietary Orders (From admission, onward)       Start     Ordered    11/13/23 0954  Low Fiber/Soft diet Low Fiber/Soft, Renal, Low Phosphorus;  Is Patient on Accuchecks? Yes  Diet effective now        Question Answer Comment   Misc Restriction Low Fiber/Soft    Misc Restriction Renal    Misc Restriction Low Phosphorus    Is Patient on Accuchecks? Yes        11/13/23 0953    11/06/23 1707  Room Service Eligibility Until Discontinued  Until discontinued        Question:  Is the patient able and willing to participate in room service? Answer:  Yes    11/06/23 1706 11/06/23 1707  Room Service Notify RN Until Discontinued  Until discontinued        Question:  Notify RN when tray delivered?   Answer:  No    11/06/23 1706                  Janey Hussein MS, CHARBEL, RDN, LDN  Clinical Dietitian  P: [phone]

## 2023-11-13 NOTE — CM/SW NOTE
11/13/23 1700   Discharge disposition   Expected discharge disposition Home-Health   Post Acute Care Provider   [facility])   Outpatient services Dialysis  ([facility] in [place], MWF at [place])   Discharge transportation Private car     Pt discussed during nursing rounds. Pt is stable for NE today. MD barr order entered. [facility] ([facility]) will provide RN and therapy services at NE, agency notified of NE home today. [address] [place] [address] in [place] will resume outpatient HD services at NE, MWF at 3pm. Pt's spouse will provide transport at NE. Plan: Home w/spouse with [facility] ([facility] and [facility] for outpatient HD today. / to remain available for support and/or discharge planning.      MARCELA PérezN    [phone]

## 2023-11-13 NOTE — PLAN OF CARE
Problem: Patient Centered Care  Goal: Patient preferences are identified and integrated in the patient's plan of care  Description: Interventions:  - What would you like us to know as we care for you?  From home with wife  - Provide timely, complete, and accurate information to patient/family  - Incorporate patient and family knowledge, values, beliefs, and cultural backgrounds into the planning and delivery of care  - Encourage patient/family to participate in care and decision-making at the level they choose  - Honor patient and family perspectives and choices  Outcome: Progressing     Problem: Patient/Family Goals  Goal: Patient/Family Long Term Goal  Description: Patient's Long Term Goal: Discharge from the hospital    Interventions:  - Monitor vital signs  - Monitor appropriate labs  - Monitor blood glucose levels  - Pain management  - Administer medications per order  - Dialysis per order  - Follow MD orders  - Diagnostics per order  - Update / inform patient and family on plan of care  - Discharge planning  - See additional Care Plan goals for specific interventions  Outcome: Progressing  Goal: Patient/Family Short Term Goal  Description: Patient's Short Term Goal: Improve dark bowel movements     Interventions:   - Monitor vital signs  - Monitor appropriate labs  - Monitor blood glucose levels  - Pain management  - Administer medications per order  - Dialysis per order  - Follow MD orders  - Diagnostics per order  - Update / inform patient and family on plan of care  - See additional Care Plan goals for specific interventions  Outcome: Progressing     Problem: RESPIRATORY - ADULT  Goal: Achieves optimal ventilation and oxygenation  Description: INTERVENTIONS:  - Assess for changes in respiratory status  - Assess for changes in mentation and behavior  - Position to facilitate oxygenation and minimize respiratory effort  - Oxygen supplementation based on oxygen saturation or ABGs  - Provide Smoking Cessation handout, if applicable  - Encourage broncho-pulmonary hygiene including cough, deep breathe, Incentive Spirometry  - Assess the need for suctioning and perform as needed  - Assess and instruct to report SOB or any respiratory difficulty  - Respiratory Therapy support as indicated  - Manage/alleviate anxiety  - Monitor for signs/symptoms of CO2 retention  Outcome: Progressing     Problem: GASTROINTESTINAL - ADULT  Goal: Maintains or returns to baseline bowel function  Description: INTERVENTIONS:  - Assess bowel function  - Maintain adequate hydration with IV or PO as ordered and tolerated  - Evaluate effectiveness of GI medications  - Encourage mobilization and activity  - Obtain nutritional consult as needed  - Establish a toileting routine/schedule  - Consider collaborating with pharmacy to review patient's medication profile  Outcome: Progressing     Problem: METABOLIC/FLUID AND ELECTROLYTES - ADULT  Goal: Glucose maintained within prescribed range  Description: INTERVENTIONS:  - Monitor Blood Glucose as ordered  - Assess for signs and symptoms of hyperglycemia and hypoglycemia  - Administer ordered medications to maintain glucose within target range  - Assess barriers to adequate nutritional intake and initiate nutrition consult as needed  - Instruct patient on self management of diabetes  Outcome: Progressing  Goal: Electrolytes maintained within normal limits  Description: INTERVENTIONS:  - Monitor labs and rhythm and assess patient for signs and symptoms of electrolyte imbalances  - Administer electrolyte replacement as ordered  - Monitor response to electrolyte replacements, including rhythm and repeat lab results as appropriate  - Fluid restriction as ordered  - Instruct patient on fluid and nutrition restrictions as appropriate  Outcome: Progressing  Goal: Hemodynamic stability and optimal renal function maintained  Description: INTERVENTIONS:  - Monitor labs and assess for signs and symptoms of volume excess or deficit  - Monitor intake, output and patient weight  - Monitor urine specific gravity, serum osmolarity and serum sodium as indicated or ordered  - Monitor response to interventions for patient's volume status, including labs, urine output, blood pressure (other measures as available)  - Encourage oral intake as appropriate  - Instruct patient on fluid and nutrition restrictions as appropriate  Outcome: Progressing     Problem: HEMATOLOGIC - ADULT  Goal: Maintains hematologic stability  Description: INTERVENTIONS  - Assess for signs and symptoms of bleeding or hemorrhage  - Monitor labs and vital signs for trends  - Administer supportive blood products/factors, fluids and medications as ordered and appropriate  - Administer supportive blood products/factors as ordered and appropriate  Outcome: Progressing  Goal: Free from bleeding injury  Description: (Example usage: patient with low platelets)  INTERVENTIONS:  - Avoid intramuscular injections, enemas and rectal medication administration  - Ensure safe mobilization of patient  - Hold pressure on venipuncture sites to achieve adequate hemostasis  - Assess for signs and symptoms of internal bleeding  - Monitor lab trends  - Patient is to report abnormal signs of bleeding to staff  - Avoid use of toothpicks and dental floss  - Use electric shaver for shaving  - Use soft bristle tooth brush  - Limit straining and forceful nose blowing  Outcome: Progressing     Problem: PAIN - ADULT  Goal: Verbalizes/displays adequate comfort level or patient's stated pain goal  Description: INTERVENTIONS:  - Encourage pt to monitor pain and request assistance  - Assess pain using appropriate pain scale  - Administer analgesics based on type and severity of pain and evaluate response  - Implement non-pharmacological measures as appropriate and evaluate response  - Consider cultural and social influences on pain and pain management  - Manage/alleviate anxiety  - Utilize distraction and/or relaxation techniques  - Monitor for opioid side effects  - Notify MD/LIP if interventions unsuccessful or patient reports new pain  - Anticipate increased pain with activity and pre-medicate as appropriate  Outcome: Progressing     Problem: SAFETY ADULT - FALL  Goal: Free from fall injury  Description: INTERVENTIONS:  - Assess pt frequently for physical needs  - Identify cognitive and physical deficits and behaviors that affect risk of falls.   - [place] fall precautions as indicated by assessment.  - Educate pt/family on patient safety including physical limitations  - Instruct pt to call for assistance with activity based on assessment  - Modify environment to reduce risk of injury  - Provide assistive devices as appropriate  - Consider OT/PT consult to assist with strengthening/mobility  - Encourage toileting schedule  Outcome: Progressing     Problem: DISCHARGE PLANNING  Goal: Discharge to home or other facility with appropriate resources  Description: INTERVENTIONS:  - Identify barriers to discharge w/pt and caregiver  - Include patient/family/discharge partner in discharge planning  - Arrange for needed discharge resources and transportation as appropriate  - Identify discharge learning needs (meds, wound care, etc)  - Arrange for interpreters to assist at discharge as needed  - Consider post-discharge preferences of patient/family/discharge partner  - Complete POLST form as appropriate  - Assess patient's ability to be responsible for managing their own health  - Refer to Case Management Department for coordinating discharge planning if the patient needs post-hospital services based on physician/LIP order or complex needs related to functional status, cognitive ability or social support system  Outcome: Progressing     Problem: CARDIOVASCULAR - ADULT  Goal: Maintains optimal cardiac output and hemodynamic stability  Description: INTERVENTIONS:  - Monitor vital signs, rhythm, and trends  - Monitor for bleeding, hypotension and signs of decreased cardiac output  - Evaluate effectiveness of vasoactive medications to optimize hemodynamic stability  - Monitor arterial and/or venous puncture sites for bleeding and/or hematoma  - Assess quality of pulses, skin color and temperature  - Assess for signs of decreased coronary artery perfusion - ex. Angina  - Evaluate fluid balance, assess for edema, trend weights  Outcome: Progressing  Goal: Absence of cardiac arrhythmias or at baseline  Description: INTERVENTIONS:  - Continuous cardiac monitoring, monitor vital signs, obtain 12 lead EKG if indicated  - Evaluate effectiveness of antiarrhythmic and heart rate control medications as ordered  - Initiate emergency measures for life threatening arrhythmias  - Monitor electrolytes and administer replacement therapy as ordered  Outcome: Progressing     Problem: SKIN/TISSUE INTEGRITY - ADULT  Goal: Skin integrity remains intact  Description: INTERVENTIONS  - Assess and document risk factors for pressure ulcer development  - Assess and document skin integrity  - Monitor for areas of redness and/or skin breakdown  - Initiate interventions, skin care algorithm/standards of care as needed  Outcome: Progressing      Pt alert and oriented; forgetful at times. L arm precautions. On remote tele. Denies any pain/discomfort. Decrease urine output due toCKD and hemodialysis. ACHS. Call light within reach. Bed in lowest and locked position.  Plan for home after dyalysis

## 2023-11-13 NOTE — OCCUPATIONAL THERAPY NOTE
11/13/23 1456   VISIT TYPE   OT Inpatient Visit Type (Documentation Required) Attempted Treatment  Pt is about to start bedside dialysis. OT will defer TX to a later date.    OT Follow Up   Charge Missed visit         Yunior Ceron OTR/L  [address]

## 2023-11-13 NOTE — PROGRESS NOTES
11/13/23 1329   Clinical Encounter Type   Visited With Patient   Routine Visit Introduction   Continue Visiting No   Referral From    Referral To    [facility]ism Encounters   [facility] Needs Pastoral care brochure   Family Spiritual Encounters   Family Coping Accepting   Taxonomy   Intended Effects Demonstrate caring and concern   Methods Explore quality of life;Encourage sharing of feelings; Exploring hope; Offer support   Interventions Acknowledge current situation; Active listening; Ask guided questions; Identify supportive relationship(s);Silent prayer; Share words of hope and inspiration   Trigger for Consult   Trigger for Spiritual Care Consult No       Visited with patient today for LOS    Visit summary: Visited bedside. Patient presents jovial, and talkative. Patient shares \"it is beautiful outside\" and he is ready to go home. PT seemed confused when asked about his support system and was unclear about who he rely's upon for support.     Plan:  Spiritual care will continue JUNI Dior  11/13/2023

## 2023-11-13 NOTE — PLAN OF CARE
Monitoring vital signs - stable at this time. Monitoring blood glucose, insulin administered per order. No acute changes noted at this moment. Safety and fall precautions maintained - bed alarm on, bed locked in lowest position, call light within reach. Frequent rounding by nursing staff. Plan to monitor hemoglobin levels overnight and possible discharge in morning after dialysis. Problem: Patient Centered Care  Goal: Patient preferences are identified and integrated in the patient's plan of care  Description: Interventions:  - What would you like us to know as we care for you?  From home with wife  - Provide timely, complete, and accurate information to patient/family  - Incorporate patient and family knowledge, values, beliefs, and cultural backgrounds into the planning and delivery of care  - Encourage patient/family to participate in care and decision-making at the level they choose  - Honor patient and family perspectives and choices  Outcome: Progressing     Problem: Patient/Family Goals  Goal: Patient/Family Long Term Goal  Description: Patient's Long Term Goal: Discharge from the hospital    Interventions:  - Monitor vital signs  - Monitor appropriate labs  - Monitor blood glucose levels  - Pain management  - Administer medications per order  - Dialysis per order  - Follow MD orders  - Diagnostics per order  - Update / inform patient and family on plan of care  - Discharge planning  - See additional Care Plan goals for specific interventions  Outcome: Progressing  Goal: Patient/Family Short Term Goal  Description: Patient's Short Term Goal: Improve dark bowel movements     Interventions:   - Monitor vital signs  - Monitor appropriate labs  - Monitor blood glucose levels  - Pain management  - Administer medications per order  - Dialysis per order  - Follow MD orders  - Diagnostics per order  - Update / inform patient and family on plan of care  - See additional Care Plan goals for specific interventions  Outcome: Progressing     Problem: RESPIRATORY - ADULT  Goal: Achieves optimal ventilation and oxygenation  Description: INTERVENTIONS:  - Assess for changes in respiratory status  - Assess for changes in mentation and behavior  - Position to facilitate oxygenation and minimize respiratory effort  - Oxygen supplementation based on oxygen saturation or ABGs  - Provide Smoking Cessation handout, if applicable  - Encourage broncho-pulmonary hygiene including cough, deep breathe, Incentive Spirometry  - Assess the need for suctioning and perform as needed  - Assess and instruct to report SOB or any respiratory difficulty  - Respiratory Therapy support as indicated  - Manage/alleviate anxiety  - Monitor for signs/symptoms of CO2 retention  Outcome: Progressing     Problem: GASTROINTESTINAL - ADULT  Goal: Maintains or returns to baseline bowel function  Description: INTERVENTIONS:  - Assess bowel function  - Maintain adequate hydration with IV or PO as ordered and tolerated  - Evaluate effectiveness of GI medications  - Encourage mobilization and activity  - Obtain nutritional consult as needed  - Establish a toileting routine/schedule  - Consider collaborating with pharmacy to review patient's medication profile  Outcome: Progressing     Problem: METABOLIC/FLUID AND ELECTROLYTES - ADULT  Goal: Glucose maintained within prescribed range  Description: INTERVENTIONS:  - Monitor Blood Glucose as ordered  - Assess for signs and symptoms of hyperglycemia and hypoglycemia  - Administer ordered medications to maintain glucose within target range  - Assess barriers to adequate nutritional intake and initiate nutrition consult as needed  - Instruct patient on self management of diabetes  Outcome: Progressing  Goal: Electrolytes maintained within normal limits  Description: INTERVENTIONS:  - Monitor labs and rhythm and assess patient for signs and symptoms of electrolyte imbalances  - Administer electrolyte replacement as ordered  - Monitor response to electrolyte replacements, including rhythm and repeat lab results as appropriate  - Fluid restriction as ordered  - Instruct patient on fluid and nutrition restrictions as appropriate  Outcome: Progressing  Goal: Hemodynamic stability and optimal renal function maintained  Description: INTERVENTIONS:  - Monitor labs and assess for signs and symptoms of volume excess or deficit  - Monitor intake, output and patient weight  - Monitor urine specific gravity, serum osmolarity and serum sodium as indicated or ordered  - Monitor response to interventions for patient's volume status, including labs, urine output, blood pressure (other measures as available)  - Encourage oral intake as appropriate  - Instruct patient on fluid and nutrition restrictions as appropriate  Outcome: Progressing     Problem: HEMATOLOGIC - ADULT  Goal: Maintains hematologic stability  Description: INTERVENTIONS  - Assess for signs and symptoms of bleeding or hemorrhage  - Monitor labs and vital signs for trends  - Administer supportive blood products/factors, fluids and medications as ordered and appropriate  - Administer supportive blood products/factors as ordered and appropriate  Outcome: Progressing  Goal: Free from bleeding injury  Description: (Example usage: patient with low platelets)  INTERVENTIONS:  - Avoid intramuscular injections, enemas and rectal medication administration  - Ensure safe mobilization of patient  - Hold pressure on venipuncture sites to achieve adequate hemostasis  - Assess for signs and symptoms of internal bleeding  - Monitor lab trends  - Patient is to report abnormal signs of bleeding to staff  - Avoid use of toothpicks and dental floss  - Use electric shaver for shaving  - Use soft bristle tooth brush  - Limit straining and forceful nose blowing  Outcome: Progressing     Problem: PAIN - ADULT  Goal: Verbalizes/displays adequate comfort level or patient's stated pain goal  Description: INTERVENTIONS:  - Encourage pt to monitor pain and request assistance  - Assess pain using appropriate pain scale  - Administer analgesics based on type and severity of pain and evaluate response  - Implement non-pharmacological measures as appropriate and evaluate response  - Consider cultural and social influences on pain and pain management  - Manage/alleviate anxiety  - Utilize distraction and/or relaxation techniques  - Monitor for opioid side effects  - Notify MD/LIP if interventions unsuccessful or patient reports new pain  - Anticipate increased pain with activity and pre-medicate as appropriate  Outcome: Progressing     Problem: SAFETY ADULT - FALL  Goal: Free from fall injury  Description: INTERVENTIONS:  - Assess pt frequently for physical needs  - Identify cognitive and physical deficits and behaviors that affect risk of falls.   - [place] fall precautions as indicated by assessment.  - Educate pt/family on patient safety including physical limitations  - Instruct pt to call for assistance with activity based on assessment  - Modify environment to reduce risk of injury  - Provide assistive devices as appropriate  - Consider OT/PT consult to assist with strengthening/mobility  - Encourage toileting schedule  Outcome: Progressing     Problem: DISCHARGE PLANNING  Goal: Discharge to home or other facility with appropriate resources  Description: INTERVENTIONS:  - Identify barriers to discharge w/pt and caregiver  - Include patient/family/discharge partner in discharge planning  - Arrange for needed discharge resources and transportation as appropriate  - Identify discharge learning needs (meds, wound care, etc)  - Arrange for interpreters to assist at discharge as needed  - Consider post-discharge preferences of patient/family/discharge partner  - Complete POLST form as appropriate  - Assess patient's ability to be responsible for managing their own health  - Refer to Case Management Department for coordinating discharge planning if the patient needs post-hospital services based on physician/LIP order or complex needs related to functional status, cognitive ability or social support system  Outcome: Progressing     Problem: CARDIOVASCULAR - ADULT  Goal: Maintains optimal cardiac output and hemodynamic stability  Description: INTERVENTIONS:  - Monitor vital signs, rhythm, and trends  - Monitor for bleeding, hypotension and signs of decreased cardiac output  - Evaluate effectiveness of vasoactive medications to optimize hemodynamic stability  - Monitor arterial and/or venous puncture sites for bleeding and/or hematoma  - Assess quality of pulses, skin color and temperature  - Assess for signs of decreased coronary artery perfusion - ex.  Angina  - Evaluate fluid balance, assess for edema, trend weights  Outcome: Progressing  Goal: Absence of cardiac arrhythmias or at baseline  Description: INTERVENTIONS:  - Continuous cardiac monitoring, monitor vital signs, obtain 12 lead EKG if indicated  - Evaluate effectiveness of antiarrhythmic and heart rate control medications as ordered  - Initiate emergency measures for life threatening arrhythmias  - Monitor electrolytes and administer replacement therapy as ordered  Outcome: Progressing     Problem: SKIN/TISSUE INTEGRITY - ADULT  Goal: Skin integrity remains intact  Description: INTERVENTIONS  - Assess and document risk factors for pressure ulcer development  - Assess and document skin integrity  - Monitor for areas of redness and/or skin breakdown  - Initiate interventions, skin care algorithm/standards of care as needed  Outcome: Progressing

## 2023-11-14 LAB
BLOOD TYPE BARCODE: 5100
UNIT VOLUME: 350 ML

## 2023-11-14 NOTE — PLAN OF CARE
Patient updated on plan of care. Received dialysis via left arm AV Fistula with 2L removed , 1 unit of RBC given during dialysis, repeat hemoglobin of 8.1 after transfusion. HD RN education provided to patient reinforced. IV removed by this RN. Discharge education and paperwork provided to patient. All belongings gathered. All questions answered. Patient to discharge home with daughter pending . Problem: Patient Centered Care  Goal: Patient preferences are identified and integrated in the patient's plan of care  Description: Interventions:  - What would you like us to know as we care for you?  From home with wife  - Provide timely, complete, and accurate information to patient/family  - Incorporate patient and family knowledge, values, beliefs, and cultural backgrounds into the planning and delivery of care  - Encourage patient/family to participate in care and decision-making at the level they choose  - Honor patient and family perspectives and choices  Outcome: Adequate for Discharge     Problem: Patient/Family Goals  Goal: Patient/Family Long Term Goal  Description: Patient's Long Term Goal: Discharge from the hospital    Interventions:  - Monitor vital signs  - Monitor appropriate labs  - Monitor blood glucose levels  - Pain management  - Administer medications per order  - Dialysis per order  - Follow MD orders  - Diagnostics per order  - Update / inform patient and family on plan of care  - Discharge planning  - See additional Care Plan goals for specific interventions  Outcome: Adequate for Discharge  Goal: Patient/Family Short Term Goal  Description: Patient's Short Term Goal: Improve dark bowel movements     Interventions:   - Monitor vital signs  - Monitor appropriate labs  - Monitor blood glucose levels  - Pain management  - Administer medications per order  - Dialysis per order  - Follow MD orders  - Diagnostics per order  - Update / inform patient and family on plan of care  - See additional Care Plan goals for specific interventions  Outcome: Adequate for Discharge     Problem: RESPIRATORY - ADULT  Goal: Achieves optimal ventilation and oxygenation  Description: INTERVENTIONS:  - Assess for changes in respiratory status  - Assess for changes in mentation and behavior  - Position to facilitate oxygenation and minimize respiratory effort  - Oxygen supplementation based on oxygen saturation or ABGs  - Provide Smoking Cessation handout, if applicable  - Encourage broncho-pulmonary hygiene including cough, deep breathe, Incentive Spirometry  - Assess the need for suctioning and perform as needed  - Assess and instruct to report SOB or any respiratory difficulty  - Respiratory Therapy support as indicated  - Manage/alleviate anxiety  - Monitor for signs/symptoms of CO2 retention  Outcome: Adequate for Discharge     Problem: GASTROINTESTINAL - ADULT  Goal: Maintains or returns to baseline bowel function  Description: INTERVENTIONS:  - Assess bowel function  - Maintain adequate hydration with IV or PO as ordered and tolerated  - Evaluate effectiveness of GI medications  - Encourage mobilization and activity  - Obtain nutritional consult as needed  - Establish a toileting routine/schedule  - Consider collaborating with pharmacy to review patient's medication profile  Outcome: Adequate for Discharge     Problem: METABOLIC/FLUID AND ELECTROLYTES - ADULT  Goal: Glucose maintained within prescribed range  Description: INTERVENTIONS:  - Monitor Blood Glucose as ordered  - Assess for signs and symptoms of hyperglycemia and hypoglycemia  - Administer ordered medications to maintain glucose within target range  - Assess barriers to adequate nutritional intake and initiate nutrition consult as needed  - Instruct patient on self management of diabetes  Outcome: Adequate for Discharge  Goal: Electrolytes maintained within normal limits  Description: INTERVENTIONS:  - Monitor labs and rhythm and assess patient for signs and symptoms of electrolyte imbalances  - Administer electrolyte replacement as ordered  - Monitor response to electrolyte replacements, including rhythm and repeat lab results as appropriate  - Fluid restriction as ordered  - Instruct patient on fluid and nutrition restrictions as appropriate  Outcome: Adequate for Discharge  Goal: Hemodynamic stability and optimal renal function maintained  Description: INTERVENTIONS:  - Monitor labs and assess for signs and symptoms of volume excess or deficit  - Monitor intake, output and patient weight  - Monitor urine specific gravity, serum osmolarity and serum sodium as indicated or ordered  - Monitor response to interventions for patient's volume status, including labs, urine output, blood pressure (other measures as available)  - Encourage oral intake as appropriate  - Instruct patient on fluid and nutrition restrictions as appropriate  Outcome: Adequate for Discharge     Problem: HEMATOLOGIC - ADULT  Goal: Maintains hematologic stability  Description: INTERVENTIONS  - Assess for signs and symptoms of bleeding or hemorrhage  - Monitor labs and vital signs for trends  - Administer supportive blood products/factors, fluids and medications as ordered and appropriate  - Administer supportive blood products/factors as ordered and appropriate  Outcome: Adequate for Discharge  Goal: Free from bleeding injury  Description: (Example usage: patient with low platelets)  INTERVENTIONS:  - Avoid intramuscular injections, enemas and rectal medication administration  - Ensure safe mobilization of patient  - Hold pressure on venipuncture sites to achieve adequate hemostasis  - Assess for signs and symptoms of internal bleeding  - Monitor lab trends  - Patient is to report abnormal signs of bleeding to staff  - Avoid use of toothpicks and dental floss  - Use electric shaver for shaving  - Use soft bristle tooth brush  - Limit straining and forceful nose blowing  Outcome: Adequate for Discharge     Problem: PAIN - ADULT  Goal: Verbalizes/displays adequate comfort level or patient's stated pain goal  Description: INTERVENTIONS:  - Encourage pt to monitor pain and request assistance  - Assess pain using appropriate pain scale  - Administer analgesics based on type and severity of pain and evaluate response  - Implement non-pharmacological measures as appropriate and evaluate response  - Consider cultural and social influences on pain and pain management  - Manage/alleviate anxiety  - Utilize distraction and/or relaxation techniques  - Monitor for opioid side effects  - Notify MD/LIP if interventions unsuccessful or patient reports new pain  - Anticipate increased pain with activity and pre-medicate as appropriate  Outcome: Adequate for Discharge     Problem: SAFETY ADULT - FALL  Goal: Free from fall injury  Description: INTERVENTIONS:  - Assess pt frequently for physical needs  - Identify cognitive and physical deficits and behaviors that affect risk of falls.   - [place] fall precautions as indicated by assessment.  - Educate pt/family on patient safety including physical limitations  - Instruct pt to call for assistance with activity based on assessment  - Modify environment to reduce risk of injury  - Provide assistive devices as appropriate  - Consider OT/PT consult to assist with strengthening/mobility  - Encourage toileting schedule  Outcome: Adequate for Discharge     Problem: DISCHARGE PLANNING  Goal: Discharge to home or other facility with appropriate resources  Description: INTERVENTIONS:  - Identify barriers to discharge w/pt and caregiver  - Include patient/family/discharge partner in discharge planning  - Arrange for needed discharge resources and transportation as appropriate  - Identify discharge learning needs (meds, wound care, etc)  - Arrange for interpreters to assist at discharge as needed  - Consider post-discharge preferences of patient/family/discharge partner  - Complete POLST form as appropriate  - Assess patient's ability to be responsible for managing their own health  - Refer to Case Management Department for coordinating discharge planning if the patient needs post-hospital services based on physician/LIP order or complex needs related to functional status, cognitive ability or social support system  Outcome: Adequate for Discharge     Problem: CARDIOVASCULAR - ADULT  Goal: Maintains optimal cardiac output and hemodynamic stability  Description: INTERVENTIONS:  - Monitor vital signs, rhythm, and trends  - Monitor for bleeding, hypotension and signs of decreased cardiac output  - Evaluate effectiveness of vasoactive medications to optimize hemodynamic stability  - Monitor arterial and/or venous puncture sites for bleeding and/or hematoma  - Assess quality of pulses, skin color and temperature  - Assess for signs of decreased coronary artery perfusion - ex.  Angina  - Evaluate fluid balance, assess for edema, trend weights  Outcome: Adequate for Discharge  Goal: Absence of cardiac arrhythmias or at baseline  Description: INTERVENTIONS:  - Continuous cardiac monitoring, monitor vital signs, obtain 12 lead EKG if indicated  - Evaluate effectiveness of antiarrhythmic and heart rate control medications as ordered  - Initiate emergency measures for life threatening arrhythmias  - Monitor electrolytes and administer replacement therapy as ordered  Outcome: Adequate for Discharge     Problem: SKIN/TISSUE INTEGRITY - ADULT  Goal: Skin integrity remains intact  Description: INTERVENTIONS  - Assess and document risk factors for pressure ulcer development  - Assess and document skin integrity  - Monitor for areas of redness and/or skin breakdown  - Initiate interventions, skin care algorithm/standards of care as needed  Outcome: Adequate for Discharge

## 2023-11-14 NOTE — DISCHARGE SUMMARY
General Medicine Discharge Summary     Patient ID:  Ghanshyam Sanders  74year old  9/1/1950    Admit date: 11/6/2023    Discharge date and time: 11/13/23    Attending Physician: No att. providers found     Primary Care Physician: Anu Moreno MD     Reason for admission: anemia    Discharge Diagnoses: Anemia, unspecified type [D64.9]    Discharged Condition: stable    Disposition: home    Consults:   Consultants         Provider   Role Specialty     Elias Ace MD  Consulting Physician Ani Quintero MD  Consulting Physician Paetl Barbosa MD  Consulting Physician Cardiovascular Diseases              HPI: Mr. Terese Quinn is a 74year old male with PMH BPH , CHF / ICM EF 30% , moderate MR, HTN, HLD, ESRD on HD M/W/F who was sent in by his nephrologist due to worsening anemia and dark stools. Patient states he has had dark stools for about 1 week. Normally has low Hg around 8-10. He thinks he had a colonoscopy in [facility] a few years ago but does not know where. Wife at bedside and does not think he has had a colonoscopy in the past. Does use ASA but no other blood thinners. No NSAIDs, no ETOH use. No family hx of GI cancers. Patient recently admitted in September due to syncope with HD, had AV fistula malfunction surgically repaired that admission     In the ED, BP elevated, labs significant for Hg 5.9. GI consulted, 1 unit pRBC ordered. Hospital Course:     Mr. Terese Quinn is a 74year old male with PMH BPH , CHF / ICM EF 30% , moderate MR, HTN, HLD, ESRD on HD M/W/F who was sent in by his nephrologist due to worsening anemia and dark stools, s/p 2 units PRBC on admission. GI consulted, s/p EGD 11/7 with mild gastritis, no active bleeding, colonoscopy 11/8 with polyps but no active bleeding, will need repeat colonoscopy as outpatient. Stable for discharge home on 11/13. F/u pcp, renal, GI.       Acute on chronic anemia  Dark stool- resolved  - Hg 5.9 on admit, baseline Hg 8-10  - s/p 2 unit pRBC on admit  - GI consulted, consider capsule endoscopy if Hg worsening  - s/p EGD 11/7 with some mild gastritis, no active bleeding  - s/p colonoscopy 11/8 with polyps but no active bleeding, will need to repeat colonoscopy as outpatient  - trend Hg,   - PPI BID  - may need blood transfusion soon     ERSD on HD M/W/F  - s/p HD 11/6  - renal consulted  - s/p recent AV fistula repair by vascular surgery in September  - HD per renal     Type 2 diabetes with hyperglycemia  Diabetic nephropathy  -Increase long acting insulin to 28 units and mealtime insulin to 8 units TID  Low-dose sliding scale  Controlled renal diet  Continue home aspirin     Hyperlipidemia  Continue home statin     BPH continue home Flomax     Ischemic cardiomyopathy EF 30% without acute exacerbation of congestive heart failure  Chronic Systolic heart failure  -Continue home metoprolol hydralazine  Not on ACE or ARB due to renal disease  -Does not use diuretics is anuric     Syncope with HD  - evaluated by cardiology  - tele  - echo reviewed     Hypotension- resolved  - BP 80s/60s this morning, possibly from HD fluid removal yesterday  - 250 cc bolus given and dizziness resolved  - hold BP rx for now and will monitor today     Exam  GEN: male in NAD  HEENT: EOMI  Pulm: CTAB, no crackles or wheezes  CV: RRR, no murmurs  ABD: Soft, non-tender, non-distended, +BS  SKIN: warm, dry  EXT: no edema    Operative Procedures: Procedure(s) (LRB):  COLONOSCOPY (N/A)     Imaging: No results found. Home Medication Changes:     I reconciled current and discharge medications on day of discharge. These medication changes have been made as below         Medication List        CHANGE how you take these medications      aspirin 81 MG Tbec  Take 1 tablet (81 mg total) by mouth daily. Start taking on: November 20, 2023  What changed: These instructions start on November 20, 2023.  If you are unsure what to do until then, ask your doctor or other care provider. finasteride 5 MG Tabs  Commonly known as: Proscar  TAKE 1 TABLET(5 MG) BY MOUTH DAILY  What changed:   when to take this  additional instructions            CONTINUE taking these medications      acetaminophen 500 MG Tabs  Commonly known as: Tylenol Extra Strength     Basaglar KwikPen 100 UNIT/ML Sopn  Generic drug: insulin glargine     Dexcom G6 Sensor Misc  1 each by Does not apply route Every 10 days. gabapentin 100 MG Caps  Commonly known as: Neurontin     hydrALAZINE 50 MG Tabs  Commonly known as: Apresoline  Take 1 tablet (50 mg total) by mouth 3 (three) times daily. Insulin Lispro (1 Unit Dial) 100 UNIT/ML Sopn  Commonly known as: HumaLOG KwikPen  Inject 14 Units into the skin 3 (three) times daily. metoprolol succinate ER 50 MG Tb24  Commonly known as: Toprol XL     multivitamin Tabs  Take 1 tablet by mouth daily. OneTouch Ultra Mini w/Device Kit  Test Blood Sugar Once Daily. Non-Insulin Dependent Diabetes Type II. E11.9. OneTouch Ultra Strp  Generic drug: Glucose Blood  Test Blood Sugar 4 Times Daily. Insulin Dependent Diabetes Type II. Z79.4, E11.9. OneTouch UltraSoft Lancets Misc  Test Blood Sugar Once Daily.  Non-Insulin Dependent Diabetes Type II. E11.9.     rosuvastatin 20 MG Tabs  Commonly known as: Crestor     sevelamer carbonate 800 MG Tabs  Commonly known as: Renvela     tamsulosin 0.4 MG Caps  Commonly known as: Flomax  TAKE 2 CAPSULES(0.8 MG) BY MOUTH DAILY     TRUEplus Pen Needles 32G X 4 MM Misc  Generic drug: Insulin Pen Needle  Use 1 needle 4 times a day               Where to Get Your Medications        These medications were sent to [address], [place] - [address] AT [address] [facility], [phone], [phone]  [address], [facility] [address]      Hours: 24-hours Phone: [phone]   aspirin 81 MG Tbec         Activity: activity as tolerated  Diet: renal diet  Wound Care: none needed  Code Status: Full Code  O2: n/a    Follow-up with:    PCP   Specialist renal, GI      FU   Follow-up Information       SAUL Ruiz Follow up in 1 week(s). Specialty: Nurse Practitioner  Why: Diabetes follow-up  Contact information:  [address] [place] [address] [address]                Ashley Astudillo MD Follow up in 2 week(s). Specialties: GASTROENTEROLOGY, Internal Medicine  Why: Follow up in 2-4 weeks for capsule endoscopy  Contact information:  [address] Rubi Simmons MD Follow up in 3 month(s). Specialties: GASTROENTEROLOGY, Internal Medicine  Why: follow up in 3-6 months for repeat colonoscopy for polyp removal  Contact information:  [facility]               Nettie Ha MD Follow up. Specialty: NEPHROLOGY  Why: As needed  Contact information:  [address] [facility] [address]                             DC instructions: Other Discharge Instructions:         Sometimes managing your health at home requires assistance. The [facility] team has recognized your preference to use [address] ([address] [facility]), Phone: [phone]. A representative from the home health agency will contact you or your family to schedule your first visit.              Follow-up with labs: CBC    Total Time Coordinating Care: 31 minutes    Patient had opportunity to ask questions and state understand and agree with therapeutic plan as outlined      Jesus Storey MD  [facility] Hospitalist

## 2023-11-15 ENCOUNTER — PATIENT OUTREACH (OUTPATIENT)
Dept: CASE MANAGEMENT | Age: 73
End: 2023-11-15

## 2023-11-15 NOTE — PROGRESS NOTES
DM apt request (discharged 11/13)    Enedelia Prather, VITALIY  Endocrinology  [facility]  [address] [place] [address]  [phone]  Apt made: Mon 11/27 @9:20am    Dr Vince Bearden, Internal Medicine  [address]  [place] [address]  [phone]  Follow up 2-4 weeks  Per office, pt doesn't need an in-office apt  Pt needs to call Dr Lyn Mims surgery coordinator, Tsering Dorantes @[phone] to schedule a repeat Colonoscopy in Jan '24    Dr Aldair Esqueda  Internal Medicine  [address], [facility] [address]  [phone]  Dr Lisa Walton office will call pt, due to no availability in needed time frame  Confirmed w/pt  Closing encounter

## 2023-11-16 NOTE — PAYOR COMM NOTE
--------------  DISCHARGE REVIEW    Payor: [address] #:  [id number]  Authorization Number: Elayne Junes date: 11/6/23  Admit time:   4:55 PM  Discharge Date: 11/13/2023  8:13 PM     Admitting Physician: Alexa Carmichael MD  Attending Physician:  No att. providers found  Primary Care Physician: Mariela Lockett MD          Discharge Summary Notes        Discharge Summary signed by Alexa Carmichael MD at 11/13/2023  9:13 PM       Author: Alexa Carmichael MD Specialty: HOSPITALIST Author Type: Physician    Filed: 11/13/2023  9:13 PM Date of Service: 11/13/2023  9:09 PM Status: Signed    : Alexa Carmichael MD (Physician)           General Medicine Discharge Summary     Patient ID:  Lora Smith  74year old  9/1/1950    Admit date: 11/6/2023    Discharge date and time: 11/13/23    Attending Physician: No att. providers found     Primary Care Physician: Chuyita Almazan MD     Reason for admission: anemia    Discharge Diagnoses: Anemia, unspecified type [D64.9]    Discharged Condition: stable    Disposition: home    Consults:   Consultants         Provider   Role Specialty     Dawson Valle MD  Consulting Physician Lorena Magaña MD  Consulting Physician Junior Sanchez MD  Consulting Physician Cardiovascular Diseases              HPI: Mr. Jay Castillo is a 74year old male with PMH BPH , CHF / ICM EF 30% , moderate MR, HTN, HLD, ESRD on HD M/W/F who was sent in by his nephrologist due to worsening anemia and dark stools. Patient states he has had dark stools for about 1 week. Normally has low Hg around 8-10. He thinks he had a colonoscopy in [facility] a few years ago but does not know where. Wife at bedside and does not think he has had a colonoscopy in the past. Does use ASA but no other blood thinners. No NSAIDs, no ETOH use. No family hx of GI cancers.  Patient recently admitted in September due to syncope with HD, had AV fistula malfunction surgically repaired that admission     In the ED, BP elevated, labs significant for Hg 5.9. GI consulted, 1 unit pRBC ordered. Hospital Course:     Mr. Andi Jones is a 74year old male with PMH BPH , CHF / ICM EF 30% , moderate MR, HTN, HLD, ESRD on HD M/W/F who was sent in by his nephrologist due to worsening anemia and dark stools, s/p 2 units PRBC on admission. GI consulted, s/p EGD 11/7 with mild gastritis, no active bleeding, colonoscopy 11/8 with polyps but no active bleeding, will need repeat colonoscopy as outpatient. Stable for discharge home on 11/13. F/u pcp, renal, GI.       Acute on chronic anemia  Dark stool- resolved  - Hg 5.9 on admit, baseline Hg 8-10  - s/p 2 unit pRBC on admit  - GI consulted, consider capsule endoscopy if Hg worsening  - s/p EGD 11/7 with some mild gastritis, no active bleeding  - s/p colonoscopy 11/8 with polyps but no active bleeding, will need to repeat colonoscopy as outpatient  - trend Hg,   - PPI BID  - may need blood transfusion soon     ERSD on HD M/W/F  - s/p HD 11/6  - renal consulted  - s/p recent AV fistula repair by vascular surgery in September  - HD per renal     Type 2 diabetes with hyperglycemia  Diabetic nephropathy  -Increase long acting insulin to 28 units and mealtime insulin to 8 units TID  Low-dose sliding scale  Controlled renal diet  Continue home aspirin     Hyperlipidemia  Continue home statin     BPH continue home Flomax     Ischemic cardiomyopathy EF 30% without acute exacerbation of congestive heart failure  Chronic Systolic heart failure  -Continue home metoprolol hydralazine  Not on ACE or ARB due to renal disease  -Does not use diuretics is anuric     Syncope with HD  - evaluated by cardiology  - tele  - echo reviewed     Hypotension- resolved  - BP 80s/60s this morning, possibly from HD fluid removal yesterday  - 250 cc bolus given and dizziness resolved  - hold BP rx for now and will monitor today     Exam  GEN: male in NAD  HEENT: EOMI  Pulm: CTAB, no crackles or wheezes  CV: RRR, no murmurs  ABD: Soft, non-tender, non-distended, +BS  SKIN: warm, dry  EXT: no edema    Operative Procedures: Procedure(s) (LRB):  COLONOSCOPY (N/A)     Imaging: No results found. Home Medication Changes:     I reconciled current and discharge medications on day of discharge. These medication changes have been made as below         Medication List        CHANGE how you take these medications      aspirin 81 MG Tbec  Take 1 tablet (81 mg total) by mouth daily. Start taking on: November 20, 2023  What changed: These instructions start on November 20, 2023. If you are unsure what to do until then, ask your doctor or other care provider. finasteride 5 MG Tabs  Commonly known as: Proscar  TAKE 1 TABLET(5 MG) BY MOUTH DAILY  What changed:   when to take this  additional instructions            CONTINUE taking these medications      acetaminophen 500 MG Tabs  Commonly known as: Tylenol Extra Strength     Basaglar KwikPen 100 UNIT/ML Sopn  Generic drug: insulin glargine     Dexcom G6 Sensor Misc  1 each by Does not apply route Every 10 days. gabapentin 100 MG Caps  Commonly known as: Neurontin     hydrALAZINE 50 MG Tabs  Commonly known as: Apresoline  Take 1 tablet (50 mg total) by mouth 3 (three) times daily. Insulin Lispro (1 Unit Dial) 100 UNIT/ML Sopn  Commonly known as: HumaLOG KwikPen  Inject 14 Units into the skin 3 (three) times daily. metoprolol succinate ER 50 MG Tb24  Commonly known as: Toprol XL     multivitamin Tabs  Take 1 tablet by mouth daily. OneTouch Ultra Mini w/Device Kit  Test Blood Sugar Once Daily. Non-Insulin Dependent Diabetes Type II. E11.9. OneTouch Ultra Strp  Generic drug: Glucose Blood  Test Blood Sugar 4 Times Daily. Insulin Dependent Diabetes Type II. Z79.4, E11.9. OneTouch UltraSoft Lancets Misc  Test Blood Sugar Once Daily.  Non-Insulin Dependent Diabetes Type II. E11.9.     rosuvastatin 20 MG Tabs  Commonly known as: Crestor     sevelamer carbonate 800 MG Tabs  Commonly known as: Renvela     tamsulosin 0.4 MG Caps  Commonly known as: Flomax  TAKE 2 CAPSULES(0.8 MG) BY MOUTH DAILY     TRUEplus Pen Needles 32G X 4 MM Misc  Generic drug: Insulin Pen Needle  Use 1 needle 4 times a day               Where to Get Your Medications        These medications were sent to [address], [place] - [address] [facility], [phone], [phone]  [address], [facility] [address]      Hours: 24-hours Phone: [phone]   aspirin 81 MG Tbec         Activity: activity as tolerated  Diet: renal diet  Wound Care: none needed  Code Status: Full Code  O2: n/a    Follow-up with:    PCP   Specialist renal, GI      FU   Follow-up Information       SAUL Singh Follow up in 1 week(s). Specialty: Nurse Practitioner  Why: Diabetes follow-up  Contact information:  [address] [place] [address]                Jacey Barker MD Follow up in 2 week(s). Specialties: GASTROENTEROLOGY, Internal Medicine  Why: Follow up in 2-4 weeks for capsule endoscopy  Contact information:  [address] [facility]               Reed Curry MD Follow up in 3 month(s). Specialties: GASTROENTEROLOGY, Internal Medicine  Why: follow up in 3-6 months for repeat colonoscopy for polyp removal  Contact information:  [facility] Follow up. Specialty: NEPHROLOGY  Why: As needed  Contact information:  [address] [facility] [address]                             [place] instructions: Other Discharge Instructions:         Sometimes managing your health at home requires assistance.   The [facility] team has recognized your preference to use [facility] ([address] [place]), Phone: [phone]. A representative from the home health agency will contact you or your family to schedule your first visit.              Follow-up with labs: CBC    Total Time Coordinating Care: 31 minutes    Patient had opportunity to ask questions and state understand and agree with therapeutic plan as outlined      Tate Gil MD  [facility] Hospitalist            Electronically signed by Israel Camacho MD on 11/13/2023  9:13 PM         REVIEWER COMMENTS

## 2023-11-27 ENCOUNTER — APPOINTMENT (OUTPATIENT)
Dept: GENERAL RADIOLOGY | Facility: HOSPITAL | Age: 73
End: 2023-11-27
Payer: MEDICARE

## 2023-11-27 ENCOUNTER — HOSPITAL ENCOUNTER (EMERGENCY)
Facility: HOSPITAL | Age: 73
Discharge: HOME | End: 2023-11-27
Attending: EMERGENCY MEDICINE
Payer: MEDICARE

## 2023-11-27 VITALS
HEIGHT: 72 IN | TEMPERATURE: 98 F | WEIGHT: 252 LBS | SYSTOLIC BLOOD PRESSURE: 157 MMHG | OXYGEN SATURATION: 95 % | RESPIRATION RATE: 20 BRPM | BODY MASS INDEX: 34.13 KG/M2 | DIASTOLIC BLOOD PRESSURE: 76 MMHG | HEART RATE: 95 BPM

## 2023-11-27 DIAGNOSIS — J20.9 ACUTE BRONCHITIS, UNSPECIFIED ORGANISM: Primary | ICD-10-CM

## 2023-11-27 LAB
FLUAV + FLUBV RNA SPEC NAA+PROBE: NEGATIVE
FLUAV + FLUBV RNA SPEC NAA+PROBE: NEGATIVE
RSV RNA SPEC NAA+PROBE: NEGATIVE
SARS-COV-2 RNA RESP QL NAA+PROBE: NOT DETECTED

## 2023-11-27 PROCEDURE — 94640 AIRWAY INHALATION TREATMENT: CPT

## 2023-11-27 PROCEDURE — 0241U SARS-COV-2/FLU A AND B/RSV BY PCR (GENEXPERT): CPT

## 2023-11-27 PROCEDURE — 0241U SARS-COV-2/FLU A AND B/RSV BY PCR (GENEXPERT): CPT | Performed by: EMERGENCY MEDICINE

## 2023-11-27 PROCEDURE — 99284 EMERGENCY DEPT VISIT MOD MDM: CPT

## 2023-11-27 PROCEDURE — 71045 X-RAY EXAM CHEST 1 VIEW: CPT

## 2023-11-27 RX ORDER — IPRATROPIUM BROMIDE AND ALBUTEROL SULFATE 2.5; .5 MG/3ML; MG/3ML
3 SOLUTION RESPIRATORY (INHALATION) ONCE
Status: COMPLETED | OUTPATIENT
Start: 2023-11-27 | End: 2023-11-27

## 2023-11-27 RX ORDER — ALBUTEROL SULFATE 90 UG/1
2 AEROSOL, METERED RESPIRATORY (INHALATION) EVERY 4 HOURS PRN
Qty: 1 EACH | Refills: 0 | Status: ON HOLD | OUTPATIENT
Start: 2023-11-27 | End: 2023-12-27

## 2023-11-28 NOTE — ED INITIAL ASSESSMENT (HPI)
Pt presents with family stating that he has been coughing a lot. He had dialysis today and was able to finish his dialysis but they want him to get a chest xray to make sure he doesn't have an infection.

## 2023-11-28 NOTE — DISCHARGE INSTRUCTIONS
Follow-up with your primary physician. Return to the emergency department if increasing shortness of breath, fever, or other new symptoms develop.

## 2023-12-18 ENCOUNTER — HOSPITAL ENCOUNTER (INPATIENT)
Facility: HOSPITAL | Age: 73
LOS: 18 days | Discharge: NURSING, REHAB OR LONG-TERM CARE | End: 2024-01-05
Attending: EMERGENCY MEDICINE | Admitting: HOSPITALIST
Payer: MEDICARE

## 2023-12-18 ENCOUNTER — APPOINTMENT (OUTPATIENT)
Dept: GENERAL RADIOLOGY | Facility: HOSPITAL | Age: 73
End: 2023-12-18
Attending: EMERGENCY MEDICINE
Payer: MEDICARE

## 2023-12-18 DIAGNOSIS — Z99.2 ESRD ON DIALYSIS (HCC): ICD-10-CM

## 2023-12-18 DIAGNOSIS — N18.6 ESRD ON DIALYSIS (HCC): ICD-10-CM

## 2023-12-18 DIAGNOSIS — J96.01 ACUTE RESPIRATORY FAILURE WITH HYPOXIA (HCC): Primary | ICD-10-CM

## 2023-12-18 PROBLEM — D69.6 THROMBOCYTOPENIA (HCC): Status: ACTIVE | Noted: 2023-12-18

## 2023-12-18 PROBLEM — D69.6 THROMBOCYTOPENIA (HCC): Status: ACTIVE | Noted: 2023-01-01

## 2023-12-18 LAB
ANION GAP SERPL CALC-SCNC: 12 MMOL/L (ref 0–18)
ATRIAL RATE: 81 BPM
BASOPHILS # BLD AUTO: 0.03 X10(3) UL (ref 0–0.2)
BASOPHILS NFR BLD AUTO: 0.4 %
BNP SERPL-MCNC: 271 PG/ML
BUN BLD-MCNC: 67 MG/DL (ref 9–23)
BUN/CREAT SERPL: 7.6 (ref 10–20)
CALCIUM BLD-MCNC: 8.5 MG/DL (ref 8.7–10.4)
CHLORIDE SERPL-SCNC: 100 MMOL/L (ref 98–112)
CHOLEST SERPL-MCNC: 181 MG/DL (ref ?–200)
CO2 SERPL-SCNC: 27 MMOL/L (ref 21–32)
CREAT BLD-MCNC: 8.79 MG/DL
DEPRECATED RDW RBC AUTO: 55.6 FL (ref 35.1–46.3)
EGFRCR SERPLBLD CKD-EPI 2021: 6 ML/MIN/1.73M2 (ref 60–?)
EOSINOPHIL # BLD AUTO: 0.03 X10(3) UL (ref 0–0.7)
EOSINOPHIL NFR BLD AUTO: 0.4 %
ERYTHROCYTE [DISTWIDTH] IN BLOOD BY AUTOMATED COUNT: 15.7 % (ref 11–15)
FLUAV + FLUBV RNA SPEC NAA+PROBE: NEGATIVE
FLUAV + FLUBV RNA SPEC NAA+PROBE: POSITIVE
GLUCOSE BLD-MCNC: 159 MG/DL (ref 70–99)
GLUCOSE BLDC GLUCOMTR-MCNC: 104 MG/DL (ref 70–99)
GLUCOSE BLDC GLUCOMTR-MCNC: 133 MG/DL (ref 70–99)
GLUCOSE BLDC GLUCOMTR-MCNC: 86 MG/DL (ref 70–99)
HCT VFR BLD AUTO: 32 %
HDLC SERPL-MCNC: 34 MG/DL (ref 40–59)
HGB BLD-MCNC: 9.6 G/DL
IMM GRANULOCYTES # BLD AUTO: 0.02 X10(3) UL (ref 0–1)
IMM GRANULOCYTES NFR BLD: 0.2 %
LDLC SERPL CALC-MCNC: 106 MG/DL (ref ?–100)
LYMPHOCYTES # BLD AUTO: 1.07 X10(3) UL (ref 1–4)
LYMPHOCYTES NFR BLD AUTO: 12.7 %
MCH RBC QN AUTO: 29.6 PG (ref 26–34)
MCHC RBC AUTO-ENTMCNC: 30 G/DL (ref 31–37)
MCV RBC AUTO: 98.8 FL
MONOCYTES # BLD AUTO: 0.61 X10(3) UL (ref 0.1–1)
MONOCYTES NFR BLD AUTO: 7.2 %
NEUTROPHILS # BLD AUTO: 6.68 X10 (3) UL (ref 1.5–7.7)
NEUTROPHILS # BLD AUTO: 6.68 X10(3) UL (ref 1.5–7.7)
NEUTROPHILS NFR BLD AUTO: 79.1 %
NONHDLC SERPL-MCNC: 147 MG/DL (ref ?–130)
OSMOLALITY SERPL CALC.SUM OF ELEC: 311 MOSM/KG (ref 275–295)
P AXIS: 64 DEGREES
P-R INTERVAL: 214 MS
PHOSPHATE SERPL-MCNC: 10.8 MG/DL (ref 2.4–5.1)
PLATELET # BLD AUTO: 141 10(3)UL (ref 150–450)
POTASSIUM SERPL-SCNC: 4.7 MMOL/L (ref 3.5–5.1)
Q-T INTERVAL: 418 MS
QRS DURATION: 138 MS
QTC CALCULATION (BEZET): 485 MS
R AXIS: -65 DEGREES
RBC # BLD AUTO: 3.24 X10(6)UL
RSV RNA SPEC NAA+PROBE: NEGATIVE
SARS-COV-2 RNA RESP QL NAA+PROBE: NOT DETECTED
SODIUM SERPL-SCNC: 139 MMOL/L (ref 136–145)
T AXIS: 110 DEGREES
TRIGL SERPL-MCNC: 234 MG/DL (ref 30–149)
TROPONIN I SERPL HS-MCNC: 54 NG/L
TROPONIN I SERPL HS-MCNC: 59 NG/L
VENTRICULAR RATE: 81 BPM
VLDLC SERPL CALC-MCNC: 40 MG/DL (ref 0–30)
WBC # BLD AUTO: 8.4 X10(3) UL (ref 4–11)

## 2023-12-18 PROCEDURE — 5A1D70Z PERFORMANCE OF URINARY FILTRATION, INTERMITTENT, LESS THAN 6 HOURS PER DAY: ICD-10-PCS | Performed by: HOSPITALIST

## 2023-12-18 PROCEDURE — 71045 X-RAY EXAM CHEST 1 VIEW: CPT | Performed by: EMERGENCY MEDICINE

## 2023-12-18 RX ORDER — ENEMA 19; 7 G/133ML; G/133ML
1 ENEMA RECTAL ONCE AS NEEDED
Status: DISCONTINUED | OUTPATIENT
Start: 2023-12-18 | End: 2023-12-30

## 2023-12-18 RX ORDER — GABAPENTIN 100 MG/1
100 CAPSULE ORAL 2 TIMES DAILY
Status: DISCONTINUED | OUTPATIENT
Start: 2023-12-18 | End: 2024-01-05

## 2023-12-18 RX ORDER — HEPARIN SODIUM 5000 [USP'U]/ML
5000 INJECTION, SOLUTION INTRAVENOUS; SUBCUTANEOUS EVERY 8 HOURS SCHEDULED
Status: DISCONTINUED | OUTPATIENT
Start: 2023-12-18 | End: 2024-01-03

## 2023-12-18 RX ORDER — BISACODYL 10 MG
10 SUPPOSITORY, RECTAL RECTAL
Status: DISCONTINUED | OUTPATIENT
Start: 2023-12-18 | End: 2024-01-05

## 2023-12-18 RX ORDER — THIAMINE HCL 100 MG
500 TABLET ORAL DAILY
COMMUNITY

## 2023-12-18 RX ORDER — METOCLOPRAMIDE HYDROCHLORIDE 5 MG/ML
10 INJECTION INTRAMUSCULAR; INTRAVENOUS EVERY 8 HOURS PRN
Status: DISCONTINUED | OUTPATIENT
Start: 2023-12-18 | End: 2023-12-25

## 2023-12-18 RX ORDER — ROSUVASTATIN CALCIUM 20 MG/1
20 TABLET, COATED ORAL NIGHTLY
Status: DISCONTINUED | OUTPATIENT
Start: 2023-12-18 | End: 2023-12-18 | Stop reason: DRUGHIGH

## 2023-12-18 RX ORDER — ACETAMINOPHEN 500 MG
500 TABLET ORAL EVERY 4 HOURS PRN
Status: DISCONTINUED | OUTPATIENT
Start: 2023-12-18 | End: 2024-01-03

## 2023-12-18 RX ORDER — ONDANSETRON 2 MG/ML
4 INJECTION INTRAMUSCULAR; INTRAVENOUS EVERY 6 HOURS PRN
Status: DISCONTINUED | OUTPATIENT
Start: 2023-12-18 | End: 2024-01-05

## 2023-12-18 RX ORDER — NICOTINE POLACRILEX 4 MG
15 LOZENGE BUCCAL
Status: DISCONTINUED | OUTPATIENT
Start: 2023-12-18 | End: 2024-01-05

## 2023-12-18 RX ORDER — NICOTINE POLACRILEX 4 MG
30 LOZENGE BUCCAL
Status: DISCONTINUED | OUTPATIENT
Start: 2023-12-18 | End: 2024-01-05

## 2023-12-18 RX ORDER — OSELTAMIVIR PHOSPHATE 30 MG/1
30 CAPSULE ORAL
Qty: 5 CAPSULE | Refills: 0 | Status: COMPLETED | OUTPATIENT
Start: 2023-12-18 | End: 2023-12-22

## 2023-12-18 RX ORDER — ASPIRIN 81 MG/1
81 TABLET ORAL DAILY
Status: DISCONTINUED | OUTPATIENT
Start: 2023-12-18 | End: 2023-12-23

## 2023-12-18 RX ORDER — SEVELAMER CARBONATE 800 MG/1
800 TABLET, FILM COATED ORAL
Status: DISCONTINUED | OUTPATIENT
Start: 2023-12-18 | End: 2023-12-26

## 2023-12-18 RX ORDER — POLYETHYLENE GLYCOL 3350 17 G/17G
17 POWDER, FOR SOLUTION ORAL DAILY PRN
Status: DISCONTINUED | OUTPATIENT
Start: 2023-12-18 | End: 2024-01-05

## 2023-12-18 RX ORDER — ALBUTEROL SULFATE 90 UG/1
2 AEROSOL, METERED RESPIRATORY (INHALATION) EVERY 4 HOURS PRN
Status: DISCONTINUED | OUTPATIENT
Start: 2023-12-18 | End: 2024-01-03

## 2023-12-18 RX ORDER — FINASTERIDE 5 MG/1
5 TABLET, FILM COATED ORAL EVERY MORNING
Status: DISCONTINUED | OUTPATIENT
Start: 2023-12-18 | End: 2023-12-23

## 2023-12-18 RX ORDER — ROSUVASTATIN CALCIUM 10 MG/1
10 TABLET, COATED ORAL NIGHTLY
Status: DISCONTINUED | OUTPATIENT
Start: 2023-12-18 | End: 2024-01-05

## 2023-12-18 RX ORDER — DEXTROSE MONOHYDRATE 25 G/50ML
50 INJECTION, SOLUTION INTRAVENOUS
Status: DISCONTINUED | OUTPATIENT
Start: 2023-12-18 | End: 2024-01-05

## 2023-12-18 RX ORDER — METOPROLOL SUCCINATE 50 MG/1
50 TABLET, EXTENDED RELEASE ORAL 2 TIMES DAILY
Status: DISCONTINUED | OUTPATIENT
Start: 2023-12-18 | End: 2023-12-23

## 2023-12-18 RX ORDER — HYDRALAZINE HYDROCHLORIDE 50 MG/1
50 TABLET, FILM COATED ORAL 3 TIMES DAILY
Status: DISCONTINUED | OUTPATIENT
Start: 2023-12-18 | End: 2024-01-05

## 2023-12-18 RX ORDER — PANTOPRAZOLE SODIUM 40 MG/1
40 TABLET, DELAYED RELEASE ORAL
Status: DISCONTINUED | OUTPATIENT
Start: 2023-12-18 | End: 2023-12-23

## 2023-12-18 RX ORDER — SENNOSIDES 8.6 MG
17.2 TABLET ORAL NIGHTLY PRN
Status: DISCONTINUED | OUTPATIENT
Start: 2023-12-18 | End: 2024-01-05

## 2023-12-18 RX ORDER — TAMSULOSIN HYDROCHLORIDE 0.4 MG/1
0.8 CAPSULE ORAL DAILY
Status: DISCONTINUED | OUTPATIENT
Start: 2023-12-18 | End: 2024-01-03

## 2023-12-18 NOTE — CONSULTS
NEPHROLOGY CONSULT NOTE       DATE: 12/18/2023    Requesting Physician: Dr. Zapata     Reason for Consult: ED consult      HISTORY OF PRESENT ILLNESS: Terrence Vines is a 73 year old male with PMH sig for ESRD on HD MWF, HTN, HLD, DM2, CAD s/p CABG x3, ischemic cardiomyopathy. Presents with SOB and cough. Patient states he has been having symptoms for the last few weeks.  Reports feeling feverish today. States wife is also having symptoms. Completed full session of dialysis on Friday.  Patient noted to be hypoxic in the ED and is currently requiring nasal cannula. CXR with pulmonary vascular changes. Found to be positive for influenza A.  Nephrology is consulted for dialysis. Patient denies chest pain, n/v, abd pain or diarrhea.     MEDICAL HISTORY:   Past Medical History:   Diagnosis Date    BPH (benign prostatic hypertrophy)     Cataract     Congestive heart disease (HCC)     Coronary atherosclerosis     Diabetic retinopathy (HCC)     Dialysis patient (HCC)     M,W,F,    Esophageal reflux     Heart valve disease     High blood pressure     High cholesterol     HLD (hyperlipidemia)     HTN (hypertension)     Neuropathy     Proteinuria     Renal disorder     HD every MWF with temporary HD cath    Type II diabetes mellitus (HCC)     Visual impairment     Reading glasses       SURGICAL HISTORY:   Past Surgical History:   Procedure Laterality Date    CABG  06/21/2021    x3-lima-->LAD, SVG-->diag and SVG-->OM    CATARACT Right     COLONOSCOPY N/A 11/8/2023    Procedure: COLONOSCOPY;  Surgeon: Cullen Bautista MD;  [facility] END[facility]COPY       FAMILY HISTORY:   Family History   Problem Relation Age of Onset    No Known Problems Father     No Known Problems Mother     No Known Problems Daughter     No Known Problems Son     No Known Problems Son     No Known Problems Son     No Known Problems Sister     No Known Problems Brother        SOCIAL HISTORY:   Social History     Socioeconomic History    Marital status:       Spouse name: Not on file    Number of children: Not on file    Years of education: Not on file    Highest education level: Not on file   Occupational History    Not on file   Tobacco Use    Smoking status: Never    Smokeless tobacco: Never   Vaping Use    Vaping Use: Never used   Substance and Sexual Activity    Alcohol use: Never    Drug use: Never    Sexual activity: Not on file   Other Topics Concern    Caffeine Concern Not Asked    Exercise Not Asked    Seat Belt Not Asked    Special Diet Not Asked    Stress Concern Not Asked    Weight Concern Not Asked   Social History Narrative    Not on file     Social Determinants of Health     Financial Resource Strain: Not on file   Food Insecurity: No Food Insecurity (11/6/2023)    Food Insecurity     Food Insecurity: Never true   Transportation Needs: No Transportation Needs (11/6/2023)    Transportation Needs     Lack of Transportation: No   Physical Activity: Not on file   Stress: Not on file   Social Connections: Not on file   Housing Stability: Low Risk  (11/6/2023)    Housing Stability     Housing Instability: No     Housing Instability Emergency: Not on file       MEDICATIONS:   No current facility-administered medications for this encounter.       ALLERGIES: No Known Allergies    REVIEW OF SYSTEMS:  A 10 pt review of systems was conducted and is negative except for what is mentioned in the HPI       PHYSICAL EXAM:  BP (!) 178/81   Pulse 76   Temp 97.1 °F (36.2 °C) (Temporal)   Resp 16   SpO2 99%   GEN: NAD, mildly dyspneic while speaking,  on oxygen   HEENT: NCAT    CHEST: coarse breath sounds    CARDIAC: S1S2 normal  ABD: soft, NT/ND  EXT:  no lower ext edema  NEURO: awake, alert  SKIN: warm, dry   PSYCH: mood appropriate   DIALYSIS ACCESS: LUE AVF with palpable thrill and bruit     LABS:  Recent Labs   Lab 12/18/23  0851   *   BUN 67*   CREATSERUM 8.79*   EGFRCR 6*   CA 8.5*      K 4.7      CO2 27.0     Recent Labs   Lab 12/18/23  0851    RBC 3.24*   HGB 9.6*   HCT 32.0*   MCV 98.8   MCH 29.6   MCHC 30.0*   RDW 15.7*   NEPRELIM 6.68   WBC 8.4   .0*           INTAKE/OUTPUT:  No intake or output data in the 24 hours ending 12/18/23 1047        IMAGING:  XR CHEST AP PORTABLE  (CPT=71045)    Result Date: 12/18/2023  CONCLUSION:  1. Cardiomegaly and prominent central vasculature.  CABG. 2. Mild perihilar and lower lobe pulmonary vascular congestive changes.  No pleural effusion    Dictated by (CST): Zaki Reyna MD on 12/18/2023 at 9:18 AM     Finalized by (CST): Zaki Reyna MD on 12/18/2023 at 9:21 AM              ASSESSMENT AND PLAN:   This is a 73 year old male with PMH sig for ESRD on HD MWF, HTN, HLD, DM2, CAD s/p CABG x3, ischemic cardiomyopathy. Presents with SOB and cough. Found to be influenza positive. Nephrology is consulted for dialysis.     ESRD on HD MWF   - full session of dialysis today, fluid removal as tolerated   - will reassess tomorrow for additional dialysis   - continue sevelamer, renal diet   - followed by [facility]      Acute hypoxic respiratory failure  - CXR with pulmonary vascular changes  - fluid removal with dialysis   - wean oxygen as tolerated     Influenza A   - per primary      Elevated troponin  - per primary   - discussed with yesenia Douglass for dialysis today     Hypertension   - Hydralazine, metoprolol      Anemia   - trend Hb   - receiving OMAIRA with outpatient dialysis       ED physician      Thank you for the consult and allowing us to participate in the care of Terrence Vines.  We will continue to follow.    Hue Galvan MD  [facility] - Nephrology  12/18/2023

## 2023-12-18 NOTE — ED INITIAL ASSESSMENT (HPI)
Patient arrives to ER for evaluation of Difficulty breathing x 3 days. Patient visibly dyspenic on arrival. 87% on RA in triage.     Patient with left sided fistula. Dialysis supposed to be today at 3pm.

## 2023-12-18 NOTE — CONSULTS
Cardiology Consultation  [facility]    Terrence Vines Patient Status:  Inpatient    1950 MRN [id number]   Location [facility] 5SW/SE Attending Bradley Dash MD   Hosp Day # 0 PCP Victor Manuel Cleaning MD     Reason for Consultation:  Elevated troponin    History of Present Illness:  Terrence Vines is a a(n) 73 year old male with BPH, CAD (s/p CABG ) CHF/ICM (EF 30%), moderate MR, HTN, HDL, ESRD on HD M/W/F, anemia, gastritis who presented with SOB.    He states that he has been having a cough for over a month but felt more acute SOB the day prior to admission. No chest pain . Doesn't weigh himself at home or monitor BP  makes small amount of urine.     Trop 59 today      Cardiac history:    Angiogram 2021: multivessel CAD (LAD, diag, LCx) with possible proximal LAD dissection; s/p 3v CABG 2021 (LIMA-LAD, SVG-OM, SVG-diag)     Nuc   - Abnormal nuclear perfusion study.   - There is no evidence of myocardial ischemia.   - There is no evidence of myocardial infarction.   - Abnormal regional wall thickening is noted on gated SPECT analysis. May be     due to left bundle branch block   - Abnormal ejection fraction.     Holter 2023: Underlying first degree AVB and LBBB.    History:  Past Medical History:   Diagnosis Date    BPH (benign prostatic hypertrophy)     Cataract     Congestive heart disease (HCC)     Coronary atherosclerosis     Diabetic retinopathy (HCC)     Dialysis patient ([facility])     M,W,F,    Esophageal reflux     Heart valve disease     High blood pressure     High cholesterol     HLD (hyperlipidemia)     HTN (hypertension)     Neuropathy     Proteinuria     Renal disorder     HD every MWF with temporary HD cath    Type II diabetes mellitus (HCC)     Visual impairment     Reading glasses     Past Surgical History:   Procedure Laterality Date    CABG  06/21/2021    x3-lima-->LAD, SVG-->diag and SVG-->OM    CATARACT Right     COLONOSCOPY N/A 2023    Procedure:  COLONOSCOPY;  Surgeon: Cullen Bautista MD;  Location: [facility] ENDOSCOPY     Family History   Problem Relation Age of Onset    No Known Problems Father     No Known Problems Mother     No Known Problems Daughter     No Known Problems Son     No Known Problems Son     No Known Problems Son     No Known Problems Sister     No Known Problems Brother       reports that he has never smoked. He has never used smokeless tobacco. He reports that he does not drink alcohol and does not use drugs.    Allergies:  No Known Allergies    Medications:  Current Facility-Administered Medications on File Prior to Encounter   Medication Dose Route Frequency Provider Last Rate Last Admin    [COMPLETED] ipratropium-albuterol (Duoneb) 0.5-2.5 (3) MG/3ML inhalation solution 3 mL  3 mL Nebulization Once Cem Ybarra MD   3 mL at 11/27/23 2159    [COMPLETED] sodium chloride 0.9% infusion   Intravenous Once Naa Duenas MD 10 mL/hr at 11/13/23 1500 New Bag at 11/13/23 1500    [COMPLETED] iron sucrose (Venofer) 20 mg/mL injection 200 mg  200 mg Intravenous Once Naa Duenas MD   200 mg at 11/12/23 1303    [COMPLETED] sodium chloride 0.9 % IV bolus 250 mL  250 mL Intravenous Once Mary Anne Jimenez  mL/hr at 11/10/23 0911 250 mL at 11/10/23 0911    [COMPLETED] iron sucrose (Venofer) 20 mg/mL injection 200 mg  200 mg Intravenous Once Cullen Bautista MD   200 mg at 11/09/23 0805    [COMPLETED] insulin aspart (NovoLOG) 100 Units/mL FlexPen 12 Units  12 Units Subcutaneous Once Mary Anne Jimenez DO   12 Units at 11/09/23 1807    [COMPLETED] Perflutren Lipid Microsphere (DEFINITY) 6.52 MG/ML injection 1.5 mL  1.5 mL Intravenous ONCE PRN Mary Anne Jimenez DO   1.5 mL at 11/08/23 0911    [COMPLETED] polyethylene glycol-electrolyte (Golytely) 236 g oral solution 4,000 mL  4,000 mL Oral Once Cullen Bautista MD   4,000 mL at 11/07/23 2001    [COMPLETED] sodium chloride 0.9% infusion   Intravenous Once Naa Duenas MD  10 mL/hr at 23 New Bag at 23    [COMPLETED] lidocaine PF (Xylocaine-MPF) 2 % injection             [COMPLETED] heparin in sodium chloride 0.9% (Porcine) 2 Units/mL flush bag premix             [COMPLETED] heparin (Porcine) 1000 UNIT/ML injection             [COMPLETED] midazolam (Versed) 2 MG/2ML injection             [COMPLETED] fentaNYL (Sublimaze) 50 mcg/mL injection             [COMPLETED] verapamil (Isoptin) 2.5 mg/mL injection             [COMPLETED] protamine 10 mg/mL injection             [COMPLETED] Nitroglycerin in D5W 200-5 MCG/ML-% injection             [COMPLETED] iopamidol (ISOVUE-300) 61 % injection 100 mL  100 mL Injection ONCE PRN Gayle Leyva MD   35 mL at 23 1654    [COMPLETED] heparin (Porcine) 1000 UNIT/ML injection             [COMPLETED] heparin in sodium chloride 0.9% (Porcine) 2 Units/mL flush bag premix             [COMPLETED] heparin sodium lock flush 100 UNIT/ML injection             [COMPLETED] lidocaine PF (Xylocaine-MPF) 2 % injection             [COMPLETED] insulin regular human (Novolin R, Humulin R) 100 UNIT/ML injection 8 Units  8 Units Intravenous Once Dick Briceno MD   8 Units at 23 1502    [] heparin (Porcine) 5000 UNIT/ML injection 5,000 Units  5,000 Units Subcutaneous Q8H Esequiel Carpenter DO   5,000 Units at 23 2219    [COMPLETED] morphINE PF 2 MG/ML injection 2 mg  2 mg Intravenous Once Bao Dorantes MD   2 mg at 23 1923     Current Outpatient Medications on File Prior to Encounter   Medication Sig Dispense Refill    Magnesium 500 MG Oral Tab Take 1 tablet (500 mg total) by mouth daily.      cyanocobalamin 250 MCG Oral Tab Take 1 tablet (250 mcg total) by mouth daily.      Coenzyme Q10 (CO Q-10) 100 MG Oral Chew Tab Chew 100 mg by mouth daily.      albuterol 108 (90 Base) MCG/ACT Inhalation Aero Soln Inhale 2 puffs into the lungs every 4 (four) hours as needed for Wheezing. 1 each 0    aspirin 81 MG Oral Tab EC  Take 1 tablet (81 mg total) by mouth daily. 30 tablet 0    rosuvastatin 20 MG Oral Tab       Sevelamer 800 MG Oral Tab 3 (three) times daily with meals.      insulin glargine (BASAGLAR KWIKPEN) 100 UNIT/ML Subcutaneous Solution Pen-injector Inject 25 Units into the skin every morning.      metoprolol succinate ER 50 MG Oral Tablet 24 Hr Take 1 tablet (50 mg total) by mouth in the morning and 1 tablet (50 mg total) before bedtime.      hydrALAZINE 50 MG Oral Tab Take 1 tablet (50 mg total) by mouth 3 (three) times daily. (Patient taking differently: Take 1 tablet (50 mg total) by mouth 3 (three) times daily. Does not take before dialysis) 90 tablet 1    FINASTERIDE 5 MG Oral Tab TAKE 1 TABLET(5 MG) BY MOUTH DAILY (Patient taking differently: Take 1 tablet (5 mg total) by mouth every morning. TAKE 1 TABLET(5 MG) BY MOUTH DAILY) 90 tablet 3    multivitamin Oral Tab Take 1 tablet by mouth daily. 30 tablet 0    Insulin Lispro, 1 Unit Dial, (HUMALOG KWIKPEN) 100 UNIT/ML Subcutaneous Solution Pen-injector Inject 14 Units into the skin 3 (three) times daily. (Patient taking differently: Inject 15 Units into the skin 3 (three) times daily. Sliding scale) 15 mL 2    acetaminophen 500 MG Oral Tab Take 2 tablets (1,000 mg total) by mouth as needed.      gabapentin 100 MG Oral Cap Take 1 capsule (100 mg total) by mouth 2 (two) times daily. (Patient not taking: Reported on 12/18/2023)      Glucose Blood (ONETOUCH ULTRA) In Vitro Strip Test Blood Sugar 4 Times Daily. Insulin Dependent Diabetes Type II. Z79.4, E11.9. 100 strip 3    OneTouch UltraSoft Lancets Does not apply Misc Test Blood Sugar Once Daily. Non-Insulin Dependent Diabetes Type II. E11.9. 100 each 1    Continuous Blood Gluc Sensor (DEXCOM G6 SENSOR) Does not apply Misc 1 each by Does not apply route Every 10 days. 9 each 3    Insulin Pen Needle (TRUEPLUS PEN NEEDLES) 32G X 4 MM Does not apply Misc Use 1 needle 4 times a day 150 each 2    tamsulosin (FLOMAX) cap TAKE 2  CAPSULES(0.8 MG) BY MOUTH DAILY (Patient not taking: Reported on 12/18/2023) 180 capsule 3    Blood Glucose Monitoring Suppl (ONETOUCH ULTRA MINI) w/Device Does not apply Kit Test Blood Sugar Once Daily. Non-Insulin Dependent Diabetes Type II. E11.9. 1 kit 0       Review of Systems:  Constitutional: denies fevers, chills, night sweats  HEENT: denies headache, vision changes, trouble or pain with swallowing  Cardiac: denies chest pain, palpitations, edema  Pulm: denies dyspnea, cough, wheeze  GI: denies n/v, abd pain, diarrhea or constipation  : denies hematuria, dysuria, incontinence  MSK: denies muscle or joint pains  Neuro: denies numbness, weakness, paresthesias  Psych: denies anxiety, depression  Integument: denies skin rashes or lesions  Heme: denies easy bruising or bleeding  Endo: denies heat/cold intolerance, skin or nail changes      Physical Exam:  Blood pressure (!) 172/94, pulse 78, temperature 97.8 °F (36.6 °C), temperature source Oral, resp. rate 18, weight 242 lb (109.8 kg), SpO2 95%.  Wt Readings from Last 3 Encounters:   12/18/23 242 lb (109.8 kg)   11/27/23 252 lb (114.3 kg)   11/13/23 245 lb 4.8 oz (111.3 kg)       General: awake, alert, oriented x 3, no acute distress  HEENT: at/nc, perrl, eomi  Neck: No JVD, carotids 2+ no bruits.  Cardiac: Regular rate and rhythm, S1, S2 normal, no murmur, rub or gallop.  Lungs: Congested   Abdomen: Soft, non-tender, non-distended, normal bowel sounds   Extremities: Without clubbing, cyanosis or edema.  Peripheral pulses are present  L arm fistula  Neurologic: Alert and oriented, normal affect.  Psych: normal mood and affect  Skin: Warm and dry.       Laboratories and Data:  Diagnostics:  EKG:  NSR 1st AVB LBBB/LAD   Echo: 11/8/23  Conclusions:     1. Left ventricle: The cavity size was normal. Wall thickness was normal.      Systolic function was mildly reduced. The estimated ejection fraction was      45-50%, by visual assessment. Although no diagnostic  regional wall motion      abnormality was identified, this possibility cannot be completely      excluded on the basis of this study.   2. Mitral valve: There was mild regurgitation.   3. Pulmonary arteries: Systolic pressure was within the normal range,      estimated to be 30mm Hg.   4. Pericardium, extracardiac: There was no pericardial effusion.       CXR: 12/18/24 CMG pulm edema     Labs:   CBC:    Lab Results   Component Value Date    WBC 8.4 12/18/2023    WBC 7.6 11/12/2023    WBC 8.0 11/11/2023     Lab Results   Component Value Date    HGB 9.6 (L) 12/18/2023    HGB 8.1 (L) 11/13/2023    HGB 7.4 (L) 11/13/2023      Lab Results   Component Value Date    .0 (L) 12/18/2023    .0 (L) 11/12/2023    .0 (L) 11/11/2023     BMP:   No results found for: \"GLUCOSE\"  Lab Results   Component Value Date    K 4.7 12/18/2023    K 4.4 11/13/2023    K 4.3 11/12/2023     Lab Results   Component Value Date    BUN 67 (H) 12/18/2023    BUN 43 (H) 11/13/2023    BUN 31 (H) 11/12/2023     Lab Results   Component Value Date    CREATSERUM 8.79 (H) 12/18/2023    CREATSERUM 6.82 (H) 11/13/2023    CREATSERUM 5.37 (H) 11/12/2023     Cholesterol:     Lab Results   Component Value Date    CHOLEST 181 12/18/2023    CHOLEST 246 (H) 10/29/2022    CHOLEST 178.00 07/19/2021     Lab Results   Component Value Date    HDL 34 (L) 12/18/2023    HDL 31 (L) 10/29/2022    HDL 32 (L) 07/19/2021     Lab Results   Component Value Date    TRIG 234 (H) 12/18/2023    TRIG 488 (H) 10/29/2022    TRIG 304.00 (H) 07/19/2021     Lab Results   Component Value Date     (H) 12/18/2023     (H) 10/29/2022    LDL 85 07/19/2021     Lab Results   Component Value Date    AST 12 11/06/2023    AST 10 (L) 09/23/2023    AST 13 (L) 09/22/2023     Lab Results   Component Value Date    ALT 13 11/06/2023    ALT 22 09/23/2023    ALT 28 09/22/2023       Assessment/Plan:    - Pulmonary edema   - CAD S/P CABG  2021   - ICM  EF 45-50% echo  11/2023   -  HTN  - HL   - ESRD  HD  MWF   - [place] 1  / LBBB/ LAD   - h/o GI bleed  11/2023     PLAN:   - Volume management via dialysis  HD tonite   - elevated monitor as restart home meds  and has dialysis titrate as needed   - Statin / BB/ ASA  for CAD   - Tele   - Will follow

## 2023-12-18 NOTE — H&P
[facility] Hospitalist H&P       CC:   Chief Complaint   Patient presents with    Difficulty Breathing        PCP: Victor Manuel Cleaning MD    History of Present Illness: Mr. Vines is a 73 year old male with PMH BPH , CHF / ICM EF 30% , Moderate MR, HTN, HLD, ESRD on HD M/W/F, Anemia, Gastritis, who presents with SOB. Patient has had a cough for over a month but felt more SOB which started yesterday.  He was treated with a course of abx as out patient with no improvement in cough.  He was supposed to be on PPI from last admission but was not taking.  Denies CP, abd pain, n/v, f/c.  Appetite as been good.  Son at bedside and confirms that he has not missed any HD sessions and does not add salt to his food.     PMH  Past Medical History:   Diagnosis Date    BPH (benign prostatic hypertrophy)     Cataract     Congestive heart disease (HCC)     Coronary atherosclerosis     Diabetes (HCC)     Diabetic neuropathy (HCC)     Diabetic retinopathy (HCC)     Dialysis patient (HCC)     M,W,F,    Esophageal reflux     Heart valve disease     High blood pressure     High cholesterol     HLD (hyperlipidemia)     HTN (hypertension)     Neuropathy     Proteinuria     Renal disorder     HD every MWF with temporary HD cath    Type II diabetes mellitus (HCC)     Visual impairment     Reading glasses        PSH  Past Surgical History:   Procedure Laterality Date    CABG  06/21/2021    x3-lima-->LAD, SVG-->diag and SVG-->OM    CATARACT Right     COLONOSCOPY N/A 11/8/2023    Procedure: COLONOSCOPY;  Surgeon: Cullen Bautista MD;  Location: [facility] ENDOSCOPY        ALL:  No Known Allergies     Home Medications:  No outpatient medications have been marked as taking for the 12/18/23 encounter (Hospital Encounter).       Soc Hx  Social History     Tobacco Use    Smoking status: Never    Smokeless tobacco: Never   Substance Use Topics    Alcohol use: Never        Fam Hx  Family History   Problem Relation Age of Onset    No Known Problems Father     No  Known Problems Mother     No Known Problems Daughter     No Known Problems Son     No Known Problems Son     No Known Problems Son     No Known Problems Sister     No Known Problems Brother        Review of Systems  Comprehensive ROS reviewed and negative except for what's stated above.     OBJECTIVE:  /78   Pulse 84   Temp 97.1 °F (36.2 °C) (Temporal)   Resp 21   SpO2 100%   General: Alert, no acute distress  HEENT: atraumatic, sclera anicteric, oral mucosa normal   Neck: non tender, no adenopathy   Lungs: clear to ausculation bilaterally  Heart: Regular rate and rhythm  Abdomen: soft, non tender, non distended   Extremities: No edema  Skin: no new rash, normal color  Neuro: 5/5 strength in bilateral extremities, normal sensations  Psych: appropriate affect   Diagnostic Data:    CBC/Chem  Recent Labs   Lab 12/18/23  0851   WBC 8.4   HGB 9.6*   MCV 98.8   .0*       No results for input(s): \"NA\", \"K\", \"CL\", \"CO2\", \"BUN\", \"CREATSERUM\", \"GLU\", \"CA\", \"CAION\", \"MG\", \"PHOS\" in the last 168 hours.    No results for input(s): \"ALT\", \"AST\", \"ALB\", \"AMYLASE\", \"LIPASE\", \"LDH\" in the last 168 hours.    Invalid input(s): \"ALPHOS\", \"TBIL\", \"DBIL\", \"TPROT\"    No results for input(s): \"TROP\" in the last 168 hours.    Additional Diagnostics:     Radiology: XR CHEST AP PORTABLE  (CPT=71045)    Result Date: 12/18/2023  CONCLUSION:  1. Cardiomegaly and prominent central vasculature.  CABG. 2. Mild perihilar and lower lobe pulmonary vascular congestive changes.  No pleural effusion    Dictated by (CST): Zaki Reyna MD on 12/18/2023 at 9:18 AM     Finalized by (CST): Zaki Reyna MD on 12/18/2023 at 9:21 AM             ASSESSMENT / PLAN:   Mr. Vines is a 73 year old male with PMH BPH , CHF / ICM EF 30% , CAD s/p CABG x3, Moderate MR, HTN, HLD, ESRD on HD M/W/F, Anemia, Gastritis, who presents with SOB.  Admitted for fluid overload.      Volume overload   SOB  ERSD on HD M/W/F  - CXR with congestion, bnp 271    - renal consulted  - s/p recent AV fistula repair by vascular surgery in September 2023  - HD per renal, will likely need an extra session     Influenza A positive   - cough for over a month but SOB more acute  - no fevers or Leukocytosis   - CXR reviewed   - will renally dose Tamiflu for 1/5 days treatment     Trop Elevation   - no chest pain   - EKG with concern for LBBB  - will trend trop    - discussed with Cards     CAD s/p CABG x3   Ischemic cardiomyopathy EF 30% without acute exacerbation of congestive heart failure  Chronic Systolic heart failure  HTN  - Continue home metoprolol hydralazine  - Not on ACE or ARB due to renal disease  - Does not use diuretics, making urine     Anemia   Thrombocytopenia   Gastritis   - s/p EGD 11/7 with some mild gastritis, no active bleeding  - s/p colonoscopy 11/8 with polyps but no active bleeding, will need to repeat colonoscopy as outpatient  - PPI BID was not taking      Type 2 diabetes with hyperglycemia  Diabetic nephropathy  - gabapentin  - ISS, meal time and basal insulin   - Controlled renal diet  - Continue home aspirin     Hyperlipidemia  - Continue home statin     BPH continue home Flomax     FN:  - IVF: hold  - Diet: Renal, DM    DVT Prophy: SCDs HSQ   Atrophy: Ambulate PT/OT  Lines: Piv    Dispo: pending clinical course    Outpatient records or previous hospital records reviewed.     Further recommendations pending patient's clinical course.  [facility] hospitalist to continue to follow patient while in house    Patient and/or patient's family given opportunity to ask questions and note understanding and agreeing with therapeutic plan as outlined    Thank You,  Bradley Dash MD    [facility] Hospitalist  Answering Service number: [phone]

## 2023-12-18 NOTE — PROGRESS NOTES
[facility] Pharmacy Note:  Renal Dose Adjustment for Rosuvastatin (Crestor)    Terrence Vines has been prescribed Rosuvastatin (Crestor) 20 mg daily.    Estimated Creatinine Clearance: 8 mL/min (A) (based on SCr of 8.79 mg/dL (H)).    The dose has been changed to 10 mg daily per P&T approved protocol.  Pharmacy will follow and resume original order if renal function improves.    Thank you,  Cindy Lazcano, PharmD  12/18/2023 1:20 PM

## 2023-12-18 NOTE — ED QUICK NOTES
Orders for admission, patient is aware of plan and ready to go upstairs. Any questions, please call ED RN Nisreen at extension [phone].     Patient Covid vaccination status: Partially vaccinated     COVID Test Ordered in ED: SARS-CoV-2/Flu A and B/RSV by PCR (GeneXpert)    COVID Suspicion at Admission: N/A    Running Infusions:  None    Mental Status/LOC at time of transport: A/Ox4    Other pertinent information: Dialysis pt MWF  CIWA score: N/A   NIH score:  N/A

## 2023-12-19 ENCOUNTER — APPOINTMENT (OUTPATIENT)
Dept: GENERAL RADIOLOGY | Facility: HOSPITAL | Age: 73
End: 2023-12-19
Attending: INTERNAL MEDICINE
Payer: MEDICARE

## 2023-12-19 LAB
ANION GAP SERPL CALC-SCNC: 8 MMOL/L (ref 0–18)
BASE EXCESS BLD CALC-SCNC: 3.3 MMOL/L (ref ?–2)
BASOPHILS # BLD AUTO: 0.02 X10(3) UL (ref 0–0.2)
BASOPHILS NFR BLD AUTO: 0.3 %
BUN BLD-MCNC: 41 MG/DL (ref 9–23)
BUN/CREAT SERPL: 6.3 (ref 10–20)
CALCIUM BLD-MCNC: 8.1 MG/DL (ref 8.7–10.4)
CHLORIDE SERPL-SCNC: 102 MMOL/L (ref 98–112)
CO2 SERPL-SCNC: 28 MMOL/L (ref 21–32)
CREAT BLD-MCNC: 6.54 MG/DL
DEPRECATED RDW RBC AUTO: 56.7 FL (ref 35.1–46.3)
EGFRCR SERPLBLD CKD-EPI 2021: 8 ML/MIN/1.73M2 (ref 60–?)
EOSINOPHIL # BLD AUTO: 0.05 X10(3) UL (ref 0–0.7)
EOSINOPHIL NFR BLD AUTO: 0.7 %
ERYTHROCYTE [DISTWIDTH] IN BLOOD BY AUTOMATED COUNT: 15.8 % (ref 11–15)
GLUCOSE BLD-MCNC: 113 MG/DL (ref 70–99)
GLUCOSE BLDC GLUCOMTR-MCNC: 105 MG/DL (ref 70–99)
GLUCOSE BLDC GLUCOMTR-MCNC: 190 MG/DL (ref 70–99)
GLUCOSE BLDC GLUCOMTR-MCNC: 90 MG/DL (ref 70–99)
GLUCOSE BLDC GLUCOMTR-MCNC: 97 MG/DL (ref 70–99)
HCO3 BLDA-SCNC: 27.4 MEQ/L (ref 21–27)
HCT VFR BLD AUTO: 30.4 %
HGB BLD-MCNC: 9.3 G/DL
IMM GRANULOCYTES # BLD AUTO: 0.03 X10(3) UL (ref 0–1)
IMM GRANULOCYTES NFR BLD: 0.4 %
LYMPHOCYTES # BLD AUTO: 1.23 X10(3) UL (ref 1–4)
LYMPHOCYTES NFR BLD AUTO: 16.5 %
MAGNESIUM SERPL-MCNC: 2.2 MG/DL (ref 1.6–2.6)
MCH RBC QN AUTO: 30.3 PG (ref 26–34)
MCHC RBC AUTO-ENTMCNC: 30.6 G/DL (ref 31–37)
MCV RBC AUTO: 99 FL
MODIFIED ALLEN TEST: POSITIVE
MONOCYTES # BLD AUTO: 0.63 X10(3) UL (ref 0.1–1)
MONOCYTES NFR BLD AUTO: 8.5 %
NEUTROPHILS # BLD AUTO: 5.48 X10 (3) UL (ref 1.5–7.7)
NEUTROPHILS # BLD AUTO: 5.48 X10(3) UL (ref 1.5–7.7)
NEUTROPHILS NFR BLD AUTO: 73.6 %
O2 CT BLD-SCNC: 12.2 VOL% (ref 15–23)
OSMOLALITY SERPL CALC.SUM OF ELEC: 297 MOSM/KG (ref 275–295)
OXYGEN LITERS/MINUTE: 5 L/MIN
PCO2 BLDA: 57 MM HG (ref 35–45)
PH BLDA: 7.33 [PH] (ref 7.35–7.45)
PHOSPHATE SERPL-MCNC: 7.1 MG/DL (ref 2.4–5.1)
PLATELET # BLD AUTO: 135 10(3)UL (ref 150–450)
PO2 BLDA: 64 MM HG (ref 80–100)
POTASSIUM SERPL-SCNC: 4.5 MMOL/L (ref 3.5–5.1)
PUNCTURE CHARGE: YES
RBC # BLD AUTO: 3.07 X10(6)UL
SAO2 % BLDA: 92.6 % (ref 94–100)
SODIUM SERPL-SCNC: 138 MMOL/L (ref 136–145)
WBC # BLD AUTO: 7.4 X10(3) UL (ref 4–11)

## 2023-12-19 PROCEDURE — 71045 X-RAY EXAM CHEST 1 VIEW: CPT | Performed by: INTERNAL MEDICINE

## 2023-12-19 RX ORDER — IPRATROPIUM BROMIDE AND ALBUTEROL SULFATE 2.5; .5 MG/3ML; MG/3ML
3 SOLUTION RESPIRATORY (INHALATION) EVERY 6 HOURS PRN
Status: DISCONTINUED | OUTPATIENT
Start: 2023-12-19 | End: 2024-01-03

## 2023-12-19 NOTE — PLAN OF CARE
Problem: Patient Centered Care  Goal: Patient preferences are identified and integrated in the patient's plan of care  Description: Interventions:  - What would you like us to know as we care for you? I live at home with wife and daughter  - Provide timely, complete, and accurate information to patient/family  - Incorporate patient and family knowledge, values, beliefs, and cultural backgrounds into the planning and delivery of care  - Encourage patient/family to participate in care and decision-making at the level they choose  - Honor patient and family perspectives and choices  Outcome: Progressing     Problem: Diabetes/Glucose Control  Goal: Glucose maintained within prescribed range  Description: INTERVENTIONS:  - Monitor Blood Glucose as ordered  - Assess for signs and symptoms of hyperglycemia and hypoglycemia  - Administer ordered medications to maintain glucose within target range  - Assess barriers to adequate nutritional intake and initiate nutrition consult as needed  - Instruct patient on self management of diabetes  Outcome: Progressing    Problem: CARDIOVASCULAR - ADULT  Goal: Maintains optimal cardiac output and hemodynamic stability  Description: INTERVENTIONS:  - Monitor vital signs, rhythm, and trends  - Monitor for bleeding, hypotension and signs of decreased cardiac output  - Evaluate effectiveness of vasoactive medications to optimize hemodynamic stability  - Monitor arterial and/or venous puncture sites for bleeding and/or hematoma  - Assess quality of pulses, skin color and temperature  - Assess for signs of decreased coronary artery perfusion - ex. Angina  - Evaluate fluid balance, assess for edema, trend weights  Outcome: Progressing  Goal: Absence of cardiac arrhythmias or at baseline  Description: INTERVENTIONS:  - Continuous cardiac monitoring, monitor vital signs, obtain 12 lead EKG if indicated  - Evaluate effectiveness of antiarrhythmic and heart rate control medications as  ordered  - Initiate emergency measures for life threatening arrhythmias  - Monitor electrolytes and administer replacement therapy as ordered  Outcome: Progressing     Problem: RESPIRATORY - ADULT  Goal: Achieves optimal ventilation and oxygenation  Description: INTERVENTIONS:  - Assess for changes in respiratory status  - Assess for changes in mentation and behavior  - Position to facilitate oxygenation and minimize respiratory effort  - Oxygen supplementation based on oxygen saturation or ABGs  - Provide Smoking Cessation handout, if applicable  - Encourage broncho-pulmonary hygiene including cough, deep breathe, Incentive Spirometry  - Assess the need for suctioning and perform as needed  - Assess and instruct to report SOB or any respiratory difficulty  - Respiratory Therapy support as indicated  - Manage/alleviate anxiety  - Monitor for signs/symptoms of CO2 retention  Outcome: Progressing     Problem: METABOLIC/FLUID AND ELECTROLYTES - ADULT  Goal: Glucose maintained within prescribed range  Description: INTERVENTIONS:  - Monitor Blood Glucose as ordered  - Assess for signs and symptoms of hyperglycemia and hypoglycemia  - Administer ordered medications to maintain glucose within target range  - Assess barriers to adequate nutritional intake and initiate nutrition consult as needed  - Instruct patient on self management of diabetes  Outcome: Progressing      Patient alert / oriented x 4, currently 4L nasal cannula, some dyspnea. Appetite fair, needs occasional help with meal setup and eating. Patient currently on HD in room. Patient educated by HD Rn on fluid overload, potassium levels, and fluid intake, reinforced education at bedside. Accuchecks ACHS in place per order. Call light within reach, safety precautions in place, patient calls appropriately.

## 2023-12-19 NOTE — PLAN OF CARE
Patient lethargic throughout shift but A&Ox4, 5Lo2, 1 with walker. Noticed more labored breathing throughout shift paged MD see notes and orders. Bipap in place. Plan to transfer to room 226 and start dialysis. Call light within reach and fall precautions in place.     Problem: Patient Centered Care  Goal: Patient preferences are identified and integrated in the patient's plan of care  Description: Interventions:  - What would you like us to know as we care for you? I live at home with family. I'm from [place]  - Provide timely, complete, and accurate information to patient/family  - Incorporate patient and family knowledge, values, beliefs, and cultural backgrounds into the planning and delivery of care  - Encourage patient/family to participate in care and decision-making at the level they choose  - Honor patient and family perspectives and choices  Outcome: Progressing     Problem: Diabetes/Glucose Control  Goal: Glucose maintained within prescribed range  Description: INTERVENTIONS:  - Monitor Blood Glucose as ordered  - Assess for signs and symptoms of hyperglycemia and hypoglycemia  - Administer ordered medications to maintain glucose within target range  - Assess barriers to adequate nutritional intake and initiate nutrition consult as needed  - Instruct patient on self management of diabetes  Outcome: Progressing     Problem: Patient/Family Goals  Goal: Patient/Family Long Term Goal  Description: Patient's Long Term Goal: go home    Interventions:  - Monitor vital signs  - Monitor appropriate labs  - Monitor blood glucose levels  - Administer medications per order  - Pain management as needed  - Follow MD orders  - Diagnostics per orders  - Dialysis per orders  - Update / inform patient and family on plan of care  - Discharge planning     - See additional Care Plan goals for specific interventions  Outcome: Progressing  Goal: Patient/Family Short Term Goal  Description: Patient's Short Term Goal: To breathe  better    Interventions:   - RT treatment  -O2  -Follow md orders  - See additional Care Plan goals for specific interventions  Outcome: Progressing     Problem: CARDIOVASCULAR - ADULT  Goal: Maintains optimal cardiac output and hemodynamic stability  Description: INTERVENTIONS:  - Monitor vital signs, rhythm, and trends  - Monitor for bleeding, hypotension and signs of decreased cardiac output  - Evaluate effectiveness of vasoactive medications to optimize hemodynamic stability  - Monitor arterial and/or venous puncture sites for bleeding and/or hematoma  - Assess quality of pulses, skin color and temperature  - Assess for signs of decreased coronary artery perfusion - ex. Angina  - Evaluate fluid balance, assess for edema, trend weights  Outcome: Progressing  Goal: Absence of cardiac arrhythmias or at baseline  Description: INTERVENTIONS:  - Continuous cardiac monitoring, monitor vital signs, obtain 12 lead EKG if indicated  - Evaluate effectiveness of antiarrhythmic and heart rate control medications as ordered  - Initiate emergency measures for life threatening arrhythmias  - Monitor electrolytes and administer replacement therapy as ordered  Outcome: Progressing     Problem: RESPIRATORY - ADULT  Goal: Achieves optimal ventilation and oxygenation  Description: INTERVENTIONS:  - Assess for changes in respiratory status  - Assess for changes in mentation and behavior  - Position to facilitate oxygenation and minimize respiratory effort  - Oxygen supplementation based on oxygen saturation or ABGs  - Provide Smoking Cessation handout, if applicable  - Encourage broncho-pulmonary hygiene including cough, deep breathe, Incentive Spirometry  - Assess the need for suctioning and perform as needed  - Assess and instruct to report SOB or any respiratory difficulty  - Respiratory Therapy support as indicated  - Manage/alleviate anxiety  - Monitor for signs/symptoms of CO2 retention  Outcome: Progressing     Problem:  METABOLIC/FLUID AND ELECTROLYTES - ADULT  Goal: Glucose maintained within prescribed range  Description: INTERVENTIONS:  - Monitor Blood Glucose as ordered  - Assess for signs and symptoms of hyperglycemia and hypoglycemia  - Administer ordered medications to maintain glucose within target range  - Assess barriers to adequate nutritional intake and initiate nutrition consult as needed  - Instruct patient on self management of diabetes  Outcome: Progressing  Goal: Electrolytes maintained within normal limits  Description: INTERVENTIONS:  - Monitor labs and rhythm and assess patient for signs and symptoms of electrolyte imbalances  - Administer electrolyte replacement as ordered  - Monitor response to electrolyte replacements, including rhythm and repeat lab results as appropriate  - Fluid restriction as ordered  - Instruct patient on fluid and nutrition restrictions as appropriate  Outcome: Progressing  Goal: Hemodynamic stability and optimal renal function maintained  Description: INTERVENTIONS:  - Monitor labs and assess for signs and symptoms of volume excess or deficit  - Monitor intake, output and patient weight  - Monitor urine specific gravity, serum osmolarity and serum sodium as indicated or ordered  - Monitor response to interventions for patient's volume status, including labs, urine output, blood pressure (other measures as available)  - Encourage oral intake as appropriate  - Instruct patient on fluid and nutrition restrictions as appropriate  Outcome: Progressing

## 2023-12-19 NOTE — PLAN OF CARE
No acute changes overnight. On 4L NC. 2.5L removed during dialysis. Safety precautions in place, call light within reach.   Problem: Patient Centered Care  Goal: Patient preferences are identified and integrated in the patient's plan of care  Description: Interventions:  - What would you like us to know as we care for you? I live at home with family. I'm from [place]  - Provide timely, complete, and accurate information to patient/family  - Incorporate patient and family knowledge, values, beliefs, and cultural backgrounds into the planning and delivery of care  - Encourage patient/family to participate in care and decision-making at the level they choose  - Honor patient and family perspectives and choices  Outcome: Progressing     Problem: Diabetes/Glucose Control  Goal: Glucose maintained within prescribed range  Description: INTERVENTIONS:  - Monitor Blood Glucose as ordered  - Assess for signs and symptoms of hyperglycemia and hypoglycemia  - Administer ordered medications to maintain glucose within target range  - Assess barriers to adequate nutritional intake and initiate nutrition consult as needed  - Instruct patient on self management of diabetes  Outcome: Progressing     Problem: Patient/Family Goals  Goal: Patient/Family Long Term Goal  Description: Patient's Long Term Goal: go home    Interventions:  - Monitor vital signs  - Monitor appropriate labs  - Monitor blood glucose levels  - Administer medications per order  - Pain management as needed  - Follow MD orders  - Diagnostics per orders  - Dialysis per orders  - Update / inform patient and family on plan of care  - Discharge planning     - See additional Care Plan goals for specific interventions  Outcome: Progressing  Goal: Patient/Family Short Term Goal  Description: Patient's Short Term Goal: To breathe better    Interventions:   - RT treatment  -O2  -Follow md orders  - See additional Care Plan goals for specific interventions  Outcome:  Progressing     Problem: CARDIOVASCULAR - ADULT  Goal: Maintains optimal cardiac output and hemodynamic stability  Description: INTERVENTIONS:  - Monitor vital signs, rhythm, and trends  - Monitor for bleeding, hypotension and signs of decreased cardiac output  - Evaluate effectiveness of vasoactive medications to optimize hemodynamic stability  - Monitor arterial and/or venous puncture sites for bleeding and/or hematoma  - Assess quality of pulses, skin color and temperature  - Assess for signs of decreased coronary artery perfusion - ex. Angina  - Evaluate fluid balance, assess for edema, trend weights  Outcome: Progressing  Goal: Absence of cardiac arrhythmias or at baseline  Description: INTERVENTIONS:  - Continuous cardiac monitoring, monitor vital signs, obtain 12 lead EKG if indicated  - Evaluate effectiveness of antiarrhythmic and heart rate control medications as ordered  - Initiate emergency measures for life threatening arrhythmias  - Monitor electrolytes and administer replacement therapy as ordered  Outcome: Progressing     Problem: RESPIRATORY - ADULT  Goal: Achieves optimal ventilation and oxygenation  Description: INTERVENTIONS:  - Assess for changes in respiratory status  - Assess for changes in mentation and behavior  - Position to facilitate oxygenation and minimize respiratory effort  - Oxygen supplementation based on oxygen saturation or ABGs  - Provide Smoking Cessation handout, if applicable  - Encourage broncho-pulmonary hygiene including cough, deep breathe, Incentive Spirometry  - Assess the need for suctioning and perform as needed  - Assess and instruct to report SOB or any respiratory difficulty  - Respiratory Therapy support as indicated  - Manage/alleviate anxiety  - Monitor for signs/symptoms of CO2 retention  Outcome: Progressing     Problem: METABOLIC/FLUID AND ELECTROLYTES - ADULT  Goal: Glucose maintained within prescribed range  Description: INTERVENTIONS:  - Monitor Blood  Glucose as ordered  - Assess for signs and symptoms of hyperglycemia and hypoglycemia  - Administer ordered medications to maintain glucose within target range  - Assess barriers to adequate nutritional intake and initiate nutrition consult as needed  - Instruct patient on self management of diabetes  Outcome: Progressing  Goal: Electrolytes maintained within normal limits  Description: INTERVENTIONS:  - Monitor labs and rhythm and assess patient for signs and symptoms of electrolyte imbalances  - Administer electrolyte replacement as ordered  - Monitor response to electrolyte replacements, including rhythm and repeat lab results as appropriate  - Fluid restriction as ordered  - Instruct patient on fluid and nutrition restrictions as appropriate  Outcome: Progressing  Goal: Hemodynamic stability and optimal renal function maintained  Description: INTERVENTIONS:  - Monitor labs and assess for signs and symptoms of volume excess or deficit  - Monitor intake, output and patient weight  - Monitor urine specific gravity, serum osmolarity and serum sodium as indicated or ordered  - Monitor response to interventions for patient's volume status, including labs, urine output, blood pressure (other measures as available)  - Encourage oral intake as appropriate  - Instruct patient on fluid and nutrition restrictions as appropriate  Outcome: Progressing

## 2023-12-19 NOTE — PROGRESS NOTES
[facility] Hospitalist Progress Note     CC: Hospital Follow up    PCP: Victor Manuel Cleaning MD       Assessment/Plan:     Principal Problem:    Acute respiratory failure with hypoxia (HCC)  Active Problems:    Thrombocytopenia (HCC)    ESRD on dialysis (HCC)    Mr. Vines is a 73 year old male with PMH BPH , CHF / ICM EF 30% , CAD s/p CABG x3, Moderate MR, HTN, HLD, ESRD on HD M/W/F, Anemia, Gastritis, who presents with SOB.  Admitted for fluid overload and Influenza A.      Acute Hypoxic Respiratory Failure  Volume overload   SOB  ERSD on HD M/W/F  - CXR with congestion, bnp 271   - renal consulted  - s/p recent AV fistula repair by vascular surgery in September 2023  - HD per renal, will likely need an extra session   - ABG with acidosis and CO2 retention   - bipap started and pulm consulted     Influenza A positive   - cough for over a month but SOB more acute  - no fevers or Leukocytosis   - CXR reviewed   - will renally dose Tamiflu for 2/5 days treatment      Trop Elevation   - no chest pain   - EKG with concern for LBBB  - will trend trop    - discussed with Cards      CAD s/p CABG x3   Ischemic cardiomyopathy EF 30% without acute exacerbation of congestive heart failure  Chronic Systolic heart failure  HTN  - Continue home metoprolol hydralazine  - Not on ACE or ARB due to renal disease  - Does not use diuretics, making urine      Anemia   Thrombocytopenia   Gastritis   - s/p EGD 11/7 with some mild gastritis, no active bleeding  - s/p colonoscopy 11/8 with polyps but no active bleeding, will need to repeat colonoscopy as outpatient  - PPI BID was not taking      Type 2 diabetes with hyperglycemia  Diabetic nephropathy  - gabapentin  - ISS, meal time and basal insulin   - Controlled renal diet  - Continue home aspirin     Hyperlipidemia  - Continue home statin     BPH continue home Flomax     FN:  - IVF: hold  - Diet: Renal, DM     DVT Prophy: SCDs HSQ   Atrophy: Ambulate PT/OT  Lines: Piv     Dispo: pending clinical  course     Outpatient records or previous hospital records reviewed.      Further recommendations pending patient's clinical course.  [facility] hospitalist to continue to follow patient while in house     Patient and/or patient's family given opportunity to ask questions and note understanding and agreeing with therapeutic plan as outlined     Thank You,  Bradley Dash MD     [facility] Hospitalist  Answering Service number: [phone]     Subjective:     Lethargic   Does not want HD today.  More trouble breathing.     OBJECTIVE:    Blood pressure 144/72, pulse 83, temperature 97.8 °F (36.6 °C), temperature source Axillary, resp. rate 25, weight 242 lb (109.8 kg), SpO2 99%.    Temp:  [97.5 °F (36.4 °C)-98.1 °F (36.7 °C)] 97.8 °F (36.6 °C)  Pulse:  [75-94] 83  Resp:  [18-25] 25  BP: (122-172)/(72-86) 144/72  SpO2:  [88 %-100 %] 99 %      Intake/Output:    Intake/Output Summary (Last 24 hours) at 12/19/2023 1421  Last data filed at 12/18/2023 2200  Gross per 24 hour   Intake 340 ml   Output 2550 ml   Net -2210 ml       Last 3 Weights   12/18/23 1258 242 lb (109.8 kg)   11/27/23 1951 252 lb (114.3 kg)   11/13/23 0700 245 lb 4.8 oz (111.3 kg)   11/12/23 0519 244 lb 8 oz (110.9 kg)   11/11/23 1300 246 lb 14.4 oz (112 kg)   11/11/23 0539 238 lb 1.6 oz (108 kg)   11/08/23 1410 234 lb (106.1 kg)   11/07/23 0619 234 lb 1.6 oz (106.2 kg)   11/06/23 1744 239 lb (108.4 kg)       Exam   General: lethargic   HEENT: atraumatic, sclera anicteric, oral mucosa normal   Neck: non tender, no adenopathy   Lungs: b/I wheezing   Heart: Regular rate and rhythm  Abdomen: soft, non tender, non distended   Extremities: No edema  Skin: no new rash, normal color  Neuro: lethargic   Psych: appropriate affect     Data Review:       Labs:     Recent Labs   Lab 12/18/23  0851 12/19/23  0743   RBC 3.24* 3.07*   HGB 9.6* 9.3*   HCT 32.0* 30.4*   MCV 98.8 99.0   MCH 29.6 30.3   MCHC 30.0* 30.6*   RDW 15.7* 15.8*   NEPRELIM 6.68 5.48   WBC 8.4  7.4   .0* 135.0*         Recent Labs   Lab 12/18/23  0851 12/19/23  0743   * 113*   BUN 67* 41*   CREATSERUM 8.79* 6.54*   EGFRCR 6* 8*   CA 8.5* 8.1*    138   K 4.7 4.5    102   CO2 27.0 28.0       No results for input(s): \"ALT\", \"AST\", \"ALB\", \"AMYLASE\", \"LIPASE\", \"LDH\" in the last 168 hours.    Invalid input(s): \"ALPHOS\", \"TBIL\", \"DBIL\", \"TPROT\"      Imaging:  XR CHEST AP PORTABLE  (CPT=71045)    Result Date: 12/18/2023  CONCLUSION:  1. Cardiomegaly and prominent central vasculature.  CABG. 2. Mild perihilar and lower lobe pulmonary vascular congestive changes.  No pleural effusion    Dictated by (CST): Zaki Reyna MD on 12/18/2023 at 9:18 AM     Finalized by (CST): Zaki Reyna MD on 12/18/2023 at 9:21 AM             Meds:      insulin aspart  1-11 Units Subcutaneous TID CC and HS    heparin  5,000 Units Subcutaneous Q8H YOLANDA    pantoprazole  40 mg Oral QAM AC    aspirin  81 mg Oral Daily    finasteride  5 mg Oral QAM    insulin aspart  15 Units Subcutaneous TID    metoprolol succinate ER  50 mg Oral BID    sevelamer carbonate  800 mg Oral TID CC    tamsulosin  0.8 mg Oral Daily    hydrALAZINE  50 mg Oral TID    gabapentin  100 mg Oral BID    rosuvastatin  10 mg Oral Nightly    oseltamivir  30 mg Oral Once per day on Monday Wednesday Friday    insulin detemir  25 Units Subcutaneous Nightly       ipratropium-albuterol, glucose **OR** glucose **OR** glucose-vitamin C **OR** dextrose **OR** glucose **OR** glucose **OR** glucose-vitamin C, acetaminophen, polyethylene glycol (PEG 3350), sennosides, bisacodyl, fleet enema, ondansetron, metoclopramide, albuterol

## 2023-12-19 NOTE — PAYOR COMM NOTE
--------------  ADMISSION REVIEW     Payor: [facility]  Subscriber #:  [id number]  Authorization Number: [id number]    Admit date: 12/18/23  Admit time: 12:56 PM          H&P - H&P Note          [facility] Hospitalist H&P       CC:   Chief Complaint   Patient presents with    Difficulty Breathing        PCP: Victor Manuel Cleaning MD    History of Present Illness: Mr. Vines is a 73 year old male with PMH BPH , CHF / ICM EF 30% , Moderate MR, HTN, HLD, ESRD on HD M/W/F, Anemia, Gastritis, who presents with SOB. Patient has had a cough for over a month but felt more SOB which started yesterday.  He was treated with a course of abx as out patient with no improvement in cough.  He was supposed to be on PPI from last admission but was not taking.  Denies CP, abd pain, n/v, f/c.  Appetite as been good.  Son at bedside and confirms that he has not missed any HD sessions and does not add salt to his food.     PMH  Past Medical History:   Diagnosis Date    BPH (benign prostatic hypertrophy)     Cataract     Congestive heart disease (HCC)     Coronary atherosclerosis     Diabetes (HCC)     Diabetic neuropathy (HCC)     Diabetic retinopathy (HCC)     Dialysis patient (HCC)     M,W,F,    Esophageal reflux     Heart valve disease     High blood pressure     High cholesterol     HLD (hyperlipidemia)     HTN (hypertension)     Neuropathy     Proteinuria     Renal disorder     HD every MWF with temporary HD cath    Type II diabetes mellitus (HCC)     Visual impairment     Reading glasses        OBJECTIVE:  /78   Pulse 84   Temp 97.1 °F (36.2 °C) (Temporal)   Resp 21   SpO2 100%   General: Alert, no acute distress  HEENT: atraumatic, sclera anicteric, oral mucosa normal   Neck: non tender, no adenopathy   Lungs: clear to ausculation bilaterally  Heart: Regular rate and rhythm  Abdomen: soft, non tender, non distended   Extremities: No edema  Skin: no new rash, normal color  Neuro: 5/5 strength in bilateral extremities,  normal sensations  Psych: appropriate affect   Diagnostic Data:    CBC/Chem  Recent Labs   Lab 12/18/23  0851   WBC 8.4   HGB 9.6*   MCV 98.8   .0*     Additional Diagnostics:     Radiology: XR CHEST AP PORTABLE  (CPT=71045)    Result Date: 12/18/2023  CONCLUSION:  1. Cardiomegaly and prominent central vasculature.  CABG. 2. Mild perihilar and lower lobe pulmonary vascular congestive changes.  No pleural effusion    Dictated by (CST): Zaki Reyna MD on 12/18/2023 at 9:18 AM     Finalized by (CST): Zaki Reyna MD on 12/18/2023 at 9:21 AM             ASSESSMENT / PLAN:   Mr. Vines is a 73 year old male with PMH BPH , CHF / ICM EF 30% , CAD s/p CABG x3, Moderate MR, HTN, HLD, ESRD on HD M/W/F, Anemia, Gastritis, who presents with SOB.  Admitted for fluid overload.      Volume overload   SOB  ERSD on HD M/W/F  - CXR with congestion, bnp 271   - renal consulted  - s/p recent AV fistula repair by vascular surgery in September 2023  - HD per renal, will likely need an extra session     Influenza A positive   - cough for over a month but SOB more acute  - no fevers or Leukocytosis   - CXR reviewed   - will renally dose Tamiflu for 1/5 days treatment     Trop Elevation   - no chest pain   - EKG with concern for LBBB  - will trend trop    - discussed with Cards     CAD s/p CABG x3   Ischemic cardiomyopathy EF 30% without acute exacerbation of congestive heart failure  Chronic Systolic heart failure  HTN  - Continue home metoprolol hydralazine  - Not on ACE or ARB due to renal disease  - Does not use diuretics, making urine     Anemia   Thrombocytopenia   Gastritis   - s/p EGD 11/7 with some mild gastritis, no active bleeding  - s/p colonoscopy 11/8 with polyps but no active bleeding, will need to repeat colonoscopy as outpatient  - PPI BID was not taking      Type 2 diabetes with hyperglycemia  Diabetic nephropathy  - gabapentin  - ISS, meal time and basal insulin   - Controlled renal diet  - Continue  home aspirin     Hyperlipidemia  - Continue home statin     BPH continue home Flomax     FN:  - IVF: hold  - Diet: Renal, DM    DVT Prophy: SCDs HSQ   Atrophy: Ambulate PT/OT  Lines: Piv    Dispo: pending clinical course    Outpatient records or previous hospital records reviewed.     Further recommendations pending patient's clinical course.  [facility] hospitalist to continue to follow patient while in house    Patient and/or patient's family given opportunity to ask questions and note understanding and agreeing with therapeutic plan as outlined    Thank You,  Bradley Dash MD    [facility] Hospitalist        Electronically signed by Bradley Dash MD on 12/18/2023  2:13 PM           CONSULT  Consults signed by Sedrick Cotto Jr., MD at 12/19/2023  1:09 PM    Author: Sedrick Cotto Jr., MD Service: Pulmonology Author Type: Physician   Filed: 12/19/2023  1:09 PM Date of Service: 12/19/2023 12:55 PM Status: Signed   : Sedrick Cotto Jr., MD (Physician)   Consult Orders   1. Consult to Pulmonology [[id number]] ordered by Bradley Dash MD at 12/19/23 1233       Pulmonary Consult      Assessment / Plan:  Acute hypoxemic respiratory failure - due to pulmonary edema and influenza  - ABG reviewed, mild compensated hypercapnia  - repeat chest x-ray  - start BiPAP  - nephrology to dialyze again today  Influenza A - chest x-ray shows edema without pneumonia  - continue tamiflu renally dosed  - bronchodilator protocol  - hold steroids in absence of underlying chronic pulmonary disease  Acute decompensated HFrEF  - volume management via HD per nephrology, to get additional session now  - per cardiology  ESRD on HD  - per nephrology  Ppx  - subcutaneous heparin  Dispo - full code  - transfer to PCU for BiPAP, will follow     Discussed with patient, RN, RT, and Dr. Dash     Critical care time 45 minutes.     Sedrick Cotto MD  Pulmonary & Critical Care Medicine  [facility]         CONSULT  ASSESSMENT AND PLAN:   This is a 73 year old male with PMH sig for ESRD on HD MWF, HTN, HLD, DM2, CAD s/p CABG x3, ischemic cardiomyopathy. Presents with SOB and cough. Found to be influenza positive. Nephrology is consulted for dialysis.      ESRD on HD MWF   - full session of dialysis today, fluid removal as tolerated   - will reassess tomorrow for additional dialysis   - continue sevelamer, renal diet   - followed by [facility]       Acute hypoxic respiratory failure  - CXR with pulmonary vascular changes  - fluid removal with dialysis   - wean oxygen as tolerated      Influenza A   - per primary       Elevated troponin  - per primary   - discussed with yesenia Douglass for dialysis today      Hypertension   - Hydralazine, metoprolol       Anemia   - trend Hb   - receiving OMAIRA with outpatient dialysis       Dw ED physician       Thank you for the consult and allowing us to participate in the care of Terrence Vines.  We will continue to follow.     MD Melodie Arriola - Nephrology  12/18/2023 12/19  CC: Hospital Follow up     PCP: Victor Manuel Cleaning MD         Assessment/Plan:      Principal Problem:    Acute respiratory failure with hypoxia (HCC)  Active Problems:    Thrombocytopenia (HCC)    ESRD on dialysis (HCC)     Mr. Vines is a 73 year old male with PMH BPH , CHF / ICM EF 30% , CAD s/p CABG x3, Moderate MR, HTN, HLD, ESRD on HD M/W/F, Anemia, Gastritis, who presents with SOB.  Admitted for fluid overload and Influenza A.      Acute Hypoxic Respiratory Failure  Volume overload   SOB  ERSD on HD M/W/F  - CXR with congestion, bnp 271   - renal consulted  - s/p recent AV fistula repair by vascular surgery in September 2023  - HD per renal, will likely need an extra session   - ABG with acidosis and CO2 retention   - bipap started and pulm consulted      Influenza A positive   - cough for over a month but SOB more acute  - no fevers or Leukocytosis   - CXR  reviewed   - will renally dose Tamiflu for 2/5 days treatment      Trop Elevation   - no chest pain   - EKG with concern for LBBB  - will trend trop    - discussed with Cards      CAD s/p CABG x3   Ischemic cardiomyopathy EF 30% without acute exacerbation of congestive heart failure  Chronic Systolic heart failure  HTN  - Continue home metoprolol hydralazine  - Not on ACE or ARB due to renal disease  - Does not use diuretics, making urine      Anemia   Thrombocytopenia   Gastritis   - s/p EGD 11/7 with some mild gastritis, no active bleeding  - s/p colonoscopy 11/8 with polyps but no active bleeding, will need to repeat colonoscopy as outpatient  - PPI BID was not taking      Type 2 diabetes with hyperglycemia  Diabetic nephropathy  - gabapentin  - ISS, meal time and basal insulin   - Controlled renal diet  - Continue home aspirin     Hyperlipidemia  - Continue home statin     BPH continue home Flomax     FN:  - IVF: hold  - Diet: Renal, DM     DVT Prophy: SCDs HSQ   Atrophy: Ambulate PT/OT  Lines: Piv     Dispo: pending clinical course     Outpatient records or previous hospital records reviewed.      Further recommendations pending patient's clinical course.  [facility] hospitalist to continue to follow patient while in house     Patient and/or patient's family given opportunity to ask questions and note understanding and agreeing with therapeutic plan as outlined     Thank You,  Bradley Dash MD     [facility] Hospitalist        Subjective:      Lethargic   Does not want HD today.  More trouble breathing.      OBJECTIVE:     Blood pressure 144/72, pulse 83, temperature 97.8 °F (36.6 °C), temperature source Axillary, resp. rate 25, weight 242 lb (109.8 kg), SpO2 99%.     Temp:  [97.5 °F (36.4 °C)-98.1 °F (36.7 °C)] 97.8 °F (36.6 °C)  Pulse:  [75-94] 83  Resp:  [18-25] 25  BP: (122-172)/(72-86) 144/72  SpO2:  [88 %-100 %] 99 %        Intake/Output:     Intake/Output Summary (Last 24 hours) at 12/19/2023  1421  Last data filed at 12/18/2023 2200      Gross per 24 hour   Intake 340 ml   Output 2550 ml   Net -2210 ml              Last 3 Weights   12/18/23 1258 242 lb (109.8 kg)   11/27/23 1951 252 lb (114.3 kg)   11/13/23 0700 245 lb 4.8 oz (111.3 kg)   11/12/23 0519 244 lb 8 oz (110.9 kg)   11/11/23 1300 246 lb 14.4 oz (112 kg)   11/11/23 0539 238 lb 1.6 oz (108 kg)   11/08/23 1410 234 lb (106.1 kg)   11/07/23 0619 234 lb 1.6 oz (106.2 kg)   11/06/23 1744 239 lb (108.4 kg)         Exam   General: lethargic   HEENT: atraumatic, sclera anicteric, oral mucosa normal   Neck: non tender, no adenopathy   Lungs: b/I wheezing   Heart: Regular rate and rhythm  Abdomen: soft, non tender, non distended   Extremities: No edema  Skin: no new rash, normal color  Neuro: lethargic   Psych: appropriate affect      Data Review:       Labs:           Recent Labs   Lab 12/18/23  0851 12/19/23  0743   RBC 3.24* 3.07*   HGB 9.6* 9.3*   HCT 32.0* 30.4*   MCV 98.8 99.0   MCH 29.6 30.3   MCHC 30.0* 30.6*   RDW 15.7* 15.8*   NEPRELIM 6.68 5.48   WBC 8.4 7.4   .0* 135.0*                 Recent Labs   Lab 12/18/23  0851 12/19/23  0743   * 113*   BUN 67* 41*   CREATSERUM 8.79* 6.54*   EGFRCR 6* 8*   CA 8.5* 8.1*    138   K 4.7 4.5    102   CO2 27.0 28.0          Imaging:  XR CHEST AP PORTABLE  (CPT=71045)     Result Date: 12/18/2023  CONCLUSION:         1. Cardiomegaly and prominent central vasculature.  CABG. 2. Mild perihilar and lower lobe pulmonary vascular congestive changes.  No pleural effusion    Dictated by (CST): Zaki Reyna MD on 12/18/2023 at 9:18 AM     Finalized by (CST): Zaki Reyna MD on 12/18/2023 at 9:21 AM               Meds:       insulin aspart  1-11 Units Subcutaneous TID CC and HS    heparin  5,000 Units Subcutaneous Q8H YOLANDA    pantoprazole  40 mg Oral QAM AC    aspirin  81 mg Oral Daily    finasteride  5 mg Oral QAM    insulin aspart  15 Units Subcutaneous TID    metoprolol  succinate ER  50 mg Oral BID    sevelamer carbonate  800 mg Oral TID CC    tamsulosin  0.8 mg Oral Daily    hydrALAZINE  50 mg Oral TID    gabapentin  100 mg Oral BID    rosuvastatin  10 mg Oral Nightly    oseltamivir  30 mg Oral Once per day on Monday Wednesday Friday    insulin detemir  25 Units Subcutaneous Nightly       MEDICATIONS ADMINISTERED IN LAST 1 DAY:  acetaminophen (Tylenol Extra Strength) tab 500 mg       Date Action Dose Route User    12/19/2023 0136 Given 500 mg Oral Ana Paula Valverde RN          aspirin DR tab 81 mg       Date Action Dose Route User    12/19/2023 0940 Given 81 mg Oral Sridevi Ceballos RN          finasteride (Proscar) tab 5 mg       Date Action Dose Route User    12/19/2023 0941 Given 5 mg Oral Sridevi Ceballos RN          gabapentin (Neurontin) cap 100 mg       Date Action Dose Route User    12/19/2023 0940 Given 100 mg Oral Sridevi Ceballos RN    12/18/2023 2200 Given 100 mg Oral Ana Paula Valverde RN          heparin (Porcine) 5000 UNIT/ML injection 5,000 Units       Date Action Dose Route User    12/19/2023 0657 Given 5,000 Units Subcutaneous (Left Lower Abdomen) Ana Paula Valverde RN    12/18/2023 2200 Given 5,000 Units Subcutaneous (Right Lower Abdomen) Ana Paula Valverde RN          hydrALAZINE (Apresoline) tab 50 mg       Date Action Dose Route User    12/19/2023 0941 Given 50 mg Oral Sridevi Ceballos RN    12/18/2023 2200 Given 50 mg Oral Ana Paula Valverde RN          insulin detemir (Levemir) 100 UNIT/ML FlexPen/FlexTouch 25 Units       Date Action Dose Route User    12/18/2023 2220 Given 25 Units Subcutaneous (Right Lower Abdomen) Ana Paula Valverde RN          ipratropium-albuterol (Duoneb) 0.5-2.5 (3) MG/3ML inhalation solution 3 mL       Date Action Dose Route User    12/19/2023 1113 Given 3 mL Nebulization Fab Saavedra RCP          metoprolol succinate ER (Toprol XL) 24 hr tab 50 mg       Date Action Dose Route User    12/19/2023 0942 Given 50 mg Oral Sridevi Ceballos RN     12/18/2023 2200 Given 50 mg Oral Ana Paula Valverde RN          oseltamivir (Tamiflu) cap 30 mg       Date Action Dose Route User    12/18/2023 1523 Given 30 mg Oral Enid White RN          pantoprazole (Protonix) DR tab 40 mg       Date Action Dose Route User    12/19/2023 0657 Given 40 mg Oral Ana Paula Valverde RN          rosuvastatin (Crestor) tab 10 mg       Date Action Dose Route User    12/18/2023 2200 Given 10 mg Oral Ana Paula Valverde RN          sevelamer carbonate (Renvela) tab 800 mg       Date Action Dose Route User    12/19/2023 0940 Given 800 mg Oral Sridevi Ceballos RN    12/18/2023 2200 Given 800 mg Oral Ana Paula Valverde RN          tamsulosin (Flomax) cap 0.8 mg       Date Action Dose Route User    12/19/2023 0941 Given 0.8 mg Oral Sridevi Ceballos RN            Vitals (last day)       Date/Time Temp Pulse Resp BP SpO2 Weight O2 Device O2 Flow Rate (L/min) [facility]    12/19/23 1252 -- 83 -- -- 99 % -- -- -- SB    12/19/23 1130 -- 85 25 -- 98 % -- Nasal cannula 5 L/min LD    12/19/23 1120 -- -- -- -- 97 % -- -- 5 L/min  RH    12/19/23 1110 -- -- -- -- 88 % -- Nasal cannula 2 L/min RH    12/19/23 0929 97.8 °F (36.6 °C) 79 18 144/72 100 % -- Nasal cannula 3 L/min LD    12/19/23 0650 -- 78 -- -- -- -- -- -- PK    12/19/23 0650 98.1 °F (36.7 °C) -- 18 122/72 98 % -- Nasal cannula 4 L/min KR    12/19/23 0010 -- -- -- 159/82 -- -- -- -- PK    12/18/23 2209 97.8 °F (36.6 °C) 94 18 172/81 98 % -- Nasal cannula 4 L/min PK    12/18/23 1521 97.5 °F (36.4 °C) 75 18 165/86 98 % -- Nasal cannula 4 L/min GM    12/18/23 1258 -- -- -- -- -- 242 lb -- --     12/18/23 1257 97.8 °F (36.6 °C) 78 18 172/94 95 % -- Nasal cannula 4 L/min     12/18/23 1245 -- 73 20 167/74 100 % -- Nasal cannula 5 L/min     12/18/23 1230 -- 74 -- -- 100 % -- -- --     12/18/23 1215 -- 75 -- -- 99 % -- -- --     12/18/23 1200 -- 79 -- -- 99 % -- -- --     12/18/23 1145 -- 83 -- -- 99 % -- -- --     12/18/23 1130 -- 75 19 183/87 99  % -- -- -- BA    12/18/23 1115 -- 73 16 178/88 99 % -- -- -- BA    12/18/23 1100 -- 80 21 -- 99 % -- -- -- BA    12/18/23 1045 -- 74 18 178/90 99 % -- -- -- BA    12/18/23 1030 -- 78 19 173/81 99 % -- Nasal cannula 5 L/min BA    12/18/23 1015 -- 78 20 182/82 99 % -- -- -- BA    12/18/23 1000 -- -- -- -- -- -- Nasal cannula 5 L/min BA    12/18/23 1000 -- 76 16 178/81 99 % -- -- --     12/18/23 0905 -- -- -- -- -- -- None (Room air) -- TF    12/18/23 0900 -- 84 21 157/78 100 % -- None (Room air) -- TF    12/18/23 0832 97.1 °F (36.2 °C) 86 22 184/90 87 % -- None (Room air) -- HT

## 2023-12-19 NOTE — PROGRESS NOTES
NEPHROLOGY DAILY PROGRESS NOTE     SUBJECTIVE:  Tolerated dialysis yesterday 2.5L removed   Remains on oxygen, feels weak    Does not want to do an extra treatment of HD  today      OBJECTIVE:    Total Intake/Output:    Intake/Output Summary (Last 24 hours) at 12/19/2023 1017  Last data filed at 12/18/2023 2200  Gross per 24 hour   Intake 340 ml   Output 2550 ml   Net -2210 ml           PHYSICAL EXAM:  /72 (BP Location: Right arm)   Pulse 79   Temp 97.8 °F (36.6 °C) (Axillary)   Resp 18   Wt 242 lb (109.8 kg)   SpO2 100%   BMI 32.82 kg/m²   GEN: NAD,  on oxygen   HEENT: NCAT    CHEST: coarse breath sounds    CARDIAC: S1S2 normal  ABD: soft, NT/ND  EXT:  no lower ext edema  NEURO: awake, alert  SKIN: warm, dry   DIALYSIS ACCESS: LUE AVF          CURRENT MEDICATIONS:  Current Facility-Administered Medications   Medication Dose Route Frequency    glucose (Dex4) 15 GM/59ML oral liquid 15 g  15 g Oral Q15 Min PRN    Or    glucose (Glutose) 40% oral gel 15 g  15 g Oral Q15 Min PRN    Or    glucose-vitamin C (Dex-4) chewable tab 4 tablet  4 tablet Oral Q15 Min PRN    Or    dextrose 50% injection 50 mL  50 mL Intravenous Q15 Min PRN    Or    glucose (Dex4) 15 GM/59ML oral liquid 30 g  30 g Oral Q15 Min PRN    Or    glucose (Glutose) 40% oral gel 30 g  30 g Oral Q15 Min PRN    Or    glucose-vitamin C (Dex-4) chewable tab 8 tablet  8 tablet Oral Q15 Min PRN    insulin aspart (NovoLOG) 100 Units/mL FlexPen 1-11 Units  1-11 Units Subcutaneous TID CC and HS    heparin (Porcine) 5000 UNIT/ML injection 5,000 Units  5,000 Units Subcutaneous Q8H YOLANDA    acetaminophen (Tylenol Extra Strength) tab 500 mg  500 mg Oral Q4H PRN    polyethylene glycol (PEG 3350) (Miralax) 17 g oral packet 17 g  17 g Oral Daily PRN    sennosides (Senokot) tab 17.2 mg  17.2 mg Oral Nightly PRN    bisacodyl (Dulcolax) 10 MG rectal suppository 10 mg  10 mg Rectal Daily PRN    fleet enema (Fleet) 7-19 GM/118ML rectal enema 133 mL  1 enema Rectal  Once PRN    ondansetron (Zofran) 4 MG/2ML injection 4 mg  4 mg Intravenous Q6H PRN    metoclopramide (Reglan) 5 mg/mL injection 10 mg  10 mg Intravenous Q8H PRN    pantoprazole (Protonix) DR tab 40 mg  40 mg Oral QAM AC    aspirin DR tab 81 mg  81 mg Oral Daily    finasteride (Proscar) tab 5 mg  5 mg Oral QAM    insulin aspart (NovoLOG) 100 Units/mL FlexPen 15 Units  15 Units Subcutaneous TID    metoprolol succinate ER (Toprol XL) 24 hr tab 50 mg  50 mg Oral BID    sevelamer carbonate (Renvela) tab 800 mg  800 mg Oral TID CC    tamsulosin (Flomax) cap 0.8 mg  0.8 mg Oral Daily    hydrALAZINE (Apresoline) tab 50 mg  50 mg Oral TID    gabapentin (Neurontin) cap 100 mg  100 mg Oral BID    albuterol (Ventolin HFA) 108 (90 Base) MCG/ACT inhaler 2 puff  2 puff Inhalation Q4H PRN    rosuvastatin (Crestor) tab 10 mg  10 mg Oral Nightly    oseltamivir (Tamiflu) cap 30 mg  30 mg Oral Once per day on Monday Wednesday Friday    insulin detemir (Levemir) 100 UNIT/ML FlexPen/FlexTouch 25 Units  25 Units Subcutaneous Nightly       LABS:  Patient Labs Reviewed in Detail. Pertinent Labs as follows:  Recent Labs   Lab 12/18/23  0851 12/19/23  0743   * 113*   BUN 67* 41*   CREATSERUM 8.79* 6.54*   EGFRCR 6* 8*   CA 8.5* 8.1*    138   K 4.7 4.5    102   CO2 27.0 28.0     Recent Labs   Lab 12/18/23  0851 12/19/23  0743   RBC 3.24* 3.07*   HGB 9.6* 9.3*   HCT 32.0* 30.4*   MCV 98.8 99.0   MCH 29.6 30.3   MCHC 30.0* 30.6*   RDW 15.7* 15.8*   NEPRELIM 6.68 5.48   WBC 8.4 7.4   .0* 135.0*           IMAGING:  XR CHEST AP PORTABLE  (CPT=71045)    Result Date: 12/18/2023  CONCLUSION:  1. Cardiomegaly and prominent central vasculature.  CABG. 2. Mild perihilar and lower lobe pulmonary vascular congestive changes.  No pleural effusion    Dictated by (CST): Zaki Reyna MD on 12/18/2023 at 9:18 AM     Finalized by (CST): Zkai Reyna MD on 12/18/2023 at 9:21 AM            ASSESSMENT AND PLAN:   This is a 73  year old male with PMH sig for ESRD on HD MWF, HTN, HLD, DM2, CAD s/p CABG x3, ischemic cardiomyopathy. Presents with SOB and cough. Found to be influenza positive. Nephrology is consulted for dialysis.      ESRD on HD MWF   - declining extra session of dialysis today  - dialysis tomorrow per usual schedule    - followed by [facility]       Acute hypoxic respiratory failure  - CXR with pulmonary vascular changes, also influenza A postivie    - fluid removal with dialysis   - wean oxygen as tolerated     Hyperphosphatemia   - improving   - continue sevelamer, renal diet   - dialysis as above     Influenza A   - tamiflu - per primary       Elevated troponin  - per primary   - per cards      Hypertension   - Hydralazine, metoprolol       Anemia   - trend Hb   - receiving OMAIRA with outpatient dialysis      ADDENDUM: Patient developed worsening respiratory status later this AM. Stat dialysis ordered for additional fluid removal. Plan for dialysis again tomorrow per usual schedule.      Thomas NUNES    We will continue to follow MD Melodie De La Rosa - Nephrology

## 2023-12-19 NOTE — CONSULTS
Pulmonary Consult     Assessment / Plan:  Acute hypoxemic respiratory failure - due to pulmonary edema and influenza  - ABG reviewed, mild compensated hypercapnia  - repeat chest x-ray  - start BiPAP  - nephrology to dialyze again today  Influenza A - chest x-ray shows edema without pneumonia  - continue tamiflu renally dosed  - bronchodilator protocol  - hold steroids in absence of underlying chronic pulmonary disease  Acute decompensated HFrEF  - volume management via HD per nephrology, to get additional session now  - per cardiology  ESRD on HD  - per nephrology  Ppx  - subcutaneous heparin  Dispo - full code  - transfer to PCU for BiPAP, will follow    Discussed with patient, RN, RT, and Dr. Dash    Critical care time 45 minutes.    Sedrick Cotto MD  Pulmonary & Critical Care Medicine  [facility]      History of Present Illness:   Mr. Vines is a 73 year old male with history of HFrEF, ESRD on HD, HTN who is admitted with dyspnea. He was found to be positive for influenza and chest x-ray showed volume overload. He received HD yesterday and refused his session today. Later this morning he became more hypoxemic from 2LPM to 5LPM and ABG showed mild hypercapnia and he was started on BiPAP. He reports his breathing has now improved. He reports feeling weak. He denies chest pain.     12 point ROS negative except per HPI.    Past Medical History:   Diagnosis Date    BPH (benign prostatic hypertrophy)     Cataract     Congestive heart disease (HCC)     Coronary atherosclerosis     Diabetic retinopathy (HCC)     Dialysis patient (HCC)     M,W,F,    Esophageal reflux     Heart valve disease     High blood pressure     High cholesterol     HLD (hyperlipidemia)     HTN (hypertension)     Neuropathy     Proteinuria     Renal disorder     HD every MWF with temporary HD cath    Type II diabetes mellitus (HCC)     Visual impairment     Reading glasses       Past Surgical History:   Procedure Laterality Date     CABG  06/21/2021    x3-lima-->LAD, SVG-->diag and SVG-->OM    CATARACT Right     COLONOSCOPY N/A 11/8/2023    Procedure: COLONOSCOPY;  Surgeon: Cullen Bautista MD;  Location: [facility] ENDOSCOPY       Medications Prior to Admission   Medication Sig Dispense Refill Last Dose    Magnesium 500 MG Oral Tab Take 1 tablet (500 mg total) by mouth daily.       cyanocobalamin 250 MCG Oral Tab Take 1 tablet (250 mcg total) by mouth daily.       Coenzyme Q10 (CO Q-10) 100 MG Oral Chew Tab Chew 100 mg by mouth daily.       albuterol 108 (90 Base) MCG/ACT Inhalation Aero Soln Inhale 2 puffs into the lungs every 4 (four) hours as needed for Wheezing. 1 each 0     aspirin 81 MG Oral Tab EC Take 1 tablet (81 mg total) by mouth daily. 30 tablet 0     rosuvastatin 20 MG Oral Tab        Sevelamer 800 MG Oral Tab 3 (three) times daily with meals.       insulin glargine (BASAGLAR KWIKPEN) 100 UNIT/ML Subcutaneous Solution Pen-injector Inject 25 Units into the skin every morning.       metoprolol succinate ER 50 MG Oral Tablet 24 Hr Take 1 tablet (50 mg total) by mouth in the morning and 1 tablet (50 mg total) before bedtime.       hydrALAZINE 50 MG Oral Tab Take 1 tablet (50 mg total) by mouth 3 (three) times daily. (Patient taking differently: Take 1 tablet (50 mg total) by mouth 3 (three) times daily. Does not take before dialysis) 90 tablet 1     FINASTERIDE 5 MG Oral Tab TAKE 1 TABLET(5 MG) BY MOUTH DAILY (Patient taking differently: Take 1 tablet (5 mg total) by mouth every morning. TAKE 1 TABLET(5 MG) BY MOUTH DAILY) 90 tablet 3     multivitamin Oral Tab Take 1 tablet by mouth daily. 30 tablet 0     Insulin Lispro, 1 Unit Dial, (HUMALOG KWIKPEN) 100 UNIT/ML Subcutaneous Solution Pen-injector Inject 14 Units into the skin 3 (three) times daily. (Patient taking differently: Inject 15 Units into the skin 3 (three) times daily. Sliding scale) 15 mL 2     acetaminophen 500 MG Oral Tab Take 2 tablets (1,000 mg total) by mouth as needed.        gabapentin 100 MG Oral Cap Take 1 capsule (100 mg total) by mouth 2 (two) times daily. (Patient not taking: Reported on 12/18/2023)   Not Taking    Glucose Blood (ONETOUCH ULTRA) In Vitro Strip Test Blood Sugar 4 Times Daily. Insulin Dependent Diabetes Type II. Z79.4, E11.9. 100 strip 3     OneTouch UltraSoft Lancets Does not apply Misc Test Blood Sugar Once Daily. Non-Insulin Dependent Diabetes Type II. E11.9. 100 each 1     Continuous Blood Gluc Sensor (DEXCOM G6 SENSOR) Does not apply Misc 1 each by Does not apply route Every 10 days. 9 each 3     Insulin Pen Needle (TRUEPLUS PEN NEEDLES) 32G X 4 MM Does not apply Misc Use 1 needle 4 times a day 150 each 2     tamsulosin (FLOMAX) cap TAKE 2 CAPSULES(0.8 MG) BY MOUTH DAILY (Patient not taking: Reported on 12/18/2023) 180 capsule 3 Not Taking    Blood Glucose Monitoring Suppl (ONETOUCH ULTRA MINI) w/Device Does not apply Kit Test Blood Sugar Once Daily. Non-Insulin Dependent Diabetes Type II. E11.9. 1 kit 0      Outpatient Medications Marked as Taking for the 12/18/23 encounter (Hospital Encounter)   Medication Sig Dispense Refill    Magnesium 500 MG Oral Tab Take 1 tablet (500 mg total) by mouth daily.      cyanocobalamin 250 MCG Oral Tab Take 1 tablet (250 mcg total) by mouth daily.      Coenzyme Q10 (CO Q-10) 100 MG Oral Chew Tab Chew 100 mg by mouth daily.      albuterol 108 (90 Base) MCG/ACT Inhalation Aero Soln Inhale 2 puffs into the lungs every 4 (four) hours as needed for Wheezing. 1 each 0    aspirin 81 MG Oral Tab EC Take 1 tablet (81 mg total) by mouth daily. 30 tablet 0    rosuvastatin 20 MG Oral Tab       Sevelamer 800 MG Oral Tab 3 (three) times daily with meals.      insulin glargine (BASAGLAR KWIKPEN) 100 UNIT/ML Subcutaneous Solution Pen-injector Inject 25 Units into the skin every morning.      metoprolol succinate ER 50 MG Oral Tablet 24 Hr Take 1 tablet (50 mg total) by mouth in the morning and 1 tablet (50 mg total) before bedtime.       hydrALAZINE 50 MG Oral Tab Take 1 tablet (50 mg total) by mouth 3 (three) times daily. (Patient taking differently: Take 1 tablet (50 mg total) by mouth 3 (three) times daily. Does not take before dialysis) 90 tablet 1    FINASTERIDE 5 MG Oral Tab TAKE 1 TABLET(5 MG) BY MOUTH DAILY (Patient taking differently: Take 1 tablet (5 mg total) by mouth every morning. TAKE 1 TABLET(5 MG) BY MOUTH DAILY) 90 tablet 3    multivitamin Oral Tab Take 1 tablet by mouth daily. 30 tablet 0    Insulin Lispro, 1 Unit Dial, (HUMALOG KWIKPEN) 100 UNIT/ML Subcutaneous Solution Pen-injector Inject 14 Units into the skin 3 (three) times daily. (Patient taking differently: Inject 15 Units into the skin 3 (three) times daily. Sliding scale) 15 mL 2    acetaminophen 500 MG Oral Tab Take 2 tablets (1,000 mg total) by mouth as needed.        No Known Allergies   Social History     Socioeconomic History    Marital status:    Tobacco Use    Smoking status: Never    Smokeless tobacco: Never   Vaping Use    Vaping Use: Never used   Substance and Sexual Activity    Alcohol use: Never    Drug use: Never     Social Determinants of Health     Food Insecurity: No Food Insecurity (12/18/2023)    Food Insecurity     Food Insecurity: Never true   Transportation Needs: No Transportation Needs (12/18/2023)    Transportation Needs     Lack of Transportation: No   Housing Stability: Low Risk  (12/18/2023)    Housing Stability     Housing Instability: No       Family History   Problem Relation Age of Onset    No Known Problems Father     No Known Problems Mother     No Known Problems Daughter     No Known Problems Son     No Known Problems Son     No Known Problems Son     No Known Problems Sister     No Known Problems Brother      Family history negative for lung disease except as noted above     Exam:  Vitals:    12/19/23 1110 12/19/23 1120 12/19/23 1130 12/19/23 1252   BP:       BP Location:       Pulse:   85 83   Resp:   25    Temp:       TempSrc:        SpO2: (!) 88% 97% 98% 99%   Weight:         General: laying in bed  Skin: no rash, ulcers or subcutaneous nodules  Eyes: anicteric sclerae, moist conjunctivae  Head, ears, nose, throat: atraumatic, oropharynx clear with moist mucous membranes  Neck: trachea midline with no thyromegaly  Heart: regular rate and rhythm, no murmurs / rubs / gallops  Lungs: bilateral rhonchi, normal respiratory effort, no accessory muscle use  Extremities: 1+ bilateral LE edema  Psych: awakens to voice, conversant and appropriate, follows commands    Labs:  Reviewed in EMR    Inpatient Medications:  Reviewed in EMR    Imaging:   Chest imaging reviewed

## 2023-12-19 NOTE — PROGRESS NOTES
[facility]    Cardiology Progress Note    Terrence Vines Patient Status:  Inpatient    1950 MRN [id number]   Location [facility] 5SW/SE Attending Bradley Dash MD   Hosp Day # 1 PCP Victor Manuel Cleaning MD       Impression/Plan:  73 year old male presenting with:    1) Acute hypoxemic respiratory failure, multifactorial  2) Influenza A  3) HFrEF (EF 45-50% 2023)  4) ESRD on HD  5) CAD s/p CABG 2021  6) CVA  7) HTN  8) HL  9) DM    - Influenza A management as per primary, on tamiflu  - HD as per nephrology  - Cont asa, statin, bb, hydralazine; titrate as needed for BP control  - Monitor on tele  - Will follow     Subjective: No events overnight.  Today patient notes ongoing dyspnea, but improved from yesterday.  Denies chest pain or palpitations.    Patient Active Problem List   Diagnosis    Moderate nonproliferative diabetic retinopathy without macular edema associated with type 2 diabetes mellitus (HCC)    Benign prostatic hyperplasia    Type 2 diabetes mellitus with diabetic polyneuropathy, without long-term current use of insulin (HCC)    Vitamin D deficiency    Type 2 diabetes mellitus with microalbuminuria  (HCC)    Type 2 diabetes mellitus with nephropathy (HCC)    Combined hyperlipidemia associated with type 2 diabetes mellitus  (HCC)    Hypertension associated with type 2 diabetes mellitus  (HCC)    Lower extremity edema    Iron deficiency anemia    CKD (chronic kidney disease) stage 4, GFR 15-29 ml/min (HCC)    Acute pulmonary edema (HCC)    Acute on chronic congestive heart failure, unspecified heart failure type (HCC)    Acute respiratory failure with hypoxia (HCC)    Coronary artery disease involving native coronary artery of native heart    Cerebrovascular accident (CVA) due to bilateral embolism of middle cerebral arteries ([facility])    Preoperative testing    S/P CABG (coronary artery bypass graft)    Acute renal failure superimposed on chronic kidney disease  (HCC)     Acute renal failure superimposed on chronic kidney disease, unspecified CKD stage, unspecified acute renal failure type    Stage 5 chronic kidney disease not on chronic dialysis (HCC)    Hypernatremia    Acute kidney injury (HCC)    Anemia    ESRD on hemodialysis (HCC)    Facial swelling    Rhinovirus infection    Thyroid nodule    Pulmonary nodules    Elevated BUN    Subacute cough    Dialysis AV fistula malfunction (HCC)    Type 2 diabetes mellitus with hyperglycemia, with long-term current use of insulin (HCC)    Anemia, unspecified type    Thrombocytopenia (HCC)    ESRD on dialysis (HCC)       Objective:   Temp: 98.1 °F (36.7 °C)  Pulse: 78  Resp: 18  BP: 122/72    Intake/Output:     Intake/Output Summary (Last 24 hours) at 12/19/2023 0826  Last data filed at 12/18/2023 2200  Gross per 24 hour   Intake 340 ml   Output 2550 ml   Net -2210 ml       Last 3 Weights   12/18/23 1258 242 lb (109.8 kg)   11/27/23 1951 252 lb (114.3 kg)   11/13/23 0700 245 lb 4.8 oz (111.3 kg)   11/12/23 0519 244 lb 8 oz (110.9 kg)   11/11/23 1300 246 lb 14.4 oz (112 kg)   11/11/23 0539 238 lb 1.6 oz (108 kg)   11/08/23 1410 234 lb (106.1 kg)   11/07/23 0619 234 lb 1.6 oz (106.2 kg)   11/06/23 1744 239 lb (108.4 kg)       Tele: NSR    Physical Exam:    General: Drowsy but arousable  HEENT: Normocephalic, anicteric sclera, neck supple, no thyromegaly or adenopathy.  Neck: No JVD, carotids 2+, no bruits.  Cardiac: Regular rate and rhythm. S1, S2 normal. No murmur, pericardial rub, S3, thrill, heave or extra cardiac sounds.  Lungs: Coarse breath sounds bilat  Abdomen: Soft, non-tender. No organosplenomegally, mass or rebound, BS-present.  Extremities: Without clubbing, cyanosis or edema.  Peripheral pulses are 2+.  Neurologic: Drowsy but arousable  Skin: Warm and dry.     Laboratory/Data:    Labs:         Recent Labs   Lab 12/18/23  0851 12/19/23  0743   WBC 8.4 7.4   HGB 9.6* 9.3*   MCV 98.8 99.0   .0* 135.0*       Recent Labs   Lab  12/18/23  0851      K 4.7      CO2 27.0   BUN 67*   CREATSERUM 8.79*   CA 8.5*   PHOS 10.8*   *       No results for input(s): \"ALT\", \"AST\", \"ALB\", \"AMYLASE\", \"LIPASE\", \"LDH\" in the last 168 hours.    Invalid input(s): \"ALPHOS\", \"TBIL\", \"DBIL\", \"TPROT\"    No results for input(s): \"TROP\" in the last 168 hours.    Allergies:   No Known Allergies    Medications:  Current Facility-Administered Medications   Medication Dose Route Frequency    glucose (Dex4) 15 GM/59ML oral liquid 15 g  15 g Oral Q15 Min PRN    Or    glucose (Glutose) 40% oral gel 15 g  15 g Oral Q15 Min PRN    Or    glucose-vitamin C (Dex-4) chewable tab 4 tablet  4 tablet Oral Q15 Min PRN    Or    dextrose 50% injection 50 mL  50 mL Intravenous Q15 Min PRN    Or    glucose (Dex4) 15 GM/59ML oral liquid 30 g  30 g Oral Q15 Min PRN    Or    glucose (Glutose) 40% oral gel 30 g  30 g Oral Q15 Min PRN    Or    glucose-vitamin C (Dex-4) chewable tab 8 tablet  8 tablet Oral Q15 Min PRN    insulin aspart (NovoLOG) 100 Units/mL FlexPen 1-11 Units  1-11 Units Subcutaneous TID CC and HS    heparin (Porcine) 5000 UNIT/ML injection 5,000 Units  5,000 Units Subcutaneous Q8H [facility]    acetaminophen (Tylenol Extra Strength) tab 500 mg  500 mg Oral Q4H PRN    polyethylene glycol (PEG 3350) (Miralax) 17 g oral packet 17 g  17 g Oral Daily PRN    sennosides (Senokot) tab 17.2 mg  17.2 mg Oral Nightly PRN    bisacodyl (Dulcolax) 10 MG rectal suppository 10 mg  10 mg Rectal Daily PRN    fleet enema (Fleet) 7-19 GM/118ML rectal enema 133 mL  1 enema Rectal Once PRN    ondansetron (Zofran) 4 MG/2ML injection 4 mg  4 mg Intravenous Q6H PRN    metoclopramide (Reglan) 5 mg/mL injection 10 mg  10 mg Intravenous Q8H PRN    pantoprazole (Protonix) DR tab 40 mg  40 mg Oral QAM AC    aspirin DR tab 81 mg  81 mg Oral Daily    finasteride (Proscar) tab 5 mg  5 mg Oral QAM    insulin aspart (NovoLOG) 100 Units/mL FlexPen 15 Units  15 Units Subcutaneous TID    metoprolol  succinate ER (Toprol XL) 24 hr tab 50 mg  50 mg Oral BID    sevelamer carbonate (Renvela) tab 800 mg  800 mg Oral TID CC    tamsulosin (Flomax) cap 0.8 mg  0.8 mg Oral Daily    hydrALAZINE (Apresoline) tab 50 mg  50 mg Oral TID    gabapentin (Neurontin) cap 100 mg  100 mg Oral BID    albuterol (Ventolin HFA) 108 (90 Base) MCG/ACT inhaler 2 puff  2 puff Inhalation Q4H PRN    rosuvastatin (Crestor) tab 10 mg  10 mg Oral Nightly    oseltamivir (Tamiflu) cap 30 mg  30 mg Oral Once per day on Monday Wednesday Friday    insulin detemir (Levemir) 100 UNIT/ML FlexPen/FlexTouch 25 Units  25 Units Subcutaneous Nightly       Hay Bautista MD  12/19/2023  8:26 AM

## 2023-12-20 LAB
ANION GAP SERPL CALC-SCNC: 15 MMOL/L (ref 0–18)
BASOPHILS # BLD AUTO: 0.03 X10(3) UL (ref 0–0.2)
BASOPHILS NFR BLD AUTO: 0.3 %
BUN BLD-MCNC: 33 MG/DL (ref 9–23)
BUN/CREAT SERPL: 5.8 (ref 10–20)
CALCIUM BLD-MCNC: 8.4 MG/DL (ref 8.7–10.4)
CHLORIDE SERPL-SCNC: 99 MMOL/L (ref 98–112)
CO2 SERPL-SCNC: 25 MMOL/L (ref 21–32)
CREAT BLD-MCNC: 5.69 MG/DL
DEPRECATED RDW RBC AUTO: 58 FL (ref 35.1–46.3)
EGFRCR SERPLBLD CKD-EPI 2021: 10 ML/MIN/1.73M2 (ref 60–?)
EOSINOPHIL # BLD AUTO: 0.02 X10(3) UL (ref 0–0.7)
EOSINOPHIL NFR BLD AUTO: 0.2 %
ERYTHROCYTE [DISTWIDTH] IN BLOOD BY AUTOMATED COUNT: 16 % (ref 11–15)
GLUCOSE BLD-MCNC: 113 MG/DL (ref 70–99)
GLUCOSE BLDC GLUCOMTR-MCNC: 102 MG/DL (ref 70–99)
GLUCOSE BLDC GLUCOMTR-MCNC: 102 MG/DL (ref 70–99)
GLUCOSE BLDC GLUCOMTR-MCNC: 103 MG/DL (ref 70–99)
GLUCOSE BLDC GLUCOMTR-MCNC: 107 MG/DL (ref 70–99)
GLUCOSE BLDC GLUCOMTR-MCNC: 124 MG/DL (ref 70–99)
GLUCOSE BLDC GLUCOMTR-MCNC: 59 MG/DL (ref 70–99)
GLUCOSE BLDC GLUCOMTR-MCNC: 67 MG/DL (ref 70–99)
GLUCOSE BLDC GLUCOMTR-MCNC: 93 MG/DL (ref 70–99)
HCT VFR BLD AUTO: 31.9 %
HGB BLD-MCNC: 10.2 G/DL
IMM GRANULOCYTES # BLD AUTO: 0.02 X10(3) UL (ref 0–1)
IMM GRANULOCYTES NFR BLD: 0.2 %
LYMPHOCYTES # BLD AUTO: 1.35 X10(3) UL (ref 1–4)
LYMPHOCYTES NFR BLD AUTO: 15.2 %
MAGNESIUM SERPL-MCNC: 2 MG/DL (ref 1.6–2.6)
MCH RBC QN AUTO: 31.4 PG (ref 26–34)
MCHC RBC AUTO-ENTMCNC: 32 G/DL (ref 31–37)
MCV RBC AUTO: 98.2 FL
MONOCYTES # BLD AUTO: 0.8 X10(3) UL (ref 0.1–1)
MONOCYTES NFR BLD AUTO: 9 %
NEUTROPHILS # BLD AUTO: 6.68 X10 (3) UL (ref 1.5–7.7)
NEUTROPHILS # BLD AUTO: 6.68 X10(3) UL (ref 1.5–7.7)
NEUTROPHILS NFR BLD AUTO: 75.1 %
OSMOLALITY SERPL CALC.SUM OF ELEC: 296 MOSM/KG (ref 275–295)
PHOSPHATE SERPL-MCNC: 6 MG/DL (ref 2.4–5.1)
PLATELET # BLD AUTO: 128 10(3)UL (ref 150–450)
POTASSIUM SERPL-SCNC: 4.8 MMOL/L (ref 3.5–5.1)
RBC # BLD AUTO: 3.25 X10(6)UL
SODIUM SERPL-SCNC: 139 MMOL/L (ref 136–145)
WBC # BLD AUTO: 8.9 X10(3) UL (ref 4–11)

## 2023-12-20 RX ORDER — TRAMADOL HYDROCHLORIDE 50 MG/1
50 TABLET ORAL EVERY 12 HOURS PRN
Status: DISCONTINUED | OUTPATIENT
Start: 2023-12-20 | End: 2024-01-05

## 2023-12-20 NOTE — OCCUPATIONAL THERAPY NOTE
12/20/23 1043   VISIT TYPE   OT Inpatient Visit Type  Attempted Evaluation  Pt is on BiPAP this am, lethargic. Hold this am   OT Follow Up   Charge Missed visit   Follow Up Needed  Re-attempt later in pm, if pt is medically appropriate for skilled intervention         Annie Lopez, OTR/L  Occupational Therapy  [facility]

## 2023-12-20 NOTE — PHYSICAL THERAPY NOTE
PT eval orders, chart reviewed. Spoke with rn who requested to hold at this time, pt currently on Bipap, may be having HD.    Will re attempt 12/21/23.

## 2023-12-20 NOTE — PAYOR COMM NOTE
--------------  CONTINUED STAY REVIEW-----REQUESTING ADDITIONAL DAY 12/20      Payor: [facility]  Subscriber #:  [id number]  Authorization Number: [id number]    Admit date: 12/18/23  Admit time: 12:56 PM    Admitting Physician: Bradley Dash MD  Attending Physician:  Bradley Dash MD  Primary Care Physician: Victor Manuel Cleaning MD    [facility] Hospitalist Progress Note      CC: Hospital Follow up     PCP: Victor Manuel Cleaning MD         Assessment/Plan:      Principal Problem:    Acute respiratory failure with hypoxia (HCC)  Active Problems:    Thrombocytopenia (HCC)    ESRD on dialysis (HCC)     Mr. Vines is a 73 year old male with PMH BPH , CHF / ICM EF 30% , CAD s/p CABG x3, Moderate MR, HTN, HLD, ESRD on HD M/W/F, Anemia, Gastritis, who presents with SOB.  Admitted for fluid overload and Influenza A.      Acute Hypoxic Respiratory Failure  Volume overload   SOB  ERSD on HD M/W/F  - CXR with congestion, bnp 271   - renal consulted  - s/p recent AV fistula repair by vascular surgery in September 2023  - HD per renal, will likely need an extra sessions   - ABG with acidosis and CO2 retention   - bipap started and pulm consulted      Influenza A positive   - cough for over a month but SOB more acute  - no fevers or Leukocytosis   - CXR reviewed   - will renally dose Tamiflu for 3/5 days treatment      Trop Elevation   - no chest pain   - EKG with concern for LBBB  - will trend trop    - discussed with Cards      CAD s/p CABG x3   Ischemic cardiomyopathy EF 30% without acute exacerbation of congestive heart failure  Chronic Systolic heart failure  HTN  - Continue home metoprolol hydralazine  - Not on ACE or ARB due to renal disease  - Does not use diuretics, making urine      Anemia   Thrombocytopenia   Gastritis   - s/p EGD 11/7 with some mild gastritis, no active bleeding  - s/p colonoscopy 11/8 with polyps but no active bleeding, will need to repeat colonoscopy as outpatient  - PPI BID was not taking       Type 2 diabetes with hyperglycemia  Diabetic nephropathy  - gabapentin  - ISS, meal time and basal insulin   - Controlled renal diet  - Continue home aspirin     Hyperlipidemia  - Continue home statin     BPH continue home Flomax     FN:  - IVF: hold  - Diet: Renal, DM     DVT Prophy: SCDs HSQ   Atrophy: Ambulate PT/OT  Lines: Piv     Dispo: pending clinical course     Outpatient records or previous hospital records reviewed.      Further recommendations pending patient's clinical course.  [facility] hospitalist to continue to follow patient while in house     Patient and/or patient's family given opportunity to ask questions and note understanding and agreeing with therapeutic plan as outlined     Thank You,  Bradley Dash MD     [facility]  Answering Service number: [phone]      Subjective:      Lethargic, wakes up for conversation.  Still SOB. No chest pain.       OBJECTIVE:     Blood pressure 116/67, pulse 77, temperature 97.9 °F (36.6 °C), temperature source Temporal, resp. rate 19, weight 242 lb (109.8 kg), SpO2 96%.     Temp:  [97.9 °F (36.6 °C)-99.5 °F (37.5 °C)] 97.9 °F (36.6 °C)  Pulse:  [77-98] 77  Resp:  [18-26] 19  BP: (103-169)/() 116/67  SpO2:  [89 %-98 %] 96 %        Intake/Output:  No intake or output data in the 24 hours ending 12/20/23 1329             Last 3 Weights   12/18/23 1258 242 lb (109.8 kg)   11/27/23 1951 252 lb (114.3 kg)   11/13/23 0700 245 lb 4.8 oz (111.3 kg)   11/12/23 0519 244 lb 8 oz (110.9 kg)   11/11/23 1300 246 lb 14.4 oz (112 kg)   11/11/23 0539 238 lb 1.6 oz (108 kg)   11/08/23 1410 234 lb (106.1 kg)   11/07/23 0619 234 lb 1.6 oz (106.2 kg)   11/06/23 1744 239 lb (108.4 kg)         Exam   General: lethargic   HEENT: bipap mask   Neck: non tender, no adenopathy   Lungs: b/I wheezing   Heart: Regular rate and rhythm  Abdomen: soft, non tender, non distended   Extremities: No edema  Skin: no new rash, normal color  Neuro: lethargic   Psych:  appropriate affect      Data Review:       Labs:            Recent Labs   Lab 12/18/23  0851 12/19/23  0743 12/20/23  0400   RBC 3.24* 3.07* 3.25*   HGB 9.6* 9.3* 10.2*   HCT 32.0* 30.4* 31.9*   MCV 98.8 99.0 98.2   MCH 29.6 30.3 31.4   MCHC 30.0* 30.6* 32.0   RDW 15.7* 15.8* 16.0*   NEPRELIM 6.68 5.48 6.68   WBC 8.4 7.4 8.9   .0* 135.0* 128.0*                  Recent Labs   Lab 12/18/23  0851 12/19/23  0743 12/20/23  0400   * 113* 113*   BUN 67* 41* 33*   CREATSERUM 8.79* 6.54* 5.69*   EGFRCR 6* 8* 10*   CA 8.5* 8.1* 8.4*    138 139   K 4.7 4.5 4.8    102 99   CO2 27.0 28.0 25.0         No results for input(s): \"ALT\", \"AST\", \"ALB\", \"AMYLASE\", \"LIPASE\", \"LDH\" in the last 168 hours.     Invalid input(s): \"ALPHOS\", \"TBIL\", \"DBIL\", \"TPROT\"        Imaging:  XR CHEST AP PORTABLE  (CPT=71045)     Result Date: 12/19/2023  CONCLUSION:         1. Mild cardiomegaly and mildly prominent pulmonary vascularity with mild interstitial edema suggesting mild cardiac failure/fluid overload.  No large airspace consolidation.  Correlate clinically.    Dictated by (CST): Donal Luo MD on 12/19/2023 at 2:26 PM     Finalized by (CST): Donal Luo MD on 12/19/2023 at 2:27 PM           XR CHEST AP PORTABLE  (CPT=71045)     Result Date: 12/18/2023  CONCLUSION:         1. Cardiomegaly and prominent central vasculature.  CABG. 2. Mild perihilar and lower lobe pulmonary vascular congestive changes.  No pleural effusion    Dictated by (CST): Zaki Reyna MD on 12/18/2023 at 9:18 AM     Finalized by (CST): Zaki Reyna MD on 12/18/2023 at 9:21 AM               Meds:       insulin aspart  1-11 Units Subcutaneous TID CC and HS    heparin  5,000 Units Subcutaneous Q8H YOLANDA    pantoprazole  40 mg Oral QAM AC    aspirin  81 mg Oral Daily    finasteride  5 mg Oral QAM    insulin aspart  15 Units Subcutaneous TID    metoprolol succinate ER  50 mg Oral BID    sevelamer carbonate  800 mg Oral TID CC     tamsulosin  0.8 mg Oral Daily    hydrALAZINE  50 mg Oral TID    gabapentin  100 mg Oral BID    rosuvastatin  10 mg Oral Nightly    oseltamivir  30 mg Oral Once per day on Monday Wednesday Friday    insulin detemir  25 Units Subcutaneous Nightly         MEDICATIONS ADMINISTERED IN LAST 1 DAY:  acetaminophen (Tylenol Extra Strength) tab 500 mg       Date Action Dose Route User    12/19/2023 1750 Given 500 mg Oral Breana Mejia RN          aspirin DR tab 81 mg       Date Action Dose Route User    12/20/2023 0835 Given 81 mg Oral Anita Hooper RN          glucose (Dex4) 15 GM/59ML oral liquid 15 g       Date Action Dose Route User    12/20/2023 1406 Given 15 g Oral Anita Hooper RN          finasteride (Proscar) tab 5 mg       Date Action Dose Route User    12/20/2023 0834 Given 5 mg Oral Anita Hooper RN          gabapentin (Neurontin) cap 100 mg       Date Action Dose Route User    12/20/2023 0834 Given 100 mg Oral Anita Hooper RN    12/19/2023 2140 Given 100 mg Oral Eduardo Walters RN          heparin (Porcine) 5000 UNIT/ML injection 5,000 Units       Date Action Dose Route User    12/20/2023 1409 Given 5,000 Units Subcutaneous (Left Lower Abdomen) Anita Hooper RN    12/20/2023 0624 Given 5,000 Units Subcutaneous (Left Lower Abdomen) Eduardo Walters RN    12/19/2023 2141 Given 5,000 Units Subcutaneous (Left Lower Abdomen) Eduardo Walters RN    12/19/2023 1536 Given 5,000 Units Subcutaneous (Right Lower Abdomen) Sridevi Ceballos RN          hydrALAZINE (Apresoline) tab 50 mg       Date Action Dose Route User    12/20/2023 0834 Given 50 mg Oral Anita Hooper RN    12/19/2023 2139 Given 50 mg Oral Eduardo Walters RN          insulin aspart (NovoLOG) 100 Units/mL FlexPen 15 Units       Date Action Dose Route User    12/20/2023 0944 Given 15 Units Subcutaneous (Left Lower Abdomen) Anita Hooper RN          insulin detemir (Levemir) 100 UNIT/ML FlexPen/FlexTouch 25 Units       Date Action Dose Route User    12/19/2023 2140  Given 25 Units Subcutaneous (Left Lower Abdomen) Eduardo Walters RN          metoprolol succinate ER (Toprol XL) 24 hr tab 50 mg       Date Action Dose Route User    12/20/2023 0835 Given 50 mg Oral Anita Hooper RN    12/19/2023 2140 Given 50 mg Oral Eduardo Walters RN          pantoprazole (Protonix) DR tab 40 mg       Date Action Dose Route User    12/20/2023 0624 Given 40 mg Oral Eduardo Walters RN          rosuvastatin (Crestor) tab 10 mg       Date Action Dose Route User    12/19/2023 2140 Given 10 mg Oral Eduardo Walters RN          sevelamer carbonate (Renvela) tab 800 mg       Date Action Dose Route User    12/20/2023 1200 Given 800 mg Oral Anita Hooper RN    12/20/2023 0834 Given 800 mg Oral Anita Hooper RN          tamsulosin (Flomax) cap 0.8 mg       Date Action Dose Route User    12/20/2023 0834 Given 0.8 mg Oral Anita Hooper RN            Vitals (last day)       Date/Time Temp Pulse Resp BP SpO2 Weight O2 Device O2 Flow Rate (L/min) Who    12/20/23 1400 -- 79 21 128/63 92 % -- Bi-PAP -- TR    12/20/23 1316 -- -- -- -- 96 % -- -- -- EB    12/20/23 1200 97.9 °F (36.6 °C) 77 19 116/67 93 % -- Bi-PAP -- TR    12/20/23 0923 -- -- -- -- 97 % -- -- -- EB    12/20/23 0800 98.2 °F (36.8 °C) 80 21 140/69 93 % -- Bi-PAP -- TR    12/20/23 0600 -- 78 22 124/70 93 % -- -- -- MS    12/20/23 0500 -- 83 20 135/68 98 % -- -- -- MS    12/20/23 0400 99.5 °F (37.5 °C) 80 21 126/67 97 % -- -- -- MS    12/20/23 0300 -- 84 23 169/133 93 % -- -- -- MS    12/20/23 0200 -- 87 23 127/78 93 % -- -- -- MS    12/20/23 0100 -- 89 24 128/72 97 % -- -- -- MS    12/20/23 0000 98.2 °F (36.8 °C) 87 23 148/80 96 % -- -- -- MS    12/19/23 2300 -- 84 24 111/61 90 % -- -- -- MS    12/19/23 2226 -- -- -- -- 98 % -- -- -- JJ    12/19/23 2200 -- 98 18 164/141 89 % -- -- -- MS    12/19/23 2100 -- 95 26 134/94 89 % -- -- -- MS    12/19/23 2000 -- 91 20 128/34 94 % -- -- -- MS    12/19/23 1900 -- 80 18 103/68 98 % -- -- -- MS    12/19/23 1637 --  85 -- -- -- -- -- -- RW    12/19/23 1635 -- 86 -- -- 97 % -- -- -- SB    12/19/23 1252 -- 83 -- -- 99 % -- -- -- SB    12/19/23 1130 -- 85 25 -- 98 % -- Nasal cannula 5 L/min LD    12/19/23 1120 -- -- -- -- 97 % -- -- 5 L/min  RH    12/19/23 1110 -- -- -- -- 88 % -- Nasal cannula 2 L/min RH    12/19/23 0929 97.8 °F (36.6 °C) 79 18 144/72 100 % -- Nasal cannula 3 L/min LD    12/19/23 0650 -- 78 -- -- -- -- -- -- PK    12/19/23 0650 98.1 °F (36.7 °C) -- 18 122/72 98 % -- Nasal cannula 4 L/min KR    12/19/23 0010 -- -- -- 159/82 -- -- -- -- PK

## 2023-12-20 NOTE — PROGRESS NOTES
[facility]    Cardiology Progress Note    Terrence Vines Patient Status:  Inpatient    1950 MRN [id number]   Location [facility] 2W/SW Attending Bradley Dash MD   Hosp Day # 2 PCP Victor Manuel Cleaning MD       Impression/Plan:  73 year old male presenting with:     1) Acute hypoxemic respiratory failure, multifactorial  2) Influenza A  3) HFrEF (EF 45-50% 2023)  4) ESRD on HD  5) CAD s/p CABG 2021  6) CVA  7) HTN  8) HL  9) DM     - Influenza A management as per primary, on tamiflu  - HD as per nephrology (refused 2023 with subsequent worsening respiratory status requiring bipap)  - Cont asa, statin, bb, hydralazine; titrate as needed for BP control  - Bipap as per pulm/critical care  - Monitor on tele  - Will follow     Subjective: Patient with worsening respiratory status overnight, now on bipap.  Refused HD yesterday.  Today without acute complaints.    Patient Active Problem List   Diagnosis    Moderate nonproliferative diabetic retinopathy without macular edema associated with type 2 diabetes mellitus (HCC)    Benign prostatic hyperplasia    Type 2 diabetes mellitus with diabetic polyneuropathy, without long-term current use of insulin (HCC)    Vitamin D deficiency    Type 2 diabetes mellitus with microalbuminuria  (HCC)    Type 2 diabetes mellitus with nephropathy (HCC)    Combined hyperlipidemia associated with type 2 diabetes mellitus  (HCC)    Hypertension associated with type 2 diabetes mellitus  (HCC)    Lower extremity edema    Iron deficiency anemia    CKD (chronic kidney disease) stage 4, GFR 15-29 ml/min (HCC)    Acute pulmonary edema (HCC)    Acute on chronic congestive heart failure, unspecified heart failure type (HCC)    Acute respiratory failure with hypoxia (HCC)    Coronary artery disease involving native coronary artery of native heart    Cerebrovascular accident (CVA) due to bilateral embolism of middle cerebral arteries (HCC)    Preoperative testing     S/P CABG (coronary artery bypass graft)    Acute renal failure superimposed on chronic kidney disease  (HCC)    Acute renal failure superimposed on chronic kidney disease, unspecified CKD stage, unspecified acute renal failure type    Stage 5 chronic kidney disease not on chronic dialysis (HCC)    Hypernatremia    Acute kidney injury (HCC)    Anemia    ESRD on hemodialysis (HCC)    Facial swelling    Rhinovirus infection    Thyroid nodule    Pulmonary nodules    Elevated BUN    Subacute cough    Dialysis AV fistula malfunction (HCC)    Type 2 diabetes mellitus with hyperglycemia, with long-term current use of insulin (HCC)    Anemia, unspecified type    Thrombocytopenia (HCC)    ESRD on dialysis ([facility])       Objective:   Temp: 99.5 °F (37.5 °C)  Pulse: 78  Resp: 22  BP: 124/70    Intake/Output:   No intake or output data in the 24 hours ending 12/20/23 0743    Last 3 Weights   12/18/23 1258 242 lb (109.8 kg)   11/27/23 1951 252 lb (114.3 kg)   11/13/23 0700 245 lb 4.8 oz (111.3 kg)   11/12/23 0519 244 lb 8 oz (110.9 kg)   11/11/23 1300 246 lb 14.4 oz (112 kg)   11/11/23 0539 238 lb 1.6 oz (108 kg)   11/08/23 1410 234 lb (106.1 kg)   11/07/23 0619 234 lb 1.6 oz (106.2 kg)   11/06/23 1744 239 lb (108.4 kg)       Tele: NSR    Physical Exam:    General: Drowsy, arousable  HEENT: Normocephalic, anicteric sclera, neck supple, no thyromegaly or adenopathy.  Neck: No JVD, carotids 2+, no bruits.  Cardiac: Regular rate and rhythm. S1, S2 normal. No murmur, pericardial rub, S3, thrill, heave or extra cardiac sounds.  Lungs: coarse breath sounds bilat  Abdomen: Soft, non-tender. No organosplenomegally, mass or rebound, BS-present.  Extremities: Without clubbing, cyanosis or edema.  Peripheral pulses are 2+.  Neurologic: Drowsy, arousable  Skin: Warm and dry.     Laboratory/Data:    Labs:         Recent Labs   Lab 12/18/23  0851 12/19/23  0743 12/20/23  0400   WBC 8.4 7.4 8.9   HGB 9.6* 9.3* 10.2*   MCV 98.8 99.0 98.2   PLT  141.0* 135.0* 128.0*       Recent Labs   Lab 12/18/23  0851 12/19/23  0743 12/20/23  0400    138 139   K 4.7 4.5 4.8    102 99   CO2 27.0 28.0 25.0   BUN 67* 41* 33*   CREATSERUM 8.79* 6.54* 5.69*   CA 8.5* 8.1* 8.4*   MG  --  2.2 2.0   PHOS 10.8* 7.1* 6.0*   * 113* 113*       No results for input(s): \"ALT\", \"AST\", \"ALB\", \"AMYLASE\", \"LIPASE\", \"LDH\" in the last 168 hours.    Invalid input(s): \"ALPHOS\", \"TBIL\", \"DBIL\", \"TPROT\"    No results for input(s): \"TROP\" in the last 168 hours.    Allergies:   No Known Allergies    Medications:  Current Facility-Administered Medications   Medication Dose Route Frequency    ipratropium-albuterol (Duoneb) 0.5-2.5 (3) MG/3ML inhalation solution 3 mL  3 mL Nebulization Q6H PRN    glucose (Dex4) 15 GM/59ML oral liquid 15 g  15 g Oral Q15 Min PRN    Or    glucose (Glutose) 40% oral gel 15 g  15 g Oral Q15 Min PRN    Or    glucose-vitamin C (Dex-4) chewable tab 4 tablet  4 tablet Oral Q15 Min PRN    Or    dextrose 50% injection 50 mL  50 mL Intravenous Q15 Min PRN    Or    glucose (Dex4) 15 GM/59ML oral liquid 30 g  30 g Oral Q15 Min PRN    Or    glucose (Glutose) 40% oral gel 30 g  30 g Oral Q15 Min PRN    Or    glucose-vitamin C (Dex-4) chewable tab 8 tablet  8 tablet Oral Q15 Min PRN    insulin aspart (NovoLOG) 100 Units/mL FlexPen 1-11 Units  1-11 Units Subcutaneous TID CC and HS    heparin (Porcine) 5000 UNIT/ML injection 5,000 Units  5,000 Units Subcutaneous Q8H YOLANDA    acetaminophen (Tylenol Extra Strength) tab 500 mg  500 mg Oral Q4H PRN    polyethylene glycol (PEG 3350) (Miralax) 17 g oral packet 17 g  17 g Oral Daily PRN    sennosides (Senokot) tab 17.2 mg  17.2 mg Oral Nightly PRN    bisacodyl (Dulcolax) 10 MG rectal suppository 10 mg  10 mg Rectal Daily PRN    fleet enema (Fleet) 7-19 GM/118ML rectal enema 133 mL  1 enema Rectal Once PRN    ondansetron (Zofran) 4 MG/2ML injection 4 mg  4 mg Intravenous Q6H PRN    metoclopramide (Reglan) 5 mg/mL injection  10 mg  10 mg Intravenous Q8H PRN    pantoprazole (Protonix) DR tab 40 mg  40 mg Oral QAM AC    aspirin DR tab 81 mg  81 mg Oral Daily    finasteride (Proscar) tab 5 mg  5 mg Oral QAM    insulin aspart (NovoLOG) 100 Units/mL FlexPen 15 Units  15 Units Subcutaneous TID    metoprolol succinate ER (Toprol XL) 24 hr tab 50 mg  50 mg Oral BID    sevelamer carbonate (Renvela) tab 800 mg  800 mg Oral TID CC    tamsulosin (Flomax) cap 0.8 mg  0.8 mg Oral Daily    hydrALAZINE (Apresoline) tab 50 mg  50 mg Oral TID    gabapentin (Neurontin) cap 100 mg  100 mg Oral BID    albuterol (Ventolin HFA) 108 (90 Base) MCG/ACT inhaler 2 puff  2 puff Inhalation Q4H PRN    rosuvastatin (Crestor) tab 10 mg  10 mg Oral Nightly    oseltamivir (Tamiflu) cap 30 mg  30 mg Oral Once per day on Monday Wednesday Friday    insulin detemir (Levemir) 100 UNIT/ML FlexPen/FlexTouch 25 Units  25 Units Subcutaneous Nightly       Hay Bautista MD  12/20/2023  7:43 AM

## 2023-12-20 NOTE — ED PROVIDER NOTES
Patient Seen in: [facility] 2w/sw      History     Chief Complaint   Patient presents with    Difficulty Breathing     Stated Complaint: coughing, shyann    Subjective:   HPI    Patient presents emergency department with shortness of breath and coughing.  He states that he gets dialyzed Monday Wednesday and Friday and was last dialyzed 3 days ago.  He could not get to dialysis today due to shortness of breath.  There is been no fever or chills.  There is no other aggravating or alleviating factors.  He denies pain.    Objective:   Past Medical History:   Diagnosis Date    BPH (benign prostatic hypertrophy)     Cataract     Congestive heart disease (HCC)     Coronary atherosclerosis     Diabetic retinopathy (HCC)     Dialysis patient ([facility])     M,W,F,    Esophageal reflux     Heart valve disease     High blood pressure     High cholesterol     HLD (hyperlipidemia)     HTN (hypertension)     Neuropathy     Proteinuria     Renal disorder     HD every MWF with temporary HD cath    Type II diabetes mellitus (HCC)     Visual impairment     Reading glasses              Past Surgical History:   Procedure Laterality Date    CABG  06/21/2021    x3-lima-->LAD, SVG-->diag and SVG-->OM    CATARACT Right     COLONOSCOPY N/A 11/8/2023    Procedure: COLONOSCOPY;  Surgeon: Cullen Bautista MD;  Location: [facility] ENDOSCOPY                Social History     Socioeconomic History    Marital status:    Tobacco Use    Smoking status: Never    Smokeless tobacco: Never   Vaping Use    Vaping Use: Never used   Substance and Sexual Activity    Alcohol use: Never    Drug use: Never     Social Determinants of Health     Food Insecurity: No Food Insecurity (12/18/2023)    Food Insecurity     Food Insecurity: Never true   Transportation Needs: No Transportation Needs (12/18/2023)    Transportation Needs     Lack of Transportation: No   Housing Stability: Low Risk  (12/18/2023)    Housing Stability     Housing Instability: No               Review of Systems    Positive for stated complaint: coughing, shyann  Other systems are as noted in HPI.  Constitutional and vital signs reviewed.      All other systems reviewed and negative except as noted above.    Physical Exam     ED Triage Vitals [12/18/23 0832]   BP (!) 184/90   Pulse 86   Resp 22   Temp 97.1 °F (36.2 °C)   Temp src Temporal   SpO2 (!) 87 %   O2 Device None (Room air)       Current:/63 (BP Location: Right arm)   Pulse 79   Temp 97.9 °F (36.6 °C) (Temporal)   Resp 21   Wt 109.8 kg   SpO2 92%   BMI 32.82 kg/m²         Physical Exam  Vitals and nursing note reviewed.   Constitutional:       General: He is not in acute distress.     Appearance: He is well-developed.   HENT:      Head: Normocephalic.      Nose: Nose normal.      Mouth/Throat:      Mouth: Mucous membranes are moist.   Eyes:      Conjunctiva/sclera: Conjunctivae normal.   Cardiovascular:      Rate and Rhythm: Normal rate and regular rhythm.      Heart sounds: No murmur heard.  Pulmonary:      Effort: Pulmonary effort is normal. No respiratory distress.      Breath sounds: Examination of the right-lower field reveals rales. Examination of the left-lower field reveals rales. Rales present.   Abdominal:      General: There is no distension.      Palpations: Abdomen is soft.      Tenderness: There is no abdominal tenderness.   Musculoskeletal:         General: No tenderness. Normal range of motion.      Cervical back: Normal range of motion and neck supple.   Skin:     General: Skin is warm and dry.      Capillary Refill: Capillary refill takes less than 2 seconds.      Findings: No rash.   Neurological:      General: No focal deficit present.      Mental Status: He is alert and oriented to person, place, and time.               ED Course     Labs Reviewed   BASIC METABOLIC PANEL (8) - Abnormal; Notable for the following components:       Result Value    Glucose 159 (*)     BUN 67 (*)     Creatinine 8.79 (*)     BUN/CREA  Ratio 7.6 (*)     Calcium, Total 8.5 (*)     Calculated Osmolality 311 (*)     eGFR-Cr 6 (*)     All other components within normal limits   BNP (B TYPE NATRIURETIC PEPTIDE) - Abnormal; Notable for the following components:    Beta Natriuretic Peptide 271 (*)     All other components within normal limits   TROPONIN I HIGH SENSITIVITY - Abnormal; Notable for the following components:    Troponin I (High Sensitivity) 59 (*)     All other components within normal limits   LIPID PANEL - Abnormal; Notable for the following components:    HDL Cholesterol 34 (*)     Triglycerides 234 (*)     LDL Cholesterol 106 (*)     VLDL 40 (*)     Non HDL Chol 147 (*)     All other components within normal limits   PHOSPHORUS - Abnormal; Notable for the following components:    Phosphorus 10.8 (*)     All other components within normal limits   TROPONIN I HIGH SENSITIVITY - Abnormal; Notable for the following components:    Troponin I (High Sensitivity) 54 (*)     All other components within normal limits   BASIC METABOLIC PANEL (8) - Abnormal; Notable for the following components:    Glucose 113 (*)     BUN 41 (*)     Creatinine 6.54 (*)     BUN/CREA Ratio 6.3 (*)     Calcium, Total 8.1 (*)     Calculated Osmolality 297 (*)     eGFR-Cr 8 (*)     All other components within normal limits   PHOSPHORUS - Abnormal; Notable for the following components:    Phosphorus 7.1 (*)     All other components within normal limits   ARTERIAL BLOOD GAS - Abnormal; Notable for the following components:    ABG pH 7.33 (*)     ABG pCO2 57 (*)     ABG PO2 64 (*)     ABG HCO3 27.4 (*)     Blood Gas Base Excess 3.3 (*)     ABG O2 Saturation 92.6 (*)     Oxygen Content 12.2 (*)     All other components within normal limits   BASIC METABOLIC PANEL (8) - Abnormal; Notable for the following components:    Glucose 113 (*)     BUN 33 (*)     Creatinine 5.69 (*)     BUN/CREA Ratio 5.8 (*)     Calcium, Total 8.4 (*)     Calculated Osmolality 296 (*)     eGFR-Cr 10  (*)     All other components within normal limits   PHOSPHORUS - Abnormal; Notable for the following components:    Phosphorus 6.0 (*)     All other components within normal limits   POCT GLUCOSE - Abnormal; Notable for the following components:    POC Glucose  133 (*)     All other components within normal limits   POCT GLUCOSE - Abnormal; Notable for the following components:    POC Glucose  104 (*)     All other components within normal limits   POCT GLUCOSE - Abnormal; Notable for the following components:    POC Glucose  190 (*)     All other components within normal limits   POCT GLUCOSE - Abnormal; Notable for the following components:    POC Glucose  105 (*)     All other components within normal limits   POCT GLUCOSE - Abnormal; Notable for the following components:    POC Glucose  107 (*)     All other components within normal limits   POCT GLUCOSE - Abnormal; Notable for the following components:    POC Glucose  102 (*)     All other components within normal limits   POCT GLUCOSE - Abnormal; Notable for the following components:    POC Glucose  67 (*)     All other components within normal limits   POCT GLUCOSE - Abnormal; Notable for the following components:    POC Glucose  103 (*)     All other components within normal limits   SARS-COV-2/FLU A AND B/RSV BY PCR (GENEXPERT) - Abnormal; Notable for the following components:    Influenza A by PCR Positive (*)     All other components within normal limits    Narrative:     This test is intended for the qualitative detection and differentiation of SARS-CoV-2, influenza A, influenza B, and respiratory syncytial virus (RSV) viral RNA in nasopharyngeal or nares swabs from individuals suspected of respiratory viral infection consistent with COVID-19 by their healthcare provider. Signs and symptoms of respiratory viral infection due to SARS-CoV-2, influenza, and RSV can be similar.    Test performed using the [employer] Xpress SARS-CoV-2/FLU/RSV (real time RT-PCR)   assay on the [employer] instrument, [employer], [place] [address].   This test is being used under the Food and Drug Administration's Emergency Use Authorization.    The authorized Fact Sheet for Healthcare Providers for this assay is available upon request from the laboratory.   CBC W/ DIFFERENTIAL - Abnormal; Notable for the following components:    RBC 3.24 (*)     HGB 9.6 (*)     HCT 32.0 (*)     MCHC 30.0 (*)     RDW-SD 55.6 (*)     RDW 15.7 (*)     .0 (*)     All other components within normal limits   CBC W/ DIFFERENTIAL - Abnormal; Notable for the following components:    RBC 3.07 (*)     HGB 9.3 (*)     HCT 30.4 (*)     MCHC 30.6 (*)     RDW-SD 56.7 (*)     RDW 15.8 (*)     .0 (*)     All other components within normal limits   CBC W/ DIFFERENTIAL - Abnormal; Notable for the following components:    RBC 3.25 (*)     HGB 10.2 (*)     HCT 31.9 (*)     RDW-SD 58.0 (*)     RDW 16.0 (*)     .0 (*)     All other components within normal limits   MAGNESIUM - Normal   MAGNESIUM - Normal   POCT GLUCOSE - Normal   POCT GLUCOSE - Normal   POCT GLUCOSE - Normal   CBC WITH DIFFERENTIAL WITH PLATELET    Narrative:     The following orders were created for panel order CBC With Differential With Platelet.  Procedure                               Abnormality         Status                     ---------                               -----------         ------                     CBC W/ DIFFERENTIAL[[id number]]          Abnormal            Final result                 Please view results for these tests on the individual orders.   CBC WITH DIFFERENTIAL WITH PLATELET    Narrative:     The following orders were created for panel order CBC With Differential With Platelet.  Procedure                               Abnormality         Status                     ---------                               -----------         ------                     CBC W/ DIFFERENTIAL[[id number]]          Abnormal            Final  result                 Please view results for these tests on the individual orders.   CBC WITH DIFFERENTIAL WITH PLATELET    Narrative:     The following orders were created for panel order CBC With Differential With Platelet.  Procedure                               Abnormality         Status                     ---------                               -----------         ------                     CBC W/ DIFFERENTIAL[[id number]]          Abnormal            Final result                 Please view results for these tests on the individual orders.   URINALYSIS, ROUTINE     EKG    Rate, intervals and axes as noted on EKG Report.  Rate: 81 bpm  Rhythm: Sinus Rhythm  Reading: Left bundle branch block, abnormal                          MDM          Admission disposition: 12/18/2023  9:33 AM                         Medical Decision Making  Differential diagnosis considered for congestive heart failure, reactive airway disease, pneumonia, pneumothorax.    Problems Addressed:  Acute respiratory failure with hypoxia (HCC): acute illness or injury  ESRD on dialysis (HCC): acute illness or injury    Amount and/or Complexity of Data Reviewed  Labs: ordered. Decision-making details documented in ED Course.     Details: Laboratory studies consistent with chronic renal failure.  Radiology: ordered and independent interpretation performed. Decision-making details documented in ED Course.     Details: Chest x-ray shows findings consistent with pulmonary edema and congestive heart failure.  ECG/medicine tests: ordered and independent interpretation performed. Decision-making details documented in ED Course.  Discussion of management or test interpretation with external provider(s): Nitropaste was applied to the patient.  He will be admitted to the hospital for urgent dialysis.  Nephrology was notified.    Risk  Prescription drug management.  Decision regarding hospitalization.        Disposition and Plan     Clinical Impression:  1.  Acute respiratory failure with hypoxia (HCC)    2. ESRD on dialysis (HCC)         Disposition:  Admit  12/18/2023  9:33 am    Follow-up:  Tara Bentley, APN  [address]  [place] [address]  [phone]    Follow up in 1 week(s)  Diabetes follow-up    We recommend that you schedule follow up care with a primary care provider within the next three months to obtain basic health screening including reassessment of your blood pressure.      Medications Prescribed:  Current Discharge Medication List                            Hospital Problems       Present on Admission  Date Reviewed: 10/30/2022            ICD-10-CM Noted POA    * (Principal) Acute respiratory failure with hypoxia (HCC) J96.01 3/24/2021 Unknown    ESRD on dialysis (HCC) N18.6, Z99.2 12/18/2023 Unknown    Thrombocytopenia (HCC) D69.6 12/18/2023 Yes

## 2023-12-20 NOTE — PROGRESS NOTES
[facility] Hospitalist Progress Note     CC: Hospital Follow up    PCP: Victor Manuel Cleaning MD       Assessment/Plan:     Principal Problem:    Acute respiratory failure with hypoxia (HCC)  Active Problems:    Thrombocytopenia (HCC)    ESRD on dialysis (HCC)    Mr. Vines is a 73 year old male with PMH BPH , CHF / ICM EF 30% , CAD s/p CABG x3, Moderate MR, HTN, HLD, ESRD on HD M/W/F, Anemia, Gastritis, who presents with SOB.  Admitted for fluid overload and Influenza A.      Acute Hypoxic Respiratory Failure  Volume overload   SOB  ERSD on HD M/W/F  - CXR with congestion, bnp 271   - renal consulted  - s/p recent AV fistula repair by vascular surgery in September 2023  - HD per renal, will likely need an extra sessions   - ABG with acidosis and CO2 retention   - bipap started and pulm consulted     Influenza A positive   - cough for over a month but SOB more acute  - no fevers or Leukocytosis   - CXR reviewed   - will renally dose Tamiflu for 3/5 days treatment      Trop Elevation   - no chest pain   - EKG with concern for LBBB  - will trend trop    - discussed with Cards      CAD s/p CABG x3   Ischemic cardiomyopathy EF 30% without acute exacerbation of congestive heart failure  Chronic Systolic heart failure  HTN  - Continue home metoprolol hydralazine  - Not on ACE or ARB due to renal disease  - Does not use diuretics, making urine      Anemia   Thrombocytopenia   Gastritis   - s/p EGD 11/7 with some mild gastritis, no active bleeding  - s/p colonoscopy 11/8 with polyps but no active bleeding, will need to repeat colonoscopy as outpatient  - PPI BID was not taking      Type 2 diabetes with hyperglycemia  Diabetic nephropathy  - gabapentin  - ISS, meal time and basal insulin   - Controlled renal diet  - Continue home aspirin     Hyperlipidemia  - Continue home statin     BPH continue home Flomax     FN:  - IVF: hold  - Diet: Renal, DM     DVT Prophy: SCDs HSQ   Atrophy: Ambulate PT/OT  Lines: Piv     Dispo: pending  clinical course     Outpatient records or previous hospital records reviewed.      Further recommendations pending patient's clinical course.  [facility] hospitalist to continue to follow patient while in house     Patient and/or patient's family given opportunity to ask questions and note understanding and agreeing with therapeutic plan as outlined     Thank You,  Bradley Dash MD     [facility] Hospitalist  Answering Service number: [phone]     Subjective:     Lethargic, wakes up for conversation.  Still SOB. No chest pain.      OBJECTIVE:    Blood pressure 116/67, pulse 77, temperature 97.9 °F (36.6 °C), temperature source Temporal, resp. rate 19, weight 242 lb (109.8 kg), SpO2 96%.    Temp:  [97.9 °F (36.6 °C)-99.5 °F (37.5 °C)] 97.9 °F (36.6 °C)  Pulse:  [77-98] 77  Resp:  [18-26] 19  BP: (103-169)/() 116/67  SpO2:  [89 %-98 %] 96 %      Intake/Output:  No intake or output data in the 24 hours ending 12/20/23 1329      Last 3 Weights   12/18/23 1258 242 lb (109.8 kg)   11/27/23 1951 252 lb (114.3 kg)   11/13/23 0700 245 lb 4.8 oz (111.3 kg)   11/12/23 0519 244 lb 8 oz (110.9 kg)   11/11/23 1300 246 lb 14.4 oz (112 kg)   11/11/23 0539 238 lb 1.6 oz (108 kg)   11/08/23 1410 234 lb (106.1 kg)   11/07/23 0619 234 lb 1.6 oz (106.2 kg)   11/06/23 1744 239 lb (108.4 kg)       Exam   General: lethargic   HEENT: bipap mask   Neck: non tender, no adenopathy   Lungs: b/I wheezing   Heart: Regular rate and rhythm  Abdomen: soft, non tender, non distended   Extremities: No edema  Skin: no new rash, normal color  Neuro: lethargic   Psych: appropriate affect     Data Review:       Labs:     Recent Labs   Lab 12/18/23  0851 12/19/23  0743 12/20/23  0400   RBC 3.24* 3.07* 3.25*   HGB 9.6* 9.3* 10.2*   HCT 32.0* 30.4* 31.9*   MCV 98.8 99.0 98.2   MCH 29.6 30.3 31.4   MCHC 30.0* 30.6* 32.0   RDW 15.7* 15.8* 16.0*   NEPRELIM 6.68 5.48 6.68   WBC 8.4 7.4 8.9   .0* 135.0* 128.0*         Recent Labs   Lab  12/18/23  0851 12/19/23  0743 12/20/23  0400   * 113* 113*   BUN 67* 41* 33*   CREATSERUM 8.79* 6.54* 5.69*   EGFRCR 6* 8* 10*   CA 8.5* 8.1* 8.4*    138 139   K 4.7 4.5 4.8    102 99   CO2 27.0 28.0 25.0       No results for input(s): \"ALT\", \"AST\", \"ALB\", \"AMYLASE\", \"LIPASE\", \"LDH\" in the last 168 hours.    Invalid input(s): \"ALPHOS\", \"TBIL\", \"DBIL\", \"TPROT\"      Imaging:  XR CHEST AP PORTABLE  (CPT=71045)    Result Date: 12/19/2023  CONCLUSION:  1. Mild cardiomegaly and mildly prominent pulmonary vascularity with mild interstitial edema suggesting mild cardiac failure/fluid overload.  No large airspace consolidation.  Correlate clinically.    Dictated by (CST): Donal Luo MD on 12/19/2023 at 2:26 PM     Finalized by (CST): Donal Luo MD on 12/19/2023 at 2:27 PM          XR CHEST AP PORTABLE  (CPT=71045)    Result Date: 12/18/2023  CONCLUSION:  1. Cardiomegaly and prominent central vasculature.  CABG. 2. Mild perihilar and lower lobe pulmonary vascular congestive changes.  No pleural effusion    Dictated by (CST): Zaki Reyna MD on 12/18/2023 at 9:18 AM     Finalized by (CST): Zaki Reyna MD on 12/18/2023 at 9:21 AM             Meds:      insulin aspart  1-11 Units Subcutaneous TID CC and HS    heparin  5,000 Units Subcutaneous Q8H YOLANDA    pantoprazole  40 mg Oral QAM AC    aspirin  81 mg Oral Daily    finasteride  5 mg Oral QAM    insulin aspart  15 Units Subcutaneous TID    metoprolol succinate ER  50 mg Oral BID    sevelamer carbonate  800 mg Oral TID CC    tamsulosin  0.8 mg Oral Daily    hydrALAZINE  50 mg Oral TID    gabapentin  100 mg Oral BID    rosuvastatin  10 mg Oral Nightly    oseltamivir  30 mg Oral Once per day on Monday Wednesday Friday    insulin detemir  25 Units Subcutaneous Nightly       ipratropium-albuterol, glucose **OR** glucose **OR** glucose-vitamin C **OR** dextrose **OR** glucose **OR** glucose **OR** glucose-vitamin C, acetaminophen, polyethylene  glycol (PEG 3350), sennosides, bisacodyl, fleet enema, ondansetron, metoclopramide, albuterol

## 2023-12-20 NOTE — PROGRESS NOTES
NEPHROLOGY DAILY PROGRESS NOTE     SUBJECTIVE:  Received extra session of dialysis yesterday; 2L removed   Remains on BIPAP  Sleepy this AM, denies SOB or pain    OBJECTIVE:    Total Intake/Output:  No intake or output data in the 24 hours ending 12/20/23 0934      PHYSICAL EXAM:  /69 (BP Location: Right arm)   Pulse 80   Temp 99.7 °F (37.6 °C) (Tympanic)   Resp 21   Wt 242 lb (109.8 kg)   SpO2 97%   BMI 32.82 kg/m²   GEN: NAD, on BIPAP     HEENT: NCAT    CHEST: coarse breath sounds    CARDIAC: S1S2 normal  ABD: soft, NT/ND  EXT:  no lower ext edema  NEURO: sleepy, but answering questions    SKIN: warm, dry   DIALYSIS ACCESS: LUE AVF        CURRENT MEDICATIONS:   insulin aspart  1-11 Units Subcutaneous TID CC and HS    heparin  5,000 Units Subcutaneous Q8H YOLANDA    pantoprazole  40 mg Oral QAM AC    aspirin  81 mg Oral Daily    finasteride  5 mg Oral QAM    insulin aspart  15 Units Subcutaneous TID    metoprolol succinate ER  50 mg Oral BID    sevelamer carbonate  800 mg Oral TID CC    tamsulosin  0.8 mg Oral Daily    hydrALAZINE  50 mg Oral TID    gabapentin  100 mg Oral BID    rosuvastatin  10 mg Oral Nightly    oseltamivir  30 mg Oral Once per day on Monday Wednesday Friday    insulin detemir  25 Units Subcutaneous Nightly             LABS:  Patient Labs Reviewed in Detail. Pertinent Labs as follows:  Recent Labs   Lab 12/18/23  0851 12/19/23  0743 12/20/23  0400   * 113* 113*   BUN 67* 41* 33*   CREATSERUM 8.79* 6.54* 5.69*   EGFRCR 6* 8* 10*   CA 8.5* 8.1* 8.4*    138 139   K 4.7 4.5 4.8    102 99   CO2 27.0 28.0 25.0     Recent Labs   Lab 12/18/23  0851 12/19/23  0743 12/20/23  0400   RBC 3.24* 3.07* 3.25*   HGB 9.6* 9.3* 10.2*   HCT 32.0* 30.4* 31.9*   MCV 98.8 99.0 98.2   MCH 29.6 30.3 31.4   MCHC 30.0* 30.6* 32.0   RDW 15.7* 15.8* 16.0*   NEPRELIM 6.68 5.48 6.68   WBC 8.4 7.4 8.9   .0* 135.0* 128.0*           IMAGING:  XR CHEST AP PORTABLE  (CPT=71045)    Result Date:  12/19/2023  CONCLUSION:  1. Mild cardiomegaly and mildly prominent pulmonary vascularity with mild interstitial edema suggesting mild cardiac failure/fluid overload.  No large airspace consolidation.  Correlate clinically.    Dictated by (CST): Donal Luo MD on 12/19/2023 at 2:26 PM     Finalized by (CST): Donal Luo MD on 12/19/2023 at 2:27 PM                ASSESSMENT AND PLAN:   This is a 73 year old male with PMH sig for ESRD on HD MWF, HTN, HLD, DM2, CAD s/p CABG x3, ischemic cardiomyopathy. Presents with SOB and cough. Found to be influenza positive. Nephrology is consulted for dialysis.      ESRD on HD MWF   - received extra session of dialysis on 12/19  - dialysis today per usual schedule. Fluid removal as tolerated.    - followed by [facility]       Acute hypoxic respiratory failure  - CXR with pulmonary vascular changes, also influenza A postivie    - fluid removal with dialysis   - currently on BiPAP  - cards following     Hyperphosphatemia   - improving   - continue sevelamer, renal diet   - dialysis as above     Influenza A   - tamiflu - per primary       Elevated troponin  -  per cards     Hypertension   - Hydralazine, metoprolol       Anemia   - trend Hb   - receiving OMAIRA with outpatient dialysis      Dw RN    We will continue to follow MD Melodie [facility] - Nephrology

## 2023-12-20 NOTE — PLAN OF CARE
Problem: Patient Centered Care  Goal: Patient preferences are identified and integrated in the patient's plan of care  Description: Interventions:  - What would you like us to know as we care for you? I live at home with family. I'm from [place]  - Provide timely, complete, and accurate information to patient/family  - Incorporate patient and family knowledge, values, beliefs, and cultural backgrounds into the planning and delivery of care  - Encourage patient/family to participate in care and decision-making at the level they choose  - Honor patient and family perspectives and choices  Outcome: Progressing     Problem: Diabetes/Glucose Control  Goal: Glucose maintained within prescribed range  Description: INTERVENTIONS:  - Monitor Blood Glucose as ordered  - Assess for signs and symptoms of hyperglycemia and hypoglycemia  - Administer ordered medications to maintain glucose within target range  - Assess barriers to adequate nutritional intake and initiate nutrition consult as needed  - Instruct patient on self management of diabetes  Outcome: Progressing     Problem: Patient/Family Goals  Goal: Patient/Family Long Term Goal  Description: Patient's Long Term Goal: go home    Interventions:  - Monitor vital signs  - Monitor appropriate labs  - Monitor blood glucose levels  - Administer medications per order  - Pain management as needed  - Follow MD orders  - Diagnostics per orders  - Dialysis per orders  - Update / inform patient and family on plan of care  - Discharge planning     - See additional Care Plan goals for specific interventions  Outcome: Progressing  Goal: Patient/Family Short Term Goal  Description: Patient's Short Term Goal: To breathe better    Interventions:   - RT treatment  -O2  -Follow md orders  - See additional Care Plan goals for specific interventions  Outcome: Progressing     Problem: CARDIOVASCULAR - ADULT  Goal: Maintains optimal cardiac output and hemodynamic stability  Description:  INTERVENTIONS:  - Monitor vital signs, rhythm, and trends  - Monitor for bleeding, hypotension and signs of decreased cardiac output  - Evaluate effectiveness of vasoactive medications to optimize hemodynamic stability  - Monitor arterial and/or venous puncture sites for bleeding and/or hematoma  - Assess quality of pulses, skin color and temperature  - Assess for signs of decreased coronary artery perfusion - ex. Angina  - Evaluate fluid balance, assess for edema, trend weights  Outcome: Progressing  Goal: Absence of cardiac arrhythmias or at baseline  Description: INTERVENTIONS:  - Continuous cardiac monitoring, monitor vital signs, obtain 12 lead EKG if indicated  - Evaluate effectiveness of antiarrhythmic and heart rate control medications as ordered  - Initiate emergency measures for life threatening arrhythmias  - Monitor electrolytes and administer replacement therapy as ordered  Outcome: Progressing     Problem: RESPIRATORY - ADULT  Goal: Achieves optimal ventilation and oxygenation  Description: INTERVENTIONS:  - Assess for changes in respiratory status  - Assess for changes in mentation and behavior  - Position to facilitate oxygenation and minimize respiratory effort  - Oxygen supplementation based on oxygen saturation or ABGs  - Provide Smoking Cessation handout, if applicable  - Encourage broncho-pulmonary hygiene including cough, deep breathe, Incentive Spirometry  - Assess the need for suctioning and perform as needed  - Assess and instruct to report SOB or any respiratory difficulty  - Respiratory Therapy support as indicated  - Manage/alleviate anxiety  - Monitor for signs/symptoms of CO2 retention  Outcome: Progressing     Problem: METABOLIC/FLUID AND ELECTROLYTES - ADULT  Goal: Glucose maintained within prescribed range  Description: INTERVENTIONS:  - Monitor Blood Glucose as ordered  - Assess for signs and symptoms of hyperglycemia and hypoglycemia  - Administer ordered medications to maintain  glucose within target range  - Assess barriers to adequate nutritional intake and initiate nutrition consult as needed  - Instruct patient on self management of diabetes  Outcome: Progressing  Goal: Electrolytes maintained within normal limits  Description: INTERVENTIONS:  - Monitor labs and rhythm and assess patient for signs and symptoms of electrolyte imbalances  - Administer electrolyte replacement as ordered  - Monitor response to electrolyte replacements, including rhythm and repeat lab results as appropriate  - Fluid restriction as ordered  - Instruct patient on fluid and nutrition restrictions as appropriate  Outcome: Progressing  Goal: Hemodynamic stability and optimal renal function maintained  Description: INTERVENTIONS:  - Monitor labs and assess for signs and symptoms of volume excess or deficit  - Monitor intake, output and patient weight  - Monitor urine specific gravity, serum osmolarity and serum sodium as indicated or ordered  - Monitor response to interventions for patient's volume status, including labs, urine output, blood pressure (other measures as available)  - Encourage oral intake as appropriate  - Instruct patient on fluid and nutrition restrictions as appropriate  Outcome: Progressing

## 2023-12-20 NOTE — PROGRESS NOTES
Critical Care Progress Note     Assessment / Plan:  Acute respiratory failure - due to pulmonary edema and influenza  - continue BIPAP  Influenza A   - Tamiflu  - bronchodilator protocol  - hold steroids in absence of underlying chronic pulmonary disease  Acute decompensated HFrEF  - per cardiology  ESRD on HD  - per nephrology  Ppx  - subcutaneous heparin  Dispo - full code  - PCU    Critical care time: 35 minutes      Subjective:  Remains on BIPAP  Feels generally unwell, but no specific complaints    Objective:  Vitals:    12/20/23 0200 12/20/23 0300 12/20/23 0400 12/20/23 0500   BP: 127/78 (!) 169/133 126/67 135/68   BP Location: Right arm Right arm Right arm Right arm   Pulse: 87 84 80 83   Resp: 23 23 21 20   Temp:   98.1 °F (36.7 °C)    TempSrc:       SpO2: 93% 93% 97% 98%   Weight:         Physical Exam:  General: on BIPAP  Skin: no rash, ulcers or subcutaneous nodules  Eyes: anicteric sclerae, moist conjunctivae  Head, ears, nose, throat: atraumatic, oropharynx clear with moist mucous membranes  Neck: trachea midline with no thyromegaly  Heart: regular rate and rhythm, no murmurs / rubs / gallops  Lungs: bibasilar rhonchi  Abdomen: soft, nontender, nondistended   Extremities: no edema or cyanosis  Psych: sleeping, but opens eyes to voice and answers simple questions    Medications:  Reviewed in EMR    Lab Data:  Reviewed in EMR    Imaging:  I independently visualized all relevant chest imaging in PACS and agree with radiology interpretation except where noted.

## 2023-12-21 LAB
ANION GAP SERPL CALC-SCNC: 6 MMOL/L (ref 0–18)
BASE EXCESS BLD CALC-SCNC: -1.1 MMOL/L (ref ?–2)
BASE EXCESS BLD CALC-SCNC: 0.2 MMOL/L (ref ?–2)
BASE EXCESS BLD CALC-SCNC: 1 MMOL/L (ref ?–2)
BASOPHILS # BLD AUTO: 0.02 X10(3) UL (ref 0–0.2)
BASOPHILS NFR BLD AUTO: 0.2 %
BLOOD GAS EPAP: 5 CM H2O
BLOOD GAS EPAP: 5 CM H2O
BLOOD GAS IPAP: 12 CM H2O
BLOOD GAS IPAP: 18 CM H2O
BUN BLD-MCNC: 26 MG/DL (ref 9–23)
BUN/CREAT SERPL: 4.8 (ref 10–20)
CA-I BLD-SCNC: 1.08 MMOL/L (ref 0.95–1.32)
CA-I BLD-SCNC: 1.09 MMOL/L (ref 0.95–1.32)
CALCIUM BLD-MCNC: 8.4 MG/DL (ref 8.7–10.4)
CHLORIDE SERPL-SCNC: 102 MMOL/L (ref 98–112)
CO2 SERPL-SCNC: 27 MMOL/L (ref 21–32)
COHGB MFR BLD: 1.5 % (ref 0–3)
COHGB MFR BLD: 1.5 % (ref 0–3)
CREAT BLD-MCNC: 5.42 MG/DL
DEPRECATED RDW RBC AUTO: 57 FL (ref 35.1–46.3)
EGFRCR SERPLBLD CKD-EPI 2021: 10 ML/MIN/1.73M2 (ref 60–?)
EOSINOPHIL # BLD AUTO: 0.02 X10(3) UL (ref 0–0.7)
EOSINOPHIL NFR BLD AUTO: 0.2 %
ERYTHROCYTE [DISTWIDTH] IN BLOOD BY AUTOMATED COUNT: 16 % (ref 11–15)
GLUCOSE BLD-MCNC: 103 MG/DL (ref 70–99)
GLUCOSE BLDC GLUCOMTR-MCNC: 106 MG/DL (ref 70–99)
GLUCOSE BLDC GLUCOMTR-MCNC: 118 MG/DL (ref 70–99)
GLUCOSE BLDC GLUCOMTR-MCNC: 124 MG/DL (ref 70–99)
GLUCOSE BLDC GLUCOMTR-MCNC: 127 MG/DL (ref 70–99)
GLUCOSE BLDC GLUCOMTR-MCNC: 157 MG/DL (ref 70–99)
HCO3 BLDA-SCNC: 24 MEQ/L (ref 21–27)
HCO3 BLDA-SCNC: 25.1 MEQ/L (ref 21–27)
HCO3 BLDA-SCNC: 25.7 MEQ/L (ref 21–27)
HCT VFR BLD AUTO: 30.9 %
HGB BLD-MCNC: 10.3 G/DL
HGB BLD-MCNC: 10.4 G/DL
HGB BLD-MCNC: 9.7 G/DL
IMM GRANULOCYTES # BLD AUTO: 0.03 X10(3) UL (ref 0–1)
IMM GRANULOCYTES NFR BLD: 0.3 %
LACTATE BLD-SCNC: 0.6 MMOL/L (ref 0.5–2)
LACTATE BLD-SCNC: 0.6 MMOL/L (ref 0.5–2)
LYMPHOCYTES # BLD AUTO: 1.42 X10(3) UL (ref 1–4)
LYMPHOCYTES NFR BLD AUTO: 16.4 %
MAGNESIUM SERPL-MCNC: 2.1 MG/DL (ref 1.6–2.6)
MCH RBC QN AUTO: 31 PG (ref 26–34)
MCHC RBC AUTO-ENTMCNC: 31.4 G/DL (ref 31–37)
MCV RBC AUTO: 98.7 FL
METHGB MFR BLD: 0.3 % SAT (ref 0.4–1.5)
METHGB MFR BLD: 0.4 % SAT (ref 0.4–1.5)
MODIFIED ALLEN TEST: POSITIVE
MONOCYTES # BLD AUTO: 0.72 X10(3) UL (ref 0.1–1)
MONOCYTES NFR BLD AUTO: 8.3 %
NEUTROPHILS # BLD AUTO: 6.47 X10 (3) UL (ref 1.5–7.7)
NEUTROPHILS # BLD AUTO: 6.47 X10(3) UL (ref 1.5–7.7)
NEUTROPHILS NFR BLD AUTO: 74.6 %
O2 CT BLD-SCNC: 13.6 VOL% (ref 15–23)
O2 CT BLD-SCNC: 14 VOL% (ref 15–23)
O2 CT BLD-SCNC: 14.2 VOL% (ref 15–23)
O2/TOTAL GAS SETTING VFR VENT: 30 %
OSMOLALITY SERPL CALC.SUM OF ELEC: 285 MOSM/KG (ref 275–295)
OXYGEN LITERS/MINUTE: 5 L/MIN
PCO2 BLDA: 54 MM HG (ref 35–45)
PCO2 BLDA: 59 MM HG (ref 35–45)
PCO2 BLDA: 59 MM HG (ref 35–45)
PH BLDA: 7.28 [PH] (ref 7.35–7.45)
PH BLDA: 7.29 [PH] (ref 7.35–7.45)
PH BLDA: 7.29 [PH] (ref 7.35–7.45)
PHOSPHATE SERPL-MCNC: 6.4 MG/DL (ref 2.4–5.1)
PLATELET # BLD AUTO: 124 10(3)UL (ref 150–450)
PO2 BLDA: 103 MM HG (ref 80–100)
PO2 BLDA: 114 MM HG (ref 80–100)
PO2 BLDA: 95 MM HG (ref 80–100)
POTASSIUM BLD-SCNC: 5.4 MMOL/L (ref 3.6–5.1)
POTASSIUM BLD-SCNC: 5.5 MMOL/L (ref 3.6–5.1)
POTASSIUM SERPL-SCNC: 5.1 MMOL/L (ref 3.5–5.1)
PUNCTURE CHARGE: YES
RBC # BLD AUTO: 3.13 X10(6)UL
SAO2 % BLDA: 96.6 % (ref 94–100)
SAO2 % BLDA: 97.1 % (ref 94–100)
SAO2 % BLDA: 97.4 % (ref 94–100)
SODIUM BLD-SCNC: 132 MMOL/L (ref 135–145)
SODIUM BLD-SCNC: 132 MMOL/L (ref 135–145)
SODIUM SERPL-SCNC: 135 MMOL/L (ref 136–145)
WBC # BLD AUTO: 8.7 X10(3) UL (ref 4–11)

## 2023-12-21 NOTE — PROGRESS NOTES
[facility] Pulmonary, Critical Care and Sleep    Terrenceharshad Vines Patient Status:  Inpatient    1950 MRN [id number]   Location [facility] 2W/SW Attending Bradley Dash MD   Hosp Day # 3 PCP Victor Manuel Cleaning MD     Date of Admission: 2023  8:35 AM    Admission Diagnosis: ESRD on dialysis (HCC) [N18.6, Z99.2]  Acute respiratory failure with hypoxia (HCC) [J96.01]    S: Off bipap this am and more confused.     Scheduled Medications:     insulin aspart  1-11 Units Subcutaneous TID CC and HS    heparin  5,000 Units Subcutaneous Q8H YOLANDA    pantoprazole  40 mg Oral QAM AC    aspirin  81 mg Oral Daily    finasteride  5 mg Oral QAM    insulin aspart  15 Units Subcutaneous TID    metoprolol succinate ER  50 mg Oral BID    sevelamer carbonate  800 mg Oral TID CC    tamsulosin  0.8 mg Oral Daily    hydrALAZINE  50 mg Oral TID    gabapentin  100 mg Oral BID    rosuvastatin  10 mg Oral Nightly    oseltamivir  30 mg Oral Once per day on     insulin detemir  25 Units Subcutaneous Nightly       Infusing Medications:      PRN Medications:  traMADol, ipratropium-albuterol, glucose **OR** glucose **OR** glucose-vitamin C **OR** dextrose **OR** glucose **OR** glucose **OR** glucose-vitamin C, acetaminophen, polyethylene glycol (PEG 3350), sennosides, bisacodyl, fleet enema, ondansetron, metoclopramide, albuterol    OBJECTIVE:  /74   Pulse 86   Temp 97.4 °F (36.3 °C) (Temporal)   Resp 18   Wt 242 lb (109.8 kg)   SpO2 98%   BMI 32.82 kg/m²    Temp (24hrs), Av.7 °F (36.5 °C), Min:97.4 °F (36.3 °C), Max:98 °F (36.7 °C)      FiO2 (%):  [30 %] 30 %      Wt Readings from Last 3 Encounters:   23 242 lb (109.8 kg)   23 252 lb (114.3 kg)   23 245 lb 4.8 oz (111.3 kg)       I/O last 3 completed shifts:  In: 100 [P.O.:100]  Out: -   No intake/output data recorded.     O2: 5 LNC this am, bipap 12/5 30% overnight.  General: NAD.   Neuro: Alert, no focal deficits.    HEENT:  PERRL  Neck : No LAD  CV: RRR, nl S1, S2, no S4, S3 or murmur.   Lungs: Coarse bialterally.   Abd: Nontender, non distended.   Ext: No edema.   Skin: No rashes.     Recent Labs   Lab 12/19/23  0743 12/20/23  0400 12/21/23  0341   WBC 7.4 8.9 8.7   HGB 9.3* 10.2* 9.7*   HCT 30.4* 31.9* 30.9*   .0* 128.0* 124.0*     Recent Labs   Lab 12/19/23  0743 12/20/23  0400 12/21/23  0341   * 113* 103*   BUN 41* 33* 26*   CREATSERUM 6.54* 5.69* 5.42*   CA 8.1* 8.4* 8.4*    139 135*   K 4.5 4.8 5.1    99 102   CO2 28.0 25.0 27.0     No results for input(s): \"INR\", \"PTT\" in the last 168 hours.  Recent Labs   Lab 12/19/23  1226   ABGPHT 7.33*   CSFKKL9C 57*   EHFGJ0T 64*   ABGHCO3 27.4*   SITE Right Radial       COVID-19 Lab Results    COVID-19  Lab Results   Component Value Date    COVID19 Not Detected 12/18/2023    COVID19 Not Detected 11/27/2023    COVID19 Not Detected 02/28/2023       Pro-Calcitonin  No results for input(s): \"PCT\" in the last 168 hours.    Cardiac  No results for input(s): \"TROP\", \"PBNP\" in the last 168 hours.    Creatinine Kinase  No results for input(s): \"CK\" in the last 168 hours.    Inflammatory Markers  No results for input(s): \"CRP\", \"KATY\", \"LDH\", \"DDIMER\" in the last 168 hours.    Imaging:   CXR 12/19:    Chest images personally reviewed.   Assessment/Plan   Acute respiratory failure - due to pulmonary edema and influenza  - continue BIPAP as needed and with sleep and O2 protocol during daytime.   Check ABG off bipap given MS swanson.   Influenza A   - Tamiflu  - bronchodilator protocol  - hold steroids in absence of underlying chronic pulmonary disease  Acute decompensated HFrEF  - per cardiology  ESRD on HD  - per nephrology  Ppx  - subcutaneous heparin  Dispo - full code  - PCU  Critical Care Time greater than: 35 minutes    My best regards,         Donovan Montana MD  [facility] Pulmonary, Critical Care and Sleep Medicine

## 2023-12-21 NOTE — PROGRESS NOTES
[facility]    Cardiology Progress Note    Terrence Vines Patient Status:  Inpatient    1950 MRN [id number]   Location [facility] 2W/SW Attending Bradley Dash MD   Hosp Day # 3 PCP Victor Manuel Cleaning MD       Impression/Plan:  73 year old male presenting with:     1) Acute hypoxemic respiratory failure, multifactorial  2) Influenza A  3) HFrEF (EF 45-50% 2023)  4) ESRD on HD  5) CAD s/p CABG 2021  6) CVA  7) HTN, borderline control  8) HL. On statin  9) DM     - Influenza A management as per primary, on tamiflu  - HD as per nephrology (refused 2023 with subsequent worsening respiratory status requiring bipap)  - Cont asa, statin, bb, hydralazine; titrate as needed for BP control. Did not receive all his oral meds yesterday; continue to follow  - Bipap as per pulm/critical care  - Monitor on tele  - Will follow    Patient Dr Bautista           Subjective:     Confused, denies dyspnea or chest pain        Patient Active Problem List   Diagnosis    Moderate nonproliferative diabetic retinopathy without macular edema associated with type 2 diabetes mellitus (HCC)    Benign prostatic hyperplasia    Type 2 diabetes mellitus with diabetic polyneuropathy, without long-term current use of insulin (HCC)    Vitamin D deficiency    Type 2 diabetes mellitus with microalbuminuria  (HCC)    Type 2 diabetes mellitus with nephropathy (HCC)    Combined hyperlipidemia associated with type 2 diabetes mellitus  (HCC)    Hypertension associated with type 2 diabetes mellitus  (HCC)    Lower extremity edema    Iron deficiency anemia    CKD (chronic kidney disease) stage 4, GFR 15-29 ml/min (HCC)    Acute pulmonary edema (HCC)    Acute on chronic congestive heart failure, unspecified heart failure type (HCC)    Acute respiratory failure with hypoxia (HCC)    Coronary artery disease involving native coronary artery of native heart    Cerebrovascular accident (CVA) due to bilateral embolism of middle  cerebral arteries (HCC)    Preoperative testing    S/P CABG (coronary artery bypass graft)    Acute renal failure superimposed on chronic kidney disease  (HCC)    Acute renal failure superimposed on chronic kidney disease, unspecified CKD stage, unspecified acute renal failure type    Stage 5 chronic kidney disease not on chronic dialysis (HCC)    Hypernatremia    Acute kidney injury (HCC)    Anemia    ESRD on hemodialysis (HCC)    Facial swelling    Rhinovirus infection    Thyroid nodule    Pulmonary nodules    Elevated BUN    Subacute cough    Dialysis AV fistula malfunction (HCC)    Type 2 diabetes mellitus with hyperglycemia, with long-term current use of insulin (HCC)    Anemia, unspecified type    Thrombocytopenia (HCC)    ESRD on dialysis (HCC)       Objective:   Temp: 97.4 °F (36.3 °C)  Pulse: 86  Resp: 17  BP: 147/71  FiO2 (%): 30 %    Intake/Output:     Intake/Output Summary (Last 24 hours) at 12/21/2023 0655  Last data filed at 12/21/2023 0500  Gross per 24 hour   Intake 0 ml   Output --   Net 0 ml       Last 3 Weights   12/18/23 1258 242 lb (109.8 kg)   11/27/23 1951 252 lb (114.3 kg)   11/13/23 0700 245 lb 4.8 oz (111.3 kg)   11/12/23 0519 244 lb 8 oz (110.9 kg)   11/11/23 1300 246 lb 14.4 oz (112 kg)   11/11/23 0539 238 lb 1.6 oz (108 kg)   11/08/23 1410 234 lb (106.1 kg)   11/07/23 0619 234 lb 1.6 oz (106.2 kg)   11/06/23 1744 239 lb (108.4 kg)       Tele: SR    Physical Exam:    General: confused  HEENT: Normocephalic, anicteric sclera  Neck: supple  Cardiac: Regular rate and rhythm. S1, S2 normal. No murmur, pericardial rub, S3, thrill, heave or extra cardiac sounds.  Lungs: coarse breath sounds bilat  Abdomen: Soft, non-distended  Extremities: no edema  Neurologic: confused  Skin: Warm and dry.     Laboratory/Data:    Labs:         Recent Labs   Lab 12/18/23  0851 12/19/23  0743 12/20/23  0400 12/21/23  0341   WBC 8.4 7.4 8.9 8.7   HGB 9.6* 9.3* 10.2* 9.7*   MCV 98.8 99.0 98.2 98.7   .0*  135.0* 128.0* 124.0*       Recent Labs   Lab 12/18/23  0851 12/19/23  0743 12/20/23  0400 12/21/23  0341    138 139 135*   K 4.7 4.5 4.8 5.1    102 99 102   CO2 27.0 28.0 25.0 27.0   BUN 67* 41* 33* 26*   CREATSERUM 8.79* 6.54* 5.69* 5.42*   CA 8.5* 8.1* 8.4* 8.4*   MG  --  2.2 2.0 2.1   PHOS 10.8* 7.1* 6.0* 6.4*   * 113* 113* 103*       No results for input(s): \"ALT\", \"AST\", \"ALB\", \"AMYLASE\", \"LIPASE\", \"LDH\" in the last 168 hours.    Invalid input(s): \"ALPHOS\", \"TBIL\", \"DBIL\", \"TPROT\"    No results for input(s): \"TROP\" in the last 168 hours.    ECHO 11/23:  1. Left ventricle: The cavity size was normal. Wall thickness was normal.      Systolic function was mildly reduced. The estimated ejection fraction was      45-50%, by visual assessment. Although no diagnostic regional wall motion      abnormality was identified, this possibility cannot be completely      excluded on the basis of this study.   2. Mitral valve: There was mild regurgitation.   3. Pulmonary arteries: Systolic pressure was within the normal range,      estimated to be 30mm Hg.   4. Pericardium, extracardiac: There was no pericardial effusion.           Allergies:   No Known Allergies    Medications:

## 2023-12-21 NOTE — PROGRESS NOTES
[facility] Hospitalist Progress Note     CC: Hospital Follow up    PCP: Victor Manuel Cleaning MD       Assessment/Plan:     Principal Problem:    Acute respiratory failure with hypoxia (HCC)  Active Problems:    Thrombocytopenia (HCC)    ESRD on dialysis (HCC)    Mr. Vines is a 73 year old male with PMH BPH , CHF / ICM EF 30% , CAD s/p CABG x3, Moderate MR, HTN, HLD, ESRD on HD M/W/F, Anemia, Gastritis, who presents with SOB.  Admitted for fluid overload and Influenza A.      Acute Hypoxic Respiratory Failure  Volume overload   SOB  ERSD on HD M/W/F  - CXR with congestion, bnp 271   - renal consulted  - s/p recent AV fistula repair by vascular surgery in September 2023  - HD per renal, will likely need an extra sessions   - ABG with acidosis and CO2 retention   - bipap continued   - pulm consulted     Influenza A positive   - cough for over a month but SOB more acute  - no fevers or Leukocytosis   - CXR reviewed   - will renally dose Tamiflu for 4/5 days treatment      Trop Elevation   - no chest pain   - EKG with concern for LBBB  - will trend trop    - discussed with Cards      CAD s/p CABG x3   Ischemic cardiomyopathy EF 30% without acute exacerbation of congestive heart failure  Chronic Systolic heart failure  HTN  - Continue home metoprolol hydralazine  - Not on ACE or ARB due to renal disease  - Does not use diuretics, making urine      Anemia   Thrombocytopenia   Gastritis   - s/p EGD 11/7 with some mild gastritis, no active bleeding  - s/p colonoscopy 11/8 with polyps but no active bleeding, will need to repeat colonoscopy as outpatient  - PPI BID was not taking      Type 2 diabetes with hyperglycemia  Diabetic nephropathy  - gabapentin  - ISS, meal time and basal insulin   - Controlled renal diet  - Continue home aspirin     Hyperlipidemia  - Continue home statin     BPH continue home Flomax     FN:  - IVF: hold  - Diet: Renal, DM     DVT Prophy: SCDs HSQ   Atrophy: Ambulate PT/OT  Lines: Piv     Dispo: pending  clinical course     Outpatient records or previous hospital records reviewed.      Further recommendations pending patient's clinical course.  [facility] hospitalist to continue to follow patient while in house     Patient and/or patient's family given opportunity to ask questions and note understanding and agreeing with therapeutic plan as outlined     Thank You,  Bradley Dash MD     [facility] Hospitalist  Answering Service number: [phone]     Subjective:     Lethargic, wakes up for conversation.  Still SOB. No chest pain.    No eating much     OBJECTIVE:    Blood pressure 148/75, pulse 83, temperature 97.4 °F (36.3 °C), temperature source Temporal, resp. rate 17, weight 242 lb (109.8 kg), SpO2 95%.    Temp:  [97.4 °F (36.3 °C)-98 °F (36.7 °C)] 97.4 °F (36.3 °C)  Pulse:  [75-93] 83  Resp:  [15-21] 17  BP: (113-149)/() 148/75  SpO2:  [91 %-98 %] 95 %  FiO2 (%):  [30 %] 30 %      Intake/Output:    Intake/Output Summary (Last 24 hours) at 12/21/2023 1305  Last data filed at 12/21/2023 1200  Gross per 24 hour   Intake 0 ml   Output --   Net 0 ml         Last 3 Weights   12/18/23 1258 242 lb (109.8 kg)   11/27/23 1951 252 lb (114.3 kg)   11/13/23 0700 245 lb 4.8 oz (111.3 kg)   11/12/23 0519 244 lb 8 oz (110.9 kg)   11/11/23 1300 246 lb 14.4 oz (112 kg)   11/11/23 0539 238 lb 1.6 oz (108 kg)   11/08/23 1410 234 lb (106.1 kg)   11/07/23 0619 234 lb 1.6 oz (106.2 kg)   11/06/23 1744 239 lb (108.4 kg)       Exam   General: lethargic   HEENT: bipap mask   Neck: non tender, no adenopathy   Lungs: b/I wheezing   Heart: Regular rate and rhythm  Abdomen: soft, non tender, non distended   Extremities: No edema  Skin: no new rash, normal color  Neuro: lethargic   Psych: appropriate affect     Data Review:       Labs:     Recent Labs   Lab 12/19/23  0743 12/20/23  0400 12/21/23  0341   RBC 3.07* 3.25* 3.13*   HGB 9.3* 10.2* 9.7*   HCT 30.4* 31.9* 30.9*   MCV 99.0 98.2 98.7   MCH 30.3 31.4 31.0   MCHC 30.6* 32.0  31.4   RDW 15.8* 16.0* 16.0*   NEPRELIM 5.48 6.68 6.47   WBC 7.4 8.9 8.7   .0* 128.0* 124.0*         Recent Labs   Lab 12/19/23  0743 12/20/23  0400 12/21/23  0341   * 113* 103*   BUN 41* 33* 26*   CREATSERUM 6.54* 5.69* 5.42*   EGFRCR 8* 10* 10*   CA 8.1* 8.4* 8.4*    139 135*   K 4.5 4.8 5.1    99 102   CO2 28.0 25.0 27.0       No results for input(s): \"ALT\", \"AST\", \"ALB\", \"AMYLASE\", \"LIPASE\", \"LDH\" in the last 168 hours.    Invalid input(s): \"ALPHOS\", \"TBIL\", \"DBIL\", \"TPROT\"      Imaging:  XR CHEST AP PORTABLE  (CPT=71045)    Result Date: 12/19/2023  CONCLUSION:  1. Mild cardiomegaly and mildly prominent pulmonary vascularity with mild interstitial edema suggesting mild cardiac failure/fluid overload.  No large airspace consolidation.  Correlate clinically.    Dictated by (CST): Donal Luo MD on 12/19/2023 at 2:26 PM     Finalized by (CST): Donal Luo MD on 12/19/2023 at 2:27 PM             Meds:      insulin aspart  5 Units Subcutaneous TID    insulin detemir  10 Units Subcutaneous Nightly    insulin aspart  1-11 Units Subcutaneous TID CC and HS    heparin  5,000 Units Subcutaneous Q8H YOLANDA    pantoprazole  40 mg Oral QAM AC    aspirin  81 mg Oral Daily    finasteride  5 mg Oral QAM    metoprolol succinate ER  50 mg Oral BID    sevelamer carbonate  800 mg Oral TID CC    tamsulosin  0.8 mg Oral Daily    hydrALAZINE  50 mg Oral TID    gabapentin  100 mg Oral BID    rosuvastatin  10 mg Oral Nightly    oseltamivir  30 mg Oral Once per day on Monday Wednesday Friday       traMADol, ipratropium-albuterol, glucose **OR** glucose **OR** glucose-vitamin C **OR** dextrose **OR** glucose **OR** glucose **OR** glucose-vitamin C, acetaminophen, polyethylene glycol (PEG 3350), sennosides, bisacodyl, fleet enema, ondansetron, metoclopramide, albuterol

## 2023-12-21 NOTE — OCCUPATIONAL THERAPY NOTE
12/21/23 0936   VISIT TYPE   OT Inpatient Visit Type  Attempted Evaluation;  RN requested to hold OT this am.   OT Follow Up   Charge Missed visit   Follow Up Needed  Hold       Annie Lopez OTR/L  Occupational Therapy  [facility]

## 2023-12-21 NOTE — PAYOR COMM NOTE
--------------  12/21:  CONTINUED STAY REVIEW    Payor: [facility]  Subscriber #:  [id number]  Authorization Number: [id number]    Admit date: 12/18/23  Admit time: 12:56 PM        RESPIRATORY:  Pt received last night on continuous BiPAP 12/5, 30% FiO2 tolerating well. Pt remained asleep for most of the night but is easy to arouse. RT will continue to monitor.                  NEPHROLOGY:      SUBJECTIVE:  Completed dialysis yesterday only able to remove 0.2L due to low blood pressure  Currently on 5L of oxygen  Sleepy this AM, denies SOB or CP     LABS:  Patient Labs Reviewed in Detail. Pertinent Labs as follows:        Recent Labs   Lab 12/19/23  0743 12/20/23  0400 12/21/23  0341   * 113* 103*   BUN 41* 33* 26*   CREATSERUM 6.54* 5.69* 5.42*   EGFRCR 8* 10* 10*   CA 8.1* 8.4* 8.4*    139 135*   K 4.5 4.8 5.1    99 102   CO2 28.0 25.0 27.0            Recent Labs   Lab 12/19/23  0743 12/20/23  0400 12/21/23  0341   RBC 3.07* 3.25* 3.13*   HGB 9.3* 10.2* 9.7*   HCT 30.4* 31.9* 30.9*   MCV 99.0 98.2 98.7   MCH 30.3 31.4 31.0   MCHC 30.6* 32.0 31.4   RDW 15.8* 16.0* 16.0*   NEPRELIM 5.48 6.68 6.47   WBC 7.4 8.9 8.7   .0* 128.0* 124.0*        ASSESSMENT AND PLAN:   This is a 73 year old male with PMH sig for ESRD on HD MWF, HTN, HLD, DM2, CAD s/p CABG x3, ischemic cardiomyopathy. Presents with SOB and cough. Found to be influenza positive. Nephrology is consulted for dialysis.      ESRD on HD MWF   - received dialysis 3 days in a row   - plan for dialysis tomorrow per usual schedule    - [facility]       Acute hypoxic respiratory failure  - CXR with pulmonary vascular changes, also influenza A postivie    - fluid removal with dialysis   - pulm following       Hyperphosphatemia   - improving   - continue sevelamer, renal diet      Influenza A   - tamiflu - per primary       Elevated troponin  -  per cards      Hypertension   - Hydralazine,  metoprolol       Anemia   - trend Hb   - receiving OMAIRA with outpatient dialysis       Dw RN     We will continue to follow Terrence Galvan MD  [facility]             MEDICATIONS ADMINISTERED IN LAST 1 DAY:  aspirin DR tab 81 mg       Date Action Dose Route User    12/21/2023 1011 Given 81 mg Oral Orquidea Hassan RN          glucose (Dex4) 15 GM/59ML oral liquid 15 g       Date Action Dose Route User    12/20/2023 2244 Given 15 g Oral Jessica Mixon RN    12/20/2023 1406 Given 15 g Oral Anita Hooper RN          gabapentin (Neurontin) cap 100 mg       Date Action Dose Route User    12/21/2023 1011 Given 100 mg Oral Orquidea Hassan RN    12/20/2023 2030 Given 100 mg Oral Jessica Mixon RN          heparin (Porcine) 5000 UNIT/ML injection 5,000 Units       Date Action Dose Route User    12/21/2023 0529 Given 5,000 Units Subcutaneous (Left Lower Abdomen) Jessica Mixon RN    12/20/2023 2238 Given 5,000 Units Subcutaneous (Left Lower Abdomen) Jessica Mixon RN    12/20/2023 1409 Given 5,000 Units Subcutaneous (Left Lower Abdomen) Anita Hooper RN          hydrALAZINE (Apresoline) tab 50 mg       Date Action Dose Route User    12/20/2023 1608 Given 50 mg Oral Anita Hooper RN          insulin aspart (NovoLOG) 100 Units/mL FlexPen 15 Units       Date Action Dose Route User    12/20/2023 1704 Given 15 Units Subcutaneous (Right Lower Abdomen) Anita Hooper RN          metoprolol succinate ER (Toprol XL) 24 hr tab 50 mg       Date Action Dose Route User    12/20/2023 2238 Given 50 mg Oral Jessica Mixon RN          oseltamivir (Tamiflu) cap 30 mg       Date Action Dose Route User    12/20/2023 1651 Given 30 mg Oral Anita Hooper RN          rosuvastatin (Crestor) tab 10 mg       Date Action Dose Route User    12/20/2023 2238 Given 10 mg Oral Jessica Mixon RN          sevelamer carbonate (Renvela) tab 800 mg       Date Action Dose Route User    12/20/2023 1608 Given 800 mg Oral Anita Hooper RN    12/20/2023  1200 Given 800 mg Oral Anita Hooper, MANJU          traMADol (Ultram) tab 50 mg       Date Action Dose Route User    12/20/2023 2041 Given 50 mg Oral Jessica Mixon, RN            Vitals (last day)       Date/Time Temp Pulse Resp BP SpO2 Weight O2 Device O2 Flow Rate (L/min) [facility]    12/21/23 1000 -- 86 20 143/72 97 % -- -- -- AD    12/21/23 0900 -- 87 19 149/69 97 % -- -- -- AD    12/21/23 0800 -- 86 18 143/74 98 % -- -- -- AD    12/21/23 0700 -- 87 20 147/73 96 % -- Nasal cannula 5 L/min NA    12/21/23 0600 -- 86 17 147/71 98 % -- Nasal cannula 5 L/min NA    12/21/23 0500 -- 85 19 147/75 97 % -- Bi-PAP -- NA    12/21/23 0400 97.4 °F (36.3 °C) 86 20 149/68 94 % -- Bi-PAP -- NA    12/21/23 0300 -- 87 19 135/77 94 % -- Bi-PAP -- NA    12/21/23 0200 -- 87 20 145/60 94 % -- Bi-PAP -- NA    12/21/23 0100 -- 93 20 143/71 96 % -- Bi-PAP -- NA    12/21/23 0021 -- 84 -- -- -- -- -- -- KK    12/21/23 0000 97.6 °F (36.4 °C) 85 19 144/87 93 % -- Bi-PAP -- NA    12/20/23 2300 -- 85 19 137/68 94 % -- Bi-PAP -- NA    12/20/23 2200 -- 81 15 144/69 96 % -- Bi-PAP -- NA    12/20/23 2100 -- 77 17 113/72 96 % -- Bi-PAP -- NA    12/20/23 2023 -- 83 -- -- 98 % -- -- -- KK    12/20/23 2000 98 °F (36.7 °C) 82 21 126/67 96 % -- Bi-PAP -- NA    12/20/23 1900 -- 80 18 115/70 98 % -- Bi-PAP -- NA    12/20/23 1800 -- 81 20 126/108 95 % -- Bi-PAP -- TR    12/20/23 1652 -- -- -- -- 97 % -- -- -- EB    12/20/23 1600 97.7 °F (36.5 °C) 75 20 118/70 91 % -- Bi-PAP -- TR    12/20/23 1400 -- 79 21 128/63 92 % -- Bi-PAP -- TR    12/20/23 1316 -- -- -- -- 96 % -- -- -- EB    12/20/23 1200 97.9 °F (36.6 °C) 77 19 116/67 93 % -- Bi-PAP -- TR    12/20/23 0923 -- -- -- -- 97 % -- -- -- EB    12/20/23 0800 98.2 °F (36.8 °C) 80 21 140/69 93 % -- Bi-PAP -- TR    12/20/23 0600 -- 78 22 124/70 93 % -- -- -- MS    12/20/23 0500 -- 83 20 135/68 98 % -- -- -- MS    12/20/23 0400 99.5 °F (37.5 °C) 80 21 126/67 97 % -- -- -- MS    12/20/23 0300 -- 84 23 169/133 93 % -- -- --  MS    12/20/23 0200 -- 87 23 127/78 93 % -- -- -- MS    12/20/23 0100 -- 89 24 128/72 97 % -- -- -- MS    12/20/23 0000 98.2 °F (36.8 °C) 87 23 148/80 96 % -- -- -- MS

## 2023-12-21 NOTE — PLAN OF CARE
Lethargic but follows commands. Receiving HD in room. One episode of hypoglycemia treated with glucose, MD notified.     Problem: Patient Centered Care  Goal: Patient preferences are identified and integrated in the patient's plan of care  Description: Interventions:  - What would you like us to know as we care for you? I live at home with family. I'm from [place]  - Provide timely, complete, and accurate information to patient/family  - Incorporate patient and family knowledge, values, beliefs, and cultural backgrounds into the planning and delivery of care  - Encourage patient/family to participate in care and decision-making at the level they choose  - Honor patient and family perspectives and choices  Outcome: Progressing     Problem: Diabetes/Glucose Control  Goal: Glucose maintained within prescribed range  Description: INTERVENTIONS:  - Monitor Blood Glucose as ordered  - Assess for signs and symptoms of hyperglycemia and hypoglycemia  - Administer ordered medications to maintain glucose within target range  - Assess barriers to adequate nutritional intake and initiate nutrition consult as needed  - Instruct patient on self management of diabetes  Outcome: Progressing     Problem: Patient/Family Goals  Goal: Patient/Family Long Term Goal  Description: Patient's Long Term Goal: go home    Interventions:  - Monitor vital signs  - Monitor appropriate labs  - Monitor blood glucose levels  - Administer medications per order  - Pain management as needed  - Follow MD orders  - Diagnostics per orders  - Dialysis per orders  - Update / inform patient and family on plan of care  - Discharge planning     - See additional Care Plan goals for specific interventions  Outcome: Progressing  Goal: Patient/Family Short Term Goal  Description: Patient's Short Term Goal: To breathe better    Interventions:   - RT treatment  -O2  -Follow md orders  - See additional Care Plan goals for specific interventions  Outcome:  Progressing     Problem: CARDIOVASCULAR - ADULT  Goal: Maintains optimal cardiac output and hemodynamic stability  Description: INTERVENTIONS:  - Monitor vital signs, rhythm, and trends  - Monitor for bleeding, hypotension and signs of decreased cardiac output  - Evaluate effectiveness of vasoactive medications to optimize hemodynamic stability  - Monitor arterial and/or venous puncture sites for bleeding and/or hematoma  - Assess quality of pulses, skin color and temperature  - Assess for signs of decreased coronary artery perfusion - ex. Angina  - Evaluate fluid balance, assess for edema, trend weights  Outcome: Progressing  Goal: Absence of cardiac arrhythmias or at baseline  Description: INTERVENTIONS:  - Continuous cardiac monitoring, monitor vital signs, obtain 12 lead EKG if indicated  - Evaluate effectiveness of antiarrhythmic and heart rate control medications as ordered  - Initiate emergency measures for life threatening arrhythmias  - Monitor electrolytes and administer replacement therapy as ordered  Outcome: Progressing     Problem: RESPIRATORY - ADULT  Goal: Achieves optimal ventilation and oxygenation  Description: INTERVENTIONS:  - Assess for changes in respiratory status  - Assess for changes in mentation and behavior  - Position to facilitate oxygenation and minimize respiratory effort  - Oxygen supplementation based on oxygen saturation or ABGs  - Provide Smoking Cessation handout, if applicable  - Encourage broncho-pulmonary hygiene including cough, deep breathe, Incentive Spirometry  - Assess the need for suctioning and perform as needed  - Assess and instruct to report SOB or any respiratory difficulty  - Respiratory Therapy support as indicated  - Manage/alleviate anxiety  - Monitor for signs/symptoms of CO2 retention  Outcome: Progressing     Problem: METABOLIC/FLUID AND ELECTROLYTES - ADULT  Goal: Glucose maintained within prescribed range  Description: INTERVENTIONS:  - Monitor Blood  Glucose as ordered  - Assess for signs and symptoms of hyperglycemia and hypoglycemia  - Administer ordered medications to maintain glucose within target range  - Assess barriers to adequate nutritional intake and initiate nutrition consult as needed  - Instruct patient on self management of diabetes  Outcome: Progressing  Goal: Electrolytes maintained within normal limits  Description: INTERVENTIONS:  - Monitor labs and rhythm and assess patient for signs and symptoms of electrolyte imbalances  - Administer electrolyte replacement as ordered  - Monitor response to electrolyte replacements, including rhythm and repeat lab results as appropriate  - Fluid restriction as ordered  - Instruct patient on fluid and nutrition restrictions as appropriate  Outcome: Progressing  Goal: Hemodynamic stability and optimal renal function maintained  Description: INTERVENTIONS:  - Monitor labs and assess for signs and symptoms of volume excess or deficit  - Monitor intake, output and patient weight  - Monitor urine specific gravity, serum osmolarity and serum sodium as indicated or ordered  - Monitor response to interventions for patient's volume status, including labs, urine output, blood pressure (other measures as available)  - Encourage oral intake as appropriate  - Instruct patient on fluid and nutrition restrictions as appropriate  Outcome: Progressing

## 2023-12-21 NOTE — PROGRESS NOTES
NEPHROLOGY DAILY PROGRESS NOTE     SUBJECTIVE:  Completed dialysis yesterday only able to remove 0.2L due to low blood pressure  Currently on 5L of oxygen  Sleepy this AM, denies SOB or CP         OBJECTIVE:    Total Intake/Output:    Intake/Output Summary (Last 24 hours) at 12/21/2023 0818  Last data filed at 12/21/2023 0500  Gross per 24 hour   Intake 0 ml   Output --   Net 0 ml         PHYSICAL EXAM:  /74   Pulse 86   Temp 97.4 °F (36.3 °C) (Temporal)   Resp 18   Wt 242 lb (109.8 kg)   SpO2 98%   BMI 32.82 kg/m²   GEN: NAD, on oxygen    HEENT: NCAT    CHEST: coarse breath sounds    CARDIAC: S1S2 normal  ABD: soft, NT/ND  EXT:  no lower ext edema  NEURO: sleepy, but answering questions    SKIN: warm, dry   DIALYSIS ACCESS: LUE AVF with palpable thrill and bruit        CURRENT MEDICATIONS:   insulin aspart  1-11 Units Subcutaneous TID CC and HS    heparin  5,000 Units Subcutaneous Q8H YOLANDA    pantoprazole  40 mg Oral QAM AC    aspirin  81 mg Oral Daily    finasteride  5 mg Oral QAM    insulin aspart  15 Units Subcutaneous TID    metoprolol succinate ER  50 mg Oral BID    sevelamer carbonate  800 mg Oral TID CC    tamsulosin  0.8 mg Oral Daily    hydrALAZINE  50 mg Oral TID    gabapentin  100 mg Oral BID    rosuvastatin  10 mg Oral Nightly    oseltamivir  30 mg Oral Once per day on Monday Wednesday Friday    insulin detemir  25 Units Subcutaneous Nightly             LABS:  Patient Labs Reviewed in Detail. Pertinent Labs as follows:  Recent Labs   Lab 12/19/23  0743 12/20/23  0400 12/21/23  0341   * 113* 103*   BUN 41* 33* 26*   CREATSERUM 6.54* 5.69* 5.42*   EGFRCR 8* 10* 10*   CA 8.1* 8.4* 8.4*    139 135*   K 4.5 4.8 5.1    99 102   CO2 28.0 25.0 27.0     Recent Labs   Lab 12/19/23  0743 12/20/23  0400 12/21/23  0341   RBC 3.07* 3.25* 3.13*   HGB 9.3* 10.2* 9.7*   HCT 30.4* 31.9* 30.9*   MCV 99.0 98.2 98.7   MCH 30.3 31.4 31.0   MCHC 30.6* 32.0 31.4   RDW 15.8* 16.0* 16.0*   NEPRELIM  5.48 6.68 6.47   WBC 7.4 8.9 8.7   .0* 128.0* 124.0*           IMAGING:  XR CHEST AP PORTABLE  (CPT=71045)    Result Date: 12/19/2023  CONCLUSION:  1. Mild cardiomegaly and mildly prominent pulmonary vascularity with mild interstitial edema suggesting mild cardiac failure/fluid overload.  No large airspace consolidation.  Correlate clinically.    Dictated by (CST): Donal Luo MD on 12/19/2023 at 2:26 PM     Finalized by (CST): Donal Luo MD on 12/19/2023 at 2:27 PM                ASSESSMENT AND PLAN:   This is a 73 year old male with PMH sig for ESRD on HD MWF, HTN, HLD, DM2, CAD s/p CABG x3, ischemic cardiomyopathy. Presents with SOB and cough. Found to be influenza positive. Nephrology is consulted for dialysis.      ESRD on HD MWF   - received dialysis 3 days in a row   - plan for dialysis tomorrow per usual schedule    - followed by [facility]       Acute hypoxic respiratory failure  - CXR with pulmonary vascular changes, also influenza A postivie    - fluid removal with dialysis   - pulm following      Hyperphosphatemia   - improving   - continue sevelamer, renal diet     Influenza A   - tamiflu - per primary       Elevated troponin  -  per cards     Hypertension   - Hydralazine, metoprolol       Anemia   - trend Hb   - receiving OMAIRA with outpatient dialysis      Thomas RN    We will continue to follow Terrence Galvan MD  [facility]

## 2023-12-21 NOTE — PHYSICAL THERAPY NOTE
PT orders recd, chart reviewed.  Per rn, pt remains confused, also in need of Abg blood draw, rn requested to recheck.  Will re attempt as time allows.

## 2023-12-21 NOTE — RESPIRATORY THERAPY NOTE
Pt received last night on continuous BiPAP 12/5, 30% FiO2 tolerating well. Pt remained asleep for most of the night but is easy to arouse. RT will continue to monitor.

## 2023-12-21 NOTE — SLP NOTE
ADULT SWALLOWING EVALUATION    ASSESSMENT    ASSESSMENT/OVERALL IMPRESSION:  PPE REQUIRED. THIS THERAPIST WORE GLOVES, DROPLET MASK, AND GOGGLES FOR DURATION OF EVALUATION. HANDS WASHED UPON ENTRANCE/EXIT.    Per MD H&P, \"Mr. Vines is a 73 year old male with PMH BPH , CHF / ICM EF 30% , Moderate MR, HTN, HLD, ESRD on HD M/W/F, Anemia, Gastritis, who presents with SOB. Patient has had a cough for over a month but felt more SOB which started yesterday.  He was treated with a course of abx as out patient with no improvement in cough.  He was supposed to be on PPI from last admission but was not taking.  Denies CP, abd pain, n/v, f/c.  Appetite as been good.  Son at bedside and confirms that he has not missed any HD sessions and does not add salt to his food.\"    SLP BSSE orders received and acknowledged. A swallow evaluation warranted per \"coughing while drinking water/taking pills\". Pt afebrile with clear/weak vocal quality, on 5L/Min, with oxygen saturation at 98%. Pt with hx of dysphagia at [facility], BSE 6/28/21 with recommendations for regular/thin liquids.    Pt positioned 90 degrees in bed, fluctuating alertness/confusion. Pt with no complaints of pain. Pt unable to follow commands for oral motor examination. Pt presented with trials of puree and mildly thick liquids via tsp. Pt with impaired oral acceptance and bilabial seal across all trials. Pt with reduced bolus formation/propulsion. Pt's swallow response appears delayed with reduced hyolaryngeal elevation/excursion. No clinical signs of aspiration (e.g., immediate/delayed throat clear, immediate/delayed cough, wet vocal quality, increased O2 effort) observed across all trials. 12/19 CXR indicates \"Mild cardiomegaly and mildly prominent pulmonary vascularity with mild interstitial edema suggesting mild cardiac failure/fluid overload.  No large airspace consolidation.  Correlate clinically\". Oxygen status remained stable t/o the entire evaluation.     At this time,  pt presents with moderate oral dysphagia and probable pharyngeal dysfunction. Recommend a puree diet and mildly thick liquids with strict adherence to safe swallowing compensatory strategies. Results and recommendations reviewed with RN, pt. Pt unable to v/u to all results/recommendations, no family present. Recommendations remain written on whiteboard. SLP collaborated with RN for MD diet orders.     PLAN: SLP to f/u x3 meal assessments, monitor imaging, and VFSS if clinically indicated           RECOMMENDATIONS   Diet Recommendations - Solids: Puree  Diet Recommendations - Liquids: Nectar thick liquids/ Mildly thick                        Compensatory Strategies Recommended: No straws;Liquids via spoon;Slow rate;Small bites and sips  Aspiration Precautions: Upright position;Slow rate;Small bites and sips;Cueing to swallow;No straw  Medication Administration Recommendations: Crushed in puree  Treatment Plan/Recommendations: Aspiration precautions  Discharge Recommendations/Plan: Undetermined    HISTORY   MEDICAL HISTORY  Reason for Referral:  (\"coughing while drinking water/taking pills\")    Problem List  Principal Problem:    Acute respiratory failure with hypoxia (HCC)  Active Problems:    Thrombocytopenia (HCC)    ESRD on dialysis (HCC)      Past Medical History  Past Medical History:   Diagnosis Date    BPH (benign prostatic hypertrophy)     Cataract     Congestive heart disease (HCC)     Coronary atherosclerosis     Diabetic retinopathy (HCC)     Dialysis patient ([facility])     M,W,F,    Esophageal reflux     Heart valve disease     High blood pressure     High cholesterol     HLD (hyperlipidemia)     HTN (hypertension)     Neuropathy     Proteinuria     Renal disorder     HD every MWF with temporary HD cath    Type II diabetes mellitus (HCC)     Visual impairment     Reading glasses       Prior Living Situation: Unknown  Diet Prior to Admission: Unknown  Precautions: Aspiration    Patient/Family Goals: pt unable  to state, no family present    SWALLOWING HISTORY  Current Diet Consistency: Regular;Thin liquids  Dysphagia History: see above  Imaging Results: see above    SUBJECTIVE       OBJECTIVE   ORAL MOTOR EXAMINATION     Symmetry: Unable to assess  Strength: Unable to assess  Tone: Unable to assess  Range of Motion: Unable to assess  Rate of Motion: Unable to assess    Voice Quality: Weak;Clear  Respiratory Status: Nasal cannula;Supplemental O2  Consistencies Trialed: Nectar thick liquids;Puree  Method of Presentation: Staff/Clinician assistance;Spoon  Patient Positioning: Upright;Midline    Oral Phase of Swallow: Impaired  Bolus Retrieval: Impaired  Bilabial Seal: Impaired  Bolus Formation: Impaired  Bolus Propulsion: Impaired     Retention: Impaired    Pharyngeal Phase of Swallow: Impaired  Laryngeal Elevation Timing: Appears impaired  Laryngeal Elevation Strength: Appears impaired  Laryngeal Elevation Coordination: Appears impaired  (Please note: Silent aspiration cannot be evaluated clinically. Videofluoroscopic Swallow Study is required to rule-out silent aspiration.)    Esophageal Phase of Swallow: No complaints consistent with possible esophageal involvement  Comments: NA              GOALS  Goal #1 The patient will tolerate puree consistency and mildly thick liquids without overt signs or symptoms of aspiration with 100 % accuracy over 1-2 session(s).  In Progress   Goal #2 The patient/family/caregiver will demonstrate understanding and implementation of aspiration precautions and swallow strategies independently over 1-2 session(s).    In Progress   Goal #3 The patient will utilize compensatory strategies as outlined by  BSSE (clinical evaluation) including Slow rate, Small bites, Small sips, No straws, Upright 90 degrees, Upright 90 degrees 30 mins after meal, Eliminate distractions, Feed patient with 1:1 assistance 100 % of the time across 2 sessions.  In Progress   Goal #4 The patient will tolerate trial  upgrade of soft/hard solid consistency and thin liquids without overt signs or symptoms of aspiration with 100 % accuracy over 1-2 session(s).    In Progress     FOLLOW UP  Treatment Plan/Recommendations: Aspiration precautions  Number of Visits to Meet Established Goals: 3  Follow Up Needed (Documentation Required): Yes  SLP Follow-up Date: 12/22/23    Thank you for your referral.   If you have any questions, please contact CECILLE Hicks M.S. CCC-SLP  Speech Language Pathologist  Phone Number [phone]

## 2023-12-22 ENCOUNTER — APPOINTMENT (OUTPATIENT)
Dept: GENERAL RADIOLOGY | Facility: HOSPITAL | Age: 73
End: 2023-12-22
Attending: INTERNAL MEDICINE
Payer: MEDICARE

## 2023-12-22 LAB
ANION GAP SERPL CALC-SCNC: 11 MMOL/L (ref 0–18)
BASE EXCESS BLD CALC-SCNC: 5.5 MMOL/L (ref ?–2)
BASE EXCESS BLD CALC-SCNC: 7.3 MMOL/L (ref ?–2)
BASOPHILS # BLD AUTO: 0.01 X10(3) UL (ref 0–0.2)
BASOPHILS NFR BLD AUTO: 0.1 %
BUN BLD-MCNC: 46 MG/DL (ref 9–23)
BUN/CREAT SERPL: 5.7 (ref 10–20)
CA-I BLD-SCNC: 1.11 MMOL/L (ref 0.95–1.32)
CALCIUM BLD-MCNC: 8.4 MG/DL (ref 8.7–10.4)
CHLORIDE SERPL-SCNC: 101 MMOL/L (ref 98–112)
CO2 SERPL-SCNC: 24 MMOL/L (ref 21–32)
COHGB MFR BLD: 1.5 % (ref 0–3)
CREAT BLD-MCNC: 8.05 MG/DL
DEPRECATED RDW RBC AUTO: 56.5 FL (ref 35.1–46.3)
EGFRCR SERPLBLD CKD-EPI 2021: 6 ML/MIN/1.73M2 (ref 60–?)
EOSINOPHIL # BLD AUTO: 0.12 X10(3) UL (ref 0–0.7)
EOSINOPHIL NFR BLD AUTO: 1.4 %
ERYTHROCYTE [DISTWIDTH] IN BLOOD BY AUTOMATED COUNT: 15.9 % (ref 11–15)
GLUCOSE BLD-MCNC: 124 MG/DL (ref 70–99)
GLUCOSE BLDC GLUCOMTR-MCNC: 112 MG/DL (ref 70–99)
GLUCOSE BLDC GLUCOMTR-MCNC: 141 MG/DL (ref 70–99)
GLUCOSE BLDC GLUCOMTR-MCNC: 224 MG/DL (ref 70–99)
GLUCOSE BLDC GLUCOMTR-MCNC: 237 MG/DL (ref 70–99)
GLUCOSE BLDC GLUCOMTR-MCNC: 249 MG/DL (ref 70–99)
HCO3 BLDA-SCNC: 29.2 MEQ/L (ref 21–27)
HCO3 BLDA-SCNC: 30.6 MEQ/L (ref 21–27)
HCT VFR BLD AUTO: 33.7 %
HGB BLD-MCNC: 10.2 G/DL
HGB BLD-MCNC: 10.6 G/DL
IMM GRANULOCYTES # BLD AUTO: 0.02 X10(3) UL (ref 0–1)
IMM GRANULOCYTES NFR BLD: 0.2 %
INSPIRATION SETTING TIME VENT: 1 SEC (ref 0.1–5)
LACTATE BLD-SCNC: 0.8 MMOL/L (ref 0.5–2)
LYMPHOCYTES # BLD AUTO: 1.62 X10(3) UL (ref 1–4)
LYMPHOCYTES NFR BLD AUTO: 18.3 %
MAGNESIUM SERPL-MCNC: 2.5 MG/DL (ref 1.6–2.6)
MCH RBC QN AUTO: 29.5 PG (ref 26–34)
MCHC RBC AUTO-ENTMCNC: 30.3 G/DL (ref 31–37)
MCV RBC AUTO: 97.4 FL
METHGB MFR BLD: 0.6 % SAT (ref 0.4–1.5)
MODIFIED ALLEN TEST: POSITIVE
MODIFIED ALLEN TEST: POSITIVE
MONOCYTES # BLD AUTO: 0.76 X10(3) UL (ref 0.1–1)
MONOCYTES NFR BLD AUTO: 8.6 %
NEUTROPHILS # BLD AUTO: 6.33 X10 (3) UL (ref 1.5–7.7)
NEUTROPHILS # BLD AUTO: 6.33 X10(3) UL (ref 1.5–7.7)
NEUTROPHILS NFR BLD AUTO: 71.4 %
O2 CT BLD-SCNC: 13.9 VOL% (ref 15–23)
O2 CT BLD-SCNC: 14.3 VOL% (ref 15–23)
O2/TOTAL GAS SETTING VFR VENT: 30 %
O2/TOTAL GAS SETTING VFR VENT: 30 %
OSMOLALITY SERPL CALC.SUM OF ELEC: 295 MOSM/KG (ref 275–295)
PCO2 BLDA: 52 MM HG (ref 35–45)
PCO2 BLDA: 54 MM HG (ref 35–45)
PH BLDA: 7.39 [PH] (ref 7.35–7.45)
PH BLDA: 7.4 [PH] (ref 7.35–7.45)
PHOSPHATE SERPL-MCNC: 10.2 MG/DL (ref 2.4–5.1)
PLATELET # BLD AUTO: 135 10(3)UL (ref 150–450)
PO2 BLDA: 83 MM HG (ref 80–100)
PO2 BLDA: 94 MM HG (ref 80–100)
POTASSIUM BLD-SCNC: 3.8 MMOL/L (ref 3.6–5.1)
POTASSIUM SERPL-SCNC: 5.4 MMOL/L (ref 3.5–5.1)
PROCALCITONIN SERPL-MCNC: 0.58 NG/ML (ref ?–0.05)
PUNCTURE CHARGE: YES
PUNCTURE CHARGE: YES
RBC # BLD AUTO: 3.46 X10(6)UL
RESP RATE: 20 BPM
SAO2 % BLDA: 95.7 % (ref 94–100)
SAO2 % BLDA: 96.8 % (ref 94–100)
SODIUM BLD-SCNC: 138 MMOL/L (ref 135–145)
SODIUM SERPL-SCNC: 136 MMOL/L (ref 136–145)
WBC # BLD AUTO: 8.9 X10(3) UL (ref 4–11)

## 2023-12-22 PROCEDURE — 71045 X-RAY EXAM CHEST 1 VIEW: CPT | Performed by: INTERNAL MEDICINE

## 2023-12-22 RX ORDER — SODIUM BICARBONATE 650 MG/1
325 TABLET ORAL AS NEEDED
Status: DISCONTINUED | OUTPATIENT
Start: 2023-12-22 | End: 2023-12-26

## 2023-12-22 RX ORDER — METHYLPREDNISOLONE SODIUM SUCCINATE 125 MG/2ML
80 INJECTION, POWDER, LYOPHILIZED, FOR SOLUTION INTRAMUSCULAR; INTRAVENOUS EVERY 8 HOURS
Status: COMPLETED | OUTPATIENT
Start: 2023-12-22 | End: 2023-12-24

## 2023-12-22 NOTE — CM/SW NOTE
12/22/23 1400   CM/SW Referral Data   Referral Source    Reason for Referral Discharge planning   Informant Daughter   Medical Hx   Does patient have an established PCP? Yes  ([facility])   Patient Info   Patient's Current Mental Status at Time of Assessment Alert;Oriented   Patient's Home Environment House   Patient lives with Spouse/Significant other;Son;Daughter   Patient Status Prior to Admission   Independent with ADLs and Mobility Yes   Discharge Needs   Anticipated D/C needs To be determined     Pt discussed during nursing rounds. Dx respiratory failure 2/2 infuenze/volume overload, on 5L O2 (no home O2), ESRD on HD. Home w/family, independent w/rollator at baseline per daughter. Current w/[facility] in [place] for outpatient HD, MWF at 3pm. PT/OT evals will be needed for dc recommendation, RN is aware. SW/CM will need to monitor O2 needs for possible home O2 at dc.    Plan: TBD    / to remain available for support and/or discharge planning.     ERIC Guan    [phone]

## 2023-12-22 NOTE — PROGRESS NOTES
[facility]    Cardiology Progress Note    Terrence Vines Patient Status:  Inpatient    1950 MRN [id number]   Location [facility] 2W/SW Attending Bradley Dash MD   Hosp Day # 4 PCP Victor Manuel Cleaning MD       Impression/Plan:  73 year old male presenting with:     1) Acute hypoxemic respiratory failure, multifactorial  2) Influenza A  3) HFrEF (EF 45-50% 2023)  4) ESRD on HD  5) CAD s/p CABG 2021  6) CVA  7) HTN, borderline control  8) HL. On statin  9) DM     - Influenza A management as per primary, on tamiflu  - HD as per nephrology (refused 2023 with subsequent worsening respiratory status requiring bipap)  - Cont asa, statin, bb, hydralazine; titrate as needed for BP control. Did not receive all his oral meds yesterday; continue to follow. No ACEi/ARB due to renal issues  - Bipap as per pulm/critical care  - Monitor on tele  - Will follow    Patient Dr Bautista           Subjective:     Confused, in soft restraints. To start dialysis.        Patient Active Problem List   Diagnosis    Moderate nonproliferative diabetic retinopathy without macular edema associated with type 2 diabetes mellitus (HCC)    Benign prostatic hyperplasia    Type 2 diabetes mellitus with diabetic polyneuropathy, without long-term current use of insulin (HCC)    Vitamin D deficiency    Type 2 diabetes mellitus with microalbuminuria  (HCC)    Type 2 diabetes mellitus with nephropathy (HCC)    Combined hyperlipidemia associated with type 2 diabetes mellitus  (HCC)    Hypertension associated with type 2 diabetes mellitus  (HCC)    Lower extremity edema    Iron deficiency anemia    CKD (chronic kidney disease) stage 4, GFR 15-29 ml/min (HCC)    Acute pulmonary edema (HCC)    Acute on chronic congestive heart failure, unspecified heart failure type (HCC)    Acute respiratory failure with hypoxia (HCC)    Coronary artery disease involving native coronary artery of native heart    Cerebrovascular accident  (CVA) due to bilateral embolism of middle cerebral arteries (HCC)    Preoperative testing    S/P CABG (coronary artery bypass graft)    Acute renal failure superimposed on chronic kidney disease  (HCC)    Acute renal failure superimposed on chronic kidney disease, unspecified CKD stage, unspecified acute renal failure type    Stage 5 chronic kidney disease not on chronic dialysis (HCC)    Hypernatremia    Acute kidney injury (HCC)    Anemia    ESRD on hemodialysis (HCC)    Facial swelling    Rhinovirus infection    Thyroid nodule    Pulmonary nodules    Elevated BUN    Subacute cough    Dialysis AV fistula malfunction (HCC)    Type 2 diabetes mellitus with hyperglycemia, with long-term current use of insulin (HCC)    Anemia, unspecified type    Thrombocytopenia (HCC)    ESRD on dialysis (HCC)       Objective:   Temp: 97.4 °F (36.3 °C)  Pulse: 86  Resp: 15  BP: 148/81  FiO2 (%): 30 %    Intake/Output:     Intake/Output Summary (Last 24 hours) at 12/22/2023 0650  Last data filed at 12/22/2023 0600  Gross per 24 hour   Intake 25 ml   Output --   Net 25 ml       Last 3 Weights   12/18/23 1258 242 lb (109.8 kg)   11/27/23 1951 252 lb (114.3 kg)   11/13/23 0700 245 lb 4.8 oz (111.3 kg)   11/12/23 0519 244 lb 8 oz (110.9 kg)   11/11/23 1300 246 lb 14.4 oz (112 kg)   11/11/23 0539 238 lb 1.6 oz (108 kg)   11/08/23 1410 234 lb (106.1 kg)   11/07/23 0619 234 lb 1.6 oz (106.2 kg)   11/06/23 1744 239 lb (108.4 kg)       Tele: SR    Physical Exam:    General: confused  HEENT: Normocephalic, anicteric sclera  Neck: supple  Cardiac: Regular rate and rhythm. S1, S2 normal. No murmur,   Lungs: coarse breath sounds bilat  Abdomen: Soft, non-distended  Extremities: no edema  Neurologic: confused  Skin: Warm and dry.     Laboratory/Data:    Labs:         Recent Labs   Lab 12/18/23  0851 12/19/23  0743 12/20/23  0400 12/21/23  0341 12/22/23  0421   WBC 8.4 7.4 8.9 8.7 8.9   HGB 9.6* 9.3* 10.2* 9.7* 10.2*   MCV 98.8 99.0 98.2 98.7 97.4    .0* 135.0* 128.0* 124.0* 135.0*       Recent Labs   Lab 12/18/23  0851 12/19/23  0743 12/20/23  0400 12/21/23  0341 12/22/23  0421    138 139 135* 136   K 4.7 4.5 4.8 5.1 5.4*    102 99 102 101   CO2 27.0 28.0 25.0 27.0 24.0   BUN 67* 41* 33* 26* 46*   CREATSERUM 8.79* 6.54* 5.69* 5.42* 8.05*   CA 8.5* 8.1* 8.4* 8.4* 8.4*   MG  --  2.2 2.0 2.1 2.5   PHOS 10.8* 7.1* 6.0* 6.4* 10.2*   * 113* 113* 103* 124*       No results for input(s): \"ALT\", \"AST\", \"ALB\", \"AMYLASE\", \"LIPASE\", \"LDH\" in the last 168 hours.    Invalid input(s): \"ALPHOS\", \"TBIL\", \"DBIL\", \"TPROT\"    No results for input(s): \"TROP\" in the last 168 hours.    ECHO 11/23:  1. Left ventricle: The cavity size was normal. Wall thickness was normal.      Systolic function was mildly reduced. The estimated ejection fraction was      45-50%, by visual assessment. Although no diagnostic regional wall motion      abnormality was identified, this possibility cannot be completely      excluded on the basis of this study.   2. Mitral valve: There was mild regurgitation.   3. Pulmonary arteries: Systolic pressure was within the normal range,      estimated to be 30mm Hg.   4. Pericardium, extracardiac: There was no pericardial effusion.           Allergies:   No Known Allergies    Medications:

## 2023-12-22 NOTE — PLAN OF CARE
Problem: Safety Risk - Non-Violent Restraints  Goal: Patient will remain free from self-harm  Description: INTERVENTIONS:  - Apply the least restrictive restraint to prevent harm  - Notify patient and family of reasons restraints applied  - Assess for any contributing factors to confusion (electrolyte disturbances, delirium, medications)  - Discontinue any unnecessary medical devices as soon as possible  - Assess the patient's physical comfort, circulation, skin condition, hydration, nutrition and elimination needs   - Reorient and redirection as needed  - Assess for the need to continue restraints  Outcome: Progressing

## 2023-12-22 NOTE — PROGRESS NOTES
NEPHROLOGY DAILY PROGRESS NOTE     SUBJECTIVE:  On BIPAP  Currently on dialysis    Sleepy/ lethargic this AM, Denies SOB       OBJECTIVE:    Total Intake/Output:    Intake/Output Summary (Last 24 hours) at 12/22/2023 0852  Last data filed at 12/22/2023 0600  Gross per 24 hour   Intake 25 ml   Output --   Net 25 ml         PHYSICAL EXAM:  /74   Pulse 94   Temp 97.4 °F (36.3 °C) (Temporal)   Resp 16   Wt 242 lb (109.8 kg)   SpO2 97%   BMI 32.82 kg/m²   GEN: NAD, on BiPAP  HEENT: NCAT    CHEST: coarse breath sounds    CARDIAC: S1S2 normal  ABD: soft, NT/ND  EXT:  no lower ext edema  NEURO: sleepy/ lethargic   SKIN: warm, dry   DIALYSIS ACCESS: LUE AVF cannulated for dialysis        CURRENT MEDICATIONS:   insulin detemir  10 Units Subcutaneous Nightly    insulin aspart  1-11 Units Subcutaneous TID CC and HS    heparin  5,000 Units Subcutaneous Q8H YOLANDA    pantoprazole  40 mg Oral QAM AC    aspirin  81 mg Oral Daily    finasteride  5 mg Oral QAM    metoprolol succinate ER  50 mg Oral BID    sevelamer carbonate  800 mg Oral TID CC    tamsulosin  0.8 mg Oral Daily    hydrALAZINE  50 mg Oral TID    gabapentin  100 mg Oral BID    rosuvastatin  10 mg Oral Nightly    oseltamivir  30 mg Oral Once per day on Monday Wednesday Friday             LABS:  Patient Labs Reviewed in Detail. Pertinent Labs as follows:  Recent Labs   Lab 12/20/23  0400 12/21/23  0341 12/22/23  0421   * 103* 124*   BUN 33* 26* 46*   CREATSERUM 5.69* 5.42* 8.05*   EGFRCR 10* 10* 6*   CA 8.4* 8.4* 8.4*    135* 136   K 4.8 5.1 5.4*   CL 99 102 101   CO2 25.0 27.0 24.0     Recent Labs   Lab 12/20/23  0400 12/21/23  0341 12/22/23  0421   RBC 3.25* 3.13* 3.46*   HGB 10.2* 9.7* 10.2*   HCT 31.9* 30.9* 33.7*   MCV 98.2 98.7 97.4   MCH 31.4 31.0 29.5   MCHC 32.0 31.4 30.3*   RDW 16.0* 16.0* 15.9*   NEPRELIM 6.68 6.47 6.33   WBC 8.9 8.7 8.9   .0* 124.0* 135.0*           IMAGING:  No results found.         ASSESSMENT AND PLAN:   This is  a 73 year old male with PMH sig for ESRD on HD MWF, HTN, HLD, DM2, CAD s/p CABG x3, ischemic cardiomyopathy. Presents with SOB and cough. Found to be influenza positive. Nephrology is consulted for dialysis.      ESRD on HD MWF   - dialysis today per usual schedule      - followed by [facility]       Acute hypoxic respiratory failure  - CXR with pulmonary vascular changes, also influenza A postivie    - fluid removal with dialysis as tolerated   - pulm following      Hyperkalemia   - 2K bath with dialysis   - follow K level      Hyperphosphatemia   - worsening    - unable to take sevelamer due to BiPAP  - dialysis planned for today   - renal diet     Influenza A   - tamiflu - per primary       Elevated troponin  -  per cards     Hypertension   - Hydralazine, metoprolol       Anemia   - trend Hb, Hb at goal    - receiving OMAIRA with outpatient dialysis      We will continue to follow MD Melodie De La Rosa - Nephrology

## 2023-12-22 NOTE — PHYSICAL THERAPY NOTE
PHYSICAL THERAPY EVALUATION - INPATIENT     Room Number: 226/226-A  Evaluation Date: 12/22/2023  Type of Evaluation: Initial   Physician Order: PT Eval and Treat    Presenting Problem: acute respiratory failure  Reason for Therapy: Mobility Dysfunction and Discharge Planning    PHYSICAL THERAPY ASSESSMENT   Orders received and chart reviewed. MANJU Heard approved participation in physical therapy. Session coordinated with OT. PPE worn by therapist: mask and gloves. Patient was not wearing a mask during session. Patient is a 73 year old male admitted 12/18/2023 for Presenting Problem: acute respiratory failure. Patient presented in bed with pain. Education provided on Physical therapy plan of care and physiological benefits of out of bed mobility. Patient with good carryover. Patient on 5 liters of oxygen. Patient oxygen sat 96% and heart rate 98, blood pressure 155/87. Patient currently with max A x 2 for mobility and is able to sit edge of bed for about 2 minutes with max A x 1. Patient not responding much to questions, although eyes open throughout the session. Patient is currently functioning below baseline with bed mobility, transfers, and gait as a result of the following impairments: decreased functional strength and medical status. Patient history and/or personal factors that may impact the plan of care include home accessibility concerns. Based on the physical therapy examination of the noted systems and functional activity/participation limitations, the patient presentation is evolving given the patient demonstrates worsening of previously stable condition and demonstrates worsening of co-morbidities. Patient would benefit from continued skilled physical therapy interventions to maximize patient safety and functional independence. Updated nurse on results of session, patient left in bed, all lines intact, all needs met with call light in reach. Next session anticipate to progress bed mobility, transfers, and  gait.     Bed mobility: Max assist and assist of 2  Transfers:  not tested   Gait Assistance: Not tested                   Patient was left in bed at end of session with all needs in reach. Patient's current functional deficits include bed mobility, transfers, and gait. Patient does not have the physical skills to return to prior living environment upon D/C from the hospital. Anticipate patient will benefit from continued skilled physical therapy while in the hospital and upon D/C from the hospital at a rehab facility. The patient's Approx Degree of Impairment: 86.62% has been calculated based on documentation in the [facility] '6 clicks' Inpatient Basic Mobility Short Form.  Research supports that patients with this level of impairment may benefit from Long term acute care. However, patient would  benefit from rehab facility. RN aware of patient status post session.    Patient will benefit from continued IP PT services to address these deficits in preparation for discharge.    DISCHARGE RECOMMENDATIONS  PT Discharge Recommendations: Sub-acute rehabilitation    PLAN  PT Treatment Plan: Body mechanics;Bed mobility;Patient education;Gait training;Balance training;Transfer training;Strengthening  Rehab Potential : Fair  Frequency (Obs): 3-5x/week       PHYSICAL THERAPY MEDICAL/SOCIAL HISTORY   Problem List  Principal Problem:    Acute respiratory failure with hypoxia (HCC)  Active Problems:    Thrombocytopenia (HCC)    ESRD on dialysis (HCC)    Past Medical History  Past Medical History:   Diagnosis Date    BPH (benign prostatic hypertrophy)     Cataract     Congestive heart disease (HCC)     Coronary atherosclerosis     Diabetic retinopathy (HCC)     Dialysis patient (Former[facility])     M,W,F,    Esophageal reflux     Heart valve disease     High blood pressure     High cholesterol     HLD (hyperlipidemia)     HTN (hypertension)     Neuropathy     Proteinuria     Renal disorder     HD every MWF with temporary HD cath    Type II  diabetes mellitus (HCC)     Visual impairment     Reading glasses     Past Surgical History  Past Surgical History:   Procedure Laterality Date    CABG  06/21/2021    x3-lima-->LAD, SVG-->diag and SVG-->OM    CATARACT Right     COLONOSCOPY N/A 11/8/2023    Procedure: COLONOSCOPY;  Surgeon: Cullen Bautista MD;  Location: [facility] ENDOSCOPY     HOME SITUATION  Type of Home: House   Home Layout: One level        Stairs to Bedroom: 0  Railing: No  Lives With: Spouse;Daughter  Drives: Yes  Patient Owned Equipment: None  Patient Regularly Uses: None    Prior Level of [place]: Patient reports being independent in ADL's and ambulation with no assistive device prior to admission to the hospital per chart.     SUBJECTIVE  \"Young lady, I told you!\"    PHYSICAL THERAPY EXAMINATION     OBJECTIVE  Precautions: None  Fall Risk: High fall risk    WEIGHT BEARING RESTRICTION  Weight Bearing Restriction: None                PAIN ASSESSMENT  Rating: Unable to rate          COGNITION  Overall Cognitive Status:  Impaired    RANGE OF MOTION AND STRENGTH ASSESSMENT    Lower extremity ROM is within functional limits  BLE WNL    Lower extremity strength is within functional limits  BLE impaired     BALANCE  Static Sitting: Poor  Dynamic Sitting: Poor -  Static Standing: Not tested  Dynamic Standing: Not tested    AM-PAC '6-Clicks' INPATIENT SHORT FORM - BASIC MOBILITY  How much difficulty does the patient currently have...  Patient Difficulty: Turning over in bed (including adjusting bedclothes, sheets and blankets)?: A Lot   Patient Difficulty: Sitting down on and standing up from a chair with arms (e.g., wheelchair, bedside commode, etc.): Unable   Patient Difficulty: Moving from lying on back to sitting on the side of the bed?: A Lot   How much help from another person does the patient currently need...   Help from Another: Moving to and from a bed to a chair (including a wheelchair)?: Total   Help from Another: Need to walk in hospital  room?: Total   Help from Another: Climbing 3-5 steps with a railing?: Total     AM-PAC Score:  Raw Score: 8   Approx Degree of Impairment: 86.62%   Standardized Score (AM-PAC Scale): 28.58   CMS Modifier (G-Code): CM    Exercise/Education Provided:  Bed mobility  Body mechanics    Patient End of Session: Needs met;Call light within reach;RN aware of session/findings;All patient questions and concerns addressed;In bed    CURRENT GOALS  Goals to be met by: 1/7/24  Patient Goal Patient's self-stated goal is: to go home   Goal #1 Patient is able to demonstrate supine - sit EOB @ level: minimum assistance     Goal #1   Current Status    Goal #2 Patient is able to demonstrate transfers Sit to/from Stand at assistance level: minimum assistance with walker - rolling     Goal #2  Current Status    Goal #3 Patient is able to ambulate 10 feet with assist device: walker - rolling at assistance level: minimum assistance   Goal #3   Current Status    Goal #4    Goal #4   Current Status    Goal #5 Patient to demonstrate independence with home activity/exercise instructions provided to patient in preparation for discharge.   Goal #5   Current Status    Goal #6    Goal #6  Current Status     Patient Evaluation Complexity Level:  History Low - no personal factors and/or co-morbidities   Examination of body systems Low - addressing 1-2 elements   Clinical Presentation Moderate - Evolving   Clinical Decision Making Low Complexity     Therapeutic Activity: 15 minutes

## 2023-12-22 NOTE — PROGRESS NOTES
[facility] Hospitalist Progress Note     CC: Hospital Follow up    PCP: Victor Manuel Cleaning MD       Assessment/Plan:     Principal Problem:    Acute respiratory failure with hypoxia (HCC)  Active Problems:    Thrombocytopenia (HCC)    ESRD on dialysis (HCC)    Mr. Vines is a 73 year old male with PMH BPH , CHF / ICM EF 30% , CAD s/p CABG x3, Moderate MR, HTN, HLD, ESRD on HD M/W/F, Anemia, Gastritis, who presents with SOB. Admitted for fluid overload and Influenza A.  Hypoxic and hypercapnic respiratory failure likely due to influenza.  Requiring BiPAP after fluid status has improved. Tamiflu continued and will start ceftriaxone and steroids today.     Acute Hypoxic Respiratory Failure  Volume overload   SOB  ERSD on HD M/W/F  - CXR with congestion, bnp 271   - renal consulted  - s/p recent AV fistula repair by vascular surgery in September 2023  - HD per renal, will likely need an extra sessions   - ABG with acidosis and CO2 retention   - bipap continued   - pulm consulted, discussed    Influenza A positive   - cough for over a month but SOB more acute  - no fevers or Leukocytosis   - CXR reviewed   - will renally dose Tamiflu 3rd dose of 5 days after HD  - Consider antibiotics pending improvement on ABG  - Ceftriaxone and steroids started 12/22/23     Trop Elevation   - no chest pain   - EKG with concern for LBBB  - will trend trop    - discussed with Cards      CAD s/p CABG x3   Ischemic cardiomyopathy EF 30% without acute exacerbation of congestive heart failure  Chronic Systolic heart failure  HTN  - Continue home metoprolol hydralazine  - Not on ACE or ARB due to renal disease  - Does not use diuretics, making urine      Anemia   Thrombocytopenia   Gastritis   - s/p EGD 11/7 with some mild gastritis, no active bleeding  - s/p colonoscopy 11/8 with polyps but no active bleeding, will need to repeat colonoscopy as outpatient  - PPI BID was not taking      Type 2 diabetes with hyperglycemia  Diabetic nephropathy  -  gabapentin  - ISS, meal time and basal insulin   - Controlled renal diet  - Continue home aspirin     Hyperlipidemia  - Continue home statin     BPH continue home Flomax     FN:  - IVF: hold  - Diet: Renal, DM     DVT Prophy: SCDs HSQ   Atrophy: Ambulate PT/OT  Lines: Piv     Dispo: pending clinical course     Outpatient records or previous hospital records reviewed.      Further recommendations pending patient's clinical course.  [facility] hospitalist to continue to follow patient while in house     Patient and/or patient's family given opportunity to ask questions and note understanding and agreeing with therapeutic plan as outlined     Thank You,  Bradley Dash MD     [facility] Hospitalist  Answering Service number: [phone]     Subjective:     Lethargic, wakes up for conversation.  Still SOB. No chest pain.    No eating much     OBJECTIVE:    Blood pressure 121/74, pulse 98, temperature 96.8 °F (36 °C), temperature source Temporal, resp. rate 20, weight 242 lb (109.8 kg), SpO2 96%.    Temp:  [96.8 °F (36 °C)-98.1 °F (36.7 °C)] 96.8 °F (36 °C)  Pulse:  [77-98] 98  Resp:  [14-23] 20  BP: ()/() 121/74  SpO2:  [92 %-99 %] 96 %  FiO2 (%):  [30 %] 30 %      Intake/Output:    Intake/Output Summary (Last 24 hours) at 12/22/2023 1143  Last data filed at 12/22/2023 0800  Gross per 24 hour   Intake 25 ml   Output --   Net 25 ml         Last 3 Weights   12/18/23 1258 242 lb (109.8 kg)   11/27/23 1951 252 lb (114.3 kg)   11/13/23 0700 245 lb 4.8 oz (111.3 kg)   11/12/23 0519 244 lb 8 oz (110.9 kg)   11/11/23 1300 246 lb 14.4 oz (112 kg)   11/11/23 0539 238 lb 1.6 oz (108 kg)   11/08/23 1410 234 lb (106.1 kg)   11/07/23 0619 234 lb 1.6 oz (106.2 kg)   11/06/23 1744 239 lb (108.4 kg)       Exam   General: lethargic   HEENT: bipap mask   Neck: non tender, no adenopathy   Lungs: b/I wheezing   Heart: Regular rate and rhythm  Abdomen: soft, non tender, non distended   Extremities: No edema  Skin: no new  rash, normal color  Neuro: lethargic   Psych: appropriate affect     Data Review:       Labs:     Recent Labs   Lab 12/20/23  0400 12/21/23  0341 12/22/23  0421   RBC 3.25* 3.13* 3.46*   HGB 10.2* 9.7* 10.2*   HCT 31.9* 30.9* 33.7*   MCV 98.2 98.7 97.4   MCH 31.4 31.0 29.5   MCHC 32.0 31.4 30.3*   RDW 16.0* 16.0* 15.9*   NEPRELIM 6.68 6.47 6.33   WBC 8.9 8.7 8.9   .0* 124.0* 135.0*         Recent Labs   Lab 12/20/23  0400 12/21/23  0341 12/22/23  0421   * 103* 124*   BUN 33* 26* 46*   CREATSERUM 5.69* 5.42* 8.05*   EGFRCR 10* 10* 6*   CA 8.4* 8.4* 8.4*    135* 136   K 4.8 5.1 5.4*   CL 99 102 101   CO2 25.0 27.0 24.0       No results for input(s): \"ALT\", \"AST\", \"ALB\", \"AMYLASE\", \"LIPASE\", \"LDH\" in the last 168 hours.    Invalid input(s): \"ALPHOS\", \"TBIL\", \"DBIL\", \"TPROT\"      Imaging:  XR CHEST AP PORTABLE  (CPT=71045)    Result Date: 12/19/2023  CONCLUSION:  1. Mild cardiomegaly and mildly prominent pulmonary vascularity with mild interstitial edema suggesting mild cardiac failure/fluid overload.  No large airspace consolidation.  Correlate clinically.    Dictated by (CST): Donal Luo MD on 12/19/2023 at 2:26 PM     Finalized by (CST): Donal Luo MD on 12/19/2023 at 2:27 PM             Meds:      cefTRIAXone  2 g Intravenous Q24H    azithromycin  500 mg Intravenous Q24H    methylPREDNISolone  80 mg Intravenous Q8H    insulin detemir  10 Units Subcutaneous Nightly    insulin aspart  1-11 Units Subcutaneous TID CC and HS    heparin  5,000 Units Subcutaneous Q8H YOLANDA    pantoprazole  40 mg Oral QAM AC    aspirin  81 mg Oral Daily    finasteride  5 mg Oral QAM    metoprolol succinate ER  50 mg Oral BID    sevelamer carbonate  800 mg Oral TID CC    tamsulosin  0.8 mg Oral Daily    hydrALAZINE  50 mg Oral TID    gabapentin  100 mg Oral BID    rosuvastatin  10 mg Oral Nightly    oseltamivir  30 mg Oral Once per day on Monday Wednesday Friday       traMADol, ipratropium-albuterol, glucose **OR**  glucose **OR** glucose-vitamin C **OR** dextrose **OR** glucose **OR** glucose **OR** glucose-vitamin C, acetaminophen, polyethylene glycol (PEG 3350), sennosides, bisacodyl, fleet enema, ondansetron, metoclopramide, albuterol

## 2023-12-22 NOTE — PROGRESS NOTES
[facility] Note: Antimicrobial Weight Based Dose Adjustment for: ceftriaxone (ROCEPHIN)    Terrence Vines is a 73 year old patient who has been prescribed ceftriaxone (ROCEPHIN) 1 g every 24 hours.    Estimated Creatinine Clearance: 8.7 mL/min (A) (based on SCr of 8.05 mg/dL (H)).  Wt Readings from Last 6 Encounters:   12/18/23 109.8 kg (242 lb)   11/27/23 114.3 kg (252 lb)   11/13/23 111.3 kg (245 lb 4.8 oz)   09/27/23 110.5 kg (243 lb 9.7 oz)   09/20/23 74.8 kg (165 lb)   09/05/23 112.5 kg (248 lb)     Body mass index is 32.82 kg/m².    Based on this criteria and renal function, dose will be adjusted to ceftriaxone (ROCEPHIN) 2 g every 24 hours.    Thank you,    Jose Jeter, PharmD  12/22/2023  9:56 AM

## 2023-12-22 NOTE — PLAN OF CARE
Problem: Patient Centered Care  Goal: Patient preferences are identified and integrated in the patient's plan of care  Description: Interventions:  - What would you like us to know as we care for you? I live at home with family. I'm from [place]  - Provide timely, complete, and accurate information to patient/family  - Incorporate patient and family knowledge, values, beliefs, and cultural backgrounds into the planning and delivery of care  - Encourage patient/family to participate in care and decision-making at the level they choose  - Honor patient and family perspectives and choices  Outcome: Progressing     Problem: Diabetes/Glucose Control  Goal: Glucose maintained within prescribed range  Description: INTERVENTIONS:  - Monitor Blood Glucose as ordered  - Assess for signs and symptoms of hyperglycemia and hypoglycemia  - Administer ordered medications to maintain glucose within target range  - Assess barriers to adequate nutritional intake and initiate nutrition consult as needed  - Instruct patient on self management of diabetes  Outcome: Progressing     Problem: Patient/Family Goals  Goal: Patient/Family Long Term Goal  Description: Patient's Long Term Goal: go home    Interventions:  - Monitor vital signs  - Monitor appropriate labs  - Monitor blood glucose levels  - Administer medications per order  - Pain management as needed  - Follow MD orders  - Diagnostics per orders  - Dialysis per orders  - Update / inform patient and family on plan of care  - Discharge planning     - See additional Care Plan goals for specific interventions  Outcome: Progressing  Goal: Patient/Family Short Term Goal  Description: Patient's Short Term Goal: To breathe better    Interventions:   - RT treatment  -O2  -Follow md orders  - See additional Care Plan goals for specific interventions  Outcome: Progressing     Problem: CARDIOVASCULAR - ADULT  Goal: Maintains optimal cardiac output and hemodynamic stability  Description:  INTERVENTIONS:  - Monitor vital signs, rhythm, and trends  - Monitor for bleeding, hypotension and signs of decreased cardiac output  - Evaluate effectiveness of vasoactive medications to optimize hemodynamic stability  - Monitor arterial and/or venous puncture sites for bleeding and/or hematoma  - Assess quality of pulses, skin color and temperature  - Assess for signs of decreased coronary artery perfusion - ex. Angina  - Evaluate fluid balance, assess for edema, trend weights  Outcome: Progressing  Goal: Absence of cardiac arrhythmias or at baseline  Description: INTERVENTIONS:  - Continuous cardiac monitoring, monitor vital signs, obtain 12 lead EKG if indicated  - Evaluate effectiveness of antiarrhythmic and heart rate control medications as ordered  - Initiate emergency measures for life threatening arrhythmias  - Monitor electrolytes and administer replacement therapy as ordered  Outcome: Progressing     Problem: RESPIRATORY - ADULT  Goal: Achieves optimal ventilation and oxygenation  Description: INTERVENTIONS:  - Assess for changes in respiratory status  - Assess for changes in mentation and behavior  - Position to facilitate oxygenation and minimize respiratory effort  - Oxygen supplementation based on oxygen saturation or ABGs  - Provide Smoking Cessation handout, if applicable  - Encourage broncho-pulmonary hygiene including cough, deep breathe, Incentive Spirometry  - Assess the need for suctioning and perform as needed  - Assess and instruct to report SOB or any respiratory difficulty  - Respiratory Therapy support as indicated  - Manage/alleviate anxiety  - Monitor for signs/symptoms of CO2 retention  Outcome: Not Progressing     Problem: METABOLIC/FLUID AND ELECTROLYTES - ADULT  Goal: Glucose maintained within prescribed range  Description: INTERVENTIONS:  - Monitor Blood Glucose as ordered  - Assess for signs and symptoms of hyperglycemia and hypoglycemia  - Administer ordered medications to  maintain glucose within target range  - Assess barriers to adequate nutritional intake and initiate nutrition consult as needed  - Instruct patient on self management of diabetes  Outcome: Progressing  Goal: Electrolytes maintained within normal limits  Description: INTERVENTIONS:  - Monitor labs and rhythm and assess patient for signs and symptoms of electrolyte imbalances  - Administer electrolyte replacement as ordered  - Monitor response to electrolyte replacements, including rhythm and repeat lab results as appropriate  - Fluid restriction as ordered  - Instruct patient on fluid and nutrition restrictions as appropriate  Outcome: Progressing  Goal: Hemodynamic stability and optimal renal function maintained  Description: INTERVENTIONS:  - Monitor labs and assess for signs and symptoms of volume excess or deficit  - Monitor intake, output and patient weight  - Monitor urine specific gravity, serum osmolarity and serum sodium as indicated or ordered  - Monitor response to interventions for patient's volume status, including labs, urine output, blood pressure (other measures as available)  - Encourage oral intake as appropriate  - Instruct patient on fluid and nutrition restrictions as appropriate  Outcome: Progressing     Problem: Safety Risk - Non-Violent Restraints  Goal: Patient will remain free from self-harm  Description: INTERVENTIONS:  - Apply the least restrictive restraint to prevent harm  - Notify patient and family of reasons restraints applied  - Assess for any contributing factors to confusion (electrolyte disturbances, delirium, medications)  - Discontinue any unnecessary medical devices as soon as possible  - Assess the patient's physical comfort, circulation, skin condition, hydration, nutrition and elimination needs   - Reorient and redirection as needed  - Assess for the need to continue restraints  Outcome: Progressing     Problem: Delirium  Goal: Minimize duration of delirium  Description:  Interventions:  - Encourage use of hearing aids, eye glasses  - Promote highest level of mobility daily  - Provide frequent reorientation  - Promote wakefulness i.e. lights on, blinds open  - Promote sleep, encourage patient's normal rest cycle i.e. lights off, TV off, minimize noise and interruptions  - Encourage family to assist in orientation and promotion of home routines  Outcome: Progressing     Patient drowsy but arousable to verbal/painful stimuli. Initially on O2 5L NC, Stat ABG done per RT as per Nan, patient placed on bipap; currently on AVAPS mode with FIO2 30%. Maxwell SWR initiated as patient pulled off bipap x 2, confused. Patient seen per SP Evaluation, pureed/mildly thickened liquids, unable to tolerate PO intake at this time. NSR on tele-monitor. Patient anuric. Bed locked in lowest position, call light within reach, bed alarm in place.

## 2023-12-22 NOTE — PLAN OF CARE
Problem: RESPIRATORY - ADULT  Goal: Achieves optimal ventilation and oxygenation  Description: INTERVENTIONS:  - Assess for changes in respiratory status  - Assess for changes in mentation and behavior  - Position to facilitate oxygenation and minimize respiratory effort  - Oxygen supplementation based on oxygen saturation or ABGs  - Provide Smoking Cessation handout, if applicable  - Encourage broncho-pulmonary hygiene including cough, deep breathe, Incentive Spirometry  - Assess the need for suctioning and perform as needed  - Assess and instruct to report SOB or any respiratory difficulty  - Respiratory Therapy support as indicated  - Manage/alleviate anxiety  - Monitor for signs/symptoms of CO2 retention  Outcome: Progressing     Pt received on continuous BiPAP, tolerating well and saturating appropriately. No changes at this time, RT will continue to monitor.  BiPAP settings and readings as follow:        12/21/23 2001   BiPAP   $ RT Standby Charge (per 15 min) 1  (BiPAP check)   Device V60   BiPAP / CPAP CE# [id number]   BiPAP bacteria filter Yes   BIPAP plugged into main power? Yes   Mode AVAPS   Interface Full face mask   Mask Size Large   Control Settings   Set Rate 20 breaths/min   Set IPAP   (Avaps)   Set EPAP 7   Oxygen Percent 30 %   Inspiratory time 1   Insp rise time 3   AVAPS   Min IPAP 20   Max IPAP 25   EPAP Level 7   Set rate 20   Tidal volume 550   BiPAP/CPAP Alarm Settings   Hi Rate 40   Low Rate 10   Hi VT 1400   Low    Hi Pressure 28   Low Pressure 5   Low Pressure Delay 20   Low MV 3   BiPAP/CPAP Monitored Parameters   Pulse 78   SpO2 94 %   PIP 20   Total Rate 23 breaths/min   Minute Volume 12   Tidal Volume 520   Total Leak 24   Trigger % 37   Ti/Ttot % 38   IPAP   (Avaps)   EPAP 7   Toleration Well

## 2023-12-22 NOTE — OCCUPATIONAL THERAPY NOTE
OCCUPATIONAL THERAPY EVALUATION - INPATIENT     Room Number: 226/226-A  Evaluation Date: 12/22/2023  Type of Evaluation: Initial  Presenting Problem: acute repsiratory failure  Hx HD MWF, CHF, CAD s/p CABG x3, HTN, DM2     Physician Order: IP Consult to Occupational Therapy  Reason for Therapy: ADL/IADL Dysfunction and Discharge Planning    OCCUPATIONAL THERAPY ASSESSMENT   Patient is a 73 year old male admitted 12/18/2023 for acute repsiratory failure. Orders received, chart reviewed. RN approved patient participation. In this OT evaluation patient presents with the following impairments: strength, balance, activity tolerance, endurance, functional reach, and pain. These deficits manifest functionally while performing ADLs, functional mobility, and functional transfers. The patient is below baseline and would benefit from skilled inpatient OT to address the above deficits, maximizing patient's ability to return to prior level of function.    Patient semi-jeter's position in bed at end of session, all needs met, call light in reach, alarm on, restraints in place, RN notified of session findings. The patient's Approx Degree of Impairment: 74.7% has been calculated based on documentation in the [facility] '6 clicks' Inpatient Daily Activity Short Form.  Research supports that patients with this level of impairment may benefit from HODA. Recommend HODA upon discharge to facilitate return to baseline level of ADL participation/performance.     DISCHARGE RECOMMENDATIONS  OT Discharge Recommendations: Sub-acute rehabilitation    PLAN  OT Treatment Plan: Balance activities;Energy conservation/work simplification techniques;ADL training;Functional transfer training;Endurance training;Equipment eval/education;Patient/Family training;Cognitive reorientation;Patient/Family education;Compensatory technique education;UE strengthening/ROM       OCCUPATIONAL THERAPY MEDICAL/SOCIAL HISTORY   Problem List  Principal Problem:    Acute  respiratory failure with hypoxia (HCC)  Active Problems:    Thrombocytopenia (HCC)    ESRD on dialysis (HCC)    HOME SITUATION  Type of Home: House  Home Layout: One level  Lives With: Spouse (2 daughters)  Toilet and Equipment: Standard height toilet  Shower/Tub and Equipment: Walk-in shower  Occupation/Status: Retired  Hand Dominance: Right  Drives: Yes  Patient Regularly Uses: None  Use of Assistive Device(s): None    Prior Level of [place]: PLOF and home setup info obtained from chart. Per chart, patient indep with ADLs and min a for IADLs.    SUBJECTIVE  \"I'm not fine!\"    OCCUPATIONAL THERAPY EXAMINATION    OBJECTIVE  Precautions: Bed/chair alarm; Restraints; Limb alert - left  Fall Risk: High fall risk    PAIN ASSESSMENT  Rating: Unable to rate  Location: Unable to localize  Management Techniques: Activity promotion; Repositioning; Nurse notified    ACTIVITY TOLERANCE  Pulse: 102  Heart Rate Source: Monitor     BP: 155/87  BP Location: Right arm  BP Method: Automatic  Patient Position: Lying    O2 SATURATIONS  Oxygen Therapy  SPO2% on Oxygen at Rest: 96  At rest oxygen flow (liters per minute): 5    COGNITION  Overall Cognitive Status:  Impaired  Arousal/Alertness:  lethargic  Orientation Level:  oriented to person and not responding to other questions  Following Commands:  does not follow commands  Patient minimally verbal throughout session    SENSATION  Light touch:  intact    RANGE OF MOTION/STRENGTH ASSESSMENT  Upper extremity ROM/strength is below functional baseline    ACTIVITIES OF DAILY LIVING ASSESSMENT  AM-PAC ‘6-Clicks’ Inpatient Daily Activity Short Form  How much help from another person does the patient currently need…  -   Putting on and taking off regular lower body clothing?: Total  -   Bathing (including washing, rinsing, drying)?: A Lot  -   Toileting, which includes using toilet, bedpan or urinal? : Total  -   Putting on and taking off regular upper body clothing?: A Lot  -   Taking  care of personal grooming such as brushing teeth?: A Lot  -   Eating meals?: A Lot    AM-PAC Score:  Score: 10  Approx Degree of Impairment: 74.7%  Standardized Score (AM-PAC Scale): 27.31  CMS Modifier (G-Code): CL    BED MOBILITY  Supine <> Sit: Max assist x2  Required total assist for static sitting balance   Tolerated sitting at EOB <5 min before requiring supine recovery s/t fatigue  Patient groaning throughout session with all mobility; unable to localize/rate pain    FUNCTIONAL TRANSFER ASSESSMENT  Unable to progress mobility at this time    FUNCTIONAL ADL ASSESSMENT  Eating: max assist (per obs)   Grooming: max assist (per obs)   UB Dressing: max assist (per obs)   LB Dressing: total assist   Toileting: total assist    EDUCATION PROVIDED  Patient : Role of Occupational Therapy; Plan of Care; Discharge Recommendations; Functional Transfer Techniques; Fall Prevention; Posture/Positioning; Proper Body Mechanics (Cognitive Reorientation)  Patient's Response to Education: Requires Further Education    Patient End of Session: Needs met;Call light within reach;RN aware of session/findings;In bed;All patient questions and concerns addressed;Alarm set;Restraints    OT Goals  Patients self stated goal is: none stated     Patient will complete functional transfer with Max A x1  Comment:     Patient will sit EOB with Min A in prep for ADLs  Comment:     Patient will tolerate standing for 30 seconds in prep for ADLs with Max A x1  Comment:    Patient will complete oral/facial grooming task in supported sitting with Min A  Comment:            Goals  on: 23  Frequency: 3-5x/week    Patient Evaluation Complexity Level:   Occupational Profile/Medical History MODERATE - Expanded review of history including review of medical or therapy record   Specific performance deficits impacting engagement in ADL/IADL HIGH  5+ performance deficits    Client Assessment/Performance Deficits HIGH - Comorbidities and significant  modifications of tasks    Clinical Decision Making MODERATE - Analysis of occupational profile, detailed assessments, several treatment options    Overall Complexity MODERATE     OT Session Time  Therapeutic Activity: 10 minutes      CHINMAY Kennedy/L  [facility]  #[id number]

## 2023-12-22 NOTE — PLAN OF CARE
Problem: Safety Risk - Non-Violent Restraints  Goal: Patient will remain free from self-harm  Description: INTERVENTIONS:  - Apply the least restrictive restraint to prevent harm  - Notify patient and family of reasons restraints applied  - Assess for any contributing factors to confusion (electrolyte disturbances, delirium, medications)  - Discontinue any unnecessary medical devices as soon as possible  - Assess the patient's physical comfort, circulation, skin condition, hydration, nutrition and elimination needs   - Reorient and redirection as needed  - Assess for the need to continue restraints  12/21/2023 1958 by Camden Ramos, RN  Outcome: Progressing  12/21/2023 1957 by Camden Ramos, RN  Outcome: Progressing

## 2023-12-22 NOTE — PLAN OF CARE
Problem: RESPIRATORY - ADULT  Goal: Achieves optimal ventilation and oxygenation  Description: INTERVENTIONS:  - Assess for changes in respiratory status  - Assess for changes in mentation and behavior  - Position to facilitate oxygenation and minimize respiratory effort  - Oxygen supplementation based on oxygen saturation or ABGs  - Provide Smoking Cessation handout, if applicable  - Encourage broncho-pulmonary hygiene including cough, deep breathe, Incentive Spirometry  - Assess the need for suctioning and perform as needed  - Assess and instruct to report SOB or any respiratory difficulty  - Respiratory Therapy support as indicated  - Manage/alleviate anxiety  - Monitor for signs/symptoms of CO2 retention  Outcome: Progressing    Received patient on continuous AVAPS settings,        12/22/23 1150   AVAPS   Min IPAP 20   Max IPAP 25   EPAP Level 7   Set rate 20   Tidal volume 550     Changed minimum pressure to 10 to reach appropriate VT    Exp. ABG and regular ABG drawn results are in the computer, results notified Dr. Nan Man.

## 2023-12-22 NOTE — PROGRESS NOTES
[facility], [facility]    Terrenceharshad Vines Patient Status:  Inpatient    1950 MRN [id number]   Location [facility] 2W/SW Attending Bradley Dash MD   Hosp Day # 4 PCP Victor Manuel Cleaning MD     Date of Admission: 2023  8:35 AM    Admission Diagnosis: ESRD on dialysis (HCC) [N18.6, Z99.2]  Acute respiratory failure with hypoxia (HCC) [J96.01]    S: On bipap overnight. Due to hypoventilation.     Scheduled Medications:     cefTRIAXone  1 g Intravenous Q24H    azithromycin  500 mg Intravenous Q24H    insulin detemir  10 Units Subcutaneous Nightly    insulin aspart  1-11 Units Subcutaneous TID CC and HS    heparin  5,000 Units Subcutaneous Q8H YOLANDA    pantoprazole  40 mg Oral QAM AC    aspirin  81 mg Oral Daily    finasteride  5 mg Oral QAM    metoprolol succinate ER  50 mg Oral BID    sevelamer carbonate  800 mg Oral TID CC    tamsulosin  0.8 mg Oral Daily    hydrALAZINE  50 mg Oral TID    gabapentin  100 mg Oral BID    rosuvastatin  10 mg Oral Nightly    oseltamivir  30 mg Oral Once per day on        Infusing Medications:      PRN Medications:  traMADol, ipratropium-albuterol, glucose **OR** glucose **OR** glucose-vitamin C **OR** dextrose **OR** glucose **OR** glucose **OR** glucose-vitamin C, acetaminophen, polyethylene glycol (PEG 3350), sennosides, bisacodyl, fleet enema, ondansetron, metoclopramide, albuterol    OBJECTIVE:  /74   Pulse 94   Temp 97.4 °F (36.3 °C) (Temporal)   Resp 16   Wt 242 lb (109.8 kg)   SpO2 97%   BMI 32.82 kg/m²    Temp (24hrs), Av.8 °F (36.6 °C), Min:97.4 °F (36.3 °C), Max:98.1 °F (36.7 °C)      FiO2 (%):  [30 %] 30 %      Wt Readings from Last 3 Encounters:   23 242 lb (109.8 kg)   23 252 lb (114.3 kg)   23 245 lb 4.8 oz (111.3 kg)       I/O last 3 completed shifts:  In: 25 [P.O.:25]  Out: -   No intake/output data recorded.     O2: 5 LNC this am, bipap 12/5 30% overnight.  General: NAD.    Neuro: Alert, no focal deficits.    HEENT: PERRL  Neck : No LAD  CV: RRR, nl S1, S2, no S4, S3 or murmur.   Lungs: Coarse bialterally.   Abd: Nontender, non distended.   Ext: No edema.   Skin: No rashes.     Recent Labs   Lab 12/20/23  0400 12/21/23  0341 12/22/23  0421   WBC 8.9 8.7 8.9   HGB 10.2* 9.7* 10.2*   HCT 31.9* 30.9* 33.7*   .0* 124.0* 135.0*     Recent Labs   Lab 12/20/23  0400 12/21/23  0341 12/22/23  0421   * 103* 124*   BUN 33* 26* 46*   CREATSERUM 5.69* 5.42* 8.05*   CA 8.4* 8.4* 8.4*    135* 136   K 4.8 5.1 5.4*   CL 99 102 101   CO2 25.0 27.0 24.0     No results for input(s): \"INR\", \"PTT\" in the last 168 hours.  Recent Labs   Lab 12/21/23  1236 12/21/23  1423   ABGPHT 7.28* 7.29*   FUNJHH0V 59* 54*   EDAYQ4E 103* 95   ABGHCO3 25.1 24.0   SITE Right Radial Right Radial   THGB 10.3*  --        COVID-19 Lab Results    COVID-19  Lab Results   Component Value Date    COVID19 Not Detected 12/18/2023    COVID19 Not Detected 11/27/2023    COVID19 Not Detected 02/28/2023       Pro-Calcitonin  No results for input(s): \"PCT\" in the last 168 hours.    Cardiac  No results for input(s): \"TROP\", \"PBNP\" in the last 168 hours.    Creatinine Kinase  No results for input(s): \"CK\" in the last 168 hours.    Inflammatory Markers  No results for input(s): \"CRP\", \"KATY\", \"LDH\", \"DDIMER\" in the last 168 hours.    Imaging:   CXR 12/19:    Chest images personally reviewed.   Assessment/Plan   Acute respiratory failure - due to pulmonary edema and influenza  - continued hypercapnea and no clear prior lung disase. ? Bronchospasm.   - Add BD protocol.   - Add steroid burst.   - outpt PFT's.   Influenza A   - Tamiflu  - bronchodilator protocol  - Check PCT and add abx rocephin/azithro 12/22/2023  Acute decompensated HFrEF  - per cardiology  ESRD on HD  - per nephrology  Ppx  - subcutaneous heparin  Dispo - full code  - PCU  Critical Care Time greater than: 35 minutes    My best regards,         Donovan Mnotana,  MD [facility] Pulmonary, Critical Care and Sleep Medicine  Called and discussed with son Luis Vines via phone and updated in detial.

## 2023-12-22 NOTE — PLAN OF CARE
Problem: ALTERED NUTRIENT INTAKE - ADULT  Goal: Nutrient intake appropriate for improving, restoring or maintaining nutritional needs  Description: INTERVENTIONS:  - Assess nutritional status and recommend course of action  - Monitor oral intake, labs, and treatment plans  - Recommend appropriate diets, oral nutritional supplements, and vitamin/mineral supplements  - Recommend, monitor, and adjust tube feedings and TPN/PPN based on assessed needs  - Provide specific nutrition education as appropriate  Outcome: Progressing

## 2023-12-22 NOTE — DIETARY NOTE
ADULT NUTRITION INITIAL ASSESSMENT    Pt is at high nutrition risk.  Pt does not meet malnutrition criteria.        RECOMMENDATIONS TO MD: See Nutrition Intervention    ADMITTING DIAGNOSIS:  ESRD on dialysis ([facility]) [N18.6, Z99.2]  Acute respiratory failure with hypoxia (HCC) [J96.01]  PERTINENT PAST MEDICAL HISTORY:   Past Medical History:   Diagnosis Date    BPH (benign prostatic hypertrophy)     Cataract     Congestive heart disease (HCC)     Coronary atherosclerosis     Diabetic retinopathy (HCC)     Dialysis patient ([facility])     M,W,F,    Esophageal reflux     Heart valve disease     High blood pressure     High cholesterol     HLD (hyperlipidemia)     HTN (hypertension)     Neuropathy     Proteinuria     Renal disorder     HD every MWF with temporary HD cath    Type II diabetes mellitus (HCC)     Visual impairment     Reading glasses       PATIENT STATUS: Initial 12/22/23: Pt admit for shortness of breath. PMH sig for  BPH , CHF / ICM EF 30% , Moderate MR, HTN, HLD, ESRD on HD M/W/F, Anemia, Gastritis . Pt assessed due to consult for tube feeds. Pt screened at no nutrition risk but discussed at rounds today noting very poor po intake since admit (X 4 days). SLP following and pt now made NPO. Pt is lethargic and disoriented. Pt is obese and well nourished upon admit. (Unable to obtain specific diet hx d/t neuro status at this time) . Will begin wth Nepro formula (fiber enriched to help promote BM and indicated for HD pt) . Pt is anuric.     FOOD/NUTRITION RELATED HISTORY:  Appetite: Good PTA. Poor last 4 days.   Intake: NPO  Intake Meeting Needs: TF will meet needs once at goal rate  Percent Meals Eaten (last 3 days)       Date/Time Percent Meals Eaten (%)    12/19/23 1130 75 %    12/21/23 1000 0 %    12/21/23 1200 0 %    12/21/23 1800 0 %    12/22/23 1306 0 %           Food Allergies: No Known Food Allergies (NKFA)  Cultural/Ethnic/Episcopal Preferences: Not Obtained    GASTROINTESTINAL: NGT in place/NPO last BM  12/17  PRN meds for constipation in place.   MEDICATIONS: reviewed pt on flomax-hold tf accordingly. Steroids (on insulin basal and CF)     cefTRIAXone  2 g Intravenous Q24H    azithromycin  500 mg Intravenous Q24H    methylPREDNISolone  80 mg Intravenous Q8H    insulin detemir  10 Units Subcutaneous Nightly    insulin aspart  1-11 Units Subcutaneous TID CC and HS    heparin  5,000 Units Subcutaneous Q8H YOLANDA    pantoprazole  40 mg Oral QAM AC    aspirin  81 mg Oral Daily    finasteride  5 mg Oral QAM    metoprolol succinate ER  50 mg Oral BID    sevelamer carbonate  800 mg Oral TID CC    tamsulosin  0.8 mg Oral Daily    hydrALAZINE  50 mg Oral TID    gabapentin  100 mg Oral BID    rosuvastatin  10 mg Oral Nightly   Prn: traMADol, ipratropium-albuterol, glucose **OR** glucose **OR** glucose-vitamin C **OR** dextrose **OR** glucose **OR** glucose **OR** glucose-vitamin C, acetaminophen, polyethylene glycol (PEG 3350), sennosides, bisacodyl, fleet enema, ondansetron, metoclopramide, albuterol    LABS: reviewed c/w ESRD BG currently controlled. Renal formula to address hyperkalemia and hyperphosphatemia.   Recent Labs     12/20/23  0400 12/21/23  0341 12/22/23  0421   * 103* 124*   BUN 33* 26* 46*   CREATSERUM 5.69* 5.42* 8.05*   CA 8.4* 8.4* 8.4*   MG 2.0 2.1 2.5    135* 136   K 4.8 5.1 5.4*   CL 99 102 101   CO2 25.0 27.0 24.0   PHOS 6.0* 6.4* 10.2*   OSMOCALC 296* 285 295       NUTRITION RELATED PHYSICAL FINDINGS:  - Nutrition Focused Physical Exam (NFPE): well nourished per visual exam  - Fluid Accumulation: none  see RN documentation for details  - Skin Integrity: intact see RN documentation for details. High skin risk.     ANTHROPOMETRICS:  HT:  6'   WT: 109.8 kg (242 lb)   BMI: Body mass index is 32.82 kg/m².  BMI CLASSIFICATION: 30-34.9 kg/m2 - obesity class I  IBW: 178  lbs        136 % IBW  Usual Body Wt: 237-248  lbs March-Sept 2023      100 % UBW    WEIGHT HISTORY:  Patient Weight(s) for the  past 336 hrs:   Weight   12/18/23 1258 109.8 kg (242 lb)     Wt Readings from Last 10 Encounters:   12/18/23 109.8 kg (242 lb)   11/27/23 114.3 kg (252 lb)   11/13/23 111.3 kg (245 lb 4.8 oz)   09/27/23 110.5 kg (243 lb 9.7 oz)   09/20/23 74.8 kg (165 lb)   09/05/23 112.5 kg (248 lb)   05/09/23 108.2 kg (238 lb 8 oz)   03/03/23 107.5 kg (237 lb)   03/01/23 103.5 kg (228 lb 4.6 oz)   01/16/23 103.4 kg (228 lb)     NUTRITION DIAGNOSIS/PROBLEM:   Inadequate oral intake related to Decreased ability to consume sufficient energy  as evidenced by very poor po intake X 4 days, lethargy and disorientation.     NUTRITION INTERVENTION:     NUTRITION PRESCRIPTION:   Estimated Nutrition needs: --dosing wt of 109.8  kg - wt taken on 12/18/23   Calories: 9764-9116 calories/day (15-20 calories per kg Dosing wt)  Protein:  g protein/day (1.2-1.4  g protein/kg Ideal body wt (IBW))   Fluid Needs: 25 ml/kg IBW= 2022 ml, adjusted IBW: 97 kg X 25 ml/kg= 2425 ml. Adjust per clinical status.     - Diet:       Procedures    NPO   - EN rx; Nepro 25 ml/hr; advance 10 ml q 12 hrs to goal 55 ml/hr X ave 21 hr infusion time= 2079 kcals, 94 gm protein, 843 ml h20, FWF: 30 ml q 4 hrs (180 ml). Total h20: 1023 ml, 100% stevan goal and 98% protein goal.    - Medical Food Supplements-NPO  - Vitamin and mineral supplements: none  - Feeding assistance: NPO  - Nutrition education: not appropriate   - Coordination of nutrition care: collaboration with other providers and discussed in Care Rounds   - Discharge and transfer of nutrition care to new setting or provider: monitor plans From home with family.     MONITOR AND EVALUATE/NUTRITION GOALS:  - Food and Nutrient Intake:      Monitor: for PO initiation  - Food and Nutrient Administration:      Monitor: tolerance to enteral nutrition, adequacy of enteral nutrition, and for enteral nutrition adjustment  - Anthropometric Measurement:    Monitor weight  - Nutrition Goals:      TF meet >80% of goal  within first week , labs within acceptable limits, and support body systems, euglycemia.     DIETITIAN FOLLOW UP: RD to follow and monitor nutrition status    Anu Estevez RD, LDN, [facility] ([id number])

## 2023-12-23 ENCOUNTER — APPOINTMENT (OUTPATIENT)
Dept: GENERAL RADIOLOGY | Facility: HOSPITAL | Age: 73
End: 2023-12-23
Attending: INTERNAL MEDICINE
Payer: MEDICARE

## 2023-12-23 LAB
ANION GAP SERPL CALC-SCNC: 17 MMOL/L (ref 0–18)
BASOPHILS # BLD AUTO: 0 X10(3) UL (ref 0–0.2)
BASOPHILS NFR BLD AUTO: 0 %
BUN BLD-MCNC: 48 MG/DL (ref 9–23)
BUN/CREAT SERPL: 7.6 (ref 10–20)
CALCIUM BLD-MCNC: 8.7 MG/DL (ref 8.7–10.4)
CHLORIDE SERPL-SCNC: 96 MMOL/L (ref 98–112)
CO2 SERPL-SCNC: 23 MMOL/L (ref 21–32)
CREAT BLD-MCNC: 6.28 MG/DL
DEPRECATED RDW RBC AUTO: 52.3 FL (ref 35.1–46.3)
EGFRCR SERPLBLD CKD-EPI 2021: 9 ML/MIN/1.73M2 (ref 60–?)
EOSINOPHIL # BLD AUTO: 0 X10(3) UL (ref 0–0.7)
EOSINOPHIL NFR BLD AUTO: 0 %
ERYTHROCYTE [DISTWIDTH] IN BLOOD BY AUTOMATED COUNT: 15.3 % (ref 11–15)
GLUCOSE BLD-MCNC: 280 MG/DL (ref 70–99)
GLUCOSE BLDC GLUCOMTR-MCNC: 265 MG/DL (ref 70–99)
GLUCOSE BLDC GLUCOMTR-MCNC: 290 MG/DL (ref 70–99)
GLUCOSE BLDC GLUCOMTR-MCNC: 306 MG/DL (ref 70–99)
GLUCOSE BLDC GLUCOMTR-MCNC: 306 MG/DL (ref 70–99)
HCT VFR BLD AUTO: 32.3 %
HGB BLD-MCNC: 10.3 G/DL
IMM GRANULOCYTES # BLD AUTO: 0.02 X10(3) UL (ref 0–1)
IMM GRANULOCYTES NFR BLD: 0.4 %
LYMPHOCYTES # BLD AUTO: 0.43 X10(3) UL (ref 1–4)
LYMPHOCYTES NFR BLD AUTO: 8.1 %
MAGNESIUM SERPL-MCNC: 2.6 MG/DL (ref 1.6–2.6)
MCH RBC QN AUTO: 29.9 PG (ref 26–34)
MCHC RBC AUTO-ENTMCNC: 31.9 G/DL (ref 31–37)
MCV RBC AUTO: 93.9 FL
MONOCYTES # BLD AUTO: 0.11 X10(3) UL (ref 0.1–1)
MONOCYTES NFR BLD AUTO: 2.1 %
NEUTROPHILS # BLD AUTO: 4.78 X10 (3) UL (ref 1.5–7.7)
NEUTROPHILS # BLD AUTO: 4.78 X10(3) UL (ref 1.5–7.7)
NEUTROPHILS NFR BLD AUTO: 89.4 %
OSMOLALITY SERPL CALC.SUM OF ELEC: 305 MOSM/KG (ref 275–295)
PHOSPHATE SERPL-MCNC: 8.7 MG/DL (ref 2.4–5.1)
PLATELET # BLD AUTO: 155 10(3)UL (ref 150–450)
POTASSIUM SERPL-SCNC: 5 MMOL/L (ref 3.5–5.1)
RBC # BLD AUTO: 3.44 X10(6)UL
SODIUM SERPL-SCNC: 136 MMOL/L (ref 136–145)
WBC # BLD AUTO: 5.3 X10(3) UL (ref 4–11)

## 2023-12-23 PROCEDURE — 71045 X-RAY EXAM CHEST 1 VIEW: CPT | Performed by: INTERNAL MEDICINE

## 2023-12-23 RX ORDER — LANSOPRAZOLE 30 MG/1
30 TABLET, ORALLY DISINTEGRATING, DELAYED RELEASE ORAL
Status: DISCONTINUED | OUTPATIENT
Start: 2023-12-23 | End: 2024-01-03

## 2023-12-23 RX ORDER — SODIUM POLYSTYRENE SULFONATE 4.1 MEQ/G
15 POWDER, FOR SUSPENSION ORAL; RECTAL ONCE
Status: COMPLETED | OUTPATIENT
Start: 2023-12-23 | End: 2023-12-23

## 2023-12-23 RX ORDER — METOPROLOL TARTRATE 100 MG/1
100 TABLET ORAL
Status: DISCONTINUED | OUTPATIENT
Start: 2023-12-23 | End: 2023-12-31

## 2023-12-23 RX ORDER — METOPROLOL SUCCINATE 100 MG/1
100 TABLET, EXTENDED RELEASE ORAL 2 TIMES DAILY
Status: DISCONTINUED | OUTPATIENT
Start: 2023-12-23 | End: 2023-12-23

## 2023-12-23 RX ORDER — ASPIRIN 81 MG/1
81 TABLET, CHEWABLE ORAL DAILY
Status: DISCONTINUED | OUTPATIENT
Start: 2023-12-23 | End: 2024-01-03

## 2023-12-23 RX ORDER — AZITHROMYCIN 200 MG/5ML
500 POWDER, FOR SUSPENSION ORAL EVERY 24 HOURS
Status: COMPLETED | OUTPATIENT
Start: 2023-12-23 | End: 2023-12-24

## 2023-12-23 NOTE — PROGRESS NOTES
NEPHROLOGY DAILY PROGRESS NOTE     SUBJECTIVE:  On NC  Confused   In restraints on my visit    OBJECTIVE:    Total Intake/Output:    Intake/Output Summary (Last 24 hours) at 12/23/2023 1241  Last data filed at 12/23/2023 1100  Gross per 24 hour   Intake 890.6 ml   Output 10 ml   Net 880.6 ml         PHYSICAL EXAM:  /74 (BP Location: Right arm)   Pulse 73   Temp 96.8 °F (36 °C) (Temporal)   Resp 15   Wt 242 lb (109.8 kg)   SpO2 98%   BMI 32.82 kg/m²   GEN: NAD, on BiPAP  HEENT: NCAT    CHEST: coarse breath sounds    CARDIAC: S1S2 normal  ABD: soft, NT/ND  EXT:  no lower ext edema  NEURO: sleepy/ lethargic   SKIN: warm, dry   DIALYSIS ACCESS: LUE AVF cannulated for dialysis        CURRENT MEDICATIONS:   aspirin  81 mg Per NG Tube Daily    lansoprazole  30 mg Per NG Tube QAM AC    azithromycin  500 mg Per NG Tube Q24H    finasteride  5 mg Per NG Tube Daily    metoprolol tartrate  100 mg Per NG Tube 2x Daily(Beta Blocker)    insulin detemir  25 Units Subcutaneous Daily    cefTRIAXone  2 g Intravenous Q24H    methylPREDNISolone  80 mg Intravenous Q8H    insulin regular human  1-11 Units Subcutaneous 4 times per day    heparin  5,000 Units Subcutaneous Q8H YOLANDA    sevelamer carbonate  800 mg Oral TID CC    tamsulosin  0.8 mg Oral Daily    hydrALAZINE  50 mg Oral TID    gabapentin  100 mg Oral BID    rosuvastatin  10 mg Oral Nightly             LABS:  Patient Labs Reviewed in Detail. Pertinent Labs as follows:  Recent Labs   Lab 12/21/23 0341 12/22/23  0421 12/23/23  0408   * 124* 280*   BUN 26* 46* 48*   CREATSERUM 5.42* 8.05* 6.28*   EGFRCR 10* 6* 9*   CA 8.4* 8.4* 8.7   * 136 136   K 5.1 5.4* 5.0    101 96*   CO2 27.0 24.0 23.0     Recent Labs   Lab 12/21/23  0341 12/22/23  0421 12/23/23  0408   RBC 3.13* 3.46* 3.44*   HGB 9.7* 10.2* 10.3*   HCT 30.9* 33.7* 32.3*   MCV 98.7 97.4 93.9   MCH 31.0 29.5 29.9   MCHC 31.4 30.3* 31.9   RDW 16.0* 15.9* 15.3*   NEPRELIM 6.47 6.33 4.78   WBC 8.7 8.9  5.3   .0* 135.0* 155.0           IMAGING:  XR CHEST AP PORTABLE  (CPT=71045)    Result Date: 12/23/2023  CONCLUSION:  1. Enteric tube tip projects over the distal gastric body/antrum. 2. Cardiomegaly with central pulmonary vascular congestion.  No focal airspace disease or significant pleural effusion.   A preliminary report was issued by the [email or web address] teleradiology service. There are no major discrepancies.  [email or web address]  Dictated by (CST): Zuhair Powers MD on 12/23/2023 at 9:54 AM     Finalized by (CST): Zuhair Powers MD on 12/23/2023 at 9:56 AM          XR CHEST AP PORTABLE  (CPT=71045)    Result Date: 12/22/2023  CONCLUSION:   Post placement of a feeding tube with tip within the stomach and below the diaphragm.  Mild interstitial edema, unchanged.   Dictated by (CST): Kareem Padilla MD on 12/22/2023 at 4:47 PM     Finalized by (CST): Kareem Padilla MD on 12/22/2023 at 4:48 PM                ASSESSMENT AND PLAN:   This is a 73 year old male with PMH sig for ESRD on HD MWF, HTN, HLD, DM2, CAD s/p CABG x3, ischemic cardiomyopathy. Presents with SOB and cough. Found to be influenza positive. Nephrology is consulted for dialysis.      ESRD on HD MWF   - No acute indication for dialysis today  - Next dialysis 12/25 per usual schedule      - followed by [facility]   - limit in's      Acute hypoxic respiratory failure  - CXR with pulmonary vascular changes, also influenza A postivie    - fluid removal with dialysis as tolerated   - pulm following      Hyperkalemia   - 2K bath with dialysis   - follow K level      Hyperphosphatemia   - Nepro feeds    Influenza A   - tamiflu - per primary       Elevated troponin  -  per cards     Hypertension   - Hydralazine, metoprolol       Anemia   - trend Hb, Hb at goal    - receiving OMAIRA with outpatient dialysis        DO Melodie Mccullough - Nephrology

## 2023-12-23 NOTE — PROGRESS NOTES
[facility] Pharmacy Note: Route Optimization for Azithromycin (ZITHROMAX)    Patient is currently on Azithromycin (ZITHROMAX) 500 mg IV every 24 hours.   The patient meets the criteria to convert to the oral equivalent as established by the IV to Oral conversion protocol approved by the P&T committee.   Medication was changed from IV formulation to Azithromycin (ZITHROMAX)  500 mg via NGT every 24 hours per protocol.      Purvi Wong, PharmGERMAN  12/23/2023,  8:07 AM

## 2023-12-23 NOTE — PLAN OF CARE
Patient confused overnight, not making very much sense with his mentation. Patient dislodged his Dobhoff this morning when he went to take off his BiPAP, another dobhoff inserted per orders and CXR ordered. Information relayed  Problem: Patient Centered Care  Goal: Patient preferences are identified and integrated in the patient's plan of care  Description: Interventions:  - What would you like us to know as we care for you? I live at home with family. I'm from [place]  - Provide timely, complete, and accurate information to patient/family  - Incorporate patient and family knowledge, values, beliefs, and cultural backgrounds into the planning and delivery of care  - Encourage patient/family to participate in care and decision-making at the level they choose  - Honor patient and family perspectives and choices  Outcome: Progressing     Problem: Diabetes/Glucose Control  Goal: Glucose maintained within prescribed range  Description: INTERVENTIONS:  - Monitor Blood Glucose as ordered  - Assess for signs and symptoms of hyperglycemia and hypoglycemia  - Administer ordered medications to maintain glucose within target range  - Assess barriers to adequate nutritional intake and initiate nutrition consult as needed  - Instruct patient on self management of diabetes  Outcome: Progressing     Problem: Patient/Family Goals  Goal: Patient/Family Long Term Goal  Description: Patient's Long Term Goal: go home    Interventions:  - Monitor vital signs  - Monitor appropriate labs  - Monitor blood glucose levels  - Administer medications per order  - Pain management as needed  - Follow MD orders  - Diagnostics per orders  - Dialysis per orders  - Update / inform patient and family on plan of care  - Discharge planning     - See additional Care Plan goals for specific interventions  Outcome: Progressing  Goal: Patient/Family Short Term Goal  Description: Patient's Short Term Goal: To breathe better    Interventions:   - RT  treatment  -O2  -Follow md orders  - See additional Care Plan goals for specific interventions  Outcome: Progressing     Problem: CARDIOVASCULAR - ADULT  Goal: Maintains optimal cardiac output and hemodynamic stability  Description: INTERVENTIONS:  - Monitor vital signs, rhythm, and trends  - Monitor for bleeding, hypotension and signs of decreased cardiac output  - Evaluate effectiveness of vasoactive medications to optimize hemodynamic stability  - Monitor arterial and/or venous puncture sites for bleeding and/or hematoma  - Assess quality of pulses, skin color and temperature  - Assess for signs of decreased coronary artery perfusion - ex. Angina  - Evaluate fluid balance, assess for edema, trend weights  Outcome: Progressing  Goal: Absence of cardiac arrhythmias or at baseline  Description: INTERVENTIONS:  - Continuous cardiac monitoring, monitor vital signs, obtain 12 lead EKG if indicated  - Evaluate effectiveness of antiarrhythmic and heart rate control medications as ordered  - Initiate emergency measures for life threatening arrhythmias  - Monitor electrolytes and administer replacement therapy as ordered  Outcome: Progressing     Problem: RESPIRATORY - ADULT  Goal: Achieves optimal ventilation and oxygenation  Description: INTERVENTIONS:  - Assess for changes in respiratory status  - Assess for changes in mentation and behavior  - Position to facilitate oxygenation and minimize respiratory effort  - Oxygen supplementation based on oxygen saturation or ABGs  - Provide Smoking Cessation handout, if applicable  - Encourage broncho-pulmonary hygiene including cough, deep breathe, Incentive Spirometry  - Assess the need for suctioning and perform as needed  - Assess and instruct to report SOB or any respiratory difficulty  - Respiratory Therapy support as indicated  - Manage/alleviate anxiety  - Monitor for signs/symptoms of CO2 retention  Outcome: Progressing     Problem: METABOLIC/FLUID AND ELECTROLYTES -  ADULT  Goal: Glucose maintained within prescribed range  Description: INTERVENTIONS:  - Monitor Blood Glucose as ordered  - Assess for signs and symptoms of hyperglycemia and hypoglycemia  - Administer ordered medications to maintain glucose within target range  - Assess barriers to adequate nutritional intake and initiate nutrition consult as needed  - Instruct patient on self management of diabetes  Outcome: Progressing  Goal: Electrolytes maintained within normal limits  Description: INTERVENTIONS:  - Monitor labs and rhythm and assess patient for signs and symptoms of electrolyte imbalances  - Administer electrolyte replacement as ordered  - Monitor response to electrolyte replacements, including rhythm and repeat lab results as appropriate  - Fluid restriction as ordered  - Instruct patient on fluid and nutrition restrictions as appropriate  Outcome: Progressing  Goal: Hemodynamic stability and optimal renal function maintained  Description: INTERVENTIONS:  - Monitor labs and assess for signs and symptoms of volume excess or deficit  - Monitor intake, output and patient weight  - Monitor urine specific gravity, serum osmolarity and serum sodium as indicated or ordered  - Monitor response to interventions for patient's volume status, including labs, urine output, blood pressure (other measures as available)  - Encourage oral intake as appropriate  - Instruct patient on fluid and nutrition restrictions as appropriate  Outcome: Progressing     Problem: Safety Risk - Non-Violent Restraints  Goal: Patient will remain free from self-harm  Description: INTERVENTIONS:  - Apply the least restrictive restraint to prevent harm  - Notify patient and family of reasons restraints applied  - Assess for any contributing factors to confusion (electrolyte disturbances, delirium, medications)  - Discontinue any unnecessary medical devices as soon as possible  - Assess the patient's physical comfort, circulation, skin condition,  hydration, nutrition and elimination needs   - Reorient and redirection as needed  - Assess for the need to continue restraints  Outcome: Progressing     Problem: Delirium  Goal: Minimize duration of delirium  Description: Interventions:  - Encourage use of hearing aids, eye glasses  - Promote highest level of mobility daily  - Provide frequent reorientation  - Promote wakefulness i.e. lights on, blinds open  - Promote sleep, encourage patient's normal rest cycle i.e. lights off, TV off, minimize noise and interruptions  - Encourage family to assist in orientation and promotion of home routines  Outcome: Progressing

## 2023-12-23 NOTE — PLAN OF CARE
Problem: Patient Centered Care  Goal: Patient preferences are identified and integrated in the patient's plan of care  Description: Interventions:  - What would you like us to know as we care for you? I live at home with family. I'm from [place]  - Provide timely, complete, and accurate information to patient/family  - Incorporate patient and family knowledge, values, beliefs, and cultural backgrounds into the planning and delivery of care  - Encourage patient/family to participate in care and decision-making at the level they choose  - Honor patient and family perspectives and choices  Outcome: Progressing     Problem: Diabetes/Glucose Control  Goal: Glucose maintained within prescribed range  Description: INTERVENTIONS:  - Monitor Blood Glucose as ordered  - Assess for signs and symptoms of hyperglycemia and hypoglycemia  - Administer ordered medications to maintain glucose within target range  - Assess barriers to adequate nutritional intake and initiate nutrition consult as needed  - Instruct patient on self management of diabetes  Outcome: Progressing     Problem: Patient/Family Goals  Goal: Patient/Family Long Term Goal  Description: Patient's Long Term Goal: go home    Interventions:  - Monitor vital signs  - Monitor appropriate labs  - Monitor blood glucose levels  - Administer medications per order  - Pain management as needed  - Follow MD orders  - Diagnostics per orders  - Dialysis per orders  - Update / inform patient and family on plan of care  - Discharge planning     - See additional Care Plan goals for specific interventions  Outcome: Progressing  Goal: Patient/Family Short Term Goal  Description: Patient's Short Term Goal: To breathe better    Interventions:   - RT treatment  -O2  -Follow md orders  - See additional Care Plan goals for specific interventions  Outcome: Not Progressing     Problem: CARDIOVASCULAR - ADULT  Goal: Maintains optimal cardiac output and hemodynamic  stability  Description: INTERVENTIONS:  - Monitor vital signs, rhythm, and trends  - Monitor for bleeding, hypotension and signs of decreased cardiac output  - Evaluate effectiveness of vasoactive medications to optimize hemodynamic stability  - Monitor arterial and/or venous puncture sites for bleeding and/or hematoma  - Assess quality of pulses, skin color and temperature  - Assess for signs of decreased coronary artery perfusion - ex. Angina  - Evaluate fluid balance, assess for edema, trend weights  Outcome: Progressing  Goal: Absence of cardiac arrhythmias or at baseline  Description: INTERVENTIONS:  - Continuous cardiac monitoring, monitor vital signs, obtain 12 lead EKG if indicated  - Evaluate effectiveness of antiarrhythmic and heart rate control medications as ordered  - Initiate emergency measures for life threatening arrhythmias  - Monitor electrolytes and administer replacement therapy as ordered  Outcome: Progressing     Problem: RESPIRATORY - ADULT  Goal: Achieves optimal ventilation and oxygenation  Description: INTERVENTIONS:  - Assess for changes in respiratory status  - Assess for changes in mentation and behavior  - Position to facilitate oxygenation and minimize respiratory effort  - Oxygen supplementation based on oxygen saturation or ABGs  - Provide Smoking Cessation handout, if applicable  - Encourage broncho-pulmonary hygiene including cough, deep breathe, Incentive Spirometry  - Assess the need for suctioning and perform as needed  - Assess and instruct to report SOB or any respiratory difficulty  - Respiratory Therapy support as indicated  - Manage/alleviate anxiety  - Monitor for signs/symptoms of CO2 retention  Outcome: Not Progressing     Problem: METABOLIC/FLUID AND ELECTROLYTES - ADULT  Goal: Glucose maintained within prescribed range  Description: INTERVENTIONS:  - Monitor Blood Glucose as ordered  - Assess for signs and symptoms of hyperglycemia and hypoglycemia  - Administer  ordered medications to maintain glucose within target range  - Assess barriers to adequate nutritional intake and initiate nutrition consult as needed  - Instruct patient on self management of diabetes  Outcome: Progressing  Goal: Electrolytes maintained within normal limits  Description: INTERVENTIONS:  - Monitor labs and rhythm and assess patient for signs and symptoms of electrolyte imbalances  - Administer electrolyte replacement as ordered  - Monitor response to electrolyte replacements, including rhythm and repeat lab results as appropriate  - Fluid restriction as ordered  - Instruct patient on fluid and nutrition restrictions as appropriate  Outcome: Progressing  Goal: Hemodynamic stability and optimal renal function maintained  Description: INTERVENTIONS:  - Monitor labs and assess for signs and symptoms of volume excess or deficit  - Monitor intake, output and patient weight  - Monitor urine specific gravity, serum osmolarity and serum sodium as indicated or ordered  - Monitor response to interventions for patient's volume status, including labs, urine output, blood pressure (other measures as available)  - Encourage oral intake as appropriate  - Instruct patient on fluid and nutrition restrictions as appropriate  Outcome: Progressing     Problem: Safety Risk - Non-Violent Restraints  Goal: Patient will remain free from self-harm  Description: INTERVENTIONS:  - Apply the least restrictive restraint to prevent harm  - Notify patient and family of reasons restraints applied  - Assess for any contributing factors to confusion (electrolyte disturbances, delirium, medications)  - Discontinue any unnecessary medical devices as soon as possible  - Assess the patient's physical comfort, circulation, skin condition, hydration, nutrition and elimination needs   - Reorient and redirection as needed  - Assess for the need to continue restraints  Outcome: Progressing     Problem: Delirium  Goal: Minimize duration of  delirium  Description: Interventions:  - Encourage use of hearing aids, eye glasses  - Promote highest level of mobility daily  - Provide frequent reorientation  - Promote wakefulness i.e. lights on, blinds open  - Promote sleep, encourage patient's normal rest cycle i.e. lights off, TV off, minimize noise and interruptions  - Encourage family to assist in orientation and promotion of home routines  Outcome: Not Progressing    Patient oriented x 1-2, confused, opens eyes to verbal stimuli, able to follow commands intermittently. NSR on tele-monitor. Remains on Bipap AVAPS, transitions to 5L NC during mealtimes. DHT to Left VERONIKA inserted as per MD order, CXR done and read per Dr [place]; orders given okay to use for meds/tube feeding. DHT guirewire removed at 1715, guidewire intact. Nepro tube feedings initiated as per MD order. Maxwell SWR in place for patient safety. Anuric; received HD today with 2L via Left AVF. Bed locked in lowest position, safety measures maintained, call light within reach. Family updated in plan of care.

## 2023-12-23 NOTE — PROGRESS NOTES
[facility] Hospitalist Progress Note     CC: Hospital Follow up    PCP: Victor Manuel Cleaning MD       Assessment/Plan:     Mr. Vines is a 73 year old male with PMH BPH , CHF / ICM EF 30% , CAD s/p CABG x3, Moderate MR, HTN, HLD, ESRD on HD M/W/F, Anemia, Gastritis, who presents with SOB. Admitted for fluid overload and Influenza A.  Hypoxic and hypercapnic respiratory failure likely due to influenza.  Requiring BiPAP after fluid status has improved. Tamiflu continued and will start ceftriaxone and steroids today.     Acute Hypoxic Respiratory Failure  Pulmonary edema  ESRD on HD M/W/F  - positive influenza   - HD per renal   - s/p recent AV fistula repair by vascular surgery in September 2023  - bipap with all sleep, can try to wean off this AM as more alert when seen   - steroid burst per pulmonary     Influenza A positive   - cough for over a month but SOB more acute  - no fevers or Leukocytosis   - CXR reviewed   - will renally dose Tamiflu   - Ceftriaxone and steroids started 12/22/23     Troponin Elevation   - no chest pain   - EKG with concern for LBBB  - cardiology on consult     CAD s/p CABG x3   Ischemic cardiomyopathy EF 30% without acute exacerbation of congestive heart failure  Chronic Systolic heart failure  HTN  - Continue home metoprolol, hydralazine  - Not on ACE or ARB due to renal disease  - Does not use diuretics     Anemia   Thrombocytopenia   Gastritis   - s/p EGD 11/7 with some mild gastritis, no active bleeding  - s/p colonoscopy 11/8 with polyps but no active bleeding, will need to repeat colonoscopy as outpatient  - PPI BID was not taking      Type 2 diabetes with hyperglycemia  Diabetic nephropathy  - gabapentin  - ISS, meal time and basal insulin   - Controlled renal diet  - Continue home aspirin     Hyperlipidemia  - Continue home statin     BPH continue home Flomax     FN:  Diet: Renal, DM     DVT Prophy: SCDs HSQ      Dispo: PCU     Ashollier Sandoval ALEMAN  [facility] Hospitalist       Subjective:     Bipap mask on. Laying in bed but awake. Responsive to me. Shakes my hand.     OBJECTIVE:    Blood pressure 138/79, pulse 86, temperature 97.9 °F (36.6 °C), temperature source Temporal, resp. rate 18, weight 242 lb (109.8 kg), SpO2 99%.    Temp:  [97.4 °F (36.3 °C)-97.9 °F (36.6 °C)] 97.9 °F (36.6 °C)  Pulse:  [] 86  Resp:  [15-24] 18  BP: (114-163)/() 138/79  SpO2:  [86 %-100 %] 99 %  FiO2 (%):  [30 %] 30 %      Intake/Output:    Intake/Output Summary (Last 24 hours) at 12/23/2023 1229  Last data filed at 12/23/2023 1100  Gross per 24 hour   Intake 890.6 ml   Output 10 ml   Net 880.6 ml         Last 3 Weights   12/18/23 1258 242 lb (109.8 kg)   11/27/23 1951 252 lb (114.3 kg)   11/13/23 0700 245 lb 4.8 oz (111.3 kg)   11/12/23 0519 244 lb 8 oz (110.9 kg)   11/11/23 1300 246 lb 14.4 oz (112 kg)   11/11/23 0539 238 lb 1.6 oz (108 kg)   11/08/23 1410 234 lb (106.1 kg)   11/07/23 0619 234 lb 1.6 oz (106.2 kg)   11/06/23 1744 239 lb (108.4 kg)       Exam   General: awake  HEENT: bipap mask   Lungs: coarse   Heart: Regular rate and rhythm  Abdomen: soft, non tender  Extremities: No edema  Skin: no new rash, normal color  Psych: appropriate affect     Data Review:       Labs:     Recent Labs   Lab 12/21/23  0341 12/22/23  0421 12/23/23  0408   RBC 3.13* 3.46* 3.44*   HGB 9.7* 10.2* 10.3*   HCT 30.9* 33.7* 32.3*   MCV 98.7 97.4 93.9   MCH 31.0 29.5 29.9   MCHC 31.4 30.3* 31.9   RDW 16.0* 15.9* 15.3*   NEPRELIM 6.47 6.33 4.78   WBC 8.7 8.9 5.3   .0* 135.0* 155.0         Recent Labs   Lab 12/21/23  0341 12/22/23  0421 12/23/23  0408   * 124* 280*   BUN 26* 46* 48*   CREATSERUM 5.42* 8.05* 6.28*   EGFRCR 10* 6* 9*   CA 8.4* 8.4* 8.7   * 136 136   K 5.1 5.4* 5.0    101 96*   CO2 27.0 24.0 23.0       No results for input(s): \"ALT\", \"AST\", \"ALB\", \"AMYLASE\", \"LIPASE\", \"LDH\" in the last 168 hours.    Invalid input(s): \"ALPHOS\", \"TBIL\", \"DBIL\", \"TPROT\"      Imaging:  XR CHEST AP  PORTABLE  (CPT=71045)    Result Date: 12/23/2023  CONCLUSION:  1. Enteric tube tip projects over the distal gastric body/antrum. 2. Cardiomegaly with central pulmonary vascular congestion.  No focal airspace disease or significant pleural effusion.   A preliminary report was issued by the [facility] teleradiology service. There are no major discrepancies.  elm-remote  Dictated by (CST): Zuhair Powers MD on 12/23/2023 at 9:54 AM     Finalized by (CST): Zuhair Powers MD on 12/23/2023 at 9:56 AM          XR CHEST AP PORTABLE  (CPT=71045)    Result Date: 12/22/2023  CONCLUSION:   Post placement of a feeding tube with tip within the stomach and below the diaphragm.  Mild interstitial edema, unchanged.   Dictated by (CST): Kareem Padilla MD on 12/22/2023 at 4:47 PM     Finalized by (CST): Kareem Padilla MD on 12/22/2023 at 4:48 PM             Meds:      aspirin  81 mg Per NG Tube Daily    lansoprazole  30 mg Per NG Tube QAM AC    azithromycin  500 mg Per NG Tube Q24H    finasteride  5 mg Per NG Tube Daily    metoprolol tartrate  100 mg Per NG Tube 2x Daily(Beta Blocker)    insulin detemir  25 Units Subcutaneous Daily    cefTRIAXone  2 g Intravenous Q24H    methylPREDNISolone  80 mg Intravenous Q8H    insulin regular human  1-11 Units Subcutaneous 4 times per day    heparin  5,000 Units Subcutaneous Q8H YOLANDA    sevelamer carbonate  800 mg Oral TID CC    tamsulosin  0.8 mg Oral Daily    hydrALAZINE  50 mg Oral TID    gabapentin  100 mg Oral BID    rosuvastatin  10 mg Oral Nightly      dextrose 10%       dextrose 10%, lipase-protease-amylase (Lip-Prot-Amyl) **AND** sodium bicarbonate, traMADol, ipratropium-albuterol, glucose **OR** glucose **OR** glucose-vitamin C **OR** dextrose **OR** glucose **OR** glucose **OR** glucose-vitamin C, acetaminophen, polyethylene glycol (PEG 3350), sennosides, bisacodyl, fleet enema, ondansetron, metoclopramide, albuterol

## 2023-12-23 NOTE — PROGRESS NOTES
[facility] Dosing Service:  Dosage Form Evaluation  Terrence Vines is a 73 year old patient who has an NPO diet, but can receive medications via tube.    A review of the patient’s medication list finds that the following medications should be changed to a different route and/or dosage form:  Metoprolol 100mg XL PO BID --> 100mg IR NGT BID  Finasteride 5mg PO daily --> 5mg oral solution NGT daily  Aspirin 81mg DR PO daily --> 81mg chewable tab NGT daily  Pantoprazole 40mg DR PO daily --> lansoprazole 30mg ODT NGT daily  Dr. Nick agreed with plan.  Pharmacy will review medication list proactively and will make any necessary changes based on dietary changes.  Thank you for allowing us to participate in the care of this patient.    Thank you,   Purvi Wong, PharmD  12/23/2023  8:01 AM

## 2023-12-23 NOTE — PROGRESS NOTES
[facility] Pulmonary, Critical Care and Sleep    Terrenceharshad Vines Patient Status:  Inpatient    1950 MRN [id number]   Location [facility] 2W/SW Attending Bradley Dash MD   Hosp Day # 5 PCP Victor Manuel Cleaning MD     Date of Admission: 2023  8:35 AM    Admission Diagnosis: ESRD on dialysis (HCC) [N18.6, Z99.2]  Acute respiratory failure with hypoxia (HCC) [J96.01]    S: On bipap overnight. More alert this am.     Scheduled Medications:     aspirin  81 mg Per NG Tube Daily    lansoprazole  30 mg Per NG Tube QAM AC    azithromycin  500 mg Per NG Tube Q24H    finasteride  5 mg Per NG Tube Daily    metoprolol tartrate  100 mg Per NG Tube 2x Daily(Beta Blocker)    insulin detemir  25 Units Subcutaneous Daily    cefTRIAXone  2 g Intravenous Q24H    methylPREDNISolone  80 mg Intravenous Q8H    insulin regular human  1-11 Units Subcutaneous 4 times per day    heparin  5,000 Units Subcutaneous Q8H YOLANDA    sevelamer carbonate  800 mg Oral TID CC    tamsulosin  0.8 mg Oral Daily    hydrALAZINE  50 mg Oral TID    gabapentin  100 mg Oral BID    rosuvastatin  10 mg Oral Nightly       Infusing Medications:     dextrose 10%         PRN Medications:  dextrose 10%, lipase-protease-amylase (Lip-Prot-Amyl) **AND** sodium bicarbonate, traMADol, ipratropium-albuterol, glucose **OR** glucose **OR** glucose-vitamin C **OR** dextrose **OR** glucose **OR** glucose **OR** glucose-vitamin C, acetaminophen, polyethylene glycol (PEG 3350), sennosides, bisacodyl, fleet enema, ondansetron, metoclopramide, albuterol    OBJECTIVE:  /79 (BP Location: Right arm)   Pulse 86   Temp 97.9 °F (36.6 °C) (Temporal)   Resp 18   Wt 242 lb (109.8 kg)   SpO2 99%   BMI 32.82 kg/m²    Temp (24hrs), Av.5 °F (36.4 °C), Min:97.3 °F (36.3 °C), Max:97.9 °F (36.6 °C)      FiO2 (%):  [30 %] 30 %      Wt Readings from Last 3 Encounters:   23 242 lb (109.8 kg)   23 252 lb (114.3 kg)   23 245 lb 4.8 oz (111.3 kg)       I/O  last 3 completed shifts:  In: 890.6 [P.O.:25; I.V.:363.6; NG/GT:402; IV PIGGYBACK:100]  Out: 2010 [Emesis/NG output:10; Other:2000]  No intake/output data recorded.     O2: 5 LNC this am, bipap 12/5 30% overnight.  General: NAD.   Neuro: Alert, no focal deficits.    HEENT: PERRL  Neck : No LAD  CV: RRR, nl S1, S2, no S4, S3 or murmur.   Lungs: Coarse bialterally.   Abd: Nontender, non distended.   Ext: No edema.   Skin: No rashes.     Recent Labs   Lab 12/21/23  0341 12/22/23  0421 12/23/23  0408   WBC 8.7 8.9 5.3   HGB 9.7* 10.2* 10.3*   HCT 30.9* 33.7* 32.3*   .0* 135.0* 155.0     Recent Labs   Lab 12/21/23 0341 12/22/23  0421 12/23/23  0408   * 124* 280*   BUN 26* 46* 48*   CREATSERUM 5.42* 8.05* 6.28*   CA 8.4* 8.4* 8.7   * 136 136   K 5.1 5.4* 5.0    101 96*   CO2 27.0 24.0 23.0     No results for input(s): \"INR\", \"PTT\" in the last 168 hours.  Recent Labs   Lab 12/22/23  0938 12/22/23  1205   ABGPHT 7.40 7.39   AWLDBP1B 54* 52*   LGENR0Q 94 83   ABGHCO3 30.6* 29.2*   SITE Right Radial Right Radial   THGB 10.6*  --        COVID-19 Lab Results    COVID-19  Lab Results   Component Value Date    COVID19 Not Detected 12/18/2023    COVID19 Not Detected 11/27/2023    COVID19 Not Detected 02/28/2023       Pro-Calcitonin  Recent Labs   Lab 12/22/23  0421   PCT 0.58*       Cardiac  No results for input(s): \"TROP\", \"PBNP\" in the last 168 hours.    Creatinine Kinase  No results for input(s): \"CK\" in the last 168 hours.    Inflammatory Markers  No results for input(s): \"CRP\", \"KATY\", \"LDH\", \"DDIMER\" in the last 168 hours.    Imaging:   CXR 12/19:    Chest images personally reviewed.   Assessment/Plan   Acute respiratory failure - due to pulmonary edema and influenza  - continued hypercapnea and no clear prior lung disase. ? Bronchospasm.   Son does report h/o OHS.   - BD protocol.   - steroid burst end tomorrow.   - outpt PFT's.   - Bipap with all sleep.   Influenza A   - Tamiflu  - bronchodilator  protocol  - PCT elevated and on abx rocephin/azithro 12/22/2023  Acute decompensated HFrEF  - per cardiology  ESRD on HD  - per nephrology  Ppx  - subcutaneous heparin  Dispo - full code  - PCU  Critical Care Time greater than: 35 minutes    My best regards,         Donovan Montana MD  [facility] Pulmonary, Critical Care and Sleep Medicine  Called and discussed with annika Vines via phone and updated in detial.

## 2023-12-23 NOTE — PROGRESS NOTES
[facility]    Cardiology Progress Note    Terrence Vines Patient Status:  Inpatient    1950 MRN [id number]   Location [facility] 2W/SW Attending Bradley Dash MD   Hosp Day # 5 PCP Victor Manuel Cleaning MD       Impression/Plan:  73 year old male presenting with:     1) Acute hypoxemic respiratory failure, multifactorial  2) Influenza A  3) HFrEF (EF 45-50% 2023)  4) ESRD on HD  5) CAD s/p CABG 2021  6) CVA  7) HTN, borderline control  8) HL. On statin  9) DM     - Influenza A management as per primary, on tamiflu  - HD as per nephrology (refused 2023 with subsequent worsening respiratory status requiring bipap)  - Cont asa, statin, bb, hydralazine; titrate as needed for BP control.  Increase BB today. No ACEi/ARB/ARNI due to renal issues  - Bipap as per pulm/critical care  - Monitor on tele  - Will follow    Patient Dr Bautista           Subjective:     Confused        Patient Active Problem List   Diagnosis    Moderate nonproliferative diabetic retinopathy without macular edema associated with type 2 diabetes mellitus (HCC)    Benign prostatic hyperplasia    Type 2 diabetes mellitus with diabetic polyneuropathy, without long-term current use of insulin (HCC)    Vitamin D deficiency    Type 2 diabetes mellitus with microalbuminuria  (HCC)    Type 2 diabetes mellitus with nephropathy (HCC)    Combined hyperlipidemia associated with type 2 diabetes mellitus  (HCC)    Hypertension associated with type 2 diabetes mellitus  (HCC)    Lower extremity edema    Iron deficiency anemia    CKD (chronic kidney disease) stage 4, GFR 15-29 ml/min (HCC)    Acute pulmonary edema (HCC)    Acute on chronic congestive heart failure, unspecified heart failure type (HCC)    Acute respiratory failure with hypoxia (HCC)    Coronary artery disease involving native coronary artery of native heart    Cerebrovascular accident (CVA) due to bilateral embolism of middle cerebral arteries (HCC)     Preoperative testing    S/P CABG (coronary artery bypass graft)    Acute renal failure superimposed on chronic kidney disease  (HCC)    Acute renal failure superimposed on chronic kidney disease, unspecified CKD stage, unspecified acute renal failure type    Stage 5 chronic kidney disease not on chronic dialysis (HCC)    Hypernatremia    Acute kidney injury (HCC)    Anemia    ESRD on hemodialysis (HCC)    Facial swelling    Rhinovirus infection    Thyroid nodule    Pulmonary nodules    Elevated BUN    Subacute cough    Dialysis AV fistula malfunction (HCC)    Type 2 diabetes mellitus with hyperglycemia, with long-term current use of insulin (HCC)    Anemia, unspecified type    Thrombocytopenia (HCC)    ESRD on dialysis ([facility])       Objective:   Temp: 97.9 °F (36.6 °C)  Pulse: 88  Resp: 19  BP: 142/75  FiO2 (%): 30 %    Intake/Output:     Intake/Output Summary (Last 24 hours) at 12/23/2023 0621  Last data filed at 12/23/2023 0600  Gross per 24 hour   Intake 890.6 ml   Output 2010 ml   Net -1119.4 ml       Last 3 Weights   12/18/23 1258 242 lb (109.8 kg)   11/27/23 1951 252 lb (114.3 kg)   11/13/23 0700 245 lb 4.8 oz (111.3 kg)   11/12/23 0519 244 lb 8 oz (110.9 kg)   11/11/23 1300 246 lb 14.4 oz (112 kg)   11/11/23 0539 238 lb 1.6 oz (108 kg)   11/08/23 1410 234 lb (106.1 kg)   11/07/23 0619 234 lb 1.6 oz (106.2 kg)   11/06/23 1744 239 lb (108.4 kg)       Tele: SR    Physical Exam:    General: confused  HEENT: Normocephalic, anicteric sclera  Neck: supple  Cardiac: Regular rate and rhythm. S1, S2 normal. No murmur,   Lungs: coarse breath sounds bilat  Abdomen: Soft, non-distended  Extremities: no edema  Neurologic: confused  Skin: Warm and dry.     Laboratory/Data:    Labs:         Recent Labs   Lab 12/19/23  0743 12/20/23  0400 12/21/23  0341 12/22/23  0421 12/23/23  0408   WBC 7.4 8.9 8.7 8.9 5.3   HGB 9.3* 10.2* 9.7* 10.2* 10.3*   MCV 99.0 98.2 98.7 97.4 93.9   .0* 128.0* 124.0* 135.0* 155.0       Recent  Labs   Lab 12/19/23  0743 12/20/23  0400 12/21/23  0341 12/22/23  0421 12/23/23  0408    139 135* 136 136   K 4.5 4.8 5.1 5.4* 5.0    99 102 101 96*   CO2 28.0 25.0 27.0 24.0 23.0   BUN 41* 33* 26* 46* 48*   CREATSERUM 6.54* 5.69* 5.42* 8.05* 6.28*   CA 8.1* 8.4* 8.4* 8.4* 8.7   MG 2.2 2.0 2.1 2.5 2.6   PHOS 7.1* 6.0* 6.4* 10.2* 8.7*   * 113* 103* 124* 280*       No results for input(s): \"ALT\", \"AST\", \"ALB\", \"AMYLASE\", \"LIPASE\", \"LDH\" in the last 168 hours.    Invalid input(s): \"ALPHOS\", \"TBIL\", \"DBIL\", \"TPROT\"    No results for input(s): \"TROP\" in the last 168 hours.    ECHO 11/23:  1. Left ventricle: The cavity size was normal. Wall thickness was normal.      Systolic function was mildly reduced. The estimated ejection fraction was      45-50%, by visual assessment. Although no diagnostic regional wall motion      abnormality was identified, this possibility cannot be completely      excluded on the basis of this study.   2. Mitral valve: There was mild regurgitation.   3. Pulmonary arteries: Systolic pressure was within the normal range,      estimated to be 30mm Hg.   4. Pericardium, extracardiac: There was no pericardial effusion.           Allergies:   No Known Allergies    Medications:

## 2023-12-24 LAB
ALBUMIN SERPL-MCNC: 3.9 G/DL (ref 3.2–4.8)
ANION GAP SERPL CALC-SCNC: 14 MMOL/L (ref 0–18)
BASE EXCESS BLD CALC-SCNC: 2.3 MMOL/L (ref ?–2)
BUN BLD-MCNC: 74 MG/DL (ref 9–23)
BUN/CREAT SERPL: 8.7 (ref 10–20)
CALCIUM BLD-MCNC: 8.5 MG/DL (ref 8.7–10.4)
CHLORIDE SERPL-SCNC: 94 MMOL/L (ref 98–112)
CO2 SERPL-SCNC: 25 MMOL/L (ref 21–32)
CREAT BLD-MCNC: 8.46 MG/DL
DEPRECATED RDW RBC AUTO: 49.2 FL (ref 35.1–46.3)
EGFRCR SERPLBLD CKD-EPI 2021: 6 ML/MIN/1.73M2 (ref 60–?)
ERYTHROCYTE [DISTWIDTH] IN BLOOD BY AUTOMATED COUNT: 14.6 % (ref 11–15)
GLUCOSE BLD-MCNC: 361 MG/DL (ref 70–99)
GLUCOSE BLDC GLUCOMTR-MCNC: 311 MG/DL (ref 70–99)
GLUCOSE BLDC GLUCOMTR-MCNC: 313 MG/DL (ref 70–99)
GLUCOSE BLDC GLUCOMTR-MCNC: 334 MG/DL (ref 70–99)
GLUCOSE BLDC GLUCOMTR-MCNC: 345 MG/DL (ref 70–99)
GLUCOSE BLDC GLUCOMTR-MCNC: 390 MG/DL (ref 70–99)
HCO3 BLDA-SCNC: 26.6 MEQ/L (ref 21–27)
HCT VFR BLD AUTO: 32.8 %
HGB BLD-MCNC: 10.7 G/DL
MCH RBC QN AUTO: 30.1 PG (ref 26–34)
MCHC RBC AUTO-ENTMCNC: 32.6 G/DL (ref 31–37)
MCV RBC AUTO: 92.1 FL
MODIFIED ALLEN TEST: POSITIVE
O2 CT BLD-SCNC: 15 VOL% (ref 15–23)
OSMOLALITY SERPL CALC.SUM OF ELEC: 312 MOSM/KG (ref 275–295)
OXYGEN LITERS/MINUTE: 3 L/MIN
PCO2 BLDA: 49 MM HG (ref 35–45)
PH BLDA: 7.37 [PH] (ref 7.35–7.45)
PHOSPHATE SERPL-MCNC: 8.9 MG/DL (ref 2.4–5.1)
PLATELET # BLD AUTO: 193 10(3)UL (ref 150–450)
PO2 BLDA: 79 MM HG (ref 80–100)
POTASSIUM SERPL-SCNC: 4.6 MMOL/L (ref 3.5–5.1)
PUNCTURE CHARGE: YES
RBC # BLD AUTO: 3.56 X10(6)UL
SAO2 % BLDA: 94.6 % (ref 94–100)
SODIUM SERPL-SCNC: 133 MMOL/L (ref 136–145)
WBC # BLD AUTO: 9.8 X10(3) UL (ref 4–11)

## 2023-12-24 NOTE — PLAN OF CARE
Patient seemed very lethargic tonight but had some moments of lucidity where he was able to respond to simple questions and commands. I've noticed he also talks in his sleep/snores and wakes himself up before going back to sleep, discussed with the provider and could potentially be some sleep apnea. Information relayed to day nurse  Problem: Patient Centered Care  Goal: Patient preferences are identified and integrated in the patient's plan of care  Description: Interventions:  - What would you like us to know as we care for you? I live at home with family. I'm from [place]  - Provide timely, complete, and accurate information to patient/family  - Incorporate patient and family knowledge, values, beliefs, and cultural backgrounds into the planning and delivery of care  - Encourage patient/family to participate in care and decision-making at the level they choose  - Honor patient and family perspectives and choices  Outcome: Progressing     Problem: Diabetes/Glucose Control  Goal: Glucose maintained within prescribed range  Description: INTERVENTIONS:  - Monitor Blood Glucose as ordered  - Assess for signs and symptoms of hyperglycemia and hypoglycemia  - Administer ordered medications to maintain glucose within target range  - Assess barriers to adequate nutritional intake and initiate nutrition consult as needed  - Instruct patient on self management of diabetes  Outcome: Progressing     Problem: Patient/Family Goals  Goal: Patient/Family Long Term Goal  Description: Patient's Long Term Goal: go home    Interventions:  - Monitor vital signs  - Monitor appropriate labs  - Monitor blood glucose levels  - Administer medications per order  - Pain management as needed  - Follow MD orders  - Diagnostics per orders  - Dialysis per orders  - Update / inform patient and family on plan of care  - Discharge planning     - See additional Care Plan goals for specific interventions  Outcome: Progressing  Goal: Patient/Family  Short Term Goal  Description: Patient's Short Term Goal: To breathe better    Interventions:   - RT treatment  -O2  -Follow md orders  - See additional Care Plan goals for specific interventions  Outcome: Progressing     Problem: CARDIOVASCULAR - ADULT  Goal: Maintains optimal cardiac output and hemodynamic stability  Description: INTERVENTIONS:  - Monitor vital signs, rhythm, and trends  - Monitor for bleeding, hypotension and signs of decreased cardiac output  - Evaluate effectiveness of vasoactive medications to optimize hemodynamic stability  - Monitor arterial and/or venous puncture sites for bleeding and/or hematoma  - Assess quality of pulses, skin color and temperature  - Assess for signs of decreased coronary artery perfusion - ex. Angina  - Evaluate fluid balance, assess for edema, trend weights  Outcome: Progressing  Goal: Absence of cardiac arrhythmias or at baseline  Description: INTERVENTIONS:  - Continuous cardiac monitoring, monitor vital signs, obtain 12 lead EKG if indicated  - Evaluate effectiveness of antiarrhythmic and heart rate control medications as ordered  - Initiate emergency measures for life threatening arrhythmias  - Monitor electrolytes and administer replacement therapy as ordered  Outcome: Progressing     Problem: RESPIRATORY - ADULT  Goal: Achieves optimal ventilation and oxygenation  Description: INTERVENTIONS:  - Assess for changes in respiratory status  - Assess for changes in mentation and behavior  - Position to facilitate oxygenation and minimize respiratory effort  - Oxygen supplementation based on oxygen saturation or ABGs  - Provide Smoking Cessation handout, if applicable  - Encourage broncho-pulmonary hygiene including cough, deep breathe, Incentive Spirometry  - Assess the need for suctioning and perform as needed  - Assess and instruct to report SOB or any respiratory difficulty  - Respiratory Therapy support as indicated  - Manage/alleviate anxiety  - Monitor for  signs/symptoms of CO2 retention  Outcome: Progressing     Problem: METABOLIC/FLUID AND ELECTROLYTES - ADULT  Goal: Glucose maintained within prescribed range  Description: INTERVENTIONS:  - Monitor Blood Glucose as ordered  - Assess for signs and symptoms of hyperglycemia and hypoglycemia  - Administer ordered medications to maintain glucose within target range  - Assess barriers to adequate nutritional intake and initiate nutrition consult as needed  - Instruct patient on self management of diabetes  Outcome: Progressing  Goal: Electrolytes maintained within normal limits  Description: INTERVENTIONS:  - Monitor labs and rhythm and assess patient for signs and symptoms of electrolyte imbalances  - Administer electrolyte replacement as ordered  - Monitor response to electrolyte replacements, including rhythm and repeat lab results as appropriate  - Fluid restriction as ordered  - Instruct patient on fluid and nutrition restrictions as appropriate  Outcome: Progressing  Goal: Hemodynamic stability and optimal renal function maintained  Description: INTERVENTIONS:  - Monitor labs and assess for signs and symptoms of volume excess or deficit  - Monitor intake, output and patient weight  - Monitor urine specific gravity, serum osmolarity and serum sodium as indicated or ordered  - Monitor response to interventions for patient's volume status, including labs, urine output, blood pressure (other measures as available)  - Encourage oral intake as appropriate  - Instruct patient on fluid and nutrition restrictions as appropriate  Outcome: Progressing     Problem: Safety Risk - Non-Violent Restraints  Goal: Patient will remain free from self-harm  Description: INTERVENTIONS:  - Apply the least restrictive restraint to prevent harm  - Notify patient and family of reasons restraints applied  - Assess for any contributing factors to confusion (electrolyte disturbances, delirium, medications)  - Discontinue any unnecessary  medical devices as soon as possible  - Assess the patient's physical comfort, circulation, skin condition, hydration, nutrition and elimination needs   - Reorient and redirection as needed  - Assess for the need to continue restraints  Outcome: Progressing     Problem: Delirium  Goal: Minimize duration of delirium  Description: Interventions:  - Encourage use of hearing aids, eye glasses  - Promote highest level of mobility daily  - Provide frequent reorientation  - Promote wakefulness i.e. lights on, blinds open  - Promote sleep, encourage patient's normal rest cycle i.e. lights off, TV off, minimize noise and interruptions  - Encourage family to assist in orientation and promotion of home routines  Outcome: Progressing

## 2023-12-24 NOTE — PROGRESS NOTES
NEPHROLOGY DAILY PROGRESS NOTE     SUBJECTIVE:    Confused   In restraints   Pulled NG many times     OBJECTIVE:    Total Intake/Output:    Intake/Output Summary (Last 24 hours) at 12/24/2023 1313  Last data filed at 12/23/2023 2200  Gross per 24 hour   Intake 465.8 ml   Output --   Net 465.8 ml         PHYSICAL EXAM:  /69 (BP Location: Right arm)   Pulse 70   Temp 96.9 °F (36.1 °C) (Temporal)   Resp 17   Wt 242 lb (109.8 kg)   SpO2 99%   BMI 32.82 kg/m²   GEN: NAD, on BiPAP  HEENT: NCAT    CHEST: coarse breath sounds    CARDIAC: S1S2 normal  ABD: soft, NT/ND  EXT:  no lower ext edema  NEURO: sleepy/ lethargic   SKIN: warm, dry   DIALYSIS ACCESS: LUE AVF cannulated for dialysis        CURRENT MEDICATIONS:   aspirin  81 mg Per NG Tube Daily    lansoprazole  30 mg Per NG Tube QAM AC    [Held by provider] finasteride  5 mg Per NG Tube Daily    metoprolol tartrate  100 mg Per NG Tube 2x Daily(Beta Blocker)    insulin detemir  25 Units Subcutaneous Daily    cefTRIAXone  2 g Intravenous Q24H    methylPREDNISolone  80 mg Intravenous Q8H    insulin regular human  1-11 Units Subcutaneous 4 times per day    heparin  5,000 Units Subcutaneous Q8H YOLANDA    sevelamer carbonate  800 mg Oral TID CC    tamsulosin  0.8 mg Oral Daily    hydrALAZINE  50 mg Oral TID    gabapentin  100 mg Oral BID    [Held by provider] rosuvastatin  10 mg Oral Nightly             LABS:  Patient Labs Reviewed in Detail. Pertinent Labs as follows:  Recent Labs   Lab 12/22/23  0421 12/23/23  0408 12/24/23  0709   * 280* 361*   BUN 46* 48* 74*   CREATSERUM 8.05* 6.28* 8.46*   EGFRCR 6* 9* 6*   CA 8.4* 8.7 8.5*    136 133*   K 5.4* 5.0 4.6    96* 94*   CO2 24.0 23.0 25.0     Recent Labs   Lab 12/21/23  0341 12/22/23  0421 12/23/23  0408 12/24/23  0709   RBC 3.13* 3.46* 3.44* 3.56*   HGB 9.7* 10.2* 10.3* 10.7*   HCT 30.9* 33.7* 32.3* 32.8*   MCV 98.7 97.4 93.9 92.1   MCH 31.0 29.5 29.9 30.1   MCHC 31.4 30.3* 31.9 32.6   RDW 16.0*  15.9* 15.3* 14.6   NEPRELIM 6.47 6.33 4.78  --    WBC 8.7 8.9 5.3 9.8   .0* 135.0* 155.0 193.0           IMAGING:  XR CHEST AP PORTABLE  (CPT=71045)    Result Date: 12/23/2023  CONCLUSION:  1. Enteric tube tip projects over the distal gastric body/antrum. 2. Cardiomegaly with central pulmonary vascular congestion.  No focal airspace disease or significant pleural effusion.   A preliminary report was issued by the [facility] teleradiology service. There are no major discrepancies.  elm-remote  Dictated by (CST): Zuhair Powers MD on 12/23/2023 at 9:54 AM     Finalized by (CST): Zuhair Powers MD on 12/23/2023 at 9:56 AM          XR CHEST AP PORTABLE  (CPT=71045)    Result Date: 12/22/2023  CONCLUSION:   Post placement of a feeding tube with tip within the stomach and below the diaphragm.  Mild interstitial edema, unchanged.   Dictated by (CST): Kareem Padilla MD on 12/22/2023 at 4:47 PM     Finalized by (CST): Kareem Padilla MD on 12/22/2023 at 4:48 PM                ASSESSMENT AND PLAN:   This is a 73 year old male with PMH sig for ESRD on HD MWF, HTN, HLD, DM2, CAD s/p CABG x3, ischemic cardiomyopathy. Presents with SOB and cough. Found to be influenza positive. Nephrology is consulted for dialysis.      ESRD on HD MWF   - No acute indication for dialysis today  - Next dialysis 12/25 per usual schedule      - followed by [facility]   - limit in's      Acute hypoxic respiratory failure  - CXR with pulmonary vascular changes, also influenza A postivie    - fluid removal with dialysis as tolerated   - pulm following      Hyperkalemia   - 2K bath with dialysis   - follow K level      Hyperphosphatemia   - Nepro feeds  - No oral access to give phos binders     Hypertension   - Hydralazine, metoprolol       Anemia   - trend Hb, Hb at goal    - receiving OMAIRA with outpatient dialysis        DO Ara Mcculloughy - Nephrology

## 2023-12-24 NOTE — PROGRESS NOTES
[facility] Hospitalist Progress Note     CC: Hospital Follow up    PCP: Victor Manuel Cleaning MD       Assessment/Plan:     Mr. Vines is a 73 year old male with PMH BPH , CHF / ICM EF 30% , CAD s/p CABG x3, Moderate MR, HTN, HLD, ESRD on HD M/W/F, Anemia, Gastritis, who presents with SOB. Admitted for fluid overload and Influenza A.  Hypoxic and hypercapnic respiratory failure likely due to influenza.  Requiring BiPAP after fluid status has improved. Tamiflu continued and will start ceftriaxone and steroids today.     Acute Hypoxic Respiratory Failure  Pulmonary edema  ESRD on HD M/W/F  - positive influenza   - HD per renal, next session 12/25  - s/p recent AV fistula repair by vascular surgery in September 2023  - bipap with all sleep  - check ABG today   - steroid burst per pulmonary, ending today     Influenza A positive   - cough for over a month but SOB more acute  - no fevers or Leukocytosis  - renally dose Tamiflu   - Ceftriaxone and steroids started 12/22/23  - monitor for worsening hypoxia     Troponin Elevation   - no chest pain   - EKG with concern for LBBB  - cardiology on consult     CAD s/p CABG x3   Ischemic cardiomyopathy EF 30% without acute exacerbation of congestive heart failure  Chronic Systolic heart failure  HTN  - Continue home metoprolol, hydralazine  - Not on ACE or ARB due to renal disease  - Does not use diuretics     Anemia   Thrombocytopenia   Gastritis   - s/p EGD 11/7 with some mild gastritis, no active bleeding  - s/p colonoscopy 11/8 with polyps but no active bleeding, will need to repeat colonoscopy as outpatient  - PPI BID was not taking   - on prevacid here      Type 2 diabetes with hyperglycemia  Diabetic nephropathy  - gabapentin  - ISS, meal time and basal insulin   - Controlled renal diet  - Continue home aspirin     Hyperlipidemia  -can hold his statin      BPH  -can hold his finasteride for now  -on flomax      FN:  Diet: on tube feeds. Continue to assess oral, speech following       DVT Prophy: HSQ      Dispo: PCU     [facility] Hospitalist      Subjective:     Awake when seen. Mild confusion. Keeps eyes closed majority of my discussion but was able to tell me his sons name, how he did last night, and states that he's not in pain. He states breathing still not the best. He knows he is at [facility]. States he \"feels ok\".     OBJECTIVE:    Blood pressure 143/78, pulse 66, temperature 96.7 °F (35.9 °C), temperature source Temporal, resp. rate 17, weight 242 lb (109.8 kg), SpO2 100%.    Temp:  [96.7 °F (35.9 °C)-96.9 °F (36.1 °C)] 96.7 °F (35.9 °C)  Pulse:  [62-84] 66  Resp:  [14-25] 17  BP: (108-158)/() 143/78  SpO2:  [90 %-100 %] 100 %      Intake/Output:    Intake/Output Summary (Last 24 hours) at 12/24/2023 0952  Last data filed at 12/23/2023 2200  Gross per 24 hour   Intake 465.8 ml   Output --   Net 465.8 ml         Last 3 Weights   12/18/23 1258 242 lb (109.8 kg)   11/27/23 1951 252 lb (114.3 kg)   11/13/23 0700 245 lb 4.8 oz (111.3 kg)   11/12/23 0519 244 lb 8 oz (110.9 kg)   11/11/23 1300 246 lb 14.4 oz (112 kg)   11/11/23 0539 238 lb 1.6 oz (108 kg)   11/08/23 1410 234 lb (106.1 kg)   11/07/23 0619 234 lb 1.6 oz (106.2 kg)   11/06/23 1744 239 lb (108.4 kg)       Exam   General: awake  Lungs: coarse   Heart: Regular rate and rhythm  Abdomen: soft, non tender  Extremities: No edema  Skin: no new rash, normal color  Psych: appropriate affect     Data Review:       Labs:     Recent Labs   Lab 12/21/23  0341 12/22/23  0421 12/23/23  0408 12/24/23  0709   RBC 3.13* 3.46* 3.44* 3.56*   HGB 9.7* 10.2* 10.3* 10.7*   HCT 30.9* 33.7* 32.3* 32.8*   MCV 98.7 97.4 93.9 92.1   MCH 31.0 29.5 29.9 30.1   MCHC 31.4 30.3* 31.9 32.6   RDW 16.0* 15.9* 15.3* 14.6   NEPRELIM 6.47 6.33 4.78  --    WBC 8.7 8.9 5.3 9.8   .0* 135.0* 155.0 193.0         Recent Labs   Lab 12/22/23  0421 12/23/23  0408 12/24/23  0709   * 280* 361*   BUN 46* 48* 74*   CREATSERUM  8.05* 6.28* 8.46*   EGFRCR 6* 9* 6*   CA 8.4* 8.7 8.5*    136 133*   K 5.4* 5.0 4.6    96* 94*   CO2 24.0 23.0 25.0       Recent Labs   Lab 12/24/23  0709   ALB 3.9         Imaging:  XR CHEST AP PORTABLE  (CPT=71045)    Result Date: 12/23/2023  CONCLUSION:  1. Enteric tube tip projects over the distal gastric body/antrum. 2. Cardiomegaly with central pulmonary vascular congestion.  No focal airspace disease or significant pleural effusion.   A preliminary report was issued by the [facility] teleradiology service. There are no major discrepancies.  elm-remote  Dictated by (CST): Zuhair Powers MD on 12/23/2023 at 9:54 AM     Finalized by (CST): Zuhair Powers MD on 12/23/2023 at 9:56 AM          XR CHEST AP PORTABLE  (CPT=71045)    Result Date: 12/22/2023  CONCLUSION:   Post placement of a feeding tube with tip within the stomach and below the diaphragm.  Mild interstitial edema, unchanged.   Dictated by (CST): Kareem Padilla MD on 12/22/2023 at 4:47 PM     Finalized by (CST): Kareem Padilla MD on 12/22/2023 at 4:48 PM             Meds:      aspirin  81 mg Per NG Tube Daily    lansoprazole  30 mg Per NG Tube QAM AC    azithromycin  500 mg Per NG Tube Q24H    [Held by provider] finasteride  5 mg Per NG Tube Daily    metoprolol tartrate  100 mg Per NG Tube 2x Daily(Beta Blocker)    insulin detemir  25 Units Subcutaneous Daily    cefTRIAXone  2 g Intravenous Q24H    methylPREDNISolone  80 mg Intravenous Q8H    insulin regular human  1-11 Units Subcutaneous 4 times per day    heparin  5,000 Units Subcutaneous Q8H YOLANDA    sevelamer carbonate  800 mg Oral TID CC    tamsulosin  0.8 mg Oral Daily    hydrALAZINE  50 mg Oral TID    gabapentin  100 mg Oral BID    [Held by provider] rosuvastatin  10 mg Oral Nightly      dextrose 10%       dextrose 10%, lipase-protease-amylase (Lip-Prot-Amyl) **AND** sodium bicarbonate, traMADol, ipratropium-albuterol, glucose **OR** glucose **OR** glucose-vitamin C **OR** dextrose  **OR** glucose **OR** glucose **OR** glucose-vitamin C, acetaminophen, polyethylene glycol (PEG 3350), sennosides, bisacodyl, fleet enema, ondansetron, metoclopramide, albuterol

## 2023-12-24 NOTE — PLAN OF CARE
Problem: Patient Centered Care  Goal: Patient preferences are identified and integrated in the patient's plan of care  Description: Interventions:  - What would you like us to know as we care for you? I live at home with family. I'm from [place]  - Provide timely, complete, and accurate information to patient/family  - Incorporate patient and family knowledge, values, beliefs, and cultural backgrounds into the planning and delivery of care  - Encourage patient/family to participate in care and decision-making at the level they choose  - Honor patient and family perspectives and choices  Outcome: Progressing     Problem: Diabetes/Glucose Control  Goal: Glucose maintained within prescribed range  Description: INTERVENTIONS:  - Monitor Blood Glucose as ordered  - Assess for signs and symptoms of hyperglycemia and hypoglycemia  - Administer ordered medications to maintain glucose within target range  - Assess barriers to adequate nutritional intake and initiate nutrition consult as needed  - Instruct patient on self management of diabetes  Outcome: Progressing     Problem: CARDIOVASCULAR - ADULT  Goal: Maintains optimal cardiac output and hemodynamic stability  Description: INTERVENTIONS:  - Monitor vital signs, rhythm, and trends  - Monitor for bleeding, hypotension and signs of decreased cardiac output  - Evaluate effectiveness of vasoactive medications to optimize hemodynamic stability  - Monitor arterial and/or venous puncture sites for bleeding and/or hematoma  - Assess quality of pulses, skin color and temperature  - Assess for signs of decreased coronary artery perfusion - ex. Angina  - Evaluate fluid balance, assess for edema, trend weights  Outcome: Progressing  Goal: Absence of cardiac arrhythmias or at baseline  Description: INTERVENTIONS:  - Continuous cardiac monitoring, monitor vital signs, obtain 12 lead EKG if indicated  - Evaluate effectiveness of antiarrhythmic and heart rate control medications as  ordered  - Initiate emergency measures for life threatening arrhythmias  - Monitor electrolytes and administer replacement therapy as ordered  Outcome: Progressing     Problem: METABOLIC/FLUID AND ELECTROLYTES - ADULT  Goal: Glucose maintained within prescribed range  Description: INTERVENTIONS:  - Monitor Blood Glucose as ordered  - Assess for signs and symptoms of hyperglycemia and hypoglycemia  - Administer ordered medications to maintain glucose within target range  - Assess barriers to adequate nutritional intake and initiate nutrition consult as needed  - Instruct patient on self management of diabetes  Outcome: Progressing  Goal: Electrolytes maintained within normal limits  Description: INTERVENTIONS:  - Monitor labs and rhythm and assess patient for signs and symptoms of electrolyte imbalances  - Administer electrolyte replacement as ordered  - Monitor response to electrolyte replacements, including rhythm and repeat lab results as appropriate  - Fluid restriction as ordered  - Instruct patient on fluid and nutrition restrictions as appropriate  Outcome: Progressing  Goal: Hemodynamic stability and optimal renal function maintained  Description: INTERVENTIONS:  - Monitor labs and assess for signs and symptoms of volume excess or deficit  - Monitor intake, output and patient weight  - Monitor urine specific gravity, serum osmolarity and serum sodium as indicated or ordered  - Monitor response to interventions for patient's volume status, including labs, urine output, blood pressure (other measures as available)  - Encourage oral intake as appropriate  - Instruct patient on fluid and nutrition restrictions as appropriate  Outcome: Progressing     Problem: Safety Risk - Non-Violent Restraints  Goal: Patient will remain free from self-harm  Description: INTERVENTIONS:  - Apply the least restrictive restraint to prevent harm  - Notify patient and family of reasons restraints applied  - Assess for any  contributing factors to confusion (electrolyte disturbances, delirium, medications)  - Discontinue any unnecessary medical devices as soon as possible  - Assess the patient's physical comfort, circulation, skin condition, hydration, nutrition and elimination needs   - Reorient and redirection as needed  - Assess for the need to continue restraints  Outcome: Not Progressing     Problem: Delirium  Goal: Minimize duration of delirium  Description: Interventions:  - Encourage use of hearing aids, eye glasses  - Promote highest level of mobility daily  - Provide frequent reorientation  - Promote wakefulness i.e. lights on, blinds open  - Promote sleep, encourage patient's normal rest cycle i.e. lights off, TV off, minimize noise and interruptions  - Encourage family to assist in orientation and promotion of home routines  Outcome: Not Progressing

## 2023-12-24 NOTE — PROGRESS NOTES
[facility] Pul[facility], [facility]    Terrenceharshad Vines Patient Status:  Inpatient    1950 MRN [id number]   Location [facility] 2W/SW Attending Bradley Dash MD   Hosp Day # 6 PCP Victor Manuel Cleaning MD     Date of Admission: 2023  8:35 AM    Admission Diagnosis: ESRD on dialysis (HCC) [N18.6, Z99.2]  Acute respiratory failure with hypoxia (HCC) [J96.01]    S: On bipap overnight. More alert this am.     Scheduled Medications:     aspirin  81 mg Per NG Tube Daily    lansoprazole  30 mg Per NG Tube QAM AC    azithromycin  500 mg Per NG Tube Q24H    [Held by provider] finasteride  5 mg Per NG Tube Daily    metoprolol tartrate  100 mg Per NG Tube 2x Daily(Beta Blocker)    insulin detemir  25 Units Subcutaneous Daily    cefTRIAXone  2 g Intravenous Q24H    methylPREDNISolone  80 mg Intravenous Q8H    insulin regular human  1-11 Units Subcutaneous 4 times per day    heparin  5,000 Units Subcutaneous Q8H YOLANDA    sevelamer carbonate  800 mg Oral TID CC    tamsulosin  0.8 mg Oral Daily    hydrALAZINE  50 mg Oral TID    gabapentin  100 mg Oral BID    [Held by provider] rosuvastatin  10 mg Oral Nightly       Infusing Medications:     dextrose 10%         PRN Medications:  dextrose 10%, lipase-protease-amylase (Lip-Prot-Amyl) **AND** sodium bicarbonate, traMADol, ipratropium-albuterol, glucose **OR** glucose **OR** glucose-vitamin C **OR** dextrose **OR** glucose **OR** glucose **OR** glucose-vitamin C, acetaminophen, polyethylene glycol (PEG 3350), sennosides, bisacodyl, fleet enema, ondansetron, metoclopramide, albuterol    OBJECTIVE:  /87   Pulse 63   Temp 96.9 °F (36.1 °C) (Temporal)   Resp 14   Wt 242 lb (109.8 kg)   SpO2 100%   BMI 32.82 kg/m²    Temp (24hrs), Av.8 °F (36 °C), Min:96.7 °F (35.9 °C), Max:96.9 °F (36.1 °C)             Wt Readings from Last 3 Encounters:   23 242 lb (109.8 kg)   23 252 lb (114.3 kg)   23 245 lb 4.8 oz (111.3 kg)       I/O last 3  completed shifts:  In: 687.8 [I.V.:115.8; NG/GT:572]  Out: 10 [Emesis/NG output:10]  No intake/output data recorded.     O2: 5 LNC this am, bipap 12/5 30% overnight.  General: NAD.   Neuro: Alert, no focal deficits.    HEENT: PERRL  Neck : No LAD  CV: RRR, nl S1, S2, no S4, S3 or murmur.   Lungs: Coarse bialterally.   Abd: Nontender, non distended.   Ext: No edema.   Skin: No rashes.     Recent Labs   Lab 12/22/23 0421 12/23/23  0408 12/24/23  0709   WBC 8.9 5.3 9.8   HGB 10.2* 10.3* 10.7*   HCT 33.7* 32.3* 32.8*   .0* 155.0 193.0     Recent Labs   Lab 12/22/23 0421 12/23/23 0408 12/24/23  0709   * 280* 361*   BUN 46* 48* 74*   CREATSERUM 8.05* 6.28* 8.46*   CA 8.4* 8.7 8.5*    136 133*   K 5.4* 5.0 4.6    96* 94*   CO2 24.0 23.0 25.0   ALB  --   --  3.9     No results for input(s): \"INR\", \"PTT\" in the last 168 hours.  Recent Labs   Lab 12/22/23  0938 12/22/23  1205   ABGPHT 7.40 7.39   OITACP7B 54* 52*   OTUDX2X 94 83   ABGHCO3 30.6* 29.2*   SITE Right Radial Right Radial   THGB 10.6*  --        COVID-19 Lab Results    COVID-19  Lab Results   Component Value Date    COVID19 Not Detected 12/18/2023    COVID19 Not Detected 11/27/2023    COVID19 Not Detected 02/28/2023       Pro-Calcitonin  Recent Labs   Lab 12/22/23 0421   PCT 0.58*       Cardiac  No results for input(s): \"TROP\", \"PBNP\" in the last 168 hours.    Creatinine Kinase  No results for input(s): \"CK\" in the last 168 hours.    Inflammatory Markers  No results for input(s): \"CRP\", \"KATY\", \"LDH\", \"DDIMER\" in the last 168 hours.    Imaging:   CXR 12/19:    Chest images personally reviewed.   Assessment/Plan   Acute respiratory failure - due to pulmonary edema and influenza  - continued hypercapnea and no clear prior lung disase. ? Bronchospasm.   Son does report h/o OHS.   - BD protocol.   - steroid burst endtoday.   - outpt PFT's.   - Bipap with all sleep. Could change to AVAPS to aid with complaince  Influenza A   - Tamiflu  -  bronchodilator protocol  - PCT elevated and on abx rocephin/azithro 12/22/2023  Acute decompensated HFrEF  - per cardiology  ESRD on HD  - per nephrology  Ppx  - subcutaneous heparin  Dispo - full code  PT eval   - OK for floor transfer.     My best regards,         Donovan Montana MD  [facility] Pulmonary, Critical Care and Sleep Medicine  Called and discussed with son Luis Vines via phone and updated in detial.

## 2023-12-24 NOTE — PROGRESS NOTES
[facility]    Cardiology Progress Note    Terrence Vines Patient Status:  Inpatient    1950 MRN [id number]   Location [facility] 2W/SW Attending Bradley Dash MD   Hosp Day # 6 PCP Victor Manuel Cleaning MD       Impression/Plan:  73 year old male presenting with:     1) Acute hypoxemic respiratory failure, multifactorial  2) Influenza A  3) HFrEF (EF 45-50% 2023)  4) ESRD on HD  5) CAD s/p CABG 2021  6) CVA  7) HTN, mostly controlled  8) HL. On statin  9) DM     - Influenza A management as per primary  - HD as per nephrology   - Cont asa, statin, bb, hydralazine; titrate as needed for BP control.  Increase BB today. No ACEi/ARB/ARNI due to renal issues  - Bipap as per pulm/critical care  - Monitor on tele  - Will follow    Patient Dr Bautista           Subjective:     Confused, in soft restraints.        Patient Active Problem List   Diagnosis    Moderate nonproliferative diabetic retinopathy without macular edema associated with type 2 diabetes mellitus (HCC)    Benign prostatic hyperplasia    Type 2 diabetes mellitus with diabetic polyneuropathy, without long-term current use of insulin (HCC)    Vitamin D deficiency    Type 2 diabetes mellitus with microalbuminuria  (HCC)    Type 2 diabetes mellitus with nephropathy (HCC)    Combined hyperlipidemia associated with type 2 diabetes mellitus  (HCC)    Hypertension associated with type 2 diabetes mellitus  (HCC)    Lower extremity edema    Iron deficiency anemia    CKD (chronic kidney disease) stage 4, GFR 15-29 ml/min (HCC)    Acute pulmonary edema (HCC)    Acute on chronic congestive heart failure, unspecified heart failure type (HCC)    Acute respiratory failure with hypoxia (HCC)    Coronary artery disease involving native coronary artery of native heart    Cerebrovascular accident (CVA) due to bilateral embolism of middle cerebral arteries (HCC)    Preoperative testing    S/P CABG (coronary artery bypass graft)    Acute renal  failure superimposed on chronic kidney disease  (HCC)    Acute renal failure superimposed on chronic kidney disease, unspecified CKD stage, unspecified acute renal failure type    Stage 5 chronic kidney disease not on chronic dialysis (HCC)    Hypernatremia    Acute kidney injury (HCC)    Anemia    ESRD on hemodialysis (HCC)    Facial swelling    Rhinovirus infection    Thyroid nodule    Pulmonary nodules    Elevated BUN    Subacute cough    Dialysis AV fistula malfunction (HCC)    Type 2 diabetes mellitus with hyperglycemia, with long-term current use of insulin (HCC)    Anemia, unspecified type    Thrombocytopenia (HCC)    ESRD on dialysis (HCC)       Objective:   Temp: 96.7 °F (35.9 °C)  Pulse: 68  Resp: 17  BP: 140/74    Intake/Output:     Intake/Output Summary (Last 24 hours) at 12/24/2023 0552  Last data filed at 12/23/2023 2200  Gross per 24 hour   Intake 657.8 ml   Output --   Net 657.8 ml       Last 3 Weights   12/18/23 1258 242 lb (109.8 kg)   11/27/23 1951 252 lb (114.3 kg)   11/13/23 0700 245 lb 4.8 oz (111.3 kg)   11/12/23 0519 244 lb 8 oz (110.9 kg)   11/11/23 1300 246 lb 14.4 oz (112 kg)   11/11/23 0539 238 lb 1.6 oz (108 kg)   11/08/23 1410 234 lb (106.1 kg)   11/07/23 0619 234 lb 1.6 oz (106.2 kg)   11/06/23 1744 239 lb (108.4 kg)       Tele: SR    Physical Exam:    General: confused  HEENT: Normocephalic, anicteric sclera  Neck: supple  Cardiac: Regular rate and rhythm. S1, S2 normal. No murmur,   Lungs: coarse breath sounds bilat  Abdomen: Soft, non-distended  Extremities: no edema  Neurologic: confused  Skin: Warm and dry.     Laboratory/Data:    Labs:         Recent Labs   Lab 12/19/23  0743 12/20/23  0400 12/21/23  0341 12/22/23  0421 12/23/23  0408   WBC 7.4 8.9 8.7 8.9 5.3   HGB 9.3* 10.2* 9.7* 10.2* 10.3*   MCV 99.0 98.2 98.7 97.4 93.9   .0* 128.0* 124.0* 135.0* 155.0       Recent Labs   Lab 12/19/23  0743 12/20/23  0400 12/21/23  0341 12/22/23  0421 12/23/23  0408    139 135*  136 136   K 4.5 4.8 5.1 5.4* 5.0    99 102 101 96*   CO2 28.0 25.0 27.0 24.0 23.0   BUN 41* 33* 26* 46* 48*   CREATSERUM 6.54* 5.69* 5.42* 8.05* 6.28*   CA 8.1* 8.4* 8.4* 8.4* 8.7   MG 2.2 2.0 2.1 2.5 2.6   PHOS 7.1* 6.0* 6.4* 10.2* 8.7*   * 113* 103* 124* 280*       No results for input(s): \"ALT\", \"AST\", \"ALB\", \"AMYLASE\", \"LIPASE\", \"LDH\" in the last 168 hours.    Invalid input(s): \"ALPHOS\", \"TBIL\", \"DBIL\", \"TPROT\"    No results for input(s): \"TROP\" in the last 168 hours.    ECHO 11/23:  1. Left ventricle: The cavity size was normal. Wall thickness was normal.      Systolic function was mildly reduced. The estimated ejection fraction was      45-50%, by visual assessment. Although no diagnostic regional wall motion      abnormality was identified, this possibility cannot be completely      excluded on the basis of this study.   2. Mitral valve: There was mild regurgitation.   3. Pulmonary arteries: Systolic pressure was within the normal range,      estimated to be 30mm Hg.   4. Pericardium, extracardiac: There was no pericardial effusion.           Allergies:   No Known Allergies    Medications:

## 2023-12-25 LAB
ALBUMIN SERPL-MCNC: 3.9 G/DL (ref 3.2–4.8)
ANION GAP SERPL CALC-SCNC: 19 MMOL/L (ref 0–18)
ATRIAL RATE: 51 BPM
BUN BLD-MCNC: 108 MG/DL (ref 9–23)
BUN/CREAT SERPL: 10.9 (ref 10–20)
CALCIUM BLD-MCNC: 8.5 MG/DL (ref 8.7–10.4)
CHLORIDE SERPL-SCNC: 92 MMOL/L (ref 98–112)
CO2 SERPL-SCNC: 24 MMOL/L (ref 21–32)
CREAT BLD-MCNC: 9.93 MG/DL
DEPRECATED RDW RBC AUTO: 50 FL (ref 35.1–46.3)
EGFRCR SERPLBLD CKD-EPI 2021: 5 ML/MIN/1.73M2 (ref 60–?)
ERYTHROCYTE [DISTWIDTH] IN BLOOD BY AUTOMATED COUNT: 14.5 % (ref 11–15)
GLUCOSE BLD-MCNC: 236 MG/DL (ref 70–99)
GLUCOSE BLDC GLUCOMTR-MCNC: 170 MG/DL (ref 70–99)
GLUCOSE BLDC GLUCOMTR-MCNC: 201 MG/DL (ref 70–99)
GLUCOSE BLDC GLUCOMTR-MCNC: 215 MG/DL (ref 70–99)
GLUCOSE BLDC GLUCOMTR-MCNC: 294 MG/DL (ref 70–99)
HCT VFR BLD AUTO: 33.3 %
HGB BLD-MCNC: 10.6 G/DL
MCH RBC QN AUTO: 29.8 PG (ref 26–34)
MCHC RBC AUTO-ENTMCNC: 31.8 G/DL (ref 31–37)
MCV RBC AUTO: 93.5 FL
OSMOLALITY SERPL CALC.SUM OF ELEC: 322 MOSM/KG (ref 275–295)
P AXIS: 72 DEGREES
P-R INTERVAL: 210 MS
PHOSPHATE SERPL-MCNC: 11 MG/DL (ref 2.4–5.1)
PLATELET # BLD AUTO: 212 10(3)UL (ref 150–450)
POTASSIUM SERPL-SCNC: 5.2 MMOL/L (ref 3.5–5.1)
Q-T INTERVAL: 518 MS
QRS DURATION: 158 MS
QTC CALCULATION (BEZET): 477 MS
R AXIS: -64 DEGREES
RBC # BLD AUTO: 3.56 X10(6)UL
SODIUM SERPL-SCNC: 135 MMOL/L (ref 136–145)
T AXIS: 69 DEGREES
VENTRICULAR RATE: 51 BPM
WBC # BLD AUTO: 9 X10(3) UL (ref 4–11)

## 2023-12-25 RX ORDER — ALBUMIN (HUMAN) 12.5 G/50ML
SOLUTION INTRAVENOUS
Status: COMPLETED
Start: 2023-12-25 | End: 2023-12-25

## 2023-12-25 RX ORDER — METOCLOPRAMIDE HYDROCHLORIDE 5 MG/ML
5 INJECTION INTRAMUSCULAR; INTRAVENOUS EVERY 8 HOURS PRN
Status: DISCONTINUED | OUTPATIENT
Start: 2023-12-25 | End: 2024-01-05

## 2023-12-25 NOTE — PROGRESS NOTES
NEPHROLOGY DAILY PROGRESS NOTE     SUBJECTIVE:    Confused   In restraints   HD today - BP low during tx     OBJECTIVE:    Total Intake/Output:    Intake/Output Summary (Last 24 hours) at 12/25/2023 1330  Last data filed at 12/24/2023 1800  Gross per 24 hour   Intake 200 ml   Output 0 ml   Net 200 ml         PHYSICAL EXAM:  /57 (BP Location: Right arm)   Pulse 55   Temp (!) 96 °F (35.6 °C) (Temporal)   Resp 13   Wt 242 lb (109.8 kg)   SpO2 93%   BMI 32.82 kg/m²   GEN: NAD, on BiPAP  HEENT: NCAT    CHEST: coarse breath sounds    CARDIAC: S1S2 normal  ABD: soft, NT/ND  EXT:  no lower ext edema  NEURO: sleepy/ lethargic   SKIN: warm, dry   DIALYSIS ACCESS: LUE AVF cannulated for dialysis        CURRENT MEDICATIONS:   albumin human        atropine        aspirin  81 mg Per NG Tube Daily    lansoprazole  30 mg Per NG Tube QAM AC    [Held by provider] finasteride  5 mg Per NG Tube Daily    metoprolol tartrate  100 mg Per NG Tube 2x Daily(Beta Blocker)    insulin detemir  25 Units Subcutaneous Daily    cefTRIAXone  2 g Intravenous Q24H    insulin regular human  1-11 Units Subcutaneous 4 times per day    heparin  5,000 Units Subcutaneous Q8H YOLANDA    sevelamer carbonate  800 mg Oral TID CC    tamsulosin  0.8 mg Oral Daily    hydrALAZINE  50 mg Oral TID    [Held by provider] gabapentin  100 mg Oral BID    [Held by provider] rosuvastatin  10 mg Oral Nightly             LABS:  Patient Labs Reviewed in Detail. Pertinent Labs as follows:  Recent Labs   Lab 12/23/23  0408 12/24/23  0709 12/25/23  0428   * 361* 236*   BUN 48* 74* 108*   CREATSERUM 6.28* 8.46* 9.93*   EGFRCR 9* 6* 5*   CA 8.7 8.5* 8.5*    133* 135*   K 5.0 4.6 5.2*   CL 96* 94* 92*   CO2 23.0 25.0 24.0     Recent Labs   Lab 12/21/23  0341 12/22/23  0421 12/23/23  0408 12/24/23  0709 12/25/23  0428   RBC 3.13* 3.46* 3.44* 3.56* 3.56*   HGB 9.7* 10.2* 10.3* 10.7* 10.6*   HCT 30.9* 33.7* 32.3* 32.8* 33.3*   MCV 98.7 97.4 93.9 92.1 93.5   MCH  31.0 29.5 29.9 30.1 29.8   MCHC 31.4 30.3* 31.9 32.6 31.8   RDW 16.0* 15.9* 15.3* 14.6 14.5   NEPRELIM 6.47 6.33 4.78  --   --    WBC 8.7 8.9 5.3 9.8 9.0   .0* 135.0* 155.0 193.0 212.0           IMAGING:  No results found.         ASSESSMENT AND PLAN:   This is a 73 year old male with PMH sig for ESRD on HD MWF, HTN, HLD, DM2, CAD s/p CABG x3, ischemic cardiomyopathy. Presents with SOB and cough. Found to be influenza positive. Nephrology is consulted for dialysis.      ESRD on HD MWF   - HD today, BP low during treatment. Give IV albumin, may not be able to UF.    - followed by [facility]   - limit in's      Acute hypoxic respiratory failure  - CXR with pulmonary vascular changes, also influenza A postivie    - fluid removal with dialysis as tolerated   - pulm following      Hyperkalemia   - 2K bath with dialysis   - follow K level      Hyperphosphatemia   - Nepro feeds  - No oral access to give phos binders     Hypertension   - Hydralazine, metoprolol       Anemia   - trend Hb, Hb at goal    - receiving OMAIRA with outpatient dialysis      Need to address GOC?     CCTime > 30 min     Dante Gifford, DO   [facility]

## 2023-12-25 NOTE — PROGRESS NOTES
[facility]    Cardiology Progress Note    Terrence Vines Patient Status:  Inpatient    1950 MRN [id number]   Location [facility] 2W/SW Attending Bradley Dash MD   Hosp Day # 7 PCP Victor Manuel Cleaning MD       Impression/Plan:  73 year old male presenting with:     1) Acute hypoxemic respiratory failure, multifactorial  2) Influenza A  3) HFrEF (EF 45-50% 2023)  4) ESRD on HD  5) CAD s/p CABG 2021  6) CVA  7) HTN, mostly controlled  8) HL. On statin  9) DM     - Influenza A management as per primary  - HD as per nephrology   - Cont asa, statin, bb, hydralazine; titrate as needed for BP control.  Increase BB today. No ACEi/ARB/ARNI due to renal issues  - Bipap as per pulm/critical care  - Monitor on tele  - ECG done for bradycardia this morning was reviewed    Patient Dr Bautista           Subjective:     Confused, in soft restraints.        Patient Active Problem List   Diagnosis    Moderate nonproliferative diabetic retinopathy without macular edema associated with type 2 diabetes mellitus (HCC)    Benign prostatic hyperplasia    Type 2 diabetes mellitus with diabetic polyneuropathy, without long-term current use of insulin (HCC)    Vitamin D deficiency    Type 2 diabetes mellitus with microalbuminuria  (HCC)    Type 2 diabetes mellitus with nephropathy (HCC)    Combined hyperlipidemia associated with type 2 diabetes mellitus  (HCC)    Hypertension associated with type 2 diabetes mellitus  (HCC)    Lower extremity edema    Iron deficiency anemia    CKD (chronic kidney disease) stage 4, GFR 15-29 ml/min (HCC)    Acute pulmonary edema (HCC)    Acute on chronic congestive heart failure, unspecified heart failure type (HCC)    Acute respiratory failure with hypoxia (HCC)    Coronary artery disease involving native coronary artery of native heart    Cerebrovascular accident (CVA) due to bilateral embolism of middle cerebral arteries (HCC)    Preoperative testing    S/P CABG (coronary  artery bypass graft)    Acute renal failure superimposed on chronic kidney disease  (HCC)    Acute renal failure superimposed on chronic kidney disease, unspecified CKD stage, unspecified acute renal failure type    Stage 5 chronic kidney disease not on chronic dialysis (HCC)    Hypernatremia    Acute kidney injury (HCC)    Anemia    ESRD on hemodialysis (HCC)    Facial swelling    Rhinovirus infection    Thyroid nodule    Pulmonary nodules    Elevated BUN    Subacute cough    Dialysis AV fistula malfunction (HCC)    Type 2 diabetes mellitus with hyperglycemia, with long-term current use of insulin (HCC)    Anemia, unspecified type    Thrombocytopenia (HCC)    ESRD on dialysis (HCC)       Objective:   Temp: 98.1 °F (36.7 °C)  Pulse: 46  Resp: 16  BP: 103/69  FiO2 (%): 30 %    Intake/Output:     Intake/Output Summary (Last 24 hours) at 12/25/2023 0554  Last data filed at 12/24/2023 1800  Gross per 24 hour   Intake 200 ml   Output 0 ml   Net 200 ml       Last 3 Weights   12/18/23 1258 242 lb (109.8 kg)   11/27/23 1951 252 lb (114.3 kg)   11/13/23 0700 245 lb 4.8 oz (111.3 kg)   11/12/23 0519 244 lb 8 oz (110.9 kg)   11/11/23 1300 246 lb 14.4 oz (112 kg)   11/11/23 0539 238 lb 1.6 oz (108 kg)   11/08/23 1410 234 lb (106.1 kg)   11/07/23 0619 234 lb 1.6 oz (106.2 kg)   11/06/23 1744 239 lb (108.4 kg)       Tele: SR/SB    Physical Exam:    General: confused  HEENT: Normocephalic, anicteric sclera  Neck: supple  Cardiac: Regular rate and rhythm. S1, S2 normal. No murmur,   Lungs: coarse breath sounds bilat  Abdomen: Soft, non-distended  Extremities: no edema  Neurologic: confused  Skin: Warm and dry.     Laboratory/Data:    Labs:         Recent Labs   Lab 12/21/23  0341 12/22/23  0421 12/23/23  0408 12/24/23  0709 12/25/23  0428   WBC 8.7 8.9 5.3 9.8 9.0   HGB 9.7* 10.2* 10.3* 10.7* 10.6*   MCV 98.7 97.4 93.9 92.1 93.5   .0* 135.0* 155.0 193.0 212.0       Recent Labs   Lab 12/19/23  0743 12/20/23  1565  12/21/23  0341 12/22/23  0421 12/23/23  0408 12/24/23  0709 12/25/23  0428    139 135* 136 136 133* 135*   K 4.5 4.8 5.1 5.4* 5.0 4.6 5.2*    99 102 101 96* 94* 92*   CO2 28.0 25.0 27.0 24.0 23.0 25.0 24.0   BUN 41* 33* 26* 46* 48* 74* 108*   CREATSERUM 6.54* 5.69* 5.42* 8.05* 6.28* 8.46* 9.93*   CA 8.1* 8.4* 8.4* 8.4* 8.7 8.5* 8.5*   MG 2.2 2.0 2.1 2.5 2.6  --   --    PHOS 7.1* 6.0* 6.4* 10.2* 8.7* 8.9* 11.0*   * 113* 103* 124* 280* 361* 236*       Recent Labs   Lab 12/24/23  0709 12/25/23  0428   ALB 3.9 3.9       No results for input(s): \"TROP\" in the last 168 hours.    ECHO 11/23:  1. Left ventricle: The cavity size was normal. Wall thickness was normal.      Systolic function was mildly reduced. The estimated ejection fraction was      45-50%, by visual assessment. Although no diagnostic regional wall motion      abnormality was identified, this possibility cannot be completely      excluded on the basis of this study.   2. Mitral valve: There was mild regurgitation.   3. Pulmonary arteries: Systolic pressure was within the normal range,      estimated to be 30mm Hg.   4. Pericardium, extracardiac: There was no pericardial effusion.           Allergies:   No Known Allergies    Medications:

## 2023-12-25 NOTE — PLAN OF CARE
Problem: Patient Centered Care  Goal: Patient preferences are identified and integrated in the patient's plan of care  Description: Interventions:  - What would you like us to know as we care for you? I live at home with family. I'm from [place]  - Provide timely, complete, and accurate information to patient/family  - Incorporate patient and family knowledge, values, beliefs, and cultural backgrounds into the planning and delivery of care  - Encourage patient/family to participate in care and decision-making at the level they choose  - Honor patient and family perspectives and choices  Outcome: Not Progressing     Problem: Diabetes/Glucose Control  Goal: Glucose maintained within prescribed range  Description: INTERVENTIONS:  - Monitor Blood Glucose as ordered  - Assess for signs and symptoms of hyperglycemia and hypoglycemia  - Administer ordered medications to maintain glucose within target range  - Assess barriers to adequate nutritional intake and initiate nutrition consult as needed  - Instruct patient on self management of diabetes  Outcome: Not Progressing     Problem: CARDIOVASCULAR - ADULT  Goal: Maintains optimal cardiac output and hemodynamic stability  Description: INTERVENTIONS:  - Monitor vital signs, rhythm, and trends  - Monitor for bleeding, hypotension and signs of decreased cardiac output  - Evaluate effectiveness of vasoactive medications to optimize hemodynamic stability  - Monitor arterial and/or venous puncture sites for bleeding and/or hematoma  - Assess quality of pulses, skin color and temperature  - Assess for signs of decreased coronary artery perfusion - ex. Angina  - Evaluate fluid balance, assess for edema, trend weights  Outcome: Not Progressing  Goal: Absence of cardiac arrhythmias or at baseline  Description: INTERVENTIONS:  - Continuous cardiac monitoring, monitor vital signs, obtain 12 lead EKG if indicated  - Evaluate effectiveness of antiarrhythmic and heart rate control  medications as ordered  - Initiate emergency measures for life threatening arrhythmias  - Monitor electrolytes and administer replacement therapy as ordered  Outcome: Not Progressing     Problem: RESPIRATORY - ADULT  Goal: Achieves optimal ventilation and oxygenation  Description: INTERVENTIONS:  - Assess for changes in respiratory status  - Assess for changes in mentation and behavior  - Position to facilitate oxygenation and minimize respiratory effort  - Oxygen supplementation based on oxygen saturation or ABGs  - Provide Smoking Cessation handout, if applicable  - Encourage broncho-pulmonary hygiene including cough, deep breathe, Incentive Spirometry  - Assess the need for suctioning and perform as needed  - Assess and instruct to report SOB or any respiratory difficulty  - Respiratory Therapy support as indicated  - Manage/alleviate anxiety  - Monitor for signs/symptoms of CO2 retention  Outcome: Not Progressing     Problem: METABOLIC/FLUID AND ELECTROLYTES - ADULT  Goal: Glucose maintained within prescribed range  Description: INTERVENTIONS:  - Monitor Blood Glucose as ordered  - Assess for signs and symptoms of hyperglycemia and hypoglycemia  - Administer ordered medications to maintain glucose within target range  - Assess barriers to adequate nutritional intake and initiate nutrition consult as needed  - Instruct patient on self management of diabetes  Outcome: Not Progressing  Goal: Electrolytes maintained within normal limits  Description: INTERVENTIONS:  - Monitor labs and rhythm and assess patient for signs and symptoms of electrolyte imbalances  - Administer electrolyte replacement as ordered  - Monitor response to electrolyte replacements, including rhythm and repeat lab results as appropriate  - Fluid restriction as ordered  - Instruct patient on fluid and nutrition restrictions as appropriate  Outcome: Not Progressing  Goal: Hemodynamic stability and optimal renal function maintained  Description:  INTERVENTIONS:  - Monitor labs and assess for signs and symptoms of volume excess or deficit  - Monitor intake, output and patient weight  - Monitor urine specific gravity, serum osmolarity and serum sodium as indicated or ordered  - Monitor response to interventions for patient's volume status, including labs, urine output, blood pressure (other measures as available)  - Encourage oral intake as appropriate  - Instruct patient on fluid and nutrition restrictions as appropriate  Outcome: Not Progressing     Problem: Safety Risk - Non-Violent Restraints  Goal: Patient will remain free from self-harm  Description: INTERVENTIONS:  - Apply the least restrictive restraint to prevent harm  - Notify patient and family of reasons restraints applied  - Assess for any contributing factors to confusion (electrolyte disturbances, delirium, medications)  - Discontinue any unnecessary medical devices as soon as possible  - Assess the patient's physical comfort, circulation, skin condition, hydration, nutrition and elimination needs   - Reorient and redirection as needed  - Assess for the need to continue restraints  Outcome: Not Progressing     Problem: Delirium  Goal: Minimize duration of delirium  Description: Interventions:  - Encourage use of hearing aids, eye glasses  - Promote highest level of mobility daily  - Provide frequent reorientation  - Promote wakefulness i.e. lights on, blinds open  - Promote sleep, encourage patient's normal rest cycle i.e. lights off, TV off, minimize noise and interruptions  - Encourage family to assist in orientation and promotion of home routines  Outcome: Not Progressing   Patient continues to need restraints to prevent from pulling lines tubes

## 2023-12-25 NOTE — PROGRESS NOTES
[facility] Pharmacy Note:  Renal Dose Adjustment for Metoclopramide (REGLAN)    Terrence Vines has been prescribed Metoclopramide (REGLAN) 10 mg every 8 hours as needed for nausea/vomiting,.    Estimated Creatinine Clearance: 7.1 mL/min (A) (based on SCr of 9.93 mg/dL (H)).  On Hemodialysis.    Calculated creatinine clearance is < 40 ml/min, therefore, the dose of Metoclopramide (REGLAN) has been changed to 5 mg every 8 hours as needed for nausea/vomiting per P&T approved protocol.  Pharmacy will continue to follow, and if renal function improves, will resume the original order.       Thank you,  Purvi Wong, PharmD  12/25/2023 2:40 PM

## 2023-12-25 NOTE — PROGRESS NOTES
Pulmonary Progress Note        NAME: Terrence Vines - ROOM: [address] [place], [place] [address]A - MRN: [id number] - Age: 73 year old - : 1950        Last 24hrs: No events overnight, awake this AM, not following commands, weaned off O2    OBJECTIVE:  Vitals:    23 0600 23 0700 23 0800 23 0900   BP: 135/80 129/75 132/68 110/62   BP Location: Right arm Right arm Right arm    Pulse: 57 57 55 54   Resp: 15  12   Temp:   (!) 96 °F (35.6 °C)    TempSrc:   Temporal    SpO2: 100% 100% 100% 100%   Weight:           Oxygen Therapy  SpO2: 100 %  O2 Device: Nasal cannula  FiO2 (%): 30 %  O2 Flow Rate (L/min): 5 L/min  Pulse Oximetry Type: Continuous                  Intake/Output Summary (Last 24 hours) at 2023 1010  Last data filed at 2023 1800  Gross per 24 hour   Intake 200 ml   Output 0 ml   Net 200 ml       Scheduled Medication:   atropine        aspirin  81 mg Per NG Tube Daily    lansoprazole  30 mg Per NG Tube QAM AC    [Held by provider] finasteride  5 mg Per NG Tube Daily    metoprolol tartrate  100 mg Per NG Tube 2x Daily(Beta Blocker)    insulin detemir  25 Units Subcutaneous Daily    cefTRIAXone  2 g Intravenous Q24H    insulin regular human  1-11 Units Subcutaneous 4 times per day    heparin  5,000 Units Subcutaneous Q8H YOLANDA    sevelamer carbonate  800 mg Oral TID CC    tamsulosin  0.8 mg Oral Daily    hydrALAZINE  50 mg Oral TID    [Held by provider] gabapentin  100 mg Oral BID    [Held by provider] rosuvastatin  10 mg Oral Nightly     Continuous Infusing Medication:   dextrose 10%         Lungs: diminished breath sounds bilaterally  Heart: S1, S2 normal, no murmur, click, rub or gallop, regular rate and rhythm  Abdomen: soft, non-tender; bowel sounds normal; no masses,  no organomegaly  Extremities: extremities normal, atraumatic, no cyanosis or edema    Labs reviewed as noted below      ASSESSMENT/PLAN:    Acute respiratory failure - due to pulmonary edema and influenza  - continued  hypercapnea and no clear prior lung disase. ? Bronchospasm.   Son does report h/o OHS.   -improved, off steroids, off O2  - BD protocol.   - outpt PFT's.   - Bipap with all sleep. Could change to AVAPS to aid with complaince  Influenza A   - s/p Tamiflu  - bronchodilator protocol  - PCT elevated and on abx rocephin 12/22/2023- , finished azithro  Acute decompensated HFrEF  - per cardiology  ESRD on HD  - per nephrology  Ppx  - subcutaneous heparin  Dispo - full code  PT eval   - OK for floor transfer.       Cuong Thomas  [facility]  Pulmonary and Critical Care

## 2023-12-25 NOTE — PLAN OF CARE
Patient because bradycardic early this morning down into the 40's. Providers notified, orders for EKG received, but will continue to monitor for any symptoms. Patient to receive dialysis this morning per nephro. Information relayed to day nurse  Addendum: Removed BiPAP for an hour or two as the patient's blood pressure also started to drop around 4253-0602 into a Map of <65, the patient's BP seemed to recover after removing and their HR went up from the low 40's back into the high 50's. Leads me to believe that maybe the BiPAP is putting too much thoracic pressure maybe leading to a lower CO? Will discuss with providers.  Problem: Patient Centered Care  Goal: Patient preferences are identified and integrated in the patient's plan of care  Description: Interventions:  - What would you like us to know as we care for you? I live at home with family. I'm from [facility]  - Provide timely, complete, and accurate information to patient/family  - Incorporate patient and family knowledge, values, beliefs, and cultural backgrounds into the planning and delivery of care  - Encourage patient/family to participate in care and decision-making at the level they choose  - Honor patient and family perspectives and choices  Outcome: Progressing     Problem: Diabetes/Glucose Control  Goal: Glucose maintained within prescribed range  Description: INTERVENTIONS:  - Monitor Blood Glucose as ordered  - Assess for signs and symptoms of hyperglycemia and hypoglycemia  - Administer ordered medications to maintain glucose within target range  - Assess barriers to adequate nutritional intake and initiate nutrition consult as needed  - Instruct patient on self management of diabetes  Outcome: Progressing     Problem: Patient/Family Goals  Goal: Patient/Family Long Term Goal  Description: Patient's Long Term Goal: go home    Interventions:  - Monitor vital signs  - Monitor appropriate labs  - Monitor blood glucose levels  - Administer medications  per order  - Pain management as needed  - Follow MD orders  - Diagnostics per orders  - Dialysis per orders  - Update / inform patient and family on plan of care  - Discharge planning     - See additional Care Plan goals for specific interventions  Outcome: Progressing  Goal: Patient/Family Short Term Goal  Description: Patient's Short Term Goal: To breathe better    Interventions:   - RT treatment  -O2  -Follow md orders  - See additional Care Plan goals for specific interventions  Outcome: Progressing     Problem: CARDIOVASCULAR - ADULT  Goal: Maintains optimal cardiac output and hemodynamic stability  Description: INTERVENTIONS:  - Monitor vital signs, rhythm, and trends  - Monitor for bleeding, hypotension and signs of decreased cardiac output  - Evaluate effectiveness of vasoactive medications to optimize hemodynamic stability  - Monitor arterial and/or venous puncture sites for bleeding and/or hematoma  - Assess quality of pulses, skin color and temperature  - Assess for signs of decreased coronary artery perfusion - ex. Angina  - Evaluate fluid balance, assess for edema, trend weights  Outcome: Progressing  Goal: Absence of cardiac arrhythmias or at baseline  Description: INTERVENTIONS:  - Continuous cardiac monitoring, monitor vital signs, obtain 12 lead EKG if indicated  - Evaluate effectiveness of antiarrhythmic and heart rate control medications as ordered  - Initiate emergency measures for life threatening arrhythmias  - Monitor electrolytes and administer replacement therapy as ordered  Outcome: Progressing     Problem: RESPIRATORY - ADULT  Goal: Achieves optimal ventilation and oxygenation  Description: INTERVENTIONS:  - Assess for changes in respiratory status  - Assess for changes in mentation and behavior  - Position to facilitate oxygenation and minimize respiratory effort  - Oxygen supplementation based on oxygen saturation or ABGs  - Provide Smoking Cessation handout, if applicable  - Encourage  broncho-pulmonary hygiene including cough, deep breathe, Incentive Spirometry  - Assess the need for suctioning and perform as needed  - Assess and instruct to report SOB or any respiratory difficulty  - Respiratory Therapy support as indicated  - Manage/alleviate anxiety  - Monitor for signs/symptoms of CO2 retention  Outcome: Progressing     Problem: METABOLIC/FLUID AND ELECTROLYTES - ADULT  Goal: Glucose maintained within prescribed range  Description: INTERVENTIONS:  - Monitor Blood Glucose as ordered  - Assess for signs and symptoms of hyperglycemia and hypoglycemia  - Administer ordered medications to maintain glucose within target range  - Assess barriers to adequate nutritional intake and initiate nutrition consult as needed  - Instruct patient on self management of diabetes  Outcome: Progressing  Goal: Electrolytes maintained within normal limits  Description: INTERVENTIONS:  - Monitor labs and rhythm and assess patient for signs and symptoms of electrolyte imbalances  - Administer electrolyte replacement as ordered  - Monitor response to electrolyte replacements, including rhythm and repeat lab results as appropriate  - Fluid restriction as ordered  - Instruct patient on fluid and nutrition restrictions as appropriate  Outcome: Progressing  Goal: Hemodynamic stability and optimal renal function maintained  Description: INTERVENTIONS:  - Monitor labs and assess for signs and symptoms of volume excess or deficit  - Monitor intake, output and patient weight  - Monitor urine specific gravity, serum osmolarity and serum sodium as indicated or ordered  - Monitor response to interventions for patient's volume status, including labs, urine output, blood pressure (other measures as available)  - Encourage oral intake as appropriate  - Instruct patient on fluid and nutrition restrictions as appropriate  Outcome: Progressing     Problem: Safety Risk - Non-Violent Restraints  Goal: Patient will remain free from  self-harm  Description: INTERVENTIONS:  - Apply the least restrictive restraint to prevent harm  - Notify patient and family of reasons restraints applied  - Assess for any contributing factors to confusion (electrolyte disturbances, delirium, medications)  - Discontinue any unnecessary medical devices as soon as possible  - Assess the patient's physical comfort, circulation, skin condition, hydration, nutrition and elimination needs   - Reorient and redirection as needed  - Assess for the need to continue restraints  Outcome: Progressing     Problem: Delirium  Goal: Minimize duration of delirium  Description: Interventions:  - Encourage use of hearing aids, eye glasses  - Promote highest level of mobility daily  - Provide frequent reorientation  - Promote wakefulness i.e. lights on, blinds open  - Promote sleep, encourage patient's normal rest cycle i.e. lights off, TV off, minimize noise and interruptions  - Encourage family to assist in orientation and promotion of home routines  Outcome: Progressing

## 2023-12-25 NOTE — PROGRESS NOTES
[facility] Hospitalist Progress Note     CC: Hospital Follow up    PCP: Victor Manuel Cleaning MD       Assessment/Plan:     Mr. Vines is a 73 year old male with PMH BPH , CHF / ICM EF 30% , CAD s/p CABG x3, Moderate MR, HTN, HLD, ESRD on HD M/W/F, Anemia, Gastritis, who presents with SOB. Admitted for fluid overload and Influenza A.  Hypoxic and hypercapnic respiratory failure likely due to influenza.  Requiring BiPAP after fluid status has improved. Tamiflu continued and will start ceftriaxone and steroids today.     Acute Hypoxic Respiratory Failure  Pulmonary edema  ESRD on HD M/W/F  - positive influenza   - HD per renal, next session 12/25  - s/p recent AV fistula repair by vascular surgery in September 2023  - bipap with all sleep  - last ABG done 12/24  - steroid burst per pulmonary ended 12/24    Encephalopathy   -more lethargic this AM  -BUN noted > 100 from 70s yesterday  -could be uremia, would have dialysis sooner then later today if possible   -discussed with RN  -monitor closely. Would not use BIPAP if this lethargic, has secretions in mouth, could aspirate     Influenza A positive   - cough for over a month but SOB more acute  - no fevers or Leukocytosis  - renally dose Tamiflu   - Ceftriaxone and steroids started 12/22/23  - monitor for worsening hypoxia     Troponin Elevation   - no chest pain   - EKG with concern for LBBB  - cardiology on consult     CAD s/p CABG x3   Ischemic cardiomyopathy EF 30% without acute exacerbation of congestive heart failure  Chronic Systolic heart failure  HTN  - Continue home metoprolol, hydralazine  - Not on ACE or ARB due to renal disease  - Does not use diuretics     Anemia   Thrombocytopenia   Gastritis   - s/p EGD 11/7 with some mild gastritis, no active bleeding  - s/p colonoscopy 11/8 with polyps but no active bleeding, will need to repeat colonoscopy as outpatient  - PPI BID was not taking   - on prevacid here      Type 2 diabetes with hyperglycemia  Diabetic  nephropathy  - gabapentin (hold while lethargic)  - ISS, meal time and basal insulin   - Controlled renal diet  - Continue home aspirin     Hyperlipidemia  -can hold his statin      BPH  -can hold his finasteride for now  -on flomax      FN:  Diet: NG removed yesterday by patient, Continue to assess oral, speech following      DVT Prophy: HSQ      Dispo: PCU     Jaxon Smith [facility] Hospitalist      Subjective:     More lethargic today.     OBJECTIVE:    Blood pressure 132/68, pulse 55, temperature (!) 96 °F (35.6 °C), temperature source Temporal, resp. rate 17, weight 242 lb (109.8 kg), SpO2 100%.    Temp:  [96 °F (35.6 °C)-98.6 °F (37 °C)] 96 °F (35.6 °C)  Pulse:  [46-72] 55  Resp:  [14-20] 17  BP: ()/(56-89) 132/68  SpO2:  [93 %-100 %] 100 %  FiO2 (%):  [30 %] 30 %      Intake/Output:    Intake/Output Summary (Last 24 hours) at 12/25/2023 0939  Last data filed at 12/24/2023 1800  Gross per 24 hour   Intake 200 ml   Output 0 ml   Net 200 ml         Last 3 Weights   12/18/23 1258 242 lb (109.8 kg)   11/27/23 1951 252 lb (114.3 kg)   11/13/23 0700 245 lb 4.8 oz (111.3 kg)   11/12/23 0519 244 lb 8 oz (110.9 kg)   11/11/23 1300 246 lb 14.4 oz (112 kg)   11/11/23 0539 238 lb 1.6 oz (108 kg)   11/08/23 1410 234 lb (106.1 kg)   11/07/23 0619 234 lb 1.6 oz (106.2 kg)   11/06/23 1744 239 lb (108.4 kg)       Exam   General: lethargic  Lungs: coarse   Heart: Regular rate and rhythm  Abdomen: soft, non tender  Extremities: No edema  Skin: no new rash, normal color  Psych: appropriate affect     Data Review:       Labs:     Recent Labs   Lab 12/21/23  0341 12/22/23  0421 12/23/23  0408 12/24/23  0709 12/25/23  0428   RBC 3.13* 3.46* 3.44* 3.56* 3.56*   HGB 9.7* 10.2* 10.3* 10.7* 10.6*   HCT 30.9* 33.7* 32.3* 32.8* 33.3*   MCV 98.7 97.4 93.9 92.1 93.5   MCH 31.0 29.5 29.9 30.1 29.8   MCHC 31.4 30.3* 31.9 32.6 31.8   RDW 16.0* 15.9* 15.3* 14.6 14.5   NEPRELIM 6.47 6.33 4.78  --   --    WBC 8.7 8.9 5.3 9.8  9.0   .0* 135.0* 155.0 193.0 212.0         Recent Labs   Lab 12/23/23  0408 12/24/23  0709 12/25/23  0428   * 361* 236*   BUN 48* 74* 108*   CREATSERUM 6.28* 8.46* 9.93*   EGFRCR 9* 6* 5*   CA 8.7 8.5* 8.5*    133* 135*   K 5.0 4.6 5.2*   CL 96* 94* 92*   CO2 23.0 25.0 24.0       Recent Labs   Lab 12/24/23  0709 12/25/23  0428   ALB 3.9 3.9         Imaging:  XR CHEST AP PORTABLE  (CPT=71045)    Result Date: 12/23/2023  CONCLUSION:  1. Enteric tube tip projects over the distal gastric body/antrum. 2. Cardiomegaly with central pulmonary vascular congestion.  No focal airspace disease or significant pleural effusion.   A preliminary report was issued by the [facility] teleradiology service. There are no major discrepancies.  elm-remote  Dictated by (CST): Zuhair Powers MD on 12/23/2023 at 9:54 AM     Finalized by (CST): Zuhair Powers MD on 12/23/2023 at 9:56 AM          XR CHEST AP PORTABLE  (CPT=71045)    Result Date: 12/22/2023  CONCLUSION:   Post placement of a feeding tube with tip within the stomach and below the diaphragm.  Mild interstitial edema, unchanged.   Dictated by (CST): Kareem Padilla MD on 12/22/2023 at 4:47 PM     Finalized by (CST): Kareem Padilla MD on 12/22/2023 at 4:48 PM             Meds:      atropine        aspirin  81 mg Per NG Tube Daily    lansoprazole  30 mg Per NG Tube QAM AC    [Held by provider] finasteride  5 mg Per NG Tube Daily    metoprolol tartrate  100 mg Per NG Tube 2x Daily(Beta Blocker)    insulin detemir  25 Units Subcutaneous Daily    cefTRIAXone  2 g Intravenous Q24H    insulin regular human  1-11 Units Subcutaneous 4 times per day    heparin  5,000 Units Subcutaneous Q8H YOLANDA    sevelamer carbonate  800 mg Oral TID CC    tamsulosin  0.8 mg Oral Daily    hydrALAZINE  50 mg Oral TID    gabapentin  100 mg Oral BID    [Held by provider] rosuvastatin  10 mg Oral Nightly      dextrose 10%       atropine, dextrose 10%, lipase-protease-amylase  (Lip-Prot-Amyl) **AND** sodium bicarbonate, traMADol, ipratropium-albuterol, glucose **OR** glucose **OR** glucose-vitamin C **OR** dextrose **OR** glucose **OR** glucose **OR** glucose-vitamin C, acetaminophen, polyethylene glycol (PEG 3350), sennosides, bisacodyl, fleet enema, ondansetron, metoclopramide, albuterol

## 2023-12-26 LAB
ALBUMIN SERPL-MCNC: 4 G/DL (ref 3.2–4.8)
ANION GAP SERPL CALC-SCNC: 14 MMOL/L (ref 0–18)
BUN BLD-MCNC: 62 MG/DL (ref 9–23)
BUN/CREAT SERPL: 8.1 (ref 10–20)
CALCIUM BLD-MCNC: 8.3 MG/DL (ref 8.7–10.4)
CHLORIDE SERPL-SCNC: 96 MMOL/L (ref 98–112)
CO2 SERPL-SCNC: 24 MMOL/L (ref 21–32)
CREAT BLD-MCNC: 7.67 MG/DL
DEPRECATED RDW RBC AUTO: 50.4 FL (ref 35.1–46.3)
EGFRCR SERPLBLD CKD-EPI 2021: 7 ML/MIN/1.73M2 (ref 60–?)
ERYTHROCYTE [DISTWIDTH] IN BLOOD BY AUTOMATED COUNT: 14.6 % (ref 11–15)
GLUCOSE BLD-MCNC: 211 MG/DL (ref 70–99)
GLUCOSE BLDC GLUCOMTR-MCNC: 178 MG/DL (ref 70–99)
GLUCOSE BLDC GLUCOMTR-MCNC: 191 MG/DL (ref 70–99)
GLUCOSE BLDC GLUCOMTR-MCNC: 228 MG/DL (ref 70–99)
GLUCOSE BLDC GLUCOMTR-MCNC: 247 MG/DL (ref 70–99)
HCT VFR BLD AUTO: 30.9 %
HGB BLD-MCNC: 9.7 G/DL
MCH RBC QN AUTO: 29.8 PG (ref 26–34)
MCHC RBC AUTO-ENTMCNC: 31.4 G/DL (ref 31–37)
MCV RBC AUTO: 95.1 FL
OSMOLALITY SERPL CALC.SUM OF ELEC: 302 MOSM/KG (ref 275–295)
PHOSPHATE SERPL-MCNC: 8.5 MG/DL (ref 2.4–5.1)
PLATELET # BLD AUTO: 178 10(3)UL (ref 150–450)
POTASSIUM SERPL-SCNC: 4.6 MMOL/L (ref 3.5–5.1)
RBC # BLD AUTO: 3.25 X10(6)UL
SODIUM SERPL-SCNC: 134 MMOL/L (ref 136–145)
WBC # BLD AUTO: 8.2 X10(3) UL (ref 4–11)

## 2023-12-26 RX ORDER — SEVELAMER CARBONATE 800 MG/1
1600 TABLET, FILM COATED ORAL
Status: DISCONTINUED | OUTPATIENT
Start: 2023-12-26 | End: 2024-01-05

## 2023-12-26 RX ORDER — ALBUMIN (HUMAN) 12.5 G/50ML
SOLUTION INTRAVENOUS
Status: COMPLETED
Start: 2023-12-26 | End: 2023-12-26

## 2023-12-26 RX ORDER — ALBUMIN (HUMAN) 12.5 G/50ML
25 SOLUTION INTRAVENOUS ONCE
Status: COMPLETED | OUTPATIENT
Start: 2023-12-26 | End: 2023-12-26

## 2023-12-26 NOTE — PROGRESS NOTES
NEPHROLOGY DAILY PROGRESS NOTE     SUBJECTIVE:    More awake today  Taking some PO     OBJECTIVE:    Total Intake/Output:    Intake/Output Summary (Last 24 hours) at 12/26/2023 1434  Last data filed at 12/26/2023 0600  Gross per 24 hour   Intake 150 ml   Output 850 ml   Net -700 ml         PHYSICAL EXAM:  /67   Pulse 66   Temp 97.1 °F (36.2 °C) (Oral)   Resp 14   Wt 242 lb (109.8 kg)   SpO2 92%   BMI 32.82 kg/m²   GEN: NAD, on BiPAP  HEENT: NCAT    CHEST: coarse breath sounds    CARDIAC: S1S2 normal  ABD: soft, NT/ND  EXT:  no lower ext edema  NEURO: sleepy/ lethargic   SKIN: warm, dry   DIALYSIS ACCESS: LUE AVF cannulated for dialysis        CURRENT MEDICATIONS:   sevelamer carbonate  1,600 mg Oral TID CC    insulin aspart  1-5 Units Subcutaneous TID CC    aspirin  81 mg Per NG Tube Daily    lansoprazole  30 mg Per NG Tube QAM AC    [Held by provider] finasteride  5 mg Per NG Tube Daily    metoprolol tartrate  100 mg Per NG Tube 2x Daily(Beta Blocker)    insulin detemir  25 Units Subcutaneous Daily    cefTRIAXone  2 g Intravenous Q24H    heparin  5,000 Units Subcutaneous Q8H YOLANDA    tamsulosin  0.8 mg Oral Daily    hydrALAZINE  50 mg Oral TID    [Held by provider] gabapentin  100 mg Oral BID    [Held by provider] rosuvastatin  10 mg Oral Nightly             LABS:  Patient Labs Reviewed in Detail. Pertinent Labs as follows:  Recent Labs   Lab 12/24/23  0709 12/25/23  0428 12/26/23  0327   * 236* 211*   BUN 74* 108* 62*   CREATSERUM 8.46* 9.93* 7.67*   EGFRCR 6* 5* 7*   CA 8.5* 8.5* 8.3*   * 135* 134*   K 4.6 5.2* 4.6   CL 94* 92* 96*   CO2 25.0 24.0 24.0     Recent Labs   Lab 12/21/23  0341 12/22/23  0421 12/23/23  0408 12/24/23  0709 12/25/23  0428 12/26/23  0327   RBC 3.13* 3.46* 3.44* 3.56* 3.56* 3.25*   HGB 9.7* 10.2* 10.3* 10.7* 10.6* 9.7*   HCT 30.9* 33.7* 32.3* 32.8* 33.3* 30.9*   MCV 98.7 97.4 93.9 92.1 93.5 95.1   MCH 31.0 29.5 29.9 30.1 29.8 29.8   MCHC 31.4 30.3* 31.9 32.6 31.8  31.4   RDW 16.0* 15.9* 15.3* 14.6 14.5 14.6   NEPRELIM 6.47 6.33 4.78  --   --   --    WBC 8.7 8.9 5.3 9.8 9.0 8.2   .0* 135.0* 155.0 193.0 212.0 178.0           IMAGING:  No results found.         ASSESSMENT AND PLAN:   This is a 73 year old male with PMH sig for ESRD on HD MWF, HTN, HLD, DM2, CAD s/p CABG x3, ischemic cardiomyopathy. Presents with SOB and cough. Found to be influenza positive. Nephrology is consulted for dialysis.      ESRD on HD MWF   - HD tomorrow. Albumin prn.   - followed by [facility]   - limit in's      Acute hypoxic respiratory failure  - CXR with pulmonary vascular changes, also influenza A postivie    - fluid removal with dialysis as tolerated   - pulm following      Hyperkalemia   - 2K bath with dialysis   - follow K level      Hyperphosphatemia   - Nepro feeds  - Sevelamer 1600mg tid     Hypertension   - Hydralazine, metoprolol       Anemia   - trend Hb, Hb at goal    - receiving OMAIRA with outpatient dialysis      Need to address GOC?         Dante Gifford, DO   [facility]

## 2023-12-26 NOTE — PLAN OF CARE
Pt A/O x2-3. Intermittently confused. No acute events overnight. SB/NSR on heart monitor. BiPAP overnight, tolerated well. Hourly nursing rounds made. Plan of care ongoing.     Problem: Patient Centered Care  Goal: Patient preferences are identified and integrated in the patient's plan of care  Description: Interventions:  - What would you like us to know as we care for you? I live at home with family. I'm from [place]  - Provide timely, complete, and accurate information to patient/family  - Incorporate patient and family knowledge, values, beliefs, and cultural backgrounds into the planning and delivery of care  - Encourage patient/family to participate in care and decision-making at the level they choose  - Honor patient and family perspectives and choices  Outcome: Progressing     Problem: Diabetes/Glucose Control  Goal: Glucose maintained within prescribed range  Description: INTERVENTIONS:  - Monitor Blood Glucose as ordered  - Assess for signs and symptoms of hyperglycemia and hypoglycemia  - Administer ordered medications to maintain glucose within target range  - Assess barriers to adequate nutritional intake and initiate nutrition consult as needed  - Instruct patient on self management of diabetes  Outcome: Progressing     Problem: Patient/Family Goals  Goal: Patient/Family Long Term Goal  Description: Patient's Long Term Goal: go home    Interventions:  - Monitor vital signs  - Monitor appropriate labs  - Monitor blood glucose levels  - Administer medications per order  - Pain management as needed  - Follow MD orders  - Diagnostics per orders  - Dialysis per orders  - Update / inform patient and family on plan of care  - Discharge planning     - See additional Care Plan goals for specific interventions  Outcome: Progressing  Goal: Patient/Family Short Term Goal  Description: Patient's Short Term Goal: To breathe better    Interventions:   - RT treatment  -O2  -Follow md orders  - See additional Care  Plan goals for specific interventions  Outcome: Progressing     Problem: CARDIOVASCULAR - ADULT  Goal: Maintains optimal cardiac output and hemodynamic stability  Description: INTERVENTIONS:  - Monitor vital signs, rhythm, and trends  - Monitor for bleeding, hypotension and signs of decreased cardiac output  - Evaluate effectiveness of vasoactive medications to optimize hemodynamic stability  - Monitor arterial and/or venous puncture sites for bleeding and/or hematoma  - Assess quality of pulses, skin color and temperature  - Assess for signs of decreased coronary artery perfusion - ex. Angina  - Evaluate fluid balance, assess for edema, trend weights  Outcome: Progressing  Goal: Absence of cardiac arrhythmias or at baseline  Description: INTERVENTIONS:  - Continuous cardiac monitoring, monitor vital signs, obtain 12 lead EKG if indicated  - Evaluate effectiveness of antiarrhythmic and heart rate control medications as ordered  - Initiate emergency measures for life threatening arrhythmias  - Monitor electrolytes and administer replacement therapy as ordered  Outcome: Progressing     Problem: RESPIRATORY - ADULT  Goal: Achieves optimal ventilation and oxygenation  Description: INTERVENTIONS:  - Assess for changes in respiratory status  - Assess for changes in mentation and behavior  - Position to facilitate oxygenation and minimize respiratory effort  - Oxygen supplementation based on oxygen saturation or ABGs  - Provide Smoking Cessation handout, if applicable  - Encourage broncho-pulmonary hygiene including cough, deep breathe, Incentive Spirometry  - Assess the need for suctioning and perform as needed  - Assess and instruct to report SOB or any respiratory difficulty  - Respiratory Therapy support as indicated  - Manage/alleviate anxiety  - Monitor for signs/symptoms of CO2 retention  Outcome: Progressing     Problem: METABOLIC/FLUID AND ELECTROLYTES - ADULT  Goal: Glucose maintained within prescribed  range  Description: INTERVENTIONS:  - Monitor Blood Glucose as ordered  - Assess for signs and symptoms of hyperglycemia and hypoglycemia  - Administer ordered medications to maintain glucose within target range  - Assess barriers to adequate nutritional intake and initiate nutrition consult as needed  - Instruct patient on self management of diabetes  Outcome: Progressing  Goal: Electrolytes maintained within normal limits  Description: INTERVENTIONS:  - Monitor labs and rhythm and assess patient for signs and symptoms of electrolyte imbalances  - Administer electrolyte replacement as ordered  - Monitor response to electrolyte replacements, including rhythm and repeat lab results as appropriate  - Fluid restriction as ordered  - Instruct patient on fluid and nutrition restrictions as appropriate  Outcome: Progressing  Goal: Hemodynamic stability and optimal renal function maintained  Description: INTERVENTIONS:  - Monitor labs and assess for signs and symptoms of volume excess or deficit  - Monitor intake, output and patient weight  - Monitor urine specific gravity, serum osmolarity and serum sodium as indicated or ordered  - Monitor response to interventions for patient's volume status, including labs, urine output, blood pressure (other measures as available)  - Encourage oral intake as appropriate  - Instruct patient on fluid and nutrition restrictions as appropriate  Outcome: Progressing     Problem: Safety Risk - Non-Violent Restraints  Goal: Patient will remain free from self-harm  Description: INTERVENTIONS:  - Apply the least restrictive restraint to prevent harm  - Notify patient and family of reasons restraints applied  - Assess for any contributing factors to confusion (electrolyte disturbances, delirium, medications)  - Discontinue any unnecessary medical devices as soon as possible  - Assess the patient's physical comfort, circulation, skin condition, hydration, nutrition and elimination needs   -  Reorient and redirection as needed  - Assess for the need to continue restraints  Outcome: Progressing     Problem: Delirium  Goal: Minimize duration of delirium  Description: Interventions:  - Encourage use of hearing aids, eye glasses  - Promote highest level of mobility daily  - Provide frequent reorientation  - Promote wakefulness i.e. lights on, blinds open  - Promote sleep, encourage patient's normal rest cycle i.e. lights off, TV off, minimize noise and interruptions  - Encourage family to assist in orientation and promotion of home routines  Outcome: Progressing

## 2023-12-26 NOTE — PAYOR COMM NOTE
--------------  CONTINUED STAY REVIEW------REQUESTING ADDITIONAL DAYS 12/23 12/24 12/25      Payor: [facility]  Subscriber #:  [id number]  Authorization Number: [id number]    Admit date: 12/18/23  Admit time: 12:56 PM    Admitting Physician: Bradley Dash MD  Attending Physician:  Jaxon Nick DO  Primary Care Physician: Victor Manuel Cleaning MD    12/23   [facility] Hospitalist Progress Note      CC: Hospital Follow up     PCP: Victor Manuel Cleaning MD         Assessment/Plan:      Mr. Vines is a 73 year old male with PMH BPH , CHF / ICM EF 30% , CAD s/p CABG x3, Moderate MR, HTN, HLD, ESRD on HD M/W/F, Anemia, Gastritis, who presents with SOB. Admitted for fluid overload and Influenza A.  Hypoxic and hypercapnic respiratory failure likely due to influenza.  Requiring BiPAP after fluid status has improved. Tamiflu continued and will start ceftriaxone and steroids today.     Acute Hypoxic Respiratory Failure  Pulmonary edema  ESRD on HD M/W/F  - positive influenza   - HD per renal   - s/p recent AV fistula repair by vascular surgery in September 2023  - bipap with all sleep, can try to wean off this AM as more alert when seen   - steroid burst per pulmonary      Influenza A positive   - cough for over a month but SOB more acute  - no fevers or Leukocytosis   - CXR reviewed   - will renally dose Tamiflu   - Ceftriaxone and steroids started 12/22/23     Troponin Elevation   - no chest pain   - EKG with concern for LBBB  - cardiology on consult     CAD s/p CABG x3   Ischemic cardiomyopathy EF 30% without acute exacerbation of congestive heart failure  Chronic Systolic heart failure  HTN  - Continue home metoprolol, hydralazine  - Not on ACE or ARB due to renal disease  - Does not use diuretics     Anemia   Thrombocytopenia   Gastritis   - s/p EGD 11/7 with some mild gastritis, no active bleeding  - s/p colonoscopy 11/8 with polyps but no active bleeding, will need to repeat colonoscopy as outpatient  - PPI BID was  not taking      Type 2 diabetes with hyperglycemia  Diabetic nephropathy  - gabapentin  - ISS, meal time and basal insulin   - Controlled renal diet  - Continue home aspirin     Hyperlipidemia  - Continue home statin     BPH continue home Flomax     FN:  Diet: Renal, DM     DVT Prophy: SCDs HSQ      Dispo: PCU     Jaxon Smith [facility] Hospitalist       Subjective:      Bipap mask on. Laying in bed but awake. Responsive to me. Shakes my hand.      OBJECTIVE:     Blood pressure 138/79, pulse 86, temperature 97.9 °F (36.6 °C), temperature source Temporal, resp. rate 18, weight 242 lb (109.8 kg), SpO2 99%.     Temp:  [97.4 °F (36.3 °C)-97.9 °F (36.6 °C)] 97.9 °F (36.6 °C)  Pulse:  [] 86  Resp:  [15-24] 18  BP: (114-163)/() 138/79  SpO2:  [86 %-100 %] 99 %  FiO2 (%):  [30 %] 30 %        Intake/Output:     Intake/Output Summary (Last 24 hours) at 12/23/2023 1229  Last data filed at 12/23/2023 1100      Gross per 24 hour   Intake 890.6 ml   Output 10 ml   Net 880.6 ml                 Last 3 Weights   12/18/23 1258 242 lb (109.8 kg)   11/27/23 1951 252 lb (114.3 kg)   11/13/23 0700 245 lb 4.8 oz (111.3 kg)   11/12/23 0519 244 lb 8 oz (110.9 kg)   11/11/23 1300 246 lb 14.4 oz (112 kg)   11/11/23 0539 238 lb 1.6 oz (108 kg)   11/08/23 1410 234 lb (106.1 kg)   11/07/23 0619 234 lb 1.6 oz (106.2 kg)   11/06/23 1744 239 lb (108.4 kg)         Exam   General: awake  HEENT: bipap mask   Lungs: coarse   Heart: Regular rate and rhythm  Abdomen: soft, non tender  Extremities: No edema  Skin: no new rash, normal color  Psych: appropriate affect      Data Review:       Labs:            Recent Labs   Lab 12/21/23  0341 12/22/23  0421 12/23/23  0408   RBC 3.13* 3.46* 3.44*   HGB 9.7* 10.2* 10.3*   HCT 30.9* 33.7* 32.3*   MCV 98.7 97.4 93.9   MCH 31.0 29.5 29.9   MCHC 31.4 30.3* 31.9   RDW 16.0* 15.9* 15.3*   NEPRELIM 6.47 6.33 4.78   WBC 8.7 8.9 5.3   .0* 135.0* 155.0                  Recent Labs   Lab  12/21/23  0341 12/22/23  0421 12/23/23  0408   * 124* 280*   BUN 26* 46* 48*   CREATSERUM 5.42* 8.05* 6.28*   EGFRCR 10* 6* 9*   CA 8.4* 8.4* 8.7   * 136 136   K 5.1 5.4* 5.0    101 96*   CO2 27.0 24.0 23.0         No results for input(s): \"ALT\", \"AST\", \"ALB\", \"AMYLASE\", \"LIPASE\", \"LDH\" in the last 168 hours.     Invalid input(s): \"ALPHOS\", \"TBIL\", \"DBIL\", \"TPROT\"        Imaging:  XR CHEST AP PORTABLE  (CPT=71045)     Result Date: 12/23/2023  CONCLUSION:         1. Enteric tube tip projects over the distal gastric body/antrum. 2. Cardiomegaly with central pulmonary vascular congestion.  No focal airspace disease or significant pleural effusion.   A preliminary report was issued by the [employer] [facility] teleradiology service. There are no major discrepancies.  elm-remote  Dictated by (CST): Zuhair Powers MD on 12/23/2023 at 9:54 AM     Finalized by (CST): Zuhair Powers MD on 12/23/2023 at 9:56 AM           XR CHEST AP PORTABLE  (CPT=71045)     Result Date: 12/22/2023  CONCLUSION:          Post placement of a feeding tube with tip within the stomach and below the diaphragm.  Mild interstitial edema, unchanged.   Dictated by (CST): Kareem Padilla MD on 12/22/2023 at 4:47 PM     Finalized by (CST): Kareem Padilla MD on 12/22/2023 at 4:48 PM               Meds:       aspirin  81 mg Per NG Tube Daily    lansoprazole  30 mg Per NG Tube QAM AC    azithromycin  500 mg Per NG Tube Q24H    finasteride  5 mg Per NG Tube Daily    metoprolol tartrate  100 mg Per NG Tube 2x Daily(Beta Blocker)    insulin detemir  25 Units Subcutaneous Daily    cefTRIAXone  2 g Intravenous Q24H    methylPREDNISolone  80 mg Intravenous Q8H    insulin regular human  1-11 Units Subcutaneous 4 times per day    heparin  5,000 Units Subcutaneous Q8H YOLANDA    sevelamer carbonate  800 mg Oral TID CC    tamsulosin  0.8 mg Oral Daily    hydrALAZINE  50 mg Oral TID    gabapentin  100 mg Oral BID    rosuvastatin  10 mg Oral Nightly        dextrose 10%        dextrose 10%, lipase-protease-amylase (Lip-Prot-Amyl) **AND** sodium bicarbonate, traMADol, ipratropium-albuterol, glucose **OR** glucose **OR** glucose-vitamin C **OR** dextrose **OR** glucose **OR** glucose **OR** glucose-vitamin C, acetaminophen, polyethylene glycol (PEG 3350), sennosides, bisacodyl, fleet enema, ondansetron, metoclopramide, albuterol        Date of Admission: 2023  8:35 AM    Admission Diagnosis: ESRD on dialysis ([facility]) [N18.6, Z99.2]  Acute respiratory failure with hypoxia ([facility]) [J96.01]    S: On bipap overnight. More alert this am.     Scheduled Medications:      aspirin  81 mg Per NG Tube Daily    lansoprazole  30 mg Per NG Tube QAM AC    azithromycin  500 mg Per NG Tube Q24H    [Held by provider] finasteride  5 mg Per NG Tube Daily    metoprolol tartrate  100 mg Per NG Tube 2x Daily(Beta Blocker)    insulin detemir  25 Units Subcutaneous Daily    cefTRIAXone  2 g Intravenous Q24H    methylPREDNISolone  80 mg Intravenous Q8H    insulin regular human  1-11 Units Subcutaneous 4 times per day    heparin  5,000 Units Subcutaneous Q8H YOLANDA    sevelamer carbonate  800 mg Oral TID CC    tamsulosin  0.8 mg Oral Daily    hydrALAZINE  50 mg Oral TID    gabapentin  100 mg Oral BID    [Held by provider] rosuvastatin  10 mg Oral Nightly       Infusing Medications:      dextrose 10%         PRN Medications:  dextrose 10%, lipase-protease-amylase (Lip-Prot-Amyl) **AND** sodium bicarbonate, traMADol, ipratropium-albuterol, glucose **OR** glucose **OR** glucose-vitamin C **OR** dextrose **OR** glucose **OR** glucose **OR** glucose-vitamin C, acetaminophen, polyethylene glycol (PEG 3350), sennosides, bisacodyl, fleet enema, ondansetron, metoclopramide, albuterol    OBJECTIVE:  /87   Pulse 63   Temp 96.9 °F (36.1 °C) (Temporal)   Resp 14   Wt 242 lb (109.8 kg)   SpO2 100%   BMI 32.82 kg/m²    Temp (24hrs), Av.8 °F (36 °C), Min:96.7 °F (35.9 °C), Max:96.9 °F (36.1  °C)                  Wt Readings from Last 3 Encounters:   12/18/23 242 lb (109.8 kg)   11/27/23 252 lb (114.3 kg)   11/13/23 245 lb 4.8 oz (111.3 kg)        I/O last 3 completed shifts:  In: 687.8 [I.V.:115.8; NG/GT:572]  Out: 10 [Emesis/NG output:10]  No intake/output data recorded.     O2: 5 LNC this am, bipap 12/5 30% overnight.  General: NAD.   Neuro: Alert, no focal deficits.    HEENT: PERRL  Neck : No LAD  CV: RRR, nl S1, S2, no S4, S3 or murmur.   Lungs: Coarse bialterally.   Abd: Nontender, non distended.   Ext: No edema.   Skin: No rashes.            Recent Labs   Lab 12/22/23  0421 12/23/23  0408 12/24/23  0709   WBC 8.9 5.3 9.8   HGB 10.2* 10.3* 10.7*   HCT 33.7* 32.3* 32.8*   .0* 155.0 193.0            Recent Labs   Lab 12/22/23  0421 12/23/23  0408 12/24/23  0709   * 280* 361*   BUN 46* 48* 74*   CREATSERUM 8.05* 6.28* 8.46*   CA 8.4* 8.7 8.5*    136 133*   K 5.4* 5.0 4.6    96* 94*   CO2 24.0 23.0 25.0   ALB  --   --  3.9      No results for input(s): \"INR\", \"PTT\" in the last 168 hours.       Recent Labs   Lab 12/22/23  0938 12/22/23  1205   ABGPHT 7.40 7.39   SGCKIL3B 54* 52*   CPELM4N 94 83   ABGHCO3 30.6* 29.2*   SITE Right Radial Right Radial   THGB 10.6*  --        COVID-19 Lab Results    COVID-19        Lab Results   Component Value Date     COVID19 Not Detected 12/18/2023     COVID19 Not Detected 11/27/2023     COVID19 Not Detected 02/28/2023         Pro-Calcitonin      Recent Labs   Lab 12/22/23  0421   PCT 0.58*       Chest images personally reviewed.   Assessment/Plan   Acute respiratory failure - due to pulmonary edema and influenza  - continued hypercapnea and no clear prior lung disase. ? Bronchospasm.   Son does report h/o OHS.   - BD protocol.   - steroid burst endtoday.   - outpt PFT's.   - Bipap with all sleep. Could change to AVAPS to aid with complaince  Influenza A   - Tamiflu  - bronchodilator protocol  - PCT elevated and on abx rocephin/azithro  12/22/2023  Acute decompensated HFrEF  - per cardiology  ESRD on HD  - per nephrology  Ppx  - subcutaneous heparin  Dispo - full code  PT eval   - OK for floor transfer.   Donovan Montana MD  [facility], Critical Care [facility] Sleep Medicine    12/25  CC: Hospital Follow up     PCP: Victor Manuel Cleaning MD         Assessment/Plan:      Mr. Vines is a 73 year old male with PMH BPH , CHF / ICM EF 30% , CAD s/p CABG x3, Moderate MR, HTN, HLD, ESRD on HD M/W/F, Anemia, Gastritis, who presents with SOB. Admitted for fluid overload and Influenza A.  Hypoxic and hypercapnic respiratory failure likely due to influenza.  Requiring BiPAP after fluid status has improved. Tamiflu continued and will start ceftriaxone and steroids today.     Acute Hypoxic Respiratory Failure  Pulmonary edema  ESRD on HD M/W/F  - positive influenza   - HD per renal, next session 12/25  - s/p recent AV fistula repair by vascular surgery in September 2023  - bipap with all sleep  - last ABG done 12/24  - steroid burst per pulmonary ended 12/24     Encephalopathy   -more lethargic this AM  -BUN noted > 100 from 70s yesterday  -could be uremia, would have dialysis sooner then later today if possible   -discussed with RN  -monitor closely. Would not use BIPAP if this lethargic, has secretions in mouth, could aspirate      Influenza A positive   - cough for over a month but SOB more acute  - no fevers or Leukocytosis  - renally dose Tamiflu   - Ceftriaxone and steroids started 12/22/23  - monitor for worsening hypoxia     Troponin Elevation   - no chest pain   - EKG with concern for LBBB  - cardiology on consult     CAD s/p CABG x3   Ischemic cardiomyopathy EF 30% without acute exacerbation of congestive heart failure  Chronic Systolic heart failure  HTN  - Continue home metoprolol, hydralazine  - Not on ACE or ARB due to renal disease  - Does not use diuretics     Anemia   Thrombocytopenia   Gastritis   - s/p EGD 11/7 with some mild  gastritis, no active bleeding  - s/p colonoscopy 11/8 with polyps but no active bleeding, will need to repeat colonoscopy as outpatient  - PPI BID was not taking   - on prevacid here      Type 2 diabetes with hyperglycemia  Diabetic nephropathy  - gabapentin (hold while lethargic)  - ISS, meal time and basal insulin   - Controlled renal diet  - Continue home aspirin     Hyperlipidemia  -can hold his statin      BPH  -can hold his finasteride for now  -on flomax      FN:  Diet: NG removed yesterday by patient, Continue to assess oral, speech following      DVT Prophy: HSQ      Dispo: PCU     [facility] Hospitalist       Subjective:      More lethargic today.      OBJECTIVE:     Blood pressure 132/68, pulse 55, temperature (!) 96 °F (35.6 °C), temperature source Temporal, resp. rate 17, weight 242 lb (109.8 kg), SpO2 100%.     Temp:  [96 °F (35.6 °C)-98.6 °F (37 °C)] 96 °F (35.6 °C)  Pulse:  [46-72] 55  Resp:  [14-20] 17  BP: ()/(56-89) 132/68  SpO2:  [93 %-100 %] 100 %  FiO2 (%):  [30 %] 30 %        Intake/Output:     Intake/Output Summary (Last 24 hours) at 12/25/2023 0939  Last data filed at 12/24/2023 1800      Gross per 24 hour   Intake 200 ml   Output 0 ml   Net 200 ml                 Last 3 Weights   12/18/23 1258 242 lb (109.8 kg)   11/27/23 1951 252 lb (114.3 kg)   11/13/23 0700 245 lb 4.8 oz (111.3 kg)   11/12/23 0519 244 lb 8 oz (110.9 kg)   11/11/23 1300 246 lb 14.4 oz (112 kg)   11/11/23 0539 238 lb 1.6 oz (108 kg)   11/08/23 1410 234 lb (106.1 kg)   11/07/23 0619 234 lb 1.6 oz (106.2 kg)   11/06/23 1744 239 lb (108.4 kg)         Exam   General: lethargic  Lungs: coarse   Heart: Regular rate and rhythm  Abdomen: soft, non tender  Extremities: No edema  Skin: no new rash, normal color  Psych: appropriate affect      Data Review:       Labs:              Recent Labs   Lab 12/21/23  0341 12/22/23  0421 12/23/23  0408 12/24/23  0709 12/25/23  0428   RBC 3.13* 3.46* 3.44* 3.56*  3.56*   HGB 9.7* 10.2* 10.3* 10.7* 10.6*   HCT 30.9* 33.7* 32.3* 32.8* 33.3*   MCV 98.7 97.4 93.9 92.1 93.5   MCH 31.0 29.5 29.9 30.1 29.8   MCHC 31.4 30.3* 31.9 32.6 31.8   RDW 16.0* 15.9* 15.3* 14.6 14.5   NEPRELIM 6.47 6.33 4.78  --   --    WBC 8.7 8.9 5.3 9.8 9.0   .0* 135.0* 155.0 193.0 212.0                  Recent Labs   Lab 12/23/23  0408 12/24/23  0709 12/25/23  0428   * 361* 236*   BUN 48* 74* 108*   CREATSERUM 6.28* 8.46* 9.93*   EGFRCR 9* 6* 5*   CA 8.7 8.5* 8.5*    133* 135*   K 5.0 4.6 5.2*   CL 96* 94* 92*   CO2 23.0 25.0 24.0              Recent Labs   Lab 12/24/23  0709 12/25/23  0428   ALB 3.9 3.9            Imaging:  XR CHEST AP PORTABLE  (CPT=71045)     Result Date: 12/23/2023  CONCLUSION:         1. Enteric tube tip projects over the distal gastric body/antrum. 2. Cardiomegaly with central pulmonary vascular congestion.  No focal airspace disease or significant pleural effusion.   A preliminary report was issued by the [facility]eradiology [facility]. There are no major discrepancies.  elm-remote  Dictated by (CST): Zuhair Powers MD on 12/23/2023 at 9:54 AM     Finalized by (CST): Zuhair Powers MD on 12/23/2023 at 9:56 AM           XR CHEST AP PORTABLE  (CPT=71045)     Result Date: 12/22/2023  CONCLUSION:          Post placement of a feeding tube with tip within the stomach and below the diaphragm.  Mild interstitial edema, unchanged.   Dictated by (CST): Kareem Padilla MD on 12/22/2023 at 4:47 PM     Finalized by (CST): Kareem Padilla MD on 12/22/2023 at 4:48 PM               Meds:       atropine          aspirin  81 mg Per NG Tube Daily    lansoprazole  30 mg Per NG Tube QAM AC    [Held by provider] finasteride  5 mg Per NG Tube Daily    metoprolol tartrate  100 mg Per NG Tube 2x Daily(Beta Blocker)    insulin detemir  25 Units Subcutaneous Daily    cefTRIAXone  2 g Intravenous Q24H    insulin regular human  1-11 Units Subcutaneous 4 times per day    heparin  5,000  Units Subcutaneous Q8H YOLANDA    sevelamer carbonate  800 mg Oral TID CC    tamsulosin  0.8 mg Oral Daily    hydrALAZINE  50 mg Oral TID    gabapentin  100 mg Oral BID    [Held by provider] rosuvastatin  10 mg Oral Nightly       dextrose 10%        atropine, dextrose 10%, lipase-protease-amylase (Lip-Prot-Amyl) **AND** sodium bicarbonate, traMADol, ipratropium-albuterol, glucose **OR** glucose **OR** glucose-vitamin C **OR** dextrose **OR** glucose **OR** glucose **OR** glucose-vitamin C, acetaminophen, polyethylene glycol (PEG 3350), sennosides, bisacodyl, fleet enema, ondansetron, metoclopramide, albuterol              MEDICATIONS ADMINISTERED IN LAST 1 DAY:  aspirin chewable tab 81 mg       Date Action Dose Route User    12/26/2023 0830 Given 81 mg Per NG Tube Jia Yanez RN          bisacodyl (Dulcolax) 10 MG rectal suppository 10 mg       Date Action Dose Route User    12/26/2023 1045 Given 10 mg Rectal Jia Yanez RN          cefTRIAXone (Rocephin) 2 g in D5W 100 mL IVPB-ADD       Date Action Dose Route User    12/26/2023 0830 New Bag 2 g Intravenous Jia Yanez RN          heparin (Porcine) 5000 UNIT/ML injection 5,000 Units       Date Action Dose Route User    12/26/2023 1313 Given 5,000 Units Subcutaneous (Left Lower Abdomen) Jia Yanez RN    12/26/2023 0536 Given 5,000 Units Subcutaneous (Left Lower Abdomen) Juliana Marina RN    12/25/2023 2103 Given 5,000 Units Subcutaneous (Left Lower Abdomen) Juliana Marina RN    12/25/2023 1714 Given 5,000 Units Subcutaneous (Left Lower Abdomen) Ion Olivas RN          hydrALAZINE (Apresoline) tab 50 mg       Date Action Dose Route User    12/26/2023 0829 Given 50 mg Oral Jia Yanez RN    12/25/2023 2058 Given 50 mg Oral Juliana Marina RN          insulin aspart (NovoLOG) 100 Units/mL FlexPen 1-5 Units       Date Action Dose Route User    12/26/2023 1313 Given 2 Units Subcutaneous (Right Upper Abdomen) Jia Yanez, RN    12/26/2023 0835 Given 1 Units  Subcutaneous (Left Lower Arm) Jia Yanez RN          insulin detemir (Levemir) 100 UNIT/ML FlexPen/FlexTouch 25 Units       Date Action Dose Route User    12/26/2023 0835 Given 25 Units Subcutaneous (Left Lower Abdomen) Jia Yanez RN          insulin regular human (Novolin R, Humulin R) 100 UNIT/ML injection 1-11 Units       Date Action Dose Route User    12/25/2023 1800 Given 2 Units Subcutaneous (Left Upper Abdomen) Ion Olivas RN          polyethylene glycol (PEG 3350) (Miralax) 17 g oral packet 17 g       Date Action Dose Route User    12/26/2023 1207 Given 17 g Oral Jia Yanez RN          sevelamer carbonate (Renvela) tab 800 mg       Date Action Dose Route User    12/26/2023 1045 Given 800 mg Oral Jia Yanez RN    12/26/2023 0830 Given 800 mg Oral Jia Yanez RN    12/25/2023 1718 Given 800 mg Oral Ion Olivas RN          tamsulosin (Flomax) cap 0.8 mg       Date Action Dose Route User    12/26/2023 0830 Given 0.8 mg Oral Jia Yanez RN          traMADol (Ultram) tab 50 mg       Date Action Dose Route User    12/26/2023 1207 Given 50 mg Oral Jia Yanez RN            Vitals (last day)       Date/Time Temp Pulse Resp BP SpO2 Weight O2 Device O2 Flow Rate (L/min) [facility]    12/26/23 1300 -- 67 16 130/62 95 % -- None (Room air) --     12/26/23 1200 -- 66 14 120/67 92 % -- None (Room air) --     12/26/23 1100 -- 64 16 121/60 91 % -- None (Room air) --     12/26/23 1051 -- 73 21 -- 94 % -- None (Room air) --     12/26/23 0900 -- 72 15 116/74 100 % -- -- --     12/26/23 0800 97.1 °F (36.2 °C) 70 16 126/66 100 % -- Nasal cannula 5 L/min     12/26/23 0600 -- 59 11 113/71 100 % -- Bi-PAP -- RK    12/26/23 0400 96.9 °F (36.1 °C) 56 16 113/66 100 % -- -- --     12/26/23 0200 -- 57 13 117/65 100 % -- Bi-PAP --     12/26/23 0000 97.1 °F (36.2 °C) 62 18 122/67 100 % -- Bi-PAP --     12/25/23 2300 -- 64 20 134/69 100 % -- Nasal cannula 5 L/min     12/25/23 2200 -- 73 19  130/65 94 % -- Nasal cannula 5 L/min RK    12/25/23 2000 96.7 °F (35.9 °C) 79 19 139/74 95 % -- Nasal cannula 5 L/min RK    12/25/23 1800 -- 68 18 120/98 94 % -- None (Room air) -- LG    12/25/23 1700 -- 63 17 129/65 95 % -- None (Room air) -- LG    12/25/23 1600 97 °F (36.1 °C) 63 15 138/68 97 % -- None (Room air) -- LG    12/25/23 1500 -- 58 14 115/73 98 % -- None (Room air) -- LG    12/25/23 1400 -- 55 13 119/73 98 % -- None (Room air) -- LG    12/25/23 1300 -- 52 13 96/64 98 % -- None (Room air) -- LG    12/25/23 1216 -- 45 12 116/57 89 % -- None (Room air) -- LG    12/25/23 1200 96 °F (35.6 °C) 55 13 104/57 93 % -- None (Room air) -- LG    12/25/23 1100 -- 52 12 99/58 94 % -- None (Room air) -- LG    12/25/23 1000 -- 53 13 106/60 99 % -- Nasal cannula -- LG    12/25/23 0900 -- 54 12 110/62 100 % -- Nasal cannula 5 L/min LG    12/25/23 0800 96 °F (35.6 °C) 55 17 132/68 100 % -- Nasal cannula 5 L/min LG    12/25/23 0700 -- 57 20 129/75 100 % -- -- -- LG    12/25/23 0600 -- 57 15 135/80 100 % -- Nasal cannula 5 L/min AL    12/25/23 0500 -- 46 16 103/69 100 % -- Nasal cannula 5 L/min AL    12/25/23 0400 98.1 °F (36.7 °C) 55 19 134/71 100 % -- Bi-PAP -- AL    12/25/23 0300 -- 65 17 112/66 100 % -- Bi-PAP -- AL    12/25/23 0200 -- 52 15 122/70 100 % -- Bi-PAP -- AL    12/25/23 0100 -- 65 15 128/83 95 % -- Bi-PAP -- AL    12/25/23 0000 98.6 °F (37 °C) 66 17 100/65 93 % -- Nasal cannula 5 L/min AL          CIWA Scores (since admission)       None              Procedures:      Plan:

## 2023-12-26 NOTE — PLAN OF CARE
AxOx3, ROOM AIR, NSR, Incontinent, anuric with dialysis, Generalized weakness due to bedrest. PT/OT eval. Restraints removed. ACHS. Eating well. Bed lowest position and call light in reach.   PLAN: HD, IV abx and steroids, PT/OT  Problem: Patient Centered Care  Goal: Patient preferences are identified and integrated in the patient's plan of care  Description: Interventions:  - What would you like us to know as we care for you? I live at home with family. I'm from [place]  - Provide timely, complete, and accurate information to patient/family  - Incorporate patient and family knowledge, values, beliefs, and cultural backgrounds into the planning and delivery of care  - Encourage patient/family to participate in care and decision-making at the level they choose  - Honor patient and family perspectives and choices  Outcome: Progressing     Problem: Diabetes/Glucose Control  Goal: Glucose maintained within prescribed range  Description: INTERVENTIONS:  - Monitor Blood Glucose as ordered  - Assess for signs and symptoms of hyperglycemia and hypoglycemia  - Administer ordered medications to maintain glucose within target range  - Assess barriers to adequate nutritional intake and initiate nutrition consult as needed  - Instruct patient on self management of diabetes  Outcome: Progressing     Problem: Patient/Family Goals  Goal: Patient/Family Long Term Goal  Description: Patient's Long Term Goal: go home    Interventions:  - Monitor vital signs  - Monitor appropriate labs  - Monitor blood glucose levels  - Administer medications per order  - Pain management as needed  - Follow MD orders  - Diagnostics per orders  - Dialysis per orders  - Update / inform patient and family on plan of care  - Discharge planning     - See additional Care Plan goals for specific interventions  Outcome: Progressing  Goal: Patient/Family Short Term Goal  Description: Patient's Short Term Goal: To breathe better    Interventions:   - RT  treatment  -O2  -Follow md orders  - See additional Care Plan goals for specific interventions  Outcome: Progressing     Problem: CARDIOVASCULAR - ADULT  Goal: Maintains optimal cardiac output and hemodynamic stability  Description: INTERVENTIONS:  - Monitor vital signs, rhythm, and trends  - Monitor for bleeding, hypotension and signs of decreased cardiac output  - Evaluate effectiveness of vasoactive medications to optimize hemodynamic stability  - Monitor arterial and/or venous puncture sites for bleeding and/or hematoma  - Assess quality of pulses, skin color and temperature  - Assess for signs of decreased coronary artery perfusion - ex. Angina  - Evaluate fluid balance, assess for edema, trend weights  Outcome: Progressing  Goal: Absence of cardiac arrhythmias or at baseline  Description: INTERVENTIONS:  - Continuous cardiac monitoring, monitor vital signs, obtain 12 lead EKG if indicated  - Evaluate effectiveness of antiarrhythmic and heart rate control medications as ordered  - Initiate emergency measures for life threatening arrhythmias  - Monitor electrolytes and administer replacement therapy as ordered  Outcome: Progressing     Problem: RESPIRATORY - ADULT  Goal: Achieves optimal ventilation and oxygenation  Description: INTERVENTIONS:  - Assess for changes in respiratory status  - Assess for changes in mentation and behavior  - Position to facilitate oxygenation and minimize respiratory effort  - Oxygen supplementation based on oxygen saturation or ABGs  - Provide Smoking Cessation handout, if applicable  - Encourage broncho-pulmonary hygiene including cough, deep breathe, Incentive Spirometry  - Assess the need for suctioning and perform as needed  - Assess and instruct to report SOB or any respiratory difficulty  - Respiratory Therapy support as indicated  - Manage/alleviate anxiety  - Monitor for signs/symptoms of CO2 retention  Outcome: Progressing     Problem: METABOLIC/FLUID AND ELECTROLYTES -  ADULT  Goal: Glucose maintained within prescribed range  Description: INTERVENTIONS:  - Monitor Blood Glucose as ordered  - Assess for signs and symptoms of hyperglycemia and hypoglycemia  - Administer ordered medications to maintain glucose within target range  - Assess barriers to adequate nutritional intake and initiate nutrition consult as needed  - Instruct patient on self management of diabetes  Outcome: Progressing  Goal: Electrolytes maintained within normal limits  Description: INTERVENTIONS:  - Monitor labs and rhythm and assess patient for signs and symptoms of electrolyte imbalances  - Administer electrolyte replacement as ordered  - Monitor response to electrolyte replacements, including rhythm and repeat lab results as appropriate  - Fluid restriction as ordered  - Instruct patient on fluid and nutrition restrictions as appropriate  Outcome: Progressing  Goal: Hemodynamic stability and optimal renal function maintained  Description: INTERVENTIONS:  - Monitor labs and assess for signs and symptoms of volume excess or deficit  - Monitor intake, output and patient weight  - Monitor urine specific gravity, serum osmolarity and serum sodium as indicated or ordered  - Monitor response to interventions for patient's volume status, including labs, urine output, blood pressure (other measures as available)  - Encourage oral intake as appropriate  - Instruct patient on fluid and nutrition restrictions as appropriate  Outcome: Progressing     Problem: Safety Risk - Non-Violent Restraints  Goal: Patient will remain free from self-harm  Description: INTERVENTIONS:  - Apply the least restrictive restraint to prevent harm  - Notify patient and family of reasons restraints applied  - Assess for any contributing factors to confusion (electrolyte disturbances, delirium, medications)  - Discontinue any unnecessary medical devices as soon as possible  - Assess the patient's physical comfort, circulation, skin condition,  hydration, nutrition and elimination needs   - Reorient and redirection as needed  - Assess for the need to continue restraints  Outcome: Progressing     Problem: Delirium  Goal: Minimize duration of delirium  Description: Interventions:  - Encourage use of hearing aids, eye glasses  - Promote highest level of mobility daily  - Provide frequent reorientation  - Promote wakefulness i.e. lights on, blinds open  - Promote sleep, encourage patient's normal rest cycle i.e. lights off, TV off, minimize noise and interruptions  - Encourage family to assist in orientation and promotion of home routines  Outcome: Progressing

## 2023-12-26 NOTE — CM/SW NOTE
PT/OT evals from 12/22 recommend HODA at [place]. Pt now restraints free, on RA, has PO diet. HODA referrals sent to [facility] approved [facility] facilities in [facility]. List of accepting facilities will be needed for choice and whatever facility that does accept will need to provide transport to outpatient HD which patient has scheduled at [facility] in [facility] at 3pm. Ins auth will be needed for HODA prior to [place].    PASRR level 1 screen completed and uploaded to [facility] referral.     Plan: HODA w/[facility] for outpatient HD  pending facility choice, ins auth, and medical clearance.    Arie Farmer, MARCELAN    [phone]

## 2023-12-26 NOTE — SLP NOTE
SPEECH DAILY NOTE - INPATIENT    ASSESSMENT & PLAN   ASSESSMENT  PPE REQUIRED. THIS THERAPIST WORE GLOVES, MASK, AND GOGGLES FOR DURATION OF EVALUATION. HANDS WASHED UPON ENTRANCE/EXIT.    SLP f/u for ongoing dysphagia tx/meal assessment per recommendations of puree/mildly thick liquids per BSE. RN reports pt tolerates diet and medication well with no overt clinical s/s aspiration. Pt denies any swallowing challenges.     Pt positioned upright in bed, fluctuating alertness/cooperative. Pt afebrile, tolerating room air with oxygen status 91% prior to the start of oral trials. SLP reviewed aspiration precautions and safe swallowing compensatory strategies with the patient. Safe swallow guidelines remain written on the white board in purple. Patient v/u, no family present. Provided 1:1 assistance, pt tolerates puree with no overt clinical signs/symptoms of aspiration. 1x delayed cough observed after mildly thick liquids, difficult to assess if related to swallow function, will continue to monitor. Pt presented with trials of soft solids and thin liquids via tsp. Pt with adequate oral acceptance and bilabial seal. Pt with weak bite, prolonged/reduced mastication of soft solids, and delayed A/P transfer. Pharyngeal swallow response appears delayed with reduced laryngeal elevation/excursion. Intermittent delayed cough observed t/o trials, again difficult to assess if related to swallow function. Oxygen status remained stable t/o the entire session. Recommend remain on current diet with strict adherence to aspiration precautions and swallow strategies.     SLP to f/u with meal assessment x3, monitor  CXR, and VFSS if any overt CSA and/or decline in CXR. RN alerted with results and recommendations.     MOST RECENT CXR 12/23  CONCLUSION:   1. Enteric tube tip projects over the distal gastric body/antrum.   2. Cardiomegaly with central pulmonary vascular congestion.  No focal airspace disease or significant pleural effusion.            Diet Recommendations - Solids: Puree  Diet Recommendations - Liquids: Nectar thick liquids/ Mildly thick    Compensatory Strategies Recommended: No straws;Liquids via spoon;Slow rate;Small bites and sips  Aspiration Precautions: Upright position;Slow rate;Small bites and sips;Cueing to swallow;No straw  Medication Administration Recommendations: Crushed in puree    Patient Experiencing Pain: No                Discharge Recommendations  Discharge Recommendations/Plan: Undetermined    Treatment Plan  Treatment Plan/Recommendations: Aspiration precautions    Interdisciplinary Communication: Discussed with RN  Plan posted at bedside            GOALS  Goal #1 The patient will tolerate puree consistency and mildly thick liquids without overt signs or symptoms of aspiration with 100 % accuracy over 1-2 session(s).  In Progress   Goal #2 The patient/family/caregiver will demonstrate understanding and implementation of aspiration precautions and swallow strategies independently over 1-2 session(s).    In Progress   Goal #3 The patient will utilize compensatory strategies as outlined by  BSSE (clinical evaluation) including Slow rate, Small bites, Small sips, No straws, Upright 90 degrees, Upright 90 degrees 30 mins after meal, Eliminate distractions, Feed patient with 1:1 assistance 100 % of the time across 2 sessions.  In Progress   Goal #4 The patient will tolerate trial upgrade of soft/hard solid consistency and thin liquids without overt signs or symptoms of aspiration with 100 % accuracy over 1-2 session(s).    In Progress     FOLLOW UP  Follow Up Needed (Documentation Required): Yes  SLP Follow-up Date: 12/27/23  Number of Visits to Meet Established Goals: 3    Session: 1    If you have any questions, please contact CECILLE Hicks M.S. CCC-SLP  Speech Language Pathologist  Phone Number [phone]

## 2023-12-26 NOTE — PLAN OF CARE
Problem: Patient Centered Care  Goal: Patient preferences are identified and integrated in the patient's plan of care  Description: Interventions:  - What would you like us to know as we care for you? I live at home with family. I'm from [place]  - Provide timely, complete, and accurate information to patient/family  - Incorporate patient and family knowledge, values, beliefs, and cultural backgrounds into the planning and delivery of care  - Encourage patient/family to participate in care and decision-making at the level they choose  - Honor patient and family perspectives and choices  12/25/2023 1820 by Ion Olivas RN  Outcome: Progressing  12/25/2023 1219 by Ion Olivas RN  Outcome: Not Progressing     Problem: Diabetes/Glucose Control  Goal: Glucose maintained within prescribed range  Description: INTERVENTIONS:  - Monitor Blood Glucose as ordered  - Assess for signs and symptoms of hyperglycemia and hypoglycemia  - Administer ordered medications to maintain glucose within target range  - Assess barriers to adequate nutritional intake and initiate nutrition consult as needed  - Instruct patient on self management of diabetes  12/25/2023 1820 by Ion Olivas RN  Outcome: Progressing  12/25/2023 1219 by Ion Olivas RN  Outcome: Progressing     Problem: Patient/Family Goals  Goal: Patient/Family Long Term Goal  Description: Patient's Long Term Goal: go home    Interventions:  - Monitor vital signs  - Monitor appropriate labs  - Monitor blood glucose levels  - Administer medications per order  - Pain management as needed  - Follow MD orders  - Diagnostics per orders  - Dialysis per orders  - Update / inform patient and family on plan of care  - Discharge planning     - See additional Care Plan goals for specific interventions  12/25/2023 1820 by Ion Olivas RN  Outcome: Progressing  12/25/2023 1219 by Ion Olivas RN  Outcome: Not Progressing  Goal: Patient/Family Short Term  Goal  Description: Patient's Short Term Goal: To breathe better    Interventions:   - RT treatment  -O2  -Follow md orders  - See additional Care Plan goals for specific interventions  12/25/2023 1820 by Ion Olivas RN  Outcome: Progressing  12/25/2023 1219 by Ion Olivas RN  Outcome: Not Progressing     Problem: CARDIOVASCULAR - ADULT  Goal: Maintains optimal cardiac output and hemodynamic stability  Description: INTERVENTIONS:  - Monitor vital signs, rhythm, and trends  - Monitor for bleeding, hypotension and signs of decreased cardiac output  - Evaluate effectiveness of vasoactive medications to optimize hemodynamic stability  - Monitor arterial and/or venous puncture sites for bleeding and/or hematoma  - Assess quality of pulses, skin color and temperature  - Assess for signs of decreased coronary artery perfusion - ex. Angina  - Evaluate fluid balance, assess for edema, trend weights  12/25/2023 1820 by Ion Olivas RN  Outcome: Progressing  12/25/2023 1219 by Ion Olivas RN  Outcome: Not Progressing  Goal: Absence of cardiac arrhythmias or at baseline  Description: INTERVENTIONS:  - Continuous cardiac monitoring, monitor vital signs, obtain 12 lead EKG if indicated  - Evaluate effectiveness of antiarrhythmic and heart rate control medications as ordered  - Initiate emergency measures for life threatening arrhythmias  - Monitor electrolytes and administer replacement therapy as ordered  12/25/2023 1820 by Ion Olivas RN  Outcome: Progressing  12/25/2023 1219 by Ion Olivas RN  Outcome: Not Progressing     Problem: RESPIRATORY - ADULT  Goal: Achieves optimal ventilation and oxygenation  Description: INTERVENTIONS:  - Assess for changes in respiratory status  - Assess for changes in mentation and behavior  - Position to facilitate oxygenation and minimize respiratory effort  - Oxygen supplementation based on oxygen saturation or ABGs  - Provide Smoking Cessation handout, if  applicable  - Encourage broncho-pulmonary hygiene including cough, deep breathe, Incentive Spirometry  - Assess the need for suctioning and perform as needed  - Assess and instruct to report SOB or any respiratory difficulty  - Respiratory Therapy support as indicated  - Manage/alleviate anxiety  - Monitor for signs/symptoms of CO2 retention  12/25/2023 1820 by Ion Olivas RN  Outcome: Progressing  12/25/2023 1219 by Ion Olivas RN  Outcome: Progressing     Problem: METABOLIC/FLUID AND ELECTROLYTES - ADULT  Goal: Glucose maintained within prescribed range  Description: INTERVENTIONS:  - Monitor Blood Glucose as ordered  - Assess for signs and symptoms of hyperglycemia and hypoglycemia  - Administer ordered medications to maintain glucose within target range  - Assess barriers to adequate nutritional intake and initiate nutrition consult as needed  - Instruct patient on self management of diabetes  12/25/2023 1820 by Ion Olivas RN  Outcome: Progressing  12/25/2023 1219 by Ion Olivas RN  Outcome: Progressing  Goal: Electrolytes maintained within normal limits  Description: INTERVENTIONS:  - Monitor labs and rhythm and assess patient for signs and symptoms of electrolyte imbalances  - Administer electrolyte replacement as ordered  - Monitor response to electrolyte replacements, including rhythm and repeat lab results as appropriate  - Fluid restriction as ordered  - Instruct patient on fluid and nutrition restrictions as appropriate  12/25/2023 1820 by Ion Olivas RN  Outcome: Progressing  12/25/2023 1219 by Ion Olivas RN  Outcome: Not Progressing  Goal: Hemodynamic stability and optimal renal function maintained  Description: INTERVENTIONS:  - Monitor labs and assess for signs and symptoms of volume excess or deficit  - Monitor intake, output and patient weight  - Monitor urine specific gravity, serum osmolarity and serum sodium as indicated or ordered  - Monitor response to  interventions for patient's volume status, including labs, urine output, blood pressure (other measures as available)  - Encourage oral intake as appropriate  - Instruct patient on fluid and nutrition restrictions as appropriate  12/25/2023 1820 by Ion Olivas RN  Outcome: Progressing  12/25/2023 1219 by Ion Olivas RN  Outcome: Not Progressing     Problem: Safety Risk - Non-Violent Restraints  Goal: Patient will remain free from self-harm  Description: INTERVENTIONS:  - Apply the least restrictive restraint to prevent harm  - Notify patient and family of reasons restraints applied  - Assess for any contributing factors to confusion (electrolyte disturbances, delirium, medications)  - Discontinue any unnecessary medical devices as soon as possible  - Assess the patient's physical comfort, circulation, skin condition, hydration, nutrition and elimination needs   - Reorient and redirection as needed  - Assess for the need to continue restraints  12/25/2023 1820 by Ion Olivas RN  Outcome: Not Progressing  12/25/2023 1219 by Ion Olivas RN  Outcome: Not Progressing     Problem: Delirium  Goal: Minimize duration of delirium  Description: Interventions:  - Encourage use of hearing aids, eye glasses  - Promote highest level of mobility daily  - Provide frequent reorientation  - Promote wakefulness i.e. lights on, blinds open  - Promote sleep, encourage patient's normal rest cycle i.e. lights off, TV off, minimize noise and interruptions  - Encourage family to assist in orientation and promotion of home routines  12/25/2023 1820 by Ion Olivas RN  Outcome: Progressing  12/25/2023 1219 by Ion Olivas RN  Outcome: Not Progressing   Patient continues to need restraints to prevent from pulling lines, tubes

## 2023-12-26 NOTE — PROGRESS NOTES
[facility]    Cardiology Progress Note    Terrence Vines Patient Status:  Inpatient    1950 MRN [id number]   Location [facility] 2W/SW Attending Bradley Dash MD   Hosp Day # 8 PCP Victor Manuel Cleaning MD       Impression/Plan:  73 year old male presenting with:     1) Acute hypoxemic respiratory failure, multifactorial  2) Influenza A  3) HFrEF (EF 45-50% 2023)  4) ESRD on HD  5) CAD s/p CABG 2021  6) CVA  7) HTN, normotensive today  8) HL. On statin  9) DM     - Influenza A management as per primary  - HD as per nephrology   - Cont asa, statin, bb, hydralazine; titrate as needed for BP control.  Continue metoprolol tartrate 100mg BID today. Can consolidate to succinae 200mg on discharge. No ACEi/ARB/ARNI due to renal issues  - Bipap as per pulm/critical care  - Monitor on tele    Patient Dr Bautista    Subjective:     Awake, more alert for me today compared to previous notes. He is able to state that he has no chest pain or dyspnea.  He is satting well on minimal oxygen (99% on 1.5 L).  Oxygen was discontinued with me and sats were 95%. He has some abdominal pain because he has not had bowel movement since the . I/O negative 3L since admission.      Patient Active Problem List   Diagnosis    Moderate nonproliferative diabetic retinopathy without macular edema associated with type 2 diabetes mellitus (HCC)    Benign prostatic hyperplasia    Type 2 diabetes mellitus with diabetic polyneuropathy, without long-term current use of insulin (HCC)    Vitamin D deficiency    Type 2 diabetes mellitus with microalbuminuria  (HCC)    Type 2 diabetes mellitus with nephropathy (HCC)    Combined hyperlipidemia associated with type 2 diabetes mellitus  (HCC)    Hypertension associated with type 2 diabetes mellitus  (HCC)    Lower extremity edema    Iron deficiency anemia    CKD (chronic kidney disease) stage 4, GFR 15-29 ml/min (HCC)    Acute pulmonary edema (HCC)    Acute on chronic  congestive heart failure, unspecified heart failure type (HCC)    Acute respiratory failure with hypoxia (HCC)    Coronary artery disease involving native coronary artery of native heart    Cerebrovascular accident (CVA) due to bilateral embolism of middle cerebral arteries (HCC)    Preoperative testing    S/P CABG (coronary artery bypass graft)    Acute renal failure superimposed on chronic kidney disease  (HCC)    Acute renal failure superimposed on chronic kidney disease, unspecified CKD stage, unspecified acute renal failure type    Stage 5 chronic kidney disease not on chronic dialysis (HCC)    Hypernatremia    Acute kidney injury (HCC)    Anemia    ESRD on hemodialysis (HCC)    Facial swelling    Rhinovirus infection    Thyroid nodule    Pulmonary nodules    Elevated BUN    Subacute cough    Dialysis AV fistula malfunction (HCC)    Type 2 diabetes mellitus with hyperglycemia, with long-term current use of insulin (HCC)    Anemia, unspecified type    Thrombocytopenia (HCC)    ESRD on dialysis (HCC)       Objective:   Temp: 97.1 °F (36.2 °C)  Pulse: 64  Resp: 16  BP: 121/60  FiO2 (%): 30 %    Intake/Output:     Intake/Output Summary (Last 24 hours) at 12/26/2023 1150  Last data filed at 12/26/2023 0600  Gross per 24 hour   Intake 150 ml   Output 850 ml   Net -700 ml       Last 3 Weights   12/18/23 1258 242 lb (109.8 kg)   11/27/23 1951 252 lb (114.3 kg)   11/13/23 0700 245 lb 4.8 oz (111.3 kg)   11/12/23 0519 244 lb 8 oz (110.9 kg)   11/11/23 1300 246 lb 14.4 oz (112 kg)   11/11/23 0539 238 lb 1.6 oz (108 kg)   11/08/23 1410 234 lb (106.1 kg)   11/07/23 0619 234 lb 1.6 oz (106.2 kg)   11/06/23 1744 239 lb (108.4 kg)       Tele: SR/SB    Physical Exam:    General: alert, alert, oriented x 1  HEENT: Normocephalic, anicteric sclera  Neck: supple  Cardiac: Regular rate and rhythm. S1, S2 normal. No murmur,   Lungs: coarse breath sounds bilat  Abdomen: Soft, non-distended  Extremities: no edema  Skin: Warm and dry.      Laboratory/Data:    Labs:         Recent Labs   Lab 12/22/23  0421 12/23/23  0408 12/24/23  0709 12/25/23  0428 12/26/23  0327   WBC 8.9 5.3 9.8 9.0 8.2   HGB 10.2* 10.3* 10.7* 10.6* 9.7*   MCV 97.4 93.9 92.1 93.5 95.1   .0* 155.0 193.0 212.0 178.0       Recent Labs   Lab 12/20/23  0400 12/21/23  0341 12/22/23  0421 12/23/23  0408 12/24/23  0709 12/25/23  0428 12/26/23  0327    135* 136 136 133* 135* 134*   K 4.8 5.1 5.4* 5.0 4.6 5.2* 4.6   CL 99 102 101 96* 94* 92* 96*   CO2 25.0 27.0 24.0 23.0 25.0 24.0 24.0   BUN 33* 26* 46* 48* 74* 108* 62*   CREATSERUM 5.69* 5.42* 8.05* 6.28* 8.46* 9.93* 7.67*   CA 8.4* 8.4* 8.4* 8.7 8.5* 8.5* 8.3*   MG 2.0 2.1 2.5 2.6  --   --   --    PHOS 6.0* 6.4* 10.2* 8.7* 8.9* 11.0* 8.5*   * 103* 124* 280* 361* 236* 211*       Recent Labs   Lab 12/24/23  0709 12/25/23  0428 12/26/23  0327   ALB 3.9 3.9 4.0       No results for input(s): \"TROP\" in the last 168 hours.    ECHO 11/23:  1. Left ventricle: The cavity size was normal. Wall thickness was normal.      Systolic function was mildly reduced. The estimated ejection fraction was      45-50%, by visual assessment. Although no diagnostic regional wall motion      abnormality was identified, this possibility cannot be completely      excluded on the basis of this study.   2. Mitral valve: There was mild regurgitation.   3. Pulmonary arteries: Systolic pressure was within the normal range,      estimated to be 30mm Hg.   4. Pericardium, extracardiac: There was no pericardial effusion.           Allergies:   No Known Allergies    Medications:

## 2023-12-26 NOTE — PROGRESS NOTES
[facility] Hospitalist Progress Note     CC: Hospital Follow up    PCP: Victor Manuel Cleaning MD       Assessment/Plan:     Mr. Vines is a 73 year old male with PMH BPH , CHF / ICM EF 30% , CAD s/p CABG x3, Moderate MR, HTN, HLD, ESRD on HD M/W/F, Anemia, Gastritis, who presents with SOB. Admitted for fluid overload and Influenza A.  Hypoxic and hypercapnic respiratory failure.      Acute Hypoxic Respiratory Failure  Pulmonary edema  ESRD on HD M/W/F  - positive influenza   - HD per renal, received on 12/25  - s/p recent AV fistula repair by vascular surgery in September 2023  - bipap with all sleep  - last ABG done 12/24  - steroid burst per pulmonary ended 12/24    Encephalopathy, improved today  -maybe responded to dialysis, today BUN less and mentation seems more clear  -monitor closely  -mobilize, needs therapy. He is very deconditioned     Influenza A positive   - cough for over a month but SOB more acute  - no fevers or Leukocytosis  - renally dose Tamiflu   - Ceftriaxone and steroids started 12/22/23  - monitor for worsening hypoxia     Troponin Elevation   - no chest pain   - EKG with concern for LBBB  - cardiology on consult     CAD s/p CABG x3   Ischemic cardiomyopathy EF 30% without acute exacerbation of congestive heart failure  Chronic Systolic heart failure  HTN  - Continue home metoprolol, hydralazine  - Not on ACE or ARB due to renal disease  - Does not use diuretics     Anemia   Thrombocytopenia   Gastritis   - s/p EGD 11/7 with some mild gastritis, no active bleeding  - s/p colonoscopy 11/8 with polyps but no active bleeding, will need to repeat colonoscopy as outpatient  - PPI BID was not taking   - on prevacid here      Type 2 diabetes with hyperglycemia  Diabetic nephropathy  - gabapentin (hold for now)  - ISS, meal time and basal insulin   - Controlled renal diet  - Continue home aspirin     Hyperlipidemia  -can hold his statin      BPH  -can hold his finasteride for now  -on flomax      FN:  Diet: NG  removed 12/24 by patient, Continue to assess oral and nutritional intake. Speech following      DVT Prophy: HSQ      Dispo: PCU, needs therapy. He is very deconditioned. Dispo will need to be HODA once stabilized. Mentation today is improved     [facility] Hospitalist      Subjective:     More talkative to me. Seems to be alert. Looks deconditioned.     OBJECTIVE:    Blood pressure 126/66, pulse 70, temperature 97.1 °F (36.2 °C), temperature source Oral, resp. rate 16, weight 242 lb (109.8 kg), SpO2 100%.    Temp:  [96 °F (35.6 °C)-97.1 °F (36.2 °C)] 97.1 °F (36.2 °C)  Pulse:  [45-79] 70  Resp:  [11-20] 16  BP: ()/(57-98) 126/66  SpO2:  [89 %-100 %] 100 %  FiO2 (%):  [30 %] 30 %      Intake/Output:    Intake/Output Summary (Last 24 hours) at 12/26/2023 0937  Last data filed at 12/26/2023 0600  Gross per 24 hour   Intake 150 ml   Output 850 ml   Net -700 ml         Last 3 Weights   12/18/23 1258 242 lb (109.8 kg)   11/27/23 1951 252 lb (114.3 kg)   11/13/23 0700 245 lb 4.8 oz (111.3 kg)   11/12/23 0519 244 lb 8 oz (110.9 kg)   11/11/23 1300 246 lb 14.4 oz (112 kg)   11/11/23 0539 238 lb 1.6 oz (108 kg)   11/08/23 1410 234 lb (106.1 kg)   11/07/23 0619 234 lb 1.6 oz (106.2 kg)   11/06/23 1744 239 lb (108.4 kg)       Exam   General: awake  Lungs: clear   Heart: Regular rate and rhythm  Abdomen: soft, non tender  Extremities: No edema  Skin: no new rash, normal color  Psych: appropriate affect     Data Review:       Labs:     Recent Labs   Lab 12/21/23  0341 12/22/23  0421 12/23/23  0408 12/24/23  0709 12/25/23  0428 12/26/23  0327   RBC 3.13* 3.46* 3.44* 3.56* 3.56* 3.25*   HGB 9.7* 10.2* 10.3* 10.7* 10.6* 9.7*   HCT 30.9* 33.7* 32.3* 32.8* 33.3* 30.9*   MCV 98.7 97.4 93.9 92.1 93.5 95.1   MCH 31.0 29.5 29.9 30.1 29.8 29.8   MCHC 31.4 30.3* 31.9 32.6 31.8 31.4   RDW 16.0* 15.9* 15.3* 14.6 14.5 14.6   NEPRELIM 6.47 6.33 4.78  --   --   --    WBC 8.7 8.9 5.3 9.8 9.0 8.2   .0* 135.0*  155.0 193.0 212.0 178.0         Recent Labs   Lab 12/24/23  0709 12/25/23  0428 12/26/23  0327   * 236* 211*   BUN 74* 108* 62*   CREATSERUM 8.46* 9.93* 7.67*   EGFRCR 6* 5* 7*   CA 8.5* 8.5* 8.3*   * 135* 134*   K 4.6 5.2* 4.6   CL 94* 92* 96*   CO2 25.0 24.0 24.0       Recent Labs   Lab 12/24/23  0709 12/25/23  0428 12/26/23 0327   ALB 3.9 3.9 4.0         Imaging:  No results found.      Meds:      insulin aspart  1-5 Units Subcutaneous TID CC    aspirin  81 mg Per NG Tube Daily    lansoprazole  30 mg Per NG Tube QAM AC    [Held by provider] finasteride  5 mg Per NG Tube Daily    metoprolol tartrate  100 mg Per NG Tube 2x Daily(Beta Blocker)    insulin detemir  25 Units Subcutaneous Daily    cefTRIAXone  2 g Intravenous Q24H    heparin  5,000 Units Subcutaneous Q8H YOLANDA    sevelamer carbonate  800 mg Oral TID CC    tamsulosin  0.8 mg Oral Daily    hydrALAZINE  50 mg Oral TID    [Held by provider] gabapentin  100 mg Oral BID    [Held by provider] rosuvastatin  10 mg Oral Nightly      dextrose 10%       metoclopramide, dextrose 10%, lipase-protease-amylase (Lip-Prot-Amyl) **AND** sodium bicarbonate, traMADol, ipratropium-albuterol, glucose **OR** glucose **OR** glucose-vitamin C **OR** dextrose **OR** glucose **OR** glucose **OR** glucose-vitamin C, acetaminophen, polyethylene glycol (PEG 3350), sennosides, bisacodyl, fleet enema, ondansetron, albuterol

## 2023-12-26 NOTE — DIETARY NOTE
ADULT NUTRITION REASSESSMENT    Pt is at moderate nutrition risk.  Pt does not meet malnutrition criteria.        RECOMMENDATIONS TO MD: See Nutrition Intervention  ADMITTING DIAGNOSIS:  ESRD on dialysis ([facility]) [N18.6, Z99.2]  Acute respiratory failure with hypoxia ([facility]) [J96.01]  PERTINENT PAST MEDICAL HISTORY:   Past Medical History:   Diagnosis Date    BPH (benign prostatic hypertrophy)     Cataract     Congestive heart disease (HCC)     Coronary atherosclerosis     Diabetic retinopathy (HCC)     Dialysis patient ([facility])     M,W,F,    Esophageal reflux     Heart valve disease     High blood pressure     High cholesterol     HLD (hyperlipidemia)     HTN (hypertension)     Neuropathy     Proteinuria     Renal disorder     HD every MWF with temporary HD cath    Type II diabetes mellitus (HCC)     Visual impairment     Reading glasses       PATIENT STATUS: Initial 12/22/23: Pt admit for shortness of breath. PMH sig for  BPH , CHF / ICM EF 30% , Moderate MR, HTN, HLD, ESRD on HD M/W/F, Anemia, Gastritis . Pt assessed due to consult for tube feeds. Pt screened at no nutrition risk but discussed at rounds today noting very poor po intake since admit (X 4 days). SLP following and pt now made NPO. Pt is lethargic and disoriented. Pt is obese and well nourished upon admit. (Unable to obtain specific diet hx d/t neuro status at this time) . Will begin wth Nepro formula (fiber enriched to help promote BM and indicated for HD pt) . Pt is anuric.   12/26/23 Update: Tube feeds stopped 12/24 as pt had pulled NG tube out (d/t confusion) and diet was able to be advanced to Renal modified consistency and thickened liquids. Po intake was variable last 24 hrs (100-20% X 3 meals) and this morning consumed 100% of meal per RN at PCCU rounds. Will provide oral nutrition supplement high in Kcals and protein (Nepro)  to help meet nutrition needs until pt is eating >65% of meals  consistently  FOOD/NUTRITION RELATED HISTORY:  Appetite: Good  PTA. Poor last 4 days.   Intake: NPO  Intake Meeting Needs: No but improving and maximized with oral nutrition supplements.   Percent Meals Eaten (last 3 days)       Date/Time Percent Meals Eaten (%)    12/24/23 1800 100 %     Percent Meals Eaten (%): chicken soup, yogurt at 12/24/23 1800    12/25/23 0827 20 %    12/25/23 1800 50 %           Food Allergies: No Known Food Allergies (NKFA)  Cultural/Ethnic/Yazidi Preferences: Not Obtained    GASTROINTESTINAL: NGT in place/NPO Last BM 12/23 formed brown medium .   PRN meds for constipation in place. Miralax given 12/22 and 12/26. . (Opioids on board increasing risk for constipation)   MEDICATIONS Steroids (on insulin basal and CF)     sevelamer carbonate  1,600 mg Oral TID CC    insulin aspart  1-5 Units Subcutaneous TID CC    aspirin  81 mg Per NG Tube Daily    lansoprazole  30 mg Per NG Tube QAM AC    [Held by provider] finasteride  5 mg Per NG Tube Daily    metoprolol tartrate  100 mg Per NG Tube 2x Daily(Beta Blocker)    insulin detemir  25 Units Subcutaneous Daily    cefTRIAXone  2 g Intravenous Q24H    heparin  5,000 Units Subcutaneous Q8H YOLANDA    tamsulosin  0.8 mg Oral Daily    hydrALAZINE  50 mg Oral TID    [Held by provider] gabapentin  100 mg Oral BID    [Held by provider] rosuvastatin  10 mg Oral Nightly   Prn: metoclopramide, traMADol, ipratropium-albuterol, glucose **OR** glucose **OR** glucose-vitamin C **OR** dextrose **OR** glucose **OR** glucose **OR** glucose-vitamin C, acetaminophen, polyethylene glycol (PEG 3350), sennosides, bisacodyl, fleet enema, ondansetron, albuterol    LABS: reviewed c/w ESRD . Phos still elevated and Renvela dose increased today. POC , 191- improved off steroids 12/24. On low dose insulin CF and Levemir insulin.    Recent Labs     12/23/23  0408 12/24/23  0709 12/25/23  0428 12/26/23  0327   * 361* 236* 211*   BUN 48* 74* 108* 62*   CREATSERUM 6.28* 8.46* 9.93* 7.67*   CA 8.7 8.5* 8.5* 8.3*   MG 2.6  --   --    --     133* 135* 134*   K 5.0 4.6 5.2* 4.6   CL 96* 94* 92* 96*   CO2 23.0 25.0 24.0 24.0   PHOS 8.7* 8.9* 11.0* 8.5*   OSMOCALC 305* 312* 322* 302*       NUTRITION RELATED PHYSICAL FINDINGS:  - Nutrition Focused Physical Exam (NFPE): well nourished per visual exam  - Fluid Accumulation: none  see RN documentation for details  - Skin Integrity: intact see RN documentation for details. High skin risk.     ANTHROPOMETRICS:  HT:  6'   WT: 109.8 kg (242 lb) 12/18. Need new updated scaled weight.   BMI: Body mass index is 32.82 kg/m².  BMI CLASSIFICATION: 30-34.9 kg/m2 - obesity class I  IBW: 178  lbs        136 % IBW  Usual Body Wt: 237-248  lbs March-Sept 2023      100 % UBW    WEIGHT HISTORY:  Patient Weight(s) for the past 336 hrs:   Weight   12/18/23 1258 109.8 kg (242 lb)     Wt Readings from Last 10 Encounters:   12/18/23 109.8 kg (242 lb)   11/27/23 114.3 kg (252 lb)   11/13/23 111.3 kg (245 lb 4.8 oz)   09/27/23 110.5 kg (243 lb 9.7 oz)   09/20/23 74.8 kg (165 lb)   09/05/23 112.5 kg (248 lb)   05/09/23 108.2 kg (238 lb 8 oz)   03/03/23 107.5 kg (237 lb)   03/01/23 103.5 kg (228 lb 4.6 oz)   01/16/23 103.4 kg (228 lb)     NUTRITION DIAGNOSIS/PROBLEM:   Inadequate oral intake related to Decreased ability to consume sufficient energy  as evidenced by very poor po intake X 4 days, lethargy and disorientation.  NUTRITION DIAGNOSIS PROGRESS:  Improvement (unresolved)- eating improved.     NUTRITION INTERVENTION:     NUTRITION PRESCRIPTION:   Estimated Nutrition needs: --dosing wt of 109.8  kg - wt taken on 12/18/23   Calories: 8848-2641 calories/day (15-20 calories per kg Dosing wt)  Protein:  g protein/day (1.2-1.4  g protein/kg Ideal body wt (IBW))   Fluid Needs: 25 ml/kg IBW= 2022 ml, adjusted IBW: 97 kg X 25 ml/kg= 2425 ml. Adjust per clinical status.     - Diet:       Procedures    Renal diet Renal, No Straw; Fluid Consistency: Nectar Thick / Mildly Thick Liquids; Texture Consistency: Pureed; Is  Patient on Accuchecks? Yes     - Medical Food Supplements-Nepro BID starting 12/26.   - Vitamin and mineral supplements: none  - Feeding assistance: marcelina to feed self  - Nutrition education: not appropriate   - Coordination of nutrition care: collaboration with other providers and discussed in Care Rounds   - Discharge and transfer of nutrition care to new setting or provider: monitor plans From home with family.     MONITOR AND EVALUATE/NUTRITION GOALS:  - Food and Nutrient Intake:      Monitor: adequacy of PO intake, tolerance of PO intake, and adequacy of supplement intake  - Anthropometric Measurement:    Monitor weight  - Nutrition Goals:      PO and supplement greater than 75% of needs, labs within acceptable limits, euglycemia, and improved GI status, euglycemia.     DIETITIAN FOLLOW UP: RD to follow and monitor nutrition status    Anu Estevez RD, LDN, [facility] ([id number])

## 2023-12-26 NOTE — PROGRESS NOTES
Pulmonary Progress Note     Assessment / Plan:  Acute respiratory failure - due to pulmonary edema and influenza  - continued hypercapnea and no clear prior lung disase. ? Bronchospasm.   Son does report h/o OHS that is not treated.   -improved, off steroids, off O2  - BD protocol.   - outpt PFT's.   - AVAPS with all sleep  Influenza A   - s/p Tamiflu  - bronchodilator protocol  - PCT elevated and on abx rocephin (12/22-12/26) , [facility]  Acute decompensated HFrEF  - per cardiology  ESRD on HD  - per nephrology  OHS/KOBY  - Needs outpt evaluation  Ppx  - subcutaneous heparin  Dispo - full code  - PT eval   - OK for floor transfer. We will follow peripherally, call with questions      Subjective:  No overnight events    Objective:  Vitals:    12/26/23 0200 12/26/23 0400 12/26/23 0600 12/26/23 0800   BP: 117/65 113/66 113/71 126/66   BP Location: Right arm Right arm Right arm Right arm   Pulse: 57 56 59 70   Resp: 13 16 11 16   Temp:  96.9 °F (36.1 °C)  97.1 °F (36.2 °C)   TempSrc:  Temporal  Oral   SpO2: 100% 100% 100% 100%   Weight:         Physical Exam:  General: no apparent distress, conversant  Skin: no rash, ulcers or subcutaneous nodules  Eyes: anicteric sclerae, moist conjunctivae  Head, ears, nose, throat: atraumatic, oropharynx clear with moist mucous membranes  Neck: trachea midline with no thyromegaly  Heart: regular rate and rhythm, no murmurs / rubs / gallops  Lungs: clear bilaterally, normal respiratory effort, no accessory muscle use  Extremities: no edema or cyanosis  Psych: interactive, answering questions appropriately, appropriate affect    Medications:  Reviewed in EMR    Lab Data:  Reviewed in EMR    Imaging:  I independently visualized all relevant chest imaging in PACS and agree with radiology interpretation except where noted.

## 2023-12-27 LAB
ALBUMIN SERPL-MCNC: 3.9 G/DL (ref 3.2–4.8)
ANION GAP SERPL CALC-SCNC: 12 MMOL/L (ref 0–18)
BUN BLD-MCNC: 91 MG/DL (ref 9–23)
BUN/CREAT SERPL: 9.7 (ref 10–20)
CALCIUM BLD-MCNC: 8.2 MG/DL (ref 8.7–10.4)
CHLORIDE SERPL-SCNC: 96 MMOL/L (ref 98–112)
CO2 SERPL-SCNC: 25 MMOL/L (ref 21–32)
CREAT BLD-MCNC: 9.39 MG/DL
DEPRECATED RDW RBC AUTO: 48.5 FL (ref 35.1–46.3)
EGFRCR SERPLBLD CKD-EPI 2021: 5 ML/MIN/1.73M2 (ref 60–?)
ERYTHROCYTE [DISTWIDTH] IN BLOOD BY AUTOMATED COUNT: 14.5 % (ref 11–15)
GLUCOSE BLD-MCNC: 181 MG/DL (ref 70–99)
GLUCOSE BLDC GLUCOMTR-MCNC: 141 MG/DL (ref 70–99)
GLUCOSE BLDC GLUCOMTR-MCNC: 149 MG/DL (ref 70–99)
GLUCOSE BLDC GLUCOMTR-MCNC: 184 MG/DL (ref 70–99)
GLUCOSE BLDC GLUCOMTR-MCNC: 248 MG/DL (ref 70–99)
HCT VFR BLD AUTO: 29.5 %
HGB BLD-MCNC: 9.7 G/DL
MCH RBC QN AUTO: 30.2 PG (ref 26–34)
MCHC RBC AUTO-ENTMCNC: 32.9 G/DL (ref 31–37)
MCV RBC AUTO: 91.9 FL
OSMOLALITY SERPL CALC.SUM OF ELEC: 309 MOSM/KG (ref 275–295)
PHOSPHATE SERPL-MCNC: 9.6 MG/DL (ref 2.4–5.1)
PLATELET # BLD AUTO: 179 10(3)UL (ref 150–450)
POTASSIUM SERPL-SCNC: 4.7 MMOL/L (ref 3.5–5.1)
RBC # BLD AUTO: 3.21 X10(6)UL
SODIUM SERPL-SCNC: 133 MMOL/L (ref 136–145)
WBC # BLD AUTO: 7.3 X10(3) UL (ref 4–11)

## 2023-12-27 NOTE — PROGRESS NOTES
[facility] Hospitalist Progress Note     CC: Hospital Follow up    PCP: Victor Manuel Cleaning MD       Assessment/Plan:     Mr. Vines is a 73 year old male with PMH BPH , CHF / ICM EF 30% , CAD s/p CABG x3, Moderate MR, HTN, HLD, ESRD on HD M/W/F, Anemia, Gastritis, who presents with SOB. Admitted for fluid overload and Influenza A.  Hypoxic and hypercapnic respiratory failure.      Acute Hypoxic Respiratory Failure  Pulmonary edema  ESRD on HD M/W/F  - positive influenza   - HD per renal, next session this AM  - s/p recent AV fistula repair by vascular surgery in September 2023  - bipap with all sleep  - last ABG done 12/24  - steroid burst per pulmonary ended 12/24    Encephalopathy, improved, but intermittent waxes and wanes  -monitor closely  -mobilize, needs therapy. He is very deconditioned     Influenza A positive   - cough for over a month but SOB more acute  - no fevers or Leukocytosis  - renally dose Tamiflu   - Ceftriaxone and steroids started 12/22/23  - monitor for worsening hypoxia     Troponin Elevation   - no chest pain   - EKG with concern for LBBB  - cardiology on consult     CAD s/p CABG x3   Ischemic cardiomyopathy EF 30% without acute exacerbation of congestive heart failure  Chronic Systolic heart failure  HTN  - Continue home metoprolol, hydralazine  - Not on ACE or ARB due to renal disease  - Does not use diuretics     Anemia   Thrombocytopenia   Gastritis   - s/p EGD 11/7 with some mild gastritis, no active bleeding  - s/p colonoscopy 11/8 with polyps but no active bleeding, will need to repeat colonoscopy as outpatient  - PPI BID was not taking   - on prevacid here      Type 2 diabetes with hyperglycemia  Diabetic nephropathy  - gabapentin (hold for now)  - ISS, meal time and basal insulin   - Controlled renal diet  - Continue home aspirin     Hyperlipidemia  -can hold his statin      BPH  -can hold his finasteride for now  -on flomax      Diet: NG removed 12/24 by patient, Continue to assess oral  and nutritional intake. Speech following      DVT Prophy: HSQ      Dispo: He is very deconditioned. Dispo will need to be HODA once stabilized. Continue therapy assessments      [facility] Hospitalist      Subjective:     Getting dialysis. Awake and talking to me. Knows his nephrologist is Dr. Parker when asked.     OBJECTIVE:    Blood pressure 103/61, pulse 56, temperature 97.4 °F (36.3 °C), temperature source Temporal, resp. rate 13, weight 242 lb (109.8 kg), SpO2 96%.    Temp:  [97.4 °F (36.3 °C)-97.6 °F (36.4 °C)] 97.4 °F (36.3 °C)  Pulse:  [56-73] 56  Resp:  [13-21] 13  BP: ()/(50-78) 103/61  SpO2:  [91 %-98 %] 96 %      Intake/Output:    Intake/Output Summary (Last 24 hours) at 12/27/2023 0941  Last data filed at 12/27/2023 0600  Gross per 24 hour   Intake 125 ml   Output 0 ml   Net 125 ml         Last 3 Weights   12/18/23 1258 242 lb (109.8 kg)   11/27/23 1951 252 lb (114.3 kg)   11/13/23 0700 245 lb 4.8 oz (111.3 kg)   11/12/23 0519 244 lb 8 oz (110.9 kg)   11/11/23 1300 246 lb 14.4 oz (112 kg)   11/11/23 0539 238 lb 1.6 oz (108 kg)   11/08/23 1410 234 lb (106.1 kg)   11/07/23 0619 234 lb 1.6 oz (106.2 kg)   11/06/23 1744 239 lb (108.4 kg)       Exam   General: awake  Lungs: clear   Heart: Regular rate and rhythm  Abdomen: soft, non tender  Extremities: No edema  Skin: no new rash, normal color  Psych: appropriate affect     Data Review:       Labs:     Recent Labs   Lab 12/21/23  0341 12/22/23  0421 12/23/23  0408 12/24/23  0709 12/25/23  0428 12/26/23  0327 12/27/23  0302   RBC 3.13* 3.46* 3.44*   < > 3.56* 3.25* 3.21*   HGB 9.7* 10.2* 10.3*   < > 10.6* 9.7* 9.7*   HCT 30.9* 33.7* 32.3*   < > 33.3* 30.9* 29.5*   MCV 98.7 97.4 93.9   < > 93.5 95.1 91.9   MCH 31.0 29.5 29.9   < > 29.8 29.8 30.2   MCHC 31.4 30.3* 31.9   < > 31.8 31.4 32.9   RDW 16.0* 15.9* 15.3*   < > 14.5 14.6 14.5   NEPRELIM 6.47 6.33 4.78  --   --   --   --    WBC 8.7 8.9 5.3   < > 9.0 8.2 7.3   .0*  135.0* 155.0   < > 212.0 178.0 179.0    < > = values in this interval not displayed.         Recent Labs   Lab 12/25/23 0428 12/26/23 0327 12/27/23  0302   * 211* 181*   * 62* 91*   CREATSERUM 9.93* 7.67* 9.39*   EGFRCR 5* 7* 5*   CA 8.5* 8.3* 8.2*   * 134* 133*   K 5.2* 4.6 4.7   CL 92* 96* 96*   CO2 24.0 24.0 25.0       Recent Labs   Lab 12/24/23  0709 12/25/23 0428 12/26/23 0327 12/27/23  0302   ALB 3.9 3.9 4.0 3.9         Imaging:  No results found.      Meds:      sevelamer carbonate  1,600 mg Oral TID CC    insulin aspart  1-5 Units Subcutaneous TID CC    aspirin  81 mg Per NG Tube Daily    lansoprazole  30 mg Per NG Tube QAM AC    [Held by provider] finasteride  5 mg Per NG Tube Daily    metoprolol tartrate  100 mg Per NG Tube 2x Daily(Beta Blocker)    insulin detemir  25 Units Subcutaneous Daily    heparin  5,000 Units Subcutaneous Q8H YOLADNA    tamsulosin  0.8 mg Oral Daily    [Held by provider] hydrALAZINE  50 mg Oral TID    [Held by provider] gabapentin  100 mg Oral BID    [Held by provider] rosuvastatin  10 mg Oral Nightly         metoclopramide, traMADol, ipratropium-albuterol, glucose **OR** glucose **OR** glucose-vitamin C **OR** dextrose **OR** glucose **OR** glucose **OR** glucose-vitamin C, acetaminophen, polyethylene glycol (PEG 3350), sennosides, bisacodyl, fleet enema, ondansetron, albuterol

## 2023-12-27 NOTE — CDS QUERY
CLINICAL DOCUMENTATION CLARIFICATION FORM  Dear Doctor:Sandoval  Encephalopathy is documented in 12/25-12/27 progress notes.  Based on your judgement and clinical information provided below, please clarify type of encephalopathy     PLEASE (X) ALL DIAGNOSES THAT APPLY.  SELECTION BY PROVIDER ONLY      ( X)  Metabolic Encephalopathy    ( )  Other (please specify) : _____________________________             Documentation indicates Risk factors/Clinical indicators  73 year M with PMH CHF / ICM EF 30% , CAD s/p CABG x3 HTN, HLD, ESRD on HD M/W/F, Anemia, Gastritis, who presents with SOB.  Admitted for fluid overload, Influenza A, acute respiratory failure     BUN 12/18 -12/27 67 41 33 26 46 48 74 108 62  ESRD on HD M/W/F, Bipap    Treatment: HD on M/W/F, Reglan Monitors renal function, Soft restraint, Holdn ACE or ARB due to renal disease and diurectics due to fluid overload      Hospitalist progress note 12/25 -12/26  Encephalopathy   -more lethargic this AM  -BUN noted > 100 from 70s yesterday  -could be uremia, would have dialysis sooner then later today if possible   -monitor closely. Would not use BIPAP if this lethargic, has secretions in mouth, could aspirate    - 12/27 -Encephalopathy, improved, but intermittent waxes and wanes     Pharmacy note 12/25 : prescribed Metoclopramide (REGLAN) 10 mg every 8 hours as needed for nausea/vomiting,.:     Plan of Care notes- 12/18 Patient alert / oriented x 4,   12/21 - Maxwell SWR initiated as patient pulled off bipap x 2, confused   12/22: Patient oriented x 1-2, confused, opens eyes to verbal stimuli, able to follow commands intermittently.   Critical care RN note 12/26: Pt A/O x2-3. Intermittently confused.   If you have any questions, please contact Clinical :  ZAFAR Ye at [phone]  . Thank You!     THIS FORM IS A PERMANENT PART OF THE MEDICAL RECORD

## 2023-12-27 NOTE — PROGRESS NOTES
Spoke with pt Son Natalee, he stated the pt received home PT once a month previously and acknowledges he needs more pt/ot. Would like to be updated tomorrow by Pt/OT team

## 2023-12-27 NOTE — PLAN OF CARE
Pt A/O*1, on 2 L NC. HD with 2 L removed. Pt appears confused, needing frequent reorientation to environment and situation. States that he has no appetite and is not allowing staff to boost or reposition him. All safety precautions in place.  Problem: Patient Centered Care  Goal: Patient preferences are identified and integrated in the patient's plan of care  Description: Interventions:  - What would you like us to know as we care for you? I live at home with family. I'm from [place]  - Provide timely, complete, and accurate information to patient/family  - Incorporate patient and family knowledge, values, beliefs, and cultural backgrounds into the planning and delivery of care  - Encourage patient/family to participate in care and decision-making at the level they choose  - Honor patient and family perspectives and choices  Outcome: Progressing     Problem: Diabetes/Glucose Control  Goal: Glucose maintained within prescribed range  Description: INTERVENTIONS:  - Monitor Blood Glucose as ordered  - Assess for signs and symptoms of hyperglycemia and hypoglycemia  - Administer ordered medications to maintain glucose within target range  - Assess barriers to adequate nutritional intake and initiate nutrition consult as needed  - Instruct patient on self management of diabetes  Outcome: Progressing     Problem: Patient/Family Goals  Goal: Patient/Family Long Term Goal  Description: Patient's Long Term Goal: go home    Interventions:  - Monitor vital signs  - Monitor appropriate labs  - Monitor blood glucose levels  - Administer medications per order  - Pain management as needed  - Follow MD orders  - Diagnostics per orders  - Dialysis per orders  - Update / inform patient and family on plan of care  - Discharge planning     - See additional Care Plan goals for specific interventions  Outcome: Progressing  Goal: Patient/Family Short Term Goal  Description: Patient's Short Term Goal: To breathe better    Interventions:    - RT treatment  -O2  -Follow md orders  - See additional Care Plan goals for specific interventions  Outcome: Progressing     Problem: CARDIOVASCULAR - ADULT  Goal: Maintains optimal cardiac output and hemodynamic stability  Description: INTERVENTIONS:  - Monitor vital signs, rhythm, and trends  - Monitor for bleeding, hypotension and signs of decreased cardiac output  - Evaluate effectiveness of vasoactive medications to optimize hemodynamic stability  - Monitor arterial and/or venous puncture sites for bleeding and/or hematoma  - Assess quality of pulses, skin color and temperature  - Assess for signs of decreased coronary artery perfusion - ex. Angina  - Evaluate fluid balance, assess for edema, trend weights  Outcome: Progressing  Goal: Absence of cardiac arrhythmias or at baseline  Description: INTERVENTIONS:  - Continuous cardiac monitoring, monitor vital signs, obtain 12 lead EKG if indicated  - Evaluate effectiveness of antiarrhythmic and heart rate control medications as ordered  - Initiate emergency measures for life threatening arrhythmias  - Monitor electrolytes and administer replacement therapy as ordered  Outcome: Progressing     Problem: RESPIRATORY - ADULT  Goal: Achieves optimal ventilation and oxygenation  Description: INTERVENTIONS:  - Assess for changes in respiratory status  - Assess for changes in mentation and behavior  - Position to facilitate oxygenation and minimize respiratory effort  - Oxygen supplementation based on oxygen saturation or ABGs  - Provide Smoking Cessation handout, if applicable  - Encourage broncho-pulmonary hygiene including cough, deep breathe, Incentive Spirometry  - Assess the need for suctioning and perform as needed  - Assess and instruct to report SOB or any respiratory difficulty  - Respiratory Therapy support as indicated  - Manage/alleviate anxiety  - Monitor for signs/symptoms of CO2 retention  Outcome: Progressing     Problem: METABOLIC/FLUID AND  ELECTROLYTES - ADULT  Goal: Glucose maintained within prescribed range  Description: INTERVENTIONS:  - Monitor Blood Glucose as ordered  - Assess for signs and symptoms of hyperglycemia and hypoglycemia  - Administer ordered medications to maintain glucose within target range  - Assess barriers to adequate nutritional intake and initiate nutrition consult as needed  - Instruct patient on self management of diabetes  Outcome: Progressing  Goal: Electrolytes maintained within normal limits  Description: INTERVENTIONS:  - Monitor labs and rhythm and assess patient for signs and symptoms of electrolyte imbalances  - Administer electrolyte replacement as ordered  - Monitor response to electrolyte replacements, including rhythm and repeat lab results as appropriate  - Fluid restriction as ordered  - Instruct patient on fluid and nutrition restrictions as appropriate  Outcome: Progressing  Goal: Hemodynamic stability and optimal renal function maintained  Description: INTERVENTIONS:  - Monitor labs and assess for signs and symptoms of volume excess or deficit  - Monitor intake, output and patient weight  - Monitor urine specific gravity, serum osmolarity and serum sodium as indicated or ordered  - Monitor response to interventions for patient's volume status, including labs, urine output, blood pressure (other measures as available)  - Encourage oral intake as appropriate  - Instruct patient on fluid and nutrition restrictions as appropriate  Outcome: Progressing

## 2023-12-27 NOTE — PROGRESS NOTES
[facility]    Cardiology Progress Note    Terrence Vines Patient Status:  Inpatient    1950 MRN [id number]   Location [facility] 2W/SW Attending Bradley Dash MD   Hosp Day # 9 PCP Victor Manuel Cleaning MD       Impression/Plan:  73 year old male presenting with:     1) Acute hypoxemic respiratory failure, multifactorial  2) Influenza A  3) HFrEF (EF 45-50% 2023)  4) ESRD on HD  5) CAD s/p CABG 2021  6) CVA  7) HTN, normotensive today  8) HL. On statin  9) DM     - Discussed patient's volume status with nephrology. Patient is still mildly overloaded, but his dry weight is difficult to ascertain, especially given his multifactorial etiologies behind dyspnea. Discussed RHC once nephrology feels that the patient is euvolemic and once he is off oxygen. Would also obtain a baseline BNP at that time if patient is euvolemic.  - Influenza A management as per primary  - HD MWF as per nephrology  - Cont asa, statin, bb, hydralazine; titrate as needed for BP control.  Continue metoprolol tartrate 100mg BID today. Can consolidate to succinate 200mg on discharge. No ACEi/ARB/ARNI due to renal issues.  - Monitor on tele    Patient Dr. Bautista    Subjective:     Awake, but less alert for me today compared to previous day.  He is on 2L of O2 by NC and satting 96% (93% off oxygen).  HD today. Negative 3L for admission.      Patient Active Problem List   Diagnosis    Moderate nonproliferative diabetic retinopathy without macular edema associated with type 2 diabetes mellitus (HCC)    Benign prostatic hyperplasia    Type 2 diabetes mellitus with diabetic polyneuropathy, without long-term current use of insulin (HCC)    Vitamin D deficiency    Type 2 diabetes mellitus with microalbuminuria  (HCC)    Type 2 diabetes mellitus with nephropathy (HCC)    Combined hyperlipidemia associated with type 2 diabetes mellitus  (HCC)    Hypertension associated with type 2 diabetes mellitus  (HCC)    Lower extremity  edema    Iron deficiency anemia    CKD (chronic kidney disease) stage 4, GFR 15-29 ml/min (HCC)    Acute pulmonary edema (HCC)    Acute on chronic congestive heart failure, unspecified heart failure type (HCC)    Acute respiratory failure with hypoxia (HCC)    Coronary artery disease involving native coronary artery of native heart    Cerebrovascular accident (CVA) due to bilateral embolism of middle cerebral arteries (HCC)    Preoperative testing    S/P CABG (coronary artery bypass graft)    Acute renal failure superimposed on chronic kidney disease  (HCC)    Acute renal failure superimposed on chronic kidney disease, unspecified CKD stage, unspecified acute renal failure type    Stage 5 chronic kidney disease not on chronic dialysis (HCC)    Hypernatremia    Acute kidney injury (HCC)    Anemia    ESRD on hemodialysis (HCC)    Facial swelling    Rhinovirus infection    Thyroid nodule    Pulmonary nodules    Elevated BUN    Subacute cough    Dialysis AV fistula malfunction (HCC)    Type 2 diabetes mellitus with hyperglycemia, with long-term current use of insulin (HCC)    Anemia, unspecified type    Thrombocytopenia (HCC)    ESRD on dialysis (HCC)       Objective:   Temp: 97.4 °F (36.3 °C)  Pulse: 56  Resp: 13  BP: 103/61    Intake/Output:     Intake/Output Summary (Last 24 hours) at 12/27/2023 1023  Last data filed at 12/27/2023 0600  Gross per 24 hour   Intake 125 ml   Output 0 ml   Net 125 ml       Last 3 Weights   12/18/23 1258 242 lb (109.8 kg)   11/27/23 1951 252 lb (114.3 kg)   11/13/23 0700 245 lb 4.8 oz (111.3 kg)   11/12/23 0519 244 lb 8 oz (110.9 kg)   11/11/23 1300 246 lb 14.4 oz (112 kg)   11/11/23 0539 238 lb 1.6 oz (108 kg)   11/08/23 1410 234 lb (106.1 kg)   11/07/23 0619 234 lb 1.6 oz (106.2 kg)   11/06/23 1744 239 lb (108.4 kg)       Tele: SR/SB    Physical Exam:    General: alert, alert, oriented x 1  HEENT: Normocephalic, anicteric sclera  Neck: supple  Cardiac: Regular rate and rhythm. S1, S2  normal. No murmur,   Lungs: coarse breath sounds bilat  Abdomen: Soft, non-distended  Extremities: no edema  Skin: Warm and dry.     Laboratory/Data:    Labs:         Recent Labs   Lab 12/23/23  0408 12/24/23  0709 12/25/23 0428 12/26/23 0327 12/27/23  0302   WBC 5.3 9.8 9.0 8.2 7.3   HGB 10.3* 10.7* 10.6* 9.7* 9.7*   MCV 93.9 92.1 93.5 95.1 91.9   .0 193.0 212.0 178.0 179.0       Recent Labs   Lab 12/21/23  0341 12/22/23  0421 12/23/23 0408 12/24/23 0709 12/25/23 0428 12/26/23 0327 12/27/23  0302   * 136 136 133* 135* 134* 133*   K 5.1 5.4* 5.0 4.6 5.2* 4.6 4.7    101 96* 94* 92* 96* 96*   CO2 27.0 24.0 23.0 25.0 24.0 24.0 25.0   BUN 26* 46* 48* 74* 108* 62* 91*   CREATSERUM 5.42* 8.05* 6.28* 8.46* 9.93* 7.67* 9.39*   CA 8.4* 8.4* 8.7 8.5* 8.5* 8.3* 8.2*   MG 2.1 2.5 2.6  --   --   --   --    PHOS 6.4* 10.2* 8.7* 8.9* 11.0* 8.5* 9.6*   * 124* 280* 361* 236* 211* 181*       Recent Labs   Lab 12/24/23  0709 12/25/23 0428 12/26/23 0327 12/27/23  0302   ALB 3.9 3.9 4.0 3.9       No results for input(s): \"TROP\" in the last 168 hours.    ECHO 11/23:  1. Left ventricle: The cavity size was normal. Wall thickness was normal.      Systolic function was mildly reduced. The estimated ejection fraction was      45-50%, by visual assessment. Although no diagnostic regional wall motion      abnormality was identified, this possibility cannot be completely      excluded on the basis of this study.   2. Mitral valve: There was mild regurgitation.   3. Pulmonary arteries: Systolic pressure was within the normal range,      estimated to be 30mm Hg.   4. Pericardium, extracardiac: There was no pericardial effusion.           Allergies:   No Known Allergies    Medications:    Rasheed Morelos MD  12/27/23

## 2023-12-27 NOTE — PLAN OF CARE
Pt alert and oriented x4, occasionally lethargic. Refused bipap overnight, but saturated well on room air. No signs of labored breathing. Vitals stable. No acute events overnight. Hourly nursing rounds made. Plan for dialysis today.     Problem: Patient Centered Care  Goal: Patient preferences are identified and integrated in the patient's plan of care  Description: Interventions:  - What would you like us to know as we care for you? I live at home with family. I'm from [place]  - Provide timely, complete, and accurate information to patient/family  - Incorporate patient and family knowledge, values, beliefs, and cultural backgrounds into the planning and delivery of care  - Encourage patient/family to participate in care and decision-making at the level they choose  - Honor patient and family perspectives and choices  Outcome: Progressing     Problem: Diabetes/Glucose Control  Goal: Glucose maintained within prescribed range  Description: INTERVENTIONS:  - Monitor Blood Glucose as ordered  - Assess for signs and symptoms of hyperglycemia and hypoglycemia  - Administer ordered medications to maintain glucose within target range  - Assess barriers to adequate nutritional intake and initiate nutrition consult as needed  - Instruct patient on self management of diabetes  Outcome: Progressing     Problem: Patient/Family Goals  Goal: Patient/Family Long Term Goal  Description: Patient's Long Term Goal: go home    Interventions:  - Monitor vital signs  - Monitor appropriate labs  - Monitor blood glucose levels  - Administer medications per order  - Pain management as needed  - Follow MD orders  - Diagnostics per orders  - Dialysis per orders  - Update / inform patient and family on plan of care  - Discharge planning     - See additional Care Plan goals for specific interventions  Outcome: Progressing  Goal: Patient/Family Short Term Goal  Description: Patient's Short Term Goal: To breathe better    Interventions:   - RT  treatment  -O2  -Follow md orders  - See additional Care Plan goals for specific interventions  Outcome: Progressing     Problem: CARDIOVASCULAR - ADULT  Goal: Maintains optimal cardiac output and hemodynamic stability  Description: INTERVENTIONS:  - Monitor vital signs, rhythm, and trends  - Monitor for bleeding, hypotension and signs of decreased cardiac output  - Evaluate effectiveness of vasoactive medications to optimize hemodynamic stability  - Monitor arterial and/or venous puncture sites for bleeding and/or hematoma  - Assess quality of pulses, skin color and temperature  - Assess for signs of decreased coronary artery perfusion - ex. Angina  - Evaluate fluid balance, assess for edema, trend weights  Outcome: Progressing  Goal: Absence of cardiac arrhythmias or at baseline  Description: INTERVENTIONS:  - Continuous cardiac monitoring, monitor vital signs, obtain 12 lead EKG if indicated  - Evaluate effectiveness of antiarrhythmic and heart rate control medications as ordered  - Initiate emergency measures for life threatening arrhythmias  - Monitor electrolytes and administer replacement therapy as ordered  Outcome: Progressing     Problem: RESPIRATORY - ADULT  Goal: Achieves optimal ventilation and oxygenation  Description: INTERVENTIONS:  - Assess for changes in respiratory status  - Assess for changes in mentation and behavior  - Position to facilitate oxygenation and minimize respiratory effort  - Oxygen supplementation based on oxygen saturation or ABGs  - Provide Smoking Cessation handout, if applicable  - Encourage broncho-pulmonary hygiene including cough, deep breathe, Incentive Spirometry  - Assess the need for suctioning and perform as needed  - Assess and instruct to report SOB or any respiratory difficulty  - Respiratory Therapy support as indicated  - Manage/alleviate anxiety  - Monitor for signs/symptoms of CO2 retention  Outcome: Progressing     Problem: METABOLIC/FLUID AND ELECTROLYTES -  ADULT  Goal: Glucose maintained within prescribed range  Description: INTERVENTIONS:  - Monitor Blood Glucose as ordered  - Assess for signs and symptoms of hyperglycemia and hypoglycemia  - Administer ordered medications to maintain glucose within target range  - Assess barriers to adequate nutritional intake and initiate nutrition consult as needed  - Instruct patient on self management of diabetes  Outcome: Progressing  Goal: Electrolytes maintained within normal limits  Description: INTERVENTIONS:  - Monitor labs and rhythm and assess patient for signs and symptoms of electrolyte imbalances  - Administer electrolyte replacement as ordered  - Monitor response to electrolyte replacements, including rhythm and repeat lab results as appropriate  - Fluid restriction as ordered  - Instruct patient on fluid and nutrition restrictions as appropriate  Outcome: Progressing  Goal: Hemodynamic stability and optimal renal function maintained  Description: INTERVENTIONS:  - Monitor labs and assess for signs and symptoms of volume excess or deficit  - Monitor intake, output and patient weight  - Monitor urine specific gravity, serum osmolarity and serum sodium as indicated or ordered  - Monitor response to interventions for patient's volume status, including labs, urine output, blood pressure (other measures as available)  - Encourage oral intake as appropriate  - Instruct patient on fluid and nutrition restrictions as appropriate  Outcome: Progressing     Problem: Safety Risk - Non-Violent Restraints  Goal: Patient will remain free from self-harm  Description: INTERVENTIONS:  - Apply the least restrictive restraint to prevent harm  - Notify patient and family of reasons restraints applied  - Assess for any contributing factors to confusion (electrolyte disturbances, delirium, medications)  - Discontinue any unnecessary medical devices as soon as possible  - Assess the patient's physical comfort, circulation, skin condition,  hydration, nutrition and elimination needs   - Reorient and redirection as needed  - Assess for the need to continue restraints  Outcome: Progressing     Problem: Delirium  Goal: Minimize duration of delirium  Description: Interventions:  - Encourage use of hearing aids, eye glasses  - Promote highest level of mobility daily  - Provide frequent reorientation  - Promote wakefulness i.e. lights on, blinds open  - Promote sleep, encourage patient's normal rest cycle i.e. lights off, TV off, minimize noise and interruptions  - Encourage family to assist in orientation and promotion of home routines  Outcome: Progressing

## 2023-12-27 NOTE — PROGRESS NOTES
NEPHROLOGY DAILY PROGRESS NOTE     SUBJECTIVE:    Awake slightly confused.  Does remember me otherwise has difficulty remembering the date.    OBJECTIVE:    Total Intake/Output:    Intake/Output Summary (Last 24 hours) at 12/27/2023 1027  Last data filed at 12/27/2023 0600  Gross per 24 hour   Intake 125 ml   Output 0 ml   Net 125 ml         PHYSICAL EXAM:  /61 (BP Location: Right arm)   Pulse 56   Temp 97.4 °F (36.3 °C) (Temporal)   Resp 13   Wt 242 lb (109.8 kg)   SpO2 96%   BMI 32.82 kg/m²   GEN: NAD, on BiPAP  HEENT: NCAT    CHEST: coarse breath sounds    CARDIAC: S1S2 normal  ABD: soft, NT/ND  EXT:  no lower ext edema  NEURO: sleepy/ lethargic   SKIN: warm, dry   DIALYSIS ACCESS: LUE AVF cannulated for dialysis        CURRENT MEDICATIONS:   sevelamer carbonate  1,600 mg Oral TID CC    insulin aspart  1-5 Units Subcutaneous TID CC    aspirin  81 mg Per NG Tube Daily    lansoprazole  30 mg Per NG Tube QAM AC    [Held by provider] finasteride  5 mg Per NG Tube Daily    metoprolol tartrate  100 mg Per NG Tube 2x Daily(Beta Blocker)    insulin detemir  25 Units Subcutaneous Daily    heparin  5,000 Units Subcutaneous Q8H YOLANDA    tamsulosin  0.8 mg Oral Daily    [Held by provider] hydrALAZINE  50 mg Oral TID    [Held by provider] gabapentin  100 mg Oral BID    [Held by provider] rosuvastatin  10 mg Oral Nightly             LABS:  Patient Labs Reviewed in Detail. Pertinent Labs as follows:  Recent Labs   Lab 12/25/23  0428 12/26/23 0327 12/27/23  0302   * 211* 181*   * 62* 91*   CREATSERUM 9.93* 7.67* 9.39*   EGFRCR 5* 7* 5*   CA 8.5* 8.3* 8.2*   * 134* 133*   K 5.2* 4.6 4.7   CL 92* 96* 96*   CO2 24.0 24.0 25.0     Recent Labs   Lab 12/21/23  0341 12/22/23  0421 12/23/23  0408 12/24/23  0709 12/25/23  0428 12/26/23  0327 12/27/23  0302   RBC 3.13* 3.46* 3.44*   < > 3.56* 3.25* 3.21*   HGB 9.7* 10.2* 10.3*   < > 10.6* 9.7* 9.7*   HCT 30.9* 33.7* 32.3*   < > 33.3* 30.9* 29.5*   MCV 98.7  97.4 93.9   < > 93.5 95.1 91.9   MCH 31.0 29.5 29.9   < > 29.8 29.8 30.2   MCHC 31.4 30.3* 31.9   < > 31.8 31.4 32.9   RDW 16.0* 15.9* 15.3*   < > 14.5 14.6 14.5   NEPRELIM 6.47 6.33 4.78  --   --   --   --    WBC 8.7 8.9 5.3   < > 9.0 8.2 7.3   .0* 135.0* 155.0   < > 212.0 178.0 179.0    < > = values in this interval not displayed.           IMAGING:  No results found.         ASSESSMENT AND PLAN:   This is a 73 year old male with PMH sig for ESRD on HD MWF, HTN, HLD, DM2, CAD s/p CABG x3, ischemic cardiomyopathy. Presents with SOB and cough. Found to be influenza positive. Nephrology is consulted for dialysis.      ESRD on HD MWF   - HD today  - followed by [facility] at [facility]   - limit in's   - Discussed with Dr. Morelos will need a RHC to evaluate volume statusa.     Acute hypoxic respiratory failure  - CXR with pulmonary vascular changes, also influenza A postivie    - fluid removal with dialysis as tolerated     Hyperkalemia   - 2K bath with dialysis   - follow K level      Hyperphosphatemia   - Nepro feeds  - Sevelamer 1600mg tid     Hypertension   - Hydralazine, metoprolol       Anemia   - trend Hb, Hb at goal    - receiving OMAIRA with outpatient dialysis      Discussed with Dr. Jaxon Parker MD  [facility]  Nephrology

## 2023-12-28 LAB
ALBUMIN SERPL-MCNC: 4.2 G/DL (ref 3.2–4.8)
ANION GAP SERPL CALC-SCNC: 8 MMOL/L (ref 0–18)
BUN BLD-MCNC: 44 MG/DL (ref 9–23)
BUN/CREAT SERPL: 7 (ref 10–20)
CALCIUM BLD-MCNC: 8.5 MG/DL (ref 8.7–10.4)
CHLORIDE SERPL-SCNC: 98 MMOL/L (ref 98–112)
CO2 SERPL-SCNC: 31 MMOL/L (ref 21–32)
CREAT BLD-MCNC: 6.33 MG/DL
DEPRECATED RDW RBC AUTO: 52 FL (ref 35.1–46.3)
EGFRCR SERPLBLD CKD-EPI 2021: 9 ML/MIN/1.73M2 (ref 60–?)
ERYTHROCYTE [DISTWIDTH] IN BLOOD BY AUTOMATED COUNT: 14.6 % (ref 11–15)
GLUCOSE BLD-MCNC: 170 MG/DL (ref 70–99)
GLUCOSE BLDC GLUCOMTR-MCNC: 181 MG/DL (ref 70–99)
GLUCOSE BLDC GLUCOMTR-MCNC: 240 MG/DL (ref 70–99)
GLUCOSE BLDC GLUCOMTR-MCNC: 279 MG/DL (ref 70–99)
GLUCOSE BLDC GLUCOMTR-MCNC: 300 MG/DL (ref 70–99)
GLUCOSE BLDC GLUCOMTR-MCNC: 362 MG/DL (ref 70–99)
HCT VFR BLD AUTO: 38.6 %
HGB BLD-MCNC: 11.9 G/DL
MAGNESIUM SERPL-MCNC: 2.3 MG/DL (ref 1.6–2.6)
MCH RBC QN AUTO: 29.8 PG (ref 26–34)
MCHC RBC AUTO-ENTMCNC: 30.8 G/DL (ref 31–37)
MCV RBC AUTO: 96.7 FL
OSMOLALITY SERPL CALC.SUM OF ELEC: 299 MOSM/KG (ref 275–295)
PHOSPHATE SERPL-MCNC: 6.7 MG/DL (ref 2.4–5.1)
PLATELET # BLD AUTO: 173 10(3)UL (ref 150–450)
POTASSIUM SERPL-SCNC: 4.5 MMOL/L (ref 3.5–5.1)
RBC # BLD AUTO: 3.99 X10(6)UL
SODIUM SERPL-SCNC: 137 MMOL/L (ref 136–145)
WBC # BLD AUTO: 7.5 X10(3) UL (ref 4–11)

## 2023-12-28 RX ORDER — HYDRALAZINE HYDROCHLORIDE 20 MG/ML
10 INJECTION INTRAMUSCULAR; INTRAVENOUS EVERY 6 HOURS PRN
Status: DISCONTINUED | OUTPATIENT
Start: 2023-12-28 | End: 2024-01-03

## 2023-12-28 NOTE — PROGRESS NOTES
[facility] Hospitalist Progress Note     CC: Hospital Follow up    PCP: Victor Manuel Cleaning MD       Assessment/Plan:     Mr. Vines is a 73 year old male with PMH BPH , CHF / ICM EF 30% , CAD s/p CABG x3, Moderate MR, HTN, HLD, ESRD on HD M/W/F, Anemia, Gastritis, who presents with SOB. Admitted for fluid overload and Influenza A.  Hypoxic and hypercapnic respiratory failure.      Acute Hypoxic Respiratory Failure  Pulmonary edema  ESRD on HD M/W/F  - positive influenza   - HD per renal, next session Friday  - s/p recent AV fistula repair by vascular surgery in September 2023  - steroid burst per pulmonary ended 12/24    Encephalopathy, improved, but intermittent waxes and wanes  -today appears the best I have seen him   -mobilize, needs therapy. He is very deconditioned     Influenza A positive   - cough for over a month but SOB more acute  - renally dose Tamiflu completed   - Ceftriaxone and steroids started 12/22/23, now off        Troponin Elevation   - no chest pain   - EKG with concern for LBBB  - cardiology on consult     CAD s/p CABG x3   Ischemic cardiomyopathy EF 30% without acute exacerbation of congestive heart failure  Chronic Systolic heart failure  HTN  - Continue metoprolol 100mg BID  - holding his hydralazine for now   - Not on ACE or ARB due to renal disease     Anemia   Thrombocytopenia   Gastritis   - s/p EGD 11/7 with some mild gastritis, no active bleeding  - s/p colonoscopy 11/8 with polyps but no active bleeding, will need to repeat colonoscopy as outpatient  - on prevacid here      Type 2 diabetes with hyperglycemia  Diabetic nephropathy  - gabapentin (hold for now)  - ISS, meal time and basal insulin   - Controlled renal diet  - Continue home aspirin     Hyperlipidemia  -can hold his statin      BPH  -can hold his finasteride for now  -on flomax      Diet: NG removed 12/24 by patient, Continue to assess oral and nutritional intake. Speech following. He is now tolerating diet     DVT Prophy: HSQ       Dispo: He is very deconditioned. Dispo will need to be HODA once stabilized. Continue therapy assessments  OK to transfer to medical floor      Jaxon Smith [facility] Hospitalist      Subjective:     More alert today when seen. No distress. He wants to get up and move.     OBJECTIVE:    Blood pressure 154/72, pulse 81, temperature 97 °F (36.1 °C), temperature source Temporal, resp. rate 13, weight 242 lb (109.8 kg), SpO2 100%.    Temp:  [97 °F (36.1 °C)] 97 °F (36.1 °C)  Pulse:  [70-81] 81  Resp:  [13-20] 13  BP: (136-164)/(62-84) 154/72  SpO2:  [87 %-100 %] 100 %      Intake/Output:    Intake/Output Summary (Last 24 hours) at 12/28/2023 1024  Last data filed at 12/27/2023 1400  Gross per 24 hour   Intake 75 ml   Output 2020 ml   Net -1945 ml         Last 3 Weights   12/27/23 1400 242 lb (109.8 kg)   12/18/23 1258 242 lb (109.8 kg)   11/27/23 1951 252 lb (114.3 kg)   11/13/23 0700 245 lb 4.8 oz (111.3 kg)   11/12/23 0519 244 lb 8 oz (110.9 kg)   11/11/23 1300 246 lb 14.4 oz (112 kg)   11/11/23 0539 238 lb 1.6 oz (108 kg)   11/08/23 1410 234 lb (106.1 kg)   11/07/23 0619 234 lb 1.6 oz (106.2 kg)   11/06/23 1744 239 lb (108.4 kg)       Exam   General: awake  Lungs: clear   Heart: Regular rate and rhythm  Abdomen: soft, non tender  Extremities: No edema  Skin: no new rash, normal color  Psych: appropriate affect     Data Review:       Labs:     Recent Labs   Lab 12/22/23  0421 12/23/23  0408 12/24/23  0709 12/26/23  0327 12/27/23  0302 12/28/23  0342   RBC 3.46* 3.44*   < > 3.25* 3.21* 3.99   HGB 10.2* 10.3*   < > 9.7* 9.7* 11.9*   HCT 33.7* 32.3*   < > 30.9* 29.5* 38.6*   MCV 97.4 93.9   < > 95.1 91.9 96.7   MCH 29.5 29.9   < > 29.8 30.2 29.8   MCHC 30.3* 31.9   < > 31.4 32.9 30.8*   RDW 15.9* 15.3*   < > 14.6 14.5 14.6   NEPRELIM 6.33 4.78  --   --   --   --    WBC 8.9 5.3   < > 8.2 7.3 7.5   .0* 155.0   < > 178.0 179.0 173.0    < > = values in this interval not displayed.         Recent Labs    Lab 12/26/23  0327 12/27/23  0302 12/28/23  0342   * 181* 170*   BUN 62* 91* 44*   CREATSERUM 7.67* 9.39* 6.33*   EGFRCR 7* 5* 9*   CA 8.3* 8.2* 8.5*   * 133* 137   K 4.6 4.7 4.5   CL 96* 96* 98   CO2 24.0 25.0 31.0       Recent Labs   Lab 12/24/23  0709 12/25/23  0428 12/26/23 0327 12/27/23  0302 12/28/23  0342   ALB 3.9 3.9 4.0 3.9 4.2         Imaging:  No results found.      Meds:      sevelamer carbonate  1,600 mg Oral TID CC    insulin aspart  1-5 Units Subcutaneous TID CC    aspirin  81 mg Per NG Tube Daily    lansoprazole  30 mg Per NG Tube QAM AC    [Held by provider] finasteride  5 mg Per NG Tube Daily    metoprolol tartrate  100 mg Per NG Tube 2x Daily(Beta Blocker)    insulin detemir  25 Units Subcutaneous Daily    heparin  5,000 Units Subcutaneous Q8H YOLANDA    tamsulosin  0.8 mg Oral Daily    [Held by provider] hydrALAZINE  50 mg Oral TID    [Held by provider] gabapentin  100 mg Oral BID    [Held by provider] rosuvastatin  10 mg Oral Nightly         metoclopramide, traMADol, ipratropium-albuterol, glucose **OR** glucose **OR** glucose-vitamin C **OR** dextrose **OR** glucose **OR** glucose **OR** glucose-vitamin C, acetaminophen, polyethylene glycol (PEG 3350), sennosides, bisacodyl, fleet enema, ondansetron, albuterol

## 2023-12-28 NOTE — PROGRESS NOTES
[facility]    Cardiology Progress Note    Terrence Vines Patient Status:  Inpatient    1950 MRN [id number]   Location [facility] 2W/SW Attending Bradley Dash MD   Hosp Day # 10 PCP Victor Manuel Cleaning MD       Impression/Plan:  73 year old male presenting with:     1) Acute hypoxemic respiratory failure, multifactorial  2) Influenza A  3) HFrEF (EF 45-50% 2023)  4) ESRD on HD  5) CAD s/p CABG 2021  6) CVA  7) HTN, normotensive today  8) HL. On statin  9) DM     - Patient is still mildly overloaded, but his dry weight is difficult to ascertain, especially given his multifactorial etiologies behind dyspnea. Discussed RHC once nephrology feels that the patient is euvolemic and once he is off oxygen. Would also obtain a baseline BNP at that time if patient is euvolemic. Coordinate with nephrology.  - Influenza A management as per primary  - HD MWF as per nephrology  - Cont asa, statin, bb, hydralazine; titrate as needed for BP control.  Continue metoprolol tartrate 100mg BID today. Can consolidate to succinate 200mg on discharge. No ACEi/ARB/ARNI due to renal issues.  - Monitor on tele    Patient Dr. Bautista    Subjective:     Awake, but confused. Turned off 2L O2 at bedside and patient satting % for me.      Patient Active Problem List   Diagnosis    Moderate nonproliferative diabetic retinopathy without macular edema associated with type 2 diabetes mellitus (HCC)    Benign prostatic hyperplasia    Type 2 diabetes mellitus with diabetic polyneuropathy, without long-term current use of insulin ([facility])    Vitamin D deficiency    Type 2 diabetes mellitus with microalbuminuria  (HCC)    Type 2 diabetes mellitus with nephropathy (HCC)    Combined hyperlipidemia associated with type 2 diabetes mellitus  (HCC)    Hypertension associated with type 2 diabetes mellitus  (HCC)    Lower extremity edema    Iron deficiency anemia    CKD (chronic kidney disease) stage 4, GFR 15-29 ml/min ([facility])     Acute pulmonary edema (HCC)    Acute on chronic congestive heart failure, unspecified heart failure type (HCC)    Acute respiratory failure with hypoxia (HCC)    Coronary artery disease involving native coronary artery of native heart    Cerebrovascular accident (CVA) due to bilateral embolism of middle cerebral arteries (HCC)    Preoperative testing    S/P CABG (coronary artery bypass graft)    Acute renal failure superimposed on chronic kidney disease  (HCC)    Acute renal failure superimposed on chronic kidney disease, unspecified CKD stage, unspecified acute renal failure type    Stage 5 chronic kidney disease not on chronic dialysis (HCC)    Hypernatremia    Acute kidney injury (HCC)    Anemia    ESRD on hemodialysis (HCC)    Facial swelling    Rhinovirus infection    Thyroid nodule    Pulmonary nodules    Elevated BUN    Subacute cough    Dialysis AV fistula malfunction (HCC)    Type 2 diabetes mellitus with hyperglycemia, with long-term current use of insulin (HCC)    Anemia, unspecified type    Thrombocytopenia (HCC)    ESRD on dialysis (HCC)       Objective:   Temp: 97 °F (36.1 °C)  Pulse: 81  Resp: 14  BP: 116/62    Intake/Output:     Intake/Output Summary (Last 24 hours) at 12/28/2023 1341  Last data filed at 12/27/2023 1400  Gross per 24 hour   Intake 75 ml   Output 20 ml   Net 55 ml       Last 3 Weights   12/27/23 1400 242 lb (109.8 kg)   12/18/23 1258 242 lb (109.8 kg)   11/27/23 1951 252 lb (114.3 kg)   11/13/23 0700 245 lb 4.8 oz (111.3 kg)   11/12/23 0519 244 lb 8 oz (110.9 kg)   11/11/23 1300 246 lb 14.4 oz (112 kg)   11/11/23 0539 238 lb 1.6 oz (108 kg)   11/08/23 1410 234 lb (106.1 kg)   11/07/23 0619 234 lb 1.6 oz (106.2 kg)   11/06/23 1744 239 lb (108.4 kg)       Tele: SR/SB    Physical Exam:    General: awake, alert, oriented x 0  HEENT: Normocephalic, anicteric sclera  Neck: supple  Cardiac: Regular rate and rhythm. S1, S2 normal. No murmur,   Lungs: coarse breath sounds bilat  Abdomen:  Soft, non-distended  Extremities: no edema  Skin: Warm and dry.     Laboratory/Data:    Labs:         Recent Labs   Lab 12/24/23  0709 12/25/23  0428 12/26/23 0327 12/27/23  0302 12/28/23  0342   WBC 9.8 9.0 8.2 7.3 7.5   HGB 10.7* 10.6* 9.7* 9.7* 11.9*   MCV 92.1 93.5 95.1 91.9 96.7   .0 212.0 178.0 179.0 173.0       Recent Labs   Lab 12/22/23  0421 12/23/23  0408 12/24/23  0709 12/25/23  0428 12/26/23 0327 12/27/23  0302 12/28/23  0342    136 133* 135* 134* 133* 137   K 5.4* 5.0 4.6 5.2* 4.6 4.7 4.5    96* 94* 92* 96* 96* 98   CO2 24.0 23.0 25.0 24.0 24.0 25.0 31.0   BUN 46* 48* 74* 108* 62* 91* 44*   CREATSERUM 8.05* 6.28* 8.46* 9.93* 7.67* 9.39* 6.33*   CA 8.4* 8.7 8.5* 8.5* 8.3* 8.2* 8.5*   MG 2.5 2.6  --   --   --   --  2.3   PHOS 10.2* 8.7* 8.9* 11.0* 8.5* 9.6* 6.7*   * 280* 361* 236* 211* 181* 170*       Recent Labs   Lab 12/24/23  0709 12/25/23  0428 12/26/23 0327 12/27/23  0302 12/28/23  0342   ALB 3.9 3.9 4.0 3.9 4.2       No results for input(s): \"TROP\" in the last 168 hours.    ECHO 11/23:  1. Left ventricle: The cavity size was normal. Wall thickness was normal.      Systolic function was mildly reduced. The estimated ejection fraction was      45-50%, by visual assessment. Although no diagnostic regional wall motion      abnormality was identified, this possibility cannot be completely      excluded on the basis of this study.   2. Mitral valve: There was mild regurgitation.   3. Pulmonary arteries: Systolic pressure was within the normal range,      estimated to be 30mm Hg.   4. Pericardium, extracardiac: There was no pericardial effusion.           Allergies:   No Known Allergies    Medications:    Rasheed Morelos MD  12/28/23

## 2023-12-28 NOTE — PROGRESS NOTES
NEPHROLOGY DAILY PROGRESS NOTE     SUBJECTIVE:    Awake alert     OBJECTIVE:    Total Intake/Output:    Intake/Output Summary (Last 24 hours) at 12/28/2023 1138  Last data filed at 12/27/2023 1400  Gross per 24 hour   Intake 75 ml   Output 20 ml   Net 55 ml         PHYSICAL EXAM:  /59 (BP Location: Right arm)   Pulse 90   Temp 97 °F (36.1 °C) (Temporal)   Resp 18   Wt 242 lb (109.8 kg)   SpO2 100%   BMI 32.82 kg/m²   GEN: NAD, on BiPAP  HEENT: NCAT    CHEST: coarse breath sounds    CARDIAC: S1S2 normal  ABD: soft, NT/ND  EXT:  no lower ext edema  NEURO: sleepy/ lethargic   SKIN: warm, dry   DIALYSIS ACCESS: LUE AVF cannulated for dialysis        CURRENT MEDICATIONS:   sevelamer carbonate  1,600 mg Oral TID CC    insulin aspart  1-5 Units Subcutaneous TID CC    aspirin  81 mg Per NG Tube Daily    lansoprazole  30 mg Per NG Tube QAM AC    [Held by provider] finasteride  5 mg Per NG Tube Daily    metoprolol tartrate  100 mg Per NG Tube 2x Daily(Beta Blocker)    insulin detemir  25 Units Subcutaneous Daily    heparin  5,000 Units Subcutaneous Q8H YOLANDA    tamsulosin  0.8 mg Oral Daily    [Held by provider] hydrALAZINE  50 mg Oral TID    [Held by provider] gabapentin  100 mg Oral BID    [Held by provider] rosuvastatin  10 mg Oral Nightly             LABS:  Patient Labs Reviewed in Detail. Pertinent Labs as follows:  Recent Labs   Lab 12/26/23  0327 12/27/23  0302 12/28/23  0342   * 181* 170*   BUN 62* 91* 44*   CREATSERUM 7.67* 9.39* 6.33*   EGFRCR 7* 5* 9*   CA 8.3* 8.2* 8.5*   * 133* 137   K 4.6 4.7 4.5   CL 96* 96* 98   CO2 24.0 25.0 31.0     Recent Labs   Lab 12/22/23  0421 12/23/23  0408 12/24/23  0709 12/26/23  0327 12/27/23  0302 12/28/23  0342   RBC 3.46* 3.44*   < > 3.25* 3.21* 3.99   HGB 10.2* 10.3*   < > 9.7* 9.7* 11.9*   HCT 33.7* 32.3*   < > 30.9* 29.5* 38.6*   MCV 97.4 93.9   < > 95.1 91.9 96.7   MCH 29.5 29.9   < > 29.8 30.2 29.8   MCHC 30.3* 31.9   < > 31.4 32.9 30.8*   RDW 15.9*  15.3*   < > 14.6 14.5 14.6   NEPRELIM 6.33 4.78  --   --   --   --    WBC 8.9 5.3   < > 8.2 7.3 7.5   .0* 155.0   < > 178.0 179.0 173.0    < > = values in this interval not displayed.           IMAGING:  No results found.         ASSESSMENT AND PLAN:   This is a 73 year old male with PMH sig for ESRD on HD MWF, HTN, HLD, DM2, CAD s/p CABG x3, ischemic cardiomyopathy. Presents with SOB and cough. Found to be influenza positive. Nephrology is consulted for dialysis.      ESRD on HD MWF   - HD today  - followed by [facility] in's   - Discussed with Dr. Morelos will need a RHC to evaluate volume status.     Acute hypoxic respiratory failure  - CXR with pulmonary vascular changes, also influenza A postivie    - fluid removal with dialysis as tolerated     Hyperkalemia   - 2K bath with dialysis   - follow K level      Hyperphosphatemia   - Nepro feeds  - Sevelamer 1600mg tid     Hypertension   - Hydralazine, metoprolol       Anemia   - trend Hb, Hb at goal    - receiving OMAIRA with outpatient dialysis      Discussed with Dr. Jaxon Parker MD  [facility]  Nephrology

## 2023-12-28 NOTE — OCCUPATIONAL THERAPY NOTE
OCCUPATIONAL THERAPY EVALUATION - INPATIENT     Room Number: 226/226-A  Evaluation Date: 12/28/2023  Type of Evaluation: Initial    Presenting Problem: Acute repsiratory failure, Fluid overload, Influenza A    Co-Morbidities: CABG x3, CAD, HTN, CHF, ESRD, HD (M/W/F)    Physician Order: IP Consult to Occupational Therapy  Reason for Therapy: ADL/IADL Dysfunction and Discharge Planning    OCCUPATIONAL THERAPY ASSESSMENT   Patient is a 73 year old male admitted 12/18/2023 for Acute repsiratory failure, Fluid overload, Influenza A.  In this OT evaluation patient presents with the following impairments: lethargy, decreased activity tolerance, decreased balance.  These deficits manifest functionally while performing ADL's, IADL's, functional mobility, functional transfers, negotiating stairs and bed mobility .   The patient is below baseline and would benefit from skilled inpatient OT to address the above deficits, maximizing patient's ability to return to prior level of function.    The patient's Approx Degree of Impairment: 70.42% has been calculated based on documentation in the [facility] '6 clicks' Inpatient Daily Activity Short Form.  Research supports that patients with this level of impairment may benefit from HODA for continued skilled OT and PT intervention in order to return to PLOF.     DISCHARGE RECOMMENDATIONS  OT Discharge Recommendations: Sub-acute rehabilitation  OT Device Recommendations: TBD    PLAN  OT Treatment Plan: Balance activities;Energy conservation/work simplification techniques;ADL training;Functional transfer training;UE strengthening/ROM;Endurance training;Cognitive reorientation;Patient/Family education;Patient/Family training;Compensatory technique education       OCCUPATIONAL THERAPY MEDICAL/SOCIAL HISTORY   Problem List  Principal Problem:    Acute respiratory failure with hypoxia (HCC)  Active Problems:    Thrombocytopenia (HCC)    ESRD on dialysis (HCC)    HOME SITUATION  Type of Home:  House  Home Layout: One level  Lives With: Spouse (2 daughters)  Toilet and Equipment: Standard height toilet  Shower/Tub and Equipment: Walk-in shower  Occupation/Status: Retired  Hand Dominance: Right  Drives: Yes  Patient Regularly Uses: None    Stairs in Home: N/A  Use of Assistive Device(s): N/A    Prior Level of [place]: Per medical chart, pt was ind with ADL and mobility.    SUBJECTIVE  \"I didn't sleep last night\"    OCCUPATIONAL THERAPY EXAMINATION    OBJECTIVE  Precautions: Bed/chair alarm; Restraints; Limb alert - left  Fall Risk: High fall risk    PAIN ASSESSMENT  Ratin  Location: Unable to localize  Management Techniques: Activity promotion; Repositioning; Nurse notified    ACTIVITY TOLERANCE  Pulse: 90  Heart Rate Source: Monitor  Resp: 13  BP: (!) 127/92 ()  BP Location: Right arm  BP Method: Automatic  Patient Position: Sitting    O2 SATURATIONS  Oxygen Therapy  SPO2% on Room Air at Rest: 94  SPO2% Ambulation on Room Air: 94 (RR 18, HR 90 BPM, 111/59, MAP 77)    COGNITION  Arousal/Alertness:  delayed responses to stimuli. Pt is lethargic, requires repeated directions, inconsistent following command      RANGE OF MOTION   Upper extremity ROM is within functional limits     STRENGTH ASSESSMENT  Upper extremity strength is within functional limits     COORDINATION  Gross Motor: WFL   Fine Motor: WFL     ACTIVITIES OF DAILY LIVING ASSESSMENT  -PAC ‘6-Clicks’ Inpatient Daily Activity Short Form  How much help from another person does the patient currently need…  -   Putting on and taking off regular lower body clothing?: A Lot  -   Bathing (including washing, rinsing, drying)?: A Lot  -   Toileting, which includes using toilet, bedpan or urinal? : Total  -   Putting on and taking off regular upper body clothing?: A Lot  -   Taking care of personal grooming such as brushing teeth?: A Lot  -   Eating meals?: A Lot    AM-PAC Score:  Score: 11  Approx Degree of Impairment:  70.42%  Standardized Score (AM-PAC Scale): 29.04  CMS Modifier (G-Code): CL    FUNCTIONAL TRANSFER ASSESSMENT  Sit to Stand: Edge of Bed  Edge of Bed: Moderate Assist    BED MOBILITY  Rolling: Moderate Assist  Supine to Sit : Moderate Assist  Sit to Supine (OT): Moderate Assist  Scooting: Mod A    BALANCE ASSESSMENT  Static Sitting: Minimal Assist  Static Standing: Moderate Assist       Skilled Therapy Provided: Bed mobility, sitting balance, sitting tolerance, sit to stand to sit transfers, ADL training, pt education    EDUCATION PROVIDED  Patient : Role of Occupational Therapy; Plan of Care; Discharge Recommendations; Functional Transfer Techniques; Fall Prevention; Compensatory ADL Techniques; Energy Conservation  Patient's Response to Education: Requires Further Education; Demonstrates Poor Carry Over to Information    Patient End of Session: In bed;Needs met;Call light within reach;RN aware of session/findings;All patient questions and concerns addressed;SCDs in place;Alarm set;Discussed recommendations with /    OT Goals  Patients self stated goal is: Unable to state at this time     Patient will complete functional transfer with Min A  Comment:     Patient will complete toileting with Min A  Comment:     Patient will tolerate standing for 2 minutes in prep for aDL with Min A   Comment:    Patient will complete UE dressing with Min A  Comment:          Goals  on: 24  Frequency: 3-5x/week      Self-Care Home Management: 10 minutes  Therapeutic Activity: 17 minutes    CHINMAY Duff/L  Occupational Therapy  [facility]

## 2023-12-28 NOTE — SLP NOTE
SPEECH DAILY NOTE - INPATIENT    ASSESSMENT & PLAN   ASSESSMENT  PPE REQUIRED. THIS THERAPIST WORE GLOVES, DROPLET MASK, AND GOGGLES FOR DURATION OF EVALUATION. HANDS WASHED UPON ENTRANCE/EXIT.    SLP f/u for ongoing dysphagia tx/meal assessment per recommendations of puree/mildly thick liquids per BSE. RN reports pt tolerates diet and medication well with no overt clinical s/s aspiration. Pt denies any swallowing challenges.     Pt positioned upright in bed, alert/cooperative. Pt afebrile, tolerating 2L/Min O2NC with oxygen status 100% prior to the start of oral trials. SLP reviewed aspiration precautions and safe swallowing compensatory strategies with the patient. Safe swallow guidelines remain written on the white board in purple. Patient v/u, no family present. Provided min assistance, pt tolerates puree and mildly thick liquids via cup with no overt clinical signs/symptoms of aspiration. Pt presented with trials of hard solids and thin liquids via cup. Pt with adequate oral acceptance and bilabial seal. Pt with intact bite, prolonged mastication of solids, and delayed A/P transfer. Pharyngeal swallow response appears delayed with reduced laryngeal elevation/excursion. No clinical signs of aspiration (e.g., immediate/delayed throat clear, immediate/delayed cough, wet vocal quality, increased O2 effort) observed across all trials. Oxygen status remained stable t/o the entire session. Recommend upgrade to soft easy to chew consistency, remain on thin liquids with strict adherence to aspiration precautions and swallow strategies.     SLP to f/u with meal assessment x1-2, monitor  CXR, and VFSS if any overt CSA and/or decline in CXR. RN alerted with results and recommendations.     MOST RECENT CXR 12/23  CONCLUSION:   1. Enteric tube tip projects over the distal gastric body/antrum.   2. Cardiomegaly with central pulmonary vascular congestion.  No focal airspace disease or significant pleural effusion.         Diet  Recommendations - Solids: Soft/ Easy to chew  Diet Recommendations - Liquids: Thin Liquids    Compensatory Strategies Recommended: No straws;Small bites and sips;Slow rate  Aspiration Precautions: Upright position;Slow rate;Small bites and sips;No straw  Medication Administration Recommendations:  (as tolerated)    Patient Experiencing Pain: No                Discharge Recommendations  Discharge Recommendations/Plan: Undetermined    Treatment Plan  Treatment Plan/Recommendations: Aspiration precautions    Interdisciplinary Communication: Discussed with RN  Plan posted at bedside            GOALS  Goal #1 The patient will tolerate soft easy to chew consistency and thin liquids without overt signs or symptoms of aspiration with 100 % accuracy over 1-2 session(s).  Revised 12/28   Goal #2 The patient/family/caregiver will demonstrate understanding and implementation of aspiration precautions and swallow strategies independently over 1-2 session(s).    In Progress   Goal #3 The patient will utilize compensatory strategies as outlined by  BSSE (clinical evaluation) including Slow rate, Small bites, Small sips, No straws, Upright 90 degrees, Upright 90 degrees 30 mins after meal, Eliminate distractions, min assistance 100 % of the time across 2 sessions.  Revised 12/28   Goal #4 The patient will tolerate trial upgrade of hard solid consistency and thin liquids without overt signs or symptoms of aspiration with 100 % accuracy over 1-2 session(s).    Revised 12/28     FOLLOW UP  Follow Up Needed (Documentation Required): Yes  SLP Follow-up Date: 12/29/23  Number of Visits to Meet Established Goals: 3    Session: 2    If you have any questions, please contact CECILLE Hicks M.S. CCC-SLP  Speech Language Pathologist  Phone Number Ext. [phone]

## 2023-12-28 NOTE — PLAN OF CARE
Dialysis patient.  Wean o2 as tolerated.  EF of 30%.  Pain management.  Refused bipap overnight.  Last bm was yesterday.      Problem: CARDIOVASCULAR - ADULT  Goal: Maintains optimal cardiac output and hemodynamic stability  Description: INTERVENTIONS:  - Monitor vital signs, rhythm, and trends  - Monitor for bleeding, hypotension and signs of decreased cardiac output  - Evaluate effectiveness of vasoactive medications to optimize hemodynamic stability  - Monitor arterial and/or venous puncture sites for bleeding and/or hematoma  - Assess quality of pulses, skin color and temperature  - Assess for signs of decreased coronary artery perfusion - ex. Angina  - Evaluate fluid balance, assess for edema, trend weights  Outcome: Not Progressing  Goal: Absence of cardiac arrhythmias or at baseline  Description: INTERVENTIONS:  - Continuous cardiac monitoring, monitor vital signs, obtain 12 lead EKG if indicated  - Evaluate effectiveness of antiarrhythmic and heart rate control medications as ordered  - Initiate emergency measures for life threatening arrhythmias  - Monitor electrolytes and administer replacement therapy as ordered  Outcome: Not Progressing     Problem: RESPIRATORY - ADULT  Goal: Achieves optimal ventilation and oxygenation  Description: INTERVENTIONS:  - Assess for changes in respiratory status  - Assess for changes in mentation and behavior  - Position to facilitate oxygenation and minimize respiratory effort  - Oxygen supplementation based on oxygen saturation or ABGs  - Provide Smoking Cessation handout, if applicable  - Encourage broncho-pulmonary hygiene including cough, deep breathe, Incentive Spirometry  - Assess the need for suctioning and perform as needed  - Assess and instruct to report SOB or any respiratory difficulty  - Respiratory Therapy support as indicated  - Manage/alleviate anxiety  - Monitor for signs/symptoms of CO2 retention  Outcome: Not Progressing

## 2023-12-28 NOTE — PHYSICAL THERAPY NOTE
PHYSICAL THERAPY EVALUATION - INPATIENT     Room Number: 226/226-A  Evaluation Date: 12/28/2023  Type of Evaluation: Re-evaluation   Physician Order: PT Eval and Treat    Presenting Problem: acute respiratory failure  Co-Morbidities : esrd on hd, dm, htn, cad/cabg  Reason for Therapy: Mobility Dysfunction and Discharge Planning    PHYSICAL THERAPY ASSESSMENT     Patient is a 73 year old male admitted 12/18/2023 for re evaluation, initial PT evaluation 12/22/23.  Pt admitted due to acute respiratory failure, currently on room air, O2sat wnl.   Patient's current functional deficits include impaired bed mobility, transfers, ambulation and stair negotiation, which are below the patient's pre-admission status.  Prior to admit, pt reports complete independence, did not use assistive device, lives with spouse and children. Patient will benefit from continued inpatient physical therapy to address above issues so that patient may achieve highest functional mobility level.      The patient's Approx Degree of Impairment: 72.57% has been calculated based on documentation in the [facility] '6 clicks' Inpatient Basic Mobility Short Form.  Research supports that patients with this level of impairment may benefit from HODA. Pt does not presently demonstrate the skills needed to safely return home with current family set up as he is significantly below his stated baseline.     Skilled Therapy Provided: Pt ok to see per rn, pt recd in supine, educated in role of PT, goals for session, importance of consistent mobility.    Pt is lethargic, able to follow simple commands with additional cueing due to fatigue. Pt currently on room air. Pt transferred supine to sit with mod a, fair sitting balance, /92. Pt educated in rw use, stood to rw with mod a, able to take side steps to HOB with mod a for upright support, seated, performed sit to stand again with same assist.   Pt returned to supine with mod a of two, not safe for transfer up to  chair at this time due to significant fatigue, decreased safety awareness with poor insight into his deficits.   Discussed session with rn.  Bed alarm activated. Recommend up to chair with nsg staff later if pt more alert.       DISCHARGE RECOMMENDATIONS  PT Discharge Recommendations: Sub-acute rehabilitation    PLAN  PT Treatment Plan: Patient education;Energy conservation;Endurance;Gait training;Strengthening;Transfer training  Rehab Potential : Good  Frequency (Obs): 3-5x/week       PHYSICAL THERAPY MEDICAL/SOCIAL HISTORY        Problem List  Principal Problem:    Acute respiratory failure with hypoxia (HCC)  Active Problems:    Thrombocytopenia (HCC)    ESRD on dialysis (HCC)      HOME SITUATION  Home Situation  Type of Home: House  Home Layout: One level  Stairs to Bedroom: 0  Railing: No  Lives With: Spouse (and 2 daughters)  Drives: Yes  Patient Owned Equipment: None  Patient Regularly Uses: None     Prior Level of [place]: Prior to admit, pt reports complete independence, did not use assistive device, lives with spouse and children.      PHYSICAL THERAPY EXAMINATION     OBJECTIVE  Precautions: Bed/chair alarm  Fall Risk: High fall risk    WEIGHT BEARING RESTRICTION  Weight Bearing Restriction: None                PAIN ASSESSMENT  Ratin  Location: denies at this time  Management Techniques: Activity promotion    COGNITION  Arousal/Alertness:  delayed responses to stimuli and lethargic  Attention Span:  difficulty attending to directions  Orientation Level:  oriented to place and oriented to person  Following Commands:  follows one step commands with repetition  Safety Judgement:  decreased awareness of need for assistance and decreased awareness of need for safety    RANGE OF MOTION AND STRENGTH ASSESSMENT  Upper extremity ROM and strength are within functional limits   Lower extremity ROM is within functional limits   Lower extremity strength bilat LE grossly 3-/5    BALANCE  Static Sitting: Poor  +  Dynamic Sitting: Poor +  Static Standing: Poor -  Dynamic Standing: Poor -      ACTIVITY TOLERANCE  Pulse: 90  Heart Rate Source: Monitor     BP: (!) 127/92  BP Location: Right arm  BP Method: Automatic  Patient Position: Sitting    O2 WALK  Oxygen Therapy  SPO2% on Room Air at Rest: 94 (sitting eob)    AM-PAC '6-Clicks' INPATIENT SHORT FORM - BASIC MOBILITY  How much difficulty does the patient currently have...  Patient Difficulty: Turning over in bed (including adjusting bedclothes, sheets and blankets)?: A Lot   Patient Difficulty: Sitting down on and standing up from a chair with arms (e.g., wheelchair, bedside commode, etc.): A Lot   Patient Difficulty: Moving from lying on back to sitting on the side of the bed?: A Lot   How much help from another person does the patient currently need...   Help from Another: Moving to and from a bed to a chair (including a wheelchair)?: A Lot   Help from Another: Need to walk in hospital room?: A Lot   Help from Another: Climbing 3-5 steps with a railing?: Total     AM-PAC Score:  Raw Score: 11   Approx Degree of Impairment: 72.57%   Standardized Score (AM-PAC Scale): 33.86   CMS Modifier (G-Code): CL    FUNCTIONAL ABILITY STATUS  Functional Mobility/Gait Assessment  Gait Assistance: Not tested    Exercise/Education Provided:  Bed mobility  Energy conservation  Functional activity tolerated  Gait training  Transfer training    Patient End of Session: In bed;Needs met;Call light within reach;RN aware of session/findings;All patient questions and concerns addressed;Alarm set    CURRENT GOALS    Goals to be met by: 1/5/24  Patient Goal Patient's self-stated goal is: to sleep   Goal #1 Patient is able to demonstrate supine - sit EOB @ level: supervision     Goal #1   Current Status    Goal #2 Patient is able to demonstrate transfers Sit to/from Stand at assistance level: supervision with walker - rolling     Goal #2  Current Status    Goal #3 Patient is able to ambulate 40  feet with assist device: walker - rolling at assistance level: minimum assistance   Goal #3   Current Status    Goal #4    Goal #4   Current Status    Patient Evaluation Complexity Level:  History Moderate - 1 or 2 personal factors and/or co-morbidities   Examination of body systems Moderate - addressing a total of 3 or more elements   Clinical Presentation Moderate - Evolving   Clinical Decision Making Moderate Complexity     Therapeutic Activity: 25 minutes

## 2023-12-29 LAB
ALBUMIN SERPL-MCNC: 4 G/DL (ref 3.2–4.8)
ANION GAP SERPL CALC-SCNC: 11 MMOL/L (ref 0–18)
BUN BLD-MCNC: 71 MG/DL (ref 9–23)
BUN/CREAT SERPL: 8 (ref 10–20)
CALCIUM BLD-MCNC: 8.7 MG/DL (ref 8.7–10.4)
CHLORIDE SERPL-SCNC: 97 MMOL/L (ref 98–112)
CO2 SERPL-SCNC: 28 MMOL/L (ref 21–32)
CREAT BLD-MCNC: 8.85 MG/DL
DEPRECATED RDW RBC AUTO: 51.3 FL (ref 35.1–46.3)
EGFRCR SERPLBLD CKD-EPI 2021: 6 ML/MIN/1.73M2 (ref 60–?)
ERYTHROCYTE [DISTWIDTH] IN BLOOD BY AUTOMATED COUNT: 14.8 % (ref 11–15)
GLUCOSE BLD-MCNC: 211 MG/DL (ref 70–99)
GLUCOSE BLDC GLUCOMTR-MCNC: 203 MG/DL (ref 70–99)
GLUCOSE BLDC GLUCOMTR-MCNC: 208 MG/DL (ref 70–99)
GLUCOSE BLDC GLUCOMTR-MCNC: 258 MG/DL (ref 70–99)
GLUCOSE BLDC GLUCOMTR-MCNC: 261 MG/DL (ref 70–99)
HBV CORE AB SERPL QL IA: REACTIVE
HBV CORE IGM SER QL: NONREACTIVE
HBV SURFACE AB SER QL: NONREACTIVE
HBV SURFACE AB SERPL IA-ACNC: 7.62 MIU/ML
HBV SURFACE AG SER-ACNC: <0.1 [IU]/L
HBV SURFACE AG SERPL QL IA: NONREACTIVE
HCT VFR BLD AUTO: 32.9 %
HGB BLD-MCNC: 10.9 G/DL
MAGNESIUM SERPL-MCNC: 2.6 MG/DL (ref 1.6–2.6)
MCH RBC QN AUTO: 31.1 PG (ref 26–34)
MCHC RBC AUTO-ENTMCNC: 33.1 G/DL (ref 31–37)
MCV RBC AUTO: 94 FL
OSMOLALITY SERPL CALC.SUM OF ELEC: 309 MOSM/KG (ref 275–295)
PHOSPHATE SERPL-MCNC: 8.8 MG/DL (ref 2.4–5.1)
PLATELET # BLD AUTO: 178 10(3)UL (ref 150–450)
POTASSIUM SERPL-SCNC: 4.7 MMOL/L (ref 3.5–5.1)
RBC # BLD AUTO: 3.5 X10(6)UL
SODIUM SERPL-SCNC: 136 MMOL/L (ref 136–145)
WBC # BLD AUTO: 8.7 X10(3) UL (ref 4–11)

## 2023-12-29 NOTE — CM/SW NOTE
Ins auth submitted via [employer] portal by JAYCEE Schneider this morning for HODA at the [facility] w/in-house HD. CM sent HD flow sheets and Hep B panel in [facility] so facility can confirm HD authorization during HODA stay. Lianoel Saenz confirmed HD auth will be obtained today and bed is available once ins auth for HODA has been obtained. [facility] placed on will call from 12/29 to 1/1, PCS updated.    Plan: [facility] for HODA w/in-house HD pending ins auth and medical clearance.    MARCELA GuanN    [phone]

## 2023-12-29 NOTE — PLAN OF CARE
Patient comfortable and lethargic at times throughout the day. Worked with PT/OT and was orthostatic and too lethargic to stay in chair. Patient accucheck AC/HS. Afebrile today. Attempted to wean off oxygen but was hypoxic and placed back on the nasal cannula. Patient refusing primofit. Patient to transfer up to telemetry floor, report given to tele RN.  Problem: Patient Centered Care  Goal: Patient preferences are identified and integrated in the patient's plan of care  Description: Interventions:  - What would you like us to know as we care for you? I live at home with family. I'm from [place]  - Provide timely, complete, and accurate information to patient/family  - Incorporate patient and family knowledge, values, beliefs, and cultural backgrounds into the planning and delivery of care  - Encourage patient/family to participate in care and decision-making at the level they choose  - Honor patient and family perspectives and choices  Outcome: Progressing     Problem: Diabetes/Glucose Control  Goal: Glucose maintained within prescribed range  Description: INTERVENTIONS:  - Monitor Blood Glucose as ordered  - Assess for signs and symptoms of hyperglycemia and hypoglycemia  - Administer ordered medications to maintain glucose within target range  - Assess barriers to adequate nutritional intake and initiate nutrition consult as needed  - Instruct patient on self management of diabetes  Outcome: Progressing     Problem: Patient/Family Goals  Goal: Patient/Family Long Term Goal  Description: Patient's Long Term Goal: go home    Interventions:  - Monitor vital signs  - Monitor appropriate labs  - Monitor blood glucose levels  - Administer medications per order  - Pain management as needed  - Follow MD orders  - Diagnostics per orders  - Dialysis per orders  - Update / inform patient and family on plan of care  - Discharge planning     - See additional Care Plan goals for specific interventions  Outcome:  Progressing  Goal: Patient/Family Short Term Goal  Description: Patient's Short Term Goal: To breathe better    Interventions:   - RT treatment  -O2  -Follow md orders  - See additional Care Plan goals for specific interventions  Outcome: Progressing     Problem: CARDIOVASCULAR - ADULT  Goal: Maintains optimal cardiac output and hemodynamic stability  Description: INTERVENTIONS:  - Monitor vital signs, rhythm, and trends  - Monitor for bleeding, hypotension and signs of decreased cardiac output  - Evaluate effectiveness of vasoactive medications to optimize hemodynamic stability  - Monitor arterial and/or venous puncture sites for bleeding and/or hematoma  - Assess quality of pulses, skin color and temperature  - Assess for signs of decreased coronary artery perfusion - ex. Angina  - Evaluate fluid balance, assess for edema, trend weights  Outcome: Progressing  Goal: Absence of cardiac arrhythmias or at baseline  Description: INTERVENTIONS:  - Continuous cardiac monitoring, monitor vital signs, obtain 12 lead EKG if indicated  - Evaluate effectiveness of antiarrhythmic and heart rate control medications as ordered  - Initiate emergency measures for life threatening arrhythmias  - Monitor electrolytes and administer replacement therapy as ordered  Outcome: Progressing     Problem: RESPIRATORY - ADULT  Goal: Achieves optimal ventilation and oxygenation  Description: INTERVENTIONS:  - Assess for changes in respiratory status  - Assess for changes in mentation and behavior  - Position to facilitate oxygenation and minimize respiratory effort  - Oxygen supplementation based on oxygen saturation or ABGs  - Provide Smoking Cessation handout, if applicable  - Encourage broncho-pulmonary hygiene including cough, deep breathe, Incentive Spirometry  - Assess the need for suctioning and perform as needed  - Assess and instruct to report SOB or any respiratory difficulty  - Respiratory Therapy support as indicated  -  Manage/alleviate anxiety  - Monitor for signs/symptoms of CO2 retention  Outcome: Progressing     Problem: METABOLIC/FLUID AND ELECTROLYTES - ADULT  Goal: Glucose maintained within prescribed range  Description: INTERVENTIONS:  - Monitor Blood Glucose as ordered  - Assess for signs and symptoms of hyperglycemia and hypoglycemia  - Administer ordered medications to maintain glucose within target range  - Assess barriers to adequate nutritional intake and initiate nutrition consult as needed  - Instruct patient on self management of diabetes  Outcome: Progressing  Goal: Electrolytes maintained within normal limits  Description: INTERVENTIONS:  - Monitor labs and rhythm and assess patient for signs and symptoms of electrolyte imbalances  - Administer electrolyte replacement as ordered  - Monitor response to electrolyte replacements, including rhythm and repeat lab results as appropriate  - Fluid restriction as ordered  - Instruct patient on fluid and nutrition restrictions as appropriate  Outcome: Progressing  Goal: Hemodynamic stability and optimal renal function maintained  Description: INTERVENTIONS:  - Monitor labs and assess for signs and symptoms of volume excess or deficit  - Monitor intake, output and patient weight  - Monitor urine specific gravity, serum osmolarity and serum sodium as indicated or ordered  - Monitor response to interventions for patient's volume status, including labs, urine output, blood pressure (other measures as available)  - Encourage oral intake as appropriate  - Instruct patient on fluid and nutrition restrictions as appropriate  Outcome: Progressing     Problem: Delirium  Goal: Minimize duration of delirium  Description: Interventions:  - Encourage use of hearing aids, eye glasses  - Promote highest level of mobility daily  - Provide frequent reorientation  - Promote wakefulness i.e. lights on, blinds open  - Promote sleep, encourage patient's normal rest cycle i.e. lights off, TV  off, minimize noise and interruptions  - Encourage family to assist in orientation and promotion of home routines  Outcome: Progressing

## 2023-12-29 NOTE — PROGRESS NOTES
[facility] Hospitalist Progress Note     CC: Hospital Follow up    PCP: Victor Manuel Cleaning MD       Assessment/Plan:     Mr. Vines is a 73 year old male with PMH BPH , CHF / ICM EF 30% , CAD s/p CABG x3, Moderate MR, HTN, HLD, ESRD on HD M/W/F, Anemia, Gastritis, who presents with SOB. Admitted for fluid overload and Influenza A.  Hypoxic and hypercapnic respiratory failure.      Acute Hypoxic Respiratory Failure  Pulmonary edema  ESRD on HD M/W/F  - positive influenza   - HD per renal  - s/p recent AV fistula repair by vascular surgery in September 2023  - steroid burst per pulmonary ended 12/24  - determine timing of RHC, per renal and cardio    Encephalopathy, improved, but intermittent waxes and wanes  -mobilize, needs therapy. He is very deconditioned     Influenza A positive   - cough for over a month but SOB more acute  - renally dose Tamiflu completed   - Ceftriaxone and steroids started 12/22/23, now off     Troponin Elevation   - no chest pain   - EKG with LBBB  - cardiology on consult     CAD s/p CABG x3   Ischemic cardiomyopathy EF 30% without acute exacerbation of congestive heart failure  Chronic Systolic heart failure  HTN  - Continue metoprolol 100mg BID  - holding his hydralazine for now   - Not on ACE or ARB due to renal disease     Anemia   Thrombocytopenia   Gastritis   - s/p EGD 11/7 with some mild gastritis, no active bleeding  - s/p colonoscopy 11/8 with polyps but no active bleeding, will need to repeat colonoscopy as outpatient  - on prevacid here      Type 2 diabetes with hyperglycemia  Diabetic nephropathy  - gabapentin (hold for now)  - ISS, meal time and basal insulin   - Controlled renal diet  - Continue home aspirin     Hyperlipidemia  -can hold his statin      BPH  -can hold his finasteride for now  -on flomax      Diet: NG removed 12/24 by patient, Continue to assess oral and nutritional intake. Speech following. He is now tolerating diet     DVT Prophy: HSQ      Dispo: He is very  deconditioned. Dispo will need to be HODA once stabilized. Continue therapy assessments  Transferred to medical floor on 12/28  Figure out timing of RHC, per renal and cardiology      Ashollier Sandoval Smith [facility] Hospitalist      Subjective:     Waxing and waning mentation. Does wake up and talk to me but appeared more sleepy today.     OBJECTIVE:    Blood pressure 128/73, pulse 81, temperature 97.8 °F (36.6 °C), temperature source Oral, resp. rate 20, weight 219 lb 8 oz (99.6 kg), SpO2 95%.    Temp:  [96.9 °F (36.1 °C)-97.8 °F (36.6 °C)] 97.8 °F (36.6 °C)  Pulse:  [81-99] 81  Resp:  [20-22] 20  BP: (128-163)/(62-83) 128/73  SpO2:  [88 %-99 %] 95 %      Intake/Output:    Intake/Output Summary (Last 24 hours) at 12/29/2023 1223  Last data filed at 12/29/2023 0800  Gross per 24 hour   Intake 130 ml   Output --   Net 130 ml         Last 3 Weights   12/29/23 0446 219 lb 8 oz (99.6 kg)   12/27/23 1400 242 lb (109.8 kg)   12/18/23 1258 242 lb (109.8 kg)   11/27/23 1951 252 lb (114.3 kg)   11/13/23 0700 245 lb 4.8 oz (111.3 kg)   11/12/23 0519 244 lb 8 oz (110.9 kg)   11/11/23 1300 246 lb 14.4 oz (112 kg)   11/11/23 0539 238 lb 1.6 oz (108 kg)   11/08/23 1410 234 lb (106.1 kg)   11/07/23 0619 234 lb 1.6 oz (106.2 kg)   11/06/23 1744 239 lb (108.4 kg)       Exam   General: awake  Lungs: clear   Heart: Regular rate and rhythm  Abdomen: soft, non tender  Extremities: No edema  Skin: no new rash, normal color  Psych: appropriate affect     Data Review:       Labs:     Recent Labs   Lab 12/23/23  0408 12/24/23  0709 12/27/23  0302 12/28/23  0342 12/29/23  0628   RBC 3.44*   < > 3.21* 3.99 3.50*   HGB 10.3*   < > 9.7* 11.9* 10.9*   HCT 32.3*   < > 29.5* 38.6* 32.9*   MCV 93.9   < > 91.9 96.7 94.0   MCH 29.9   < > 30.2 29.8 31.1   MCHC 31.9   < > 32.9 30.8* 33.1   RDW 15.3*   < > 14.5 14.6 14.8   NEPRELIM 4.78  --   --   --   --    WBC 5.3   < > 7.3 7.5 8.7   .0   < > 179.0 173.0 178.0    < > = values in this  interval not displayed.         Recent Labs   Lab 12/27/23  0302 12/28/23  0342 12/29/23  0628   * 170* 211*   BUN 91* 44* 71*   CREATSERUM 9.39* 6.33* 8.85*   EGFRCR 5* 9* 6*   CA 8.2* 8.5* 8.7   * 137 136   K 4.7 4.5 4.7   CL 96* 98 97*   CO2 25.0 31.0 28.0       Recent Labs   Lab 12/25/23  0428 12/26/23  0327 12/27/23  0302 12/28/23  0342 12/29/23  0628   ALB 3.9 4.0 3.9 4.2 4.0         Imaging:  No results found.      Meds:      sevelamer carbonate  1,600 mg Oral TID CC    insulin aspart  1-5 Units Subcutaneous TID CC    aspirin  81 mg Per NG Tube Daily    lansoprazole  30 mg Per NG Tube QAM AC    [Held by provider] finasteride  5 mg Per NG Tube Daily    metoprolol tartrate  100 mg Per NG Tube 2x Daily(Beta Blocker)    insulin detemir  25 Units Subcutaneous Daily    heparin  5,000 Units Subcutaneous Q8H YOLANDA    tamsulosin  0.8 mg Oral Daily    [Held by provider] hydrALAZINE  50 mg Oral TID    [Held by provider] gabapentin  100 mg Oral BID    [Held by provider] rosuvastatin  10 mg Oral Nightly         hydrALAzine, metoclopramide, traMADol, ipratropium-albuterol, glucose **OR** glucose **OR** glucose-vitamin C **OR** dextrose **OR** glucose **OR** glucose **OR** glucose-vitamin C, acetaminophen, polyethylene glycol (PEG 3350), sennosides, bisacodyl, fleet enema, ondansetron, albuterol

## 2023-12-29 NOTE — CM/SW NOTE
Expected Discharge Plan   Destination: Subacute Rehab with on-site HD.  Family choice is [facility]    Current Barriers to d/c: Medical Clearance; insurance approval,on-site dialysis approval.  Pt/Family aware of plan: Spoke with wife who provided choice.  [facility] Staff aware of dc plan: Patient discussed in care rounds.    PASRR completed-yes    Social Work Follow up Needed:   Insurance approval  Dialysis approval    PLAN:  Open with luli on 12/29  Send dialysis flow sheets for dialysis approval.    / to remain available for support and/or discharge planning.      Shara Bedolla MBA BSN RN CRRN   RN Case Manager  [phone]

## 2023-12-29 NOTE — PAYOR COMM NOTE
--------------  CONTINUED STAY REVIEW-----REQUESTING ADDITIONAL DAYS 12/28 12/29      Payor: [facility]  Subscriber #:  [id number]  Authorization Number: [id number]    Admit date: 12/18/23  Admit time: 12:56 PM    Admitting Physician: Bradley Dash MD  Attending Physician:  Jaxon Nick DO  Primary Care Physician: Victor Manuel Cleaning MD    12/28   [facility] Hospitalist Progress Note      CC: Hospital Follow up     PCP: Victor Manuel Cleaning MD         Assessment/Plan:      Mr. Vines is a 73 year old male with PMH BPH , CHF / ICM EF 30% , CAD s/p CABG x3, Moderate MR, HTN, HLD, ESRD on HD M/W/F, Anemia, Gastritis, who presents with SOB. Admitted for fluid overload and Influenza A.  Hypoxic and hypercapnic respiratory failure.      Acute Hypoxic Respiratory Failure  Pulmonary edema  ESRD on HD M/W/F  - positive influenza   - HD per renal, next session Friday  - s/p recent AV fistula repair by vascular surgery in September 2023  - steroid burst per pulmonary ended 12/24     Encephalopathy, improved, but intermittent waxes and wanes  -today appears the best I have seen him   -mobilize, needs therapy. He is very deconditioned      Influenza A positive   - cough for over a month but SOB more acute  - renally dose Tamiflu completed   - Ceftriaxone and steroids started 12/22/23, now off         Troponin Elevation   - no chest pain   - EKG with concern for LBBB  - cardiology on consult     CAD s/p CABG x3   Ischemic cardiomyopathy EF 30% without acute exacerbation of congestive heart failure  Chronic Systolic heart failure  HTN  - Continue metoprolol 100mg BID  - holding his hydralazine for now   - Not on ACE or ARB due to renal disease     Anemia   Thrombocytopenia   Gastritis   - s/p EGD 11/7 with some mild gastritis, no active bleeding  - s/p colonoscopy 11/8 with polyps but no active bleeding, will need to repeat colonoscopy as outpatient  - on prevacid here      Type 2 diabetes with hyperglycemia  Diabetic  nephropathy  - gabapentin (hold for now)  - ISS, meal time and basal insulin   - Controlled renal diet  - Continue home aspirin     Hyperlipidemia  -can hold his statin      BPH  -can hold his finasteride for now  -on flomax      Diet: NG removed 12/24 by patient, Continue to assess oral and nutritional intake. Speech following. He is now tolerating diet     DVT Prophy: HSQ      Dispo: He is very deconditioned. Dispo will need to be HODA once stabilized. Continue therapy assessments  OK to transfer to medical floor      Jaxon Smith [facility] Hospitalist       Subjective:      More alert today when seen. No distress. He wants to get up and move.      OBJECTIVE:     Blood pressure 154/72, pulse 81, temperature 97 °F (36.1 °C), temperature source Temporal, resp. rate 13, weight 242 lb (109.8 kg), SpO2 100%.     Temp:  [97 °F (36.1 °C)] 97 °F (36.1 °C)  Pulse:  [70-81] 81  Resp:  [13-20] 13  BP: (136-164)/(62-84) 154/72  SpO2:  [87 %-100 %] 100 %        Intake/Output:     Intake/Output Summary (Last 24 hours) at 12/28/2023 1024  Last data filed at 12/27/2023 1400      Gross per 24 hour   Intake 75 ml   Output 2020 ml   Net -1945 ml                 Last 3 Weights   12/27/23 1400 242 lb (109.8 kg)   12/18/23 1258 242 lb (109.8 kg)   11/27/23 1951 252 lb (114.3 kg)   11/13/23 0700 245 lb 4.8 oz (111.3 kg)   11/12/23 0519 244 lb 8 oz (110.9 kg)   11/11/23 1300 246 lb 14.4 oz (112 kg)   11/11/23 0539 238 lb 1.6 oz (108 kg)   11/08/23 1410 234 lb (106.1 kg)   11/07/23 0619 234 lb 1.6 oz (106.2 kg)   11/06/23 1744 239 lb (108.4 kg)         Exam   General: awake  Lungs: clear   Heart: Regular rate and rhythm  Abdomen: soft, non tender  Extremities: No edema  Skin: no new rash, normal color  Psych: appropriate affect      Data Review:       Labs:               Recent Labs   Lab 12/22/23  0421 12/23/23  0408 12/24/23  0709 12/26/23  0327 12/27/23  0302 12/28/23  0342   RBC 3.46* 3.44*   < > 3.25* 3.21* 3.99   HGB  10.2* 10.3*   < > 9.7* 9.7* 11.9*   HCT 33.7* 32.3*   < > 30.9* 29.5* 38.6*   MCV 97.4 93.9   < > 95.1 91.9 96.7   MCH 29.5 29.9   < > 29.8 30.2 29.8   MCHC 30.3* 31.9   < > 31.4 32.9 30.8*   RDW 15.9* 15.3*   < > 14.6 14.5 14.6   NEPRELIM 6.33 4.78  --   --   --   --    WBC 8.9 5.3   < > 8.2 7.3 7.5   .0* 155.0   < > 178.0 179.0 173.0    < > = values in this interval not displayed.                  Recent Labs   Lab 12/26/23  0327 12/27/23  0302 12/28/23  0342   * 181* 170*   BUN 62* 91* 44*   CREATSERUM 7.67* 9.39* 6.33*   EGFRCR 7* 5* 9*   CA 8.3* 8.2* 8.5*   * 133* 137   K 4.6 4.7 4.5   CL 96* 96* 98   CO2 24.0 25.0 31.0                 Recent Labs   Lab 12/24/23  0709 12/25/23  0428 12/26/23  0327 12/27/23  0302 12/28/23  0342   ALB 3.9 3.9 4.0 3.9 4.2            Imaging:  No results found.        Meds:       sevelamer carbonate  1,600 mg Oral TID CC    insulin aspart  1-5 Units Subcutaneous TID CC    aspirin  81 mg Per NG Tube Daily    lansoprazole  30 mg Per NG Tube QAM AC    [Held by provider] finasteride  5 mg Per NG Tube Daily    metoprolol tartrate  100 mg Per NG Tube 2x Daily(Beta Blocker)    insulin detemir  25 Units Subcutaneous Daily    heparin  5,000 Units Subcutaneous Q8H YOLANDA    tamsulosin  0.8 mg Oral Daily    [Held by provider] hydrALAZINE  50 mg Oral TID    [Held by provider] gabapentin  100 mg Oral BID    [Held by provider] rosuvastatin  10 mg Oral Nightly            metoclopramide, traMADol, ipratropium-albuterol, glucose **OR** glucose **OR** glucose-vitamin C **OR** dextrose **OR** glucose **OR** glucose **OR** glucose-vitamin C, acetaminophen, polyethylene glycol (PEG 3350), sennosides, bisacodyl, fleet enema, ondansetron, albuterol                       12/29   [facility] Hospitalist Progress Note      CC: Hospital Follow up     PCP: Victor Manuel Cleaning MD         Assessment/Plan:      Mr. Vines is a 73 year old male with PMH BPH , CHF / ICM EF 30% , CAD s/p CABG x3, Moderate  MR, HTN, HLD, ESRD on HD M/W/F, Anemia, Gastritis, who presents with SOB. Admitted for fluid overload and Influenza A.  Hypoxic and hypercapnic respiratory failure.      Acute Hypoxic Respiratory Failure  Pulmonary edema  ESRD on HD M/W/F  - positive influenza   - HD per renal  - s/p recent AV fistula repair by vascular surgery in September 2023  - steroid burst per pulmonary ended 12/24  - determine timing of RHC, per renal and cardio     Encephalopathy, improved, but intermittent waxes and wanes  -mobilize, needs therapy. He is very deconditioned      Influenza A positive   - cough for over a month but SOB more acute  - renally dose Tamiflu completed   - Ceftriaxone and steroids started 12/22/23, now off      Troponin Elevation   - no chest pain   - EKG with LBBB  - cardiology on consult     CAD s/p CABG x3   Ischemic cardiomyopathy EF 30% without acute exacerbation of congestive heart failure  Chronic Systolic heart failure  HTN  - Continue metoprolol 100mg BID  - holding his hydralazine for now   - Not on ACE or ARB due to renal disease     Anemia   Thrombocytopenia   Gastritis   - s/p EGD 11/7 with some mild gastritis, no active bleeding  - s/p colonoscopy 11/8 with polyps but no active bleeding, will need to repeat colonoscopy as outpatient  - on prevacid here      Type 2 diabetes with hyperglycemia  Diabetic nephropathy  - gabapentin (hold for now)  - ISS, meal time and basal insulin   - Controlled renal diet  - Continue home aspirin     Hyperlipidemia  -can hold his statin      BPH  -can hold his finasteride for now  -on flomax      Diet: NG removed 12/24 by patient, Continue to assess oral and nutritional intake. Speech following. He is now tolerating diet     DVT Prophy: HSQ      Dispo: He is very deconditioned. Dispo will need to be HODA once stabilized. Continue therapy assessments  Transferred to medical floor on 12/28  Figure out timing of RHC, per renal and cardiology      formerly [facility]  and Care Hospitalist       Subjective:      Waxing and waning mentation. Does wake up and talk to me but appeared more sleepy today.      OBJECTIVE:     Blood pressure 128/73, pulse 81, temperature 97.8 °F (36.6 °C), temperature source Oral, resp. rate 20, weight 219 lb 8 oz (99.6 kg), SpO2 95%.     Temp:  [96.9 °F (36.1 °C)-97.8 °F (36.6 °C)] 97.8 °F (36.6 °C)  Pulse:  [81-99] 81  Resp:  [20-22] 20  BP: (128-163)/(62-83) 128/73  SpO2:  [88 %-99 %] 95 %        Intake/Output:     Intake/Output Summary (Last 24 hours) at 12/29/2023 1223  Last data filed at 12/29/2023 0800      Gross per 24 hour   Intake 130 ml   Output --   Net 130 ml                 Last 3 Weights   12/29/23 0446 219 lb 8 oz (99.6 kg)   12/27/23 1400 242 lb (109.8 kg)   12/18/23 1258 242 lb (109.8 kg)   11/27/23 1951 252 lb (114.3 kg)   11/13/23 0700 245 lb 4.8 oz (111.3 kg)   11/12/23 0519 244 lb 8 oz (110.9 kg)   11/11/23 1300 246 lb 14.4 oz (112 kg)   11/11/23 0539 238 lb 1.6 oz (108 kg)   11/08/23 1410 234 lb (106.1 kg)   11/07/23 0619 234 lb 1.6 oz (106.2 kg)   11/06/23 1744 239 lb (108.4 kg)         Exam   General: awake  Lungs: clear   Heart: Regular rate and rhythm  Abdomen: soft, non tender  Extremities: No edema  Skin: no new rash, normal color  Psych: appropriate affect      Data Review:       Labs:              Recent Labs   Lab 12/23/23  0408 12/24/23  0709 12/27/23  0302 12/28/23  0342 12/29/23  0628   RBC 3.44*   < > 3.21* 3.99 3.50*   HGB 10.3*   < > 9.7* 11.9* 10.9*   HCT 32.3*   < > 29.5* 38.6* 32.9*   MCV 93.9   < > 91.9 96.7 94.0   MCH 29.9   < > 30.2 29.8 31.1   MCHC 31.9   < > 32.9 30.8* 33.1   RDW 15.3*   < > 14.5 14.6 14.8   NEPRELIM 4.78  --   --   --   --    WBC 5.3   < > 7.3 7.5 8.7   .0   < > 179.0 173.0 178.0    < > = values in this interval not displayed.                  Recent Labs   Lab 12/27/23  0302 12/28/23  0342 12/29/23  0628   * 170* 211*   BUN 91* 44* 71*   CREATSERUM 9.39* 6.33* 8.85*   EGFRCR 5*  9* 6*   CA 8.2* 8.5* 8.7   * 137 136   K 4.7 4.5 4.7   CL 96* 98 97*   CO2 25.0 31.0 28.0                 Recent Labs   Lab 12/25/23  0428 12/26/23  0327 12/27/23  0302 12/28/23  0342 12/29/23  0628   ALB 3.9 4.0 3.9 4.2 4.0            Imaging:  No results found.        Meds:       sevelamer carbonate  1,600 mg Oral TID CC    insulin aspart  1-5 Units Subcutaneous TID CC    aspirin  81 mg Per NG Tube Daily    lansoprazole  30 mg Per NG Tube QAM AC    [Held by provider] finasteride  5 mg Per NG Tube Daily    metoprolol tartrate  100 mg Per NG Tube 2x Daily(Beta Blocker)    insulin detemir  25 Units Subcutaneous Daily    heparin  5,000 Units Subcutaneous Q8H YOLANDA    tamsulosin  0.8 mg Oral Daily    [Held by provider] hydrALAZINE  50 mg Oral TID    [Held by provider] gabapentin  100 mg Oral BID    [Held by provider] rosuvastatin  10 mg Oral Nightly            hydrALAzine, metoclopramide, traMADol, ipratropium-albuterol, glucose **OR** glucose **OR** glucose-vitamin C **OR** dextrose **OR** glucose **OR** glucose **OR** glucose-vitamin C, acetaminophen, polyethylene glycol (PEG 3350), sennosides, bisacodyl, fleet enema, ondansetron, albuterol                   MEDICATIONS ADMINISTERED IN LAST 1 DAY:  heparin (Porcine) 5000 UNIT/ML injection 5,000 Units       Date Action Dose Route User    12/29/2023 0530 Given 5,000 Units Subcutaneous (Left Upper Abdomen) Esther Kincaid RN    12/28/2023 2220 Given 5,000 Units Subcutaneous (Left Lower Abdomen) Esther Kincaid RN          insulin aspart (NovoLOG) 100 Units/mL FlexPen 1-5 Units       Date Action Dose Route User    12/29/2023 1311 Given 2 Units Subcutaneous (Left Lower Abdomen) Jia Yanez RN    12/29/2023 0843 Given 2 Units Subcutaneous (Left Lower Abdomen) Jia Yanez RN          insulin aspart (NovoLOG) 100 Units/mL FlexPen 7 Units       Date Action Dose Route User    12/28/2023 1825 Given 7 Units Subcutaneous (Left Lower Abdomen) Estrella Alejandre, RN           insulin detemir (Levemir) 100 UNIT/ML FlexPen/FlexTouch 25 Units       Date Action Dose Route User    12/29/2023 0843 Given 25 Units Subcutaneous (Left Lower Abdomen) Jia Yanez, RN          sevelamer carbonate (Renvela) tab 1,600 mg       Date Action Dose Route User    12/28/2023 1825 Given 1,600 mg Oral Estrella Alejandre, RN            Vitals (last day)       Date/Time Temp Pulse Resp BP SpO2 Weight O2 Device O2 Flow Rate (L/min) Who    12/29/23 1313 -- 103 -- 118/64 94 % -- Nasal cannula 2 L/min SY    12/29/23 0837 97.8 °F (36.6 °C) 81 20 128/73 95 % -- Bi-PAP 2 L/min SY    12/29/23 0446 -- -- -- -- -- 219 lb 8 oz -- -- MO    12/29/23 0446 97.4 °F (36.3 °C) 83 22 137/72 97 % -- Bi-PAP 2 L/min IM    12/29/23 0435 -- 87 -- -- 98 % -- -- -- SN    12/29/23 0001 -- 99 -- -- 95 % -- -- -- SN    12/28/23 2019 -- -- -- 140/74 -- -- -- -- IM    12/28/23 1958 96.9 °F (36.1 °C) 99 22 163/83 95 % -- Bi-PAP 2 L/min IM    12/28/23 1932 -- 90 -- -- -- -- -- -- FG    12/28/23 1903 -- 89 -- -- -- -- -- -- KD    12/28/23 1600 -- 89 20 131/62 99 % -- Nasal cannula 2 L/min BK    12/28/23 1511 -- -- -- -- 88 % -- None (Room air) -- BK    12/28/23 1200 -- 81 14 116/62 94 % -- Nasal cannula 2 L/min BK    12/28/23 1055 -- 90 18 111/59 -- -- -- -- RT    12/28/23 1052 -- 90 -- 127/92 -- -- -- -- SP    12/28/23 1046 -- 90 13 127/92 -- -- -- -- RT    12/28/23 0800 97 °F (36.1 °C) 81 13 154/72 100 % -- Nasal cannula 2 L/min BK    12/28/23 0458 97 °F (36.1 °C) -- -- 153/84 -- -- Nasal cannula 2 L/min WT

## 2023-12-29 NOTE — PLAN OF CARE
Patient lethargic. Resting in bed with BiPAP in place with 2L O2. No pain overnight. Denies shortness of breath. Plan for HD this morning. Safety precautions in place.    Problem: Patient Centered Care  Goal: Patient preferences are identified and integrated in the patient's plan of care  Description: Interventions:  - What would you like us to know as we care for you? I live at home with family. I'm from [place]  - Provide timely, complete, and accurate information to patient/family  - Incorporate patient and family knowledge, values, beliefs, and cultural backgrounds into the planning and delivery of care  - Encourage patient/family to participate in care and decision-making at the level they choose  - Honor patient and family perspectives and choices  Outcome: Progressing     Problem: Patient/Family Goals  Goal: Patient/Family Long Term Goal  Description: Patient's Long Term Goal: go home    Interventions:  - Monitor vital signs  - Monitor appropriate labs  - Monitor blood glucose levels  - Administer medications per order  - Pain management as needed  - Follow MD orders  - Diagnostics per orders  - Dialysis per orders  - Update / inform patient and family on plan of care  - Discharge planning     - See additional Care Plan goals for specific interventions  Outcome: Progressing  Goal: Patient/Family Short Term Goal  Description: Patient's Short Term Goal: To breathe better    Interventions:   - RT treatment  -O2  -Follow md orders  - See additional Care Plan goals for specific interventions  Outcome: Progressing     Problem: CARDIOVASCULAR - ADULT  Goal: Maintains optimal cardiac output and hemodynamic stability  Description: INTERVENTIONS:  - Monitor vital signs, rhythm, and trends  - Monitor for bleeding, hypotension and signs of decreased cardiac output  - Evaluate effectiveness of vasoactive medications to optimize hemodynamic stability  - Monitor arterial and/or venous puncture sites for bleeding and/or  hematoma  - Assess quality of pulses, skin color and temperature  - Assess for signs of decreased coronary artery perfusion - ex. Angina  - Evaluate fluid balance, assess for edema, trend weights  Outcome: Progressing  Goal: Absence of cardiac arrhythmias or at baseline  Description: INTERVENTIONS:  - Continuous cardiac monitoring, monitor vital signs, obtain 12 lead EKG if indicated  - Evaluate effectiveness of antiarrhythmic and heart rate control medications as ordered  - Initiate emergency measures for life threatening arrhythmias  - Monitor electrolytes and administer replacement therapy as ordered  Outcome: Progressing     Problem: RESPIRATORY - ADULT  Goal: Achieves optimal ventilation and oxygenation  Description: INTERVENTIONS:  - Assess for changes in respiratory status  - Assess for changes in mentation and behavior  - Position to facilitate oxygenation and minimize respiratory effort  - Oxygen supplementation based on oxygen saturation or ABGs  - Provide Smoking Cessation handout, if applicable  - Encourage broncho-pulmonary hygiene including cough, deep breathe, Incentive Spirometry  - Assess the need for suctioning and perform as needed  - Assess and instruct to report SOB or any respiratory difficulty  - Respiratory Therapy support as indicated  - Manage/alleviate anxiety  - Monitor for signs/symptoms of CO2 retention  Outcome: Progressing     Problem: METABOLIC/FLUID AND ELECTROLYTES - ADULT  Goal: Electrolytes maintained within normal limits  Description: INTERVENTIONS:  - Monitor labs and rhythm and assess patient for signs and symptoms of electrolyte imbalances  - Administer electrolyte replacement as ordered  - Monitor response to electrolyte replacements, including rhythm and repeat lab results as appropriate  - Fluid restriction as ordered  - Instruct patient on fluid and nutrition restrictions as appropriate  Outcome: Progressing  Goal: Hemodynamic stability and optimal renal function  maintained  Description: INTERVENTIONS:  - Monitor labs and assess for signs and symptoms of volume excess or deficit  - Monitor intake, output and patient weight  - Monitor urine specific gravity, serum osmolarity and serum sodium as indicated or ordered  - Monitor response to interventions for patient's volume status, including labs, urine output, blood pressure (other measures as available)  - Encourage oral intake as appropriate  - Instruct patient on fluid and nutrition restrictions as appropriate  Outcome: Progressing     Problem: Diabetes/Glucose Control  Goal: Glucose maintained within prescribed range  Description: INTERVENTIONS:  - Monitor Blood Glucose as ordered  - Assess for signs and symptoms of hyperglycemia and hypoglycemia  - Administer ordered medications to maintain glucose within target range  - Assess barriers to adequate nutritional intake and initiate nutrition consult as needed  - Instruct patient on self management of diabetes  Outcome: Not Progressing     Problem: Delirium  Goal: Minimize duration of delirium  Description: Interventions:  - Encourage use of hearing aids, eye glasses  - Promote highest level of mobility daily  - Provide frequent reorientation  - Promote wakefulness i.e. lights on, blinds open  - Promote sleep, encourage patient's normal rest cycle i.e. lights off, TV off, minimize noise and interruptions  - Encourage family to assist in orientation and promotion of home routines  Outcome: Not Progressing

## 2023-12-29 NOTE — PROGRESS NOTES
NEPHROLOGY DAILY PROGRESS NOTE     SUBJECTIVE:    Awake slightly confused.    OBJECTIVE:    Total Intake/Output:    Intake/Output Summary (Last 24 hours) at 12/29/2023 1250  Last data filed at 12/29/2023 0800  Gross per 24 hour   Intake 130 ml   Output --   Net 130 ml         PHYSICAL EXAM:  /73 (BP Location: Right arm)   Pulse 81   Temp 97.8 °F (36.6 °C) (Oral)   Resp 20   Wt 219 lb 8 oz (99.6 kg)   SpO2 95%   BMI 29.77 kg/m²   GEN:  on NC  HEENT: NCAT    CHEST: coarse breath sounds    CARDIAC: S1S2 normal  ABD: soft, NT/ND  EXT:  no lower ext edema  NEURO: sleepy/ lethargic   SKIN: warm, dry   DIALYSIS ACCESS: LUE AVF + bruit and thrill      CURRENT MEDICATIONS:   sevelamer carbonate  1,600 mg Oral TID CC    insulin aspart  1-5 Units Subcutaneous TID CC    aspirin  81 mg Per NG Tube Daily    lansoprazole  30 mg Per NG Tube QAM AC    [Held by provider] finasteride  5 mg Per NG Tube Daily    metoprolol tartrate  100 mg Per NG Tube 2x Daily(Beta Blocker)    insulin detemir  25 Units Subcutaneous Daily    heparin  5,000 Units Subcutaneous Q8H YOLANDA    tamsulosin  0.8 mg Oral Daily    [Held by provider] hydrALAZINE  50 mg Oral TID    [Held by provider] gabapentin  100 mg Oral BID    [Held by provider] rosuvastatin  10 mg Oral Nightly             LABS:  Patient Labs Reviewed in Detail. Pertinent Labs as follows:  Recent Labs   Lab 12/27/23  0302 12/28/23  0342 12/29/23  0628   * 170* 211*   BUN 91* 44* 71*   CREATSERUM 9.39* 6.33* 8.85*   EGFRCR 5* 9* 6*   CA 8.2* 8.5* 8.7   * 137 136   K 4.7 4.5 4.7   CL 96* 98 97*   CO2 25.0 31.0 28.0     Recent Labs   Lab 12/23/23  0408 12/24/23  0709 12/27/23  0302 12/28/23  0342 12/29/23  0628   RBC 3.44*   < > 3.21* 3.99 3.50*   HGB 10.3*   < > 9.7* 11.9* 10.9*   HCT 32.3*   < > 29.5* 38.6* 32.9*   MCV 93.9   < > 91.9 96.7 94.0   MCH 29.9   < > 30.2 29.8 31.1   MCHC 31.9   < > 32.9 30.8* 33.1   RDW 15.3*   < > 14.5 14.6 14.8   NEPRELIM 4.78  --   --   --    --    WBC 5.3   < > 7.3 7.5 8.7   .0   < > 179.0 173.0 178.0    < > = values in this interval not displayed.           IMAGING:  No results found.         ASSESSMENT AND PLAN:   This is a 73 year old male with PMH sig for ESRD on HD MWF, HTN, HLD, DM2, CAD s/p CABG x3, ischemic cardiomyopathy. Presents with SOB and cough. Found to be influenza positive. Nephrology is consulted for dialysis.      ESRD on HD MWF   - HD today  - followed by [facility]   - [facility] in's   - Discussed with Dr. Morelos will need a RHC to evaluate volume status, will plan for next week when closer to euvolemia.     Acute hypoxic respiratory failure  - CXR with pulmonary vascular changes, also influenza A postivie    - fluid removal with dialysis as tolerated     Hyperkalemia   - 2K bath with dialysis   - follow K level      Hyperphosphatemia   - Nepro feeds  - Sevelamer 1600mg tid     Hypertension   - Hydralazine, metoprolol       Anemia   - trend Hb, Hb at goal    - receiving OMAIRA with outpatient dialysis      Discussed with Dr. Jaxon Parker MD  [facility]  Nephrology

## 2023-12-30 ENCOUNTER — APPOINTMENT (OUTPATIENT)
Dept: CT IMAGING | Facility: HOSPITAL | Age: 73
End: 2023-12-30
Attending: HOSPITALIST
Payer: MEDICARE

## 2023-12-30 LAB
ALBUMIN SERPL-MCNC: 4.2 G/DL (ref 3.2–4.8)
ANION GAP SERPL CALC-SCNC: 8 MMOL/L (ref 0–18)
BUN BLD-MCNC: 49 MG/DL (ref 9–23)
BUN/CREAT SERPL: 7.4 (ref 10–20)
CALCIUM BLD-MCNC: 8.9 MG/DL (ref 8.7–10.4)
CHLORIDE SERPL-SCNC: 97 MMOL/L (ref 98–112)
CO2 SERPL-SCNC: 31 MMOL/L (ref 21–32)
CREAT BLD-MCNC: 6.62 MG/DL
DEPRECATED RDW RBC AUTO: 51.8 FL (ref 35.1–46.3)
EGFRCR SERPLBLD CKD-EPI 2021: 8 ML/MIN/1.73M2 (ref 60–?)
ERYTHROCYTE [DISTWIDTH] IN BLOOD BY AUTOMATED COUNT: 14.8 % (ref 11–15)
GLUCOSE BLD-MCNC: 218 MG/DL (ref 70–99)
GLUCOSE BLDC GLUCOMTR-MCNC: 229 MG/DL (ref 70–99)
GLUCOSE BLDC GLUCOMTR-MCNC: 234 MG/DL (ref 70–99)
GLUCOSE BLDC GLUCOMTR-MCNC: 242 MG/DL (ref 70–99)
GLUCOSE BLDC GLUCOMTR-MCNC: 246 MG/DL (ref 70–99)
HCT VFR BLD AUTO: 34.3 %
HGB BLD-MCNC: 11.2 G/DL
MAGNESIUM SERPL-MCNC: 2.4 MG/DL (ref 1.6–2.6)
MCH RBC QN AUTO: 31.3 PG (ref 26–34)
MCHC RBC AUTO-ENTMCNC: 32.7 G/DL (ref 31–37)
MCV RBC AUTO: 95.8 FL
OSMOLALITY SERPL CALC.SUM OF ELEC: 302 MOSM/KG (ref 275–295)
PHOSPHATE SERPL-MCNC: 5.4 MG/DL (ref 2.4–5.1)
PLATELET # BLD AUTO: 178 10(3)UL (ref 150–450)
POTASSIUM SERPL-SCNC: 4.7 MMOL/L (ref 3.5–5.1)
RBC # BLD AUTO: 3.58 X10(6)UL
SODIUM SERPL-SCNC: 136 MMOL/L (ref 136–145)
WBC # BLD AUTO: 12.5 X10(3) UL (ref 4–11)

## 2023-12-30 PROCEDURE — 70450 CT HEAD/BRAIN W/O DYE: CPT | Performed by: HOSPITALIST

## 2023-12-30 RX ORDER — MULTIVITAMIN/IRON/FOLIC ACID 18MG-0.4MG
500 TABLET ORAL DAILY
Status: DISCONTINUED | OUTPATIENT
Start: 2023-12-31 | End: 2024-01-03

## 2023-12-30 RX ORDER — CHOLECALCIFEROL (VITAMIN D3) 125 MCG
250 CAPSULE ORAL DAILY
Status: DISCONTINUED | OUTPATIENT
Start: 2023-12-30 | End: 2024-01-03

## 2023-12-30 NOTE — PROGRESS NOTES
NEPHROLOGY DAILY PROGRESS NOTE     SUBJECTIVE:    Awake, + confused.     OBJECTIVE:    Total Intake/Output:    Intake/Output Summary (Last 24 hours) at 12/30/2023 1512  Last data filed at 12/30/2023 1148  Gross per 24 hour   Intake 280 ml   Output 2050 ml   Net -1770 ml         PHYSICAL EXAM:  /71 (BP Location: Right arm)   Pulse 99   Temp 98.8 °F (37.1 °C) (Oral)   Resp 18   Wt 218 lb 1.6 oz (98.9 kg)   SpO2 95%   BMI 29.58 kg/m²     GEN: no acute distress  HEENT: NCAT    CHEST: diminished breath sounds bilaterally  CARDIAC: S1S2 normal  ABD: soft, NT/ND  EXT:  no lower ext edema  NEURO: sleepy/ lethargic   SKIN: warm, dry   DIALYSIS ACCESS: LUE AVF + bruit and thrill      CURRENT MEDICATIONS:   cyanocobalamin  250 mcg Oral Daily    [START ON 12/31/2023] magnesium oxide  500 mg Oral Daily    sevelamer carbonate  1,600 mg Oral TID CC    insulin aspart  1-5 Units Subcutaneous TID CC    aspirin  81 mg Per NG Tube Daily    lansoprazole  30 mg Per NG Tube QAM AC    [Held by provider] finasteride  5 mg Per NG Tube Daily    metoprolol tartrate  100 mg Per NG Tube 2x Daily(Beta Blocker)    insulin detemir  25 Units Subcutaneous Daily    heparin  5,000 Units Subcutaneous Q8H YOLANDA    tamsulosin  0.8 mg Oral Daily    [Held by provider] hydrALAZINE  50 mg Oral TID    [Held by provider] gabapentin  100 mg Oral BID    [Held by provider] rosuvastatin  10 mg Oral Nightly             LABS:  Patient Labs Reviewed in Detail. Pertinent Labs as follows:  Recent Labs   Lab 12/28/23 0342 12/29/23 0628 12/30/23  0605   * 211* 218*   BUN 44* 71* 49*   CREATSERUM 6.33* 8.85* 6.62*   EGFRCR 9* 6* 8*   CA 8.5* 8.7 8.9    136 136   K 4.5 4.7 4.7   CL 98 97* 97*   CO2 31.0 28.0 31.0     Recent Labs   Lab 12/28/23  0342 12/29/23  0628 12/30/23  0605   RBC 3.99 3.50* 3.58*   HGB 11.9* 10.9* 11.2*   HCT 38.6* 32.9* 34.3*   MCV 96.7 94.0 95.8   MCH 29.8 31.1 31.3   MCHC 30.8* 33.1 32.7   RDW 14.6 14.8 14.8   WBC 7.5 8.7  12.5*   .0 178.0 178.0       IMAGING:  No results found.     ASSESSMENT AND PLAN:   This is a 73 year old male with PMH sig for ESRD on HD MWF, HTN, HLD, DM2, CAD s/p CABG x3, ischemic cardiomyopathy. Presents with SOB and cough. Found to be influenza positive. Nephrology is consulted for dialysis.      ESRD on HD MWF   - HD per MWF schedule  - Followed by [facility] at [facility]   - Discussed with patient son over the phone in regards to confusion post dialysis which seems to be a chronic issue. Will monitor closely and consider changes to dialysis prescription such as decreasing blood flow rate and increasing time to help alleviate symptoms.   - Discussed with Dr. Morelos will need a RHC to evaluate volume status, will plan for next week when closer to euvolemia.    Acute hypoxic respiratory failure  - CXR with pulmonary vascular changes, also influenza A postivie    - UF with HD as tolerated    Hyperkalemia   - 2K bath with dialysis   - follow K level      Hyperphosphatemia   - Nepro feeds  - Sevelamer 1600mg tid     Hypertension   - Hydralazine, metoprolol    - Hold BP meds before dialysis     Anemia   - trend Hb, Hb at goal    - receiving OMAIRA with outpatient dialysis            DO Melodie Robles [facility]  Nephrology

## 2023-12-30 NOTE — PROGRESS NOTES
12/29/23 2103   Clinical Encounter Type   Visited With Patient;Family   Routine Visit Introduction   Continue Visiting No   Family Spiritual Encounters   Family Participation in Care Consistently   Family Support During Treatment Consistently   Taxonomy   Intended Effects Demonstrate caring and concern   Methods Offer support;Offer spiritual/Druze support   Trigger for Consult   Trigger for Spiritual Care Consult No        responded to a LOS. Pt family was at bedside and stated that they would call if they needed spiritual support.    Chaplain Christiano

## 2023-12-30 NOTE — PLAN OF CARE
Bed locked in low position, belongings in reach, bed alarm on, call light in reach. Frequent rounding done, all patient needs met. Non-slip socks on/at bedside. Continues to be slightly confused/ dialysis yesterday removed 2L per report. Plan for HODA at [facility] when medically cleared.     Problem: Patient Centered Care  Goal: Patient preferences are identified and integrated in the patient's plan of care  Description: Interventions:  - What would you like us to know as we care for you? I live at home with family. I'm from [place]  - Provide timely, complete, and accurate information to patient/family  - Incorporate patient and family knowledge, values, beliefs, and cultural backgrounds into the planning and delivery of care  - Encourage patient/family to participate in care and decision-making at the level they choose  - Honor patient and family perspectives and choices  Outcome: Progressing     Problem: Diabetes/Glucose Control  Goal: Glucose maintained within prescribed range  Description: INTERVENTIONS:  - Monitor Blood Glucose as ordered  - Assess for signs and symptoms of hyperglycemia and hypoglycemia  - Administer ordered medications to maintain glucose within target range  - Assess barriers to adequate nutritional intake and initiate nutrition consult as needed  - Instruct patient on self management of diabetes  Outcome: Progressing     Problem: CARDIOVASCULAR - ADULT  Goal: Maintains optimal cardiac output and hemodynamic stability  Description: INTERVENTIONS:  - Monitor vital signs, rhythm, and trends  - Monitor for bleeding, hypotension and signs of decreased cardiac output  - Evaluate effectiveness of vasoactive medications to optimize hemodynamic stability  - Monitor arterial and/or venous puncture sites for bleeding and/or hematoma  - Assess quality of pulses, skin color and temperature  - Assess for signs of decreased coronary artery perfusion - ex. Angina  - Evaluate fluid balance, assess  for edema, trend weights  Outcome: Progressing  Goal: Absence of cardiac arrhythmias or at baseline  Description: INTERVENTIONS:  - Continuous cardiac monitoring, monitor vital signs, obtain 12 lead EKG if indicated  - Evaluate effectiveness of antiarrhythmic and heart rate control medications as ordered  - Initiate emergency measures for life threatening arrhythmias  - Monitor electrolytes and administer replacement therapy as ordered  Outcome: Progressing     Problem: RESPIRATORY - ADULT  Goal: Achieves optimal ventilation and oxygenation  Description: INTERVENTIONS:  - Assess for changes in respiratory status  - Assess for changes in mentation and behavior  - Position to facilitate oxygenation and minimize respiratory effort  - Oxygen supplementation based on oxygen saturation or ABGs  - Provide Smoking Cessation handout, if applicable  - Encourage broncho-pulmonary hygiene including cough, deep breathe, Incentive Spirometry  - Assess the need for suctioning and perform as needed  - Assess and instruct to report SOB or any respiratory difficulty  - Respiratory Therapy support as indicated  - Manage/alleviate anxiety  - Monitor for signs/symptoms of CO2 retention  Outcome: Progressing     Problem: METABOLIC/FLUID AND ELECTROLYTES - ADULT  Goal: Glucose maintained within prescribed range  Description: INTERVENTIONS:  - Monitor Blood Glucose as ordered  - Assess for signs and symptoms of hyperglycemia and hypoglycemia  - Administer ordered medications to maintain glucose within target range  - Assess barriers to adequate nutritional intake and initiate nutrition consult as needed  - Instruct patient on self management of diabetes  Outcome: Progressing  Goal: Electrolytes maintained within normal limits  Description: INTERVENTIONS:  - Monitor labs and rhythm and assess patient for signs and symptoms of electrolyte imbalances  - Administer electrolyte replacement as ordered  - Monitor response to electrolyte  replacements, including rhythm and repeat lab results as appropriate  - Fluid restriction as ordered  - Instruct patient on fluid and nutrition restrictions as appropriate  Outcome: Progressing  Goal: Hemodynamic stability and optimal renal function maintained  Description: INTERVENTIONS:  - Monitor labs and assess for signs and symptoms of volume excess or deficit  - Monitor intake, output and patient weight  - Monitor urine specific gravity, serum osmolarity and serum sodium as indicated or ordered  - Monitor response to interventions for patient's volume status, including labs, urine output, blood pressure (other measures as available)  - Encourage oral intake as appropriate  - Instruct patient on fluid and nutrition restrictions as appropriate  Outcome: Progressing     Problem: Delirium  Goal: Minimize duration of delirium  Description: Interventions:  - Encourage use of hearing aids, eye glasses  - Promote highest level of mobility daily  - Provide frequent reorientation  - Promote wakefulness i.e. lights on, blinds open  - Promote sleep, encourage patient's normal rest cycle i.e. lights off, TV off, minimize noise and interruptions  - Encourage family to assist in orientation and promotion of home routines  Outcome: Progressing

## 2023-12-30 NOTE — PROGRESS NOTES
[facility] Hospitalist Progress Note     CC: Hospital Follow up    PCP: Victor Manuel Cleaning MD       Assessment/Plan:     Mr. Vines is a 73 year old male with PMH BPH , CHF / ICM EF 30% , CAD s/p CABG x3, Moderate MR, HTN, HLD, ESRD on HD M/W/F, Anemia, Gastritis, who presents with SOB. Admitted for fluid overload and Influenza A.  Hypoxic and hypercapnic respiratory failure.      Acute Hypoxic Respiratory Failure  Pulmonary edema  ESRD on HD M/W/F  - positive influenza   - HD per renal  - s/p recent AV fistula repair by vascular surgery in September 2023  - steroid burst per pulmonary ended 12/24  - determine timing of RHC, per renal and cardio    Encephalopathy, improved, but intermittent waxes and wanes  -mobilize, needs therapy. He is very deconditioned   -This morning alert and awake, following all commands, engaging in conversation  -CT brain ordered given persistent symptoms     Influenza A positive   - cough for over a month but SOB more acute  - renally dose Tamiflu completed   - Ceftriaxone and steroids started 12/22/23, now off     Troponin Elevation   - no chest pain   - EKG with LBBB  - cardiology on consult     CAD s/p CABG x3   Ischemic cardiomyopathy EF 30% without acute exacerbation of congestive heart failure  Chronic Systolic heart failure  HTN  - Continue metoprolol 100mg BID  - holding his hydralazine for now   - Not on ACE or ARB due to renal disease     Anemia   Thrombocytopenia   Gastritis   - s/p EGD 11/7 with some mild gastritis, no active bleeding  - s/p colonoscopy 11/8 with polyps but no active bleeding, will need to repeat colonoscopy as outpatient  - on prevacid here      Type 2 diabetes with hyperglycemia  Diabetic nephropathy  - gabapentin (hold for now)  - ISS, meal time and basal insulin   - Controlled renal diet  - Continue home aspirin     Hyperlipidemia  -can hold his statin      BPH  -can hold his finasteride for now  -on flomax      Diet: NG removed 12/24 by patient, Continue to  assess oral and nutritional intake. Speech following. He is now tolerating diet     DVT Prophy: HSQ      Dispo: He is very deconditioned. Dispo will need to be HODA once stabilized. Continue therapy assessments  Transferred to medical floor on 12/28  Figure out timing of RHC, per renal and cardiology     Updated son 12/30 over phone     Zeb Joshi MD  [facility] Hospitalist      Subjective:     Alert and awake this morning, very conversant, denies complaints, states he slept well last night..     OBJECTIVE:    Blood pressure 102/69, pulse 100, temperature 98.9 °F (37.2 °C), temperature source Axillary, resp. rate 18, weight 218 lb 1.6 oz (98.9 kg), SpO2 95%.    Temp:  [97.9 °F (36.6 °C)-98.9 °F (37.2 °C)] 98.9 °F (37.2 °C)  Pulse:  [] 100  Resp:  [18-21] 18  BP: (102-118)/(64-76) 102/69  SpO2:  [92 %-97 %] 95 %      Intake/Output:    Intake/Output Summary (Last 24 hours) at 12/30/2023 1239  Last data filed at 12/30/2023 1148  Gross per 24 hour   Intake 520 ml   Output 2050 ml   Net -1530 ml         Last 3 Weights   12/30/23 0518 218 lb 1.6 oz (98.9 kg)   12/29/23 0446 219 lb 8 oz (99.6 kg)   12/27/23 1400 242 lb (109.8 kg)   12/18/23 1258 242 lb (109.8 kg)   11/27/23 1951 252 lb (114.3 kg)   11/13/23 0700 245 lb 4.8 oz (111.3 kg)   11/12/23 0519 244 lb 8 oz (110.9 kg)   11/11/23 1300 246 lb 14.4 oz (112 kg)   11/11/23 0539 238 lb 1.6 oz (108 kg)   11/08/23 1410 234 lb (106.1 kg)   11/07/23 0619 234 lb 1.6 oz (106.2 kg)   11/06/23 1744 239 lb (108.4 kg)       Exam   General: awake  Lungs: clear   Heart: Regular rate and rhythm  Abdomen: soft, non tender  Extremities: No edema  Skin: no new rash, normal color  Psych: appropriate affect     Data Review:       Labs:     Recent Labs   Lab 12/28/23  0342 12/29/23  0628 12/30/23  0605   RBC 3.99 3.50* 3.58*   HGB 11.9* 10.9* 11.2*   HCT 38.6* 32.9* 34.3*   MCV 96.7 94.0 95.8   MCH 29.8 31.1 31.3   MCHC 30.8* 33.1 32.7   RDW 14.6 14.8 14.8   WBC 7.5 8.7 12.5*    .0 178.0 178.0         Recent Labs   Lab 12/28/23  0342 12/29/23  0628 12/30/23  0605   * 211* 218*   BUN 44* 71* 49*   CREATSERUM 6.33* 8.85* 6.62*   EGFRCR 9* 6* 8*   CA 8.5* 8.7 8.9    136 136   K 4.5 4.7 4.7   CL 98 97* 97*   CO2 31.0 28.0 31.0       Recent Labs   Lab 12/26/23  0327 12/27/23  0302 12/28/23  0342 12/29/23  0628 12/30/23  0605   ALB 4.0 3.9 4.2 4.0 4.2         Imaging:  No results found.      Meds:      sevelamer carbonate  1,600 mg Oral TID CC    insulin aspart  1-5 Units Subcutaneous TID CC    aspirin  81 mg Per NG Tube Daily    lansoprazole  30 mg Per NG Tube QAM AC    [Held by provider] finasteride  5 mg Per NG Tube Daily    metoprolol tartrate  100 mg Per NG Tube 2x Daily(Beta Blocker)    insulin detemir  25 Units Subcutaneous Daily    heparin  5,000 Units Subcutaneous Q8H YOLANDA    tamsulosin  0.8 mg Oral Daily    [Held by provider] hydrALAZINE  50 mg Oral TID    [Held by provider] gabapentin  100 mg Oral BID    [Held by provider] rosuvastatin  10 mg Oral Nightly         hydrALAzine, metoclopramide, traMADol, ipratropium-albuterol, glucose **OR** glucose **OR** glucose-vitamin C **OR** dextrose **OR** glucose **OR** glucose **OR** glucose-vitamin C, acetaminophen, polyethylene glycol (PEG 3350), sennosides, bisacodyl, fleet enema, ondansetron, albuterol

## 2023-12-30 NOTE — CM/SW NOTE
Per online web portal - insurance auth still pending at this time.    PLAN: [facility] on WC, PCS needs date - pending ins auth & med clear      SW/CM to remain available for support and/or discharge planning.         Kerline Davis, MSW, LSW [id number]

## 2023-12-30 NOTE — PLAN OF CARE
AxOx3-4 (confused at times), 2L NC, NSR, Incont x2,   Problem: Patient Centered Care  Goal: Patient preferences are identified and integrated in the patient's plan of care  Description: Interventions:  - What would you like us to know as we care for you? I live at home with family. I'm from [place]  - Provide timely, complete, and accurate information to patient/family  - Incorporate patient and family knowledge, values, beliefs, and cultural backgrounds into the planning and delivery of care  - Encourage patient/family to participate in care and decision-making at the level they choose  - Honor patient and family perspectives and choices  Outcome: Progressing     Problem: Diabetes/Glucose Control  Goal: Glucose maintained within prescribed range  Description: INTERVENTIONS:  - Monitor Blood Glucose as ordered  - Assess for signs and symptoms of hyperglycemia and hypoglycemia  - Administer ordered medications to maintain glucose within target range  - Assess barriers to adequate nutritional intake and initiate nutrition consult as needed  - Instruct patient on self management of diabetes  Outcome: Progressing     Problem: Patient/Family Goals  Goal: Patient/Family Long Term Goal  Description: Patient's Long Term Goal: go home    Interventions:  - Monitor vital signs  - Monitor appropriate labs  - Monitor blood glucose levels  - Administer medications per order  - Pain management as needed  - Follow MD orders  - Diagnostics per orders  - Dialysis per orders  - Update / inform patient and family on plan of care  - Discharge planning     - See additional Care Plan goals for specific interventions  Outcome: Progressing  Goal: Patient/Family Short Term Goal  Description: Patient's Short Term Goal: To breathe better    Interventions:   - RT treatment  -O2  -Follow md orders  - See additional Care Plan goals for specific interventions  Outcome: Progressing     Problem: CARDIOVASCULAR - ADULT  Goal: Maintains optimal  cardiac output and hemodynamic stability  Description: INTERVENTIONS:  - Monitor vital signs, rhythm, and trends  - Monitor for bleeding, hypotension and signs of decreased cardiac output  - Evaluate effectiveness of vasoactive medications to optimize hemodynamic stability  - Monitor arterial and/or venous puncture sites for bleeding and/or hematoma  - Assess quality of pulses, skin color and temperature  - Assess for signs of decreased coronary artery perfusion - ex. Angina  - Evaluate fluid balance, assess for edema, trend weights  Outcome: Progressing  Goal: Absence of cardiac arrhythmias or at baseline  Description: INTERVENTIONS:  - Continuous cardiac monitoring, monitor vital signs, obtain 12 lead EKG if indicated  - Evaluate effectiveness of antiarrhythmic and heart rate control medications as ordered  - Initiate emergency measures for life threatening arrhythmias  - Monitor electrolytes and administer replacement therapy as ordered  Outcome: Progressing     Problem: RESPIRATORY - ADULT  Goal: Achieves optimal ventilation and oxygenation  Description: INTERVENTIONS:  - Assess for changes in respiratory status  - Assess for changes in mentation and behavior  - Position to facilitate oxygenation and minimize respiratory effort  - Oxygen supplementation based on oxygen saturation or ABGs  - Provide Smoking Cessation handout, if applicable  - Encourage broncho-pulmonary hygiene including cough, deep breathe, Incentive Spirometry  - Assess the need for suctioning and perform as needed  - Assess and instruct to report SOB or any respiratory difficulty  - Respiratory Therapy support as indicated  - Manage/alleviate anxiety  - Monitor for signs/symptoms of CO2 retention  Outcome: Progressing     Problem: METABOLIC/FLUID AND ELECTROLYTES - ADULT  Goal: Glucose maintained within prescribed range  Description: INTERVENTIONS:  - Monitor Blood Glucose as ordered  - Assess for signs and symptoms of hyperglycemia and  hypoglycemia  - Administer ordered medications to maintain glucose within target range  - Assess barriers to adequate nutritional intake and initiate nutrition consult as needed  - Instruct patient on self management of diabetes  Outcome: Progressing  Goal: Electrolytes maintained within normal limits  Description: INTERVENTIONS:  - Monitor labs and rhythm and assess patient for signs and symptoms of electrolyte imbalances  - Administer electrolyte replacement as ordered  - Monitor response to electrolyte replacements, including rhythm and repeat lab results as appropriate  - Fluid restriction as ordered  - Instruct patient on fluid and nutrition restrictions as appropriate  Outcome: Progressing  Goal: Hemodynamic stability and optimal renal function maintained  Description: INTERVENTIONS:  - Monitor labs and assess for signs and symptoms of volume excess or deficit  - Monitor intake, output and patient weight  - Monitor urine specific gravity, serum osmolarity and serum sodium as indicated or ordered  - Monitor response to interventions for patient's volume status, including labs, urine output, blood pressure (other measures as available)  - Encourage oral intake as appropriate  - Instruct patient on fluid and nutrition restrictions as appropriate  Outcome: Progressing     Problem: Delirium  Goal: Minimize duration of delirium  Description: Interventions:  - Encourage use of hearing aids, eye glasses  - Promote highest level of mobility daily  - Provide frequent reorientation  - Promote wakefulness i.e. lights on, blinds open  - Promote sleep, encourage patient's normal rest cycle i.e. lights off, TV off, minimize noise and interruptions  - Encourage family to assist in orientation and promotion of home routines  Outcome: Progressing

## 2023-12-30 NOTE — PROGRESS NOTES
Pharmacy Note: Dietary Supplement Discontinuation Per Policy    Coenzyme Q10 (CO Q-10)  has been discontinued on Terrence Vines per policy. This supplement may be restarted upon discharge using the medication reconciliation process.    Thank you,   Cindy Lazcano, PharmD  12/30/2023,  2:26 PM

## 2023-12-31 LAB
ALBUMIN SERPL-MCNC: 4.3 G/DL (ref 3.2–4.8)
ANION GAP SERPL CALC-SCNC: 11 MMOL/L (ref 0–18)
BUN BLD-MCNC: 72 MG/DL (ref 9–23)
BUN/CREAT SERPL: 8 (ref 10–20)
CALCIUM BLD-MCNC: 9.2 MG/DL (ref 8.7–10.4)
CHLORIDE SERPL-SCNC: 95 MMOL/L (ref 98–112)
CO2 SERPL-SCNC: 31 MMOL/L (ref 21–32)
CREAT BLD-MCNC: 9.05 MG/DL
DEPRECATED RDW RBC AUTO: 53.5 FL (ref 35.1–46.3)
EGFRCR SERPLBLD CKD-EPI 2021: 6 ML/MIN/1.73M2 (ref 60–?)
ERYTHROCYTE [DISTWIDTH] IN BLOOD BY AUTOMATED COUNT: 14.9 % (ref 11–15)
GLUCOSE BLD-MCNC: 199 MG/DL (ref 70–99)
GLUCOSE BLDC GLUCOMTR-MCNC: 199 MG/DL (ref 70–99)
GLUCOSE BLDC GLUCOMTR-MCNC: 274 MG/DL (ref 70–99)
GLUCOSE BLDC GLUCOMTR-MCNC: 276 MG/DL (ref 70–99)
GLUCOSE BLDC GLUCOMTR-MCNC: 279 MG/DL (ref 70–99)
HCT VFR BLD AUTO: 34.3 %
HGB BLD-MCNC: 10.7 G/DL
MCH RBC QN AUTO: 30.2 PG (ref 26–34)
MCHC RBC AUTO-ENTMCNC: 31.2 G/DL (ref 31–37)
MCV RBC AUTO: 96.9 FL
OSMOLALITY SERPL CALC.SUM OF ELEC: 311 MOSM/KG (ref 275–295)
PHOSPHATE SERPL-MCNC: 6.2 MG/DL (ref 2.4–5.1)
PLATELET # BLD AUTO: 194 10(3)UL (ref 150–450)
POTASSIUM SERPL-SCNC: 4.3 MMOL/L (ref 3.5–5.1)
RBC # BLD AUTO: 3.54 X10(6)UL
SODIUM SERPL-SCNC: 137 MMOL/L (ref 136–145)
WBC # BLD AUTO: 12.1 X10(3) UL (ref 4–11)

## 2023-12-31 RX ORDER — MELATONIN
3 NIGHTLY
Status: DISCONTINUED | OUTPATIENT
Start: 2023-12-31 | End: 2024-01-05

## 2023-12-31 RX ORDER — METOPROLOL TARTRATE 100 MG/1
100 TABLET ORAL
Status: DISCONTINUED | OUTPATIENT
Start: 2023-12-31 | End: 2023-12-31

## 2023-12-31 RX ORDER — METOPROLOL SUCCINATE 100 MG/1
200 TABLET, EXTENDED RELEASE ORAL
Status: DISCONTINUED | OUTPATIENT
Start: 2024-01-01 | End: 2024-01-03

## 2023-12-31 NOTE — PROGRESS NOTES
[facility]    Cardiology Progress Note    Terrence Vines Patient Status:  Inpatient    1950 MRN [id number]   Location [facility] 2W/SW Attending Bradley Dash MD   Hosp Day # 13 PCP Victor Manuel Cleaning MD       Impression/Plan:  73 year old male presenting with:     Acute hypoxemic respiratory failure, multifactorial  Influenza A  Acute on Chronic HFrEF (EF 45-50% 2023)  Ischemic Cardiomyopathy   ESRD on HD  CAD s/p CABG 2021  HTN, normotensive today  HL,. On statin  DM     - Patient is still mildly overloaded, but his dry weight is difficult to ascertain, especially given his multifactorial etiologies behind dyspnea. Discussed RHC once nephrology feels that the patient is euvolemic and once he is off oxygen. Would also obtain a baseline BNP at that time if patient is euvolemic. Coordinate with nephrology; will plan for RHC Wednesday prior to dialysis.   - Influenza A management as per primary  - HD MWF as per nephrology  - Cont asa, statin, bb, hydralazine; Switch Lopressor to Toprol 200mg daily. No ACEi/ARB/ARNI due to renal issues.  - Monitor on tele    Patient Dr. Bautista.    ADDENDUM:  Note was co-written with SAUL Brownlee. The patient was personally seen and examined by me. I agree with the note written and above recommendations.    Subjective:     Awake, but confused.       Patient Active Problem List   Diagnosis    Moderate nonproliferative diabetic retinopathy without macular edema associated with type 2 diabetes mellitus (HCC)    Benign prostatic hyperplasia    Type 2 diabetes mellitus with diabetic polyneuropathy, without long-term current use of insulin (HCC)    Vitamin D deficiency    Type 2 diabetes mellitus with microalbuminuria  (HCC)    Type 2 diabetes mellitus with nephropathy (HCC)    Combined hyperlipidemia associated with type 2 diabetes mellitus  (HCC)    Hypertension associated with type 2 diabetes mellitus  (HCC)    Lower extremity edema    Iron  deficiency anemia    CKD (chronic kidney disease) stage 4, GFR 15-29 ml/min (HCC)    Acute pulmonary edema (HCC)    Acute on chronic congestive heart failure, unspecified heart failure type (HCC)    Acute respiratory failure with hypoxia (HCC)    Coronary artery disease involving native coronary artery of native heart    Cerebrovascular accident (CVA) due to bilateral embolism of middle cerebral arteries (HCC)    Preoperative testing    S/P CABG (coronary artery bypass graft)    Acute renal failure superimposed on chronic kidney disease  (HCC)    Acute renal failure superimposed on chronic kidney disease, unspecified CKD stage, unspecified acute renal failure type    Stage 5 chronic kidney disease not on chronic dialysis (HCC)    Hypernatremia    Acute kidney injury (HCC)    Anemia    ESRD on hemodialysis (HCC)    Facial swelling    Rhinovirus infection    Thyroid nodule    Pulmonary nodules    Elevated BUN    Subacute cough    Dialysis AV fistula malfunction (HCC)    Type 2 diabetes mellitus with hyperglycemia, with long-term current use of insulin (HCC)    Anemia, unspecified type    Thrombocytopenia (HCC)    ESRD on dialysis (HCC)       Objective:   Temp: 97.8 °F (36.6 °C)  Pulse: 101  Resp: 22  BP: 122/60    Intake/Output:     Intake/Output Summary (Last 24 hours) at 12/31/2023 0823  Last data filed at 12/30/2023 2300  Gross per 24 hour   Intake 125 ml   Output 0 ml   Net 125 ml       Last 3 Weights   12/31/23 0532 212 lb (96.2 kg)   12/30/23 0518 218 lb 1.6 oz (98.9 kg)   12/29/23 0446 219 lb 8 oz (99.6 kg)   12/27/23 1400 242 lb (109.8 kg)   12/18/23 1258 242 lb (109.8 kg)   11/27/23 1951 252 lb (114.3 kg)   11/13/23 0700 245 lb 4.8 oz (111.3 kg)   11/12/23 0519 244 lb 8 oz (110.9 kg)   11/11/23 1300 246 lb 14.4 oz (112 kg)   11/11/23 0539 238 lb 1.6 oz (108 kg)   11/08/23 1410 234 lb (106.1 kg)   11/07/23 0619 234 lb 1.6 oz (106.2 kg)   11/06/23 1744 239 lb (108.4 kg)       Tele: SR/SB    Physical  Exam:    General: awake, alert, oriented x 0  HEENT: Normocephalic, anicteric sclera  Neck: supple  Cardiac: Regular rate and rhythm. S1, S2 normal. No murmur,   Lungs: coarse breath sounds bilat  Abdomen: Soft, non-distended  Extremities: no edema  Skin: Warm and dry.     Laboratory/Data:    Labs:         Recent Labs   Lab 12/27/23  0302 12/28/23 0342 12/29/23  0628 12/30/23  0605 12/31/23  0603   WBC 7.3 7.5 8.7 12.5* 12.1*   HGB 9.7* 11.9* 10.9* 11.2* 10.7*   MCV 91.9 96.7 94.0 95.8 96.9   .0 173.0 178.0 178.0 194.0       Recent Labs   Lab 12/27/23  0302 12/28/23 0342 12/29/23  0628 12/30/23  0605 12/31/23  0603   * 137 136 136 137   K 4.7 4.5 4.7 4.7 4.3   CL 96* 98 97* 97* 95*   CO2 25.0 31.0 28.0 31.0 31.0   BUN 91* 44* 71* 49* 72*   CREATSERUM 9.39* 6.33* 8.85* 6.62* 9.05*   CA 8.2* 8.5* 8.7 8.9 9.2   MG  --  2.3 2.6 2.4  --    PHOS 9.6* 6.7* 8.8* 5.4* 6.2*   * 170* 211* 218* 199*       Recent Labs   Lab 12/27/23  0302 12/28/23  0342 12/29/23  0628 12/30/23  0605 12/31/23  0603   ALB 3.9 4.2 4.0 4.2 4.3       No results for input(s): \"TROP\" in the last 168 hours.    ECHO 11/23:  1. Left ventricle: The cavity size was normal. Wall thickness was normal.      Systolic function was mildly reduced. The estimated ejection fraction was      45-50%, by visual assessment. Although no diagnostic regional wall motion      abnormality was identified, this possibility cannot be completely      excluded on the basis of this study.   2. Mitral valve: There was mild regurgitation.   3. Pulmonary arteries: Systolic pressure was within the normal range,      estimated to be 30mm Hg.   4. Pericardium, extracardiac: There was no pericardial effusion.           Allergies:   No Known Allergies

## 2023-12-31 NOTE — PLAN OF CARE
Bed locked in low position, belongings in reach,bed alarm on, call light in reach. Frequent rounding done, all patient needs met. Non-slip socks on/at bedside. Wearing bipap tonight, but has pulled it off multiple times. Changed primofit twice tonight as he has been pulling that as well. No complaints of pain thus far this shift. Plan for dialysis Monday, and HODA when medically cleared for DC.    Able to give PO medication this morning, mentation has improved this morning vs last night.    Problem: Diabetes/Glucose Control  Goal: Glucose maintained within prescribed range  Description: INTERVENTIONS:  - Monitor Blood Glucose as ordered  - Assess for signs and symptoms of hyperglycemia and hypoglycemia  - Administer ordered medications to maintain glucose within target range  - Assess barriers to adequate nutritional intake and initiate nutrition consult as needed  - Instruct patient on self management of diabetes  Outcome: Progressing     Problem: CARDIOVASCULAR - ADULT  Goal: Maintains optimal cardiac output and hemodynamic stability  Description: INTERVENTIONS:  - Monitor vital signs, rhythm, and trends  - Monitor for bleeding, hypotension and signs of decreased cardiac output  - Evaluate effectiveness of vasoactive medications to optimize hemodynamic stability  - Monitor arterial and/or venous puncture sites for bleeding and/or hematoma  - Assess quality of pulses, skin color and temperature  - Assess for signs of decreased coronary artery perfusion - ex. Angina  - Evaluate fluid balance, assess for edema, trend weights  Outcome: Progressing  Goal: Absence of cardiac arrhythmias or at baseline  Description: INTERVENTIONS:  - Continuous cardiac monitoring, monitor vital signs, obtain 12 lead EKG if indicated  - Evaluate effectiveness of antiarrhythmic and heart rate control medications as ordered  - Initiate emergency measures for life threatening arrhythmias  - Monitor electrolytes and administer replacement  therapy as ordered  Outcome: Progressing     Problem: RESPIRATORY - ADULT  Goal: Achieves optimal ventilation and oxygenation  Description: INTERVENTIONS:  - Assess for changes in respiratory status  - Assess for changes in mentation and behavior  - Position to facilitate oxygenation and minimize respiratory effort  - Oxygen supplementation based on oxygen saturation or ABGs  - Provide Smoking Cessation handout, if applicable  - Encourage broncho-pulmonary hygiene including cough, deep breathe, Incentive Spirometry  - Assess the need for suctioning and perform as needed  - Assess and instruct to report SOB or any respiratory difficulty  - Respiratory Therapy support as indicated  - Manage/alleviate anxiety  - Monitor for signs/symptoms of CO2 retention  Outcome: Progressing     Problem: METABOLIC/FLUID AND ELECTROLYTES - ADULT  Goal: Glucose maintained within prescribed range  Description: INTERVENTIONS:  - Monitor Blood Glucose as ordered  - Assess for signs and symptoms of hyperglycemia and hypoglycemia  - Administer ordered medications to maintain glucose within target range  - Assess barriers to adequate nutritional intake and initiate nutrition consult as needed  - Instruct patient on self management of diabetes  Outcome: Progressing  Goal: Electrolytes maintained within normal limits  Description: INTERVENTIONS:  - Monitor labs and rhythm and assess patient for signs and symptoms of electrolyte imbalances  - Administer electrolyte replacement as ordered  - Monitor response to electrolyte replacements, including rhythm and repeat lab results as appropriate  - Fluid restriction as ordered  - Instruct patient on fluid and nutrition restrictions as appropriate  Outcome: Progressing     Problem: Delirium  Goal: Minimize duration of delirium  Description: Interventions:  - Encourage use of hearing aids, eye glasses  - Promote highest level of mobility daily  - Provide frequent reorientation  - Promote wakefulness  i.e. lights on, blinds open  - Promote sleep, encourage patient's normal rest cycle i.e. lights off, TV off, minimize noise and interruptions  - Encourage family to assist in orientation and promotion of home routines  Outcome: Progressing

## 2023-12-31 NOTE — PROGRESS NOTES
NEPHROLOGY DAILY PROGRESS NOTE     SUBJECTIVE:    Awake, + confused.     OBJECTIVE:    Total Intake/Output:    Intake/Output Summary (Last 24 hours) at 12/31/2023 1446  Last data filed at 12/30/2023 2300  Gross per 24 hour   Intake --   Output 0 ml   Net 0 ml         PHYSICAL EXAM:  BP 95/54 (BP Location: Right arm)   Pulse 86   Temp 97.8 °F (36.6 °C) (Oral)   Resp 20   Wt 212 lb (96.2 kg)   SpO2 92%   BMI 28.75 kg/m²     GEN: no acute distress  HEENT: NCAT    CHEST: diminished breath sounds bilaterally  CARDIAC: S1S2 normal  ABD: soft, NT/ND  EXT:  no lower ext edema  NEURO: sleepy/ lethargic   SKIN: warm, dry   DIALYSIS ACCESS: LUE AVF + bruit and thrill      CURRENT MEDICATIONS:   [START ON 1/1/2024] metoprolol succinate ER  200 mg Oral Daily Beta Blocker    melatonin  3 mg Oral Nightly    cyanocobalamin  250 mcg Oral Daily    magnesium oxide  500 mg Oral Daily    sevelamer carbonate  1,600 mg Oral TID CC    insulin aspart  1-5 Units Subcutaneous TID CC    aspirin  81 mg Per NG Tube Daily    lansoprazole  30 mg Per NG Tube QAM AC    [Held by provider] finasteride  5 mg Per NG Tube Daily    insulin detemir  25 Units Subcutaneous Daily    heparin  5,000 Units Subcutaneous Q8H YOLANDA    tamsulosin  0.8 mg Oral Daily    [Held by provider] hydrALAZINE  50 mg Oral TID    [Held by provider] gabapentin  100 mg Oral BID    [Held by provider] rosuvastatin  10 mg Oral Nightly             LABS:  Patient Labs Reviewed in Detail. Pertinent Labs as follows:  Recent Labs   Lab 12/29/23 0628 12/30/23  0605 12/31/23  0603   * 218* 199*   BUN 71* 49* 72*   CREATSERUM 8.85* 6.62* 9.05*   EGFRCR 6* 8* 6*   CA 8.7 8.9 9.2    136 137   K 4.7 4.7 4.3   CL 97* 97* 95*   CO2 28.0 31.0 31.0     Recent Labs   Lab 12/29/23 0628 12/30/23  0605 12/31/23  0603   RBC 3.50* 3.58* 3.54*   HGB 10.9* 11.2* 10.7*   HCT 32.9* 34.3* 34.3*   MCV 94.0 95.8 96.9   MCH 31.1 31.3 30.2   MCHC 33.1 32.7 31.2   RDW 14.8 14.8 14.9   WBC 8.7  12.5* 12.1*   .0 178.0 194.0       IMAGING:  CT BRAIN OR HEAD (50894)    Result Date: 12/30/2023  CONCLUSION:  1. No acute intracranial process by noncontrast CT technique.  2. Senescent changes of parenchymal volume loss with minimal sequela of chronic microvascular ischemic disease.  3. There is large vessel atherosclerosis involvement of the anterior and posterior intracranial circulations.  4. Lesser incidental findings as above.     Dictated by (CST): Paddy Ortiz MD on 12/30/2023 at 6:13 PM     Finalized by (CST): Paddy Ortiz MD on 12/30/2023 at 6:16 PM            ASSESSMENT AND PLAN:   This is a 73 year old male with PMH sig for ESRD on HD MWF, HTN, HLD, DM2, CAD s/p CABG x3, ischemic cardiomyopathy. Presents with SOB and cough. Found to be influenza positive. Nephrology is consulted for dialysis.      ESRD on HD MWF   - HD per MWF schedule  - Followed by [facility]   - Discussed with patient son over the phone in regards to confusion post dialysis which seems to be a chronic issue. Will monitor closely and consider changes to dialysis prescription such as decreasing blood flow rate and increasing time to help alleviate symptoms.   - My partner discussed with Dr. Morelos - will need a RHC to evaluate volume status, will plan for next week when closer to euvolemia.    Acute hypoxic respiratory failure  - CXR with pulmonary vascular changes, also influenza A postivie    - UF with HD as tolerated    Hyperkalemia   - 2K bath with dialysis   - follow K level      Hyperphosphatemia   - Nepro feeds  - Sevelamer 1600mg tid     Hypertension   - Hydralazine, metoprolol    - Hold BP meds before dialysis     Anemia   - trend Hb, Hb at goal    - receiving OMAIRA with outpatient dialysis            DO Melodie [facility]les [facility]  Nephrology

## 2023-12-31 NOTE — PROGRESS NOTES
[facility] Hospitalist Progress Note     CC: Hospital Follow up    PCP: Victor Manuel Cleaning MD       Assessment/Plan:     Mr. Vines is a 73 year old male with PMH BPH , CHF / ICM EF 30% , CAD s/p CABG x3, Moderate MR, HTN, HLD, ESRD on HD M/W/F, Anemia, Gastritis, who presents with SOB. Admitted for fluid overload and Influenza A.  Hypoxic and hypercapnic respiratory failure.      Acute Hypoxic Respiratory Failure  Pulmonary edema  ESRD on HD M/W/F  - positive influenza   - HD per renal  - s/p recent AV fistula repair by vascular surgery in September 2023  - steroid burst per pulmonary ended 12/24  - determine timing of RHC, per renal and cardio    Encephalopathy, improved, but intermittent waxes and wanes  -mobilize, needs therapy. He is very deconditioned   -This morning alert and awake, following all commands, engaging in conversation  -CT brain 12/30, negative for acute pathology  -Medically improved today    Influenza A positive   - cough for over a month but SOB more acute  - renally dose Tamiflu completed   - Ceftriaxone and steroids started 12/22/23, now off     Troponin Elevation   - no chest pain   - EKG with LBBB  - cardiology on consult     CAD s/p CABG x3   Ischemic cardiomyopathy EF 30% without acute exacerbation of congestive heart failure  Chronic Systolic heart failure  HTN  - Continue metoprolol 100mg BID  - holding his hydralazine for now   - Not on ACE or ARB due to renal disease     Anemia   Thrombocytopenia   Gastritis   - s/p EGD 11/7 with some mild gastritis, no active bleeding  - s/p colonoscopy 11/8 with polyps but no active bleeding, will need to repeat colonoscopy as outpatient  - on prevacid here      Type 2 diabetes with hyperglycemia  Diabetic nephropathy  - gabapentin (hold for now)  - ISS, meal time and basal insulin   - Controlled renal diet  - Continue home aspirin     Hyperlipidemia  -can hold his statin      BPH  -can hold his finasteride for now  -on flomax      Diet: Tolerating well  diet     DVT Prophy: HSQ      Dispo: He is very deconditioned. Dispo will need to be HODA once stabilized. Continue therapy assessments  Transferred to medical floor on 12/28  Figure out timing of RHC, per renal and cardiology     Updated son 12/30 over phone     Zeb Joshi MD  [facility] Hospitalist      Subjective:     Alert and awake this morning, sitting up in bed eating breakfast, denies complaints, alert to self place, thought the year was 2024, no chest pain or shortness of breath, no nausea or vomiting    OBJECTIVE:    Blood pressure 95/54, pulse 86, temperature 97.8 °F (36.6 °C), temperature source Oral, resp. rate 20, weight 212 lb (96.2 kg), SpO2 92%.    Temp:  [97.8 °F (36.6 °C)-98.8 °F (37.1 °C)] 97.8 °F (36.6 °C)  Pulse:  [] 86  Resp:  [18-22] 20  BP: ()/(54-71) 95/54  SpO2:  [92 %-95 %] 92 %      Intake/Output:    Intake/Output Summary (Last 24 hours) at 12/31/2023 1343  Last data filed at 12/30/2023 2300  Gross per 24 hour   Intake --   Output 0 ml   Net 0 ml         Last 3 Weights   12/31/23 0532 212 lb (96.2 kg)   12/30/23 0518 218 lb 1.6 oz (98.9 kg)   12/29/23 0446 219 lb 8 oz (99.6 kg)   12/27/23 1400 242 lb (109.8 kg)   12/18/23 1258 242 lb (109.8 kg)   11/27/23 1951 252 lb (114.3 kg)   11/13/23 0700 245 lb 4.8 oz (111.3 kg)   11/12/23 0519 244 lb 8 oz (110.9 kg)   11/11/23 1300 246 lb 14.4 oz (112 kg)   11/11/23 0539 238 lb 1.6 oz (108 kg)   11/08/23 1410 234 lb (106.1 kg)   11/07/23 0619 234 lb 1.6 oz (106.2 kg)   11/06/23 1744 239 lb (108.4 kg)       Exam   General: awake  Lungs: clear   Heart: Regular rate and rhythm  Abdomen: soft, non tender  Extremities: No edema  Skin: no new rash, normal color  Psych: appropriate affect     Data Review:       Labs:     Recent Labs   Lab 12/29/23  0628 12/30/23  0605 12/31/23  0603   RBC 3.50* 3.58* 3.54*   HGB 10.9* 11.2* 10.7*   HCT 32.9* 34.3* 34.3*   MCV 94.0 95.8 96.9   MCH 31.1 31.3 30.2   MCHC 33.1 32.7 31.2   RDW 14.8 14.8  14.9   WBC 8.7 12.5* 12.1*   .0 178.0 194.0         Recent Labs   Lab 12/29/23  0628 12/30/23  0605 12/31/23  0603   * 218* 199*   BUN 71* 49* 72*   CREATSERUM 8.85* 6.62* 9.05*   EGFRCR 6* 8* 6*   CA 8.7 8.9 9.2    136 137   K 4.7 4.7 4.3   CL 97* 97* 95*   CO2 28.0 31.0 31.0       Recent Labs   Lab 12/27/23  0302 12/28/23  0342 12/29/23  0628 12/30/23  0605 12/31/23  0603   ALB 3.9 4.2 4.0 4.2 4.3         Imaging:  CT BRAIN OR HEAD ([id number])    Result Date: 12/30/2023  CONCLUSION:  1. No acute intracranial process by noncontrast CT technique.  2. Senescent changes of parenchymal volume loss with minimal sequela of chronic microvascular ischemic disease.  3. There is large vessel atherosclerosis involvement of the anterior and posterior intracranial circulations.  4. Lesser incidental findings as above.     Dictated by (CST): Paddy Ortiz MD on 12/30/2023 at 6:13 PM     Finalized by (CST): Paddy Ortiz MD on 12/30/2023 at 6:16 PM             Meds:      [START ON 1/1/2024] metoprolol succinate ER  200 mg Oral Daily Beta Blocker    cyanocobalamin  250 mcg Oral Daily    magnesium oxide  500 mg Oral Daily    sevelamer carbonate  1,600 mg Oral TID CC    insulin aspart  1-5 Units Subcutaneous TID CC    aspirin  81 mg Per NG Tube Daily    lansoprazole  30 mg Per NG Tube QAM AC    [Held by provider] finasteride  5 mg Per NG Tube Daily    insulin detemir  25 Units Subcutaneous Daily    heparin  5,000 Units Subcutaneous Q8H YOLANDA    tamsulosin  0.8 mg Oral Daily    [Held by provider] hydrALAZINE  50 mg Oral TID    [Held by provider] gabapentin  100 mg Oral BID    [Held by provider] rosuvastatin  10 mg Oral Nightly         hydrALAzine, metoclopramide, traMADol, ipratropium-albuterol, glucose **OR** glucose **OR** glucose-vitamin C **OR** dextrose **OR** glucose **OR** glucose **OR** glucose-vitamin C, acetaminophen, polyethylene glycol (PEG 3350), sennosides, bisacodyl, ondansetron, albuterol

## 2024-01-01 ENCOUNTER — HOSPITAL ENCOUNTER (EMERGENCY)
Facility: HOSPITAL | Age: 74
End: 2024-01-01
Attending: EMERGENCY MEDICINE
Payer: MEDICARE

## 2024-01-01 DIAGNOSIS — I46.9 CARDIAC ARREST (HCC): Primary | ICD-10-CM

## 2024-01-01 LAB
DEPRECATED RDW RBC AUTO: 54.2 FL (ref 35.1–46.3)
ERYTHROCYTE [DISTWIDTH] IN BLOOD BY AUTOMATED COUNT: 15 % (ref 11–15)
GLUCOSE BLDC GLUCOMTR-MCNC: 161 MG/DL (ref 70–99)
GLUCOSE BLDC GLUCOMTR-MCNC: 204 MG/DL (ref 70–99)
GLUCOSE BLDC GLUCOMTR-MCNC: 297 MG/DL (ref 70–99)
GLUCOSE BLDC GLUCOMTR-MCNC: 314 MG/DL (ref 70–99)
GLUCOSE BLDC GLUCOMTR-MCNC: 373 MG/DL (ref 70–99)
HCT VFR BLD AUTO: 34.5 %
HGB BLD-MCNC: 10.7 G/DL
MCH RBC QN AUTO: 30.2 PG (ref 26–34)
MCHC RBC AUTO-ENTMCNC: 31 G/DL (ref 31–37)
MCV RBC AUTO: 97.5 FL
PLATELET # BLD AUTO: 194 10(3)UL (ref 150–450)
RBC # BLD AUTO: 3.54 X10(6)UL
WBC # BLD AUTO: 12.6 X10(3) UL (ref 4–11)

## 2024-01-01 PROCEDURE — 31500 INSERT EMERGENCY AIRWAY: CPT

## 2024-01-01 PROCEDURE — 82962 GLUCOSE BLOOD TEST: CPT

## 2024-01-01 PROCEDURE — 92950 HEART/LUNG RESUSCITATION CPR: CPT

## 2024-01-01 PROCEDURE — 99285 EMERGENCY DEPT VISIT HI MDM: CPT

## 2024-01-01 RX ORDER — ALBUMIN (HUMAN) 12.5 G/50ML
25 SOLUTION INTRAVENOUS ONCE
Status: COMPLETED | OUTPATIENT
Start: 2024-01-01 | End: 2024-01-01

## 2024-01-01 RX ORDER — CALCIUM CHLORIDE 100 MG/ML
INJECTION INTRAVENOUS; INTRAVENTRICULAR
Status: COMPLETED | OUTPATIENT
Start: 2024-01-01 | End: 2024-01-01

## 2024-01-01 NOTE — PLAN OF CARE
Bed locked in low position, belongings in reach, bed alarm on, call light in reach. Frequent rounding done, all patient needs met. Non-slip socks on/at bedside. Wore bipap until 6am this morning, slept better with the addition of the melatonin last night.     Problem: Patient Centered Care  Goal: Patient preferences are identified and integrated in the patient's plan of care  Description: Interventions:  - What would you like us to know as we care for you? I live at home with family. I'm from [place]  - Provide timely, complete, and accurate information to patient/family  - Incorporate patient and family knowledge, values, beliefs, and cultural backgrounds into the planning and delivery of care  - Encourage patient/family to participate in care and decision-making at the level they choose  - Honor patient and family perspectives and choices  Outcome: Progressing     Problem: Diabetes/Glucose Control  Goal: Glucose maintained within prescribed range  Description: INTERVENTIONS:  - Monitor Blood Glucose as ordered  - Assess for signs and symptoms of hyperglycemia and hypoglycemia  - Administer ordered medications to maintain glucose within target range  - Assess barriers to adequate nutritional intake and initiate nutrition consult as needed  - Instruct patient on self management of diabetes  Outcome: Progressing     Problem: Patient/Family Goals  Goal: Patient/Family Long Term Goal  Description: Patient's Long Term Goal: go home    Interventions:  - Monitor vital signs  - Monitor appropriate labs  - Monitor blood glucose levels  - Administer medications per order  - Pain management as needed  - Follow MD orders  - Diagnostics per orders  - Dialysis per orders  - Update / inform patient and family on plan of care  - Discharge planning     - See additional Care Plan goals for specific interventions  Outcome: Progressing  Goal: Patient/Family Short Term Goal  Description: Patient's Short Term Goal: To breathe  better    Interventions:   - RT treatment  -O2  -Follow md orders  - See additional Care Plan goals for specific interventions  Outcome: Progressing     Problem: CARDIOVASCULAR - ADULT  Goal: Maintains optimal cardiac output and hemodynamic stability  Description: INTERVENTIONS:  - Monitor vital signs, rhythm, and trends  - Monitor for bleeding, hypotension and signs of decreased cardiac output  - Evaluate effectiveness of vasoactive medications to optimize hemodynamic stability  - Monitor arterial and/or venous puncture sites for bleeding and/or hematoma  - Assess quality of pulses, skin color and temperature  - Assess for signs of decreased coronary artery perfusion - ex. Angina  - Evaluate fluid balance, assess for edema, trend weights  Outcome: Progressing  Goal: Absence of cardiac arrhythmias or at baseline  Description: INTERVENTIONS:  - Continuous cardiac monitoring, monitor vital signs, obtain 12 lead EKG if indicated  - Evaluate effectiveness of antiarrhythmic and heart rate control medications as ordered  - Initiate emergency measures for life threatening arrhythmias  - Monitor electrolytes and administer replacement therapy as ordered  Outcome: Progressing     Problem: RESPIRATORY - ADULT  Goal: Achieves optimal ventilation and oxygenation  Description: INTERVENTIONS:  - Assess for changes in respiratory status  - Assess for changes in mentation and behavior  - Position to facilitate oxygenation and minimize respiratory effort  - Oxygen supplementation based on oxygen saturation or ABGs  - Provide Smoking Cessation handout, if applicable  - Encourage broncho-pulmonary hygiene including cough, deep breathe, Incentive Spirometry  - Assess the need for suctioning and perform as needed  - Assess and instruct to report SOB or any respiratory difficulty  - Respiratory Therapy support as indicated  - Manage/alleviate anxiety  - Monitor for signs/symptoms of CO2 retention  Outcome: Progressing     Problem:  METABOLIC/FLUID AND ELECTROLYTES - ADULT  Goal: Glucose maintained within prescribed range  Description: INTERVENTIONS:  - Monitor Blood Glucose as ordered  - Assess for signs and symptoms of hyperglycemia and hypoglycemia  - Administer ordered medications to maintain glucose within target range  - Assess barriers to adequate nutritional intake and initiate nutrition consult as needed  - Instruct patient on self management of diabetes  Outcome: Progressing  Goal: Electrolytes maintained within normal limits  Description: INTERVENTIONS:  - Monitor labs and rhythm and assess patient for signs and symptoms of electrolyte imbalances  - Administer electrolyte replacement as ordered  - Monitor response to electrolyte replacements, including rhythm and repeat lab results as appropriate  - Fluid restriction as ordered  - Instruct patient on fluid and nutrition restrictions as appropriate  Outcome: Progressing  Goal: Hemodynamic stability and optimal renal function maintained  Description: INTERVENTIONS:  - Monitor labs and assess for signs and symptoms of volume excess or deficit  - Monitor intake, output and patient weight  - Monitor urine specific gravity, serum osmolarity and serum sodium as indicated or ordered  - Monitor response to interventions for patient's volume status, including labs, urine output, blood pressure (other measures as available)  - Encourage oral intake as appropriate  - Instruct patient on fluid and nutrition restrictions as appropriate  Outcome: Progressing     Problem: Delirium  Goal: Minimize duration of delirium  Description: Interventions:  - Encourage use of hearing aids, eye glasses  - Promote highest level of mobility daily  - Provide frequent reorientation  - Promote wakefulness i.e. lights on, blinds open  - Promote sleep, encourage patient's normal rest cycle i.e. lights off, TV off, minimize noise and interruptions  - Encourage family to assist in orientation and promotion of home  routines  Outcome: Progressing

## 2024-01-01 NOTE — PLAN OF CARE
Pt sleeping most of the day, lethargic and confused at times but answers questions appropriaely  Problem: Patient Centered Care  Goal: Patient preferences are identified and integrated in the patient's plan of care  Description: Interventions:  - What would you like us to know as we care for you? I live at home with family. I'm from [place]  - Provide timely, complete, and accurate information to patient/family  - Incorporate patient and family knowledge, values, beliefs, and cultural backgrounds into the planning and delivery of care  - Encourage patient/family to participate in care and decision-making at the level they choose  - Honor patient and family perspectives and choices  Outcome: Progressing     Problem: Diabetes/Glucose Control  Goal: Glucose maintained within prescribed range  Description: INTERVENTIONS:  - Monitor Blood Glucose as ordered  - Assess for signs and symptoms of hyperglycemia and hypoglycemia  - Administer ordered medications to maintain glucose within target range  - Assess barriers to adequate nutritional intake and initiate nutrition consult as needed  - Instruct patient on self management of diabetes  Outcome: Progressing     Problem: Patient/Family Goals  Goal: Patient/Family Long Term Goal  Description: Patient's Long Term Goal: go home    Interventions:  - Monitor vital signs  - Monitor appropriate labs  - Monitor blood glucose levels  - Administer medications per order  - Pain management as needed  - Follow MD orders  - Diagnostics per orders  - Dialysis per orders  - Update / inform patient and family on plan of care  - Discharge planning     - See additional Care Plan goals for specific interventions  Outcome: Progressing  Goal: Patient/Family Short Term Goal  Description: Patient's Short Term Goal: To breathe better    Interventions:   - RT treatment  -O2  -Follow md orders  - See additional Care Plan goals for specific interventions  Outcome: Progressing     Problem:  CARDIOVASCULAR - ADULT  Goal: Maintains optimal cardiac output and hemodynamic stability  Description: INTERVENTIONS:  - Monitor vital signs, rhythm, and trends  - Monitor for bleeding, hypotension and signs of decreased cardiac output  - Evaluate effectiveness of vasoactive medications to optimize hemodynamic stability  - Monitor arterial and/or venous puncture sites for bleeding and/or hematoma  - Assess quality of pulses, skin color and temperature  - Assess for signs of decreased coronary artery perfusion - ex. Angina  - Evaluate fluid balance, assess for edema, trend weights  Outcome: Progressing  Goal: Absence of cardiac arrhythmias or at baseline  Description: INTERVENTIONS:  - Continuous cardiac monitoring, monitor vital signs, obtain 12 lead EKG if indicated  - Evaluate effectiveness of antiarrhythmic and heart rate control medications as ordered  - Initiate emergency measures for life threatening arrhythmias  - Monitor electrolytes and administer replacement therapy as ordered  Outcome: Progressing     Problem: RESPIRATORY - ADULT  Goal: Achieves optimal ventilation and oxygenation  Description: INTERVENTIONS:  - Assess for changes in respiratory status  - Assess for changes in mentation and behavior  - Position to facilitate oxygenation and minimize respiratory effort  - Oxygen supplementation based on oxygen saturation or ABGs  - Provide Smoking Cessation handout, if applicable  - Encourage broncho-pulmonary hygiene including cough, deep breathe, Incentive Spirometry  - Assess the need for suctioning and perform as needed  - Assess and instruct to report SOB or any respiratory difficulty  - Respiratory Therapy support as indicated  - Manage/alleviate anxiety  - Monitor for signs/symptoms of CO2 retention  Outcome: Progressing     Problem: METABOLIC/FLUID AND ELECTROLYTES - ADULT  Goal: Glucose maintained within prescribed range  Description: INTERVENTIONS:  - Monitor Blood Glucose as ordered  - Assess  for signs and symptoms of hyperglycemia and hypoglycemia  - Administer ordered medications to maintain glucose within target range  - Assess barriers to adequate nutritional intake and initiate nutrition consult as needed  - Instruct patient on self management of diabetes  Outcome: Progressing  Goal: Electrolytes maintained within normal limits  Description: INTERVENTIONS:  - Monitor labs and rhythm and assess patient for signs and symptoms of electrolyte imbalances  - Administer electrolyte replacement as ordered  - Monitor response to electrolyte replacements, including rhythm and repeat lab results as appropriate  - Fluid restriction as ordered  - Instruct patient on fluid and nutrition restrictions as appropriate  Outcome: Progressing  Goal: Hemodynamic stability and optimal renal function maintained  Description: INTERVENTIONS:  - Monitor labs and assess for signs and symptoms of volume excess or deficit  - Monitor intake, output and patient weight  - Monitor urine specific gravity, serum osmolarity and serum sodium as indicated or ordered  - Monitor response to interventions for patient's volume status, including labs, urine output, blood pressure (other measures as available)  - Encourage oral intake as appropriate  - Instruct patient on fluid and nutrition restrictions as appropriate  Outcome: Progressing     Problem: Delirium  Goal: Minimize duration of delirium  Description: Interventions:  - Encourage use of hearing aids, eye glasses  - Promote highest level of mobility daily  - Provide frequent reorientation  - Promote wakefulness i.e. lights on, blinds open  - Promote sleep, encourage patient's normal rest cycle i.e. lights off, TV off, minimize noise and interruptions  - Encourage family to assist in orientation and promotion of home routines  Outcome: Progressing

## 2024-01-01 NOTE — CONSULTS
[facility]    PATIENT'S NAME: SERGIO ALEXANDRA   ATTENDING PHYSICIAN: Zeb Joshi MD   CONSULTING PHYSICIAN: Esequiel Morrow MD   PATIENT ACCOUNT#:   [id number]    LOCATION:  [address] [place] [address]  MEDICAL RECORD #:   [id number]       YOB: 1950  ADMISSION DATE:       2023      CONSULT DATE:  2023    REPORT OF CONSULTATION      IDENTIFICATION:  The patient is a 73-year-old  [place] male currently living with his wife and daughter.    CHIEF COMPLAINT:  Confusion.    HISTORY OF PRESENT ILLNESS:  The patient was initially admitted for fluid overload and hypoxic and hypercapnic respiratory failure.  His nurses report that he has pulled off his BiPAP multiple times and is now on nasal cannula.  He also developed a case of influenza A while here at the hospital.  CT the brain on  showed no acute pathology.  Nurses noted that he will laugh sometimes when he is being cleaned up after having a bowel movement.  He is sometimes alert and talking with them and sometimes confused and sleepy.    During my interview with the patient, he said that his mood has been okay and his mood is generally not depressed but was when\"a doctor said I am dying.\"  He then said \"I had a seizure and I .\"  He said then they put him in a room and said \"I'm supposed to be dead.\"  He talked about his son flying from [place] to [place] because his son is a physician and wanted to be involved in his care.  He said, \"I have no slightest idea about what is going on because I am dying.\"  He claims that he sleeps 1 to 2 hours at a time during the day.  He claims his concentration is good with the TV.    I called his wife who felt that he was doing fine during her last visit with him.  I also spoke with his daughter who said that lately he cannot remember where he is and talks about random things.  This has been true for a couple of weeks and was true even for a while before the hospitalization.  He  recognizes family members and will talk about things that do not make sense sometimes.  They note that he has been tired and sleepy during their visits.    PAST MEDICAL HISTORY:  Diabetes, BPH, hypertension, hyperlipidemia, end-stage renal disease on hemodialysis, shortness of breath, CHF, GERD, anemia, hypoxic and hypercapnic respiratory failure, influenza A infection.      PAST PSYCHIATRIC HISTORY:  None per patient.     MEDICATIONS:  Aspirin, vitamin B12, heparin, insulin, Prevacid, Mag-Ox, melatonin 3 mg at bedtime, metoprolol  mg daily, Renvela t.i.d., tamsulosin.    ALLERGIES:  No known drug allergies.    SOCIAL HISTORY:  He lives with his wife and daughter.    FAMILY HISTORY:  None for psychiatric illness or substance abuse.    REVIEW OF SYSTEMS:  Reviewed in Epic.      MENTAL STATUS EXAMINATION:  The patient is an elderly [place] male sitting up in his hospital gown in hospital bed, eating and drinking very slowly during the interview which made it hard to understand some of the things that he was saying.  Affect is quite sleepy and he appeared to be having difficulty staying awake, and mood is constricted.  Eye contact was poor as it seemed like he was falling asleep much of the time.  He denies suicidal or homicidal ideation and denied auditory or visual hallucinations.  Insight and judgment are poor.  Thought content and process are grossly within normal limits.  Intellect and memory are below average.  Assets include a desire to get well.  Liabilities include his multiple illnesses.  He did not know the date but knew that we were at [facility].      INITIAL ASSESSMENT:  This is a 73-year-old  [place] male who has been having more episodes of being somewhat sedated and confused at home but much more in the hospital.  He was admitted for fluid overload and CO2 retention.  His oxygen needs have been less, but he still confused at times and talking about things that are  not relevant to the conversation at times.    Based on what the family said as well as what staff are saying, it seems that he is still recovering from his significant metabolic problems during his severe illnesses here.  I do not believe that he is psychotic or severely depressed.  The most appropriate diagnosis seems to be delirium due to his medical condition.     INITIAL DIAGNOSIS:    AXIS I:  Delirium.  AXIS II:  Deferred.  AXIS III:  Multiple, see above.   AXIS IV:  Severe, severe medical problems.   AXIS V:  Current 20, past year 65.     INITIAL TREATMENT RECOMMENDATIONS:  At this time, I have no psychiatric recommendations.  Reassuring the patient that he is not dying would be probably helpful for him over time.  I would expect that very slowly over time he would be likely to recover from his delirium and confusion.    Thank you for referring this patient to us.  We will follow him with you.     Dictated By Esequiel Morrow MD  d: 12/31/2023 19:04:43  t: 12/31/2023 19:27:47  [facility] [id number]  [facility] [place]/

## 2024-01-01 NOTE — BH PROGRESS NOTE
Chart reviewed, spoke with RN's, interviewed pt, consultation dictated # [id number].  Dx: delirium.  Rec: Pt has had these symptoms even before the hospitalization, per wife and dtr.  I would expect that he would slowly improve.  No psych meds needed.      We will follow.

## 2024-01-01 NOTE — PROGRESS NOTES
[facility] Hospitalist Progress Note     CC: Hospital Follow up    PCP: Victor Manuel Cleaning MD       Assessment/Plan:     Mr. Vines is a 73 year old male with PMH BPH , CHF / ICM EF 30% , CAD s/p CABG x3, Moderate MR, HTN, HLD, ESRD on HD M/W/F, Anemia, Gastritis, who presents with SOB. Admitted for fluid overload and Influenza A.  Hypoxic and hypercapnic respiratory failure.      Acute Hypoxic Respiratory Failure  Pulmonary edema  ESRD on HD M/W/F  - positive influenza   - HD per renal  - s/p recent AV fistula repair by vascular surgery in September 2023  - steroid burst per pulmonary ended 12/24  - determine timing of RHC, per renal and cardio    Encephalopathy, improved, but intermittent waxes and wanes  -mobilize, needs therapy. He is very deconditioned   -This morning alert and awake, following all commands, engaging in conversation  -CT brain 12/30, negative for acute pathology  -improving each day, seen by psychiatry whom agrees     Influenza A positive   - cough for over a month but SOB more acute  - renally dose Tamiflu completed   - Ceftriaxone and steroids started 12/22/23, now off     Troponin Elevation   - no chest pain   - EKG with LBBB  - cardiology on consult     CAD s/p CABG x3   Ischemic cardiomyopathy EF 30% without acute exacerbation of congestive heart failure  Chronic Systolic heart failure  HTN  - Continue metoprolol 100mg BID  - holding his hydralazine for now   - Not on ACE or ARB due to renal disease     Anemia   Thrombocytopenia   Gastritis   - s/p EGD 11/7 with some mild gastritis, no active bleeding  - s/p colonoscopy 11/8 with polyps but no active bleeding, will need to repeat colonoscopy as outpatient  - on prevacid here      Type 2 diabetes with hyperglycemia  Diabetic nephropathy  - gabapentin (hold for now)  - ISS, meal time and basal insulin   - Controlled renal diet  - Continue home aspirin     Hyperlipidemia  -can hold his statin      BPH  -can hold his finasteride for now  -on flomax       Diet: Tolerating well diet     DVT Prophy: HSQ      Dispo: He is very deconditioned. Dispo will need to be HODA once stabilized. Continue therapy assessments  Transferred to medical floor on 12/28  Figure out timing of RHC, per renal and cardiology     Updated son 12/30 over phone     Zeb Joshi MD  [facility] Hospitalist      Subjective:     Alert and awake this morning, getting HD, alert to self and place but not date.  no chest pain or shortness of breath, no nausea or vomiting    OBJECTIVE:    Blood pressure 117/51, pulse 77, temperature 97.6 °F (36.4 °C), temperature source Oral, resp. rate 16, weight 210 lb 1.6 oz (95.3 kg), SpO2 93%.    Temp:  [97.6 °F (36.4 °C)-98 °F (36.7 °C)] 97.6 °F (36.4 °C)  Pulse:  [77-89] 77  Resp:  [16-20] 16  BP: (103-143)/(51-72) 117/51  SpO2:  [93 %-99 %] 93 %      Intake/Output:    Intake/Output Summary (Last 24 hours) at 1/1/2024 1308  Last data filed at 1/1/2024 1050  Gross per 24 hour   Intake 525 ml   Output --   Net 525 ml         Last 3 Weights   01/01/24 0401 210 lb 1.6 oz (95.3 kg)   12/31/23 0532 212 lb (96.2 kg)   12/30/23 0518 218 lb 1.6 oz (98.9 kg)   12/29/23 0446 219 lb 8 oz (99.6 kg)   12/27/23 1400 242 lb (109.8 kg)   12/18/23 1258 242 lb (109.8 kg)   11/27/23 1951 252 lb (114.3 kg)   11/13/23 0700 245 lb 4.8 oz (111.3 kg)   11/12/23 0519 244 lb 8 oz (110.9 kg)   11/11/23 1300 246 lb 14.4 oz (112 kg)   11/11/23 0539 238 lb 1.6 oz (108 kg)   11/08/23 1410 234 lb (106.1 kg)   11/07/23 0619 234 lb 1.6 oz (106.2 kg)   11/06/23 1744 239 lb (108.4 kg)       Exam   General: awake  Lungs: clear   Heart: Regular rate and rhythm  Abdomen: soft, non tender  Extremities: No edema  Skin: no new rash, normal color  Psych: appropriate affect     Data Review:       Labs:     Recent Labs   Lab 12/30/23  0605 12/31/23  0603 01/01/24  0648   RBC 3.58* 3.54* 3.54*   HGB 11.2* 10.7* 10.7*   HCT 34.3* 34.3* 34.5*   MCV 95.8 96.9 97.5   MCH 31.3 30.2 30.2   MCHC 32.7 31.2 31.0    RDW 14.8 14.9 15.0   WBC 12.5* 12.1* 12.6*   .0 194.0 194.0         Recent Labs   Lab 12/29/23  0628 12/30/23  0605 12/31/23  0603   * 218* 199*   BUN 71* 49* 72*   CREATSERUM 8.85* 6.62* 9.05*   EGFRCR 6* 8* 6*   CA 8.7 8.9 9.2    136 137   K 4.7 4.7 4.3   CL 97* 97* 95*   CO2 28.0 31.0 31.0       Recent Labs   Lab 12/27/23  0302 12/28/23  0342 12/29/23  0628 12/30/23  0605 12/31/23  0603   ALB 3.9 4.2 4.0 4.2 4.3         Imaging:  CT BRAIN OR HEAD (03411)    Result Date: 12/30/2023  CONCLUSION:  1. No acute intracranial process by noncontrast CT technique.  2. Senescent changes of parenchymal volume loss with minimal sequela of chronic microvascular ischemic disease.  3. There is large vessel atherosclerosis involvement of the anterior and posterior intracranial circulations.  4. Lesser incidental findings as above.     Dictated by (CST): Paddy Ortiz MD on 12/30/2023 at 6:13 PM     Finalized by (CST): Paddy Ortiz MD on 12/30/2023 at 6:16 PM             Meds:      albumin human  25 g Intravenous Once    [Held by provider] metoprolol succinate ER  200 mg Oral Daily Beta Blocker    melatonin  3 mg Oral Nightly    cyanocobalamin  250 mcg Oral Daily    magnesium oxide  500 mg Oral Daily    sevelamer carbonate  1,600 mg Oral TID CC    insulin aspart  1-5 Units Subcutaneous TID CC    aspirin  81 mg Per NG Tube Daily    lansoprazole  30 mg Per NG Tube QAM AC    [Held by provider] finasteride  5 mg Per NG Tube Daily    insulin detemir  25 Units Subcutaneous Daily    heparin  5,000 Units Subcutaneous Q8H YOLANDA    tamsulosin  0.8 mg Oral Daily    [Held by provider] hydrALAZINE  50 mg Oral TID    [Held by provider] gabapentin  100 mg Oral BID    [Held by provider] rosuvastatin  10 mg Oral Nightly         hydrALAzine, metoclopramide, traMADol, ipratropium-albuterol, glucose **OR** glucose **OR** glucose-vitamin C **OR** dextrose **OR** glucose **OR** glucose **OR** glucose-vitamin C, acetaminophen,  polyethylene glycol (PEG 3350), sennosides, bisacodyl, ondansetron, albuterol

## 2024-01-01 NOTE — PROGRESS NOTES
NEPHROLOGY DAILY PROGRESS NOTE     SUBJECTIVE:    Awake, + confused.     OBJECTIVE:    Total Intake/Output:    Intake/Output Summary (Last 24 hours) at 1/1/2024 1428  Last data filed at 1/1/2024 1050  Gross per 24 hour   Intake 525 ml   Output --   Net 525 ml         PHYSICAL EXAM:  /51 (BP Location: Right arm)   Pulse 77   Temp 97.6 °F (36.4 °C) (Oral)   Resp 16   Wt 210 lb 1.6 oz (95.3 kg)   SpO2 93%   BMI 28.49 kg/m²     GEN: no acute distress  HEENT: NCAT    CHEST: diminished breath sounds bilaterally  CARDIAC: S1S2 normal  ABD: soft, NT/ND  EXT:  no lower ext edema  NEURO: sleepy/ lethargic   SKIN: warm, dry   DIALYSIS ACCESS: LUE AVF + bruit and thrill      CURRENT MEDICATIONS:   albumin human  25 g Intravenous Once    [Held by provider] metoprolol succinate ER  200 mg Oral Daily Beta Blocker    melatonin  3 mg Oral Nightly    cyanocobalamin  250 mcg Oral Daily    magnesium oxide  500 mg Oral Daily    sevelamer carbonate  1,600 mg Oral TID CC    insulin aspart  1-5 Units Subcutaneous TID CC    aspirin  81 mg Per NG Tube Daily    lansoprazole  30 mg Per NG Tube QAM AC    [Held by provider] finasteride  5 mg Per NG Tube Daily    insulin detemir  25 Units Subcutaneous Daily    heparin  5,000 Units Subcutaneous Q8H YOLANDA    tamsulosin  0.8 mg Oral Daily    [Held by provider] hydrALAZINE  50 mg Oral TID    [Held by provider] gabapentin  100 mg Oral BID    [Held by provider] rosuvastatin  10 mg Oral Nightly             LABS:  Patient Labs Reviewed in Detail. Pertinent Labs as follows:  Recent Labs   Lab 12/29/23  0628 12/30/23  0605 12/31/23  0603   * 218* 199*   BUN 71* 49* 72*   CREATSERUM 8.85* 6.62* 9.05*   EGFRCR 6* 8* 6*   CA 8.7 8.9 9.2    136 137   K 4.7 4.7 4.3   CL 97* 97* 95*   CO2 28.0 31.0 31.0     Recent Labs   Lab 12/30/23  0605 12/31/23  0603 01/01/24  0648   RBC 3.58* 3.54* 3.54*   HGB 11.2* 10.7* 10.7*   HCT 34.3* 34.3* 34.5*   MCV 95.8 96.9 97.5   MCH 31.3 30.2 30.2   MCHC  32.7 31.2 31.0   RDW 14.8 14.9 15.0   WBC 12.5* 12.1* 12.6*   .0 194.0 194.0       IMAGING:  CT BRAIN OR HEAD (77094)    Result Date: 12/30/2023  CONCLUSION:  1. No acute intracranial process by noncontrast CT technique.  2. Senescent changes of parenchymal volume loss with minimal sequela of chronic microvascular ischemic disease.  3. There is large vessel atherosclerosis involvement of the anterior and posterior intracranial circulations.  4. Lesser incidental findings as above.     Dictated by (CST): Paddy Ortiz MD on 12/30/2023 at 6:13 PM     Finalized by (CST): Paddy Ortiz MD on 12/30/2023 at 6:16 PM            ASSESSMENT AND PLAN:   This is a 73 year old male with PMH sig for ESRD on HD MWF, HTN, HLD, DM2, CAD s/p CABG x3, ischemic cardiomyopathy. Presents with SOB and cough. Found to be influenza positive. Nephrology is consulted for dialysis.      ESRD on HD MWF   - HD per MWF schedule  - Followed by [facility]   - Discussed with patient son over the phone in regards to confusion post dialysis which seems to be a chronic issue. Will monitor closely and consider changes to dialysis prescription such as decreasing blood flow rate and increasing time to help alleviate symptoms.   - My partner discussed with Dr. Morelos - will need a RHC to evaluate volume status, will plan for next week when closer to euvolemia.    Acute hypoxic respiratory failure  - CXR with pulmonary vascular changes, also influenza A postivie    - UF with HD as tolerated    Hyperkalemia   - 2K bath with dialysis   - follow K level      Hyperphosphatemia   - Nepro feeds  - Sevelamer 1600mg tid     Hypertension   - Hydralazine, metoprolol    - Hold BP meds before dialysis     Anemia   - trend Hb, Hb at goal    - receiving OMAIRA with outpatient dialysis            DO Melodie Robles [facility]  Nephrology

## 2024-01-02 LAB
ALBUMIN SERPL-MCNC: 4.2 G/DL (ref 3.2–4.8)
ANION GAP SERPL CALC-SCNC: 6 MMOL/L (ref 0–18)
BUN BLD-MCNC: 55 MG/DL (ref 9–23)
BUN/CREAT SERPL: 7.7 (ref 10–20)
CALCIUM BLD-MCNC: 9.1 MG/DL (ref 8.7–10.4)
CHLORIDE SERPL-SCNC: 98 MMOL/L (ref 98–112)
CO2 SERPL-SCNC: 30 MMOL/L (ref 21–32)
CREAT BLD-MCNC: 7.16 MG/DL
DEPRECATED RDW RBC AUTO: 51.9 FL (ref 35.1–46.3)
EGFRCR SERPLBLD CKD-EPI 2021: 7 ML/MIN/1.73M2 (ref 60–?)
ERYTHROCYTE [DISTWIDTH] IN BLOOD BY AUTOMATED COUNT: 14.6 % (ref 11–15)
GLUCOSE BLD-MCNC: 232 MG/DL (ref 70–99)
GLUCOSE BLDC GLUCOMTR-MCNC: 184 MG/DL (ref 70–99)
GLUCOSE BLDC GLUCOMTR-MCNC: 219 MG/DL (ref 70–99)
GLUCOSE BLDC GLUCOMTR-MCNC: 248 MG/DL (ref 70–99)
GLUCOSE BLDC GLUCOMTR-MCNC: 282 MG/DL (ref 70–99)
HCT VFR BLD AUTO: 30.7 %
HGB BLD-MCNC: 10.1 G/DL
MCH RBC QN AUTO: 31.7 PG (ref 26–34)
MCHC RBC AUTO-ENTMCNC: 32.9 G/DL (ref 31–37)
MCV RBC AUTO: 96.2 FL
OSMOLALITY SERPL CALC.SUM OF ELEC: 301 MOSM/KG (ref 275–295)
PHOSPHATE SERPL-MCNC: 3.8 MG/DL (ref 2.4–5.1)
PLATELET # BLD AUTO: 170 10(3)UL (ref 150–450)
POTASSIUM SERPL-SCNC: 4.2 MMOL/L (ref 3.5–5.1)
RBC # BLD AUTO: 3.19 X10(6)UL
SODIUM SERPL-SCNC: 134 MMOL/L (ref 136–145)
WBC # BLD AUTO: 9.7 X10(3) UL (ref 4–11)

## 2024-01-02 RX ORDER — CHLORHEXIDINE GLUCONATE 4 G/100ML
30 SOLUTION TOPICAL
Status: COMPLETED | OUTPATIENT
Start: 2024-01-03 | End: 2024-01-03

## 2024-01-02 NOTE — PROGRESS NOTES
[facility] Hospitalist Progress Note     CC: Hospital Follow up    PCP: Victor Manuel Cleaning MD       Assessment/Plan:     Mr. Vines is a 73 year old male with PMH BPH , CHF / ICM EF 30% , CAD s/p CABG x3, Moderate MR, HTN, HLD, ESRD on HD M/W/F, Anemia, Gastritis, who presents with SOB. Admitted for fluid overload and Influenza A.  Hypoxic and hypercapnic respiratory failure clinically improved, weaned off of oxygen, course further complicated by encephalopathy, slowly improving, plans for possible right heart cath to determine accurate dry weight.     Acute Hypoxic Respiratory Failure  Pulmonary edema  ESRD on HD M/W/F  - positive influenza   - HD per renal  - s/p recent AV fistula repair by vascular surgery in September 2023  - steroid burst per pulmonary ended 12/24  - determine timing of RHC, per renal and cardio, page out to both to further discuss timing    Encephalopathy, improved, but intermittent waxes and wanes  -mobilize, needs therapy. He is very deconditioned   -This morning alert and awake, following all commands, engaging in conversation  -CT brain 12/30, negative for acute pathology  -improving each day, seen by psychiatry whom agrees     Influenza A positive   - cough for over a month but SOB more acute  - renally dose Tamiflu completed   - Ceftriaxone and steroids started 12/22/23, now off     Troponin Elevation   - no chest pain   - EKG with LBBB  - cardiology on consult     CAD s/p CABG x3   Ischemic cardiomyopathy EF 30% without acute exacerbation of congestive heart failure  Chronic Systolic heart failure  HTN  - Continue metoprolol 100mg BID  - holding his hydralazine for now   - Not on ACE or ARB due to renal disease     Anemia   Thrombocytopenia   Gastritis   - s/p EGD 11/7 with some mild gastritis, no active bleeding  - s/p colonoscopy 11/8 with polyps but no active bleeding, will need to repeat colonoscopy as outpatient  - on prevacid here      Type 2 diabetes with hyperglycemia  Diabetic  nephropathy  - gabapentin (hold for now)  - ISS, meal time and basal insulin   - Controlled renal diet  - Continue home aspirin     Hyperlipidemia  -can hold his statin      BPH  -can hold his finasteride for now  -on flomax      Diet: Tolerating well diet     DVT Prophy: HSQ      Dispo: He is very deconditioned. Dispo will need to be HODA once stabilized. Continue therapy assessments  Transferred to medical floor on 12/28  Figure out timing of RHC, per renal and cardiology, discussed with both    Updated son 12/30 over phone, will try to update today     Zeb Joshi MD  [facility] Hospitalist      Subjective:     Alert and awake this morning, he ate his entire breakfast, he is alert and oriented to self and place, aware of the year as well.    OBJECTIVE:    Blood pressure 121/60, pulse 82, temperature 98.1 °F (36.7 °C), temperature source Oral, resp. rate 19, weight 215 lb (97.5 kg), SpO2 96%.    Temp:  [97.7 °F (36.5 °C)-99 °F (37.2 °C)] 98.1 °F (36.7 °C)  Pulse:  [82-94] 82  Resp:  [16-20] 19  BP: (112-127)/(58-66) 121/60  SpO2:  [93 %-97 %] 96 %      Intake/Output:    Intake/Output Summary (Last 24 hours) at 1/2/2024 1249  Last data filed at 1/2/2024 0902  Gross per 24 hour   Intake 320 ml   Output 1200 ml   Net -880 ml         Last 3 Weights   01/02/24 1055 215 lb (97.5 kg)   01/02/24 0415 214 lb 12.8 oz (97.4 kg)   01/01/24 0401 210 lb 1.6 oz (95.3 kg)   12/31/23 0532 212 lb (96.2 kg)   12/30/23 0518 218 lb 1.6 oz (98.9 kg)   12/29/23 0446 219 lb 8 oz (99.6 kg)   12/27/23 1400 242 lb (109.8 kg)   12/18/23 1258 242 lb (109.8 kg)   11/27/23 1951 252 lb (114.3 kg)   11/13/23 0700 245 lb 4.8 oz (111.3 kg)   11/12/23 0519 244 lb 8 oz (110.9 kg)   11/11/23 1300 246 lb 14.4 oz (112 kg)   11/11/23 0539 238 lb 1.6 oz (108 kg)   11/08/23 1410 234 lb (106.1 kg)   11/07/23 0619 234 lb 1.6 oz (106.2 kg)   11/06/23 1744 239 lb (108.4 kg)       Exam   General: awake  Lungs: clear   Heart: Regular rate and  rhythm  Abdomen: soft, non tender  Extremities: No edema  Skin: no new rash, normal color  Psych: appropriate affect     Data Review:       Labs:     Recent Labs   Lab 12/31/23  0603 01/01/24  0648 01/02/24  0607   RBC 3.54* 3.54* 3.19*   HGB 10.7* 10.7* 10.1*   HCT 34.3* 34.5* 30.7*   MCV 96.9 97.5 96.2   MCH 30.2 30.2 31.7   MCHC 31.2 31.0 32.9   RDW 14.9 15.0 14.6   WBC 12.1* 12.6* 9.7   .0 194.0 170.0         Recent Labs   Lab 12/30/23  0605 12/31/23  0603 01/02/24  0607   * 199* 232*   BUN 49* 72* 55*   CREATSERUM 6.62* 9.05* 7.16*   EGFRCR 8* 6* 7*   CA 8.9 9.2 9.1    137 134*   K 4.7 4.3 4.2   CL 97* 95* 98   CO2 31.0 31.0 30.0       Recent Labs   Lab 12/28/23  0342 12/29/23  0628 12/30/23  0605 12/31/23  0603 01/02/24  0607   ALB 4.2 4.0 4.2 4.3 4.2         Imaging:  CT BRAIN OR HEAD (63271)    Result Date: 12/30/2023  CONCLUSION:  1. No acute intracranial process by noncontrast CT technique.  2. Senescent changes of parenchymal volume loss with minimal sequela of chronic microvascular ischemic disease.  3. There is large vessel atherosclerosis involvement of the anterior and posterior intracranial circulations.  4. Lesser incidental findings as above.     Dictated by (CST): Paddy Ortiz MD on 12/30/2023 at 6:13 PM     Finalized by (CST): Paddy Ortiz MD on 12/30/2023 at 6:16 PM             Meds:      [Held by provider] metoprolol succinate ER  200 mg Oral Daily Beta Blocker    melatonin  3 mg Oral Nightly    cyanocobalamin  250 mcg Oral Daily    magnesium oxide  500 mg Oral Daily    sevelamer carbonate  1,600 mg Oral TID CC    insulin aspart  1-5 Units Subcutaneous TID CC    aspirin  81 mg Per NG Tube Daily    lansoprazole  30 mg Per NG Tube QAM AC    [Held by provider] finasteride  5 mg Per NG Tube Daily    insulin detemir  25 Units Subcutaneous Daily    heparin  5,000 Units Subcutaneous Q8H YOLANDA    tamsulosin  0.8 mg Oral Daily    [Held by provider] hydrALAZINE  50 mg Oral TID     [Held by provider] gabapentin  100 mg Oral BID    [Held by provider] rosuvastatin  10 mg Oral Nightly         hydrALAzine, metoclopramide, traMADol, ipratropium-albuterol, glucose **OR** glucose **OR** glucose-vitamin C **OR** dextrose **OR** glucose **OR** glucose **OR** glucose-vitamin C, acetaminophen, polyethylene glycol (PEG 3350), sennosides, bisacodyl, ondansetron, albuterol

## 2024-01-02 NOTE — SLP NOTE
SPEECH DAILY NOTE - INPATIENT    ASSESSMENT & PLAN   ASSESSMENT  PPE REQUIRED. THIS THERAPIST WORE GLOVES, DROPLET MASK, AND GOGGLES FOR DURATION OF EVALUATION. HANDS WASHED UPON ENTRANCE/EXIT.    SLP f/u for ongoing dysphagia tx/meal assessment per recommendations of easy to chew/thin per upgrade. RN reports pt tolerates diet and medication well with no overt clinical s/s aspiration. Pt denies any swallowing challenges.     Pt positioned upright in bed. Pt afebrile, tolerating room air with oxygen status 96 prior to the start of oral trials. SLP reviewed aspiration precautions and safe swallowing compensatory strategies with the patient. Safe swallow guidelines remain written on the white board in purple. Diet recommendations remain on the whiteboard in the room. Patient v/u. Provided minimal assistance, pt tolerates easy to chew and thin liquids via open cup with no overt clinical signs/symptoms of aspiration. Pt trialed with hard solids and mildly prolonged mastication observed. Pt expressed \"easy to chew food is good.\" Oxygen status remained >95 t/o the entire session. Oral/buccal cavities clear of residue following all trials.     PLAN: SLP to sign off at this time secondary to pt tolerating least restrictive diet with no CSA.     Diet Recommendations - Solids: Soft/ Easy to chew  Diet Recommendations - Liquids: Thin Liquids    Compensatory Strategies Recommended: No straws;Small bites and sips;Slow rate  Aspiration Precautions: Upright position;Slow rate;Small bites and sips;No straw  Medication Administration Recommendations:  (as tolerated)    Patient Experiencing Pain: No      Discharge Recommendations  Discharge Recommendations/Plan: Undetermined    Treatment Plan  Treatment Plan/Recommendations: No further inpatient SLP service warranted    Interdisciplinary Communication: Discussed with RN  Recommendations posted at bedside            GOALS  Goal #1 The patient will tolerate soft easy to chew consistency  and thin liquids without overt signs or symptoms of aspiration with 100 % accuracy over 1-2 session(s).  Goal met   Goal #2 The patient/family/caregiver will demonstrate understanding and implementation of aspiration precautions and swallow strategies independently over 1-2 session(s).    Goal met   Goal #3 The patient will utilize compensatory strategies as outlined by  BSSE (clinical evaluation) including Slow rate, Small bites, Small sips, No straws, Upright 90 degrees, Upright 90 degrees 30 mins after meal, Eliminate distractions, min assistance 100 % of the time across 2 sessions.  Goal met   Goal #4 The patient will tolerate trial upgrade of hard solid consistency and thin liquids without overt signs or symptoms of aspiration with 100 % accuracy over 1-2 session(s).    Goal discontinued     FOLLOW UP  Follow Up Needed (Documentation Required): No  SLP Follow-up Date:  (n/a)  Number of Visits to Meet Established Goals: 3    Session: 3    If you have any questions, please contact CECILLE Childress M.S. CCC-SLP  Speech Language Pathologist  Phone Number Ext. [phone]

## 2024-01-02 NOTE — PLAN OF CARE
Pt A/Ox3/4- forgetful at times. Room air. Up in chair today. Plan for cardiac cath tomorrow- consent signed. NPO at midnight. Dialysis ordered today and tomorrow. Call for dialysis nurse saying most likely unable to perform dialysis today. Nephro aware of situation and spoke to dialysis RN. Safety precaution in place and call light within reach.     Problem: Patient Centered Care  Goal: Patient preferences are identified and integrated in the patient's plan of care  Description: Interventions:  - What would you like us to know as we care for you? I live at home with family. I'm from [place]  - Provide timely, complete, and accurate information to patient/family  - Incorporate patient and family knowledge, values, beliefs, and cultural backgrounds into the planning and delivery of care  - Encourage patient/family to participate in care and decision-making at the level they choose  - Honor patient and family perspectives and choices  Outcome: Progressing     Problem: Diabetes/Glucose Control  Goal: Glucose maintained within prescribed range  Description: INTERVENTIONS:  - Monitor Blood Glucose as ordered  - Assess for signs and symptoms of hyperglycemia and hypoglycemia  - Administer ordered medications to maintain glucose within target range  - Assess barriers to adequate nutritional intake and initiate nutrition consult as needed  - Instruct patient on self management of diabetes  Outcome: Progressing     Problem: Patient/Family Goals  Goal: Patient/Family Long Term Goal  Description: Patient's Long Term Goal: go home    Interventions:  - Monitor vital signs  - Monitor appropriate labs  - Monitor blood glucose levels  - Administer medications per order  - Pain management as needed  - Follow MD orders  - Diagnostics per orders  - Dialysis per orders  - Update / inform patient and family on plan of care  - Discharge planning     - See additional Care Plan goals for specific interventions  Outcome: Progressing  Goal:  Patient/Family Short Term Goal  Description: Patient's Short Term Goal: To breathe better    Interventions:   - RT treatment  -O2  -Follow md orders  - See additional Care Plan goals for specific interventions  Outcome: Progressing     Problem: CARDIOVASCULAR - ADULT  Goal: Maintains optimal cardiac output and hemodynamic stability  Description: INTERVENTIONS:  - Monitor vital signs, rhythm, and trends  - Monitor for bleeding, hypotension and signs of decreased cardiac output  - Evaluate effectiveness of vasoactive medications to optimize hemodynamic stability  - Monitor arterial and/or venous puncture sites for bleeding and/or hematoma  - Assess quality of pulses, skin color and temperature  - Assess for signs of decreased coronary artery perfusion - ex. Angina  - Evaluate fluid balance, assess for edema, trend weights  Outcome: Progressing  Goal: Absence of cardiac arrhythmias or at baseline  Description: INTERVENTIONS:  - Continuous cardiac monitoring, monitor vital signs, obtain 12 lead EKG if indicated  - Evaluate effectiveness of antiarrhythmic and heart rate control medications as ordered  - Initiate emergency measures for life threatening arrhythmias  - Monitor electrolytes and administer replacement therapy as ordered  Outcome: Progressing     Problem: RESPIRATORY - ADULT  Goal: Achieves optimal ventilation and oxygenation  Description: INTERVENTIONS:  - Assess for changes in respiratory status  - Assess for changes in mentation and behavior  - Position to facilitate oxygenation and minimize respiratory effort  - Oxygen supplementation based on oxygen saturation or ABGs  - Provide Smoking Cessation handout, if applicable  - Encourage broncho-pulmonary hygiene including cough, deep breathe, Incentive Spirometry  - Assess the need for suctioning and perform as needed  - Assess and instruct to report SOB or any respiratory difficulty  - Respiratory Therapy support as indicated  - Manage/alleviate anxiety  -  Monitor for signs/symptoms of CO2 retention  Outcome: Progressing     Problem: METABOLIC/FLUID AND ELECTROLYTES - ADULT  Goal: Glucose maintained within prescribed range  Description: INTERVENTIONS:  - Monitor Blood Glucose as ordered  - Assess for signs and symptoms of hyperglycemia and hypoglycemia  - Administer ordered medications to maintain glucose within target range  - Assess barriers to adequate nutritional intake and initiate nutrition consult as needed  - Instruct patient on self management of diabetes  Outcome: Progressing  Goal: Electrolytes maintained within normal limits  Description: INTERVENTIONS:  - Monitor labs and rhythm and assess patient for signs and symptoms of electrolyte imbalances  - Administer electrolyte replacement as ordered  - Monitor response to electrolyte replacements, including rhythm and repeat lab results as appropriate  - Fluid restriction as ordered  - Instruct patient on fluid and nutrition restrictions as appropriate  Outcome: Progressing  Goal: Hemodynamic stability and optimal renal function maintained  Description: INTERVENTIONS:  - Monitor labs and assess for signs and symptoms of volume excess or deficit  - Monitor intake, output and patient weight  - Monitor urine specific gravity, serum osmolarity and serum sodium as indicated or ordered  - Monitor response to interventions for patient's volume status, including labs, urine output, blood pressure (other measures as available)  - Encourage oral intake as appropriate  - Instruct patient on fluid and nutrition restrictions as appropriate  Outcome: Progressing     Problem: Delirium  Goal: Minimize duration of delirium  Description: Interventions:  - Encourage use of hearing aids, eye glasses  - Promote highest level of mobility daily  - Provide frequent reorientation  - Promote wakefulness i.e. lights on, blinds open  - Promote sleep, encourage patient's normal rest cycle i.e. lights off, TV off, minimize noise and  interruptions  - Encourage family to assist in orientation and promotion of home routines  Outcome: Progressing

## 2024-01-02 NOTE — DIETARY NOTE
ADULT NUTRITION REASSESSMENT    Pt is at moderate nutrition risk.  Pt does not meet malnutrition criteria.      RECOMMENDATIONS TO MD: See Nutrition Intervention  ADMITTING DIAGNOSIS:  ESRD on dialysis ([facility]) [N18.6, Z99.2]  Acute respiratory failure with hypoxia ([facility]) [J96.01]  PERTINENT PAST MEDICAL HISTORY:   Past Medical History:   Diagnosis Date    BPH (benign prostatic hypertrophy)     Cataract     Congestive heart disease (HCC)     Coronary atherosclerosis     Diabetic retinopathy (HCC)     Dialysis patient ([facility])     M,W,F,    Esophageal reflux     Heart valve disease     High blood pressure     High cholesterol     HLD (hyperlipidemia)     HTN (hypertension)     Neuropathy     Proteinuria     Renal disorder     HD every MWF with temporary HD cath    Type II diabetes mellitus (HCC)     Visual impairment     Reading glasses     PATIENT STATUS: Initial 12/22/23: Pt admit for shortness of breath. PMH sig for  BPH , CHF / ICM EF 30% , Moderate MR, HTN, HLD, ESRD on HD M/W/F, Anemia, Gastritis . Pt assessed due to consult for tube feeds. Pt screened at no nutrition risk but discussed at rounds today noting very poor po intake since admit (X 4 days). SLP following and pt now made NPO. Pt is lethargic and disoriented. Pt is obese and well nourished upon admit. (Unable to obtain specific diet hx d/t neuro status at this time) . Will begin wth Nepro formula (fiber enriched to help promote BM and indicated for HD pt) . Pt is anuric.   12/26/23 Update: Tube feeds stopped 12/24 as pt had pulled NG tube out (d/t confusion) and diet was able to be advanced to Renal modified consistency and thickened liquids. Po intake was variable last 24 hrs (100-20% X 3 meals) and this morning consumed 100% of meal per RN at PCCU rounds. Will provide oral nutrition supplement high in Kcals and protein (Nepro)  to help meet nutrition needs until pt is eating >65% of meals  consistently    Update 01/02/24: RA completed per protocol. Chart  reviewed, pt admitted for fluid overload and influenza A. Hypoxic and hypercapnic respiratory failure noted to be clinically improved, weaned off oxygen - course further complicated by encephalopathy. Pt noted with variable intake since last visit - 0-100% x 13 meals (averaging 57% overall - fair). Discussion with RN, pt reports eating if ordering trays for him. Per RN, drank about 50% of ONS this morning. Pt visited, sleeping, no family at bedside. Unable to obtain diet history. Noted significant weight change since admit, unsure accuracy of admission weight. Current weight 214# 13oz, reweigh obtained for accuracy -received 215#. EMR weight review, pt noted weighing 245# 5 oz on 11/13/23 - 30.5# or 12.4% weight loss and 234# 2 oz on 11/7/23 - 19.3# or 8.2% weight loss - difficult to determine accuracy of current weight vs admit weight plus unable to determine weight loss from fluid status vs true weight loss. Pt noted weighing 228# on 1/16/23 - 13.2# or 5.8% weight loss x 1 year (non-significant). Plan for RHC to evaluate volume status per note review. Monitor weight. CPM    FOOD/NUTRITION RELATED HISTORY:  Appetite: Varies  Good PTA. Poor last 4 days.   Intake: ~0-100%% x13 meals documented since last visit - averaging 57% overall (Fair) + ONS - RN reported drank about 50% this morning  Intake Meeting Needs: Marginal, added ONS to help maximize nutrition  Percent Meals Eaten (last 6 days)       Date/Time Percent Meals Eaten (%)    12/27/23 1400 0 %    12/29/23 0800 15 %    12/29/23 1300 50 %    12/29/23 1700 50 %    12/30/23 1000 95 %    12/30/23 1148 100 %    12/31/23 0900 50 %    12/31/23 1500 80 %    12/31/23 1900 50 %    01/01/24 1050 100 %    01/01/24 1303 50 %    01/02/24 0902 100 %    01/02/24 1300 0 %          Food Allergies: No Known Food Allergies (NKFA)  Cultural/Ethnic/Holiness Preferences: Not Obtained    GASTROINTESTINAL: +BM 1/2/2024 - smear;soft and Swallow evaluation noted     MEDICATIONS Renvela  TID noted   [Held by provider] metoprolol succinate ER  200 mg Oral Daily Beta Blocker    melatonin  3 mg Oral Nightly    cyanocobalamin  250 mcg Oral Daily    magnesium oxide  500 mg Oral Daily    sevelamer carbonate  1,600 mg Oral TID CC    insulin aspart  1-5 Units Subcutaneous TID CC    aspirin  81 mg Per NG Tube Daily    lansoprazole  30 mg Per NG Tube QAM AC    [Held by provider] finasteride  5 mg Per NG Tube Daily    insulin detemir  25 Units Subcutaneous Daily    heparin  5,000 Units Subcutaneous Q8H YOLANDA    tamsulosin  0.8 mg Oral Daily    [Held by provider] hydrALAZINE  50 mg Oral TID    [Held by provider] gabapentin  100 mg Oral BID    [Held by provider] rosuvastatin  10 mg Oral Nightly   Prn: hydrALAzine, metoclopramide, traMADol, ipratropium-albuterol, glucose **OR** glucose **OR** glucose-vitamin C **OR** dextrose **OR** glucose **OR** glucose **OR** glucose-vitamin C, acetaminophen, polyethylene glycol (PEG 3350), sennosides, bisacodyl, ondansetron, albuterol    LABS: reviewed c/w ESRD . Hyponatremia (134) noted  Hyperphosphatemia resolved  POC Glucose reviewed  Recent Labs     12/30/23  0605 12/31/23  0603 01/02/24  0607   * 199* 232*   BUN 49* 72* 55*   CREATSERUM 6.62* 9.05* 7.16*   CA 8.9 9.2 9.1   MG 2.4  --   --     137 134*   K 4.7 4.3 4.2   CL 97* 95* 98   CO2 31.0 31.0 30.0   PHOS 5.4* 6.2* 3.8   OSMOCALC 302* 311* 301*     NUTRITION RELATED PHYSICAL FINDINGS:  - Nutrition Focused Physical Exam (NFPE): well nourished per visual exam  - Fluid Accumulation: none  see RN documentation for details  - Skin Integrity: at risk and intact see RN documentation for details. High skin risk.     ANTHROPOMETRICS:  HT:  182.9 cm (6')   WT: 97.5 kg (215 lb) - consistent weight noted since transfer from CCU, monitor for accuracy.   Admit Wt: 242# on 12/18/23 unsure accuracy of admit weight  BMI: Body mass index is 29.16 kg/m².  BMI CLASSIFICATION: 25-29.9 kg/m2 - overweight  IBW: 178  lbs         121% IBW  Usual Body Wt: 237-248  lbs March-Sept 2023      87-91% UBW    WEIGHT HISTORY:  Patient Weight(s) for the past 336 hrs:   Weight   01/02/24 1055 97.5 kg (215 lb)   01/02/24 0415 97.4 kg (214 lb 12.8 oz)   01/01/24 0401 95.3 kg (210 lb 1.6 oz)   12/31/23 0532 96.2 kg (212 lb)   12/30/23 0518 98.9 kg (218 lb 1.6 oz)   12/29/23 0446 99.6 kg (219 lb 8 oz)   12/27/23 1400 109.8 kg (242 lb)     Wt Readings from Last 10 Encounters:   01/02/24 97.5 kg (215 lb)   11/27/23 114.3 kg (252 lb)   11/13/23 111.3 kg (245 lb 4.8 oz)   09/27/23 110.5 kg (243 lb 9.7 oz)   09/20/23 74.8 kg (165 lb)   09/05/23 112.5 kg (248 lb)   05/09/23 108.2 kg (238 lb 8 oz)   03/03/23 107.5 kg (237 lb)   03/01/23 103.5 kg (228 lb 4.6 oz)   01/16/23 103.4 kg (228 lb)     NUTRITION DIAGNOSIS/PROBLEM:   Inadequate oral intake related to Decreased ability to consume sufficient energy  as evidenced by very poor po intake X 4 days, lethargy and disorientation.    NUTRITION DIAGNOSIS PROGRESS:  Slight Improvement (unresolved)- eating improved. Monitor weight, unsure accuracy of weight loss/wt loss from fluid    NUTRITION INTERVENTION:   NUTRITION PRESCRIPTION:   Estimated Nutrition needs: --dosing wt of 109.8  kg - wt taken on 12/18/23   Calories: 5930-1823 calories/day (15-20 calories per kg Dosing wt)  Protein:  g protein/day (1.2-1.4  g protein/kg Ideal body wt (IBW))   Fluid Needs: 25 ml/kg IBW= 2022 ml, adjusted IBW: 97 kg X 25 ml/kg= 2425 ml. Adjust per clinical status.     - Diet:       Procedures    Renal diet Renal, No Straw; Fluid Consistency: Thin Liquids ; Texture Consistency: Soft / Easy to Chew ; Is Patient on Accuchecks? Yes    NPO     - Medical Food Supplements-Nepro BID starting 12/26.   - Vitamin and mineral supplements: vitamin B12/MD  - Feeding assistance: marcelina to feed self  - Nutrition education: not appropriate   - Coordination of nutrition care: collaboration with other providers  - Discharge and transfer of nutrition  care to new setting or provider: monitor plans From home with family. Plan [facility] [place] pending medical clearance    MONITOR AND EVALUATE/NUTRITION GOALS:  - Food and Nutrient Intake:      Monitor: adequacy of PO intake, tolerance of PO intake, and adequacy of supplement intake  - Anthropometric Measurement:    Monitor weight  - Nutrition Goals:      PO and supplement greater than 75% of needs, labs within acceptable limits, euglycemia, monitor fluid status, halt true wt loss, and improved GI status    DIETITIAN FOLLOW UP: RD to follow and monitor nutrition status    Gala Squires MS, CHARBEL, RDN, LDN  Clinical Dietitian  P: [phone]

## 2024-01-02 NOTE — PROGRESS NOTES
NEPHROLOGY DAILY PROGRESS NOTE     SUBJECTIVE:    Awake, + confused.     OBJECTIVE:    Total Intake/Output:    Intake/Output Summary (Last 24 hours) at 1/2/2024 1417  Last data filed at 1/2/2024 0902  Gross per 24 hour   Intake 100 ml   Output 1200 ml   Net -1100 ml         PHYSICAL EXAM:  /60 (BP Location: Right arm)   Pulse 82   Temp 98.1 °F (36.7 °C) (Oral)   Resp 19   Wt 215 lb (97.5 kg)   SpO2 96%   BMI 29.16 kg/m²     GEN: no acute distress  HEENT: NCAT    CHEST: diminished breath sounds bilaterally  CARDIAC: S1S2 normal  ABD: soft, NT/ND  EXT:  no lower ext edema  NEURO: sleepy/ lethargic   SKIN: warm, dry   DIALYSIS ACCESS: LUE AVF + bruit and thrill      CURRENT MEDICATIONS:   [Held by provider] metoprolol succinate ER  200 mg Oral Daily Beta Blocker    melatonin  3 mg Oral Nightly    cyanocobalamin  250 mcg Oral Daily    magnesium oxide  500 mg Oral Daily    sevelamer carbonate  1,600 mg Oral TID CC    insulin aspart  1-5 Units Subcutaneous TID CC    aspirin  81 mg Per NG Tube Daily    lansoprazole  30 mg Per NG Tube QAM AC    [Held by provider] finasteride  5 mg Per NG Tube Daily    insulin detemir  25 Units Subcutaneous Daily    heparin  5,000 Units Subcutaneous Q8H YOLANDA    tamsulosin  0.8 mg Oral Daily    [Held by provider] hydrALAZINE  50 mg Oral TID    [Held by provider] gabapentin  100 mg Oral BID    [Held by provider] rosuvastatin  10 mg Oral Nightly             LABS:  Patient Labs Reviewed in Detail. Pertinent Labs as follows:  Recent Labs   Lab 12/30/23  0605 12/31/23  0603 01/02/24  0607   * 199* 232*   BUN 49* 72* 55*   CREATSERUM 6.62* 9.05* 7.16*   EGFRCR 8* 6* 7*   CA 8.9 9.2 9.1    137 134*   K 4.7 4.3 4.2   CL 97* 95* 98   CO2 31.0 31.0 30.0     Recent Labs   Lab 12/31/23  0603 01/01/24  0648 01/02/24  0607   RBC 3.54* 3.54* 3.19*   HGB 10.7* 10.7* 10.1*   HCT 34.3* 34.5* 30.7*   MCV 96.9 97.5 96.2   MCH 30.2 30.2 31.7   MCHC 31.2 31.0 32.9   RDW 14.9 15.0 14.6   WBC  12.1* 12.6* 9.7   .0 194.0 170.0       IMAGING:  No results found.     ASSESSMENT AND PLAN:   This is a 73 year old male with PMH sig for ESRD on HD MWF, HTN, HLD, DM2, CAD s/p CABG x3, ischemic cardiomyopathy. Presents with SOB and cough. Found to be influenza positive. Nephrology is consulted for dialysis.      ESRD on HD MWF   - HD per MWF schedule  - Followed by [facility]   - Discussed with patient son over the phone in regards to confusion post dialysis which seems to be a chronic issue. Will monitor closely and consider changes to dialysis prescription such as decreasing blood flow rate and increasing time to help alleviate symptoms.   - My partner discussed with Dr. Morelos - will need a RHC to evaluate volume status, will plan for tomorrow 1/3  - Extra HD session today 1/2 and HD again tomorrow 1/3 per normal MWF schedule.     Acute hypoxic respiratory failure  - CXR with pulmonary vascular changes, also influenza A postivie    - UF with HD as tolerated    Hyperkalemia   - follow K level      Hyperphosphatemia   - Nepro feeds  - Sevelamer 1600mg tid     Hypertension   - Hydralazine, metoprolol    - Hold BP meds before dialysis     Anemia   - trend Hb, Hb at goal    - receiving OMAIRA with outpatient dialysis            DO Melodie Robles [facility]  Nephrology

## 2024-01-02 NOTE — CM/SW NOTE
Per [employer] portal the pt's insurance effective date is 1/1/24 and there is approval for HODA.    Auth #: [id number]  Auth is good 1/2/24-1/8/24    Auth uploaded to [employer], QUINTIN ext [id number]

## 2024-01-02 NOTE — PLAN OF CARE
Patient is alert & oriented x3/4 on 1L of O2. Vital signs stable at this time. No acute changes noted throughout shift; patient slept. Fall precautions maintained - bed alarm on, bed locked in lowest position, call light and personal belongings within reach, non-skid socks in place. Frequent rounding by nursing staff. Plan right heart cath possibly Wednesday prior to dialysis.     Problem: Patient Centered Care  Goal: Patient preferences are identified and integrated in the patient's plan of care  Description: Interventions:  - What would you like us to know as we care for you? I live at home with family. I'm from [place]  - Provide timely, complete, and accurate information to patient/family  - Incorporate patient and family knowledge, values, beliefs, and cultural backgrounds into the planning and delivery of care  - Encourage patient/family to participate in care and decision-making at the level they choose  - Honor patient and family perspectives and choices  Outcome: Progressing     Problem: Diabetes/Glucose Control  Goal: Glucose maintained within prescribed range  Description: INTERVENTIONS:  - Monitor Blood Glucose as ordered  - Assess for signs and symptoms of hyperglycemia and hypoglycemia  - Administer ordered medications to maintain glucose within target range  - Assess barriers to adequate nutritional intake and initiate nutrition consult as needed  - Instruct patient on self management of diabetes  Outcome: Progressing     Problem: Patient/Family Goals  Goal: Patient/Family Long Term Goal  Description: Patient's Long Term Goal: go home    Interventions:  - Monitor vital signs  - Monitor appropriate labs  - Monitor blood glucose levels  - Administer medications per order  - Pain management as needed  - Follow MD orders  - Diagnostics per orders  - Dialysis per orders  - Update / inform patient and family on plan of care  - Discharge planning     - See additional Care Plan goals for specific  interventions  Outcome: Progressing  Goal: Patient/Family Short Term Goal  Description: Patient's Short Term Goal: To breathe better    Interventions:   - RT treatment  -O2  -Follow md orders  - See additional Care Plan goals for specific interventions  Outcome: Progressing     Problem: CARDIOVASCULAR - ADULT  Goal: Maintains optimal cardiac output and hemodynamic stability  Description: INTERVENTIONS:  - Monitor vital signs, rhythm, and trends  - Monitor for bleeding, hypotension and signs of decreased cardiac output  - Evaluate effectiveness of vasoactive medications to optimize hemodynamic stability  - Monitor arterial and/or venous puncture sites for bleeding and/or hematoma  - Assess quality of pulses, skin color and temperature  - Assess for signs of decreased coronary artery perfusion - ex. Angina  - Evaluate fluid balance, assess for edema, trend weights  Outcome: Progressing  Goal: Absence of cardiac arrhythmias or at baseline  Description: INTERVENTIONS:  - Continuous cardiac monitoring, monitor vital signs, obtain 12 lead EKG if indicated  - Evaluate effectiveness of antiarrhythmic and heart rate control medications as ordered  - Initiate emergency measures for life threatening arrhythmias  - Monitor electrolytes and administer replacement therapy as ordered  Outcome: Progressing     Problem: RESPIRATORY - ADULT  Goal: Achieves optimal ventilation and oxygenation  Description: INTERVENTIONS:  - Assess for changes in respiratory status  - Assess for changes in mentation and behavior  - Position to facilitate oxygenation and minimize respiratory effort  - Oxygen supplementation based on oxygen saturation or ABGs  - Provide Smoking Cessation handout, if applicable  - Encourage broncho-pulmonary hygiene including cough, deep breathe, Incentive Spirometry  - Assess the need for suctioning and perform as needed  - Assess and instruct to report SOB or any respiratory difficulty  - Respiratory Therapy support as  indicated  - Manage/alleviate anxiety  - Monitor for signs/symptoms of CO2 retention  Outcome: Progressing     Problem: METABOLIC/FLUID AND ELECTROLYTES - ADULT  Goal: Glucose maintained within prescribed range  Description: INTERVENTIONS:  - Monitor Blood Glucose as ordered  - Assess for signs and symptoms of hyperglycemia and hypoglycemia  - Administer ordered medications to maintain glucose within target range  - Assess barriers to adequate nutritional intake and initiate nutrition consult as needed  - Instruct patient on self management of diabetes  Outcome: Progressing  Goal: Electrolytes maintained within normal limits  Description: INTERVENTIONS:  - Monitor labs and rhythm and assess patient for signs and symptoms of electrolyte imbalances  - Administer electrolyte replacement as ordered  - Monitor response to electrolyte replacements, including rhythm and repeat lab results as appropriate  - Fluid restriction as ordered  - Instruct patient on fluid and nutrition restrictions as appropriate  Outcome: Progressing  Goal: Hemodynamic stability and optimal renal function maintained  Description: INTERVENTIONS:  - Monitor labs and assess for signs and symptoms of volume excess or deficit  - Monitor intake, output and patient weight  - Monitor urine specific gravity, serum osmolarity and serum sodium as indicated or ordered  - Monitor response to interventions for patient's volume status, including labs, urine output, blood pressure (other measures as available)  - Encourage oral intake as appropriate  - Instruct patient on fluid and nutrition restrictions as appropriate  Outcome: Progressing     Problem: Delirium  Goal: Minimize duration of delirium  Description: Interventions:  - Encourage use of hearing aids, eye glasses  - Promote highest level of mobility daily  - Provide frequent reorientation  - Promote wakefulness i.e. lights on, blinds open  - Promote sleep, encourage patient's normal rest cycle i.e.  lights off, TV off, minimize noise and interruptions  - Encourage family to assist in orientation and promotion of home routines  Outcome: Progressing

## 2024-01-02 NOTE — CM/SW NOTE
Leslie called earlier that Terrence's insurance plan is showing inactive as of 1/1/2024.  SW on 1/2/2023 to follow-up with family to see if insurance has changed to another plan.  Phone number for leslie is [phone] and case ID [id number]. DAWIT will need to notify leslie on outcome of current insurance status.    / to remain available for support and/or discharge planning.      Shara Bedolla MBA BSN RN CRRN   RN Case Manager  [phone]

## 2024-01-03 ENCOUNTER — APPOINTMENT (OUTPATIENT)
Dept: INTERVENTIONAL RADIOLOGY/VASCULAR | Facility: HOSPITAL | Age: 74
End: 2024-01-03
Attending: INTERNAL MEDICINE
Payer: MEDICARE

## 2024-01-03 PROBLEM — F05 DELIRIUM DUE TO ANOTHER MEDICAL CONDITION: Status: ACTIVE | Noted: 2024-01-01

## 2024-01-03 PROBLEM — F05 DELIRIUM DUE TO ANOTHER MEDICAL CONDITION: Status: ACTIVE | Noted: 2024-01-03

## 2024-01-03 LAB
ALBUMIN SERPL-MCNC: 4.4 G/DL (ref 3.2–4.8)
ANION GAP SERPL CALC-SCNC: 9 MMOL/L (ref 0–18)
BUN BLD-MCNC: 31 MG/DL (ref 9–23)
BUN/CREAT SERPL: 6.3 (ref 10–20)
CALCIUM BLD-MCNC: 9.5 MG/DL (ref 8.7–10.4)
CHLORIDE SERPL-SCNC: 98 MMOL/L (ref 98–112)
CO2 SERPL-SCNC: 27 MMOL/L (ref 21–32)
CREAT BLD-MCNC: 4.95 MG/DL
DEPRECATED RDW RBC AUTO: 51.3 FL (ref 35.1–46.3)
EGFRCR SERPLBLD CKD-EPI 2021: 12 ML/MIN/1.73M2 (ref 60–?)
ERYTHROCYTE [DISTWIDTH] IN BLOOD BY AUTOMATED COUNT: 14.7 % (ref 11–15)
GLUCOSE BLD-MCNC: 202 MG/DL (ref 70–99)
GLUCOSE BLDC GLUCOMTR-MCNC: 194 MG/DL (ref 70–99)
GLUCOSE BLDC GLUCOMTR-MCNC: 200 MG/DL (ref 70–99)
GLUCOSE BLDC GLUCOMTR-MCNC: 202 MG/DL (ref 70–99)
GLUCOSE BLDC GLUCOMTR-MCNC: 247 MG/DL (ref 70–99)
HCT VFR BLD AUTO: 31.5 %
HGB BLD-MCNC: 10.1 G/DL
MCH RBC QN AUTO: 30.5 PG (ref 26–34)
MCHC RBC AUTO-ENTMCNC: 32.1 G/DL (ref 31–37)
MCV RBC AUTO: 95.2 FL
OSMOLALITY SERPL CALC.SUM OF ELEC: 290 MOSM/KG (ref 275–295)
PHOSPHATE SERPL-MCNC: 3.1 MG/DL (ref 2.4–5.1)
PLATELET # BLD AUTO: 184 10(3)UL (ref 150–450)
POTASSIUM SERPL-SCNC: 3.9 MMOL/L (ref 3.5–5.1)
RBC # BLD AUTO: 3.31 X10(6)UL
SODIUM SERPL-SCNC: 134 MMOL/L (ref 136–145)
WBC # BLD AUTO: 10.5 X10(3) UL (ref 4–11)

## 2024-01-03 PROCEDURE — 4A023N6 MEASUREMENT OF CARDIAC SAMPLING AND PRESSURE, RIGHT HEART, PERCUTANEOUS APPROACH: ICD-10-PCS | Performed by: INTERNAL MEDICINE

## 2024-01-03 PROCEDURE — 99232 SBSQ HOSP IP/OBS MODERATE 35: CPT | Performed by: NURSE PRACTITIONER

## 2024-01-03 RX ORDER — LANSOPRAZOLE 30 MG/1
30 TABLET, ORALLY DISINTEGRATING, DELAYED RELEASE ORAL
Status: DISCONTINUED | OUTPATIENT
Start: 2024-01-04 | End: 2024-01-05

## 2024-01-03 RX ORDER — IPRATROPIUM BROMIDE AND ALBUTEROL SULFATE 2.5; .5 MG/3ML; MG/3ML
3 SOLUTION RESPIRATORY (INHALATION) EVERY 6 HOURS PRN
Status: DISCONTINUED | OUTPATIENT
Start: 2024-01-03 | End: 2024-01-05

## 2024-01-03 RX ORDER — METOPROLOL SUCCINATE 50 MG/1
50 TABLET, EXTENDED RELEASE ORAL
Status: DISCONTINUED | OUTPATIENT
Start: 2024-01-04 | End: 2024-01-05

## 2024-01-03 RX ORDER — HYDRALAZINE HYDROCHLORIDE 20 MG/ML
10 INJECTION INTRAMUSCULAR; INTRAVENOUS EVERY 6 HOURS PRN
Status: DISCONTINUED | OUTPATIENT
Start: 2024-01-03 | End: 2024-01-05

## 2024-01-03 RX ORDER — ASPIRIN 81 MG/1
81 TABLET, CHEWABLE ORAL DAILY
Status: DISCONTINUED | OUTPATIENT
Start: 2024-01-04 | End: 2024-01-05

## 2024-01-03 RX ORDER — LIDOCAINE HYDROCHLORIDE 20 MG/ML
INJECTION, SOLUTION EPIDURAL; INFILTRATION; INTRACAUDAL; PERINEURAL
Status: COMPLETED
Start: 2024-01-03 | End: 2024-01-03

## 2024-01-03 RX ORDER — MIDAZOLAM HYDROCHLORIDE 1 MG/ML
INJECTION INTRAMUSCULAR; INTRAVENOUS
Status: DISCONTINUED
Start: 2024-01-03 | End: 2024-01-03 | Stop reason: WASHOUT

## 2024-01-03 RX ORDER — HEPARIN SODIUM 5000 [USP'U]/ML
5000 INJECTION, SOLUTION INTRAVENOUS; SUBCUTANEOUS EVERY 8 HOURS SCHEDULED
Status: DISCONTINUED | OUTPATIENT
Start: 2024-01-03 | End: 2024-01-05

## 2024-01-03 RX ORDER — ALBUTEROL SULFATE 90 UG/1
2 AEROSOL, METERED RESPIRATORY (INHALATION) EVERY 4 HOURS PRN
Status: DISCONTINUED | OUTPATIENT
Start: 2024-01-03 | End: 2024-01-05

## 2024-01-03 RX ORDER — CHOLECALCIFEROL (VITAMIN D3) 125 MCG
250 CAPSULE ORAL DAILY
Status: DISCONTINUED | OUTPATIENT
Start: 2024-01-04 | End: 2024-01-05

## 2024-01-03 RX ORDER — ACETAMINOPHEN 500 MG
500 TABLET ORAL EVERY 4 HOURS PRN
Status: DISCONTINUED | OUTPATIENT
Start: 2024-01-03 | End: 2024-01-05

## 2024-01-03 RX ORDER — TAMSULOSIN HYDROCHLORIDE 0.4 MG/1
0.8 CAPSULE ORAL DAILY
Status: DISCONTINUED | OUTPATIENT
Start: 2024-01-04 | End: 2024-01-05

## 2024-01-03 RX ORDER — DIPHENHYDRAMINE HYDROCHLORIDE 50 MG/ML
INJECTION INTRAMUSCULAR; INTRAVENOUS
Status: DISCONTINUED
Start: 2024-01-03 | End: 2024-01-03 | Stop reason: WASHOUT

## 2024-01-03 RX ORDER — MULTIVITAMIN/IRON/FOLIC ACID 18MG-0.4MG
500 TABLET ORAL DAILY
Status: DISCONTINUED | OUTPATIENT
Start: 2024-01-04 | End: 2024-01-05

## 2024-01-03 NOTE — PROGRESS NOTES
Patient is a 73 year old  male with past medical history of BPH, CHF, HTN,HLD, ESRD on HD who was admitted to the hospital for Acute respiratory failure with hypoxia (HCC): The patient has been demonstrating confusion and restlessness. Patient indicated for psych consult for evaluation and advise. Patient was seen by Dr. Morrow for initial consult.     Consult Duration   The patient seen for over 50-minute, follow-up evaluation, over 50% counseling and coordinating care addressing increased confusion.    Record reviewed, communication with attending, communication with RN and patient seen face to face evaluation.    History of Present Illness:   Per chart review, the patient presented to the hospital with complaint of difficulty breathing. The patient has been demonstrating some increased confusion while in hospital. Patient was seen by Dr. Morrow for initial consult for delirium.    Communicating with the team, the patient continues to demonstrate some increased confusion and restlessness at times.     The patient receiving no psychotropic medications.     Labs and imaging reviewed: Glucose 202, sodium 134, BUN 31, creatinine 4.95. CT head did not demonstrate any acute findings.     The patient seen today laying in hospital bed. The patient presents calm, cooperative and pleasant. He is not demonstrating any restlessness or agitation.    The patient is alert and oriented to person, place and date with prompting.  The patient answers questions and engages in conversation otherwise he continues to demonstrate some impairment in his cognition and thought process.     The patient stating that it is December, 2024 and that he is in the hospital, \"for a procedure to do dialysis.\"     The patient states that he lives with his wife and that he has four children. 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     He denies feeling depressed or anxious. He denies any feelings of  hopelessness, helplessness, worthlessness, low mood, low energy, decreased motivation, worry, ruminations or impairment in sleep.     The patient otherwise noting that what has been challenging him recently his his dialysis. He states that he feels supported by family.     The patient is not demonstrating any shahriar or psychosis  The patient denies auditory or visual hallucinations  The patient denies suicidal or homicidal ideation.    The patient has been demonstrating some increased confusion and restlessness at times. He has not demonstrated any agitation or concerning behaviors. According to the team, the patient has been demonstrating improvement.     Past Psychiatric/Medication History:  1. Prior diagnoses: none reported  2. Past psychiatric inpatient: none reported  3. Past outpatient history: none reported  4. Past suicide history: none reported  5. Medication history: none reported    Social History:   Patient states that he lives with his wife and that he has four children. 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     The patient denies any alcohol, tobacco, cannabis or illicit substance abuse.     Family History:  No family history reported.   Medical History:   Past Medical History  Past Medical History:   Diagnosis Date    BPH (benign prostatic hypertrophy)     Cataract     Congestive heart disease (HCC)     Coronary atherosclerosis     Diabetic retinopathy (HCC)     Dialysis patient (HCC)     M,W,F,    Esophageal reflux     Heart valve disease     High blood pressure     High cholesterol     HLD (hyperlipidemia)     HTN (hypertension)     Neuropathy     Proteinuria     Renal disorder     HD every MWF with temporary HD cath    Type II diabetes mellitus (HCC)     Visual impairment     Reading glasses       Past Surgical History  Past Surgical History:   Procedure Laterality Date    CABG  06/21/2021    x3-lima-->LAD, SVG-->diag and SVG-->OM    CATARACT Right     COLONOSCOPY N/A  11/8/2023    Procedure: COLONOSCOPY;  Surgeon: Cullen Bautista MD;  Location: [facility] ENDOSCOPY       Family History  Family History   Problem Relation Age of Onset    No Known Problems Father     No Known Problems Mother     No Known Problems Daughter     No Known Problems Son     No Known Problems Son     No Known Problems Son     No Known Problems Sister     No Known Problems Brother        Social History  Social History     Socioeconomic History    Marital status:    Tobacco Use    Smoking status: Never    Smokeless tobacco: Never   Vaping Use    Vaping Use: Never used   Substance and Sexual Activity    Alcohol use: Never    Drug use: Never     Social Determinants of Health     Food Insecurity: No Food Insecurity (12/18/2023)    Food Insecurity     Food Insecurity: Never true   Transportation Needs: No Transportation Needs (12/18/2023)    Transportation Needs     Lack of Transportation: No   Housing Stability: Low Risk  (12/18/2023)    Housing Stability     Housing Instability: No           Current Medications:  Current Facility-Administered Medications   Medication Dose Route Frequency    [Held by provider] metoprolol succinate ER (Toprol XL) 24 hr tab 200 mg  200 mg Oral Daily Beta Blocker    melatonin tab 3 mg  3 mg Oral Nightly    cyanocobalamin (Vitamin B12) tab 250 mcg  250 mcg Oral Daily    magnesium oxide (Mag-Ox) tab 500 mg  500 mg Oral Daily    hydrALAzine (Apresoline) 20 mg/mL injection 10 mg  10 mg Intravenous Q6H PRN    sevelamer carbonate (Renvela) tab 1,600 mg  1,600 mg Oral TID CC    metoclopramide (Reglan) 5 mg/mL injection 5 mg  5 mg Intravenous Q8H PRN    insulin aspart (NovoLOG) 100 Units/mL FlexPen 1-5 Units  1-5 Units Subcutaneous TID CC    aspirin chewable tab 81 mg  81 mg Per NG Tube Daily    lansoprazole (Prevacid Solutab) disintegrating tab 30 mg  30 mg Per NG Tube QAM AC    [Held by provider] finasteride (Proscar) 1 mg/mL oral solution 5 mg  5 mg Per NG Tube Daily    insulin  detemir (Levemir) 100 UNIT/ML FlexPen/FlexTouch 25 Units  25 Units Subcutaneous Daily    traMADol (Ultram) tab 50 mg  50 mg Oral Q12H PRN    ipratropium-albuterol (Duoneb) 0.5-2.5 (3) MG/3ML inhalation solution 3 mL  3 mL Nebulization Q6H PRN    glucose (Dex4) 15 GM/59ML oral liquid 15 g  15 g Oral Q15 Min PRN    Or    glucose (Glutose) 40% oral gel 15 g  15 g Oral Q15 Min PRN    Or    glucose-vitamin C (Dex-4) chewable tab 4 tablet  4 tablet Oral Q15 Min PRN    Or    dextrose 50% injection 50 mL  50 mL Intravenous Q15 Min PRN    Or    glucose (Dex4) 15 GM/59ML oral liquid 30 g  30 g Oral Q15 Min PRN    Or    glucose (Glutose) 40% oral gel 30 g  30 g Oral Q15 Min PRN    Or    glucose-vitamin C (Dex-4) chewable tab 8 tablet  8 tablet Oral Q15 Min PRN    heparin (Porcine) 5000 UNIT/ML injection 5,000 Units  5,000 Units Subcutaneous Q8H YOLANDA    acetaminophen (Tylenol Extra Strength) tab 500 mg  500 mg Oral Q4H PRN    polyethylene glycol (PEG 3350) (Miralax) 17 g oral packet 17 g  17 g Oral Daily PRN    sennosides (Senokot) tab 17.2 mg  17.2 mg Oral Nightly PRN    bisacodyl (Dulcolax) 10 MG rectal suppository 10 mg  10 mg Rectal Daily PRN    ondansetron (Zofran) 4 MG/2ML injection 4 mg  4 mg Intravenous Q6H PRN    tamsulosin (Flomax) cap 0.8 mg  0.8 mg Oral Daily    [Held by provider] hydrALAZINE (Apresoline) tab 50 mg  50 mg Oral TID    [Held by provider] gabapentin (Neurontin) cap 100 mg  100 mg Oral BID    albuterol (Ventolin HFA) 108 (90 Base) MCG/ACT inhaler 2 puff  2 puff Inhalation Q4H PRN    [Held by provider] rosuvastatin (Crestor) tab 10 mg  10 mg Oral Nightly     Medications Prior to Admission   Medication Sig    Magnesium 500 MG Oral Tab Take 1 tablet (500 mg total) by mouth daily.    cyanocobalamin 250 MCG Oral Tab Take 1 tablet (250 mcg total) by mouth daily.    Coenzyme Q10 (CO Q-10) 100 MG Oral Chew Tab Chew 100 mg by mouth daily.    [] albuterol 108 (90 Base) MCG/ACT Inhalation Aero Soln Inhale 2  puffs into the lungs every 4 (four) hours as needed for Wheezing.    aspirin 81 MG Oral Tab EC Take 1 tablet (81 mg total) by mouth daily.    rosuvastatin 20 MG Oral Tab     Sevelamer 800 MG Oral Tab 3 (three) times daily with meals.    insulin glargine (BASAGLAR KWIKPEN) 100 UNIT/ML Subcutaneous Solution Pen-injector Inject 25 Units into the skin every morning.    metoprolol succinate ER 50 MG Oral Tablet 24 Hr Take 1 tablet (50 mg total) by mouth in the morning and 1 tablet (50 mg total) before bedtime.    hydrALAZINE 50 MG Oral Tab Take 1 tablet (50 mg total) by mouth 3 (three) times daily. (Patient taking differently: Take 1 tablet (50 mg total) by mouth 3 (three) times daily. Does not take before dialysis)    FINASTERIDE 5 MG Oral Tab TAKE 1 TABLET(5 MG) BY MOUTH DAILY (Patient taking differently: Take 1 tablet (5 mg total) by mouth every morning. TAKE 1 TABLET(5 MG) BY MOUTH DAILY)    multivitamin Oral Tab Take 1 tablet by mouth daily.    Insulin Lispro, 1 Unit Dial, (HUMALOG KWIKPEN) 100 UNIT/ML Subcutaneous Solution Pen-injector Inject 14 Units into the skin 3 (three) times daily. (Patient taking differently: Inject 15 Units into the skin 3 (three) times daily. Sliding scale)    acetaminophen 500 MG Oral Tab Take 2 tablets (1,000 mg total) by mouth as needed.    gabapentin 100 MG Oral Cap Take 1 capsule (100 mg total) by mouth 2 (two) times daily. (Patient not taking: Reported on 12/18/2023)    Glucose Blood (ONETOUCH ULTRA) In Vitro Strip Test Blood Sugar 4 Times Daily. Insulin Dependent Diabetes Type II. Z79.4, E11.9.    OneTouch UltraSoft Lancets Does not apply Misc Test Blood Sugar Once Daily. Non-Insulin Dependent Diabetes Type II. E11.9.    Continuous Blood Gluc Sensor (DEXCOM G6 SENSOR) Does not apply Misc 1 each by Does not apply route Every 10 days.    Insulin Pen Needle (TRUEPLUS PEN NEEDLES) 32G X 4 MM Does not apply Misc Use 1 needle 4 times a day    tamsulosin (FLOMAX) cap TAKE 2 CAPSULES(0.8  MG) BY MOUTH DAILY (Patient not taking: Reported on 12/18/2023)    Blood Glucose Monitoring Suppl (ONETOUCH ULTRA MINI) w/Device Does not apply Kit Test Blood Sugar Once Daily. Non-Insulin Dependent Diabetes Type II. E11.9.       Allergies  No Known Allergies    Review of Systems:   As by Admitting/Attending    Results:   Laboratory Data:  Lab Results   Component Value Date    WBC 10.5 01/03/2024    HGB 10.1 (L) 01/03/2024    HCT 31.5 (L) 01/03/2024    .0 01/03/2024    CREATSERUM 4.95 (H) 01/03/2024    BUN 31 (H) 01/03/2024     (L) 01/03/2024    K 3.9 01/03/2024    CL 98 01/03/2024    CO2 27.0 01/03/2024     (H) 01/03/2024    CA 9.5 01/03/2024    ALB 4.4 01/03/2024    ALKPHO 63 11/06/2023    TP 6.2 11/06/2023    AST 12 11/06/2023    ALT 13 11/06/2023    PTT 28.4 09/06/2022    INR 1.02 09/06/2022    PTP 13.3 09/06/2022    TSH 1.620 06/21/2021     (H) 09/20/2023    PSA 1.80 02/06/2020    DDIMER 0.33 05/09/2022    MG 2.4 12/30/2023    PHOS 3.1 01/03/2024    TROP <0.045 06/02/2021         Imaging:  No results found.    Vital Signs:   Blood pressure 116/64, pulse 94, temperature 98 °F (36.7 °C), temperature source Oral, resp. rate 18, weight 97.5 kg (215 lb), SpO2 92%.    Mental Status Exam:   Appearance: Stated age male, in hospital gown, laying down in hospital bed.  Psychomotor: No psychomotor agitation, or retardation.   Orientation: Alert and oriented to person, place   Gait: Not evaluated.  Attitude/Coorperation: Cooperative and attentive.  Behavior: Appropriate.  Speech: Regular rate and rhythm speech.  Mood: denies feeling depressed or anxious   Affect: restricted  Thought process: confused  Thought content: Patient denies any suicidal or homicidal ideation.  Perceptions: Patient denies any auditory or visual hallucinations.  Concentration: impaired  Memory: impaired  Intellect: Average.  Judgment and Insight: Questionable.     Impression:     Delirium due to another medical  condition  Acute respiratory failure with hypoxia (HCC)  Thrombocytopenia (HCC)  ESRD on dialysis (HCC)    Patient is a 73 year old  male with past medical history of BPH, CHF, HTN,HLD, ESRD on HD who was admitted to the hospital for Acute respiratory failure with hypoxia (HCC): The patient has been demonstrating confusion and restlessness. Patient indicated for psych consult for evaluation and advise. Patient was seen by Dr. Morrow for initial consult.     The patient has been demonstrating some increased confusion and restlessness at times. He has not demonstrated any agitation or concerning behaviors. According to the team, the patient has been demonstrating improvement.     Discussed risk and benefit, acknowledging the current symptom and severity.  At this point, I would recommend the following approach:     Focus on safety  Focus on education and support.  Focus on insight orientation helping the patient understand diagnosis and treatment plan.  Utilize Zyprexa 2.5 mg nightly PRN  Coordinate plan with team    Orders This Visit:  Orders Placed This Encounter   Procedures    CBC With Differential With Platelet    Basic Metabolic Panel (8)    Urinalysis, Routine    BNP (Brain Natriuretic Peptide)    Troponin I (High Sensitivity)    Basic Metabolic Panel (8)    Magnesium    CBC With Differential With Platelet    Lipid Panel    Phosphorus    Troponin I (High Sensitivity)    Phosphorus    Arterial blood gas    Phosphorus    Expanded Arterial Blood Gas    Expanded Arterial Blood Gas    Arterial blood gas    Expanded Arterial Blood Gas    Procalcitonin    Arterial blood gas    Renal Function Panel    CBC, Platelet; No Differential    Arterial blood gas    Magnesium    Magnesium    Hepatitis B Profile    Hep B Core Ab, IGM    Magnesium    SARS-CoV-2/Flu A and B/RSV by PCR (GeneXpert)       Meds This Visit:  Requested Prescriptions      No prescriptions requested or ordered in this encounter       Mesha Santos  APRN  1/3/2024    Note to Patient: The 21st Century Cures Act makes medical notes like these available to patients in the interest of transparency. However, be advised this is a medical document. It is intended as peer to peer communication. It is written in medical language and may contain abbreviations or verbiage that are unfamiliar. It may appear blunt or direct. Medical documents are intended to carry relevant information, facts as evident, and the clinical opinion of the practitioner. This note may have been transcribed using a voice dictation system. Voice recognition errors may occur. This should not be taken to alter the content or meaning of this note.

## 2024-01-03 NOTE — PLAN OF CARE
HD completed overnight, 2L off. Slightly confused and mumbling to self after HD, able to reorient. Prepped for cath today. NPO since 5am. Plan: RHC 1/3 w/ HD afterwards    Call light within reach, fall precautions in place  Problem: Patient Centered Care  Goal: Patient preferences are identified and integrated in the patient's plan of care  Description: Interventions:  - What would you like us to know as we care for you? I live at home with family. I'm from [place]  - Provide timely, complete, and accurate information to patient/family  - Incorporate patient and family knowledge, values, beliefs, and cultural backgrounds into the planning and delivery of care  - Encourage patient/family to participate in care and decision-making at the level they choose  - Honor patient and family perspectives and choices  Outcome: Progressing     Problem: Diabetes/Glucose Control  Goal: Glucose maintained within prescribed range  Description: INTERVENTIONS:  - Monitor Blood Glucose as ordered  - Assess for signs and symptoms of hyperglycemia and hypoglycemia  - Administer ordered medications to maintain glucose within target range  - Assess barriers to adequate nutritional intake and initiate nutrition consult as needed  - Instruct patient on self management of diabetes  Outcome: Progressing     Problem: RESPIRATORY - ADULT  Goal: Achieves optimal ventilation and oxygenation  Description: INTERVENTIONS:  - Assess for changes in respiratory status  - Assess for changes in mentation and behavior  - Position to facilitate oxygenation and minimize respiratory effort  - Oxygen supplementation based on oxygen saturation or ABGs  - Provide Smoking Cessation handout, if applicable  - Encourage broncho-pulmonary hygiene including cough, deep breathe, Incentive Spirometry  - Assess the need for suctioning and perform as needed  - Assess and instruct to report SOB or any respiratory difficulty  - Respiratory Therapy support as indicated  -  Manage/alleviate anxiety  - Monitor for signs/symptoms of CO2 retention  Outcome: Progressing     Problem: METABOLIC/FLUID AND ELECTROLYTES - ADULT  Goal: Glucose maintained within prescribed range  Description: INTERVENTIONS:  - Monitor Blood Glucose as ordered  - Assess for signs and symptoms of hyperglycemia and hypoglycemia  - Administer ordered medications to maintain glucose within target range  - Assess barriers to adequate nutritional intake and initiate nutrition consult as needed  - Instruct patient on self management of diabetes  Outcome: Progressing  Goal: Electrolytes maintained within normal limits  Description: INTERVENTIONS:  - Monitor labs and rhythm and assess patient for signs and symptoms of electrolyte imbalances  - Administer electrolyte replacement as ordered  - Monitor response to electrolyte replacements, including rhythm and repeat lab results as appropriate  - Fluid restriction as ordered  - Instruct patient on fluid and nutrition restrictions as appropriate  Outcome: Progressing

## 2024-01-03 NOTE — PROGRESS NOTES
[facility] Hospitalist Progress Note     CC: Hospital Follow up    PCP: Victor Manuel Cleaning MD       Assessment/Plan:     Mr. Vines is a 73 year old male with PMH BPH , CHF / ICM EF 30% , CAD s/p CABG x3, Moderate MR, HTN, HLD, ESRD on HD M/W/F, Anemia, Gastritis, who presents with SOB. Admitted for fluid overload and Influenza A.  Hypoxic and hypercapnic respiratory failure clinically improved, weaned off of oxygen, course further complicated by encephalopathy, slowly improving, plans for possible right heart cath to determine accurate dry weight.     Acute Hypoxic Respiratory Failure  Pulmonary edema  ESRD on HD M/W/F  - positive influenza   - HD per renal  - s/p recent AV fistula repair by vascular surgery in September 2023  - steroid burst per pulmonary ended 12/24  - determine timing of RHC, per renal and cardio, page out to both to further discuss timing    Encephalopathy, improved, but intermittent waxes and wanes  - mobilize, needs therapy. He is very deconditioned   - alert and awake, following all commands, engaging in conversation  - CT brain 12/30, negative for acute pathology  - improving each day, seen by psychiatry whom agrees     Influenza A positive   - cough for over a month but SOB more acute  - renally dose Tamiflu completed   - Ceftriaxone and steroids started 12/22/23, now off     Troponin Elevation   - no chest pain   - EKG with LBBB  - cardiology on consult     CAD s/p CABG x3   Ischemic cardiomyopathy EF 30% without acute exacerbation of congestive heart failure  Chronic Systolic heart failure  HTN  - Continue metoprolol 100mg BID  - holding his hydralazine for now   - Not on ACE or ARB due to renal disease     Anemia   Thrombocytopenia   Gastritis   - s/p EGD 11/7 with some mild gastritis, no active bleeding  - s/p colonoscopy 11/8 with polyps but no active bleeding, will need to repeat colonoscopy as outpatient  - on prevacid here      Type 2 diabetes with hyperglycemia  Diabetic nephropathy  -  gabapentin (hold for now)  - ISS, meal time and basal insulin   - Controlled renal diet  - Continue home aspirin     Hyperlipidemia  -can hold his statin      BPH  -can hold his finasteride for now  -on flomax      Diet: Tolerating well diet     DVT Prophy: HSQ      Dispo: He is very deconditioned. Dispo will need to be HODA once stabilized. Continue therapy assessments  Transferred to medical floor on 12/28  Possibly to [facility] on Thurs or Friday     Nacho Castillo MD  [facility] Hospitalist      Subjective:     Alert and awake this morning, he ate his entire breakfast, he is alert and oriented to self and place, aware of the year as well and situation of cath today, coughing    OBJECTIVE:    Blood pressure 116/64, pulse 94, temperature 98 °F (36.7 °C), temperature source Oral, resp. rate 18, weight 215 lb (97.5 kg), SpO2 92%.    Temp:  [97.8 °F (36.6 °C)-98.6 °F (37 °C)] 98 °F (36.7 °C)  Pulse:  [] 94  Resp:  [18] 18  BP: ()/(58-69) 116/64  SpO2:  [92 %-95 %] 92 %      Intake/Output:    Intake/Output Summary (Last 24 hours) at 1/3/2024 1042  Last data filed at 1/3/2024 1000  Gross per 24 hour   Intake 0 ml   Output 2000 ml   Net -2000 ml         Last 3 Weights   01/02/24 1055 215 lb (97.5 kg)   01/02/24 0415 214 lb 12.8 oz (97.4 kg)   01/01/24 0401 210 lb 1.6 oz (95.3 kg)   12/31/23 0532 212 lb (96.2 kg)   12/30/23 0518 218 lb 1.6 oz (98.9 kg)   12/29/23 0446 219 lb 8 oz (99.6 kg)   12/27/23 1400 242 lb (109.8 kg)   12/18/23 1258 242 lb (109.8 kg)   11/27/23 1951 252 lb (114.3 kg)   11/13/23 0700 245 lb 4.8 oz (111.3 kg)   11/12/23 0519 244 lb 8 oz (110.9 kg)   11/11/23 1300 246 lb 14.4 oz (112 kg)   11/11/23 0539 238 lb 1.6 oz (108 kg)   11/08/23 1410 234 lb (106.1 kg)   11/07/23 0619 234 lb 1.6 oz (106.2 kg)   11/06/23 1744 239 lb (108.4 kg)       Exam   General: awake  Lungs: clear   Heart: Regular rate and rhythm  Abdomen: soft, non tender  Extremities: No edema  Skin: no new rash, normal  color  Psych: appropriate affect     Data Review:       Labs:     Recent Labs   Lab 01/01/24  0648 01/02/24  0607 01/03/24  0847   RBC 3.54* 3.19* 3.31*   HGB 10.7* 10.1* 10.1*   HCT 34.5* 30.7* 31.5*   MCV 97.5 96.2 95.2   MCH 30.2 31.7 30.5   MCHC 31.0 32.9 32.1   RDW 15.0 14.6 14.7   WBC 12.6* 9.7 10.5   .0 170.0 184.0         Recent Labs   Lab 12/31/23  0603 01/02/24  0607 01/03/24  0847   * 232* 202*   BUN 72* 55* 31*   CREATSERUM 9.05* 7.16* 4.95*   EGFRCR 6* 7* 12*   CA 9.2 9.1 9.5    134* 134*   K 4.3 4.2 3.9   CL 95* 98 98   CO2 31.0 30.0 27.0       Recent Labs   Lab 12/29/23  0628 12/30/23  0605 12/31/23  0603 01/02/24  0607 01/03/24  0847   ALB 4.0 4.2 4.3 4.2 4.4         Imaging:  No results found.      Meds:      [Held by provider] metoprolol succinate ER  200 mg Oral Daily Beta Blocker    melatonin  3 mg Oral Nightly    cyanocobalamin  250 mcg Oral Daily    magnesium oxide  500 mg Oral Daily    sevelamer carbonate  1,600 mg Oral TID CC    insulin aspart  1-5 Units Subcutaneous TID CC    aspirin  81 mg Per NG Tube Daily    lansoprazole  30 mg Per NG Tube QAM AC    [Held by provider] finasteride  5 mg Per NG Tube Daily    insulin detemir  25 Units Subcutaneous Daily    heparin  5,000 Units Subcutaneous Q8H YOLANDA    tamsulosin  0.8 mg Oral Daily    [Held by provider] hydrALAZINE  50 mg Oral TID    [Held by provider] gabapentin  100 mg Oral BID    [Held by provider] rosuvastatin  10 mg Oral Nightly         hydrALAzine, metoclopramide, traMADol, ipratropium-albuterol, glucose **OR** glucose **OR** glucose-vitamin C **OR** dextrose **OR** glucose **OR** glucose **OR** glucose-vitamin C, acetaminophen, polyethylene glycol (PEG 3350), sennosides, bisacodyl, ondansetron, albuterol

## 2024-01-03 NOTE — OCCUPATIONAL THERAPY NOTE
Chart reviewed. Plan is for RHC followed by HD this afternoon. OT will f/u as appropriate/able.      Marleny Lopez OTR/L  [facility]  #[id number]

## 2024-01-03 NOTE — PROGRESS NOTES
NEPHROLOGY DAILY PROGRESS NOTE     SUBJECTIVE:  Had extra session of dialysis yesterday, finished early this AM   Denies SOB   Plan for RHC later today        OBJECTIVE:    Total Intake/Output:    Intake/Output Summary (Last 24 hours) at 1/3/2024 1058  Last data filed at 1/3/2024 1000  Gross per 24 hour   Intake 0 ml   Output 2000 ml   Net -2000 ml         PHYSICAL EXAM:  /64 (BP Location: Right arm)   Pulse 94   Temp 98 °F (36.7 °C) (Oral)   Resp 18   Wt 215 lb (97.5 kg)   SpO2 92%   BMI 29.16 kg/m²     GEN: NAD  HEENT: NCAT    CHEST: CTAB   CARDIAC: S1S2 normal  ABD: soft, NT/ND  EXT:  no lower ext edema  NEURO: sleepy/ lethargic   SKIN: warm, dry   DIALYSIS ACCESS: LUE AVF + bruit and thrill      CURRENT MEDICATIONS:   [Held by provider] metoprolol succinate ER  200 mg Oral Daily Beta Blocker    melatonin  3 mg Oral Nightly    cyanocobalamin  250 mcg Oral Daily    magnesium oxide  500 mg Oral Daily    sevelamer carbonate  1,600 mg Oral TID CC    insulin aspart  1-5 Units Subcutaneous TID CC    aspirin  81 mg Per NG Tube Daily    lansoprazole  30 mg Per NG Tube QAM AC    [Held by provider] finasteride  5 mg Per NG Tube Daily    insulin detemir  25 Units Subcutaneous Daily    heparin  5,000 Units Subcutaneous Q8H YOLANDA    tamsulosin  0.8 mg Oral Daily    [Held by provider] hydrALAZINE  50 mg Oral TID    [Held by provider] gabapentin  100 mg Oral BID    [Held by provider] rosuvastatin  10 mg Oral Nightly             LABS:  Patient Labs Reviewed in Detail. Pertinent Labs as follows:  Recent Labs   Lab 12/31/23  0603 01/02/24  0607 01/03/24  0847   * 232* 202*   BUN 72* 55* 31*   CREATSERUM 9.05* 7.16* 4.95*   EGFRCR 6* 7* 12*   CA 9.2 9.1 9.5    134* 134*   K 4.3 4.2 3.9   CL 95* 98 98   CO2 31.0 30.0 27.0     Recent Labs   Lab 01/01/24  0648 01/02/24  0607 01/03/24  0847   RBC 3.54* 3.19* 3.31*   HGB 10.7* 10.1* 10.1*   HCT 34.5* 30.7* 31.5*   MCV 97.5 96.2 95.2   MCH 30.2 31.7 30.5   MCHC  31.0 32.9 32.1   RDW 15.0 14.6 14.7   WBC 12.6* 9.7 10.5   .0 170.0 184.0       IMAGING:  No results found.     ASSESSMENT AND PLAN:   This is a 73 year old male with PMH sig for ESRD on HD MWF, HTN, HLD, DM2, CAD s/p CABG x3, ischemic cardiomyopathy. Presents with SOB and cough. Found to be influenza positive. Nephrology is consulted for dialysis.      ESRD on HD MWF   - received extra session of dialysis 1/2   - HD per MWF schedule, dialysis today post RHC    - Followed by [facility]     Acute hypoxic respiratory failure  - CXR with pulmonary vascular changes, also influenza A postivie    - UF with HD as tolerated  - now on RA     Hyperkalemia   - resolved   - follow K level      Hyperphosphatemia   - Nepro feeds  - Sevelamer 1600mg tid   - follow phos level     Hypertension   - Hydralazine, metoprolol    - Hold BP meds before dialysis     Anemia   - trend Hb, Hb at goal    - receiving OMAIRA with outpatient dialysis      ADDENDUM: discussed with cards, patient euvolemic with PCWP of 10.  Will hold off on dialysis this afternoon and plan for dialysis tomorrow AM instead  as electrolytes are stable. Thomas RN to obtain standing weight today to establish EDW for dialysis.          Thomas RN     MD Melodie Guardado - Nephrology

## 2024-01-03 NOTE — PLAN OF CARE
Mohinder is A&Ox3 forgetful. Patient on RA.  Patient and Right heart cath today. See notes. Plan for HD tomorrow per Dr. Galvan.   Problem: Patient Centered Care  Goal: Patient preferences are identified and integrated in the patient's plan of care  Description: Interventions:  - What would you like us to know as we care for you? I live at home with family. I'm from [place]  - Provide timely, complete, and accurate information to patient/family  - Incorporate patient and family knowledge, values, beliefs, and cultural backgrounds into the planning and delivery of care  - Encourage patient/family to participate in care and decision-making at the level they choose  - Honor patient and family perspectives and choices  Outcome: Progressing     Problem: Diabetes/Glucose Control  Goal: Glucose maintained within prescribed range  Description: INTERVENTIONS:  - Monitor Blood Glucose as ordered  - Assess for signs and symptoms of hyperglycemia and hypoglycemia  - Administer ordered medications to maintain glucose within target range  - Assess barriers to adequate nutritional intake and initiate nutrition consult as needed  - Instruct patient on self management of diabetes  Outcome: Progressing     Problem: Patient/Family Goals  Goal: Patient/Family Long Term Goal  Description: Patient's Long Term Goal: go home    Interventions:  - Monitor vital signs  - Monitor appropriate labs  - Monitor blood glucose levels  - Administer medications per order  - Pain management as needed  - Follow MD orders  - Diagnostics per orders  - Dialysis per orders  - Update / inform patient and family on plan of care  - Discharge planning     - See additional Care Plan goals for specific interventions  Outcome: Progressing  Goal: Patient/Family Short Term Goal  Description: Patient's Short Term Goal: To breathe better    Interventions:   - RT treatment  -O2  -Follow md orders  - See additional Care Plan goals for specific interventions  Outcome:  Progressing     Problem: CARDIOVASCULAR - ADULT  Goal: Maintains optimal cardiac output and hemodynamic stability  Description: INTERVENTIONS:  - Monitor vital signs, rhythm, and trends  - Monitor for bleeding, hypotension and signs of decreased cardiac output  - Evaluate effectiveness of vasoactive medications to optimize hemodynamic stability  - Monitor arterial and/or venous puncture sites for bleeding and/or hematoma  - Assess quality of pulses, skin color and temperature  - Assess for signs of decreased coronary artery perfusion - ex. Angina  - Evaluate fluid balance, assess for edema, trend weights  Outcome: Progressing  Goal: Absence of cardiac arrhythmias or at baseline  Description: INTERVENTIONS:  - Continuous cardiac monitoring, monitor vital signs, obtain 12 lead EKG if indicated  - Evaluate effectiveness of antiarrhythmic and heart rate control medications as ordered  - Initiate emergency measures for life threatening arrhythmias  - Monitor electrolytes and administer replacement therapy as ordered  Outcome: Progressing     Problem: RESPIRATORY - ADULT  Goal: Achieves optimal ventilation and oxygenation  Description: INTERVENTIONS:  - Assess for changes in respiratory status  - Assess for changes in mentation and behavior  - Position to facilitate oxygenation and minimize respiratory effort  - Oxygen supplementation based on oxygen saturation or ABGs  - Provide Smoking Cessation handout, if applicable  - Encourage broncho-pulmonary hygiene including cough, deep breathe, Incentive Spirometry  - Assess the need for suctioning and perform as needed  - Assess and instruct to report SOB or any respiratory difficulty  - Respiratory Therapy support as indicated  - Manage/alleviate anxiety  - Monitor for signs/symptoms of CO2 retention  Outcome: Progressing     Problem: METABOLIC/FLUID AND ELECTROLYTES - ADULT  Goal: Glucose maintained within prescribed range  Description: INTERVENTIONS:  - Monitor Blood  Glucose as ordered  - Assess for signs and symptoms of hyperglycemia and hypoglycemia  - Administer ordered medications to maintain glucose within target range  - Assess barriers to adequate nutritional intake and initiate nutrition consult as needed  - Instruct patient on self management of diabetes  Outcome: Progressing  Goal: Electrolytes maintained within normal limits  Description: INTERVENTIONS:  - Monitor labs and rhythm and assess patient for signs and symptoms of electrolyte imbalances  - Administer electrolyte replacement as ordered  - Monitor response to electrolyte replacements, including rhythm and repeat lab results as appropriate  - Fluid restriction as ordered  - Instruct patient on fluid and nutrition restrictions as appropriate  Outcome: Progressing  Goal: Hemodynamic stability and optimal renal function maintained  Description: INTERVENTIONS:  - Monitor labs and assess for signs and symptoms of volume excess or deficit  - Monitor intake, output and patient weight  - Monitor urine specific gravity, serum osmolarity and serum sodium as indicated or ordered  - Monitor response to interventions for patient's volume status, including labs, urine output, blood pressure (other measures as available)  - Encourage oral intake as appropriate  - Instruct patient on fluid and nutrition restrictions as appropriate  Outcome: Progressing     Problem: Delirium  Goal: Minimize duration of delirium  Description: Interventions:  - Encourage use of hearing aids, eye glasses  - Promote highest level of mobility daily  - Provide frequent reorientation  - Promote wakefulness i.e. lights on, blinds open  - Promote sleep, encourage patient's normal rest cycle i.e. lights off, TV off, minimize noise and interruptions  - Encourage family to assist in orientation and promotion of home routines  Outcome: Progressing

## 2024-01-03 NOTE — PAYOR COMM NOTE
--------------  CONTINUED STAY REVIEW    [facility]  Subscriber #:  [id number]  Authorization Number: [id number]    1/1     PMH BPH , CHF / ICM EF 30% , CAD s/p CABG x3, Moderate MR, HTN, HLD, ESRD on HD M/W/F, Anemia, Gastritis, who presents with SOB.   Admitted for fluid overload and Influenza A.  Hypoxic and hypercapnic respiratory failure.      Acute Hypoxic Respiratory Failure  Pulmonary edema  ESRD on HD M/W/F  - positive influenza   - HD per renal  -   Encephalopathy, improved, but intermittent waxes and wanes  -mobilize, needs therapy. He is very deconditioned   --CT brain 12/30, negative for acute pathology  -improving each day, seen by psychiatry whom agrees      Influenza A positive   - cough for over a month but SOB more acute  - renally dose Tamiflu completed   - Ceftriaxone and steroids started 12/22/23, now off      Troponin Elevation   - - EKG with LBBB  - cardiology on consult     CAD s/p CABG x3   Ischemic cardiomyopathy EF 30% without acute exacerbation of congestive heart failure  Chronic Systolic heart failure    1/2     : He is very deconditioned. Dispo will need to be HODA once stabilized. Continue therapy assessments  Transferred to medical floor on 12/28  Figure out timing of RHC, per renal and cardiology, discussed with both      ESRD on HD MWF   - HD per MWF schedule  -      Acute hypoxic respiratory failure  - CXR with pulmonary vascular changes, also influenza A postivie    - UF with HD as tolerated     Hyperkalemia   - 2K bath with dialysis   - follow K level       Hyperphosphatemia   - Nepro feeds  - Sevelamer 1600mg tid      Hypertension   - Hydralazine, metoprolol    - Hold BP meds before dialysis     Anemia   - trend Hb, Hb at goal    - receiving OMAIRA with outpatient dialysis            MEDICATIONS ADMINISTERED IN LAST 1 DAY:           heparin (Porcine) 5000 UNIT/ML injection 5,000 Units       Date Action Dose Route User    1/3/2024 0628 Given 5,000 Units  Subcutaneous (Right Lower Abdomen) Stephanie Santiago RN    1/2/2024 1240 Given 5,000 Units Subcutaneous (Bilateral Lower Abdomen) Milla Diaz RN          insulin aspart (NovoLOG) 100 Units/mL FlexPen 1-5 Units       Date Action Dose Route User    1/2/2024 1734 Given 3 Units Subcutaneous (Bilateral Lower Abdomen) Milla Diaz RN    1/2/2024 1240 Given 4 Units Subcutaneous (Bilateral Lower Abdomen) Milla Diaz RN    1/2/2024 1001 Given 2 Units Subcutaneous (Bilateral Lower Abdomen) Milla Diaz RN          insulin detemir (Levemir) 100 UNIT/ML FlexPen/FlexTouch 25 Units       Date Action Dose Route User    1/3/2024 0908 Given 25 Units Subcutaneous (Right Upper Arm) Shayla Butts, RN

## 2024-01-03 NOTE — PROGRESS NOTES
[facility]    Cardiology Progress Note    Terrence Vines Patient Status:  Inpatient    1950 MRN [id number]   Location [facility] 2W/SW Attending Bradley Dash MD   Hosp Day # 16 PCP Victor Manuel Cleaning MD       Impression/Plan:  73 year old male presenting with:     Acute hypoxemic respiratory failure, multifactorial  Influenza A  Acute on Chronic HFrEF (EF 45-50% 2023)  Ischemic Cardiomyopathy   ESRD on HD  CAD s/p CABG 2021  HTN, normotensive today  HL,. On statin  DM     - s/p RHC showing euvolemia. Discussed findings with primary team and renal to establish patient's dry weight. Discussed results with son, Luis Vines.  - Influenza A management as per primary  - HD MWF as per nephrology  - Cont asa, statin, bb, hydralazine; Switch Lopressor to Toprol 200mg daily. No ACEi/ARB/ARNI due to renal issues.  - Monitor on tele    Cardiology will sign off. Please have patient follow up with Dr. Hay Bautista in clinic.      Subjective:     Awake, but confused. Underwent RHC showing euvolemia with normal right and left sided filling pressures and normal cardiac index.      Patient Active Problem List   Diagnosis    Moderate nonproliferative diabetic retinopathy without macular edema associated with type 2 diabetes mellitus (HCC)    Benign prostatic hyperplasia    Type 2 diabetes mellitus with diabetic polyneuropathy, without long-term current use of insulin (HCC)    Vitamin D deficiency    Type 2 diabetes mellitus with microalbuminuria  (HCC)    Type 2 diabetes mellitus with nephropathy (HCC)    Combined hyperlipidemia associated with type 2 diabetes mellitus  (HCC)    Hypertension associated with type 2 diabetes mellitus  (HCC)    Lower extremity edema    Iron deficiency anemia    CKD (chronic kidney disease) stage 4, GFR 15-29 ml/min (HCC)    Acute pulmonary edema (HCC)    Acute on chronic congestive heart failure, unspecified heart failure type (HCC)    Acute respiratory failure with  hypoxia (HCC)    Coronary artery disease involving native coronary artery of native heart    Cerebrovascular accident (CVA) due to bilateral embolism of middle cerebral arteries (HCC)    Preoperative testing    S/P CABG (coronary artery bypass graft)    Acute renal failure superimposed on chronic kidney disease  (HCC)    Acute renal failure superimposed on chronic kidney disease, unspecified CKD stage, unspecified acute renal failure type    Stage 5 chronic kidney disease not on chronic dialysis (HCC)    Hypernatremia    Acute kidney injury (HCC)    Anemia    ESRD on hemodialysis (HCC)    Facial swelling    Rhinovirus infection    Thyroid nodule    Pulmonary nodules    Elevated BUN    Subacute cough    Dialysis AV fistula malfunction (HCC)    Type 2 diabetes mellitus with hyperglycemia, with long-term current use of insulin (HCC)    Anemia, unspecified type    Thrombocytopenia (HCC)    ESRD on dialysis (HCC)    Delirium due to another medical condition       Objective:   Temp: 98.2 °F (36.8 °C)  Pulse: 95  Resp: 16  BP: 161/66    Intake/Output:     Intake/Output Summary (Last 24 hours) at 1/3/2024 1619  Last data filed at 1/3/2024 1000  Gross per 24 hour   Intake 0 ml   Output 2000 ml   Net -2000 ml       Last 3 Weights   01/03/24 1515 213 lb 3 oz (96.7 kg)   01/02/24 1055 215 lb (97.5 kg)   01/02/24 0415 214 lb 12.8 oz (97.4 kg)   01/01/24 0401 210 lb 1.6 oz (95.3 kg)   12/31/23 0532 212 lb (96.2 kg)   12/30/23 0518 218 lb 1.6 oz (98.9 kg)   12/29/23 0446 219 lb 8 oz (99.6 kg)   12/27/23 1400 242 lb (109.8 kg)   12/18/23 1258 242 lb (109.8 kg)   11/27/23 1951 252 lb (114.3 kg)   11/13/23 0700 245 lb 4.8 oz (111.3 kg)   11/12/23 0519 244 lb 8 oz (110.9 kg)   11/11/23 1300 246 lb 14.4 oz (112 kg)   11/11/23 0539 238 lb 1.6 oz (108 kg)   11/08/23 1410 234 lb (106.1 kg)   11/07/23 0619 234 lb 1.6 oz (106.2 kg)   11/06/23 1744 239 lb (108.4 kg)       Tele: SR/SB    Physical Exam:    General: awake, alert, oriented x  0  HEENT: Normocephalic, anicteric sclera  Neck: supple  Cardiac: Regular rate and rhythm. S1, S2 normal. No murmur,   Lungs: coarse breath sounds bilat  Abdomen: Soft, non-distended  Extremities: no edema  Skin: Warm and dry.     Laboratory/Data:    Labs:         Recent Labs   Lab 12/30/23  0605 12/31/23  0603 01/01/24  0648 01/02/24  0607 01/03/24  0847   WBC 12.5* 12.1* 12.6* 9.7 10.5   HGB 11.2* 10.7* 10.7* 10.1* 10.1*   MCV 95.8 96.9 97.5 96.2 95.2   .0 194.0 194.0 170.0 184.0       Recent Labs   Lab 12/28/23  0342 12/29/23  0628 12/30/23  0605 12/31/23  0603 01/02/24  0607 01/03/24  0847    136 136 137 134* 134*   K 4.5 4.7 4.7 4.3 4.2 3.9   CL 98 97* 97* 95* 98 98   CO2 31.0 28.0 31.0 31.0 30.0 27.0   BUN 44* 71* 49* 72* 55* 31*   CREATSERUM 6.33* 8.85* 6.62* 9.05* 7.16* 4.95*   CA 8.5* 8.7 8.9 9.2 9.1 9.5   MG 2.3 2.6 2.4  --   --   --    PHOS 6.7* 8.8* 5.4* 6.2* 3.8 3.1   * 211* 218* 199* 232* 202*       Recent Labs   Lab 12/29/23  0628 12/30/23  0605 12/31/23  0603 01/02/24  0607 01/03/24  0847   ALB 4.0 4.2 4.3 4.2 4.4       No results for input(s): \"TROP\" in the last 168 hours.    ECHO 11/23:  1. Left ventricle: The cavity size was normal. Wall thickness was normal.      Systolic function was mildly reduced. The estimated ejection fraction was      45-50%, by visual assessment. Although no diagnostic regional wall motion      abnormality was identified, this possibility cannot be completely      excluded on the basis of this study.   2. Mitral valve: There was mild regurgitation.   3. Pulmonary arteries: Systolic pressure was within the normal range,      estimated to be 30mm Hg.   4. Pericardium, extracardiac: There was no pericardial effusion.     RHC:  HEMODYNAMICS:  RA 7/7/7 mmHg  RV 30/15/16 mmHg  PCWP 10/10/10 mmHg  PA 30/25/28 mmHg     Thiago Cardiac Output 5.4 L/min  Thiago Cardiac Index 2.5 L/min/m2     Thermo Cardiac Output 4.8 L/min  Thermo Cardiac Index 2.2 L/min/m2     PVR  3.3 Woods U  SVR 1355 Dynes     OXIMETRY:  PA saturation: 52.4 %  Aortic saturation: 91.0 %    Allergies:   No Known Allergies        Rasheed Morelos MD  01/03/24

## 2024-01-03 NOTE — PROCEDURES
[facility] Cardiology  Cardiac Catheterization Report    : Rasheed Morelos MD    PREOPERATIVE DIAGNOSIS: ESRD, CHF    POSTOPERATIVE DIAGNOSIS: ESRD    PROCEDURE PERFORMED: Right heart catheterization (CPT code 82028)    CONSENT: The risks, benefits, and alternatives of right heart catheterization were discussed. The risks included, but were not limited to: bleeding, pulmonary embolus, pulmonary infarction, cardiac tamponade, and death. Benefits of the procedure included: symptomatic improvement, diagnosis of heart disease. Alternatives to the procedure included: not performing cardiac catheterization, treatment with medications only, and observation.     DESCRIPTION OF PROCEDURE: The patient was brought to the cardiac catheterization laboratory.  10 mL of 1% lidocaine were injected into the right antecubital fossa for local anesthesia.     Once local anesthesia was achieved, sedation was not administered. IV sedation was not given due to patient status.    A 5F Terumo glidesheath slender was inserted into the brachial vein under ultrasound guidance with the modified Seldinger technique. A 5F [place] was advanced under fluoroscopic guidance. All waveforms were measured and PA saturations were obtained. The swan was removed without complications. Hemostasis was achieved with manual pressure.    FINDINGS:    HEMODYNAMICS:  RA 7/7/7 mmHg  RV 30/15/16 mmHg  PCWP 10/10/10 mmHg  PA 30/25/28 mmHg    Thiago Cardiac Output 5.4 L/min  Thiago Cardiac Index 2.5 L/min/m2    Thermo Cardiac Output 4.8 L/min  Thermo Cardiac Index 2.2 L/min/m2    PVR 3.3 Woods U  SVR 1355 Dynes      OXIMETRY:  PA saturation: 52.4 %  Aortic saturation: 91.0 %    CONCLUSIONS:  Normal right sided filling pressures.  Normal left sided filling pressure (PCWP 10 mmHg)  Mild pulmonary HTN consistent with pre-capillary HTN (mPAP 28 mmHg, TPG 18 mmHg, PVR > 3 Woods unit)    Communicated results with primary team and nephrology service.    Rasheed  MD Jethro  1/3/2024  2:15 PM

## 2024-01-04 PROBLEM — F39 EPISODIC MOOD DISORDER (HCC): Status: ACTIVE | Noted: 2024-01-01

## 2024-01-04 PROBLEM — F39 EPISODIC MOOD DISORDER (HCC): Status: ACTIVE | Noted: 2024-01-04

## 2024-01-04 LAB
ALBUMIN SERPL-MCNC: 4.3 G/DL (ref 3.2–4.8)
ANION GAP SERPL CALC-SCNC: 11 MMOL/L (ref 0–18)
BUN BLD-MCNC: 53 MG/DL (ref 9–23)
BUN/CREAT SERPL: 7.3 (ref 10–20)
CALCIUM BLD-MCNC: 9.4 MG/DL (ref 8.7–10.4)
CHLORIDE SERPL-SCNC: 96 MMOL/L (ref 98–112)
CO2 SERPL-SCNC: 28 MMOL/L (ref 21–32)
CREAT BLD-MCNC: 7.25 MG/DL
EGFRCR SERPLBLD CKD-EPI 2021: 7 ML/MIN/1.73M2 (ref 60–?)
GLUCOSE BLD-MCNC: 216 MG/DL (ref 70–99)
GLUCOSE BLDC GLUCOMTR-MCNC: 175 MG/DL (ref 70–99)
GLUCOSE BLDC GLUCOMTR-MCNC: 232 MG/DL (ref 70–99)
GLUCOSE BLDC GLUCOMTR-MCNC: 256 MG/DL (ref 70–99)
GLUCOSE BLDC GLUCOMTR-MCNC: 277 MG/DL (ref 70–99)
OSMOLALITY SERPL CALC.SUM OF ELEC: 301 MOSM/KG (ref 275–295)
PHOSPHATE SERPL-MCNC: 3.7 MG/DL (ref 2.4–5.1)
POTASSIUM SERPL-SCNC: 4.2 MMOL/L (ref 3.5–5.1)
SODIUM SERPL-SCNC: 135 MMOL/L (ref 136–145)

## 2024-01-04 PROCEDURE — 99232 SBSQ HOSP IP/OBS MODERATE 35: CPT | Performed by: NURSE PRACTITIONER

## 2024-01-04 RX ORDER — ESCITALOPRAM OXALATE 5 MG/1
5 TABLET ORAL EVERY MORNING
Status: DISCONTINUED | OUTPATIENT
Start: 2024-01-04 | End: 2024-01-05

## 2024-01-04 NOTE — PHYSICAL THERAPY NOTE
PHYSICAL THERAPY TREATMENT NOTE - INPATIENT     Room Number: 341/341-A       Presenting Problem: acute respiratory failure    Problem List  Principal Problem:    Acute respiratory failure with hypoxia (HCC)  Active Problems:    Thrombocytopenia (HCC)    ESRD on dialysis (HCC)    Delirium due to another medical condition    Episodic mood disorder (HCC)      PHYSICAL THERAPY ASSESSMENT   Chart reviewed. RN Yanet approved participation in physical therapy. PPE worn by therapist: mask and gloves. Patient was not wearing a mask during session. Patient presented in bed with 0/10 pain. Patient with good  progress towards goals during this session. Education provided on Physical therapy plan of care and physiological benefits of out of bed mobility. Patient with good carryover. Pt is received sitting EOB with RN. RN assisted with transferring pt to the bathroom. Pt sat EOB for a few minutes and denied any dizziness and light headedness. Pt is min A with sit<>stand transfers with the RW. Pt is cued for proper hand placement for safety. Pt was able to take a few steps to the chair with the RW min A. Pt pt then was assisted into the bathroom. Pt then was min A with AMB to the  side chair from the bathroom. Pt AMB about 10' with the RW min A. Pt with slow shuffling steps. Pt does fatigue quickly with activity. Pt is left in the chair with all needs within reach and alarm system activated. Pt is on track to dc to [facility] once medically cleared. Handed pt off to the PCT in the room. Reported to the RN on the status of the pt.     Bed mobility:  NT  Transfers: Min assist  Gait Assistance: Minimum assistance  Distance (ft): SPT/10'  Assistive Device: Rolling walker  Pattern: Shuffle          . Patient was left in bedside chair and alarm activated at end of session with all needs in reach. The patient's Approx Degree of Impairment: 57.7% has been calculated based on documentation in the [facility] '6 clicks' Inpatient Basic Mobility Short  Form.  Research supports that patients with this level of impairment may benefit from Subacute Rehab. RN aware of patient status post session.    DISCHARGE RECOMMENDATIONS  PT Discharge Recommendations: Sub-acute rehabilitation     PLAN  PT Treatment Plan: Bed mobility;Body mechanics;Coordination;Endurance;Patient education;Gait training;Strengthening;Transfer training;Balance training    SUBJECTIVE  Pt was agreeable to therapy session.         OBJECTIVE  Precautions: Bed/chair alarm    WEIGHT BEARING RESTRICTION  Weight Bearing Restriction: None                PAIN ASSESSMENT   Ratin  Location: denies at this time  Management Techniques: Activity promotion;Body mechanics;Relaxation;Repositioning    BALANCE                                                                                                                       Static Sitting: Fair  Dynamic Sitting: Fair           Static Standing: Poor +  Dynamic Standing: Poor +    ACTIVITY TOLERANCE                         O2 WALK       AM-PAC '6-Clicks' INPATIENT SHORT FORM - BASIC MOBILITY  How much difficulty does the patient currently have...  Patient Difficulty: Turning over in bed (including adjusting bedclothes, sheets and blankets)?: A Lot   Patient Difficulty: Sitting down on and standing up from a chair with arms (e.g., wheelchair, bedside commode, etc.): A Little   Patient Difficulty: Moving from lying on back to sitting on the side of the bed?: A Lot   How much help from another person does the patient currently need...   Help from Another: Moving to and from a bed to a chair (including a wheelchair)?: A Little   Help from Another: Need to walk in hospital room?: A Little   Help from Another: Climbing 3-5 steps with a railing?: A Lot     AM-PAC Score:  Raw Score: 15   Approx Degree of Impairment: 57.7%   Standardized Score (AM-PAC Scale): 39.45   CMS Modifier (G-Code): CK        Patient End of Session: Up in chair;Needs met;Call light within reach;RN  aware of session/findings;All patient questions and concerns addressed;Alarm set    CURRENT GOALS   Goals to be met by: 1/5/24  Patient Goal Patient's self-stated goal is: to sleep   Goal #1 Patient is able to demonstrate supine - sit EOB @ level: supervision     Goal #1   Current Status NT received with RN EOB   Goal #2 Patient is able to demonstrate transfers Sit to/from Stand at assistance level: supervision with walker - rolling     Goal #2  Current Status Min A with the RW   Goal #3 Patient is able to ambulate 40 feet with assist device: walker - rolling at assistance level: minimum assistance   Goal #3   Current Status SPT/10' with the RW min A    Goal #4    Goal #4   Current Status              Therapeutic Activity: 30 minutes

## 2024-01-04 NOTE — PROGRESS NOTES
Patient is a 73 year old  male with past medical history of BPH, CHF, HTN,HLD, ESRD on HD who was admitted to the hospital for Acute respiratory failure with hypoxia (HCC): The patient has been demonstrating confusion and restlessness. Patient indicated for psych consult for evaluation and advise. Patient was seen by Dr. Morrow for initial consult.     Consult Duration   The patient seen for over 50-minute, follow-up evaluation, over 50% counseling and coordinating care addressing alternation in his mood and cognition.    Record reviewed, communication with attending, communication with RN and patient seen face to face evaluation.    History of Present Illness:   Communicating with the team, the patient has not been demonstrating any odd or concerning behaviors. Patient has been demonstrating improvement in his mood and cognition otherwise he continues to demonstrate some forgetfulness at times.     The patient receiving no psychotropic medications.     Labs and imaging reviewed: Glucose 216, sodium 135, BUN 53, creatinine 7.25. CT head did not demonstrate any acute findings.     The patient seen today sitting in hospital recliner. The patient presents calm, cooperative and pleasant. He is not demonstrating any restlessness or agitation.    The patient is alert and oriented to person, place and date with prompting.      The patient answers questions and engages in conversation otherwise he continues to demonstrate some impairment in his cognition and thought process. Patient demonstrating some confusion.     The patient stating that it is December, 2024 and that he is in the hospital. He notes that he is in the hospital for dialysis.     The patient reports that his mood has been \"okay\" otherwise he admits to some episodes of sadness, low mood, low energy, helplessness, hopelessness with some increased anxiety and worry.    He states that he has been having problems with his legs and getting around. He  reports feeling sad about his loss of independence.     The patient is not demonstrating any shahriar or psychosis  The patient denies auditory or visual hallucinations  The patient denies suicidal or homicidal ideation.    The patient has been demonstrating some increased confusion and restlessness at times. He has not demonstrated any agitation or concerning behaviors. According to the team, the patient has been demonstrating improvement. The patient today endorsing some increased anxiety and depressive symptoms with some feelings of low mood, low energy, hopelessness, helplessness, increased anxiety and worry. He is agreeable with the initiation of lexapro.     Past Psychiatric/Medication History:  1. Prior diagnoses: none reported  2. Past psychiatric inpatient: none reported  3. Past outpatient history: none reported  4. Past suicide history: none reported  5. Medication history: none reported    Social History:   Patient states that he lives with his wife and that he has four children. 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     The patient denies any alcohol, tobacco, cannabis or illicit substance abuse.     Family History:  No family history reported.   Medical History:   Past Medical History  Past Medical History:   Diagnosis Date    BPH (benign prostatic hypertrophy)     Cataract     Congestive heart disease (HCC)     Coronary atherosclerosis     Diabetic retinopathy (HCC)     D[facility] patient ([facility])     M,W,F,    Esophageal reflux     Heart valve disease     High blood pressure     High cholesterol     HLD (hyperlipidemia)     HTN (hypertension)     Neuropathy     Proteinuria     Renal disorder     HD every MWF with temporary HD cath    Type II diabetes mellitus (HCC)     Visual impairment     Reading glasses       Past Surgical History  Past Surgical History:   Procedure Laterality Date    CABG  06/21/2021    x3-lima-->LAD, SVG-->diag and SVG-->OM    CATARACT Right     COLONOSCOPY  N/A 2023    Procedure: COLONOSCOPY;  Surgeon: Cullen Bautista MD;  [facility]: [facility] ENDOSCOPY       Family History  Family History   Problem Relation Age of Onset    No Known Problems Father     No Known Problems Mother     No Known Problems Daughter     No Known Problems Son     No Known Problems Son     No Known Problems Son     No Known Problems Sister     No Known Problems Brother        Social History  Social History     Socioeconomic History    Marital status:    Tobacco Use    Smoking status: Never    Smokeless tobacco: Never   Vaping Use    Vaping Use: Never used   Substance and Sexual Activity    Alcohol use: Never    Drug use: Never     Social Determinants of Health     Food Insecurity: No Food Insecurity (2023)    Food Insecurity     Food Insecurity: Never true   Transportation Needs: No Transportation Needs (2023)    Transportation Needs     Lack of Transportation: No   Housing Stability: Low Risk  (2023)    Housing Stability     Housing Instability: No           Current Medications:      Medications Prior to Admission   Medication Sig    Magnesium 500 MG Oral Tab Take 1 tablet (500 mg total) by mouth daily.    cyanocobalamin 250 MCG Oral Tab Take 1 tablet (250 mcg total) by mouth daily.    Coenzyme Q10 (CO Q-10) 100 MG Oral Chew Tab Chew 100 mg by mouth daily.    [] albuterol 108 (90 Base) MCG/ACT Inhalation Aero Soln Inhale 2 puffs into the lungs every 4 (four) hours as needed for Wheezing.    aspirin 81 MG Oral Tab EC Take 1 tablet (81 mg total) by mouth daily.    rosuvastatin 20 MG Oral Tab     Sevelamer 800 MG Oral Tab 3 (three) times daily with meals.    insulin glargine (BASAGLAR KWIKPEN) 100 UNIT/ML Subcutaneous Solution Pen-injector Inject 25 Units into the skin every morning.    metoprolol succinate ER 50 MG Oral Tablet 24 Hr Take 1 tablet (50 mg total) by mouth in the morning and 1 tablet (50 mg total) before bedtime.    hydrALAZINE 50 MG Oral Tab Take 1  tablet (50 mg total) by mouth 3 (three) times daily. (Patient taking differently: Take 1 tablet (50 mg total) by mouth 3 (three) times daily. Does not take before dialysis)    FINASTERIDE 5 MG Oral Tab TAKE 1 TABLET(5 MG) BY MOUTH DAILY (Patient taking differently: Take 1 tablet (5 mg total) by mouth every morning. TAKE 1 TABLET(5 MG) BY MOUTH DAILY)    multivitamin Oral Tab Take 1 tablet by mouth daily.    Insulin Lispro, 1 Unit Dial, (HUMALOG KWIKPEN) 100 UNIT/ML Subcutaneous Solution Pen-injector Inject 14 Units into the skin 3 (three) times daily. (Patient taking differently: Inject 15 Units into the skin 3 (three) times daily. Sliding scale)    acetaminophen 500 MG Oral Tab Take 2 tablets (1,000 mg total) by mouth as needed.    gabapentin 100 MG Oral Cap Take 1 capsule (100 mg total) by mouth 2 (two) times daily. (Patient not taking: Reported on 12/18/2023)    Glucose Blood (ONETOUCH ULTRA) In Vitro Strip Test Blood Sugar 4 Times Daily. Insulin Dependent Diabetes Type II. Z79.4, E11.9.    OneTouch UltraSoft Lancets Does not apply Misc Test Blood Sugar Once Daily. Non-Insulin Dependent Diabetes Type II. E11.9.    Continuous Blood Gluc Sensor (DEXCOM G6 SENSOR) Does not apply Misc 1 each by Does not apply route Every 10 days.    Insulin Pen Needle (TRUEPLUS PEN NEEDLES) 32G X 4 MM Does not apply Misc Use 1 needle 4 times a day    tamsulosin (FLOMAX) cap TAKE 2 CAPSULES(0.8 MG) BY MOUTH DAILY (Patient not taking: Reported on 12/18/2023)    Blood Glucose Monitoring Suppl (ONETOUCH ULTRA MINI) w/Device Does not apply Kit Test Blood Sugar Once Daily. Non-Insulin Dependent Diabetes Type II. E11.9.       Allergies  No Known Allergies    Review of Systems:   As by Admitting/Attending    Results:   Laboratory Data:  Lab Results   Component Value Date    WBC 10.5 01/03/2024    HGB 10.1 (L) 01/03/2024    HCT 31.5 (L) 01/03/2024    .0 01/03/2024    CREATSERUM 7.25 (H) 01/04/2024    BUN 53 (H) 01/04/2024     (L)  01/04/2024    K 4.2 01/04/2024    CL 96 (L) 01/04/2024    CO2 28.0 01/04/2024     (H) 01/04/2024    CA 9.4 01/04/2024    ALB 4.3 01/04/2024    ALKPHO 63 11/06/2023    TP 6.2 11/06/2023    AST 12 11/06/2023    ALT 13 11/06/2023    PTT 28.4 09/06/2022    INR 1.02 09/06/2022    PTP 13.3 09/06/2022    TSH 1.620 06/21/2021     (H) 09/20/2023    PSA 1.80 02/06/2020    DDIMER 0.33 05/09/2022    MG 2.4 12/30/2023    PHOS 3.7 01/04/2024    TROP <0.045 06/02/2021         Imaging:  No results found.    Vital Signs:   Blood pressure 120/72, pulse 88, temperature 98.3 °F (36.8 °C), temperature source Oral, resp. rate 18, weight 98.7 kg (217 lb 9.5 oz), SpO2 94%.    Mental Status Exam:   Appearance: Stated age male, in hospital gown, laying down in hospital bed.  Psychomotor: No psychomotor agitation, or retardation.   Orientation: Alert and oriented to person, place   Gait: Not evaluated.  Attitude/Coorperation: Cooperative and attentive.  Behavior: Appropriate.  Speech: Regular rate and rhythm speech.  Mood: patient reporting some low mood, low energy, hopelessness, helplessness.   Affect: restricted  Thought process: confused  Thought content: Patient denies any suicidal or homicidal ideation.  Perceptions: Patient denies any auditory or visual hallucinations.  Concentration: impaired  Memory: impaired  Intellect: Average.  Judgment and Insight: Questionable.     Impression:     Delirium due to another medical condition  Episodic mood disorder  Acute respiratory failure with hypoxia (HCC)  Thrombocytopenia (HCC)  ESRD on dialysis (HCC)    Patient is a 73 year old  male with past medical history of BPH, CHF, HTN,HLD, ESRD on HD who was admitted to the hospital for Acute respiratory failure with hypoxia (HCC): The patient has been demonstrating confusion and restlessness. Patient indicated for psych consult for evaluation and advise. Patient was seen by Dr. Morrow for initial consult.     The patient has  been demonstrating some increased confusion and restlessness at times. He has not demonstrated any agitation or concerning behaviors. According to the team, the patient has been demonstrating improvement. The patient today endorsing some increased anxiety and depressive symptoms with some feelings of low mood, low energy, hopelessness, helplessness, increased anxiety and worry. He is agreeable with the initiation of lexapro.    Discussed risk and benefit, acknowledging the current symptom and severity.  At this point, I would recommend the following approach:     Focus on safety  Focus on education and support.  Focus on insight orientation helping the patient understand diagnosis and treatment plan  Start lexapro 5 mg daily  Coordinate plan with team    Orders This Visit:  Orders Placed This Encounter   Procedures    CBC With Differential With Platelet    Basic Metabolic Panel (8)    Urinalysis, Routine    BNP (Brain Natriuretic Peptide)    Troponin I (High Sensitivity)    Basic Metabolic Panel (8)    Magnesium    CBC With Differential With Platelet    Lipid Panel    Phosphorus    Troponin I (High Sensitivity)    Phosphorus    Arterial blood gas    Phosphorus    Expanded Arterial Blood Gas    Expanded Arterial Blood Gas    Arterial blood gas    Expanded Arterial Blood Gas    Procalcitonin    Arterial blood gas    Renal Function Panel    Arterial blood gas    Magnesium    Magnesium    Hepatitis B Profile    Hep B Core Ab, IGM    Magnesium    IVS PROCEDURE LOG SCAN    SARS-CoV-2/Flu A and B/RSV by PCR (GeneXpert)       Meds This Visit:  Requested Prescriptions      No prescriptions requested or ordered in this encounter       Mesha Santos, APRN  1/4/2024    Note to Patient: The 21st Century Cures Act makes medical notes like these available to patients in the interest of transparency. However, be advised this is a medical document. It is intended as peer to peer communication. It is written in medical language and may  contain abbreviations or verbiage that are unfamiliar. It may appear blunt or direct. Medical documents are intended to carry relevant information, facts as evident, and the clinical opinion of the practitioner. This note may have been transcribed using a voice dictation system. Voice recognition errors may occur. This should not be taken to alter the content or meaning of this note.

## 2024-01-04 NOTE — PROGRESS NOTES
NEPHROLOGY DAILY PROGRESS NOTE     SUBJECTIVE:  No new complaints today    Denies SOB or swelling      OBJECTIVE:    Total Intake/Output:    Intake/Output Summary (Last 24 hours) at 1/4/2024 1013  Last data filed at 1/4/2024 0818  Gross per 24 hour   Intake 60 ml   Output --   Net 60 ml         PHYSICAL EXAM:  /72 (BP Location: Right arm)   Pulse 88   Temp 98.3 °F (36.8 °C) (Oral)   Resp 18   Wt 217 lb 9.5 oz (98.7 kg)   SpO2 94%   BMI 29.51 kg/m²     GEN: NAD  HEENT: NCAT    CHEST: CTAB   CARDIAC: S1S2 normal  ABD: soft, NT/ND  EXT:  no lower ext edema  NEURO: awake, alert   SKIN: warm, dry   DIALYSIS ACCESS: LUE AVF + bruit and thrill      CURRENT MEDICATIONS:   aspirin  81 mg Per NG Tube Daily    cyanocobalamin  250 mcg Oral Daily    heparin  5,000 Units Subcutaneous Q8H YOLANDA    insulin aspart  1-5 Units Subcutaneous TID CC    insulin detemir  25 Units Subcutaneous Daily    lansoprazole  30 mg Per NG Tube QAM AC    magnesium oxide  500 mg Oral Daily    tamsulosin  0.8 mg Oral Daily    metoprolol succinate ER  50 mg Oral Daily Beta Blocker    melatonin  3 mg Oral Nightly    sevelamer carbonate  1,600 mg Oral TID CC    finasteride  5 mg Per NG Tube Daily    hydrALAZINE  50 mg Oral TID    gabapentin  100 mg Oral BID    rosuvastatin  10 mg Oral Nightly             LABS:  Patient Labs Reviewed in Detail. Pertinent Labs as follows:  Recent Labs   Lab 01/02/24  0607 01/03/24  0847 01/04/24  0743   * 202* 216*   BUN 55* 31* 53*   CREATSERUM 7.16* 4.95* 7.25*   EGFRCR 7* 12* 7*   CA 9.1 9.5 9.4   * 134* 135*   K 4.2 3.9 4.2   CL 98 98 96*   CO2 30.0 27.0 28.0     Recent Labs   Lab 01/01/24  0648 01/02/24  0607 01/03/24  0847   RBC 3.54* 3.19* 3.31*   HGB 10.7* 10.1* 10.1*   HCT 34.5* 30.7* 31.5*   MCV 97.5 96.2 95.2   MCH 30.2 31.7 30.5   MCHC 31.0 32.9 32.1   RDW 15.0 14.6 14.7   WBC 12.6* 9.7 10.5   .0 170.0 184.0       IMAGING:  No results found.     ASSESSMENT AND PLAN:   This is a 73  year old male with PMH sig for ESRD on HD MWF, HTN, HLD, DM2, CAD s/p CABG x3, ischemic cardiomyopathy. Presents with SOB and cough. Found to be influenza positive. Nephrology is consulted for dialysis.      ESRD on HD MWF   - received extra session of dialysis 1/2   - dialysis planned for today    - will need dialysis again tomorrow to transition back to MWF dialysis schedule.   - new EDW 96.7 kg, will check post dialysis standing weight today.   - Followed by [facility]     Acute hypoxic respiratory failure  - CXR with pulmonary vascular changes, also influenza A postivie    - UF with HD as tolerated  - improved,  now on RA     Hyperkalemia   - resolved   - follow K level      Hyperphosphatemia   - improved  - Nepro feeds  - Sevelamer 1600mg tid   - follow phos level     Hypertension   - Hydralazine, metoprolol    - Hold BP meds before dialysis     Anemia   - trend Hb, Hb at goal    - receiving OMAIRA with outpatient dialysis      Dw Dr. Castillo and RN     Hue Galvan MD  [facility]

## 2024-01-04 NOTE — PLAN OF CARE
Problem: Patient Centered Care  Goal: Patient preferences are identified and integrated in the patient's plan of care  Description: Interventions:  - What would you like us to know as we care for you? I live at home with family. I'm from [place]  - Provide timely, complete, and accurate information to patient/family  - Incorporate patient and family knowledge, values, beliefs, and cultural backgrounds into the planning and delivery of care  - Encourage patient/family to participate in care and decision-making at the level they choose  - Honor patient and family perspectives and choices  Outcome: Progressing     Problem: Diabetes/Glucose Control  Goal: Glucose maintained within prescribed range  Description: INTERVENTIONS:  - Monitor Blood Glucose as ordered  - Assess for signs and symptoms of hyperglycemia and hypoglycemia  - Administer ordered medications to maintain glucose within target range  - Assess barriers to adequate nutritional intake and initiate nutrition consult as needed  - Instruct patient on self management of diabetes  Outcome: Progressing     Problem: Patient/Family Goals  Goal: Patient/Family Long Term Goal  Description: Patient's Long Term Goal: go home    Interventions:  - Monitor vital signs  - Monitor appropriate labs  - Monitor blood glucose levels  - Administer medications per order  - Pain management as needed  - Follow MD orders  - Diagnostics per orders  - Dialysis per orders  - Update / inform patient and family on plan of care  - Discharge planning     - See additional Care Plan goals for specific interventions  Outcome: Progressing  Goal: Patient/Family Short Term Goal  Description: Patient's Short Term Goal: To breathe better    Interventions:   - RT treatment  -O2  -Follow md orders  - See additional Care Plan goals for specific interventions  Outcome: Progressing     Problem: CARDIOVASCULAR - ADULT  Goal: Maintains optimal cardiac output and hemodynamic stability  Description:  INTERVENTIONS:  - Monitor vital signs, rhythm, and trends  - Monitor for bleeding, hypotension and signs of decreased cardiac output  - Evaluate effectiveness of vasoactive medications to optimize hemodynamic stability  - Monitor arterial and/or venous puncture sites for bleeding and/or hematoma  - Assess quality of pulses, skin color and temperature  - Assess for signs of decreased coronary artery perfusion - ex. Angina  - Evaluate fluid balance, assess for edema, trend weights  Outcome: Progressing  Goal: Absence of cardiac arrhythmias or at baseline  Description: INTERVENTIONS:  - Continuous cardiac monitoring, monitor vital signs, obtain 12 lead EKG if indicated  - Evaluate effectiveness of antiarrhythmic and heart rate control medications as ordered  - Initiate emergency measures for life threatening arrhythmias  - Monitor electrolytes and administer replacement therapy as ordered  Outcome: Progressing     Problem: RESPIRATORY - ADULT  Goal: Achieves optimal ventilation and oxygenation  Description: INTERVENTIONS:  - Assess for changes in respiratory status  - Assess for changes in mentation and behavior  - Position to facilitate oxygenation and minimize respiratory effort  - Oxygen supplementation based on oxygen saturation or ABGs  - Provide Smoking Cessation handout, if applicable  - Encourage broncho-pulmonary hygiene including cough, deep breathe, Incentive Spirometry  - Assess the need for suctioning and perform as needed  - Assess and instruct to report SOB or any respiratory difficulty  - Respiratory Therapy support as indicated  - Manage/alleviate anxiety  - Monitor for signs/symptoms of CO2 retention  Outcome: Progressing     Problem: METABOLIC/FLUID AND ELECTROLYTES - ADULT  Goal: Glucose maintained within prescribed range  Description: INTERVENTIONS:  - Monitor Blood Glucose as ordered  - Assess for signs and symptoms of hyperglycemia and hypoglycemia  - Administer ordered medications to maintain  glucose within target range  - Assess barriers to adequate nutritional intake and initiate nutrition consult as needed  - Instruct patient on self management of diabetes  Outcome: Progressing  Goal: Electrolytes maintained within normal limits  Description: INTERVENTIONS:  - Monitor labs and rhythm and assess patient for signs and symptoms of electrolyte imbalances  - Administer electrolyte replacement as ordered  - Monitor response to electrolyte replacements, including rhythm and repeat lab results as appropriate  - Fluid restriction as ordered  - Instruct patient on fluid and nutrition restrictions as appropriate  Outcome: Progressing  Goal: Hemodynamic stability and optimal renal function maintained  Description: INTERVENTIONS:  - Monitor labs and assess for signs and symptoms of volume excess or deficit  - Monitor intake, output and patient weight  - Monitor urine specific gravity, serum osmolarity and serum sodium as indicated or ordered  - Monitor response to interventions for patient's volume status, including labs, urine output, blood pressure (other measures as available)  - Encourage oral intake as appropriate  - Instruct patient on fluid and nutrition restrictions as appropriate  Outcome: Progressing     Problem: Delirium  Goal: Minimize duration of delirium  Description: Interventions:  - Encourage use of hearing aids, eye glasses  - Promote highest level of mobility daily  - Provide frequent reorientation  - Promote wakefulness i.e. lights on, blinds open  - Promote sleep, encourage patient's normal rest cycle i.e. lights off, TV off, minimize noise and interruptions  - Encourage family to assist in orientation and promotion of home routines  Outcome: Progressing

## 2024-01-04 NOTE — PROGRESS NOTES
[facility] Hospitalist Progress Note     CC: Hospital Follow up    PCP: Victor Manuel Cleaning MD       Assessment/Plan:     Mr. Vines is a 73 year old male with PMH BPH , CHF / ICM EF 30% , CAD s/p CABG x3, Moderate MR, HTN, HLD, ESRD on HD M/W/F, Anemia, Gastritis, who presents with SOB. Admitted for fluid overload and Influenza A.  Hypoxic and hypercapnic respiratory failure clinically improved, weaned off of oxygen, course further complicated by encephalopathy, slowly improving, S/P right heart cath (1/3). Accurate dry weight, is 96.7 kg     Acute Hypoxic Respiratory Failure  Pulmonary edema  ESRD on HD M/W/F  - positive influenza   - HD per renal  - s/p recent AV fistula repair by vascular surgery in September 2023  - steroid burst per pulmonary ended 12/24  - Transferred to medical floor on 12/28  - off O2, and completed Tamiflu course    Encephalopathy  - improving  - mobilize, needs therapy. He is very deconditioned   - alert and awake, following all commands, engaging in conversation  - CT brain 12/30, negative for acute pathology  - improving each day, seen by psychiatry whom agrees   - Lexapro added    Influenza A positive   - cough for over a month but SOB more acute  - renally dose Tamiflu completed   - Ceftriaxone and steroids started 12/22/23, now off     Troponin Elevation   - no chest pain   - EKG with LBBB  - cardiology on consult     CAD s/p CABG x3   Ischemic cardiomyopathy EF 30% without acute exacerbation of congestive heart failure  Chronic Systolic heart failure  HTN  - Continue metoprolol 100mg BID  - holding his hydralazine for now   - Not on ACE or ARB due to renal disease     Anemia   Thrombocytopenia   Gastritis   - s/p EGD 11/7 with some mild gastritis, no active bleeding  - s/p colonoscopy 11/8 with polyps but no active bleeding, will need to repeat colonoscopy as outpatient  - on prevacid here      Type 2 diabetes with hyperglycemia  Diabetic nephropathy  - gabapentin (hold for now)  - ISS, meal  time and basal insulin   - Controlled renal diet  - Continue home aspirin     Hyperlipidemia  - restarted his statin     BPH  - resume finasteride  - on flomax      Diet: Tolerating well diet     DVT Prophy: HSQ      Dispo: DC planning, OK to transfer to rehab tomorrow after HD, d/w son, he is flying in tomorrow in AM, and would like to assist facilitate the transfer to rehab tomorrow       Nacho Castillo MD  [facility] Hospitalist      Subjective:     Alert and awake this morning, feels good, oriented.  Still with a cough, nonproductive, no other complaints    OBJECTIVE:    Blood pressure 120/72, pulse 88, temperature 98.3 °F (36.8 °C), temperature source Oral, resp. rate 18, weight 217 lb 9.5 oz (98.7 kg), SpO2 94%.    Temp:  [97.1 °F (36.2 °C)-98.3 °F (36.8 °C)] 98.3 °F (36.8 °C)  Pulse:  [86-95] 88  Resp:  [16-18] 18  BP: (103-161)/(57-78) 120/72  SpO2:  [92 %-100 %] 94 %      Intake/Output:    Intake/Output Summary (Last 24 hours) at 1/4/2024 1054  Last data filed at 1/4/2024 0818  Gross per 24 hour   Intake 60 ml   Output --   Net 60 ml         Last 3 Weights   01/04/24 0430 217 lb 9.5 oz (98.7 kg)   01/03/24 1515 213 lb 3 oz (96.7 kg)   01/02/24 1055 215 lb (97.5 kg)   01/02/24 0415 214 lb 12.8 oz (97.4 kg)   01/01/24 0401 210 lb 1.6 oz (95.3 kg)   12/31/23 0532 212 lb (96.2 kg)   12/30/23 0518 218 lb 1.6 oz (98.9 kg)   12/29/23 0446 219 lb 8 oz (99.6 kg)   12/27/23 1400 242 lb (109.8 kg)   12/18/23 1258 242 lb (109.8 kg)   11/27/23 1951 252 lb (114.3 kg)   11/13/23 0700 245 lb 4.8 oz (111.3 kg)   11/12/23 0519 244 lb 8 oz (110.9 kg)   11/11/23 1300 246 lb 14.4 oz (112 kg)   11/11/23 0539 238 lb 1.6 oz (108 kg)   11/08/23 1410 234 lb (106.1 kg)   11/07/23 0619 234 lb 1.6 oz (106.2 kg)   11/06/23 1744 239 lb (108.4 kg)       Exam   General: awake  Lungs: clear   Heart: Regular rate and rhythm  Abdomen: soft, non tender  Extremities: No edema  Skin: no new rash, normal color  Psych: appropriate  affect     Data Review:       Labs:     Recent Labs   Lab 01/01/24  0648 01/02/24  0607 01/03/24  0847   RBC 3.54* 3.19* 3.31*   HGB 10.7* 10.1* 10.1*   HCT 34.5* 30.7* 31.5*   MCV 97.5 96.2 95.2   MCH 30.2 31.7 30.5   MCHC 31.0 32.9 32.1   RDW 15.0 14.6 14.7   WBC 12.6* 9.7 10.5   .0 170.0 184.0         Recent Labs   Lab 01/02/24  0607 01/03/24  0847 01/04/24  0743   * 202* 216*   BUN 55* 31* 53*   CREATSERUM 7.16* 4.95* 7.25*   EGFRCR 7* 12* 7*   CA 9.1 9.5 9.4   * 134* 135*   K 4.2 3.9 4.2   CL 98 98 96*   CO2 30.0 27.0 28.0       Recent Labs   Lab 12/30/23  0605 12/31/23  0603 01/02/24  0607 01/03/24  0847 01/04/24  0743   ALB 4.2 4.3 4.2 4.4 4.3         Imaging:  No results found.      Meds:      escitalopram  5 mg Oral QAM    aspirin  81 mg Per NG Tube Daily    cyanocobalamin  250 mcg Oral Daily    heparin  5,000 Units Subcutaneous Q8H YOLANDA    insulin aspart  1-5 Units Subcutaneous TID CC    insulin detemir  25 Units Subcutaneous Daily    lansoprazole  30 mg Per NG Tube QAM AC    magnesium oxide  500 mg Oral Daily    tamsulosin  0.8 mg Oral Daily    metoprolol succinate ER  50 mg Oral Daily Beta Blocker    melatonin  3 mg Oral Nightly    sevelamer carbonate  1,600 mg Oral TID CC    finasteride  5 mg Per NG Tube Daily    hydrALAZINE  50 mg Oral TID    gabapentin  100 mg Oral BID    rosuvastatin  10 mg Oral Nightly         acetaminophen, albuterol, hydrALAzine, ipratropium-albuterol, metoclopramide, traMADol, glucose **OR** glucose **OR** glucose-vitamin C **OR** dextrose **OR** glucose **OR** glucose **OR** glucose-vitamin C, polyethylene glycol (PEG 3350), sennosides, bisacodyl, ondansetron

## 2024-01-04 NOTE — PLAN OF CARE
Patient is from home w/ family. A&Ox4. Room air. X2 assist to rolling chair. Dialysis session today - see documentation and note. BP on the lower end during dialysis - no fluids removed. Bed in lowest position and locked. Call light in hand. Personal belongings w/in reach.     Problem: Patient Centered Care  Goal: Patient preferences are identified and integrated in the patient's plan of care  Description: Interventions:  - What would you like us to know as we care for you? I live at home with family. I'm from [place]  - Provide timely, complete, and accurate information to patient/family  - Incorporate patient and family knowledge, values, beliefs, and cultural backgrounds into the planning and delivery of care  - Encourage patient/family to participate in care and decision-making at the level they choose  - Honor patient and family perspectives and choices  Outcome: Progressing     Problem: Diabetes/Glucose Control  Goal: Glucose maintained within prescribed range  Description: INTERVENTIONS:  - Monitor Blood Glucose as ordered  - Assess for signs and symptoms of hyperglycemia and hypoglycemia  - Administer ordered medications to maintain glucose within target range  - Assess barriers to adequate nutritional intake and initiate nutrition consult as needed  - Instruct patient on self management of diabetes  Outcome: Progressing     Problem: Patient/Family Goals  Goal: Patient/Family Long Term Goal  Description: Patient's Long Term Goal: go home    Interventions:  - Monitor vital signs  - Monitor appropriate labs  - Monitor blood glucose levels  - Administer medications per order  - Pain management as needed  - Follow MD orders  - Diagnostics per orders  - Dialysis per orders  - Update / inform patient and family on plan of care  - Discharge planning     - See additional Care Plan goals for specific interventions  Outcome: Progressing  Goal: Patient/Family Short Term Goal  Description: Patient's Short Term Goal: To  breathe better    Interventions:   - RT treatment  -O2  -Follow md orders  - See additional Care Plan goals for specific interventions  Outcome: Progressing     Problem: CARDIOVASCULAR - ADULT  Goal: Maintains optimal cardiac output and hemodynamic stability  Description: INTERVENTIONS:  - Monitor vital signs, rhythm, and trends  - Monitor for bleeding, hypotension and signs of decreased cardiac output  - Evaluate effectiveness of vasoactive medications to optimize hemodynamic stability  - Monitor arterial and/or venous puncture sites for bleeding and/or hematoma  - Assess quality of pulses, skin color and temperature  - Assess for signs of decreased coronary artery perfusion - ex. Angina  - Evaluate fluid balance, assess for edema, trend weights  Outcome: Progressing  Goal: Absence of cardiac arrhythmias or at baseline  Description: INTERVENTIONS:  - Continuous cardiac monitoring, monitor vital signs, obtain 12 lead EKG if indicated  - Evaluate effectiveness of antiarrhythmic and heart rate control medications as ordered  - Initiate emergency measures for life threatening arrhythmias  - Monitor electrolytes and administer replacement therapy as ordered  Outcome: Progressing     Problem: RESPIRATORY - ADULT  Goal: Achieves optimal ventilation and oxygenation  Description: INTERVENTIONS:  - Assess for changes in respiratory status  - Assess for changes in mentation and behavior  - Position to facilitate oxygenation and minimize respiratory effort  - Oxygen supplementation based on oxygen saturation or ABGs  - Provide Smoking Cessation handout, if applicable  - Encourage broncho-pulmonary hygiene including cough, deep breathe, Incentive Spirometry  - Assess the need for suctioning and perform as needed  - Assess and instruct to report SOB or any respiratory difficulty  - Respiratory Therapy support as indicated  - Manage/alleviate anxiety  - Monitor for signs/symptoms of CO2 retention  Outcome: Progressing      Problem: METABOLIC/FLUID AND ELECTROLYTES - ADULT  Goal: Glucose maintained within prescribed range  Description: INTERVENTIONS:  - Monitor Blood Glucose as ordered  - Assess for signs and symptoms of hyperglycemia and hypoglycemia  - Administer ordered medications to maintain glucose within target range  - Assess barriers to adequate nutritional intake and initiate nutrition consult as needed  - Instruct patient on self management of diabetes  Outcome: Progressing  Goal: Electrolytes maintained within normal limits  Description: INTERVENTIONS:  - Monitor labs and rhythm and assess patient for signs and symptoms of electrolyte imbalances  - Administer electrolyte replacement as ordered  - Monitor response to electrolyte replacements, including rhythm and repeat lab results as appropriate  - Fluid restriction as ordered  - Instruct patient on fluid and nutrition restrictions as appropriate  Outcome: Progressing  Goal: Hemodynamic stability and optimal renal function maintained  Description: INTERVENTIONS:  - Monitor labs and assess for signs and symptoms of volume excess or deficit  - Monitor intake, output and patient weight  - Monitor urine specific gravity, serum osmolarity and serum sodium as indicated or ordered  - Monitor response to interventions for patient's volume status, including labs, urine output, blood pressure (other measures as available)  - Encourage oral intake as appropriate  - Instruct patient on fluid and nutrition restrictions as appropriate  Outcome: Progressing     Problem: Delirium  Goal: Minimize duration of delirium  Description: Interventions:  - Encourage use of hearing aids, eye glasses  - Promote highest level of mobility daily  - Provide frequent reorientation  - Promote wakefulness i.e. lights on, blinds open  - Promote sleep, encourage patient's normal rest cycle i.e. lights off, TV off, minimize noise and interruptions  - Encourage family to assist in orientation and promotion of  home routines  Outcome: Progressing

## 2024-01-05 VITALS
HEART RATE: 91 BPM | BODY MASS INDEX: 29 KG/M2 | WEIGHT: 212.38 LBS | DIASTOLIC BLOOD PRESSURE: 60 MMHG | RESPIRATION RATE: 20 BRPM | OXYGEN SATURATION: 92 % | SYSTOLIC BLOOD PRESSURE: 112 MMHG | TEMPERATURE: 97 F

## 2024-01-05 LAB
ALBUMIN SERPL-MCNC: 4.1 G/DL (ref 3.2–4.8)
ANION GAP SERPL CALC-SCNC: 8 MMOL/L (ref 0–18)
BUN BLD-MCNC: 35 MG/DL (ref 9–23)
BUN/CREAT SERPL: 6.8 (ref 10–20)
CALCIUM BLD-MCNC: 9 MG/DL (ref 8.7–10.4)
CHLORIDE SERPL-SCNC: 100 MMOL/L (ref 98–112)
CO2 SERPL-SCNC: 28 MMOL/L (ref 21–32)
CREAT BLD-MCNC: 5.14 MG/DL
EGFRCR SERPLBLD CKD-EPI 2021: 11 ML/MIN/1.73M2 (ref 60–?)
GLUCOSE BLD-MCNC: 203 MG/DL (ref 70–99)
GLUCOSE BLDC GLUCOMTR-MCNC: 139 MG/DL (ref 70–99)
GLUCOSE BLDC GLUCOMTR-MCNC: 218 MG/DL (ref 70–99)
OSMOLALITY SERPL CALC.SUM OF ELEC: 296 MOSM/KG (ref 275–295)
PHOSPHATE SERPL-MCNC: 2.5 MG/DL (ref 2.4–5.1)
POTASSIUM SERPL-SCNC: 4.1 MMOL/L (ref 3.5–5.1)
SODIUM SERPL-SCNC: 136 MMOL/L (ref 136–145)

## 2024-01-05 RX ORDER — INSULIN LISPRO 100 [IU]/ML
10 INJECTION, SOLUTION INTRAVENOUS; SUBCUTANEOUS 3 TIMES DAILY
Status: SHIPPED | COMMUNITY
Start: 2024-01-05

## 2024-01-05 RX ORDER — ALBUTEROL SULFATE 90 UG/1
2 AEROSOL, METERED RESPIRATORY (INHALATION) EVERY 4 HOURS PRN
Qty: 1 EACH | Refills: 0 | Status: SHIPPED | OUTPATIENT
Start: 2024-01-05 | End: 2024-02-04

## 2024-01-05 RX ORDER — ESCITALOPRAM OXALATE 5 MG/1
5 TABLET ORAL EVERY MORNING
Status: SHIPPED | COMMUNITY
Start: 2024-01-05

## 2024-01-05 RX ORDER — MIDODRINE HYDROCHLORIDE 5 MG/1
5 TABLET ORAL
Status: SHIPPED | COMMUNITY
Start: 2024-01-05

## 2024-01-05 RX ORDER — MIDODRINE HYDROCHLORIDE 5 MG/1
5 TABLET ORAL ONCE
Status: COMPLETED | OUTPATIENT
Start: 2024-01-05 | End: 2024-01-05

## 2024-01-05 RX ORDER — MIDODRINE HYDROCHLORIDE 5 MG/1
5 TABLET ORAL
Status: DISCONTINUED | OUTPATIENT
Start: 2024-01-05 | End: 2024-01-05

## 2024-01-05 RX ORDER — TAMSULOSIN HYDROCHLORIDE 0.4 MG/1
0.4 CAPSULE ORAL DAILY
Status: SHIPPED | COMMUNITY
Start: 2024-01-05

## 2024-01-05 RX ORDER — HYDRALAZINE HYDROCHLORIDE 50 MG/1
50 TABLET, FILM COATED ORAL 3 TIMES DAILY
Status: SHIPPED | COMMUNITY
Start: 2024-01-05

## 2024-01-05 NOTE — DISCHARGE INSTRUCTIONS
Please monitor sugars closely and titrate insulin as able    Please hold BP meds (hydralazine, metoprolol) Before HD.    Please given midodrine 5mg, prior to HD    Please follow up with PCP/cardiology/nephrology on DC.

## 2024-01-05 NOTE — PLAN OF CARE
No complaints overnight. Pt up and down from bed to chair. Plan for HD, need dry weight after. Call light in reach, bed alarm on. Plan for dc td,  Problem: Patient Centered Care  Goal: Patient preferences are identified and integrated in the patient's plan of care  Description: Interventions:  - What would you like us to know as we care for you? I live at home with family. I'm from [place]  - Provide timely, complete, and accurate information to patient/family  - Incorporate patient and family knowledge, values, beliefs, and cultural backgrounds into the planning and delivery of care  - Encourage patient/family to participate in care and decision-making at the level they choose  - Honor patient and family perspectives and choices  Outcome: Progressing     Problem: Diabetes/Glucose Control  Goal: Glucose maintained within prescribed range  Description: INTERVENTIONS:  - Monitor Blood Glucose as ordered  - Assess for signs and symptoms of hyperglycemia and hypoglycemia  - Administer ordered medications to maintain glucose within target range  - Assess barriers to adequate nutritional intake and initiate nutrition consult as needed  - Instruct patient on self management of diabetes  Outcome: Progressing     Problem: Patient/Family Goals  Goal: Patient/Family Long Term Goal  Description: Patient's Long Term Goal: go home    Interventions:  - Monitor vital signs  - Monitor appropriate labs  - Monitor blood glucose levels  - Administer medications per order  - Pain management as needed  - Follow MD orders  - Diagnostics per orders  - Dialysis per orders  - Update / inform patient and family on plan of care  - Discharge planning     - See additional Care Plan goals for specific interventions  Outcome: Progressing  Goal: Patient/Family Short Term Goal  Description: Patient's Short Term Goal: To breathe better    Interventions:   - RT treatment  -O2  -Follow md orders  - See additional Care Plan goals for specific  interventions  Outcome: Progressing     Problem: CARDIOVASCULAR - ADULT  Goal: Maintains optimal cardiac output and hemodynamic stability  Description: INTERVENTIONS:  - Monitor vital signs, rhythm, and trends  - Monitor for bleeding, hypotension and signs of decreased cardiac output  - Evaluate effectiveness of vasoactive medications to optimize hemodynamic stability  - Monitor arterial and/or venous puncture sites for bleeding and/or hematoma  - Assess quality of pulses, skin color and temperature  - Assess for signs of decreased coronary artery perfusion - ex. Angina  - Evaluate fluid balance, assess for edema, trend weights  Outcome: Progressing  Goal: Absence of cardiac arrhythmias or at baseline  Description: INTERVENTIONS:  - Continuous cardiac monitoring, monitor vital signs, obtain 12 lead EKG if indicated  - Evaluate effectiveness of antiarrhythmic and heart rate control medications as ordered  - Initiate emergency measures for life threatening arrhythmias  - Monitor electrolytes and administer replacement therapy as ordered  Outcome: Progressing     Problem: RESPIRATORY - ADULT  Goal: Achieves optimal ventilation and oxygenation  Description: INTERVENTIONS:  - Assess for changes in respiratory status  - Assess for changes in mentation and behavior  - Position to facilitate oxygenation and minimize respiratory effort  - Oxygen supplementation based on oxygen saturation or ABGs  - Provide Smoking Cessation handout, if applicable  - Encourage broncho-pulmonary hygiene including cough, deep breathe, Incentive Spirometry  - Assess the need for suctioning and perform as needed  - Assess and instruct to report SOB or any respiratory difficulty  - Respiratory Therapy support as indicated  - Manage/alleviate anxiety  - Monitor for signs/symptoms of CO2 retention  Outcome: Progressing     Problem: METABOLIC/FLUID AND ELECTROLYTES - ADULT  Goal: Glucose maintained within prescribed range  Description:  INTERVENTIONS:  - Monitor Blood Glucose as ordered  - Assess for signs and symptoms of hyperglycemia and hypoglycemia  - Administer ordered medications to maintain glucose within target range  - Assess barriers to adequate nutritional intake and initiate nutrition consult as needed  - Instruct patient on self management of diabetes  Outcome: Progressing  Goal: Electrolytes maintained within normal limits  Description: INTERVENTIONS:  - Monitor labs and rhythm and assess patient for signs and symptoms of electrolyte imbalances  - Administer electrolyte replacement as ordered  - Monitor response to electrolyte replacements, including rhythm and repeat lab results as appropriate  - Fluid restriction as ordered  - Instruct patient on fluid and nutrition restrictions as appropriate  Outcome: Progressing  Goal: Hemodynamic stability and optimal renal function maintained  Description: INTERVENTIONS:  - Monitor labs and assess for signs and symptoms of volume excess or deficit  - Monitor intake, output and patient weight  - Monitor urine specific gravity, serum osmolarity and serum sodium as indicated or ordered  - Monitor response to interventions for patient's volume status, including labs, urine output, blood pressure (other measures as available)  - Encourage oral intake as appropriate  - Instruct patient on fluid and nutrition restrictions as appropriate  Outcome: Progressing     Problem: Delirium  Goal: Minimize duration of delirium  Description: Interventions:  - Encourage use of hearing aids, eye glasses  - Promote highest level of mobility daily  - Provide frequent reorientation  - Promote wakefulness i.e. lights on, blinds open  - Promote sleep, encourage patient's normal rest cycle i.e. lights off, TV off, minimize noise and interruptions  - Encourage family to assist in orientation and promotion of home routines  Outcome: Progressing

## 2024-01-05 NOTE — OCCUPATIONAL THERAPY NOTE
OCCUPATIONAL THERAPY TREATMENT NOTE - INPATIENT        Room Number: 341/341-A     Presenting Problem: acute repsiratory failure    Problem List  Principal Problem:    Acute respiratory failure with hypoxia (HCC)  Active Problems:    Thrombocytopenia (HCC)    ESRD on dialysis (HCC)    Delirium due to another medical condition    Episodic mood disorder (HCC)      OCCUPATIONAL THERAPY ASSESSMENT   Occupational Therapy treatment performed this date. RN approved session. OT PPE includes: goggles, and surgical mask. Patient was received in bed. Vitals: stable /68 taken by RN prior to session. SPO2 95% and -115 following therapeutic activities. Pt seen right after dialysis but reported feeling ok and wanting to work with OT. The following activities were performed during session:    Bed mobility: mod A for UB/trunk, increased time  EOB sitting balance/duration: SBA warm-up exercises ankle pumps  Functional transfers: min A and FWW, cues for technique  Functional mobility: min A and FWW increased time, 3 standing rest breaks and 1 seated rest break and cues for proper breathing techniques for short household distances to enhance activity tolerance needed for household tasks. Cues for positioning of FWW to enhance support for balance    Pt progressing towards goals this session. During session, pt educated on proper breathing techs with activities as well as energy conservation strategies to enhance occupational performance. Pt left in chair with PCT present at end of session. Handoff provided to RN.  Pt continues to experience decreased strength, ROM, activity tolerance,  balance, which impacts BADLs and occupational performance.       The patient's Approx Degree of Impairment: 53.32% has been calculated based on documentation in the [facility] '6 clicks' Inpatient Daily Activity Short Form.  Research supports that patients with this level of impairment may benefit from HODA.    DISCHARGE RECOMMENDATIONS  OT Discharge  Recommendations: Sub-acute rehabilitation  OT Device Recommendations: TBD     PLAN  OT Treatment Plan: Balance activities;Energy conservation/work simplification techniques;ADL training;Functional transfer training;UE strengthening/ROM;Endurance training;Cognitive reorientation;Patient/Family education;Patient/Family training;Compensatory technique education    SUBJECTIVE  \"I want to do this, I want to be more independent\"    OBJECTIVE  Precautions: Bed/chair alarm    WEIGHT BEARING RESTRICTION     PAIN ASSESSMENT  Ratin  Location: Unable to localize  Management Techniques: Activity promotion; Repositioning; Nurse notified      ACTIVITIES OF DAILY LIVING ASSESSMENT  AM-PAC ‘6-Clicks’ Inpatient Daily Activity Short Form  How much help from another person does the patient currently need…  -   Putting on and taking off regular lower body clothing?: A Lot  -   Bathing (including washing, rinsing, drying)?: A Lot  -   Toileting, which includes using toilet, bedpan or urinal? : A Lot  -   Putting on and taking off regular upper body clothing?: A Little  -   Taking care of personal grooming such as brushing teeth?: A Little  -   Eating meals?: None    AM-PAC Score:  Score: 16  Approx Degree of Impairment: 53.32%  Standardized Score (AM-PAC Scale): 35.96  CMS Modifier (G-Code): CK    EDUCATION PROVIDED  Patient : Role of Occupational Therapy; Plan of Care; Fall Prevention; Functional Transfer Techniques; Posture/Positioning; Energy Conservation; Compensatory ADL Techniques  Patient's Response to Education: Verbalized Understanding; Returned Demonstration    Patient End of Session: Needs met;With  staff;Up in chair;Call light within reach;RN aware of session/findings;All patient questions and concerns addressed    OT Goals:     Patient will complete functional transfer with Min A  Comment: MET    Patient will complete toileting with Min A  Comment: ongoing    Patient will tolerate standing for 2 minutes in prep for aDL  with Min A   Comment: inconsistent    Patient will complete UE dressing with Min A  Comment: NT this session          Goals  on: 24  Frequency: 3-5x/week    OT Session Time: 18 minutes    Therapeutic Activity: 18 minutes    CHINMAY Puckett/[facility]

## 2024-01-05 NOTE — DISCHARGE SUMMARY
General Medicine Discharge Summary     Patient ID:  Terrence Vines  73 year old  9/1/1950    Admit date: 12/18/2023    Discharge date and time: 1/5/2024    Attending Physician: Zeb Joshi MD     Consults: IP CONSULT TO NEPHROLOGY  IP CONSULT TO CARDIOLOGY  IP CONSULT TO PULMONOLOGY  IP CONSULT TO FOOD AND NUTRITION SERVICES  IP CONSULT TO SOCIAL WORK  IP CONSULT TO PSYCHIATRY    Primary Care Physician: Victor Manuel Cleaning MD     Reason for admission: influenza    Risk For Readmission: moderate    Discharge Diagnoses: ESRD on dialysis (HCC) [N18.6, Z99.2]  Acute respiratory failure with hypoxia (HCC) [J96.01]  See Additional Discharge Diagnoses in Hospital Course    Discharged Condition: good    Follow-up with labs/images appointments:   Please monitor sugars closely and titrate insulin as able    Please hold BP meds (hydralazine, metoprolol) Before HD.    Please given midodrine 5mg, prior to HD    Please follow up with PCP/cardiology/nephrology on DC.      Exam  Gen: No acute distress  Pulm: Lungs clear, normal respiratory effort  CV: Heart with regular rate and rhythm  Abd: Abdomen soft,   EXT: no edema     HPI:   Per Dr. Dash:  History of Present Illness: Mr. Vines is a 73 year old male with PMH BPH , CHF / ICM EF 30% , Moderate MR, HTN, HLD, ESRD on HD M/W/F, Anemia, Gastritis, who presents with SOB. Patient has had a cough for over a month but felt more SOB which started yesterday.  He was treated with a course of abx as out patient with no improvement in cough.  He was supposed to be on PPI from last admission but was not taking.  Denies CP, abd pain, n/v, f/c.  Appetite as been good.  Son at bedside and confirms that he has not missed any HD sessions and does not add salt to his food.       Hospital Course:   Mr. Vines is a 73 year old male with PMH BPH , CHF / ICM EF 30% , CAD s/p CABG x3, Moderate MR, HTN, HLD, ESRD on HD M/W/F, Anemia, Gastritis, who presents with SOB. Admitted for fluid overload and  Influenza A.  Hypoxic and hypercapnic respiratory failure clinically improved, weaned off of oxygen, course further complicated by encephalopathy, slowly improving, S/P right heart cath (1/3). Accurate dry weight, is 96.7 kg (213 lbs), clinically improved, plan for adding midodrine 5 mg prior to dialysis to help with fluid removal, cleared for discharge to rehab per cardiology and nephrology, plan for DC to [facility] following HD today.  Close follow-up with PCP, cardiology, nephrology on discharge.     Acute Hypoxic Respiratory Failure  Pulmonary edema  ESRD on HD M/W/F  - positive influenza   - HD per renal  - s/p recent AV fistula repair by vascular surgery in September 2023  - steroid burst per pulmonary ended 12/24  - Transferred to medical floor on 12/28  - off O2, and completed Tamiflu course  - Hypotension with HD, nephrology recommending 5 mg midodrine prior to dialysis, and continuing to hold antihypertensives on days of dialysis.     Encephalopathy  - improving  - mobilize, needs therapy. He is very deconditioned   - alert and awake, following all commands, engaging in conversation  - CT brain 12/30, negative for acute pathology  - improving each day, seen by psychiatry whom agrees   -Likely some underlying depression/anxiety, Lexapro added, will continue     Influenza A positive   - cough for over a month but SOB more acute  - renally dose Tamiflu completed   - Ceftriaxone and steroids started 12/22/23, now off      Troponin Elevation   - no chest pain   - EKG with LBBB  - cardiology follow-up  -ACS ruled out     CAD s/p CABG x3   Ischemic cardiomyopathy EF 30% without acute exacerbation of congestive heart failure  Chronic Systolic heart failure  HTN  -Resume lower dose metoprolol, and hydralazine hold prior to dialysis     Anemia   Thrombocytopenia   Gastritis   - s/p EGD 11/7 with some mild gastritis, no active bleeding  - s/p colonoscopy 11/8 with polyps but no active bleeding, will need to  repeat colonoscopy as outpatient  - on prevacid here      Type 2 diabetes with hyperglycemia  Diabetic nephropathy  - gabapentin (will hold to prevent lethargy), can consider  - Controlled renal diet  - Continue long-acting insulin, mealtime insulin  - Continue home aspirin     Hyperlipidemia  - restarted his statin     BPH  - resume finasteride  - on flomax     Depression  -Diagnosed by the psychiatrist, started on Lexapro    Operative Procedures:      Imaging: No results found.      Disposition: SNF    Activity: activity as tolerated  Diet: regular diet  Wound Care: as directed  Code Status: Full Code    Home Medication Changes:     Med list     Medication List        START taking these medications      escitalopram 5 MG Tabs  Commonly known as: Lexapro     midodrine 5 MG Tabs  Commonly known as: ProAmatine     Sennosides 17.2 MG Tabs            CHANGE how you take these medications      finasteride 5 MG Tabs  Commonly known as: Proscar  TAKE 1 TABLET(5 MG) BY MOUTH DAILY  What changed:   when to take this  additional instructions     HumaLOG KwikPen 100 UNIT/ML Sopn  Generic drug: Insulin Lispro (1 Unit Dial)  What changed: how much to take     tamsulosin 0.4 MG Caps  Commonly known as: Flomax  What changed: how much to take            CONTINUE taking these medications      acetaminophen 500 MG Tabs  Commonly known as: Tylenol Extra Strength     albuterol 108 (90 Base) MCG/ACT Aers  Commonly known as: Ventolin HFA  Inhale 2 puffs into the lungs every 4 (four) hours as needed for Wheezing.     aspirin 81 MG Tbec  Take 1 tablet (81 mg total) by mouth daily.     Basaglar KwikPen 100 UNIT/ML Sopn  Generic drug: insulin glargine     Co Q-10 100 MG Chew     cyanocobalamin 250 MCG Tabs  Commonly known as: Vitamin B12     Dexcom G6 Sensor Misc  1 each by Does not apply route Every 10 days.     hydrALAZINE 50 MG Tabs  Commonly known as: Apresoline     Magnesium 500 MG Tabs     metoprolol succinate ER 50 MG Tb24  Commonly  known as: Toprol XL     multivitamin Tabs  Take 1 tablet by mouth daily.     OneTouch Ultra Mini w/Device Kit  Test Blood Sugar Once Daily. Non-Insulin Dependent Diabetes Type II. E11.9.     OneTouch Ultra Strp  Generic drug: Glucose Blood  Test Blood Sugar 4 Times Daily. Insulin Dependent Diabetes Type II. Z79.4, E11.9.     OneTouch UltraSoft Lancets Misc  Test Blood Sugar Once Daily. Non-Insulin Dependent Diabetes Type II. E11.9.     rosuvastatin 20 MG Tabs  Commonly known as: Crestor     sevelamer carbonate 800 MG Tabs  Commonly known as: Renvela     TRUEplus Pen Needles 32G X 4 MM Misc  Generic drug: Insulin Pen Needle  Use 1 needle 4 times a day            STOP taking these medications      gabapentin 100 MG Caps  Commonly known as: Neurontin               Where to Get Your Medications        You can get these medications from any pharmacy    Bring a paper prescription for each of these medications  albuterol 108 (90 Base) MCG/ACT Aers         FU   Follow-up Information       Tara Bentley APN Follow up in 1 week(s).    Specialty: Nurse Practitioner  Why: Diabetes follow-up  Contact information:  [address]  [facility] [id number] [phone]               Hay Bautista MD Follow up.    Specialties: Cardiovascular Diseases, CARDIOLOGY  Contact information:  [address]   [address]  [facility] [id number] [phone]               Victor Manuel Cleaning MD Follow up.    Specialty: Internal Medicine  Contact information:  [address]  [address]  [facility] [id number] [phone]               Jaquan Parker MD. Schedule an appointment as soon as possible for a visit in 1 week(s).    Specialty: NEPHROLOGY  Contact information:  [address]  [facility] [id number] [phone]                             DC instructions:      Other Discharge Instructions:         Please monitor sugars closely and titrate insulin as able    Please hold BP meds (hydralazine, metoprolol)  Before HD.    Please given midodrine 5mg, prior to HD    Please follow up with PCP/cardiology/nephrology on DC.          I reconciled current and discharge medications on day of discharge, discussed changes with patient and noted changes above.       Total Time Coordinating Care: Greater than 30 minutes    Patient had opportunity to ask questions and state understand and agree with therapeutic plan as outlined    Thank You,    Zeb Joshi MD   Hospitalist with [facility]

## 2024-01-05 NOTE — PHYSICAL THERAPY NOTE
PHYSICAL THERAPY TREATMENT NOTE - INPATIENT     Room Number: 341/341-A       Presenting Problem: acute respiratory failure    Problem List  Principal Problem:    Acute respiratory failure with hypoxia (HCC)  Active Problems:    Thrombocytopenia (HCC)    ESRD on dialysis (HCC)    Delirium due to another medical condition    Episodic mood disorder (HCC)      PHYSICAL THERAPY ASSESSMENT   Chart reviewed. RN Kushal approved participation in physical therapy. PPE worn by therapist: mask and gloves. Patient was not wearing a mask during session. Patient presented in bed with 0/10 pain. Patient with fair  progress towards goals during this session. Education provided on Physical therapy plan of care and physiological benefits of out of bed mobility. Patient with good carryover. Pt is received in the bed and was cleared for therapy session. Assisted RN with weighing pt at bedside. Pt is mod A with bed mobility and to transfer to the EOB. Pt required assist with his B LE's and his upper trunk to sit up. Pt was drowsy throughout the session. Pt sat EOB for a few minutes and denied any dizziness and light headedness. RN and PCT were present to assist. Pt is min A with sit<>stand transfers with the RW. Pt is cued for proper hand placement for safety. Pt AMB about 3'x2 with the RW min A for balance and safety. Pt with some unsteadiness. Pt is min A with stepping up on the scale. Pt stood for about 1-2 minutes with min A and posterior lean. Pt does fatigue quickly and was returned back to the bed. Per RN to return pt back to the bed due to him having dialysis this morning. Pt is mod A with returning back to supine in the bed. Pt is repositioned and left in the bed with all needs within reach and alarm system activated. Pt is on track to dc [facility] once medically cleared.     Bed mobility: Mod assist  Transfers: Min assist  Gait Assistance: Minimum assistance  Distance (ft): 3'  Assistive Device: Rolling walker  Pattern: Shuffle           . Patient was left in bed and alarm activated at end of session with all needs in reach. The patient's Approx Degree of Impairment: 61.29% has been calculated based on documentation in the [facility] '6 clicks' Inpatient Basic Mobility Short Form.  Research supports that patients with this level of impairment may benefit from Subacute Rehab. RN aware of patient status post session.    DISCHARGE RECOMMENDATIONS  PT Discharge Recommendations: Sub-acute rehabilitation     PLAN  PT Treatment Plan: Bed mobility;Body mechanics;Coordination;Endurance;Patient education;Gait training;Strengthening;Transfer training;Balance training    SUBJECTIVE  Pt was agreeable to therapy session.         OBJECTIVE  Precautions: Bed/chair alarm    WEIGHT BEARING RESTRICTION  Weight Bearing Restriction: None                PAIN ASSESSMENT   Ratin  Location: denies at this time  Management Techniques: Activity promotion;Body mechanics;Relaxation;Repositioning    BALANCE                                                                                                                       Static Sitting: Fair +  Dynamic Sitting: Fair           Static Standing: Poor +  Dynamic Standing: Poor +    ACTIVITY TOLERANCE                         O2 WALK       AM-PAC '6-Clicks' INPATIENT SHORT FORM - BASIC MOBILITY  How much difficulty does the patient currently have...  Patient Difficulty: Turning over in bed (including adjusting bedclothes, sheets and blankets)?: A Lot   Patient Difficulty: Sitting down on and standing up from a chair with arms (e.g., wheelchair, bedside commode, etc.): A Little   Patient Difficulty: Moving from lying on back to sitting on the side of the bed?: A Lot   How much help from another person does the patient currently need...   Help from Another: Moving to and from a bed to a chair (including a wheelchair)?: A Little   Help from Another: Need to walk in hospital room?: A Little   Help from Another: Climbing 3-5 steps  with a railing?: Total     AM-PAC Score:  Raw Score: 14   Approx Degree of Impairment: 61.29%   Standardized Score (AM-PAC Scale): 38.1   CMS Modifier (G-Code): CL          Patient End of Session: In bed;Needs met;Call light within reach;RN aware of session/findings;All patient questions and concerns addressed;Alarm set    CURRENT GOALS   Goals to be met by: 1/5/24  Patient Goal Patient's self-stated goal is: to sleep   Goal #1 Patient is able to demonstrate supine - sit EOB @ level: supervision     Goal #1   Current Status Mod A    Goal #2 Patient is able to demonstrate transfers Sit to/from Stand at assistance level: supervision with walker - rolling     Goal #2  Current Status Min A with the RW   Goal #3 Patient is able to ambulate 40 feet with assist device: walker - rolling at assistance level: minimum assistance   Goal #3   Current Status 3'x2with the RW min A    Goal #4    Goal #4   Current Status            Therapeutic Activity: 30 minutes

## 2024-01-05 NOTE — PROGRESS NOTES
NEPHROLOGY DAILY PROGRESS NOTE     SUBJECTIVE:  - tolerated dialysis yesterday, 0L removed due to low BP   - no new complaints today  - son and daughter at bedside         OBJECTIVE:    Total Intake/Output:    Intake/Output Summary (Last 24 hours) at 1/5/2024 1141  Last data filed at 1/5/2024 0541  Gross per 24 hour   Intake 5 ml   Output 0 ml   Net 5 ml         PHYSICAL EXAM:  /61 (BP Location: Right arm)   Pulse 93   Temp 97.4 °F (36.3 °C) (Oral)   Resp 19   Wt 215 lb 1.6 oz (97.6 kg)   SpO2 93%   BMI 29.17 kg/m²     GEN: NAD  HEENT: NCAT    CHEST: CTAB   CARDIAC: S1S2 normal  ABD: soft, NT/ND  EXT:  no lower ext edema  NEURO: awake, alert   SKIN: warm, dry   DIALYSIS ACCESS: LUE AVF + bruit and thrill      CURRENT MEDICATIONS:   escitalopram  5 mg Oral QAM    insulin detemir  30 Units Subcutaneous Daily    aspirin  81 mg Per NG Tube Daily    cyanocobalamin  250 mcg Oral Daily    heparin  5,000 Units Subcutaneous Q8H YOLANDA    insulin aspart  1-5 Units Subcutaneous TID CC    lansoprazole  30 mg Per NG Tube QAM AC    magnesium oxide  500 mg Oral Daily    tamsulosin  0.8 mg Oral Daily    metoprolol succinate ER  50 mg Oral Daily Beta Blocker    melatonin  3 mg Oral Nightly    sevelamer carbonate  1,600 mg Oral TID CC    finasteride  5 mg Per NG Tube Daily    hydrALAZINE  50 mg Oral TID    rosuvastatin  10 mg Oral Nightly             LABS:  Patient Labs Reviewed in Detail. Pertinent Labs as follows:  Recent Labs   Lab 01/03/24  0847 01/04/24  0743 01/05/24  0607   * 216* 203*   BUN 31* 53* 35*   CREATSERUM 4.95* 7.25* 5.14*   EGFRCR 12* 7* 11*   CA 9.5 9.4 9.0   * 135* 136   K 3.9 4.2 4.1   CL 98 96* 100   CO2 27.0 28.0 28.0     Recent Labs   Lab 01/01/24  0648 01/02/24  0607 01/03/24  0847   RBC 3.54* 3.19* 3.31*   HGB 10.7* 10.1* 10.1*   HCT 34.5* 30.7* 31.5*   MCV 97.5 96.2 95.2   MCH 30.2 31.7 30.5   MCHC 31.0 32.9 32.1   RDW 15.0 14.6 14.7   WBC 12.6* 9.7 10.5   .0 170.0 184.0        IMAGING:  No results found.     ASSESSMENT AND PLAN:   This is a 73 year old male with PMH sig for ESRD on HD MWF, HTN, HLD, DM2, CAD s/p CABG x3, ischemic cardiomyopathy. Presents with SOB and cough. Found to be influenza positive. Nephrology is consulted for dialysis.      ESRD on HD MWF   - received dialysis 1/2, 1/4   - dialysis today 1/5 to transition back to MWF dialysis session   - EDW 96.7, 1kg above EDW today, UF goal planned for 1L.   - start midodrine 5 mg prior to dialysis to support BP during dialysis and facilitate fluid removal.    - check post dialysis standing weight today.   - Followed by [facility]     Acute hypoxic respiratory failure  - on admission: CXR with pulmonary vascular changes, also influenza A postivie    - improved,  now on RA     Hyperkalemia   - resolved   - follow K level      Hyperphosphatemia   - improved  - Sevelamer 1600mg tid   - follow phos level     Hypertension   - Hydralazine, metoprolol    - Hold BP meds before dialysis     Anemia   - trend Hb, Hb at goal    - receiving OMAIRA with outpatient dialysis      Dispo: Anticipate discharge later today post dialysis.    Dw Dr. Joshi and RN     Hue Galvan MD  [facility]

## 2024-01-05 NOTE — CM/SW NOTE
01/05/24 1100   Discharge disposition   Expected discharge disposition subacute   Post Acute Care Provider Other  ([facility])   Discharge transportation [facility]     The patient received a MDO for discharge.    The patient will have dialysis at 1:30pm and will discharge after his dialysis treatment.   The patient will be transported to [facility] at 6pm via [facility].   PCS complete.  The patient's Medicaid will cover transport cost.     The number to call report is [phone]  [facility] in Admissions has been notified of transport time.  RN to inform patient and family.    SW/CM to remain available for support and/or discharge planning.     Shoshana Agarwal MSW, LSW  Discharge Planner [id number]

## 2024-01-06 NOTE — DISCHARGE PLANNING
I called and gave report to nurse Setiner at [facility] rehab facility. Patient's physical and history were relayed to nursing staff and included past medical history & diagnosis of acute respiratory failure. Phone number of primary nurse provided for follow up questions.     Code status: LUCIO Magallon RN, Discharge Leader [id number]

## 2024-01-06 NOTE — PLAN OF CARE
Problem: Patient Centered Care  Goal: Patient preferences are identified and integrated in the patient's plan of care  Description: Interventions:  - What would you like us to know as we care for you? I live at home with family. I'm from [place]  - Provide timely, complete, and accurate information to patient/family  - Incorporate patient and family knowledge, values, beliefs, and cultural backgrounds into the planning and delivery of care  - Encourage patient/family to participate in care and decision-making at the level they choose  - Honor patient and family perspectives and choices  Outcome: Completed     Problem: Diabetes/Glucose Control  Goal: Glucose maintained within prescribed range  Description: INTERVENTIONS:  - Monitor Blood Glucose as ordered  - Assess for signs and symptoms of hyperglycemia and hypoglycemia  - Administer ordered medications to maintain glucose within target range  - Assess barriers to adequate nutritional intake and initiate nutrition consult as needed  - Instruct patient on self management of diabetes  Outcome: Completed     Problem: Patient/Family Goals  Goal: Patient/Family Long Term Goal  Description: Patient's Long Term Goal: go home    Interventions:  - Monitor vital signs  - Monitor appropriate labs  - Monitor blood glucose levels  - Administer medications per order  - Pain management as needed  - Follow MD orders  - Diagnostics per orders  - Dialysis per orders  - Update / inform patient and family on plan of care  - Discharge planning     - See additional Care Plan goals for specific interventions  Outcome: Completed  Goal: Patient/Family Short Term Goal  Description: Patient's Short Term Goal: To breathe better    Interventions:   - RT treatment  -O2  -Follow md orders  - See additional Care Plan goals for specific interventions  Outcome: Completed     Problem: CARDIOVASCULAR - ADULT  Goal: Maintains optimal cardiac output and hemodynamic stability  Description:  INTERVENTIONS:  - Monitor vital signs, rhythm, and trends  - Monitor for bleeding, hypotension and signs of decreased cardiac output  - Evaluate effectiveness of vasoactive medications to optimize hemodynamic stability  - Monitor arterial and/or venous puncture sites for bleeding and/or hematoma  - Assess quality of pulses, skin color and temperature  - Assess for signs of decreased coronary artery perfusion - ex. Angina  - Evaluate fluid balance, assess for edema, trend weights  Outcome: Completed  Goal: Absence of cardiac arrhythmias or at baseline  Description: INTERVENTIONS:  - Continuous cardiac monitoring, monitor vital signs, obtain 12 lead EKG if indicated  - Evaluate effectiveness of antiarrhythmic and heart rate control medications as ordered  - Initiate emergency measures for life threatening arrhythmias  - Monitor electrolytes and administer replacement therapy as ordered  Outcome: Completed     Problem: RESPIRATORY - ADULT  Goal: Achieves optimal ventilation and oxygenation  Description: INTERVENTIONS:  - Assess for changes in respiratory status  - Assess for changes in mentation and behavior  - Position to facilitate oxygenation and minimize respiratory effort  - Oxygen supplementation based on oxygen saturation or ABGs  - Provide Smoking Cessation handout, if applicable  - Encourage broncho-pulmonary hygiene including cough, deep breathe, Incentive Spirometry  - Assess the need for suctioning and perform as needed  - Assess and instruct to report SOB or any respiratory difficulty  - Respiratory Therapy support as indicated  - Manage/alleviate anxiety  - Monitor for signs/symptoms of CO2 retention  Outcome: Completed     Problem: METABOLIC/FLUID AND ELECTROLYTES - ADULT  Goal: Glucose maintained within prescribed range  Description: INTERVENTIONS:  - Monitor Blood Glucose as ordered  - Assess for signs and symptoms of hyperglycemia and hypoglycemia  - Administer ordered medications to maintain glucose  within target range  - Assess barriers to adequate nutritional intake and initiate nutrition consult as needed  - Instruct patient on self management of diabetes  Outcome: Completed  Goal: Electrolytes maintained within normal limits  Description: INTERVENTIONS:  - Monitor labs and rhythm and assess patient for signs and symptoms of electrolyte imbalances  - Administer electrolyte replacement as ordered  - Monitor response to electrolyte replacements, including rhythm and repeat lab results as appropriate  - Fluid restriction as ordered  - Instruct patient on fluid and nutrition restrictions as appropriate  Outcome: Completed  Goal: Hemodynamic stability and optimal renal function maintained  Description: INTERVENTIONS:  - Monitor labs and assess for signs and symptoms of volume excess or deficit  - Monitor intake, output and patient weight  - Monitor urine specific gravity, serum osmolarity and serum sodium as indicated or ordered  - Monitor response to interventions for patient's volume status, including labs, urine output, blood pressure (other measures as available)  - Encourage oral intake as appropriate  - Instruct patient on fluid and nutrition restrictions as appropriate  Outcome: Completed     Problem: Delirium  Goal: Minimize duration of delirium  Description: Interventions:  - Encourage use of hearing aids, eye glasses  - Promote highest level of mobility daily  - Provide frequent reorientation  - Promote wakefulness i.e. lights on, blinds open  - Promote sleep, encourage patient's normal rest cycle i.e. lights off, TV off, minimize noise and interruptions  - Encourage family to assist in orientation and promotion of home routines  Outcome: Completed

## 2024-03-13 NOTE — SPIRITUAL CARE NOTE
Spiritual Care Visit Note    Patient Name: Terrence Vines Date of Spiritual Care Visit: 24   : 1950 Primary Dx: <principal problem not specified>       Referred By:  Full Arrest, ED Death    Spiritual Care Taxonomy:    Intended Effects: Convey a calming presence    Methods: Offer support    Interventions: Explain  role;Acknowledge response to difficult experience    Visit Type/Summary:   responded to Full Arrest. Patient  by the time writer visited. Writer visited family with MD, spouse, brother and children present. Writer brought family to patient room.      - Spiritual Care: Responded to a request via the on call phone Offered empathic listening and emotional support. Provided resources related to grief and grieving.  remains available as needed for follow up.    Spiritual Care support can be requested via an [facility] consult. For urgent/immediate needs, please contact the On Call  at: [place]: ext [phone]    Chaplain Karin.

## 2024-03-13 NOTE — ED QUICK NOTES
Pt arrives on herminio. Per EMS pt was assysotle entire time. Family heard fall 10  ins PTA    Epi last 4-5 mins ago

## 2024-03-13 NOTE — ED PROVIDER NOTES
Patient Seen in: [facility] Emergency Department    History     Chief Complaint   Patient presents with    Cardiac Arrest       HPI    History is provided by patient/independent historian: EMS  73 year old male with history of CHF, diabetes, hypertension, hyperlipidemia brought in by EMS for cardiac arrest.  EMS reports they received a call from family stating that they heard a thud and found patient on the ground after he had gotten up to walk to a different room.  On EMS arrival, patient was asystole.  They gave 2 rounds of epi and were performing CPR and route.  Patient was intubated prior to arrival to the hospital.    History reviewed.   Past Medical History:   Diagnosis Date    BPH (benign prostatic hypertrophy)     Cataract     Congestive heart disease (HCC)     Coronary atherosclerosis     Diabetic retinopathy (HCC)     Dialysis patient (HCC)     M,W,F,    Esophageal reflux     Heart valve disease     High blood pressure     High cholesterol     HLD (hyperlipidemia)     HTN (hypertension)     Neuropathy     Proteinuria     Renal disorder     HD every MWF with temporary HD cath    Type II diabetes mellitus (HCC)     Visual impairment     Reading glasses         History reviewed.   Past Surgical History:   Procedure Laterality Date    CABG  06/21/2021    x3-lima-->LAD, SVG-->diag and SVG-->OM    CATARACT Right     COLONOSCOPY N/A 11/8/2023    Procedure: COLONOSCOPY;  Surgeon: Cullen Bautista MD;  Location: [facility] ENDOSCOPY         Home Medications reviewed :  (Not in a hospital admission)        History reviewed.   Social History     Socioeconomic History    Marital status:    Tobacco Use    Smoking status: Never    Smokeless tobacco: Never   Vaping Use    Vaping Use: Never used   Substance and Sexual Activity    Alcohol use: Never    Drug use: Never         ROS  Review of Systems   Unable to perform ROS: Patient unresponsive      All other pertinent organ systems are reviewed and are  negative.      Physical Exam     ED Triage Vitals   BP    Pulse    Resp    Temp    Temp src    SpO2    O2 Device      Vital signs reviewed.      Physical Exam  Vitals and nursing note reviewed.   Constitutional:       Comments: Unresponsive   HENT:      Mouth/Throat:      Mouth: Mucous membranes are moist.   Eyes:      Comments: Fixed pupils bilaterally   Cardiovascular:      Comments: Asystole  Pulmonary:      Comments: Bilateral breath sounds absent  Abdominal:      General: There is distension.   Musculoskeletal:      Comments: Right lower extremity IO in place   Skin:     General: Skin is warm.         ED Course       Labs:     Labs Reviewed   POCT GLUCOSE - Abnormal; Notable for the following components:       Result Value    POC Glucose  373 (*)     All other components within normal limits         My EKG Interpretation:   As reviewed and Interpreted by me      Imaging Results Available and Reviewed while in ED:   No results found.      Decision rules/scores evaluated: none      Diagnostic labs/tests considered but not ordered: CBC, BMP, troponin, CXR    ED Medications Administered:   Medications   EPINEPHrine (Adrenalin) 1 MG/10ML (0.1 MG/ML) injection (Cardiac Arrest) (1 mg Intravenous Given 3/13/24 0956)   calcium chloride injection (1 g Intravenous Given 3/13/24 0954)   sodium bicarbonate injection (50 mEq Intravenous Given 3/13/24 0955)          - ETT on arrival was in the esophagus - pt was reintubated      MDM       Medical Decision Making      Differential Diagnosis: After obtaining the patient's history, performing the physical exam and reviewing the diagnostics, multiple initial diagnoses were considered based on the presenting problem including cardiac arrest, PE, ICH    External document review: I personally reviewed available external medical records for any recent pertinent discharge summaries, testing, and procedures - the findings are as follows: 2/15/24 visit with Dr. Cleaning for influenza  A    Complicating Factors: The patient already  has a past medical history of BPH (benign prostatic hypertrophy), Cataract, Congestive heart disease (HCC), Coronary atherosclerosis, Diabetic retinopathy (HCC), Dialysis patient (HCC), Esophageal reflux, Heart valve disease, High blood pressure, High cholesterol, HLD (hyperlipidemia), HTN (hypertension), Neuropathy, Proteinuria, Renal disorder, Type II diabetes mellitus (HCC), and Visual impairment. to contribute to the complexity of this ED evaluation.    Procedures performed:   PROCEDURE:  Endotracheal Intubation  :  Landon Kirby MD  Indication: cardiac arrest    Emergency airway management was warranted in this patient.  The patient / caregivers were apprised of diagnostic / treatment options including alternate modes of care, in addition to risks and benefits, for this medical condition. Based on this discussion the patient / caregiver agree with this chosen diagnostic and treatment plan and emergency consent was obtained.    Patient placed on 100% oxygen and cardiac monitor applied.  Timeout performed and proper patient/site verified.  All elements of maximal sterile barrier technique were utilized.      video laryngoscopy was performed with visualization of the cords.  Cricoid pressure was applied throughout the procedure.  A 7.5 size endotracheal tube was placed at 25 cm at the lip.  Tube placement verified by direct visualization, condensation in the tube, and positive ETCO2.  Bilateral breath sounds were present with absence of breath sounds over the epigastrium.  Initial ventilator management conducted by me.  No complications.    LABORATORY DATA:  Recent Results (from the past 24 hour(s))   POCT Glucose    Collection Time: 03/13/24  9:57 AM   Result Value Ref Range    POC Glucose  373 (H) 70 - 99 mg/dL     Patient seen immediately on arrival because of the critical nature of illness. Initial assessment, history, and exam done. Information taken  from transport personnel.  Resuscitation initiated immediately using standard ACLS protocols.    CPR was continued under my direct supervision.    On arrival to the ED the patient was intubated.  Bilateral breath sounds were not present.    Cardiac Monitor:  Patient placed on monitor for cardiac arrest and rhythm strip obtained revealed asystole rhythm.  Interpretation:  abnormal for rate, abnormal for rhythm    Defibrillation was not indicated.    The following medications were administered:  epi, calcium, bicarb    Please refer to the nursing notes for complete details and timing of medications.    After the above heroic efforts at resuscitation of this patient, return of spontaneous circulation was not successful.      Critical Care Time:  Total critical care time for this patient was 38 minutes exclusive of separately billable procedures, but in obtaining history, performing a physical exam, bedside monitoring of interventions, collecting and interpreting test, and discussion with consultants.        Disposition and Plan     Clinical Impression:  1. Cardiac arrest (HCC)        Disposition:      Follow-up:  Victor Manuel Cleaning MD  [address] [place] [address]  [address] [place] [id number]  [phone]    Follow up        Medications Prescribed:  Current Discharge Medication List

## 2024-03-13 NOTE — ED QUICK NOTES
MD checking heart activity with US. No effusion. No cardiac activity    Time of death 10:02AM   The wire is inserted in the distal left anterior descending artery.

## 2024-03-13 NOTE — ED QUICK NOTES
[facility] notified pt is a candidate for potential tissue and eye donation.    Pt is not a  case

## 2024-03-13 NOTE — ED INITIAL ASSESSMENT (HPI)
Pt arrives via EMS in cardiac arrest. Per EMS pt was asystole upon arrival. CPR was initiated. EMS states that family heard a possible fall 10 mins PTA of EMS. Pt arrives on Marcel[facility] with Igel and IO

## (undated) DEVICE — MEDI-VAC NON-CONDUCTIVE SUCTION TUBING 6MM X 1.8M (6FT.) L: Brand: CARDINAL HEALTH

## (undated) DEVICE — SUTURE PROLENE 6-0 C-1

## (undated) DEVICE — SUT VICRYL 5-0 PS-2 J495H

## (undated) DEVICE — 32 FR RIGHT ANGLE – SOFT PVC CATHETER: Brand: PVC THORACIC CATHETERS

## (undated) DEVICE — Device: Brand: DUAL NARE NASAL CANNULAE FEMALE LUER CON 7FT O2 TUBE

## (undated) DEVICE — STERILE TETRA-FLEX CF, ELASTIC BANDAGE LATEX FREE 6IN X5.5 YD: Brand: TETRA-FLEX™CF

## (undated) DEVICE — EYE PACK: Brand: MEDLINE INDUSTRIES, INC.

## (undated) DEVICE — CATARACT PATIENT CARE KIT

## (undated) DEVICE — HEXAGONAL CAPTIVATOR STIFF 13M

## (undated) DEVICE — SUT PROLENE 7-0 BV-1 8703H

## (undated) DEVICE — SUCTION CANISTER, 3000CC,SAFELINER: Brand: DEROYAL

## (undated) DEVICE — 3 ML SYRINGE LUER-LOCK TIP: Brand: MONOJECT

## (undated) DEVICE — HEART A: Brand: MEDLINE INDUSTRIES, INC.

## (undated) DEVICE — SUT PROLENE 6-0 BV-1 8805H

## (undated) DEVICE — MEDI-VAC NON-CONDUCTIVE SUCTION TUBING: Brand: CARDINAL HEALTH

## (undated) DEVICE — CV: Brand: MEDLINE INDUSTRIES, INC.

## (undated) DEVICE — SUTURE SILK 4-0 SA63H

## (undated) DEVICE — STABILIZER SRG UNV AST CABG

## (undated) DEVICE — ABSORBABLE HEMOSTAT (OXIDIZED REGENERATED CELLULOSE, U.S.P.): Brand: SURGICEL

## (undated) DEVICE — SUTURE PROLENE 4-0 RB-1

## (undated) DEVICE — SNARE OPTMZ PLPCTM TRP

## (undated) DEVICE — DEVICE BLWR/MSTR ACCUMIST ATCH

## (undated) DEVICE — FORESIGHT LARGE SENSOR: Brand: FORESIGHT

## (undated) DEVICE — MINI-BLADE®: Brand: BEAVER®

## (undated) DEVICE — INSERT SUTURE OPEN HEART

## (undated) DEVICE — CONTAINER,SPECIMEN,OR STERILE,4OZ: Brand: MEDLINE

## (undated) DEVICE — APPLICATOR CHLORAPREP 26ML

## (undated) DEVICE — SUT MONOCRYL 4-0 PS-2 Y496G

## (undated) DEVICE — SUTURE MONOCRYL 3-0 Y936H

## (undated) DEVICE — PREP BETADINE SOL 5% EYE

## (undated) DEVICE — SUT PROLENE 5-0 C-1 8725H

## (undated) DEVICE — [HIGH FLOW INSUFFLATOR,  DO NOT USE IF PACKAGE IS DAMAGED,  KEEP DRY,  KEEP AWAY FROM SUNLIGHT,  PROTECT FROM HEAT AND RADIOACTIVE SOURCES.]: Brand: PNEUMOSURE

## (undated) DEVICE — GAMMEX® PI HYBRID SIZE 6.5, STERILE POWDER-FREE SURGICAL GLOVE, POLYISOPRENE AND NEOPRENE BLEND: Brand: GAMMEX

## (undated) DEVICE — SUT SILK 2-0 SA65H

## (undated) DEVICE — SUT PROLENE 6-0 BV-1 8709H

## (undated) DEVICE — SOL  0.9% 1000ML

## (undated) DEVICE — TRAY CATH BDX 16FR 350ML FL

## (undated) DEVICE — LEAD BIPOLAR TEMP 6495XF53

## (undated) DEVICE — SUTURE VICRYL 2-0 CT-1

## (undated) DEVICE — CLEARCUT® SIDEPORT KNIFE DUAL BEVEL 1.0MM ANGLED: Brand: CLEARCUT®

## (undated) DEVICE — RETROGRADE CARDIOPLEGIA CATHETER: Brand: EDWARDS LIFESCIENCES RETROGRADE CARDIOPLEGIA CATHETER

## (undated) DEVICE — HOVERMATT 34IN SINGLE USE

## (undated) DEVICE — GAMMEX® PI HYBRID SIZE 7.5, STERILE POWDER-FREE SURGICAL GLOVE, POLYISOPRENE AND NEOPRENE BLEND: Brand: GAMMEX

## (undated) DEVICE — KIT CLEAN ENDOKIT 1.1OZ GOWNX2

## (undated) DEVICE — UNFOLDER PLATINUM 1 SERIES CRTRDG 30/BOX: Brand: UNFOLDER PLATINUM 1 SERIES

## (undated) DEVICE — CONNECTOR PRFSN QCK PRM .25IN

## (undated) DEVICE — GAMMEX® PI HYBRID SIZE 6, STERILE POWDER-FREE SURGICAL GLOVE, POLYISOPRENE AND NEOPRENE BLEND: Brand: GAMMEX

## (undated) DEVICE — VIOLET BRAIDED (POLYGLACTIN 910), SYNTHETIC ABSORBABLE SUTURE: Brand: COATED VICRYL

## (undated) DEVICE — ENCORE® PERRY STYLE 42 PF SIZE 8, STERILE LATEX POWDER-FREE SURGICAL GLOVE: Brand: ENCORE

## (undated) DEVICE — DRAPE,EXTREMITY,89X128,STERILE: Brand: MEDLINE

## (undated) DEVICE — CARTRIDGE HC HMS+ CRTDG SYR

## (undated) DEVICE — INTENDED TO BE USED TO OCCLUDE, RETRACT AND IDENTIFY ARTERIES, VEINS, TENDONS AND NERVES IN SURGICAL PROCEDURES: Brand: STERION®  VESSEL LOOP

## (undated) DEVICE — GAMMEX® PI HYBRID SIZE 8, STERILE POWDER-FREE SURGICAL GLOVE, POLYISOPRENE AND NEOPRENE BLEND: Brand: GAMMEX

## (undated) DEVICE — PACK ASSRY CUSTOM TUBING

## (undated) DEVICE — SOL H2O 1000ML BTL

## (undated) DEVICE — SUT PROLENE 6-0 C-1 8706H

## (undated) DEVICE — SUTURE PDS II 2-0 CT-1

## (undated) DEVICE — UNDYED BRAIDED (POLYGLACTIN 910), SYNTHETIC ABSORBABLE SUTURE: Brand: COATED VICRYL

## (undated) DEVICE — 60 ML SYRINGE REGULAR TIP: Brand: MONOJECT

## (undated) DEVICE — YANKAUER SUCTION INSTRUMENT NO CONTROL VENT, BULB TIP, CLEAR: Brand: YANKAUER

## (undated) DEVICE — CATHETER SCLERO L240CM NDL 25GA OD0.51MM

## (undated) DEVICE — ADAPTER CRDPLG ANTGRD RTRGD 3W

## (undated) DEVICE — SUTURE ETHIBOND 0 CT-1

## (undated) DEVICE — ACTIVE FMS W/ INTREPID* ULTRA SLEEVES, 0.9MM 30° ABS* INTREPID* BALANCED TIP: Brand: ALCON

## (undated) DEVICE — BSS BAG CENTURION

## (undated) DEVICE — SUT SILK 4-0 SA63H

## (undated) DEVICE — SUTURE SURGICAL STEEL #7

## (undated) DEVICE — STERILE (10.2 X 147CM) TELESCOPICALLY-FOLDED COVER: Brand: CIV-FLEX™ TRANSDUCER COVER

## (undated) DEVICE — PAD PLMM SLVR 12.5X4IN SLVR

## (undated) DEVICE — SOL  .9 1000ML BAG

## (undated) DEVICE — STERILE LATEX POWDER-FREE SURGICAL GLOVESWITH NITRILE COATING: Brand: PROTEXIS

## (undated) DEVICE — SUBMUCOSAL INJ COMPO ELEVIEW

## (undated) DEVICE — SINGLE USE MEDICAL DEVICE FOR OPHTHALMIC SURGERY: Brand: SIL. COATED I/A 45 MIL 12/B

## (undated) DEVICE — BLADE ADVANCUT CORNEAL 2.4MM

## (undated) DEVICE — DERMABOND CLOSURE 0.7ML TOPICL

## (undated) DEVICE — Device: Brand: VIRTUOSAPH PLUS WITH RADIAL INDICATION

## (undated) DEVICE — 3M™ TEGADERM™ TRANSPARENT FILM DRESSING, 1626W, 4 IN X 4-3/4 IN (10 CM X 12 CM), 50 EACH/CARTON, 4 CARTON/CASE: Brand: 3M™ TEGADERM™

## (undated) DEVICE — DECANTER BAG 9": Brand: MEDLINE INDUSTRIES, INC.

## (undated) DEVICE — SUTURE SILK 2-0 SH

## (undated) DEVICE — HEART DRAPE AND SUPPLY: Brand: MEDLINE INDUSTRIES, INC.

## (undated) DEVICE — CS5/5+ FASTPACK, 225ML 150U RES: Brand: HAEMONETICS CELL SAVER 5/5+ SYSTEMS

## (undated) DEVICE — SUT VICRYL 3-0 SH J416H

## (undated) DEVICE — REM POLYHESIVE ADULT PATIENT RETURN ELECTRODE: Brand: VALLEYLAB

## (undated) DEVICE — CONTAINER GENT-L-KARE 4OZ GRAY

## (undated) DEVICE — SYSTEM ZDRIVE NLTX DISPOSABLE

## (undated) DEVICE — DRAPE SRG UNV 131X90IN LWR

## (undated) DEVICE — SUTURE SILK 2-0 SA85H

## (undated) DEVICE — FLOSEAL HEMOSTATIC MATRIX, 5ML: Brand: FLOSEAL HEMOSTATIC MATRIX

## (undated) DEVICE — DRAPE SLUSH/WARMER W/DISC

## (undated) DEVICE — SUT PROLENE 5-0 C-1 8720H

## (undated) DEVICE — 32 FR STRAIGHT – SOFT PVC CATHETER: Brand: PVC THORACIC CATHETERS

## (undated) DEVICE — PACK CUSTOM TUBING

## (undated) DEVICE — CATH SECURING DEVICE STATLOCK

## (undated) DEVICE — SOL  .9 1000ML BTL

## (undated) DEVICE — FORCEPS BX L240CM DIA2.4MM L NDL RAD JAW 4

## (undated) DEVICE — TRAY ENDOVEIN KTV16 HARVESTING

## (undated) DEVICE — CONMED SCOPE SAVER BITE BLOCK, 20X27 MM: Brand: SCOPE SAVER

## (undated) DEVICE — ARM SLING LARGE W/ NECK PAD

## (undated) DEVICE — SUTURE PROLENE 7-0 8701H

## (undated) DEVICE — SUTURE SILK 1-0 SA87G

## (undated) DEVICE — SUTURE NON ABS 3-0 30INL MONO

## (undated) DEVICE — 12 ML SYRINGE LUER-LOCK TIP: Brand: MONOJECT

## (undated) DEVICE — 12 FOOT DISPOSABLE EXTENSION CABLE WITH SAFE CONNECT / SCREW-DOWN

## (undated) DEVICE — STANDARD HYPODERMIC NEEDLE,POLYPROPYLENE HUB: Brand: MONOJECT

## (undated) DEVICE — 3M™ BAIR HUGGER® UNDERBODY BLANKET, FULL ACCESS, 10 PER CASE 63500: Brand: BAIR HUGGER™

## (undated) DEVICE — CLOSURE EXOFIN 1.0ML

## (undated) DEVICE — ALARM PT PTCH LVL

## (undated) DEVICE — PUNCH AORT 4MM 8IN ROT BLT TIP

## (undated) DEVICE — EZ GLIDE AORTIC CANNULA: Brand: EDWARDS LIFESCIENCES EZ GLIDE AORTIC CANNULA

## (undated) DEVICE — ELEC DEFIB QUIK COMBO

## (undated) DEVICE — SUTURE PROLENE 5-0 8325H

## (undated) DEVICE — Device

## (undated) DEVICE — SUT SILK 3-0 SA64H

## (undated) DEVICE — KIT ENDO ORCAPOD 160/180/190

## (undated) DEVICE — TOWEL SURG OR 17X26IN WHITE

## (undated) DEVICE — STERILE TETRA-FLEX CF, ELASTIC BANDAGE, 4" X 5.5YD: Brand: TETRA-FLEX™CF

## (undated) DEVICE — SUTURE VICRYL 1-0 J977H

## (undated) DEVICE — CATH THORACIC 32F 5 EYLT

## (undated) DEVICE — CLIP INTERNAL HORIZON SMALL

## (undated) NOTE — LETTER
[place] [address]  Authorization for Invasive Procedures  Date: 1/2/2023           Time: 1600    I hereby authorize Rasheed Morelos MD  , my physician and his/her assistants (if applicable), which may include medical students, residents, and/or fellows, to perform the following surgical operation/ procedure and administer such anesthesia as may be determined necessary by my physician:  Operation/Procedure name (s)  Cardiac Catheterization, Left Ventricular Cineangiography, Bilateral Selective Coronary Angiography and/or Right Heart Catheterization; possible Percutaneous Transluminal Coronary Angioplasty, Coronary Atherectomy, Coronary Stent, Intracoronary Thrombolytic therapy, Antiplatelet therapy and/or Intravascular Ultrasound on Terrence Vines   2.   I recognize that during the surgical operation/procedure, unforeseen conditions may necessitate additional or different procedures than those listed above.  I, therefore, further authorize and request that the above-named surgeon, assistants, or designees perform such procedures as are, in their judgment, necessary and desirable.    3.   My surgeon/physician has discussed prior to my surgery the potential benefits, risks and side effects of this procedure; the likelihood of achieving goals; and potential problems that might occur during recuperation.  They also discussed reasonable alternatives to the procedure, including risks, benefits, and side effects related to the alternatives and risks related to not receiving this procedure.  I have had all my questions answered and I acknowledge that no guarantee has been made as to the result that may be obtained.    4.   Should the need arise during my operation/procedure, which includes change of level of care prior to discharge, I also consent to the administration of blood and/or blood products.  Further, I understand that despite careful testing and screening of blood or blood products by  collecting agencies, I may still be subject to ill effects as a result of receiving a blood transfusion and/or blood products.  The following are some, but not all, of the potential risks that can occur: fever and allergic reactions, hemolytic reactions, transmission of diseases such as Hepatitis, AIDS and Cytomegalovirus (CMV) and fluid overload.  In the event that I wish to have an autologous transfusion of my own blood, or a directed donor transfusion, I will discuss this with my physician.  Check only if Refusing Blood or Blood Products  I understand refusal of blood or blood products as deemed necessary by my physician may have serious consequences to my condition to include possible death. I hereby assume responsibility for my refusal and release the hospital, its personnel, and my physicians from any responsibility for the consequences of my refusal.          o  Refuse      5.   I authorize the use of any specimen, organs, tissues, body parts or foreign objects that may be removed from my body during the operation/procedure for diagnosis, research or teaching purposes and their subsequent disposal by hospital authorities.  I also authorize the release of specimen test results and/or written reports to my treating physician on the hospital medical staff or other referring or consulting physicians involved in my care, at the discretion of the Pathologist or my treating physician.    6.   I consent to the photographing or videotaping of the operations or procedures to be performed, including appropriate portions of my body for medical, scientific, or educational purposes, provided my identity is not revealed by the pictures or by descriptive texts accompanying them.  If the procedure has been photographed/videotaped, the surgeon will obtain the original picture, image, videotape or CD.  The hospital will not be responsible for storage, release or maintenance of the picture, image, tape or CD.    7.   I consent  to the presence of a  or observers in the operating room as deemed necessary by my physician or their designees.    8.   I recognize that in the event my procedure results in extended X-Ray/fluoroscopy time, I may develop a skin reaction.    9. If I have a Do Not Attempt Resuscitation (DNAR) order in place, that status will be suspended while in the operating room, procedural suite, and during the recovery period unless otherwise explicitly stated by me (or a person authorized to consent on my behalf). The surgeon or my attending physician will determine when the applicable recovery period ends for purposes of reinstating the DNAR order.  10. Patients having a sterilization procedure: I understand that if the procedure is successful the results will be permanent and it will therefore be impossible for me to inseminate, conceive, or bear children.  I also understand that the procedure is intended to result in sterility, although the result has not been guaranteed.   11. I acknowledge that my physician has explained sedation/analgesia administration to me including the risk and benefits I consent to the administration of sedation/analgesia as may be necessary or desirable in the judgment of my physician.    I CERTIFY THAT I HAVE READ AND FULLY UNDERSTAND THE ABOVE CONSENT TO OPERATION and/or OTHER PROCEDURE.        ____________________________________       _________________________________      ______________________________  Signature of Patient         Signature of Responsible Person        Printed Name of Responsible Person    ____________________________________      _________________________________      ______________________________       Signature of Witness          Relationship to Patient                       Date                                       Time  Patient Name: Terrence Vines     : 1950                 Printed: 2024      Medical Record #: [id number]                       Page 1 of 2           STATEMENT OF PHYSICIAN My signature below affirms that prior to the time of the procedure; I have explained to the patient and/or his/her legal representative, the risks and benefits involved in the proposed treatment and any reasonable alternative to the proposed treatment. I have also explained the risks and benefits involved in refusal of the proposed treatment and alternatives to the proposed treatment and have answered the patient's questions. If I have a significant financial interest in a co-management agreement or a significant financial interest in any product or implant, or other significant relationship used in this procedure/surgery, I have disclosed this and had a discussion with my patient.     _______________________________________________________________ _____________________________  (Signature of Physician)                                                                                         (Date)                                   (Time)  Patient Name: Terrence Vines     : 1950                 Printed: 2024      Medical Record #: [id number]                      Page 2 of 2

## (undated) NOTE — LETTER
[facility] Department  Phone: [phone]  OUTSIDE TESTING RESULT REQUEST      TO:   Dr. Edgar Mcgee and staff        Today's Date: 2/7/19    FAX #: [phone]     IMPORTANT: FOR YOUR IMMEDIATE ATTENTION  Please FAX all test results lis

## (undated) NOTE — LETTER
[address], [place]  Authorization for Invasive Procedures  Date: 9/25/23           Time:     I hereby authorize    , my physician and his/her assistants (if applicable), which may include medical students, residents, and/or fellows, to perform the following surgical operation/ procedure and administer such anesthesia as may be determined necessary by my physician:  Left upper extremity fistula angioplasty on [address]  2. I recognize that during the surgical operation/procedure, unforeseen conditions may necessitate additional or different procedures than those listed above. I, therefore, further authorize and request that the above-named surgeon, assistants, or designees perform such procedures as are, in their judgment, necessary and desirable. 3.   My surgeon/physician has discussed prior to my surgery the potential benefits, risks and side effects of this procedure; the likelihood of achieving goals; and potential problems that might occur during recuperation. They also discussed reasonable alternatives to the procedure, including risks, benefits, and side effects related to the alternatives and risks related to not receiving this procedure. I have had all my questions answered and I acknowledge that no guarantee has been made as to the result that may be obtained. 4.   Should the need arise during my operation/procedure, which includes change of level of care prior to discharge, I also consent to the administration of blood and/or blood products. Further, I understand that despite careful testing and screening of blood or blood products by collecting agencies, I may still be subject to ill effects as a result of receiving a blood transfusion and/or blood products.   The following are some, but not all, of the potential risks that can occur: fever and allergic reactions, hemolytic reactions, transmission of diseases such as Hepatitis, AIDS and Cytomegalovirus (CMV) and fluid overload. In the event that I wish to have an autologous transfusion of my own blood, or a directed donor transfusion, I will discuss this with my physician. Check only if Refusing Blood or Blood Products  I understand refusal of blood or blood products as deemed necessary by my physician may have serious consequences to my condition to include possible death. I hereby assume responsibility for my refusal and release the hospital, its personnel, and my physicians from any responsibility for the consequences of my refusal.         o  Refuse         5. I authorize the use of any specimen, organs, tissues, body parts or foreign objects that may be removed from my body during the operation/procedure for diagnosis, research or teaching purposes and their subsequent disposal by hospital authorities. I also authorize the release of specimen test results and/or written reports to my treating physician on the hospital medical staff or other referring or consulting physicians involved in my care, at the discretion of the Pathologist or my treating physician. 6.   I consent to the photographing or videotaping of the operations or procedures to be performed, including appropriate portions of my body for medical, scientific, or educational purposes, provided my identity is not revealed by the pictures or by descriptive texts accompanying them. If the procedure has been photographed/videotaped, the surgeon will obtain the original picture, image, videotape or CD. The hospital will not be responsible for storage, release or maintenance of the picture, image, tape or CD.    7.   I consent to the presence of a  or observers in the operating room as deemed necessary by my physician or their designees. 8.   I recognize that in the event my procedure results in extended X-Ray/fluoroscopy time, I may develop a skin reaction. 9.  If I have a Do Not Attempt Resuscitation (DNAR) order in place, that status will be suspended while in the operating room, procedural suite, and during the recovery period unless otherwise explicitly stated by me (or a person authorized to consent on my behalf). The surgeon or my attending physician will determine when the applicable recovery period ends for purposes of reinstating the DNAR order. 10. Patients having a sterilization procedure: I understand that if the procedure is successful the results will be permanent and it will therefore be impossible for me to inseminate, conceive, or bear children. I also understand that the procedure is intended to result in sterility, although the result has not been guaranteed. 11. I acknowledge that my physician has explained sedation/analgesia administration to me including the risk and benefits I consent to the administration of sedation/analgesia as may be necessary or desirable in the judgment of my physician. I CERTIFY THAT I HAVE READ AND FULLY UNDERSTAND THE ABOVE CONSENT TO OPERATION and/or OTHER PROCEDURE.        ____________________________________       _________________________________      ______________________________  Signature of Patient         Signature of Responsible Person        Printed Name of Responsible Person        ____________________________________      _________________________________      ______________________________       Signature of Witness          Relationship to Patient                       Date                                       Time    Patient Name: Kenyatta Ballard     : 1950                 Printed: 2023      Medical Record #: [id number]                      Page 1 of 2          [place] My signature below affirms that prior to the time of the procedure; I have explained to the patient and/or his/her legal representative, the risks and benefits involved in the proposed treatment and any reasonable alternative to the proposed treatment.  I have also explained the risks and benefits involved in refusal of the proposed treatment and alternatives to the proposed treatment and have answered the patient's questions.  If I have a significant financial interest in a co-management agreement or a significant financial interest in any product or implant, or other significant relationship used in this procedure/surgery, I have disclosed this and had a discussion with my patient.     _______________________________________________________________ _____________________________  Maribell James  )                                                                                         (Date)                                   (Time)    Patient Name: Deven Calderon     : 1950                 Printed: 2023      Medical Record #: [id number]                      Page 2 of 2

## (undated) NOTE — LETTER
[address] [place] [address], [place]  Authorization for Surgical Operation and Procedure                                                                                           I hereby authorize Gary Ellsworth MD, my physician and his/her assistants (if applicable), which may include medical students, residents, and/or fellows, to perform the following surgical operation/ procedure and administer such anesthesia as may be determined necessary by my physician: Operation/Procedure name (s) COLONSCOPY on [address]   2. I recognize that during the surgical operation/procedure, unforeseen conditions may necessitate additional or different procedures than those listed above. I, therefore, further authorize and request that the above-named surgeon, assistants, or designees perform such procedures as are, in their judgment, necessary and desirable. 3.   My surgeon/physician has discussed prior to my surgery the potential benefits, risks and side effects of this procedure; the likelihood of achieving goals; and potential problems that might occur during recuperation. They also discussed reasonable alternatives to the procedure, including risks, benefits, and side effects related to the alternatives and risks related to not receiving this procedure. I have had all my questions answered and I acknowledge that no guarantee has been made as to the result that may be obtained. 4.   Should the need arise during my operation/procedure, which includes change of level of care prior to discharge, I also consent to the administration of blood and/or blood products. Further, I understand that despite careful testing and screening of blood or blood products by collecting agencies, I may still be subject to ill effects as a result of receiving a blood transfusion and/or blood products.   The following are some, but not all, of the potential risks that can occur: fever and allergic reactions, hemolytic reactions, transmission of diseases such as Hepatitis, AIDS and Cytomegalovirus (CMV) and fluid overload. In the event that I wish to have an autologous transfusion of my own blood, or a directed donor transfusion, I will discuss this with my physician. Check only if Refusing Blood or Blood Products  I understand refusal of blood or blood products as deemed necessary by my physician may have serious consequences to my condition to include possible death. I hereby assume responsibility for my refusal and release the hospital, its personnel, and my physicians from any responsibility for the consequences of my refusal.    o  Refuse   5. I authorize the use of any specimen, organs, tissues, body parts or foreign objects that may be removed from my body during the operation/procedure for diagnosis, research or teaching purposes and their subsequent disposal by hospital authorities. I also authorize the release of specimen test results and/or written reports to my treating physician on the hospital medical staff or other referring or consulting physicians involved in my care, at the discretion of the Pathologist or my treating physician. 6.   I consent to the photographing or videotaping of the operations or procedures to be performed, including appropriate portions of my body for medical, scientific, or educational purposes, provided my identity is not revealed by the pictures or by descriptive texts accompanying them. If the procedure has been photographed/videotaped, the surgeon will obtain the original picture, image, videotape or CD. The hospital will not be responsible for storage, release or maintenance of the picture, image, tape or CD.    7.   I consent to the presence of a  or observers in the operating room as deemed necessary by my physician or their designees.     8.   I recognize that in the event my procedure results in extended X-Ray/fluoroscopy time, I may develop a skin reaction. 9. If I have a Do Not Attempt Resuscitation (DNAR) order in place, that status will be suspended while in the operating room, procedural suite, and during the recovery period unless otherwise explicitly stated by me (or a person authorized to consent on my behalf). The surgeon or my attending physician will determine when the applicable recovery period ends for purposes of reinstating the DNAR order. 10. Patients having a sterilization procedure: I understand that if the procedure is successful the results will be permanent and it will therefore be impossible for me to inseminate, conceive, or bear children. I also understand that the procedure is intended to result in sterility, although the result has not been guaranteed. 11. I acknowledge that my physician has explained sedation/analgesia administration to me including the risk and benefits I consent to the administration of sedation/analgesia as may be necessary or desirable in the judgment of my physician. I CERTIFY THAT I HAVE READ AND FULLY UNDERSTAND THE ABOVE CONSENT TO OPERATION and/or OTHER PROCEDURE.     _________________________________________ _________________________________     ___________________________________  Signature of Patient     Signature of Responsible Person                   Printed Name of Responsible Person                              _________________________________________ ______________________________        ___________________________________  Signature of Witness         Date  Time         Relationship to Patient    STATEMENT OF PHYSICIAN My signature below affirms that prior to the time of the procedure; I have explained to the patient and/or his/her legal representative, the risks and benefits involved in the proposed treatment and any reasonable alternative to the proposed treatment.  I have also explained the risks and benefits involved in refusal of the proposed treatment and alternatives to the proposed treatment and have answered the patient's questions.  If I have a significant financial interest in a co-management agreement or a significant financial interest in any product or implant, or other significant relationship used in this procedure/surgery, I have disclosed this and had a discussion with my patient.     _______________________________________________________________ _____________________________  Walda Roof of Physician)                                                                                         (Date)                                   (Time)  Patient Name: Royal Session    : 1950   Printed: 2023      Medical Record #: [id number]                                              Page 1 of

## (undated) NOTE — IP AVS SNAPSHOT
Patient Demographics     Address  [address]   [place] [place] [address] Phone  [phone] (Home)  [phone] (Mobile) *Preferred* E-mail Address  [email or web address]      Patient Contacts     Name Relation Home Work Mobile    Luis Alexandra Son   [phone]    FLOYD ALEXANDRA Spouse   [phone]    Geraldo Alexandra Son   [phone]    Rodrigo Alexandra Son   [phone]    NING ALEXANDRA Daughter   [phone]      Allergies as of 1/5/2024  Review status set to Review Complete on 12/18/2023   No Known Allergies     Code Status Information     Code Status    Full Code        Patient Instructions       Please monitor sugars closely and titrate insulin as able    Please hold BP meds (hydralazine, metoprolol) Before HD.    Please given midodrine 5mg, prior to HD    Please follow up with PCP/cardiology/nephrology on DC.       Follow-up Information     Tara Bentley APN Follow up in 1 week(s).    Specialty: Nurse Practitioner  Why: Diabetes follow-up  Contact information:  [address]  [facility] [id number] [phone]             Hay Bautista MD Follow up.    Specialties: Cardiovascular Diseases, CARDIOLOGY  Contact information:  [address]   [address]  [facility] [id number] [phone]             Victor Manuel Cleaning MD Follow up.    Specialty: Internal Medicine  Contact information:  [address]  [address]  [facility] [id number] [phone]             Jaquan Parker MD. Schedule an appointment as soon as possible for a visit in 1 week(s).    Specialty: NEPHROLOGY  Contact information:  [address]  [facility] [address] [phone]                        Your Home Meds List      TAKE these medications       Instructions Authorizing Provider Morning Afternoon Evening As Needed   acetaminophen 500 MG Tabs  Commonly known as: Tylenol Extra Strength      Take 2 tablets (1,000 mg total) by mouth as needed.          albuterol 108 (90 Base) MCG/ACT Aers  Commonly known as: Ventolin  HFA      Inhale 2 puffs into the lungs every 4 (four) hours as needed for Wheezing.  Stop taking on: February 4, 2024   Tad Joshi         aspirin 81 MG Tbec  Next dose due: Tomorrow morning      Take 1 tablet (81 mg total) by mouth daily.   Naa Pabon KwikPen 100 UNIT/ML Sopn  Generic drug: insulin glargine  Next dose due: Tomorrow morning      Inject 25 Units into the skin every morning.   Nacho Kumarnathan Jonathan         Co Q-10 100 MG Chew  Next dose due: Tomorrow morning      Chew 100 mg by mouth daily.          cyanocobalamin 250 MCG Tabs  Commonly known as: Vitamin B12  Next dose due: Tomorrow morning      Take 1 tablet (250 mcg total) by mouth daily.          Dexcom G6 Sensor Misc      1 each by Does not apply route Every 10 days.   Tara Bentley         escitalopram 5 MG Tabs  Commonly known as: Lexapro  Next dose due: Tomorrow morning      Take 1 tablet (5 mg total) by mouth every morning.   Tad Joshi         finasteride 5 MG Tabs  Commonly known as: Proscar  Next dose due: Tomorrow morning      TAKE 1 TABLET(5 MG) BY MOUTH DAILY   Victor Manuel Cleaning         HumaLOG KwikPen 100 UNIT/ML Sopn  Generic drug: Insulin Lispro (1 Unit Dial)  Next dose due: With next meal      Inject 10 Units into the skin 3 (three) times daily.   Tad Joshi         hydrALAZINE 50 MG Tabs  Commonly known as: Apresoline  Next dose due: tonight      Take 1 tablet (50 mg total) by mouth 3 (three) times daily. Does not take before dialysis   Tad Joshi         Magnesium 500 MG Tabs  Next dose due: Tomorrow morning      Take 1 tablet (500 mg total) by mouth daily.          metoprolol succinate ER 50 MG Tb24  Commonly known as: Toprol XL  Next dose due: tonight      Take 1 tablet (50 mg total) by mouth in the morning and 1 tablet (50 mg total) before bedtime.          midodrine 5 MG Tabs  Commonly known as: ProAmatine      Take 1 tablet (5 mg total) by mouth Every Monday, Wednesday, and Friday.   Hue Galvan          multivitamin Tabs  Next dose due: Tomorrow morning      Take 1 tablet by mouth daily.   Orquidea Huitron         OneTouch Ultra Mini w/Device Kit      Test Blood Sugar Once Daily. Non-Insulin Dependent Diabetes Type II. E11.9.   Victor Manuel Cleaning         OneTouch Ultra Strp  Generic drug: Glucose Blood      Test Blood Sugar 4 Times Daily. Insulin Dependent Diabetes Type II. Z79.4, E11.9.   Vitcor Manuel Cleaning         OneTouch UltraSoft Lancets Misc      Test Blood Sugar Once Daily. Non-Insulin Dependent Diabetes Type II. E11.9.   Victor Manuel Cleaning         rosuvastatin 20 MG Tabs  Commonly known as: Crestor  Next dose due: tonight              Sennosides 17.2 MG Tabs      Take 1 tablet (17.2 mg total) by mouth nightly as needed (constipation, as needed if no bowel movement that day).   Tad Joshi         sevelamer carbonate 800 MG Tabs  Commonly known as: Renvela  Next dose due: With next meal       3 (three) times daily with meals.          tamsulosin 0.4 MG Caps  Commonly known as: Flomax  Next dose due: Tomorrow morning      Take 1 capsule (0.4 mg total) by mouth daily.   Tad Joshi         TRUEplus Pen Needles 32G X 4 MM Misc  Generic drug: Insulin Pen Needle      Use 1 needle 4 times a day   ELLIOTT SWADE               Where to Get Your Medications      Please  your prescriptions at the location directed by your doctor or nurse    Bring a paper prescription for each of these medications  albuterol 108 (90 Base) MCG/ACT Aers           [id number] - MAR ACTION REPORT  (last 48 hrs)    ** SITE UNKNOWN **     Order ID Medication Name Action Time Action Reason Comments    [id number] aspirin chewable tab 81 mg 01/04/24 0820 Given      [id number] aspirin chewable tab 81 mg 01/05/24 0921 Given      [id number] cyanocobalamin (Vitamin B12) tab 250 mcg 01/05/24 1052 Given      [id number] escitalopram (Lexapro) tab 5 mg 01/04/24 1044 Given      [id number] escitalopram (Lexapro) tab 5 mg 01/05/24 0921 Given      [id number]  finasteride (Proscar) 1 mg/mL oral solution 5 mg 01/04/24 1044 Given  Medication unavailable    [id number] hydrALAZINE (Apresoline) tab 50 mg 01/03/24 2215 Given      [id number] hydrALAZINE (Apresoline) tab 50 mg 01/04/24 1738 Given  Patient prev in dialysis    [id number] hydrALAZINE (Apresoline) tab 50 mg 01/04/24 2121 Given      [id number] lansoprazole (Prevacid Solutab) disintegrating tab 30 mg 01/04/24 0602 Given      [id number] lansoprazole (Prevacid Solutab) disintegrating tab 30 mg 01/05/24 0542 Given      [id number] magnesium oxide (Mag-Ox) tab 500 mg 01/04/24 0820 Given      [id number] magnesium oxide (Mag-Ox) tab 500 mg 01/05/24 0921 Given      [id number] melatonin tab 3 mg 01/03/24 2215 Given      [id number] melatonin tab 3 mg 01/04/24 2121 Given      [id number] metoprolol succinate ER (Toprol XL) 24 hr tab 50 mg 01/04/24 0603 Given      [id number] midodrine (ProAmatine) tab 5 mg 01/05/24 1300 Given      [id number] midodrine (ProAmatine) tab 5 mg 01/05/24 1430 Given      [id number] rosuvastatin (Crestor) tab 10 mg 01/03/24 2215 Given      [id number] rosuvastatin (Crestor) tab 10 mg 01/04/24 2121 Given      [id number] sevelamer carbonate (Renvela) tab 1,600 mg 01/04/24 0820 Given      [id number] sevelamer carbonate (Renvela) tab 1,600 mg 01/04/24 1739 Given      [id number] sevelamer carbonate (Renvela) tab 1,600 mg 01/05/24 1052 Given      [id number] tamsulosin (Flomax) cap 0.8 mg 01/04/24 0820 Given      [id number] tamsulosin (Flomax) cap 0.8 mg 01/05/24 0921 Given            LEFT LOWER ABDOMEN     Order ID Medication Name Action Time Action Reason Comments    [id number] heparin (Porcine) 5000 UNIT/ML injection 5,000 Units 01/04/24 0603 Given      [id number] insulin detemir (Levemir) 100 UNIT/ML FlexPen/FlexTouch 30 Units 01/05/24 1054 Given            LEFT UPPER ABDOMEN     Order ID Medication Name Action Time Action Reason Comments    [id number] heparin (Porcine) 5000 UNIT/ML injection 5,000 Units 01/03/24 2216 Given             LEFT UPPER ARM     Order ID Medication Name Action Time Action Reason Comments    [id number] heparin (Porcine) 5000 UNIT/ML injection 5,000 Units 01/04/24 1350 Given      [id number] insulin aspart (NovoLOG) 100 Units/mL FlexPen 1-5 Units 01/04/24 0940 Given      [id number] insulin aspart (NovoLOG) 100 Units/mL FlexPen 1-5 Units 01/04/24 1349 Given  Dialysis    [id number] insulin aspart (NovoLOG) 100 Units/mL FlexPen 1-5 Units 01/04/24 1905 Given            RIGHT LOWER ABDOMEN     Order ID Medication Name Action Time Action Reason Comments    [id number] insulin aspart (NovoLOG) 100 Units/mL FlexPen 1-5 Units 01/05/24 1054 Given  218          RIGHT UPPER ARM     Order ID Medication Name Action Time Action Reason Comments    [id number] heparin (Porcine) 5000 UNIT/ML injection 5,000 Units 01/04/24 2121 Given      [id number] heparin (Porcine) 5000 UNIT/ML injection 5,000 Units 01/05/24 0543 Given              Recent Vital Signs    Flowsheet Row Most Recent Value   /59 Filed at 01/05/2024 1239   Pulse 92 Filed at 01/05/2024 1239   Resp 19 Filed at 01/05/2024 0915   Temp 97.4 °F (36.3 °C) Filed at 01/05/2024 0915   SpO2 93 % Filed at 01/05/2024 0915      Patient's Most Recent Weight    Flowsheet Row Most Recent Value   Patient Weight 97.6 kg (215 lb 1.6 oz)      CPAP Settings (Inpatient)    Flowsheet Row Most Recent Value   Mode AVAPS   Interface Full face mask   Mask size Medium   Set rate 18 breaths/min   Set IPAP --  [Avaps]   Set EPAP 7   O2% 30 %   I time 1         Lab Results Last 24 Hours      RAINBOW DRAW LAVENDER [[id number]]  Resulted: 01/05/24 0801, Result status: Final result   Ordering provider: Zeb Joshi MD  01/05/24 0722 Resulting lab: [facility] ([facility])    Specimen Information    Type Source Collected On   Blood — 01/05/24 0607          Components    Component Value Reference Range Flag Lab   Hold Lavender Auto Resulted — — [facility] ([facility])            Renal Function Panel  [[id number]] (Abnormal)  Resulted: 01/05/24 0648, Result status: Final result   Ordering provider: Dante Gifford DO  01/04/24 2300 Resulting lab: [facility] ([facility])    Specimen Information    Type Source Collected On   Blood — 01/05/24 0607          Components    Component Value Reference Range Flag Lab   Glucose 203 70 - 99 mg/dL [facility] ([facility])   Sodium 136 136 - 145 mmol/L — [facility])   Potassium 4.1 3.5 - 5.1 mmol/L — [facility])   Chloride 100 98 - 112 mmol/L — [facility])   CO2 28.0 21.0 - 32.0 mmol/L — [facility])   Anion Gap 8 0 - 18 mmol/L — [facility])   BUN 35 9 - 23 mg/dL [facility])   Creatinine 5.14 0.70 - 1.30 mg/dL [facility] ([facility])   BUN/CREA Ratio 6.8 10.0 - 20.0 L [facility])   Calcium, Total 9.0 8.7 - 10.4 mg/dL — [facility])   Calculated Osmolality 296 275 - 295 mOsm/kg [facility] ([facility])   eGFR-Cr 11 >=60 mL/min/1.73m2 L [facility])   Albumin 4.1 3.2 - 4.8 g/dL — [facility])   Phosphorus 2.5 2.4 - 5.1 mg/dL — [facility] ([facility])            Testing Performed By     Lab - Abbreviation Name Director Address Valid Date Range    162 - [facility] ([facility]) [facility] ([facility]) [facility] [id number] 03/19/20 1442 - Present            Microbiology Results (All)     Procedure Component Value Units Date/Time    SARS-CoV-2/Flu A and B/RSV by PCR (GeneXpert) [[id number]]  (Abnormal) Collected: 12/18/23 0851    Order Status: Completed Lab Status: Final result Updated: 12/18/23 0949    Specimen: Other from Nares      SARS-CoV-2 (COVID-19) - (GeneXpert) Not Detected     Influenza A by PCR Positive     Influenza B by PCR Negative     RSV by PCR Negative    Narrative:      This test is intended for the qualitative detection and differentiation of SARS-CoV-2, influenza A, influenza B, and respiratory syncytial virus (RSV) viral RNA in  nasopharyngeal or nares swabs from individuals suspected of respiratory viral infection consistent with COVID-19 by their healthcare provider. Signs and symptoms of respiratory viral infection due to SARS-CoV-2, influenza, and RSV can be similar.    Test performed using the Xpert Xpress SARS-CoV-2/FLU/RSV (real time RT-PCR)  assay on the GeneXpert instrument, [employer], [place] [address].   This test is being used under the Food and Drug Administration's Emergency Use Authorization.    The authorized Fact Sheet for Healthcare Providers for this assay is available upon request from the laboratory.         H&P - H&P Note      H&P signed by Bradley Dash MD at 12/18/2023  2:13 PM  Version 2 of 2    Author: Bradley Dash MD Service: — Author Type: Physician    Filed: 12/18/2023  2:13 PM Date of Service: 12/18/2023  9:45 AM Status: Addendum    : Bradley Dash MD (Physician)    Related Notes: Original Note by Bradley Dash MD (Physician) filed at 12/18/2023  1:45 PM         [facility] Hospitalist H&P       CC:   Chief Complaint   Patient presents with    Difficulty Breathing        PCP: Victor Manuel Cleaning MD    History of Present Illness: Mr. Vines is a 73 year old male with PMH BPH , CHF / ICM EF 30% , Moderate MR, HTN, HLD, ESRD on HD M/W/F, Anemia, Gastritis, who presents with SOB. Patient has had a cough for over a month but felt more SOB which started yesterday.  He was treated with a course of abx as out patient with no improvement in cough.  He was supposed to be on PPI from last admission but was not taking.  Denies CP, abd pain, n/v, f/c.  Appetite as been good.  Son at bedside and confirms that he has not missed any HD sessions and does not add salt to his food.     PMH  Past Medical History:   Diagnosis Date    BPH (benign prostatic hypertrophy)     Cataract     Congestive heart disease (HCC)     Coronary atherosclerosis     Diabetes (HCC)     Diabetic neuropathy (HCC)     Diabetic retinopathy (HCC)      Dialysis patient ([facility])     M,W,F,    Esophageal reflux     Heart valve disease     High blood pressure     High cholesterol     HLD (hyperlipidemia)     HTN (hypertension)     Neuropathy     Proteinuria     Renal disorder     HD every MWF with temporary HD cath    Type II diabetes mellitus ([facility])     Visual impairment     Reading glasses        PSH  Past Surgical History:   Procedure Laterality Date    CABG  06/21/2021    x3-lima-->LAD, SVG-->diag and SVG-->OM    CATARACT Right     COLONOSCOPY N/A 11/8/2023    Procedure: COLONOSCOPY;  Surgeon: Cullen Bautista MD;  Location: [facility] ENDOSCOPY        ALL:  No Known Allergies     Home Medications:  No outpatient medications have been marked as taking for the 12/18/23 encounter (Hospital Encounter).       Soc Hx  Social History     Tobacco Use    Smoking status: Never    Smokeless tobacco: Never   Substance Use Topics    Alcohol use: Never        Fam Hx  Family History   Problem Relation Age of Onset    No Known Problems Father     No Known Problems Mother     No Known Problems Daughter     No Known Problems Son     No Known Problems Son     No Known Problems Son     No Known Problems Sister     No Known Problems Brother        Review of Systems  Comprehensive ROS reviewed and negative except for what's stated above.     OBJECTIVE:  /78   Pulse 84   Temp 97.1 °F (36.2 °C) (Temporal)   Resp 21   SpO2 100%   General: Alert, no acute distress  HEENT: atraumatic, sclera anicteric, oral mucosa normal   Neck: non tender, no adenopathy   Lungs: clear to ausculation bilaterally  Heart: Regular rate and rhythm  Abdomen: soft, non tender, non distended   Extremities: No edema  Skin: no new rash, normal color  Neuro: 5/5 strength in bilateral extremities, normal sensations  Psych: appropriate affect   Diagnostic Data:    CBC/Chem  Recent Labs   Lab 12/18/23  0851   WBC 8.4   HGB 9.6*   MCV 98.8   .0*       No results for input(s): \"NA\", \"K\", \"CL\", \"CO2\", \"BUN\",  \"CREATSERUM\", \"GLU\", \"CA\", \"CAION\", \"MG\", \"PHOS\" in the last 168 hours.    No results for input(s): \"ALT\", \"AST\", \"ALB\", \"AMYLASE\", \"LIPASE\", \"LDH\" in the last 168 hours.    Invalid input(s): \"ALPHOS\", \"TBIL\", \"DBIL\", \"TPROT\"    No results for input(s): \"TROP\" in the last 168 hours.    Additional Diagnostics:     Radiology: XR CHEST AP PORTABLE  (CPT=71045)    Result Date: 12/18/2023  CONCLUSION:  1. Cardiomegaly and prominent central vasculature.  CABG. 2. Mild perihilar and lower lobe pulmonary vascular congestive changes.  No pleural effusion    Dictated by (CST): Zaki Reyna MD on 12/18/2023 at 9:18 AM     Finalized by (CST): Zaki Reyna MD on 12/18/2023 at 9:21 AM             ASSESSMENT / PLAN:   Mr. Vines is a 73 year old male with PMH BPH , CHF / ICM EF 30% , CAD s/p CABG x3, Moderate MR, HTN, HLD, ESRD on HD M/W/F, Anemia, Gastritis, who presents with SOB.  Admitted for fluid overload.      Volume overload   SOB  ERSD on HD M/W/F  - CXR with congestion, bnp 271   - renal consulted  - s/p recent AV fistula repair by vascular surgery in September 2023  - HD per renal, will likely need an extra session     Influenza A positive   - cough for over a month but SOB more acute  - no fevers or Leukocytosis   - CXR reviewed   - will renally dose Tamiflu for 1/5 days treatment     Trop Elevation   - no chest pain   - EKG with concern for LBBB  - will trend trop    - discussed with Cards     CAD s/p CABG x3   Ischemic cardiomyopathy EF 30% without acute exacerbation of congestive heart failure  Chronic Systolic heart failure  HTN  - Continue home metoprolol hydralazine  - Not on ACE or ARB due to renal disease  - Does not use diuretics, making urine     Anemia   Thrombocytopenia   Gastritis   - s/p EGD 11/7 with some mild gastritis, no active bleeding  - s/p colonoscopy 11/8 with polyps but no active bleeding, will need to repeat colonoscopy as outpatient  - PPI BID was not taking      Type 2 diabetes  with hyperglycemia  Diabetic nephropathy  - gabapentin  - ISS, meal time and basal insulin   - Controlled renal diet  - Continue home aspirin     Hyperlipidemia  - Continue home statin     BPH continue home Flomax     FN:  - IVF: hold  - Diet: Renal, DM    DVT Prophy: SCDs HSQ   Atrophy: Ambulate PT/OT  Lines: Piv    Dispo: pending clinical course    Outpatient records or previous hospital records reviewed.     Further recommendations pending patient's clinical course.  [facility] hospitalist to continue to follow patient while in house    Patient and/or patient's family given opportunity to ask questions and note understanding and agreeing with therapeutic plan as outlined    Thank You,  Bradley Dash MD    [facility] Hospitalist  Answering Service number: [phone]      Electronically signed by Bradley Dash MD on 12/18/2023  2:13 PM     H&P signed by Bradley Dash MD at 12/18/2023  1:45 PM  Version 1 of 2    Author: Bradley Dash MD Service: — Author Type: Physician    Filed: 12/18/2023  1:45 PM Date of Service: 12/18/2023  9:45 AM Status: Signed    : Bradley Dash MD (Physician)    Related Notes: Addendum by Bradley Dash MD (Physician) filed at 12/18/2023  2:13 PM         [facility] Hospitalist H&P       CC:   Chief Complaint   Patient presents with    Difficulty Breathing        PCP: Victor Manuel Cleaning MD    History of Present Illness: Mr. Vines is a 73 year old male with PMH BPH , CHF / ICM EF 30% , Moderate MR, HTN, HLD, ESRD on HD M/W/F, Anemia, Gastritis, who presents with SOB. Patient has had a cough for over a month but felt more SOB which started yesterday.  He was treated with a course of abx as out patient with no improvement in cough.  He was supposed to be on PPI from last admission but was not taking.  Denies CP, abd pain, n/v, f/c.  Appetite as been good.  Son at bedside and confirms that he has not missed any HD sessions and does not add salt to his food.     PMH  Past Medical  History:   Diagnosis Date    BPH (benign prostatic hypertrophy)     Cataract     Congestive heart disease (HCC)     Coronary atherosclerosis     Diabetes (HCC)     Diabetic neuropathy (HCC)     Diabetic retinopathy (HCC)     Dialysis patient (HCC)     M,W,F,    Esophageal reflux     Heart valve disease     High blood pressure     High cholesterol     HLD (hyperlipidemia)     HTN (hypertension)     Neuropathy     Proteinuria     Renal disorder     HD every MWF with temporary HD cath    Type II diabetes mellitus (HCC)     Visual impairment     Reading glasses        PSH  Past Surgical History:   Procedure Laterality Date    CABG  06/21/2021    x3-lima-->LAD, SVG-->diag and SVG-->OM    CATARACT Right     COLONOSCOPY N/A 11/8/2023    Procedure: COLONOSCOPY;  Surgeon: Cullen Bautista MD;  Location: [facility] ENDOSCOPY        ALL:  No Known Allergies     Home Medications:  No outpatient medications have been marked as taking for the 12/18/23 encounter (Hospital Encounter).       Soc Hx  Social History     Tobacco Use    Smoking status: Never    Smokeless tobacco: Never   Substance Use Topics    Alcohol use: Never        Fam Hx  Family History   Problem Relation Age of Onset    No Known Problems Father     No Known Problems Mother     No Known Problems Daughter     No Known Problems Son     No Known Problems Son     No Known Problems Son     No Known Problems Sister     No Known Problems Brother        Review of Systems  Comprehensive ROS reviewed and negative except for what's stated above.     OBJECTIVE:  /78   Pulse 84   Temp 97.1 °F (36.2 °C) (Temporal)   Resp 21   SpO2 100%   General: Alert, no acute distress  HEENT: atraumatic, sclera anicteric, oral mucosa normal   Neck: non tender, no adenopathy   Lungs: clear to ausculation bilaterally  Heart: Regular rate and rhythm  Abdomen: soft, non tender, non distended   Extremities: No edema  Skin: no new rash, normal color  Neuro: 5/5 strength in bilateral  extremities, normal sensations  Psych: appropriate affect   Diagnostic Data:    CBC/Chem  Recent Labs   Lab 12/18/23  0851   WBC 8.4   HGB 9.6*   MCV 98.8   .0*       No results for input(s): \"NA\", \"K\", \"CL\", \"CO2\", \"BUN\", \"CREATSERUM\", \"GLU\", \"CA\", \"CAION\", \"MG\", \"PHOS\" in the last 168 hours.    No results for input(s): \"ALT\", \"AST\", \"ALB\", \"AMYLASE\", \"LIPASE\", \"LDH\" in the last 168 hours.    Invalid input(s): \"ALPHOS\", \"TBIL\", \"DBIL\", \"TPROT\"    No results for input(s): \"TROP\" in the last 168 hours.    Additional Diagnostics:     Radiology: XR CHEST AP PORTABLE  (CPT=71045)    Result Date: 12/18/2023  CONCLUSION:  1. Cardiomegaly and prominent central vasculature.  CABG. 2. Mild perihilar and lower lobe pulmonary vascular congestive changes.  No pleural effusion    Dictated by (CST): Zaki Reyna MD on 12/18/2023 at 9:18 AM     Finalized by (CST): Zaki Reyna MD on 12/18/2023 at 9:21 AM             ASSESSMENT / PLAN:   Mr. Vines is a 73 year old male with PMH BPH , CHF / ICM EF 30% , Moderate MR, HTN, HLD, ESRD on HD M/W/F, Anemia, Gastritis, who presents with SOB.  Admitted for fluid overload.      Volume overload   SOB  ERSD on HD M/W/F  - CXR with congestion, bnp 271   - renal consulted  - s/p recent AV fistula repair by vascular surgery in September 2023  - HD per renal, will likely need an extra session     Influenza A positive   - cough for over a month but SOB more acute  - no fevers or Leukocytosis   - CXR reviewed   - will renally dose Tamiflu for 1/5 days treatment     Trop Elevation   - no chest pain   - EKG with concern for LBBB  - will trend trop    - discussed with Cards     Ischemic cardiomyopathy EF 30% without acute exacerbation of congestive heart failure  Chronic Systolic heart failure  HTN  - Continue home metoprolol hydralazine  - Not on ACE or ARB due to renal disease  - Does not use diuretics, making urine     Anemia   Thrombocytopenia   Gastritis   - s/p EGD 11/7 with  some mild gastritis, no active bleeding  - s/p colonoscopy 11/8 with polyps but no active bleeding, will need to repeat colonoscopy as outpatient  - PPI BID was not taking      Type 2 diabetes with hyperglycemia  Diabetic nephropathy  - gabapentin  - ISS, meal time and basal insulin   - Controlled renal diet  - Continue home aspirin     Hyperlipidemia  - Continue home statin     BPH continue home Flomax     FN:  - IVF: hold  - Diet: Renal, DM    DVT Prophy: SCDs HSQ   Atrophy: Ambulate PT/OT  Lines: Piv    Dispo: pending clinical course    Outpatient records or previous hospital records reviewed.     Further recommendations pending patient's clinical course.  [facility] Hospitalist to continue to follow patient while in house    Patient and/or patient's family given opportunity to ask questions and note understanding and agreeing with therapeutic plan as outlined    Thank You,  Bradley Dash MD    [facility]  Answering Service number: [phone]      Electronically signed by Bradley Dash MD on 12/18/2023  1:45 PM              Consults - MD Consult Notes      Consults signed by Esequiel Morrow MD at 1/1/2024 12:38 PM      Author: Esequiel Morrow MD Service: Psychiatry Author Type: Physician    Filed: 1/1/2024 12:38 PM Status: Signed    : Esequiel Morrow MD (Physician)       [facility]    PATIENT'S NAME: SERGIO ALEXANDRA   ATTENDING PHYSICIAN: Zeb Joshi MD   CONSULTING PHYSICIAN: Esequiel Morrow MD   PATIENT ACCOUNT#:   [id number]    LOCATION:  [address] [place] [address]  MEDICAL RECORD #:   [id number]       YOB: 1950  ADMISSION DATE:       12/18/2023      CONSULT DATE:  12/31/2023    REPORT OF CONSULTATION      IDENTIFICATION:  The patient is a 73-year-old  [place] male currently living with his wife and daughter.    CHIEF COMPLAINT:  Confusion.    HISTORY OF PRESENT ILLNESS:  The patient was initially admitted for fluid overload and hypoxic and  hypercapnic respiratory failure.  His nurses report that he has pulled off his BiPAP multiple times and is now on nasal cannula.  He also developed a case of influenza A while here at the hospital.  CT the brain on  showed no acute pathology.  Nurses noted that he will laugh sometimes when he is being cleaned up after having a bowel movement.  He is sometimes alert and talking with them and sometimes confused and sleepy.    During my interview with the patient, he said that his mood has been okay and his mood is generally not depressed but was when\"a doctor said I am dying.\"  He then said \"I had a seizure and I .\"  He said then they put him in a room and said \"I'm supposed to be dead.\"  He talked about his son flying from [place] to [place] because his son is a physician and wanted to be involved in his care.  He said, \"I have no slightest idea about what is going on because I am dying.\"  He claims that he sleeps 1 to 2 hours at a time during the day.  He claims his concentration is good with the TV.    I called his wife who felt that he was doing fine during her last visit with him.  I also spoke with his daughter who said that lately he cannot remember where he is and talks about random things.  This has been true for a couple of weeks and was true even for a while before the hospitalization.  He recognizes family members and will talk about things that do not make sense sometimes.  They note that he has been tired and sleepy during their visits.    PAST MEDICAL HISTORY:  Diabetes, BPH, hypertension, hyperlipidemia, end-stage renal disease on hemodialysis, shortness of breath, CHF, GERD, anemia, hypoxic and hypercapnic respiratory failure, influenza A infection.      PAST PSYCHIATRIC HISTORY:  None per patient.     MEDICATIONS:  Aspirin, vitamin B12, heparin, insulin, Prevacid, Mag-Ox, melatonin 3 mg at bedtime, metoprolol  mg daily, Renvela t.i.d., tamsulosin.    ALLERGIES:  No known drug  allergies.    SOCIAL HISTORY:  He lives with his wife and daughter.    FAMILY HISTORY:  None for psychiatric illness or substance abuse.    REVIEW OF SYSTEMS:  Reviewed in Epic.      MENTAL STATUS EXAMINATION:  The patient is an elderly East [place] male sitting up in his hospital gown in hospital bed, eating and drinking very slowly during the interview which made it hard to understand some of the things that he was saying.  Affect is quite sleepy and he appeared to be having difficulty staying awake, and mood is constricted.  Eye contact was poor as it seemed like he was falling asleep much of the time.  He denies suicidal or homicidal ideation and denied auditory or visual hallucinations.  Insight and judgment are poor.  Thought content and process are grossly within normal limits.  Intellect and memory are below average.  Assets include a desire to get well.  Liabilities include his multiple illnesses.  He did not know the date but knew that we were at [facility].      INITIAL ASSESSMENT:  This is a 73-year-old  [place] male who has been having more episodes of being somewhat sedated and confused at home but much more in the hospital.  He was admitted for fluid overload and CO2 retention.  His oxygen needs have been less, but he still confused at times and talking about things that are not relevant to the conversation at times.    Based on what the family said as well as what staff are saying, it seems that he is still recovering from his significant metabolic problems during his severe illnesses here.  I do not believe that he is psychotic or severely depressed.  The most appropriate diagnosis seems to be delirium due to his medical condition.     INITIAL DIAGNOSIS:    AXIS I:  Delirium.  AXIS II:  Deferred.  AXIS III:  Multiple, see above.   AXIS IV:  Severe, severe medical problems.   AXIS V:  Current 20, past year 65.     INITIAL TREATMENT RECOMMENDATIONS:  At this time, I have no  psychiatric recommendations.  Reassuring the patient that he is not dying would be probably helpful for him over time.  I would expect that very slowly over time he would be likely to recover from his delirium and confusion.    Thank you for referring this patient to us.  We will follow him with you.     Dictated By Esequiel Morrow MD  d: 12/31/2023 19:04:43  t: 12/31/2023 19:27:47  Job [id number]  [facility]/    Electronically signed by Esequiel Morrow MD on 1/1/2024 12:38 PM              Discharge Summary - D/C Summary      Discharge Summary signed by Zeb Joshi MD at 1/5/2024  1:39 PM  Version 2 of 2    Author: Zeb Joshi MD Service: Hospitalist Author Type: Physician    Filed: 1/5/2024  1:39 PM Date of Service: 1/5/2024  1:25 PM Status: Addendum    : Zeb Joshi MD (Physician)    Related Notes: Original Note by Zeb Joshi MD (Physician) filed at 1/5/2024  1:38 PM       General Medicine Discharge Summary     Patient ID:  Terrence Vines  73 year old  9/1/1950    Admit date: 12/18/2023    Discharge date and time: 1/5/2024    Attending Physician: Zeb Joshi MD     Consults: IP CONSULT TO NEPHROLOGY  IP CONSULT TO CARDIOLOGY  IP CONSULT TO PULMONOLOGY  IP CONSULT TO FOOD AND NUTRITION SERVICES  IP CONSULT TO SOCIAL WORK  IP CONSULT TO PSYCHIATRY    Primary Care Physician: Victor Manuel Cleaning MD     Reason for admission: influenza    Risk For Readmission: moderate    Discharge Diagnoses: ESRD on dialysis (HCC) [N18.6, Z99.2]  Acute respiratory failure with hypoxia (HCC) [J96.01]  See Additional Discharge Diagnoses in Hospital Course    Discharged Condition: good    Follow-up with labs/images appointments:   Please monitor sugars closely and titrate insulin as able    Please hold BP meds (hydralazine, metoprolol) Before HD.    Please given midodrine 5mg, prior to HD    Please follow up with PCP/cardiology/nephrology on DC.      Exam  Gen: No acute distress  Pulm: Lungs clear, normal respiratory  effort  CV: Heart with regular rate and rhythm  Abd: Abdomen soft,   EXT: no edema     HPI:   Per Dr. Dash:  History of Present Illness: Mr. Vines is a 73 year old male with PMH BPH , CHF / ICM EF 30% , Moderate MR, HTN, HLD, ESRD on HD M/W/F, Anemia, Gastritis, who presents with SOB. Patient has had a cough for over a month but felt more SOB which started yesterday.  He was treated with a course of abx as out patient with no improvement in cough.  He was supposed to be on PPI from last admission but was not taking.  Denies CP, abd pain, n/v, f/c.  Appetite as been good.  Son at bedside and confirms that he has not missed any HD sessions and does not add salt to his food.       Hospital Course:   Mr. Vines is a 73 year old male with PMH BPH , CHF / ICM EF 30% , CAD s/p CABG x3, Moderate MR, HTN, HLD, ESRD on HD M/W/F, Anemia, Gastritis, who presents with SOB. Admitted for fluid overload and Influenza A.  Hypoxic and hypercapnic respiratory failure clinically improved, weaned off of oxygen, course further complicated by encephalopathy, slowly improving, S/P right heart cath (1/3). Accurate dry weight, is 96.7 kg (213 lbs), clinically improved, plan for adding midodrine 5 mg prior to dialysis to help with fluid removal, cleared for discharge to rehab per cardiology and nephrology, plan for DC to [facility] following HD today.  Close follow-up with PCP, cardiology, nephrology on discharge.     Acute Hypoxic Respiratory Failure  Pulmonary edema  ESRD on HD M/W/F  - positive influenza   - HD per renal  - s/p recent AV fistula repair by vascular surgery in September 2023  - steroid burst per pulmonary ended 12/24  - Transferred to medical floor on 12/28  - off O2, and completed Tamiflu course  - Hypotension with HD, nephrology recommending 5 mg midodrine prior to dialysis, and continuing to hold antihypertensives on days of dialysis.     Encephalopathy  - improving  - mobilize, needs therapy. He is very  deconditioned   - alert and awake, following all commands, engaging in conversation  - CT brain 12/30, negative for acute pathology  - improving each day, seen by psychiatry whom agrees   -Likely some underlying depression/anxiety, Lexapro added, will continue     Influenza A positive   - cough for over a month but SOB more acute  - renally dose Tamiflu completed   - Ceftriaxone and steroids started 12/22/23, now off      Troponin Elevation   - no chest pain   - EKG with LBBB  - cardiology follow-up  -ACS ruled out     CAD s/p CABG x3   Ischemic cardiomyopathy EF 30% without acute exacerbation of congestive heart failure  Chronic Systolic heart failure  HTN  -Resume lower dose metoprolol, and hydralazine hold prior to dialysis     Anemia   Thrombocytopenia   Gastritis   - s/p EGD 11/7 with some mild gastritis, no active bleeding  - s/p colonoscopy 11/8 with polyps but no active bleeding, will need to repeat colonoscopy as outpatient  - on prevacid here      Type 2 diabetes with hyperglycemia  Diabetic nephropathy  - gabapentin (will hold to prevent lethargy), can consider  - Controlled renal diet  - Continue long-acting insulin, mealtime insulin  - Continue home aspirin     Hyperlipidemia  - restarted his statin     BPH  - resume finasteride  - on flomax     Depression  -Diagnosed by the psychiatrist, started on Lexapro    Operative Procedures:      Imaging: No results found.      Disposition: SNF    Activity: activity as tolerated  Diet: regular diet  Wound Care: as directed  Code Status: Full Code    Home Medication Changes:     Med list     Medication List        START taking these medications      escitalopram 5 MG Tabs  Commonly known as: Lexapro     midodrine 5 MG Tabs  Commonly known as: ProAmatine     Sennosides 17.2 MG Tabs            CHANGE how you take these medications      finasteride 5 MG Tabs  Commonly known as: Proscar  TAKE 1 TABLET(5 MG) BY MOUTH DAILY  What changed:   when to take this  additional  instructions     HumaLOG KwikPen 100 UNIT/ML Sopn  Generic drug: Insulin Lispro (1 Unit Dial)  What changed: how much to take     tamsulosin 0.4 MG Caps  Commonly known as: Flomax  What changed: how much to take            CONTINUE taking these medications      acetaminophen 500 MG Tabs  Commonly known as: Tylenol Extra Strength     albuterol 108 (90 Base) MCG/ACT Aers  Commonly known as: Ventolin HFA  Inhale 2 puffs into the lungs every 4 (four) hours as needed for Wheezing.     aspirin 81 MG Tbec  Take 1 tablet (81 mg total) by mouth daily.     Basaglar KwikPen 100 UNIT/ML Sopn  Generic drug: insulin glargine     Co Q-10 100 MG Chew     cyanocobalamin 250 MCG Tabs  Commonly known as: Vitamin B12     Dexcom G6 Sensor Misc  1 each by Does not apply route Every 10 days.     hydrALAZINE 50 MG Tabs  Commonly known as: Apresoline     Magnesium 500 MG Tabs     metoprolol succinate ER 50 MG Tb24  Commonly known as: Toprol XL     multivitamin Tabs  Take 1 tablet by mouth daily.     OneTouch Ultra Mini w/Device Kit  Test Blood Sugar Once Daily. Non-Insulin Dependent Diabetes Type II. E11.9.     OneTouch Ultra Strp  Generic drug: Glucose Blood  Test Blood Sugar 4 Times Daily. Insulin Dependent Diabetes Type II. Z79.4, E11.9.     OneTouch UltraSoft Lancets Misc  Test Blood Sugar Once Daily. Non-Insulin Dependent Diabetes Type II. E11.9.     rosuvastatin 20 MG Tabs  Commonly known as: Crestor     sevelamer carbonate 800 MG Tabs  Commonly known as: Renvela     TRUEplus Pen Needles 32G X 4 MM Misc  Generic drug: Insulin Pen Needle  Use 1 needle 4 times a day            STOP taking these medications      gabapentin 100 MG Caps  Commonly known as: Neurontin               Where to Get Your Medications        You can get these medications from any pharmacy    Bring a paper prescription for each of these medications  albuterol 108 (90 Base) MCG/ACT Aers         FU   Follow-up Information       Tara Bentley APN Follow up in 1  week(s).    Specialty: Nurse Practitioner  Why: Diabetes follow-up  Contact information:  [address]  [address]  [place] [id number] [phone]               Hay Bautista MD Follow up.    Specialties: Cardiovascular Diseases, CARDIOLOGY  Contact information:  [address]   [address]  [facility] [address]  [phone]               Victor Manuel Cleaning MD Follow up.    Specialty: Internal Medicine  Contact information:  [address]  [address]  [facility] [address]  [phone]               Jaquan Parker MD. Schedule an appointment as soon as possible for a visit in 1 week(s).    Specialty: NEPHROLOGY  Contact information:  [address]  [facility] [id number] [phone]                             DC instructions:      Other Discharge Instructions:         Please monitor sugars closely and titrate insulin as able    Please hold BP meds (hydralazine, metoprolol) Before HD.    Please given midodrine 5mg, prior to HD    Please follow up with PCP/cardiology/nephrology on DC.          I reconciled current and discharge medications on day of discharge, discussed changes with patient and noted changes above.       Total Time Coordinating Care: Greater than 30 minutes    Patient had opportunity to ask questions and state understand and agree with therapeutic plan as outlined    Thank You,    Zeb Joshi MD   Hospitalist with [facility]         Electronically signed by Zeb Joshi MD on 1/5/2024  1:39 PM     Discharge Summary signed by Zeb Joshi MD at 1/5/2024  1:38 PM  Version 1 of 2    Author: Zeb Joshi MD Service: Hospitalist Author Type: Physician    Filed: 1/5/2024  1:38 PM Date of Service: 1/5/2024  1:25 PM Status: Signed    : Zeb Joshi MD (Physician)    Related Notes: Addendum by Zeb Joshi MD (Physician) filed at 1/5/2024  1:39 PM       General Medicine Discharge Summary     Patient ID:  Terrence Vines  73 year old  9/1/1950    Admit date:  12/18/2023    Discharge date and time: 1/5/2024    Attending Physician: Zeb Joshi MD     Consults: IP CONSULT TO NEPHROLOGY  IP CONSULT TO CARDIOLOGY  IP CONSULT TO PULMONOLOGY  IP CONSULT TO FOOD AND NUTRITION SERVICES  IP CONSULT TO SOCIAL WORK  IP CONSULT TO PSYCHIATRY    Primary Care Physician: Victor Manuel Cleaning MD     Reason for admission: influenza    Risk For Readmission: moderate    Discharge Diagnoses: ESRD on dialysis (HCC) [N18.6, Z99.2]  Acute respiratory failure with hypoxia (HCC) [J96.01]  See Additional Discharge Diagnoses in Hospital Course    Discharged Condition: good    Follow-up with labs/images appointments:   Please monitor sugars closely and titrate insulin as able    Please hold BP meds (hydralazine, metoprolol) Before HD.    Please given midodrine 5mg, prior to HD    Please follow up with PCP/cardiology/nephrology on DC.      Exam  Gen: No acute distress  Pulm: Lungs clear, normal respiratory effort  CV: Heart with regular rate and rhythm  Abd: Abdomen soft,   EXT: no edema     HPI:   Per Dr. Dash:  History of Present Illness: Mr. Vines is a 73 year old male with PMH BPH , CHF / ICM EF 30% , Moderate MR, HTN, HLD, ESRD on HD M/W/F, Anemia, Gastritis, who presents with SOB. Patient has had a cough for over a month but felt more SOB which started yesterday.  He was treated with a course of abx as out patient with no improvement in cough.  He was supposed to be on PPI from last admission but was not taking.  Denies CP, abd pain, n/v, f/c.  Appetite as been good.  Son at bedside and confirms that he has not missed any HD sessions and does not add salt to his food.       Hospital Course:   Mr. Vines is a 73 year old male with PMH BPH , CHF / ICM EF 30% , CAD s/p CABG x3, Moderate MR, HTN, HLD, ESRD on HD M/W/F, Anemia, Gastritis, who presents with SOB. Admitted for fluid overload and Influenza A.  Hypoxic and hypercapnic respiratory failure clinically improved, weaned off of oxygen, course  further complicated by encephalopathy, slowly improving, S/P right heart cath (1/3). Accurate dry weight, is 96.7 kg (213 lbs), clinically improved, plan for adding midodrine 5 mg prior to dialysis to help with fluid removal, cleared for discharge to rehab per cardiology and nephrology, plan for DC to [facility] following HD today.  Close follow-up with PCP, cardiology, nephrology on discharge.     Acute Hypoxic Respiratory Failure  Pulmonary edema  ESRD on HD M/W/F  - positive influenza   - HD per renal  - s/p recent AV fistula repair by vascular surgery in September 2023  - steroid burst per pulmonary ended 12/24  - Transferred to medical floor on 12/28  - off O2, and completed Tamiflu course  - Hypotension with HD, nephrology recommending 5 mg midodrine prior to dialysis, and continuing to hold antihypertensives on days of dialysis.     Encephalopathy  - improving  - mobilize, needs therapy. He is very deconditioned   - alert and awake, following all commands, engaging in conversation  - CT brain 12/30, negative for acute pathology  - improving each day, seen by psychiatry whom agrees   -Likely some underlying depression/anxiety, Lexapro added, will continue     Influenza A positive   - cough for over a month but SOB more acute  - renally dose Tamiflu completed   - Ceftriaxone and steroids started 12/22/23, now off      Troponin Elevation   - no chest pain   - EKG with LBBB  - cardiology follow-up  -ACS ruled out     CAD s/p CABG x3   Ischemic cardiomyopathy EF 30% without acute exacerbation of congestive heart failure  Chronic Systolic heart failure  HTN  -Resume lower dose metoprolol, and hydralazine hold prior to dialysis     Anemia   Thrombocytopenia   Gastritis   - s/p EGD 11/7 with some mild gastritis, no active bleeding  - s/p colonoscopy 11/8 with polyps but no active bleeding, will need to repeat colonoscopy as outpatient  - on prevacid here      Type 2 diabetes with hyperglycemia  Diabetic  nephropathy  - gabapentin (will hold to prevent lethargy), can consider  - Controlled renal diet  - Continue home aspirin     Hyperlipidemia  - restarted his statin     BPH  - resume finasteride  - on flomax     Operative Procedures:      Imaging: No results found.      Disposition: SNF    Activity: activity as tolerated  Diet: regular diet  Wound Care: as directed  Code Status: Full Code    Home Medication Changes:     Med list     Medication List        START taking these medications      escitalopram 5 MG Tabs  Commonly known as: Lexapro     midodrine 5 MG Tabs  Commonly known as: ProAmatine     Sennosides 17.2 MG Tabs            CHANGE how you take these medications      finasteride 5 MG Tabs  Commonly known as: Proscar  TAKE 1 TABLET(5 MG) BY MOUTH DAILY  What changed:   when to take this  additional instructions     HumaLOG KwikPen 100 UNIT/ML Sopn  Generic drug: Insulin Lispro (1 Unit Dial)  What changed: how much to take     tamsulosin 0.4 MG Caps  Commonly known as: Flomax  What changed: how much to take            CONTINUE taking these medications      acetaminophen 500 MG Tabs  Commonly known as: Tylenol Extra Strength     albuterol 108 (90 Base) MCG/ACT Aers  Commonly known as: Ventolin HFA  Inhale 2 puffs into the lungs every 4 (four) hours as needed for Wheezing.     aspirin 81 MG Tbec  Take 1 tablet (81 mg total) by mouth daily.     Basaglar KwikPen 100 UNIT/ML Sopn  Generic drug: insulin glargine     Co Q-10 100 MG Chew     cyanocobalamin 250 MCG Tabs  Commonly known as: Vitamin B12     Dexcom G6 Sensor Misc  1 each by Does not apply route Every 10 days.     hydrALAZINE 50 MG Tabs  Commonly known as: Apresoline     Magnesium 500 MG Tabs     metoprolol succinate ER 50 MG Tb24  Commonly known as: Toprol XL     multivitamin Tabs  Take 1 tablet by mouth daily.     OneTouch Ultra Mini w/Device Kit  Test Blood Sugar Once Daily. Non-Insulin Dependent Diabetes Type II. E11.9.     OneTouch Ultra Strp  Generic  drug: Glucose Blood  Test Blood Sugar 4 Times Daily. Insulin Dependent Diabetes Type II. Z79.4, E11.9.     OneTouch UltraSoft Lancets Misc  Test Blood Sugar Once Daily. Non-Insulin Dependent Diabetes Type II. E11.9.     rosuvastatin 20 MG Tabs  Commonly known as: Crestor     sevelamer carbonate 800 MG Tabs  Commonly known as: Renvela     TRUEplus Pen Needles 32G X 4 MM Misc  Generic drug: Insulin Pen Needle  Use 1 needle 4 times a day            STOP taking these medications      gabapentin 100 MG Caps  Commonly known as: Neurontin               Where to Get Your Medications        You can get these medications from any pharmacy    Bring a paper prescription for each of these medications  albuterol 108 (90 Base) MCG/ACT Aers         FU   Follow-up Information       Tara Bentley APN Follow up in 1 week(s).    Specialty: Nurse Practitioner  Why: Diabetes follow-up  Contact information:  [address]  [facility] [id number] [phone]               Hay Bautista MD Follow up.    Specialties: Cardiovascular Diseases, CARDIOLOGY  Contact information:  [address]  [facility] [id number] [phone]               Victor Manuel Cleaning MD Follow up.    Specialty: Internal Medicine  Contact information:  [address]  [address]  [facility] [id number] [phone]               Jaquan Parker MD. Schedule an appointment as soon as possible for a visit in 1 week(s).    Specialty: NEPHROLOGY  Contact information:  [address]  [facility] [id number] [phone]                             DC instructions:      Other Discharge Instructions:         Please monitor sugars closely and titrate insulin as able    Please hold BP meds (hydralazine, metoprolol) Before HD.    Please given midodrine 5mg, prior to HD    Please follow up with PCP/cardiology/nephrology on DC.          I reconciled current and discharge medications on day of discharge, discussed changes with patient  and noted changes above.       Total Time Coordinating Care: Greater than 30 minutes    Patient had opportunity to ask questions and state understand and agree with therapeutic plan as outlined    Thank You,    Zeb Joshi MD   Hospitalist with [facility] [facility]         Electronically signed by Zeb Joshi MD on 1/5/2024  1:38 PM           Imaging Results (HF patients)    Chest X-Ray Results (HF patients only)    No exam resulted this encounter.      2D Echocardiogram Results (HF patients only)    No exam resulted this encounter.      Cath Angiogram Results (HF Patients only)    No exam resulted this encounter.          Physical Therapy Notes (last 72 hours)      Physical Therapy Note signed by Sebas Vences PTA at 1/5/2024  3:06 PM  Version 2 of 2    Author: Sebas Vences PTA Service: Rehab Author Type: Physical Therapy Assistant    Filed: 1/5/2024  3:06 PM Date of Service: 1/5/2024  9:00 AM Status: Addendum    : Sebas Vences PTA (Physical Therapy Assistant)    Related Notes: Original Note by Sebas Vences PTA (Physical Therapy Assistant) filed at 1/5/2024  3:06 PM       PHYSICAL THERAPY TREATMENT NOTE - INPATIENT     Room Number: 341/341-A       Presenting Problem: acute respiratory failure    Problem List  Principal Problem:    Acute respiratory failure with hypoxia (HCC)  Active Problems:    Thrombocytopenia (HCC)    ESRD on dialysis (HCC)    Delirium due to another medical condition    Episodic mood disorder (HCC)      PHYSICAL THERAPY ASSESSMENT   Chart reviewed. MANJU Fatima approved participation in physical therapy. PPE worn by therapist: mask and gloves. Patient was not wearing a mask during session. Patient presented in bed with 0/10 pain. Patient with fair  progress towards goals during this session. Education provided on Physical therapy plan of care and physiological benefits of out of bed mobility. Patient with good carryover. Pt is received in the bed and was cleared for  therapy session. Assisted RN with weighing pt at bedside. Pt is mod A with bed mobility and to transfer to the EOB. Pt required assist with his B LE's and his upper trunk to sit up. Pt was drowsy throughout the session. Pt sat EOB for a few minutes and denied any dizziness and light headedness. RN and PCT were present to assist. Pt is min A with sit<>stand transfers with the RW. Pt is cued for proper hand placement for safety. Pt AMB about 3'x2 with the RW min A for balance and safety. Pt with some unsteadiness. Pt is min A with stepping up on the scale. Pt stood for about 1-2 minutes with min A and posterior lean. Pt does fatigue quickly and was returned back to the bed. Per RN to return pt back to the bed due to him having dialysis this morning. Pt is mod A with returning back to supine in the bed. Pt is repositioned and left in the bed with all needs within reach and alarm system activated. Pt is on track to dc to [facility] once medically cleared.     Bed mobility: Mod assist  Transfers: Min assist  Gait Assistance: Minimum assistance  Distance (ft): 3'  Assistive Device: Rolling walker  Pattern: Shuffle          . Patient was left in bed and alarm activated at end of session with all needs in reach. The patient's Approx Degree of Impairment: 61.29% has been calculated based on documentation in the [facility] '6 clicks' Inpatient Basic Mobility Short Form.  Research supports that patients with this level of impairment may benefit from Subacute Rehab. RN aware of patient status post session.    DISCHARGE RECOMMENDATIONS  PT Discharge Recommendations: Sub-acute rehabilitation     PLAN  PT Treatment Plan: Bed mobility;Body mechanics;Coordination;Endurance;Patient education;Gait training;Strengthening;Transfer training;Balance training    SUBJECTIVE  Pt was agreeable to therapy session.         OBJECTIVE  Precautions: Bed/chair alarm    WEIGHT BEARING RESTRICTION  Weight Bearing Restriction: None                PAIN ASSESSMENT    Ratin  Location: denies at this time  Management Techniques: Activity promotion;Body mechanics;Relaxation;Repositioning    BALANCE                                                                                                                       Static Sitting: Fair +  Dynamic Sitting: Fair           Static Standing: Poor +  Dynamic Standing: Poor +    ACTIVITY TOLERANCE                         O2 WALK       AM-PAC '6-Clicks' INPATIENT SHORT FORM - BASIC MOBILITY  How much difficulty does the patient currently have...  Patient Difficulty: Turning over in bed (including adjusting bedclothes, sheets and blankets)?: A Lot   Patient Difficulty: Sitting down on and standing up from a chair with arms (e.g., wheelchair, bedside commode, etc.): A Little   Patient Difficulty: Moving from lying on back to sitting on the side of the bed?: A Lot   How much help from another person does the patient currently need...   Help from Another: Moving to and from a bed to a chair (including a wheelchair)?: A Little   Help from Another: Need to walk in hospital room?: A Little   Help from Another: Climbing 3-5 steps with a railing?: Total     AM-PAC Score:  Raw Score: 14   Approx Degree of Impairment: 61.29%   Standardized Score (AM-PAC Scale): 38.1   CMS Modifier (G-Code): CL          Patient End of Session: In bed;Needs met;Call light within reach;RN aware of session/findings;All patient questions and concerns addressed;Alarm set    CURRENT GOALS   Goals to be met by: 24  Patient Goal Patient's self-stated goal is: to sleep   Goal #1 Patient is able to demonstrate supine - sit EOB @ level: supervision     Goal #1   Current Status Mod A    Goal #2 Patient is able to demonstrate transfers Sit to/from Stand at assistance level: supervision with walker - rolling     Goal #2  Current Status Min A with the RW   Goal #3 Patient is able to ambulate 40 feet with assist device: walker - rolling at assistance level: minimum  assistance   Goal #3   Current Status 3'x2with the RW min A    Goal #4    Goal #4   Current Status            Therapeutic Activity: 30 minutes       Physical Therapy Note signed by Sebas Vences PTA at 1/5/2024  3:06 PM  Version 1 of 2    Author: Sebas Vences PTA Service: Rehab Author Type: Physical Therapy Assistant    Filed: 1/5/2024  3:06 PM Date of Service: 1/5/2024  2:53 PM Status: Signed    : Sebas Vences PTA (Physical Therapy Assistant)    Related Notes: Addendum by Sebas Vences PTA (Physical Therapy Assistant) filed at 1/5/2024  3:06 PM       PHYSICAL THERAPY TREATMENT NOTE - INPATIENT     Room Number: 341/341-A       Presenting Problem: acute respiratory failure    Problem List  Principal Problem:    Acute respiratory failure with hypoxia (HCC)  Active Problems:    Thrombocytopenia (HCC)    ESRD on dialysis (HCC)    Delirium due to another medical condition    Episodic mood disorder (HCC)      PHYSICAL THERAPY ASSESSMENT   Chart reviewed. RN Kushal approved participation in physical therapy. PPE worn by therapist: mask and gloves. Patient was not wearing a mask during session. Patient presented in bed with 0/10 pain. Patient with fair  progress towards goals during this session. Education provided on Physical therapy plan of care and physiological benefits of out of bed mobility. Patient with good carryover. Pt is received in the bed and was cleared for therapy session. Assisted RN with weighing pt at bedside. Pt is mod A with bed mobility and to transfer to the EOB. Pt required assist with his B LE's and his upper trunk to sit up. Pt was drowsy throughout the session. Pt sat EOB for a few minutes and denied any dizziness and light headedness. RN and PCT were present to assist. Pt is min A with sit<>stand transfers with the RW. Pt is cued for proper hand placement for safety. Pt AMB about 3'x2 with the RW min A for balance and safety. Pt with some unsteadiness. Pt is min A with  stepping up on the scale. Pt stood for about 1-2 minutes with min A and posterior lean. Pt does fatigue quickly and was returned back to the bed. Per RN to return pt back to the bed due to him having dialysis this morning. Pt is mod A with returning back to supine in the bed. Pt is repositioned and left in the bed with all needs within reach and alarm system activated. Pt is on track to dc to [facility] once medically cleared.     Bed mobility: Mod assist  Transfers: Min assist  Gait Assistance: Minimum assistance  Distance (ft): 3'  Assistive Device: Rolling walker  Pattern: Shuffle          . Patient was left in bed and alarm activated at end of session with all needs in reach. The patient's Approx Degree of Impairment: 61.29% has been calculated based on documentation in the [facility] '6 clicks' Inpatient Basic Mobility Short Form.  Research supports that patients with this level of impairment may benefit from Subacute Rehab. RN aware of patient status post session.    DISCHARGE RECOMMENDATIONS  PT Discharge Recommendations: Sub-acute rehabilitation     PLAN  PT Treatment Plan: Bed mobility;Body mechanics;Coordination;Endurance;Patient education;Gait training;Strengthening;Transfer training;Balance training    SUBJECTIVE  Pt was agreeable to therapy session.         OBJECTIVE  Precautions: Bed/chair alarm    WEIGHT BEARING RESTRICTION  Weight Bearing Restriction: None                PAIN ASSESSMENT   Ratin  Location: denies at this time  Management Techniques: Activity promotion;Body mechanics;Relaxation;Repositioning    BALANCE                                                                                                                       Static Sitting: Fair +  Dynamic Sitting: Fair           Static Standing: Poor +  Dynamic Standing: Poor +    ACTIVITY TOLERANCE                         O2 WALK       AM-[facility] '6-Clicks' INPATIENT SHORT FORM - BASIC MOBILITY  How much difficulty does the patient currently  have...  Patient Difficulty: Turning over in bed (including adjusting bedclothes, sheets and blankets)?: A Lot   Patient Difficulty: Sitting down on and standing up from a chair with arms (e.g., wheelchair, bedside commode, etc.): A Little   Patient Difficulty: Moving from lying on back to sitting on the side of the bed?: A Lot   How much help from another person does the patient currently need...   Help from Another: Moving to and from a bed to a chair (including a wheelchair)?: A Little   Help from Another: Need to walk in hospital room?: A Little   Help from Another: Climbing 3-5 steps with a railing?: Total     AM-PAC Score:  Raw Score: 14   Approx Degree of Impairment: 61.29%   Standardized Score (AM-PAC Scale): 38.1   CMS Modifier (G-Code): CL          Patient End of Session: In bed;Needs met;Call light within reach;RN aware of session/findings;All patient questions and concerns addressed;Alarm set    CURRENT GOALS   Goals to be met by: 1/5/24  Patient Goal Patient's self-stated goal is: to sleep   Goal #1 Patient is able to demonstrate supine - sit EOB @ level: supervision     Goal #1   Current Status Mod A    Goal #2 Patient is able to demonstrate transfers Sit to/from Stand at assistance level: supervision with walker - rolling     Goal #2  Current Status Min A with the RW   Goal #3 Patient is able to ambulate 40 feet with assist device: walker - rolling at assistance level: minimum assistance   Goal #3   Current Status 3'x2with the RW min A    Goal #4    Goal #4   Current Status            Therapeutic Activity: 30 minutes       Physical Therapy Note signed by Sebas Vences PTA at 1/4/2024  2:53 PM  Version 1 of 1    Author: Sebas Vences PTA Service: Rehab Author Type: Physical Therapy Assistant    Filed: 1/4/2024  2:53 PM Date of Service: 1/4/2024  8:00 AM Status: Signed    : Sebas Vences PTA (Physical Therapy Assistant)       PHYSICAL THERAPY TREATMENT NOTE - INPATIENT     Room  Number: 341/341-A       Presenting Problem: acute respiratory failure    Problem List  Principal Problem:    Acute respiratory failure with hypoxia (HCC)  Active Problems:    Thrombocytopenia (HCC)    ESRD on dialysis (HCC)    Delirium due to another medical condition    Episodic mood disorder (HCC)      PHYSICAL THERAPY ASSESSMENT   Chart reviewed. MANJU Holt approved participation in physical therapy. PPE worn by therapist: mask and gloves. Patient was not wearing a mask during session. Patient presented in bed with 0/10 pain. Patient with good  progress towards goals during this session. Education provided on Physical therapy plan of care and physiological benefits of out of bed mobility. Patient with good carryover. Pt is received sitting EOB with RN. RN assisted with transferring pt to the bathroom. Pt sat EOB for a few minutes and denied any dizziness and light headedness. Pt is min A with sit<>stand transfers with the RW. Pt is cued for proper hand placement for safety. Pt was able to take a few steps to the chair with the RW min A. Pt pt then was assisted into the bathroom. Pt then was min A with AMB to the  side chair from the bathroom. Pt AMB about 10' with the RW min A. Pt with slow shuffling steps. Pt does fatigue quickly with activity. Pt is left in the chair with all needs within reach and alarm system activated. Pt is on track to dc to [facility] once medically cleared. Handed pt off to the PCT in the room. Reported to the RN on the status of the pt.     Bed mobility:  NT  Transfers: Min assist  Gait Assistance: Minimum assistance  Distance (ft): SPT/10'  Assistive Device: Rolling walker  Pattern: Shuffle          . Patient was left in bedside chair and alarm activated at end of session with all needs in reach. The patient's Approx Degree of Impairment: 57.7% has been calculated based on documentation in the [facility] '6 clicks' Inpatient Basic Mobility Short Form.  Research supports that patients with this  level of impairment may benefit from Subacute Rehab. RN aware of patient status post session.    DISCHARGE RECOMMENDATIONS  PT Discharge Recommendations: Sub-acute rehabilitation     PLAN  PT Treatment Plan: Bed mobility;Body mechanics;Coordination;Endurance;Patient education;Gait training;Strengthening;Transfer training;Balance training    SUBJECTIVE  Pt was agreeable to therapy session.         OBJECTIVE  Precautions: Bed/chair alarm    WEIGHT BEARING RESTRICTION  Weight Bearing Restriction: None                PAIN ASSESSMENT   Ratin  Location: denies at this time  Management Techniques: Activity promotion;Body mechanics;Relaxation;Repositioning    BALANCE                                                                                                                       Static Sitting: Fair  Dynamic Sitting: Fair           Static Standing: Poor +  Dynamic Standing: Poor +    ACTIVITY TOLERANCE                         O2 WALK       AM-PAC '6-Clicks' INPATIENT SHORT FORM - BASIC MOBILITY  How much difficulty does the patient currently have...  Patient Difficulty: Turning over in bed (including adjusting bedclothes, sheets and blankets)?: A Lot   Patient Difficulty: Sitting down on and standing up from a chair with arms (e.g., wheelchair, bedside commode, etc.): A Little   Patient Difficulty: Moving from lying on back to sitting on the side of the bed?: A Lot   How much help from another person does the patient currently need...   Help from Another: Moving to and from a bed to a chair (including a wheelchair)?: A Little   Help from Another: Need to walk in hospital room?: A Little   Help from Another: Climbing 3-5 steps with a railing?: A Lot     AM-PAC Score:  Raw Score: 15   Approx Degree of Impairment: 57.7%   Standardized Score (AM-PAC Scale): 39.45   CMS Modifier (G-Code): CK        Patient End of Session: Up in chair;Needs met;Call light within reach;RN aware of session/findings;All patient questions and  concerns addressed;Alarm set    CURRENT GOALS   Goals to be met by: 1/5/24  Patient Goal Patient's self-stated goal is: to sleep   Goal #1 Patient is able to demonstrate supine - sit EOB @ level: supervision     Goal #1   Current Status NT received with RN EOB   Goal #2 Patient is able to demonstrate transfers Sit to/from Stand at assistance level: supervision with walker - rolling     Goal #2  Current Status Min A with the RW   Goal #3 Patient is able to ambulate 40 feet with assist device: walker - rolling at assistance level: minimum assistance   Goal #3   Current Status SPT/10' with the RW min A    Goal #4    Goal #4   Current Status              Therapeutic Activity: 30 minutes                Occupational Therapy Notes (last 72 hours)      Occupational Therapy Note signed by Mayela Grier OT at 1/4/2024  6:34 PM  Version 1 of 1    Author: Mayela Grier OT Service: — Author Type: Occupational Therapist    Filed: 1/4/2024  6:34 PM Date of Service: 1/4/2024  5:45 PM Status: Signed    : Mayela Grier OT (Occupational Therapist)       OCCUPATIONAL THERAPY TREATMENT NOTE - INPATIENT        Room Number: 341/341-A     Presenting Problem: acute repsiratory failure    Problem List  Principal Problem:    Acute respiratory failure with hypoxia (HCC)  Active Problems:    Thrombocytopenia (HCC)    ESRD on dialysis (HCC)    Delirium due to another medical condition    Episodic mood disorder (HCC)      OCCUPATIONAL THERAPY ASSESSMENT   Occupational Therapy treatment performed this date. RN approved session. OT PPE includes: goggles, and surgical mask. Patient was received in bed. Vitals: stable /68 taken by RN prior to session. SPO2 95% and -115 following therapeutic activities. Pt seen right after dialysis but reported feeling ok and wanting to work with OT. The following activities were performed during session:    Bed mobility: mod A for UB/trunk, increased time  EOB sitting balance/duration: SBA  warm-up exercises ankle pumps  Functional transfers: min A and FWW, cues for technique  Functional mobility: min A and FWW increased time, 3 standing rest breaks and 1 seated rest break and cues for proper breathing techniques for short household distances to enhance activity tolerance needed for household tasks. Cues for positioning of FWW to enhance support for balance    Pt progressing towards goals this session. During session, pt educated on proper breathing techs with activities as well as energy conservation strategies to enhance occupational performance. Pt left in chair with PCT present at end of session. Handoff provided to RN.  Pt continues to experience decreased strength, ROM, activity tolerance,  balance, which impacts BADLs and occupational performance.       The patient's Approx Degree of Impairment: 53.32% has been calculated based on documentation in the [facility] '6 clicks' Inpatient Daily Activity Short Form.  Research supports that patients with this level of impairment may benefit from HODA.    DISCHARGE RECOMMENDATIONS  OT Discharge Recommendations: Sub-acute rehabilitation  OT Device Recommendations: TBD     PLAN  OT Treatment Plan: Balance activities;Energy conservation/work simplification techniques;ADL training;Functional transfer training;UE strengthening/ROM;Endurance training;Cognitive reorientation;Patient/Family education;Patient/Family training;Compensatory technique education    SUBJECTIVE  \"I want to do this, I want to be more independent\"    OBJECTIVE  Precautions: Bed/chair alarm    WEIGHT BEARING RESTRICTION     PAIN ASSESSMENT  Ratin  Location: Unable to localize  Management Techniques: Activity promotion; Repositioning; Nurse notified      ACTIVITIES OF DAILY LIVING ASSESSMENT  AM-Located within [facility] ‘6-Clicks’ Inpatient Daily Activity Short Form  How much help from another person does the patient currently need…  -   Putting on and taking off regular lower body clothing?: A Lot  -   Bathing  (including washing, rinsing, drying)?: A Lot  -   Toileting, which includes using toilet, bedpan or urinal? : A Lot  -   Putting on and taking off regular upper body clothing?: A Little  -   Taking care of personal grooming such as brushing teeth?: A Little  -   Eating meals?: None    AM-PAC Score:  Score: 16  Approx Degree of Impairment: 53.32%  Standardized Score (AM-PAC Scale): 35.96  CMS Modifier (G-Code): CK    EDUCATION PROVIDED  Patient : Role of Occupational Therapy; Plan of Care; Fall Prevention; Functional Transfer Techniques; Posture/Positioning; Energy Conservation; Compensatory ADL Techniques  Patient's Response to Education: Verbalized Understanding; Returned Demonstration    Patient End of Session: Needs met;With  staff;Up in chair;Call light within reach;RN aware of session/findings;All patient questions and concerns addressed    OT Goals:     Patient will complete functional transfer with Min A  Comment: MET    Patient will complete toileting with Min A  Comment: ongoing    Patient will tolerate standing for 2 minutes in prep for aDL with Min A   Comment: inconsistent    Patient will complete UE dressing with Min A  Comment: NT this session          Goals  on: 24  Frequency: 3-5x/week    OT Session Time: 18 minutes    Therapeutic Activity: 18 minutes    [facility]         Occupational Therapy Note signed by Marleny Lopez OT at 1/3/2024  1:48 PM  Version 1 of 1    Author: Marleny Lopez OT Service: — Author Type: Occupational Therapist    Filed: 1/3/2024  1:48 PM Date of Service: 1/3/2024  1:30 PM Status: Signed    : Marleny Lopez OT (Occupational Therapist)       Chart reviewed. Plan is for [facility] followed by HD this afternoon. OT will f/u as appropriate/able.      CHINMAY Kennedy/L  [facility]  #[id number]               Video Swallow Study Notes    No notes of this type exist for this encounter.        SLP Note - SLP Notes      SLP Note signed by  Orquidea Stewart, SLP at 1/2/2024 10:23 AM  Version 1 of 1    Author: Orquidea Stewart SLP Service: — Author Type: Speech and Language Pathologist    Filed: 1/2/2024 10:23 AM Date of Service: 1/2/2024 10:15 AM Status: Signed    : Orquidea Stewart SLP (Speech and Language Pathologist)       SPEECH DAILY NOTE - INPATIENT    ASSESSMENT & PLAN   ASSESSMENT  PPE REQUIRED. THIS THERAPIST WORE GLOVES, DROPLET MASK, AND GOGGLES FOR DURATION OF EVALUATION. HANDS WASHED UPON ENTRANCE/EXIT.    SLP f/u for ongoing dysphagia tx/meal assessment per recommendations of easy to chew/thin per upgrade. RN reports pt tolerates diet and medication well with no overt clinical s/s aspiration. Pt denies any swallowing challenges.     Pt positioned upright in bed. Pt afebrile, tolerating room air with oxygen status 96 prior to the start of oral trials. SLP reviewed aspiration precautions and safe swallowing compensatory strategies with the patient. Safe swallow guidelines remain written on the white board in purple. Diet recommendations remain on the whiteboard in the room. Patient v/u. Provided minimal assistance, pt tolerates easy to chew and thin liquids via open cup with no overt clinical signs/symptoms of aspiration. Pt trialed with hard solids and mildly prolonged mastication observed. Pt expressed \"easy to chew food is good.\" Oxygen status remained >95 t/o the entire session. Oral/buccal cavities clear of residue following all trials.     PLAN: SLP to sign off at this time secondary to pt tolerating least restrictive diet with no CSA.     Diet Recommendations - Solids: Soft/ Easy to chew  Diet Recommendations - Liquids: Thin Liquids    Compensatory Strategies Recommended: No straws;Small bites and sips;Slow rate  Aspiration Precautions: Upright position;Slow rate;Small bites and sips;No straw  Medication Administration Recommendations:  (as tolerated)    Patient Experiencing Pain: No      Discharge Recommendations  Discharge  Recommendations/Plan: Undetermined    Treatment Plan  Treatment Plan/Recommendations: No further inpatient SLP service warranted    Interdisciplinary Communication: Discussed with RN  Recommendations posted at bedside            GOALS  Goal #1 The patient will tolerate soft easy to chew consistency and thin liquids without overt signs or symptoms of aspiration with 100 % accuracy over 1-2 session(s).  Goal met   Goal #2 The patient/family/caregiver will demonstrate understanding and implementation of aspiration precautions and swallow strategies independently over 1-2 session(s).    Goal met   Goal #3 The patient will utilize compensatory strategies as outlined by  BSSE (clinical evaluation) including Slow rate, Small bites, Small sips, No straws, Upright 90 degrees, Upright 90 degrees 30 mins after meal, Eliminate distractions, min assistance 100 % of the time across 2 sessions.  Goal met   Goal #4 The patient will tolerate trial upgrade of hard solid consistency and thin liquids without overt signs or symptoms of aspiration with 100 % accuracy over 1-2 session(s).    Goal discontinued     FOLLOW UP  Follow Up Needed (Documentation Required): No  SLP Follow-up Date:  (n/a)  Number of Visits to Meet Established Goals: 3    Session: 3    If you have any questions, please contact CECILLE Childress M.S. CCC-SLP  Speech Language Pathologist  Phone Number Ext. [phone]      Electronically signed by Orquidea Stewart SLP on 1/2/2024 10:23 AM           Immunizations     Name Date      Bevacizumab, 10mg 03/17/22     Bevacizumab, 10mg 09/02/21     Bevacizumab, 10mg 09/02/21     Bevacizumab, 10mg 06/03/21     Bevacizumab, 10mg 06/03/21     Bevacizumab, 10mg 04/15/21     Bevacizumab, 10mg 04/15/21     Bevacizumab, 10mg 03/03/21     Bevacizumab, 10mg 03/03/21     Bevacizumab, 10mg 01/29/21     Bevacizumab, 10mg 01/29/21     Bevacizumab, 10mg 12/30/20     Bevacizumab, 10mg 12/30/20     Bevacizumab, 10mg 12/02/20      Bevacizumab, 10mg 12/02/20     Bevacizumab, 10mg 10/30/20     Bevacizumab, 10mg 10/30/20     Covid-19 Pfizer 10/14/21     INFLUENZA 09/29/21     INFLUENZA 09/29/21     INFLUENZA 09/22/20     INFLUENZA 09/22/20     INFLUENZA 09/20/19     INFLUENZA 09/20/19     INFLUENZA 10/10/18     INFLUENZA 10/10/18     INFLUENZA 10/12/17     INFLUENZA 10/12/17     INFLUENZA 10/21/16     INFLUENZA 10/21/16     Kenalog Per 10mg Inj 01/02/20     Kenalog Per 10mg Inj 02/25/19     Pneumococcal (Prevnar 13) 07/13/16     Pneumovax 23 10/12/17     TDAP 07/13/16       Multidisciplinary Problems     Active Goals        Problem: Patient/Family Goals    Goal Priority Disciplines Outcome Interventions   Patient/Family Long Term Goal     Interdisciplinary Progressing    Description: Patient's Long Term Goal: go home    Interventions:  - Monitor vital signs  - Monitor appropriate labs  - Monitor blood glucose levels  - Administer medications per order  - Pain management as needed  - Follow MD orders  - Diagnostics per orders  - Dialysis per orders  - Update / inform patient and family on plan of care  - Discharge planning     - See additional Care Plan goals for specific interventions   Patient/Family Short Term Goal     Interdisciplinary Progressing    Description: Patient's Short Term Goal: To breathe better    Interventions:   - RT treatment  -O2  -Follow md orders  - See additional Care Plan goals for specific interventions               Inpatient Dialysis Orders (From admission, onward)     Start       Ordered    01/05/24 1141  Hemodialysis inpatient  Once        Question Answer Comment   K 3 mEq    Ca++ 2.5 mEq    Bicarb 32 mEq    Na Other (please specify) 139   Na+ Modeling No    Dialyzer F180    Dialysate Temperature (C) 36    BFR-As tolerated to a maximum of: 400 ml/min     mL/min    Duration of Treatment 3.5 Hours    Dry weight (kg) or fluid removal (L) 1L as tolerated, keep SBP >100.  EDW is 96.7 kg    Access Site AVF         01/05/24 1141    01/04/24 1031  Hemodialysis inpatient  Once        Question Answer Comment   K 3 mEq    Ca++ 2.5 mEq    Bicarb 35 mEq    Na 138 mEq    Na+ Modeling No    Dialyzer F180    Dialysate Temperature (C) 36    BFR-As tolerated to a maximum of: 400 ml/min     mL/min    Duration of Treatment 4 Hours    Dry weight (kg) or fluid removal (L) 1-2L as tolerated, keep SBP > 100.  Dry weight is 96.7 kg    Access Site AVF        01/04/24 1030            Dialysis LDA's     Active Dialysis LDA's        Hemodialysis Catheter Arteriovenous fistula Left Arm    Placement date 11/06/23  Site Arm     Placement time 2000  Days 59    Assessments     Row Name 01/05/24 0917 01/04/24 2117 01/04/24 1859 01/04/24 0818 01/03/24 2130    Site Assessment Clean;Dry;Intact  Clean;Dry;Intact  —  Clean;Dry;Intact  Clean;Dry;Intact     Reason for Continuing Central Line Hemodialysis  Hemodialysis  —  Hemodialysis  Hemodialysis     AV Fistula Present;Thrill;Bruit  Present;Thrill;Bruit  —  Present;Thrill;Bruit  Present;Thrill;Bruit     Status Deaccessed  Deaccessed  —  Deaccessed  Deaccessed     CHG Impregnated Disc —  —  —  —  —     If No, Reason Why? —  —  —  —  —     Dressing Status Clean;Dry;Intact  Clean;Dry;Intact  —  Clean;Dry;Intact  Clean;Dry;Intact     Site Condition No complications  No complications  —  No complications  No complications     Dressing Gauze  Gauze  —  Gauze  Gauze     Dressing Change Due —  —  —  —  —     Drainage Description —  —  —  —  —     HD Output —  —  0 mL  —  —     Row Name 01/03/24 1000 01/02/24 2000 01/02/24 0902 01/01/24 2216 01/01/24 1618    Site Assessment Clean;Dry;Intact  Clean;Dry;Intact  Clean;Dry;Intact  Clean;Dry;Intact  —     Reason for Continuing Central Line Hemodialysis  Hemodialysis  Hemodialysis  Hemodialysis  —     AV Fistula Present;Thrill;Bruit  Present;Thrill;Bruit  Present;Thrill  Present;Thrill  —     Status Deaccessed  Deaccessed  Deaccessed  Deaccessed  —     CHG  Impregnated Disc —  —  —  —  —     If No, Reason Why? —  —  —  —  —     Dressing Status Clean;Dry;Intact  Clean;Dry;Intact  Clean;Dry;Intact  Clean;Dry;Intact  —     Site Condition No complications  No complications  No complications  No complications  —     Dressing Gauze  Gauze  Gauze  Gauze  —     Dressing Change Due —  —  —  —  —     Drainage Description —  —  —  —  —     HD Output —  —  —  —  1100 mL     Row Name 01/01/24 0841 12/31/23 2300 12/31/23 0700 12/30/23 2230 12/30/23 1000    Site Assessment Clean;Dry;Intact  Clean;Dry;Intact  Clean;Dry;Intact  Clean;Dry;Intact  Clean;Dry;Intact     Reason for Continuing Central Line Hemodialysis  Hemodialysis  Hemodialysis  Hemodialysis  Hemodialysis     AV Fistula Present;Thrill;Bruit  Present;Bruit;Thrill  —  Present;Thrill;Bruit  Bruit;Thrill;Present     Status Deaccessed  Deaccessed  —  Deaccessed  Deaccessed     CHG Impregnated Disc —  —  —  —  —     If No, Reason Why? —  —  —  —  —     Dressing Status Clean;Dry;Intact  Clean;Dry;Intact  Clean;Dry;Intact  Clean;Dry;Intact  Clean;Dry;Intact     Site Condition —  No complications  No complications  No complications  No complications     Dressing Gauze  —  Gauze  Gauze  Gauze     Dressing Change Due —  —  —  —  —     Drainage Description —  —  —  —  —     HD Output —  —  —  —  —     Row Name 12/29/23 2209 12/29/23 1600 12/28/23 2100 12/28/23 0900 12/28/23 0514    Site Assessment Clean;Dry;Intact  Clean;Dry;Intact  Clean;Dry;Intact  Clean;Dry;Intact  Clean;Dry;Intact     Reason for Continuing Central Line Hemodialysis  Hemodialysis  Hemodialysis  Hemodialysis  Hemodialysis     AV Fistula Bruit;Thrill;Present  Thrill;Bruit;Present  Thrill;Bruit;Present  Thrill;Bruit  Thrill;Bruit;Present     Status Deaccessed  —  —  Deaccessed  Deaccessed     CHG Impregnated Disc —  —  —  —  —     If No, Reason Why? —  —  —  —  —     Dressing Status Clean;Dry;Intact  Clean;Dry;Intact  Clean;Dry;Intact  Clean;Dry;Intact   Clean;Dry;Intact     Site Condition No complications  No complications  No complications  No complications  No complications     Dressing Gauze  Gauze  Gauze  Gauze  Gauze     Dressing Change Due —  —  —  —  —     Drainage Description —  —  —  —  —     HD Output —  2000 mL  —  —  —     Row Name 12/27/23 1400 12/27/23 1100 12/27/23 0700 12/26/23 2200 12/26/23 1955    Site Assessment Clean;Dry;Intact  —  Clean;Dry;Intact  Clean;Dry;Intact  Clean;Dry;Intact     Reason for Continuing Central Line Hemodialysis  —  Hemodialysis  Hemodialysis  Hemodialysis     AV Fistula Thrill;Bruit;Present  —  —  —  —     Status Deaccessed  —  —  —  —     CHG Impregnated Disc —  —  —  —  —     If No, Reason Why? —  —  —  —  —     Dressing Status Clean;Dry;Intact  —  Clean;Dry;Intact  Clean;Dry;Intact  Clean;Dry;Intact     Site Condition No complications  —  No complications  No complications  No complications     Dressing Gauze  —  Gauze  Gauze  Gauze     Dressing Change Due —  —  —  —  —     Drainage Description —  —  —  —  —     HD Output —  2000 mL  —  —  —     Row Name 12/26/23 1000 12/25/23 2000 12/24/23 2000 12/23/23 2000 12/23/23 0800    Site Assessment Clean;Dry;Intact  Clean;Dry;Intact  Clean;Dry;Intact  Clean;Dry;Intact  Clean;Dry;Intact     Reason for Continuing Central Line Hemodialysis  Hemodialysis  Hemodialysis  Hemodialysis  Hemodialysis     AV Fistula —  —  Bruit;Thrill;Present  Bruit;Thrill;Present  Present;Thrill;Bruit     Status —  —  —  —  —     CHG Impregnated Disc —  —  —  —  —     If No, Reason Why? —  —  —  —  —     Dressing Status —  Clean;Dry;Intact  Clean;Dry;Intact  Clean;Dry;Intact  Clean;Dry;Intact     Site Condition —  No complications  No complications  No complications  No complications     Dressing —  —  —  —  —     Dressing Change Due —  —  —  —  —     Drainage Description —  —  —  —  —     HD Output —  —  —  —  —     Row Name 12/23/23 0400 12/23/23 0000 12/22/23 2000 12/22/23 1400 12/22/23 1133     Site Assessment Clean;Dry;Intact  Clean;Dry;Intact  Clean;Dry;Intact  Clean;Intact  Clean;Dry;Intact     Reason for Continuing Central Line Hemodialysis  Hemodialysis  Hemodialysis  Hemodialysis  Hemodialysis     AV Fistula Thrill;Bruit;Present  Thrill;Bruit;Present  Present;Thrill;Bruit  Present  Present     Status —  —  —  Deaccessed  Deaccessed     CHG Impregnated Disc —  —  —  —  —     If No, Reason Why? —  —  —  —  —     Dressing Status Clean;Dry;Intact  Clean;Dry;Intact  Clean;Dry;Intact  Clean;Dry;Intact  Clean;Dry;Intact;Dressing Changed by Other     Site Condition No complications  No complications  No complications  No complications  No complications     Dressing —  —  —  —  —     Dressing Change Due —  —  —  —  —     Drainage Description —  —  —  —  —     HD Output —  —  —  —  2000 mL     Row Name 12/22/23 0800 12/21/23 2000 12/21/23 0800 11/13/23 1935 11/13/23 1851    Site Assessment —  Clean;Dry;Intact  Clean;Dry;Intact  Clean;Dry;Intact  —     Reason for Continuing Central Line —  Hemodialysis  Hemodialysis  —  —     AV Fistula —  Present  Present  Present;Thrill;Bruit  —     Status Accessed  Deaccessed  Deaccessed  Deaccessed  Deaccessed     CHG Impregnated Disc —  —  —  —  —     If No, Reason Why? —  —  —  —  —     Dressing Status —  Clean;Dry;Intact  Clean;Dry;Intact  Clean;Dry;Intact  Clean;Dry;Intact     Site Condition —  No complications  No complications  No complications  No complications     Dressing —  Gauze  Gauze  Gauze  Gauze     Dressing Change Due —  —  —  —  —     Drainage Description —  —  —  —  —     HD Output —  —  —  —  2000 mL     Row Name 11/13/23 1100 11/12/23 2030 11/12/23 1100 11/11/23 2007 11/11/23 1900    Site Assessment Clean;Dry;Intact  Clean;Dry;Intact  Clean;Dry;Intact  Clean;Dry;Intact  —     Reason for Continuing Central Line Hemodialysis  Hemodialysis  Hemodialysis  Hemodialysis  —     AV Fistula Present;Thrill;Bruit  Present;Thrill  Present;Thrill;Bruit   Present;Thrill;Bruit  —     Status Deaccessed  Deaccessed  Deaccessed  Deaccessed  —     CHG Impregnated Disc —  —  —  —  —     If No, Reason Why? —  —  —  —  —     Dressing Status Clean;Dry;Intact  Clean;Dry;Intact  Clean;Dry;Intact  Clean;Dry;Intact  —     Site Condition No complications  No complications  No complications  No complications  —     Dressing Occlusive  —  Occlusive  Occlusive  —     Dressing Change Due —  —  —  —  —     Drainage Description —  —  —  —  —     HD Output —  —  —  —  500 mL     Row Name 11/11/23 1111 11/10/23 2107 11/10/23 0848 11/09/23 2140 11/09/23 1400    Site Assessment Clean;Dry;Intact  Clean;Dry;Intact  Clean;Dry;Intact  Clean;Dry;Intact  —     Reason for Continuing Central Line Hemodialysis  Hemodialysis  Hemodialysis  Hemodialysis  —     AV Fistula Present;Thrill;Bruit  Present;Thrill;Bruit  Thrill;Bruit  Thrill;Bruit  —     Status Deaccessed  Deaccessed  Deaccessed  Deaccessed  —     CHG Impregnated Disc —  —  —  —  —     If No, Reason Why? —  —  —  —  —     Dressing Status Clean;Dry;Intact  Clean;Dry;Intact  Clean;Dry;Intact  Clean;Dry;Intact  —     Site Condition No complications  No complications  No complications  No complications  —     Dressing Open to air (None)  Open to air (None)  Gauze  Gauze  —     Dressing Change Due —  —  —  —  —     Drainage Description —  —  —  —  —     HD Output —  —  —  —  1600 mL     Row Name 11/09/23 0804 11/08/23 2110 11/08/23 1100 11/07/23 2035 11/07/23 0902    Site Assessment Clean;Dry;Intact  Clean;Dry;Intact  Clean;Dry;Intact  Clean;Dry;Intact  Clean;Dry;Intact     Reason for Continuing Central Line Hemodialysis  Hemodialysis  Hemodialysis  Hemodialysis  Hemodialysis     AV Fistula Thrill;Present  Thrill;Present  Thrill;Present  Present;Thrill;Bruit  Present     Status Deaccessed  Deaccessed  Deaccessed  Deaccessed  Deaccessed     CHG Impregnated Disc —  —  —  —  —     If No, Reason Why? —  —  —  —  —     Dressing Status Clean;Dry;Intact   Clean;Dry;Intact  Clean;Dry;Intact  Clean;Dry;Intact  Clean;Dry;Intact     Site Condition No complications  No complications  No complications  No complications  No complications     Dressing Gauze  Gauze  Gauze  Gauze  Gauze     Dressing Change Due —  —  —  —  —     Drainage Description —  —  —  —  —     HD Output —  —  —  —  —     Row Name 11/06/23 2336    Site Assessment Clean;Dry;Intact     Reason for Continuing Central Line Hemodialysis     AV Fistula Present;Thrill;Bruit     Status Deaccessed     CHG Impregnated Disc —     If No, Reason Why? —     Dressing Status Clean;Dry;Intact     Site Condition No complications     Dressing Gauze     Dressing Change Due —     Drainage Description —     HD Output —

## (undated) NOTE — LETTER
[address]  Authorization for Surgical Operation and Procedure                                                                                           I hereby authorize Gary Ellsworth MD, my physician and his/her assistants (if applicable), which may include medical students, residents, and/or fellows, to perform the following surgical operation/ procedure and administer such anesthesia as may be determined necessary by my physician: Operation/Procedure name (s) COLONOSCOPY on [address]   2. I recognize that during the surgical operation/procedure, unforeseen conditions may necessitate additional or different procedures than those listed above. I, therefore, further authorize and request that the above-named surgeon, assistants, or designees perform such procedures as are, in their judgment, necessary and desirable. 3.   My surgeon/physician has discussed prior to my surgery the potential benefits, risks and side effects of this procedure; the likelihood of achieving goals; and potential problems that might occur during recuperation. They also discussed reasonable alternatives to the procedure, including risks, benefits, and side effects related to the alternatives and risks related to not receiving this procedure. I have had all my questions answered and I acknowledge that no guarantee has been made as to the result that may be obtained. 4.   Should the need arise during my operation/procedure, which includes change of level of care prior to discharge, I also consent to the administration of blood and/or blood products. Further, I understand that despite careful testing and screening of blood or blood products by collecting agencies, I may still be subject to ill effects as a result of receiving a blood transfusion and/or blood products.   The following are some, but not all, of the potential risks that can occur: fever and allergic reactions, hemolytic reactions, transmission of diseases such as Hepatitis, AIDS and Cytomegalovirus (CMV) and fluid overload. In the event that I wish to have an autologous transfusion of my own blood, or a directed donor transfusion, I will discuss this with my physician. Check only if Refusing Blood or Blood Products  I understand refusal of blood or blood products as deemed necessary by my physician may have serious consequences to my condition to include possible death. I hereby assume responsibility for my refusal and release the hospital, its personnel, and my physicians from any responsibility for the consequences of my refusal.    o  Refuse   5. I authorize the use of any specimen, organs, tissues, body parts or foreign objects that may be removed from my body during the operation/procedure for diagnosis, research or teaching purposes and their subsequent disposal by hospital authorities. I also authorize the release of specimen test results and/or written reports to my treating physician on the hospital medical staff or other referring or consulting physicians involved in my care, at the discretion of the Pathologist or my treating physician. 6.   I consent to the photographing or videotaping of the operations or procedures to be performed, including appropriate portions of my body for medical, scientific, or educational purposes, provided my identity is not revealed by the pictures or by descriptive texts accompanying them. If the procedure has been photographed/videotaped, the surgeon will obtain the original picture, image, videotape or CD. The hospital will not be responsible for storage, release or maintenance of the picture, image, tape or CD.    7.   I consent to the presence of a  or observers in the operating room as deemed necessary by my physician or their designees.     8.   I recognize that in the event my procedure results in extended X-Ray/fluoroscopy time, I may develop a skin reaction. 9. If I have a Do Not Attempt Resuscitation (DNAR) order in place, that status will be suspended while in the operating room, procedural suite, and during the recovery period unless otherwise explicitly stated by me (or a person authorized to consent on my behalf). The surgeon or my attending physician will determine when the applicable recovery period ends for purposes of reinstating the DNAR order. 10. Patients having a sterilization procedure: I understand that if the procedure is successful the results will be permanent and it will therefore be impossible for me to inseminate, conceive, or bear children. I also understand that the procedure is intended to result in sterility, although the result has not been guaranteed. 11. I acknowledge that my physician has explained sedation/analgesia administration to me including the risk and benefits I consent to the administration of sedation/analgesia as may be necessary or desirable in the judgment of my physician. I CERTIFY THAT I HAVE READ AND FULLY UNDERSTAND THE ABOVE CONSENT TO OPERATION and/or OTHER PROCEDURE.     _________________________________________ _________________________________     ___________________________________  Signature of Patient     Signature of Responsible Person                   Printed Name of Responsible Person                              _________________________________________ ______________________________        ___________________________________  Signature of Witness         Date  Time         Relationship to Patient    STATEMENT OF PHYSICIAN My signature below affirms that prior to the time of the procedure; I have explained to the patient and/or his/her legal representative, the risks and benefits involved in the proposed treatment and any reasonable alternative to the proposed treatment.  I have also explained the risks and benefits involved in refusal of the proposed treatment and alternatives to the proposed treatment and have answered the patient's questions.  If I have a significant financial interest in a co-management agreement or a significant financial interest in any product or implant, or other significant relationship used in this procedure/surgery, I have disclosed this and had a discussion with my patient.     _______________________________________________________________ _____________________________  Cleopatra Neville Physician)                                                                                         (Date)                                   (Time)  Patient Name: Ashely Hester    : 1950   Printed: 2023      Medical Record #: [id number]                                              Page 1 of 1

## (undated) NOTE — LETTER
[facility]  Administration of Anesthesia  1. Ingris Lurdes, or _________________________________ acting on his behalf, (Patient) (Dependent/Representative) request to receive anesthesia for my pending procedure/operation/treatment.   A bleeding, seizure, cardiac arrest and death. 7. AWARENESS: I understand that it is possible (but unlikely) to have explicit memory of events from the operating room while under general anesthesia.   8. ELECTROCONVULSIVE THERAPY PATIENTS: This consent serve below affirms that prior to the time of the procedure, I have explained to the patient and/or his/her guardian, the risks and benefits of undergoing anesthesia, as well as any reasonable alternatives.     ___________________________________________________

## (undated) NOTE — LETTER
[address], [place]  Authorization for Invasive Procedures  Date: 9/25/2023           Time: 1603    I hereby authorize Dr. Blue Martinez, my physician and his/her assistants (if applicable), which may include medical students, residents, and/or fellows, to perform the following surgical operation/ procedure and administer such anesthesia as may be determined necessary by my physician: Left upper extremity fistulogram with possible intervention on Terrence Vines  2. I recognize that during the surgical operation/procedure, unforeseen conditions may necessitate additional or different procedures than those listed above. I, therefore, further authorize and request that the above-named surgeon, assistants, or designees perform such procedures as are, in their judgment, necessary and desirable. 3.   My surgeon/physician has discussed prior to my surgery the potential benefits, risks and side effects of this procedure; the likelihood of achieving goals; and potential problems that might occur during recuperation. They also discussed reasonable alternatives to the procedure, including risks, benefits, and side effects related to the alternatives and risks related to not receiving this procedure. I have had all my questions answered and I acknowledge that no guarantee has been made as to the result that may be obtained. 4.   Should the need arise during my operation/procedure, which includes change of level of care prior to discharge, I also consent to the administration of blood and/or blood products. Further, I understand that despite careful testing and screening of blood or blood products by collecting agencies, I may still be subject to ill effects as a result of receiving a blood transfusion and/or blood products.   The following are some, but not all, of the potential risks that can occur: fever and allergic reactions, hemolytic reactions, transmission of diseases such as Hepatitis, AIDS and Cytomegalovirus (CMV) and fluid overload. In the event that I wish to have an autologous transfusion of my own blood, or a directed donor transfusion, I will discuss this with my physician. Check only if Refusing Blood or Blood Products  I understand refusal of blood or blood products as deemed necessary by my physician may have serious consequences to my condition to include possible death. I hereby assume responsibility for my refusal and release the hospital, its personnel, and my physicians from any responsibility for the consequences of my refusal.         o  Refuse         5. I authorize the use of any specimen, organs, tissues, body parts or foreign objects that may be removed from my body during the operation/procedure for diagnosis, research or teaching purposes and their subsequent disposal by hospital authorities. I also authorize the release of specimen test results and/or written reports to my treating physician on the hospital medical staff or other referring or consulting physicians involved in my care, at the discretion of the Pathologist or my treating physician. 6.   I consent to the photographing or videotaping of the operations or procedures to be performed, including appropriate portions of my body for medical, scientific, or educational purposes, provided my identity is not revealed by the pictures or by descriptive texts accompanying them. If the procedure has been photographed/videotaped, the surgeon will obtain the original picture, image, videotape or CD. The hospital will not be responsible for storage, release or maintenance of the picture, image, tape or CD.    7.   I consent to the presence of a  or observers in the operating room as deemed necessary by my physician or their designees. 8.   I recognize that in the event my procedure results in extended X-Ray/fluoroscopy time, I may develop a skin reaction. 9.  If I have a Do Not Attempt Resuscitation (DNAR) order in place, that status will be suspended while in the operating room, procedural suite, and during the recovery period unless otherwise explicitly stated by me (or a person authorized to consent on my behalf). The surgeon or my attending physician will determine when the applicable recovery period ends for purposes of reinstating the DNAR order. 10. Patients having a sterilization procedure: I understand that if the procedure is successful the results will be permanent and it will therefore be impossible for me to inseminate, conceive, or bear children. I also understand that the procedure is intended to result in sterility, although the result has not been guaranteed. 11. I acknowledge that my physician has explained sedation/analgesia administration to me including the risk and benefits I consent to the administration of sedation/analgesia as may be necessary or desirable in the judgment of my physician.     I CERTIFY THAT I HAVE READ AND FULLY UNDERSTAND THE ABOVE CONSENT TO OPERATION and/or OTHER PROCEDURE.        ____________________________________       _________________________________      ______________________________  Signature of Patient         Signature of Responsible Person        Printed Name of Responsible Person        ____________________________________      _________________________________      ______________________________       Signature of Witness          Relationship to Patient                       Date                                       Time    Patient Name: Ky Burnett     : 1950                 Printed: 2023      Medical Record #: [id number]                      Page 1 of 2          [place] My signature below affirms that prior to the time of the procedure; I have explained to the patient and/or his/her legal representative, the risks and benefits involved in the proposed treatment and any reasonable alternative to the proposed treatment. I have also explained the risks and benefits involved in refusal of the proposed treatment and alternatives to the proposed treatment and have answered the patient's questions.  If I have a significant financial interest in a co-management agreement or a significant financial interest in any product or implant, or other significant relationship used in this procedure/surgery, I have disclosed this and had a discussion with my patient.     _______________________________________________________________ _____________________________  Espinoza Oshea of Physician)                                                                                         (Date)                                   (Time)    Patient Name: Boston Hays     : 1950                 Printed: 2023      Medical Record #: [id number]                      Page 2 of 2

## (undated) NOTE — LETTER
[address], [place]  Authorization for Invasive Procedures  Date: 09/23/2023           Time: 8268    I hereby authorize Dr. Nacho Cabral, my physician and his/her assistants (if applicable), which may include medical students, residents, and/or fellows, to perform the following surgical operation/ procedure and administer such anesthesia as may be determined necessary by my physician: central venous catheter placement on Terrence V[place]  2. I recognize that during the surgical operation/procedure, unforeseen conditions may necessitate additional or different procedures than those listed above. I, therefore, further authorize and request that the above-named surgeon, assistants, or designees perform such procedures as are, in their judgment, necessary and desirable. 3.   My surgeon/physician has discussed prior to my surgery the potential benefits, risks and side effects of this procedure; the likelihood of achieving goals; and potential problems that might occur during recuperation. They also discussed reasonable alternatives to the procedure, including risks, benefits, and side effects related to the alternatives and risks related to not receiving this procedure. I have had all my questions answered and I acknowledge that no guarantee has been made as to the result that may be obtained. 4.   Should the need arise during my operation/procedure, which includes change of level of care prior to discharge, I also consent to the administration of blood and/or blood products. Further, I understand that despite careful testing and screening of blood or blood products by collecting agencies, I may still be subject to ill effects as a result of receiving a blood transfusion and/or blood products.   The following are some, but not all, of the potential risks that can occur: fever and allergic reactions, hemolytic reactions, transmission of diseases such as Hepatitis, AIDS and Cytomegalovirus (CMV) and fluid overload. In the event that I wish to have an autologous transfusion of my own blood, or a directed donor transfusion, I will discuss this with my physician. Check only if Refusing Blood or Blood Products  I understand refusal of blood or blood products as deemed necessary by my physician may have serious consequences to my condition to include possible death. I hereby assume responsibility for my refusal and release the hospital, its personnel, and my physicians from any responsibility for the consequences of my refusal.         o  Refuse         5. I authorize the use of any specimen, organs, tissues, body parts or foreign objects that may be removed from my body during the operation/procedure for diagnosis, research or teaching purposes and their subsequent disposal by hospital authorities. I also authorize the release of specimen test results and/or written reports to my treating physician on the hospital medical staff or other referring or consulting physicians involved in my care, at the discretion of the Pathologist or my treating physician. 6.   I consent to the photographing or videotaping of the operations or procedures to be performed, including appropriate portions of my body for medical, scientific, or educational purposes, provided my identity is not revealed by the pictures or by descriptive texts accompanying them. If the procedure has been photographed/videotaped, the surgeon will obtain the original picture, image, videotape or CD. The hospital will not be responsible for storage, release or maintenance of the picture, image, tape or CD.    7.   I consent to the presence of a  or observers in the operating room as deemed necessary by my physician or their designees. 8.   I recognize that in the event my procedure results in extended X-Ray/fluoroscopy time, I may develop a skin reaction. 9.  If I have a Do Not Attempt Resuscitation (DNAR) order in place, that status will be suspended while in the operating room, procedural suite, and during the recovery period unless otherwise explicitly stated by me (or a person authorized to consent on my behalf). The surgeon or my attending physician will determine when the applicable recovery period ends for purposes of reinstating the DNAR order. 10. Patients having a sterilization procedure: I understand that if the procedure is successful the results will be permanent and it will therefore be impossible for me to inseminate, conceive, or bear children. I also understand that the procedure is intended to result in sterility, although the result has not been guaranteed. 11. I acknowledge that my physician has explained sedation/analgesia administration to me including the risk and benefits I consent to the administration of sedation/analgesia as may be necessary or desirable in the judgment of my physician. I CERTIFY THAT I HAVE READ AND FULLY UNDERSTAND THE ABOVE CONSENT TO OPERATION and/or OTHER PROCEDURE.        ____________________________________       _________________________________      ______________________________  Signature of Patient         Signature of Responsible Person        Printed Name of Responsible Person        ____________________________________      _________________________________      ______________________________       Signature of Witness          Relationship to Patient                       Date                                       Time    Patient Name: Boston Hays     : 1950                 Printed: 2023      Medical Record #: [id number]                      Page 1 of 2          [place] My signature below affirms that prior to the time of the procedure; I have explained to the patient and/or his/her legal representative, the risks and benefits involved in the proposed treatment and any reasonable alternative to the proposed treatment.  I have also explained the risks and benefits involved in refusal of the proposed treatment and alternatives to the proposed treatment and have answered the patient's questions.  If I have a significant financial interest in a co-management agreement or a significant financial interest in any product or implant, or other significant relationship used in this procedure/surgery, I have disclosed this and had a discussion with my patient.     _______________________________________________________________ _____________________________  Teri Elizalde  Physician)                                                                                         (Date)                                   (Time)    Patient Name: Elan Sen     : 1950                 Printed: 2023      Medical Record #: [id number]                      Page 2 of 2

## (undated) NOTE — LETTER
[address], [place]  Authorization for Invasive Procedures  Date: 11/7/2023           Time: 0854    I hereby authorize Dr. Asia Clark, my physician and his/her assistants (if applicable), which may include medical students, residents, and/or fellows, to perform the following surgical operation/ procedure and administer such anesthesia as may be determined necessary by my physician:   esophagogastroduodenoscopy with possible biopsy, banding or control of bleeding    on Terrence V[place]  2. I recognize that during the surgical operation/procedure, unforeseen conditions may necessitate additional or different procedures than those listed above. I, therefore, further authorize and request that the above-named surgeon, assistants, or designees perform such procedures as are, in their judgment, necessary and desirable. 3.   My surgeon/physician has discussed prior to my surgery the potential benefits, risks and side effects of this procedure; the likelihood of achieving goals; and potential problems that might occur during recuperation. They also discussed reasonable alternatives to the procedure, including risks, benefits, and side effects related to the alternatives and risks related to not receiving this procedure. I have had all my questions answered and I acknowledge that no guarantee has been made as to the result that may be obtained. 4.   Should the need arise during my operation/procedure, which includes change of level of care prior to discharge, I also consent to the administration of blood and/or blood products. Further, I understand that despite careful testing and screening of blood or blood products by collecting agencies, I may still be subject to ill effects as a result of receiving a blood transfusion and/or blood products.   The following are some, but not all, of the potential risks that can occur: fever and allergic reactions, hemolytic reactions, transmission of diseases such as Hepatitis, AIDS and Cytomegalovirus (CMV) and fluid overload. In the event that I wish to have an autologous transfusion of my own blood, or a directed donor transfusion, I will discuss this with my physician. Check only if Refusing Blood or Blood Products  I understand refusal of blood or blood products as deemed necessary by my physician may have serious consequences to my condition to include possible death. I hereby assume responsibility for my refusal and release the hospital, its personnel, and my physicians from any responsibility for the consequences of my refusal.         o  Refuse         5. I authorize the use of any specimen, organs, tissues, body parts or foreign objects that may be removed from my body during the operation/procedure for diagnosis, research or teaching purposes and their subsequent disposal by hospital authorities. I also authorize the release of specimen test results and/or written reports to my treating physician on the hospital medical staff or other referring or consulting physicians involved in my care, at the discretion of the Pathologist or my treating physician. 6.   I consent to the photographing or videotaping of the operations or procedures to be performed, including appropriate portions of my body for medical, scientific, or educational purposes, provided my identity is not revealed by the pictures or by descriptive texts accompanying them. If the procedure has been photographed/videotaped, the surgeon will obtain the original picture, image, videotape or CD. The hospital will not be responsible for storage, release or maintenance of the picture, image, tape or CD.    7.   I consent to the presence of a  or observers in the operating room as deemed necessary by my physician or their designees. 8.   I recognize that in the event my procedure results in extended X-Ray/fluoroscopy time, I may develop a skin reaction. 9.  If I have a Do Not Attempt Resuscitation (DNAR) order in place, that status will be suspended while in the operating room, procedural suite, and during the recovery period unless otherwise explicitly stated by me (or a person authorized to consent on my behalf). The surgeon or my attending physician will determine when the applicable recovery period ends for purposes of reinstating the DNAR order. 10. Patients having a sterilization procedure: I understand that if the procedure is successful the results will be permanent and it will therefore be impossible for me to inseminate, conceive, or bear children. I also understand that the procedure is intended to result in sterility, although the result has not been guaranteed. 11. I acknowledge that my physician has explained sedation/analgesia administration to me including the risk and benefits I consent to the administration of sedation/analgesia as may be necessary or desirable in the judgment of my physician.     I CERTIFY THAT I HAVE READ AND FULLY UNDERSTAND THE ABOVE CONSENT TO OPERATION and/or OTHER PROCEDURE.        ____________________________________       _________________________________      ______________________________  Signature of Patient         Signature of Responsible Person        Printed Name of Responsible Person        ____________________________________      _________________________________      ______________________________       Signature of Witness          Relationship to Patient                       Date                                       Time    Patient Name: Tomas Cortez     : 1950                 Printed: 2023      Medical Record #: [id number]                      Page 1 of 2          [place] My signature below affirms that prior to the time of the procedure; I have explained to the patient and/or his/her legal representative, the risks and benefits involved in the proposed treatment and any reasonable alternative to the proposed treatment. I have also explained the risks and benefits involved in refusal of the proposed treatment and alternatives to the proposed treatment and have answered the patient's questions.  If I have a significant financial interest in a co-management agreement or a significant financial interest in any product or implant, or other significant relationship used in this procedure/surgery, I have disclosed this and had a discussion with my patient.     _______________________________________________________________ _____________________________  Isaac Han  )                                                                                         (Date)                                   (Time)    Patient Name: Benny Severino     : 1950                 Printed: 2023      Medical Record #: [id number]                      Page 2 of 2

## (undated) NOTE — LETTER
[address] [place] [address]      Authorization for Surgical Operation and Procedure     Date:___________                                                                                                         Time:_______ all, of the potential risks that can occur: fever and allergic reactions, hemolytic reactions, transmission of diseases such as Hepatitis, AIDS and Cytomegalovirus (CMV) and fluid overload.   In the event that I wish to have an autologous transfusion of my physician will determine when the applicable recovery period ends for purposes of reinstating the DNAR order.   10. Patients having a sterilization procedure: I understand that if the procedure is successful the results will be permanent and it will therefo _______________________________________________________________ _____________________________  Jeffrey Stairs of Physician)                                                                                         (Date)                                   (Time)

## (undated) NOTE — LETTER
Ck Ricks [address], [place], [place]  [address]  INFORMED CONSENT FOR TRANSFUSION OF BLOOD OR BLOOD PRODUCTS  My physician has informed me of the nature, purpose, benefits and risks of transfusion for blood and blood components that he/she may deem necessary during my treatment or hospitalization. He/she has also discussed alternatives to receiving blood from the voluntary blood supply with me, such as self-donation (autologous) and directed donation (blood donated by family or friends to be used specifically for me). I further understand that while the [address] [place] [address] will attempt to supply any autologous or directed donor blood prior to transfusion of blood from the routine blood supply, medical circumstances may require that other or additional blood components may be required for my care. In giving consent, I acknowledge that my physician has also informed me that despite careful screening and testing in accordance with national and regional regulations, there is still a small risk of transmission of infectious agents including hepatitis, HIV-1/2, cytomegalovirus and other viruses or diseases as yet unknown for which licensed definitive screening tests do not currently exist. Additionally, my physician has informed me of the potential for transfusion reactions not related to an infectious agent. [  ]  Check here for Recurring Outpatient Transfusion Therapy (valid for 1 year) In addition to the above, my physician has informed me that I shall receive numerous transfusions over a period of time and that these can lead to other increased risks. I hereby authorize the transfusion of blood and/or blood products to me as deemed necessary and ordered by physicians participating in my care.  My physician has given me the opportunity to ask questions and any questions asked have been answered to my satisfaction  __________________________________________ ______________________________________________  (Signature of Patient)                                                            (Responsible party in case of Minor,                                                                                                 Incompetent, or unconscious Patient)  ___________________________________________       ________ ______________________________________  (Relationship to Patient)                                                       (Signature of Witness)  ______________________________________________________________________________________________   (Date)                                                                           (Time)  REFUSAL OF CONSENT FOR BLOOD TRANSFUSIONS   Sign only if Refusing   [  ] I understand refusal of blood or blood products as deemed necessary by my physician may have serious consequences to my condition to include possible death.  I hereby assume responsibility for my refusal and release the hospital, its personnel, and my physicians from any responsibility for the consequences of my refusal.    ________________________________________________________________________________  (Signature of Patient)                                                         (Responsible Party/Relationship to Patient)    ________________________________________________________________________________  (Signature of Witness)                                                       (Date/Time)     Patient Name: Deven Calderon     : 1950                 Printed: 2023      Medical Record #: [id number]                                 Page 1 of 1

## (undated) NOTE — LETTER
Ck Ricks [address], [place], [address] [id number]  INFORMED CONSENT FOR TRANSFUSION OF BLOOD OR BLOOD PRODUCTS  My physician has informed me of the nature, purpose, benefits and risks of transfusion for blood and blood components that ______________________________________________  (Signature of Patient)                                                            (Responsible party in case of Minor,